# Patient Record
Sex: FEMALE | Race: WHITE | NOT HISPANIC OR LATINO | Employment: PART TIME | URBAN - METROPOLITAN AREA
[De-identification: names, ages, dates, MRNs, and addresses within clinical notes are randomized per-mention and may not be internally consistent; named-entity substitution may affect disease eponyms.]

---

## 2017-01-23 ENCOUNTER — ALLSCRIPTS OFFICE VISIT (OUTPATIENT)
Dept: OTHER | Facility: OTHER | Age: 52
End: 2017-01-23

## 2017-02-06 ENCOUNTER — GENERIC CONVERSION - ENCOUNTER (OUTPATIENT)
Dept: OTHER | Facility: OTHER | Age: 52
End: 2017-02-06

## 2017-03-27 ENCOUNTER — TRANSCRIBE ORDERS (OUTPATIENT)
Dept: ADMINISTRATIVE | Facility: HOSPITAL | Age: 52
End: 2017-03-27

## 2017-03-27 DIAGNOSIS — Z12.31 SCREENING MAMMOGRAM FOR HIGH-RISK PATIENT: Primary | ICD-10-CM

## 2017-03-31 ENCOUNTER — HOSPITAL ENCOUNTER (OUTPATIENT)
Dept: MAMMOGRAPHY | Facility: HOSPITAL | Age: 52
Discharge: HOME | End: 2017-03-31
Payer: COMMERCIAL

## 2017-03-31 DIAGNOSIS — Z12.31 SCREENING MAMMOGRAM FOR HIGH-RISK PATIENT: ICD-10-CM

## 2017-03-31 PROCEDURE — G0202 SCR MAMMO BI INCL CAD: HCPCS

## 2017-09-15 ENCOUNTER — ALLSCRIPTS OFFICE VISIT (OUTPATIENT)
Dept: OTHER | Facility: OTHER | Age: 52
End: 2017-09-15

## 2017-09-15 ENCOUNTER — APPOINTMENT (OUTPATIENT)
Dept: RADIOLOGY | Facility: MEDICAL CENTER | Age: 52
End: 2017-09-15
Payer: COMMERCIAL

## 2017-09-15 DIAGNOSIS — M25.50 PAIN IN JOINT: ICD-10-CM

## 2017-09-15 DIAGNOSIS — R52 PAIN: ICD-10-CM

## 2017-09-15 DIAGNOSIS — M17.11 PRIMARY OSTEOARTHRITIS OF RIGHT KNEE: ICD-10-CM

## 2017-09-15 PROCEDURE — 73562 X-RAY EXAM OF KNEE 3: CPT

## 2017-10-12 ENCOUNTER — TRANSCRIBE ORDERS (OUTPATIENT)
Dept: RADIOLOGY | Facility: MEDICAL CENTER | Age: 52
End: 2017-10-12

## 2017-10-12 ENCOUNTER — APPOINTMENT (OUTPATIENT)
Dept: RADIOLOGY | Facility: MEDICAL CENTER | Age: 52
End: 2017-10-12
Payer: COMMERCIAL

## 2017-10-12 DIAGNOSIS — R52 PAIN: ICD-10-CM

## 2017-10-12 PROCEDURE — 73630 X-RAY EXAM OF FOOT: CPT

## 2017-10-13 ENCOUNTER — GENERIC CONVERSION - ENCOUNTER (OUTPATIENT)
Dept: OTHER | Facility: OTHER | Age: 52
End: 2017-10-13

## 2017-11-27 ENCOUNTER — ALLSCRIPTS OFFICE VISIT (OUTPATIENT)
Dept: OTHER | Facility: OTHER | Age: 52
End: 2017-11-27

## 2017-11-28 NOTE — PROGRESS NOTES
Assessment    1  Chronic constipation (564 00) (K59 09)    Plan  Chronic constipation    · Linzess 72 MCG Oral Capsule; take 1 capsule daily    Chief Complaint  Mrs Hellen Dunn aunts here with recurring constipation she seems to wax and wane but when she is acutely symptomatic her pain is quite severe this is 1 of the times when she is acutely symptomatic she has not gone well for several days abdomen should his there is no guarding our rigid there is no rigidity but there is some guarding in the lower abdomen over the rectosigmoid colon I reviewed her most recent colonoscopy in 2016 she had a small tubular adenoma removed from the lumbar from the lower colon but otherwise did not have any significant pathology or strictures      History of Present Illness  HPI: see chief complaint regarding chronic constipation      Review of Systems   Constitutional: No fever, no chills, feels well, no tiredness, no recent weight gain or loss  ENT: no ear ache, no loss of hearing, no nosebleeds or nasal discharge, no sore throat or hoarseness  Cardiovascular: no complaints of slow or fast heart rate, no chest pain, no palpitations, no leg claudication or lower extremity edema  Respiratory: no complaints of shortness of breath, no wheezing, no dyspnea on exertion, no orthopnea or PND  Breasts: no complaints of breast pain, breast lump or nipple discharge  Gastrointestinal: abdominal pain-- and-- constipation  Genitourinary: no complaints of dysuria, no incontinence, no pelvic pain, no dysmenorrhea, no vaginal discharge or abnormal vaginal bleeding  Musculoskeletal: no complaints of arthralgia, no myalgia, no joint swelling or stiffness, no limb pain or swelling  Integumentary: no complaints of skin rash or lesion, no itching or dry skin, no skin wounds  Neurological: no complaints of headache, no confusion, no numbness or tingling, no dizziness or fainting  ROS reviewed  Active Problems    1   Ache in joint (318 81) (M25 50)   2  Acute pain (338 19) (R52)   3  Anemia (285 9) (D64 9)   4  Depression screening (V79 0) (Z13 89)   5  Diverticulosis (562 10) (K57 90)   6  Encounter for routine gynecological examination (V72 31) (Z01 419)   7  Encounter for screening colonoscopy (V76 51) (Z12 11)   8  Encounter for screening mammogram for malignant neoplasm of breast (V76 12) (Z12 31)   9  Erysipelas (035) (A46)   10  Esophageal reflux (530 81) (K21 9)   11  Fatigue (780 79) (R53 83)   12  History of stomach ulcers (V12 79) (Z87 19)   13  Hyperlipidemia (272 4) (E78 5)   14  Hypertension (401 9) (I10)   15  Leg pain, left (729 5) (M79 605)   16  Non-rheumatic mitral regurgitation (424 0) (I34 0)   17  Primary osteoarthritis of left knee (715 16) (M17 12)   18  Systolic murmur (353 8) (H46 6)   19  Thyroid disorder (246 9) (E07 9)   20  Vaginal dryness, menopausal (627 2) (N95 1)   21  Vitamin D deficiency (268 9) (E55 9)   22  Vulvitis (616 10) (N76 2)    Past Medical History  1  History of Acute pansinusitis (461 8) (J01 40)   2  History of Acute tracheobronchitis (466 0) (J20 9)   3  Common cold (460) (J00)   4  History of Dermatitis (692 9) (L30 9)   5  History of Endometrial polyp (621 0) (N84 0)   6  History of Hand Injury (959 4)   7  History of High risk medication use (V58 69) (Z79 899)   8  History Of 5  Previous Pregnancies (V61 5)   9  History of dysfunctional uterine bleeding (V13 29) (Z87 42)   10  History of Renal colic (941 9) (A60)   11  History of Sinusitis (473 9) (J32 9)   12  Sore throat (462) (J02 9)   13  History of Viral gastroenteritis (008 8) (A08 4)   14  History of Viral syndrome (079 99) (B34 9)  Active Problems And Past Medical History Reviewed: The active problems and past medical history were reviewed and updated today  Family History  Mother    1  Family history of Type 2 diabetes mellitus (250 00) (E11 9)  Father    2   Family history of congestive heart failure (V17 49) (Z82 49)  Family History 3  Denied: Family history of Breast Cancer   4  Denied: Family history of Colon Cancer   5  Denied: Family history of Osteoporosis   6  Denied: Family history of Ovarian Cancer   7  Denied: Family history of Uterine Cancer  Family History Reviewed: The family history was reviewed and updated today  Social History   · Being A Social Drinker   · Denied: Drug use (305 90) (F19 90)   · Former smoker (V65 82) (E56 038)   · quit 2003  The social history was reviewed and updated today  The social history was reviewed and is unchanged  Surgical History    1  History of Cholecystectomy   2  History of Gynecologic Services Thermal Endometrial Ablation   3  History of Hysteroscopy   4  History of Near-Total Thyroidectomy   5  History of Tonsillectomy With Adenoidectomy   6  History of Tubal Ligation  Surgical History Reviewed: The surgical history was reviewed and updated today  Current Meds   1  Aspirin 81 MG CAPS; Therapy: (Recorded:58Ulf1450) to Recorded   2  Atorvastatin Calcium 10 MG Oral Tablet; take 1 tablet by mouth once daily; Therapy: [id number] to (Astrid Jennings)  Requested for: 80Vpe2487; Last Rx:86Lom3288 Ordered   3  Lisinopril 10 MG Oral Tablet; TAKE 1 TABLET DAILY AS DIRECTED; Therapy: 21Jun2016 to (Evaluate:17Jan2018)  Requested for: 61Tye8398; Last Rx:45Xju2509 Ordered   4  Prevacid CPDR; TAKE 1 CAPSULE Daily Recorded   5  RA Vitamin D-3 2000 UNIT Oral Capsule; One daily; Therapy: 94Buv4058 to (Last Rx:28Cdq6457) Ordered    The medication list was reviewed and updated today  Allergies  1  No Known Drug Allergies    Vitals   Recorded: 83MTD1081 67:14ZP   Systolic 690   Diastolic 84       Physical Exam   Constitutional  General appearance: Abnormal  -- obese  Ears, Nose, Mouth, and Throat  External inspection of ears and nose: Normal    Otoscopic examination: Tympanic membranes translucent with normal light reflex  Canals patent without erythema     Pulmonary  Respiratory effort: No increased work of breathing or signs of respiratory distress  Auscultation of lungs: Clear to auscultation  Cardiovascular  Palpation of heart: Normal PMI, no thrills  Auscultation of heart: Normal rate and rhythm, normal S1 and S2, without murmurs  Abdomen  Abdomen: Abnormal  -- tenderness inferior to the umbilicus  Liver and spleen: No hepatomegaly or splenomegaly           Signatures   Electronically signed by : Aida Gary DO; Nov 27 2017  4:37PM EST                       (Author)

## 2017-11-29 ENCOUNTER — TRANSCRIBE ORDERS (OUTPATIENT)
Dept: ADMINISTRATIVE | Facility: HOSPITAL | Age: 52
End: 2017-11-29

## 2017-11-29 ENCOUNTER — APPOINTMENT (EMERGENCY)
Dept: CT IMAGING | Facility: HOSPITAL | Age: 52
End: 2017-11-29
Payer: COMMERCIAL

## 2017-11-29 ENCOUNTER — APPOINTMENT (OUTPATIENT)
Dept: LAB | Facility: HOSPITAL | Age: 52
End: 2017-11-29
Attending: FAMILY MEDICINE
Payer: COMMERCIAL

## 2017-11-29 ENCOUNTER — HOSPITAL ENCOUNTER (EMERGENCY)
Facility: HOSPITAL | Age: 52
Discharge: HOME | End: 2017-11-29
Attending: EMERGENCY MEDICINE
Payer: COMMERCIAL

## 2017-11-29 ENCOUNTER — HOSPITAL ENCOUNTER (OUTPATIENT)
Dept: RADIOLOGY | Facility: HOSPITAL | Age: 52
Discharge: HOME | End: 2017-11-29
Attending: FAMILY MEDICINE
Payer: COMMERCIAL

## 2017-11-29 VITALS
TEMPERATURE: 98.5 F | HEART RATE: 82 BPM | RESPIRATION RATE: 18 BRPM | SYSTOLIC BLOOD PRESSURE: 159 MMHG | OXYGEN SATURATION: 100 % | DIASTOLIC BLOOD PRESSURE: 75 MMHG | WEIGHT: 210 LBS | BODY MASS INDEX: 39.68 KG/M2

## 2017-11-29 DIAGNOSIS — R52 PAIN: ICD-10-CM

## 2017-11-29 DIAGNOSIS — K57.92 DIVERTICULITIS: Primary | ICD-10-CM

## 2017-11-29 LAB
ALBUMIN SERPL BCP-MCNC: 3.2 G/DL (ref 3.5–5)
ALP SERPL-CCNC: 110 U/L (ref 46–116)
ALT SERPL W P-5'-P-CCNC: 20 U/L (ref 12–78)
ANION GAP SERPL CALCULATED.3IONS-SCNC: 8 MMOL/L (ref 4–13)
AST SERPL W P-5'-P-CCNC: 12 U/L (ref 5–45)
BACTERIA UR QL AUTO: ABNORMAL /HPF
BASOPHILS # BLD AUTO: 0.05 THOUSANDS/ΜL (ref 0–0.1)
BASOPHILS NFR BLD AUTO: 0 % (ref 0–1)
BILIRUB SERPL-MCNC: 1.4 MG/DL (ref 0.2–1)
BILIRUB UR QL STRIP: ABNORMAL
BUN SERPL-MCNC: 11 MG/DL (ref 5–25)
CALCIUM SERPL-MCNC: 9.7 MG/DL (ref 8.3–10.1)
CHLORIDE SERPL-SCNC: 98 MMOL/L (ref 100–108)
CLARITY UR: CLEAR
CO2 SERPL-SCNC: 31 MMOL/L (ref 21–32)
COLOR UR: YELLOW
CREAT SERPL-MCNC: 0.74 MG/DL (ref 0.6–1.3)
EOSINOPHIL # BLD AUTO: 0.02 THOUSAND/ΜL (ref 0–0.61)
EOSINOPHIL NFR BLD AUTO: 0 % (ref 0–6)
ERYTHROCYTE [DISTWIDTH] IN BLOOD BY AUTOMATED COUNT: 11.7 % (ref 11.6–15.1)
GFR SERPL CREATININE-BSD FRML MDRD: 93 ML/MIN/1.73SQ M
GLUCOSE SERPL-MCNC: 82 MG/DL (ref 65–140)
GLUCOSE UR STRIP-MCNC: NEGATIVE MG/DL
HCT VFR BLD AUTO: 40.1 % (ref 34.8–46.1)
HGB BLD-MCNC: 14 G/DL (ref 11.5–15.4)
HGB UR QL STRIP.AUTO: ABNORMAL
KETONES UR STRIP-MCNC: ABNORMAL MG/DL
LEUKOCYTE ESTERASE UR QL STRIP: NEGATIVE
LYMPHOCYTES # BLD AUTO: 1.98 THOUSANDS/ΜL (ref 0.6–4.47)
LYMPHOCYTES NFR BLD AUTO: 16 % (ref 14–44)
MCH RBC QN AUTO: 30.4 PG (ref 26.8–34.3)
MCHC RBC AUTO-ENTMCNC: 34.9 G/DL (ref 31.4–37.4)
MCV RBC AUTO: 87 FL (ref 82–98)
MONOCYTES # BLD AUTO: 0.87 THOUSAND/ΜL (ref 0.17–1.22)
MONOCYTES NFR BLD AUTO: 7 % (ref 4–12)
NEUTROPHILS # BLD AUTO: 9.12 THOUSANDS/ΜL (ref 1.85–7.62)
NEUTS SEG NFR BLD AUTO: 77 % (ref 43–75)
NITRITE UR QL STRIP: NEGATIVE
NON-SQ EPI CELLS URNS QL MICRO: ABNORMAL /HPF
PH UR STRIP.AUTO: 6 [PH] (ref 4.5–8)
PLATELET # BLD AUTO: 307 THOUSANDS/UL (ref 149–390)
PMV BLD AUTO: 10.1 FL (ref 8.9–12.7)
POTASSIUM SERPL-SCNC: 3.3 MMOL/L (ref 3.5–5.3)
PROT SERPL-MCNC: 7.8 G/DL (ref 6.4–8.2)
PROT UR STRIP-MCNC: NEGATIVE MG/DL
RBC # BLD AUTO: 4.6 MILLION/UL (ref 3.81–5.12)
RBC #/AREA URNS AUTO: ABNORMAL /HPF
SODIUM SERPL-SCNC: 137 MMOL/L (ref 136–145)
SP GR UR STRIP.AUTO: 1.02 (ref 1–1.03)
UROBILINOGEN UR QL STRIP.AUTO: 1 E.U./DL
WBC # BLD AUTO: 12.04 THOUSAND/UL (ref 4.31–10.16)
WBC #/AREA URNS AUTO: ABNORMAL /HPF

## 2017-11-29 PROCEDURE — 99284 EMERGENCY DEPT VISIT MOD MDM: CPT

## 2017-11-29 PROCEDURE — 80053 COMPREHEN METABOLIC PANEL: CPT | Performed by: EMERGENCY MEDICINE

## 2017-11-29 PROCEDURE — 85025 COMPLETE CBC W/AUTO DIFF WBC: CPT

## 2017-11-29 PROCEDURE — 36415 COLL VENOUS BLD VENIPUNCTURE: CPT

## 2017-11-29 PROCEDURE — 81001 URINALYSIS AUTO W/SCOPE: CPT | Performed by: EMERGENCY MEDICINE

## 2017-11-29 PROCEDURE — 96360 HYDRATION IV INFUSION INIT: CPT

## 2017-11-29 PROCEDURE — 74022 RADEX COMPL AQT ABD SERIES: CPT

## 2017-11-29 PROCEDURE — 74177 CT ABD & PELVIS W/CONTRAST: CPT

## 2017-11-29 RX ORDER — MORPHINE SULFATE 15 MG/1
7.5 TABLET ORAL EVERY 6 HOURS PRN
Qty: 8 TABLET | Refills: 0 | Status: SHIPPED | OUTPATIENT
Start: 2017-11-29 | End: 2017-12-07 | Stop reason: HOSPADM

## 2017-11-29 RX ORDER — DICYCLOMINE HCL 20 MG
20 TABLET ORAL ONCE
Status: COMPLETED | OUTPATIENT
Start: 2017-11-29 | End: 2017-11-29

## 2017-11-29 RX ORDER — CIPROFLOXACIN 500 MG/1
500 TABLET, FILM COATED ORAL 2 TIMES DAILY
Qty: 14 TABLET | Refills: 0 | Status: ON HOLD | OUTPATIENT
Start: 2017-11-29 | End: 2017-12-04 | Stop reason: SINTOL

## 2017-11-29 RX ORDER — DICYCLOMINE HCL 20 MG
20 TABLET ORAL 3 TIMES DAILY PRN
Qty: 30 TABLET | Refills: 0 | Status: ON HOLD | OUTPATIENT
Start: 2017-11-29 | End: 2017-12-04 | Stop reason: SINTOL

## 2017-11-29 RX ORDER — CIPROFLOXACIN 500 MG/1
500 TABLET, FILM COATED ORAL ONCE
Status: COMPLETED | OUTPATIENT
Start: 2017-11-29 | End: 2017-11-29

## 2017-11-29 RX ORDER — METRONIDAZOLE 500 MG/1
500 TABLET ORAL ONCE
Status: COMPLETED | OUTPATIENT
Start: 2017-11-29 | End: 2017-11-29

## 2017-11-29 RX ORDER — METRONIDAZOLE 500 MG/1
500 TABLET ORAL EVERY 8 HOURS SCHEDULED
Qty: 21 TABLET | Refills: 0 | Status: ON HOLD | OUTPATIENT
Start: 2017-11-29 | End: 2017-12-04 | Stop reason: SINTOL

## 2017-11-29 RX ADMIN — IOHEXOL 100 ML: 350 INJECTION, SOLUTION INTRAVENOUS at 17:39

## 2017-11-29 RX ADMIN — METRONIDAZOLE 500 MG: 500 TABLET ORAL at 18:34

## 2017-11-29 RX ADMIN — CIPROFLOXACIN 500 MG: 500 TABLET, FILM COATED ORAL at 18:34

## 2017-11-29 RX ADMIN — DICYCLOMINE HYDROCHLORIDE 20 MG: 20 TABLET ORAL at 16:48

## 2017-11-29 RX ADMIN — SODIUM CHLORIDE 1000 ML: 0.9 INJECTION, SOLUTION INTRAVENOUS at 17:08

## 2017-11-29 NOTE — ED PROVIDER NOTES
History  Chief Complaint   Patient presents with    Constipation     Patient has been constipated for the past 2 weeks says she is able to go to the bathroom but only goes a little bit  Last BM was today  Patient was seen by Dr Laila Gutierrez today and was sent for an xray and bloodwork  Patient states Dr Laila Gutierrez called her and told her everything was normal but wanted her to come in for a Cat Scan  HPI     47 yoF presents with two weeks of difficulty stooling  Having small BMs, most times firm but sometimes soft, always brown, and still yet feeling constipated  She admits to eating almost no fiber and hasn't tried metamucil or other bulking agents  No obstipation or tenesmus  No fevers, chills, sweats  Having some discomfort, especially in the LLQ  Had colonoscopy a year ago with diverticulosis  No radiating pain  Achey, cramping  Tried dulcolax without relief  Had abd x-ray and CBC today, unremarkable, sent here by PCP for CT imaging  No cp, sob, weakness, light-headedness, urinary changes  MDM:  Imp: constipation and LLQ discomfort, worrisome for colitis, diverticulitis, ovarian cyst, uti, etc   Warrants labs, UA, CT abd, symptom control  Prior to Admission Medications   Prescriptions Last Dose Informant Patient Reported? Taking?    Cholecalciferol (VITAMIN D) 2000 UNITS CAPS   Yes No   Sig: Take by mouth   aspirin 81 MG tablet   Yes No   Sig: Take 81 mg by mouth daily   atorvastatin (LIPITOR) 10 mg tablet   Yes No   Sig: Take 10 mg by mouth daily   lansoprazole (PREVACID) 30 mg capsule   Yes No   Sig: Take 30 mg by mouth daily   lisinopril (ZESTRIL) 10 mg tablet   Yes No   Sig: Take 10 mg by mouth daily      Facility-Administered Medications: None       Past Medical History:   Diagnosis Date    Disease of thyroid gland     Hypertension     Vitamin D deficiency disease        Past Surgical History:   Procedure Laterality Date    CHOLECYSTECTOMY      HYSTERECTOMY      NM ESOPHAGOGASTRODUODENOSCOPY TRANSORAL DIAGNOSTIC N/A 10/21/2016    Procedure: EGD AND COLONOSCOPY;  Surgeon: Chiquita Hagan MD;  Location: [facility];  Service: Gastroenterology    THYROIDECTOMY, PARTIAL      TONSILLECTOMY AND ADENOIDECTOMY      TUBAL LIGATION         History reviewed  No pertinent family history  I have reviewed and agree with the history as documented  Social History   Substance Use Topics    Smoking status: Former Smoker    Smokeless tobacco: Not on file    Alcohol use Yes      Comment: social        Review of Systems   Constitutional: Negative for chills, fatigue and fever  HENT: Negative for congestion, ear pain, rhinorrhea, sinus pressure, sore throat, trouble swallowing and voice change  Eyes: Negative for pain and visual disturbance  Respiratory: Negative for cough, choking, shortness of breath and stridor  Cardiovascular: Negative for chest pain, palpitations and leg swelling  Gastrointestinal: Positive for abdominal pain and constipation  Negative for blood in stool, diarrhea, nausea and vomiting  Endocrine: Negative  Genitourinary: Negative for dysuria, flank pain, hematuria, pelvic pain and urgency  Musculoskeletal: Negative  Skin: Negative  Allergic/Immunologic: Negative  Neurological: Negative for dizziness, speech difficulty, weakness, light-headedness and numbness  Hematological: Negative  Psychiatric/Behavioral: Negative  Physical Exam  ED Triage Vitals [11/29/17 1614]   Temperature Pulse Respirations Blood Pressure SpO2   98 5 °F (36 9 °C) 95 18 141/83 96 %      Temp Source Heart Rate Source Patient Position - Orthostatic VS BP Location FiO2 (%)   Temporal Monitor Sitting Left arm --      Pain Score       7           Orthostatic Vital Signs  Vitals:    11/29/17 1614   BP: 141/83   Pulse: 95   Patient Position - Orthostatic VS: Sitting       Physical Exam   Constitutional: She is oriented to person, place, and time   She appears well-developed and well-nourished  She appears distressed (uncomfortable)  HENT:   Head: Normocephalic and atraumatic  Nose: Nose normal    Mouth/Throat: Oropharynx is clear and moist    Eyes: Conjunctivae and EOM are normal  Pupils are equal, round, and reactive to light  Neck: Normal range of motion  Neck supple  Cardiovascular: Normal rate, regular rhythm, normal heart sounds and intact distal pulses  Exam reveals no gallop and no friction rub  No murmur heard  Pulmonary/Chest: Effort normal and breath sounds normal  No stridor  No respiratory distress  She has no wheezes  She has no rales  She exhibits no tenderness  Abdominal: Soft  Bowel sounds are normal  She exhibits no distension  There is tenderness (LLQ)  There is no rebound and no guarding  Musculoskeletal: Normal range of motion  She exhibits no deformity  Neurological: She is alert and oriented to person, place, and time  She exhibits normal muscle tone  Skin: Skin is warm and dry  No rash noted  No erythema  Psychiatric: She has a normal mood and affect  Vitals reviewed        ED Medications  Medications   ciprofloxacin (CIPRO) tablet 500 mg (not administered)   metroNIDAZOLE (FLAGYL) tablet 500 mg (not administered)   dicyclomine (BENTYL) tablet 20 mg (20 mg Oral Given 11/29/17 1648)   sodium chloride 0 9 % bolus 1,000 mL (1,000 mL Intravenous New Bag 11/29/17 1708)   iohexol (OMNIPAQUE) 350 MG/ML injection (SINGLE-DOSE) 100 mL (100 mL Intravenous Given 11/29/17 1739)       Diagnostic Studies  Results Reviewed     Procedure Component Value Units Date/Time    Comprehensive metabolic panel [[id number]]  (Abnormal) Collected:  11/29/17 1648    Lab Status:  Final result Specimen:  Blood from Arm, Left Updated:  11/29/17 1712     Sodium 137 mmol/L      Potassium 3 3 (L) mmol/L      Chloride 98 (L) mmol/L      CO2 31 mmol/L      Anion Gap 8 mmol/L      BUN 11 mg/dL      Creatinine 0 74 mg/dL      Glucose 82 mg/dL      Calcium 9 7 mg/dL      AST 12 U/L      ALT 20 U/L      Alkaline Phosphatase 110 U/L      Total Protein 7 8 g/dL      Albumin 3 2 (L) g/dL      Total Bilirubin 1 40 (H) mg/dL      eGFR 93 ml/min/1 73sq m     Narrative:         National Kidney Disease Education Program recommendations are as follows:  GFR calculation is accurate only with a steady state creatinine  Chronic Kidney disease less than 60 ml/min/1 73 sq  meters  Kidney failure less than 15 ml/min/1 73 sq  meters  Urine Microscopic [[id number]]  (Abnormal) Collected:  11/29/17 1648    Lab Status:  Final result Specimen:  Urine from Urine, Clean Catch Updated:  11/29/17 1703     RBC, UA 1-2 (A) /hpf      WBC, UA 1-2 (A) /hpf      Epithelial Cells Moderate (A) /hpf      Bacteria, UA Occasional /hpf     UA w Reflex to Microscopic w Reflex to Culture [[id number]]  (Abnormal) Collected:  11/29/17 1648    Lab Status:  Final result Specimen:  Urine from Urine, Clean Catch Updated:  11/29/17 1654     Color, UA Yellow     Clarity, UA Clear     Specific Sublimity, UA 1 020     pH, UA 6 0     Leukocytes, UA Negative     Nitrite, UA Negative     Protein, UA Negative mg/dl      Glucose, UA Negative mg/dl      Ketones, UA 40 (2+) (A) mg/dl      Urobilinogen, UA 1 0 E U /dl      Bilirubin, UA Interference- unable to analyze (A)     Blood, UA Trace-lysed (A)                 CT abdomen pelvis w contrast   Final Result by Nereida Barton MD (11/29 1754)      Findings most compatible with recurrent acute diverticulitis of the sigmoid colon  No abscess or perforation or obstruction seen  Recommend appropriate clinical follow-up to ensure resolution and exclude the possibility of any underlying neoplastic lesion  Multi fibroid uterus  Cholecystectomy           Workstation performed: [id number]                    Procedures  Procedures       Phone Contacts  ED Phone Contact    ED Course  ED Course                                MDM  CritCare Time    Disposition  Final diagnoses:   Diverticulitis     Time reflects when diagnosis was documented in both MDM as applicable and the Disposition within this note     Time User Action Codes Description Comment    11/29/2017  6:11 PM Escobar Stubbs Add [K57 92] Diverticulitis       ED Disposition     ED Disposition Condition Comment    Discharge  [place]  discharge to home/self care  Condition at discharge: Good        Follow-up Information     Follow up With Specialties Details Why Contact Info    Aime Franco DO Family Medicine Schedule an appointment as soon as possible for a visit in 3 days for re-evaluation [address]  [address]  [phone]          Patient's Medications   Discharge Prescriptions    CIPROFLOXACIN (CIPRO) 500 MG TABLET    Take 1 tablet by mouth 2 (two) times a day for 7 days       Start Date: 11/29/2017End Date: 12/6/2017       Order Dose: 500 mg       Quantity: 14 tablet    Refills: 0    DICYCLOMINE (BENTYL) 20 MG TABLET    Take 1 tablet by mouth 3 (three) times a day as needed (cramping or abdominal pain) for up to 10 days       Start Date: 11/29/2017End Date: 12/9/2017       Order Dose: 20 mg       Quantity: 30 tablet    Refills: 0    METRONIDAZOLE (FLAGYL) 500 MG TABLET    Take 1 tablet by mouth every 8 (eight) hours for 7 days       Start Date: 11/29/2017End Date: 12/6/2017       Order Dose: 500 mg       Quantity: 21 tablet    Refills: 0    MORPHINE (MSIR) 15 MG TABLET    Take 0 5 tablets by mouth every 6 (six) hours as needed for severe pain for up to 10 days Max Daily Amount: 30 mg       Start Date: 11/29/2017End Date: 12/9/2017       Order Dose: 7 5 mg       Quantity: 8 tablet    Refills: 0     No discharge procedures on file      ED Provider  Electronically Signed by           Escobar Rosado DO  11/29/17 3448

## 2017-11-29 NOTE — DISCHARGE INSTRUCTIONS
Diverticulitis   WHAT YOU NEED TO KNOW:   Diverticulitis is a condition that causes small pockets along your intestine called diverticula to become inflamed or infected  This is caused by hard bowel movements, food, or bacteria that get stuck in the pockets  DISCHARGE INSTRUCTIONS:   Return to the emergency department if:   · You have bowel movement or foul-smelling discharge leaking from your vagina or in your urine  · You have severe diarrhea  · You urinate less than usual or not at all  · You are not able to have a bowel movement  · You cannot stop vomiting  · You have severe abdominal pain, a fever, and your abdomen is larger than usual      · You have new or increased blood in your bowel movements  Contact your healthcare provider if:   · You have pain when you urinate  · Your symptoms get worse or do not go away  · You have questions or concerns about your condition or care  Medicines:   · Antibiotics  may be given to help treat a bacterial infection  · Prescription pain medicine  may be given  Ask your healthcare provider how to take this medicine safely  Some prescription pain medicines contain acetaminophen  Do not take other medicines that contain acetaminophen without talking to your healthcare provider  Too much acetaminophen may cause liver damage  Prescription pain medicine may cause constipation  Ask your healthcare provider how to prevent or treat constipation  · Take your medicine as directed  Contact your healthcare provider if you think your medicine is not helping or if you have side effects  Tell him or her if you are allergic to any medicine  Keep a list of the medicines, vitamins, and herbs you take  Include the amounts, and when and why you take them  Bring the list or the pill bottles to follow-up visits  Carry your medicine list with you in case of an emergency  Clear liquid diet:  A clear liquid diet includes any liquids that you can see through  Examples include water, ginger-delta, cranberry or apple juice, frozen fruit ice, or broth  Stay on a clear liquid diet until your symptoms are gone, or as directed  Follow up with your healthcare provider as directed: You may need to return for a colonoscopy  When your symptoms are gone, you may need a low-fat, high-fiber diet to prevent diverticulitis from developing again  Your healthcare provider or dietitian can help you create meal plans  Write down your questions so you remember to ask them during your visits  © 2017 [facility] Information is for End User's use only and may not be sold, redistributed or otherwise used for commercial purposes  All illustrations and images included in CareNotes® are the copyrighted property of [employer]  or Yonis Stover  The above information is an  only  It is not intended as medical advice for individual conditions or treatments  Talk to your doctor, nurse or pharmacist before following any medical regimen to see if it is safe and effective for you

## 2017-12-04 ENCOUNTER — APPOINTMENT (EMERGENCY)
Dept: CT IMAGING | Facility: HOSPITAL | Age: 52
DRG: 392 | End: 2017-12-04
Payer: COMMERCIAL

## 2017-12-04 ENCOUNTER — HOSPITAL ENCOUNTER (INPATIENT)
Facility: HOSPITAL | Age: 52
LOS: 3 days | Discharge: HOME | DRG: 392 | End: 2017-12-07
Attending: EMERGENCY MEDICINE | Admitting: INTERNAL MEDICINE
Payer: COMMERCIAL

## 2017-12-04 DIAGNOSIS — K57.92 DIVERTICULITIS: Primary | ICD-10-CM

## 2017-12-04 DIAGNOSIS — K63.0 PERICOLONIC ABSCESS: ICD-10-CM

## 2017-12-04 PROBLEM — E78.5 DYSLIPIDEMIA: Status: ACTIVE | Noted: 2017-12-04

## 2017-12-04 PROBLEM — I10 ESSENTIAL HYPERTENSION: Status: ACTIVE | Noted: 2017-12-04

## 2017-12-04 PROBLEM — E87.6 HYPOKALEMIA: Status: ACTIVE | Noted: 2017-12-04

## 2017-12-04 PROBLEM — K21.9 GASTROESOPHAGEAL REFLUX DISEASE WITHOUT ESOPHAGITIS: Chronic | Status: ACTIVE | Noted: 2017-12-04

## 2017-12-04 LAB
ALBUMIN SERPL BCP-MCNC: 3.1 G/DL (ref 3.5–5)
ALP SERPL-CCNC: 102 U/L (ref 46–116)
ALT SERPL W P-5'-P-CCNC: 21 U/L (ref 12–78)
ANION GAP SERPL CALCULATED.3IONS-SCNC: 11 MMOL/L (ref 4–13)
AST SERPL W P-5'-P-CCNC: 12 U/L (ref 5–45)
BASOPHILS # BLD AUTO: 0.04 THOUSANDS/ΜL (ref 0–0.1)
BASOPHILS NFR BLD AUTO: 0 % (ref 0–1)
BILIRUB SERPL-MCNC: 0.9 MG/DL (ref 0.2–1)
BUN SERPL-MCNC: 8 MG/DL (ref 5–25)
CALCIUM SERPL-MCNC: 8.9 MG/DL (ref 8.3–10.1)
CHLORIDE SERPL-SCNC: 99 MMOL/L (ref 100–108)
CO2 SERPL-SCNC: 26 MMOL/L (ref 21–32)
CREAT SERPL-MCNC: 0.7 MG/DL (ref 0.6–1.3)
EOSINOPHIL # BLD AUTO: 0.02 THOUSAND/ΜL (ref 0–0.61)
EOSINOPHIL NFR BLD AUTO: 0 % (ref 0–6)
ERYTHROCYTE [DISTWIDTH] IN BLOOD BY AUTOMATED COUNT: 12 % (ref 11.6–15.1)
GFR SERPL CREATININE-BSD FRML MDRD: 99 ML/MIN/1.73SQ M
GLUCOSE SERPL-MCNC: 125 MG/DL (ref 65–140)
HCT VFR BLD AUTO: 42.2 % (ref 34.8–46.1)
HGB BLD-MCNC: 14.8 G/DL (ref 11.5–15.4)
LACTATE SERPL-SCNC: 1.2 MMOL/L (ref 0.5–2)
LIPASE SERPL-CCNC: 403 U/L (ref 73–393)
LYMPHOCYTES # BLD AUTO: 1.3 THOUSANDS/ΜL (ref 0.6–4.47)
LYMPHOCYTES NFR BLD AUTO: 8 % (ref 14–44)
MCH RBC QN AUTO: 30.5 PG (ref 26.8–34.3)
MCHC RBC AUTO-ENTMCNC: 35.1 G/DL (ref 31.4–37.4)
MCV RBC AUTO: 87 FL (ref 82–98)
MONOCYTES # BLD AUTO: 0.97 THOUSAND/ΜL (ref 0.17–1.22)
MONOCYTES NFR BLD AUTO: 6 % (ref 4–12)
NEUTROPHILS # BLD AUTO: 14.21 THOUSANDS/ΜL (ref 1.85–7.62)
NEUTS SEG NFR BLD AUTO: 86 % (ref 43–75)
PLATELET # BLD AUTO: 372 THOUSANDS/UL (ref 149–390)
PMV BLD AUTO: 10.2 FL (ref 8.9–12.7)
POTASSIUM SERPL-SCNC: 3.2 MMOL/L (ref 3.5–5.3)
PROT SERPL-MCNC: 7.8 G/DL (ref 6.4–8.2)
RBC # BLD AUTO: 4.86 MILLION/UL (ref 3.81–5.12)
SODIUM SERPL-SCNC: 136 MMOL/L (ref 136–145)
WBC # BLD AUTO: 16.54 THOUSAND/UL (ref 4.31–10.16)

## 2017-12-04 PROCEDURE — 83605 ASSAY OF LACTIC ACID: CPT | Performed by: EMERGENCY MEDICINE

## 2017-12-04 PROCEDURE — 36415 COLL VENOUS BLD VENIPUNCTURE: CPT | Performed by: EMERGENCY MEDICINE

## 2017-12-04 PROCEDURE — 74177 CT ABD & PELVIS W/CONTRAST: CPT

## 2017-12-04 PROCEDURE — 96375 TX/PRO/DX INJ NEW DRUG ADDON: CPT

## 2017-12-04 PROCEDURE — 96361 HYDRATE IV INFUSION ADD-ON: CPT

## 2017-12-04 PROCEDURE — 99285 EMERGENCY DEPT VISIT HI MDM: CPT

## 2017-12-04 PROCEDURE — 96365 THER/PROPH/DIAG IV INF INIT: CPT

## 2017-12-04 PROCEDURE — 85025 COMPLETE CBC W/AUTO DIFF WBC: CPT | Performed by: EMERGENCY MEDICINE

## 2017-12-04 PROCEDURE — 96367 TX/PROPH/DG ADDL SEQ IV INF: CPT

## 2017-12-04 PROCEDURE — 83690 ASSAY OF LIPASE: CPT | Performed by: EMERGENCY MEDICINE

## 2017-12-04 PROCEDURE — 80053 COMPREHEN METABOLIC PANEL: CPT | Performed by: EMERGENCY MEDICINE

## 2017-12-04 RX ORDER — ONDANSETRON 2 MG/ML
4 INJECTION INTRAMUSCULAR; INTRAVENOUS EVERY 6 HOURS PRN
Status: DISCONTINUED | OUTPATIENT
Start: 2017-12-04 | End: 2017-12-07 | Stop reason: HOSPADM

## 2017-12-04 RX ORDER — SODIUM CHLORIDE 9 MG/ML
125 INJECTION, SOLUTION INTRAVENOUS CONTINUOUS
Status: DISCONTINUED | OUTPATIENT
Start: 2017-12-04 | End: 2017-12-04

## 2017-12-04 RX ORDER — ONDANSETRON 2 MG/ML
4 INJECTION INTRAMUSCULAR; INTRAVENOUS ONCE
Status: COMPLETED | OUTPATIENT
Start: 2017-12-04 | End: 2017-12-04

## 2017-12-04 RX ORDER — CIPROFLOXACIN 2 MG/ML
400 INJECTION, SOLUTION INTRAVENOUS ONCE
Status: COMPLETED | OUTPATIENT
Start: 2017-12-04 | End: 2017-12-04

## 2017-12-04 RX ORDER — PANTOPRAZOLE SODIUM 40 MG/1
40 INJECTION, POWDER, FOR SOLUTION INTRAVENOUS
Status: DISCONTINUED | OUTPATIENT
Start: 2017-12-05 | End: 2017-12-06

## 2017-12-04 RX ORDER — ONDANSETRON 2 MG/ML
4 INJECTION INTRAMUSCULAR; INTRAVENOUS EVERY 8 HOURS PRN
Status: DISCONTINUED | OUTPATIENT
Start: 2017-12-04 | End: 2017-12-04

## 2017-12-04 RX ORDER — SODIUM CHLORIDE AND POTASSIUM CHLORIDE .9; .15 G/100ML; G/100ML
50 SOLUTION INTRAVENOUS CONTINUOUS
Status: DISCONTINUED | OUTPATIENT
Start: 2017-12-04 | End: 2017-12-06

## 2017-12-04 RX ORDER — ACETAMINOPHEN 325 MG/1
650 TABLET ORAL EVERY 6 HOURS PRN
Status: DISCONTINUED | OUTPATIENT
Start: 2017-12-04 | End: 2017-12-07 | Stop reason: HOSPADM

## 2017-12-04 RX ADMIN — SODIUM CHLORIDE AND POTASSIUM CHLORIDE 125 ML/HR: .9; .15 SOLUTION INTRAVENOUS at 15:59

## 2017-12-04 RX ADMIN — ONDANSETRON 4 MG: 2 INJECTION INTRAMUSCULAR; INTRAVENOUS at 15:59

## 2017-12-04 RX ADMIN — HYDROMORPHONE HYDROCHLORIDE 0.2 MG: 1 INJECTION, SOLUTION INTRAMUSCULAR; INTRAVENOUS; SUBCUTANEOUS at 17:42

## 2017-12-04 RX ADMIN — CIPROFLOXACIN 400 MG: 2 INJECTION, SOLUTION INTRAVENOUS at 10:55

## 2017-12-04 RX ADMIN — IOHEXOL 100 ML: 350 INJECTION, SOLUTION INTRAVENOUS at 11:18

## 2017-12-04 RX ADMIN — HYDROMORPHONE HYDROCHLORIDE 1 MG: 1 INJECTION, SOLUTION INTRAMUSCULAR; INTRAVENOUS; SUBCUTANEOUS at 10:18

## 2017-12-04 RX ADMIN — SODIUM CHLORIDE 1000 ML: 0.9 INJECTION, SOLUTION INTRAVENOUS at 09:31

## 2017-12-04 RX ADMIN — ONDANSETRON 4 MG: 2 INJECTION INTRAMUSCULAR; INTRAVENOUS at 09:30

## 2017-12-04 RX ADMIN — METRONIDAZOLE 500 MG: 500 INJECTION, SOLUTION INTRAVENOUS at 10:18

## 2017-12-04 RX ADMIN — PIPERACILLIN SODIUM AND TAZOBACTAM SODIUM 3.38 G: 3; .375 INJECTION, POWDER, LYOPHILIZED, FOR SOLUTION INTRAVENOUS at 18:50

## 2017-12-04 NOTE — ED PROVIDER NOTES
History  Chief Complaint   Patient presents with    Abdominal Pain     Abdominal pain nausea,vomiting and diarrhea     HPI     50-year-old female I saw personally about 5 days ago and found to have diverticulitis with left lower quadrant pain and constipation presents to the emergency department with ongoing abdominal pain  She has been progressed to having some diarrhea that is slightly orange colored  Also having vomiting the past couple days roughly fiber 6 episodes of nonbilious nonbloody material   No fevers chills or sweats  No vaginal symptoms  No urinary changes  Has no appetite  Had a sister who had diverticulitis so bad she needed to have colostomy placed  The pain is achy cramping nonradiating  Persistent throughout the day  Medical decision making:    Impression:  Ongoing diverticulitis most likely in this patient worrisome for intramural abscesses etc   She warrants repeat labs, hydration, symptom control, CT abdomen  Depending on workup, her disposition is pending  Prior to Admission Medications   Prescriptions Last Dose Informant Patient Reported? Taking?    Cholecalciferol (VITAMIN D) 2000 UNITS CAPS   Yes Yes   Sig: Take by mouth   aspirin 81 MG tablet   Yes Yes   Sig: Take 81 mg by mouth daily   atorvastatin (LIPITOR) 10 mg tablet   Yes Yes   Sig: Take 10 mg by mouth daily   ciprofloxacin (CIPRO) 500 mg tablet   No Yes   Sig: Take 1 tablet by mouth 2 (two) times a day for 7 days   dicyclomine (BENTYL) 20 mg tablet   No Yes   Sig: Take 1 tablet by mouth 3 (three) times a day as needed (cramping or abdominal pain) for up to 10 days   lansoprazole (PREVACID) 30 mg capsule   Yes Yes   Sig: Take 30 mg by mouth daily   lisinopril (ZESTRIL) 10 mg tablet   Yes Yes   Sig: Take 10 mg by mouth daily   metroNIDAZOLE (FLAGYL) 500 mg tablet   No Yes   Sig: Take 1 tablet by mouth every 8 (eight) hours for 7 days   morphine (MSIR) 15 mg tablet   No Yes   Sig: Take 0 5 tablets by mouth every 6 (six) hours as needed for severe pain for up to 10 days Max Daily Amount: 30 mg      Facility-Administered Medications: None       Past Medical History:   Diagnosis Date    Disease of thyroid gland     Hypertension     Vitamin D deficiency disease        Past Surgical History:   Procedure Laterality Date    CHOLECYSTECTOMY      HYSTERECTOMY      [place] ESOPHAGOGASTRODUODENOSCOPY TRANSORAL DIAGNOSTIC N/A 10/21/2016    Procedure: EGD AND COLONOSCOPY;  Surgeon: Guille Martinez MD;  Location: [facility];  Service: Gastroenterology    THYROIDECTOMY, PARTIAL      TONSILLECTOMY AND ADENOIDECTOMY      TUBAL LIGATION         History reviewed  No pertinent family history  I have reviewed and agree with the history as documented  Social History   Substance Use Topics    Smoking status: Former Smoker    Smokeless tobacco: Never Used    Alcohol use Yes      Comment: social        Review of Systems   Constitutional: Negative for chills, fatigue and fever  HENT: Negative for congestion, ear pain, rhinorrhea, sinus pressure, sore throat, trouble swallowing and voice change  Eyes: Negative for pain and visual disturbance  Respiratory: Negative for cough, choking, shortness of breath and stridor  Cardiovascular: Negative for chest pain, palpitations and leg swelling  Gastrointestinal: Positive for abdominal pain, diarrhea, nausea and vomiting  Negative for blood in stool and constipation  Endocrine: Negative  Genitourinary: Negative for dysuria, flank pain, hematuria, pelvic pain and urgency  Musculoskeletal: Negative  Skin: Negative  Allergic/Immunologic: Negative  Neurological: Negative for dizziness, speech difficulty, weakness, light-headedness and numbness  Hematological: Negative  Psychiatric/Behavioral: Negative          Physical Exam  ED Triage Vitals   Temperature Pulse Respirations Blood Pressure SpO2   12/04/17 0920 12/04/17 0920 12/04/17 0920 12/04/17 0930 12/04/17 0920   99 °F (37 2 °C) 98 18 139/94 98 %      Temp Source Heart Rate Source Patient Position - Orthostatic VS BP Location FiO2 (%)   12/04/17 0920 12/04/17 0920 12/04/17 0920 12/04/17 0920 --   Temporal Monitor Lying Left arm       Pain Score       12/04/17 0920       5           Orthostatic Vital Signs  Vitals:    12/04/17 1045 12/04/17 1100 12/04/17 1130 12/04/17 1200   BP:  108/56  98/55   Pulse: 87 89 88 84   Patient Position - Orthostatic VS:           Physical Exam   Constitutional: She is oriented to person, place, and time  She appears well-developed and well-nourished  She appears distressed (uncomfortable)  HENT:   Head: Normocephalic and atraumatic  Nose: Nose normal    Mouth/Throat: Oropharynx is clear and moist    Eyes: Conjunctivae and EOM are normal  Pupils are equal, round, and reactive to light  Neck: Normal range of motion  Neck supple  Cardiovascular: Normal rate, regular rhythm, normal heart sounds and intact distal pulses  Exam reveals no gallop and no friction rub  No murmur heard  Pulmonary/Chest: Effort normal and breath sounds normal  No stridor  No respiratory distress  She has no wheezes  She has no rales  She exhibits no tenderness  Abdominal: Soft  Bowel sounds are normal  She exhibits no distension  There is tenderness (LLQ pain)  There is no rebound and no guarding  Musculoskeletal: Normal range of motion  She exhibits no deformity  Neurological: She is alert and oriented to person, place, and time  She exhibits normal muscle tone  Skin: Skin is warm and dry  No rash noted  No erythema  Psychiatric: She has a normal mood and affect  Vitals reviewed        ED Medications  Medications   piperacillin-tazobactam (ZOSYN) 3 375 g in dextrose 5 % 50 mL IVPB (not administered)   sodium chloride 0 9 % with KCl 20 mEq/L infusion (premix) (not administered)   ondansetron (ZOFRAN) injection 4 mg (not administered)   HYDROmorphone (DILAUDID) 1 mg/mL injection 1 mg (not administered)   sodium chloride 0 9 % bolus 1,000 mL (0 mL Intravenous Stopped 12/4/17 1055)   ondansetron (ZOFRAN) injection 4 mg (4 mg Intravenous Given 12/4/17 0930)   ciprofloxacin (CIPRO) IVPB (premix) 400 mg (400 mg Intravenous New Bag 12/4/17 1055)   metroNIDAZOLE (FLAGYL) IVPB (premix) 500 mg (0 mg Intravenous Stopped 12/4/17 1055)   HYDROmorphone (DILAUDID) 1 mg/mL injection 1 mg (1 mg Intravenous Given 12/4/17 1018)   iohexol (OMNIPAQUE) 350 MG/ML injection (MULTI-DOSE) 100 mL (100 mL Intravenous Given 12/4/17 1118)       Diagnostic Studies  Results Reviewed     Procedure Component Value Units Date/Time    Lipase [[id number]]  (Abnormal) Collected:  12/04/17 0930    Lab Status:  Final result Specimen:  Blood from Line, Venous Updated:  12/04/17 1002     Lipase 403 (H) u/L     Comprehensive metabolic panel [[id number]]  (Abnormal) Collected:  12/04/17 0930    Lab Status:  Final result Specimen:  Blood from Line, Venous Updated:  12/04/17 1002     Sodium 136 mmol/L      Potassium 3 2 (L) mmol/L      Chloride 99 (L) mmol/L      CO2 26 mmol/L      Anion Gap 11 mmol/L      BUN 8 mg/dL      Creatinine 0 70 mg/dL      Glucose 125 mg/dL      Calcium 8 9 mg/dL      AST 12 U/L      ALT 21 U/L      Alkaline Phosphatase 102 U/L      Total Protein 7 8 g/dL      Albumin 3 1 (L) g/dL      Total Bilirubin 0 90 mg/dL      eGFR 99 ml/min/1 73sq m     Narrative:         National Kidney Disease Education Program recommendations are as follows:  GFR calculation is accurate only with a steady state creatinine  Chronic Kidney disease less than 60 ml/min/1 73 sq  meters  Kidney failure less than 15 ml/min/1 73 sq  meters  Lactic acid, plasma [[id number]]  (Normal) Collected:  12/04/17 0930    Lab Status:  Final result Specimen:  Blood from Line, Venous Updated:  12/04/17 0956     LACTIC ACID 1 2 mmol/L     Narrative:         Result may be elevated if tourniquet was used during collection      CBC and differential [[id number]]  (Abnormal) Collected:  12/04/17 0930    Lab Status:  Final result Specimen:  Blood from Line, Venous Updated:  12/04/17 0940     WBC 16 54 (H) Thousand/uL      RBC 4 86 Million/uL      Hemoglobin 14 8 g/dL      Hematocrit 42 2 %      MCV 87 fL      MCH 30 5 pg      MCHC 35 1 g/dL      RDW 12 0 %      MPV 10 2 fL      Platelets 859 Thousands/uL      Neutrophils Relative 86 (H) %      Lymphocytes Relative 8 (L) %      Monocytes Relative 6 %      Eosinophils Relative 0 %      Basophils Relative 0 %      Neutrophils Absolute 14 21 (H) Thousands/µL      Lymphocytes Absolute 1 30 Thousands/µL      Monocytes Absolute 0 97 Thousand/µL      Eosinophils Absolute 0 02 Thousand/µL      Basophils Absolute 0 04 Thousands/µL                  CT abdomen pelvis w contrast   Final Result by Shonna James MD (12/04 1140)      Diffuse, persistent, thickening of the proximal sigmoid colon with a focal area of more severe inflammatory sabout a proximal sigmoid diverticulum likely on the basis of persistent sacute diverticulitis  Interval development of 16mm and 13 mm collections adjacent to the area of maximum colonic inflammation in keeping with small pericolonic abscesses  These both show thin surrounding rinds of subtle enhancement  Workstation performed: [id number]                    Procedures  Procedures       Phone Contacts  ED Phone Contact    ED Course  ED Course as of Dec 04 1501   Mon Dec 04, 2017   1150 Spoke to surgery on-call  The abscesses are so small so as not to need procedural intervention  He recomends IV zosyn and medical admission                                  MDM  CritCare Time    Disposition  Final diagnoses:   Diverticulitis   Pericolonic abscess     Time reflects when diagnosis was documented in both MDM as applicable and the Disposition within this note     Time User Action Codes Description Comment    12/4/2017 11:58 AM Enoc, Case Add [[id number]] Diverticulitis     12/4/2017 11:58 AM Enoc, Case Add [[id number]] Pericolonic abscess       ED Disposition     ED Disposition Condition Comment    Admit  Case was discussed with ANGELINA and the patient's admission status was agreed to be Admission Status: inpatient status to the service of Dr Caterina Roldan  Follow-up Information    None       Patient's Medications   Discharge Prescriptions    No medications on file     No discharge procedures on file      ED Provider  Electronically Signed by           Escobar Russell, DO  12/04/17 2442

## 2017-12-04 NOTE — MEDICAL STUDENT
H&P Exam - General Surgery   Monet Rich 47 y o  female MRN: [id number]  Unit/Bed#: RM02 Encounter: [id number]    Assessment/Plan     Assessment:  1  Persistent acute diverticulitis with 16mm and 13mm abscesses  2  Hypokalemia   Hypertension, controlled    Plan:  1  Dx: History, physical, CT abd/pelvis   Tx: remain NPO    IV fluids and rehydration    Start Zosyn    Remain in hospital for observation    1  Dx: History and physical   Tx: Potassium bump    Recheck BMP in AM    History of Present Illness "abdominal pain and diarrhea" x 8 days  HPI:  Monet Rich is a 47 y o  female who presents with abdominal pain and diarrhea  Eight days ago she was having abdominal pain and went to her PCP, Dr Cecelia Quinn  He treated her for constipation  Two days later when she did not feel any better he sent her to the ER where a CT of the abdomen was done  She was diagnosed with diverticulitis and put on two antibiotics - Flagyl and Cipro  She states that since the pain has gotten worse and she now states that she has diarrhea from the antibiotics  She describes it as mucus and denies seeing blood in her bowel movements  She reports that when she was first seen in the ER her pain was like a belt around her lower stomach  She states that the pain has intensified but has moved to just her left lower stomach  She states that she has had a 1-2 bouts of diarrhea a day  She also reports vomiting bile the last 2 days  She is able to drink water but does not have the appetite to eat  She has never experienced pain like this before  Nothing helps to make it feel better  She states that she does not think that she has had a fever but she has experienced chills in the past week  Review of Systems   Constitutional: Negative for fever and unexpected weight change  HENT: Negative for congestion and hearing loss  Has experienced headaches for a couple weeks  Eyes: Negative      Respiratory: Negative for chest tightness and shortness of breath  Cardiovascular: Negative for chest pain and leg swelling  Gastrointestinal: Positive for abdominal pain  Negative for blood in stool  Endocrine: Negative  Genitourinary: Negative  Skin: Negative  Allergic/Immunologic: Negative  Neurological: Positive for dizziness  Psychiatric/Behavioral: Negative  Historical Information   Past Medical History:   Diagnosis Date    Disease of thyroid gland     Hypertension     Vitamin D deficiency disease      Past Surgical History:   Procedure Laterality Date    CHOLECYSTECTOMY      HYSTERECTOMY      [place] ESOPHAGOGASTRODUODENOSCOPY TRANSORAL DIAGNOSTIC N/A 10/21/2016    Procedure: EGD AND COLONOSCOPY;  Surgeon: Sally Beach MD;  Location: [facility];  Service: Gastroenterology    THYROIDECTOMY, PARTIAL      TONSILLECTOMY AND ADENOIDECTOMY      TUBAL LIGATION       Social History   History   Alcohol Use    Yes     Comment: social     History   Drug Use No     History   Smoking Status    Former Smoker   Smokeless Tobacco    Never Used     Family History: History reviewed  No pertinent family history      Meds/Allergies     No Known Allergies    Objective   First Vitals:   Blood Pressure: 139/94 (12/04/17 0930)  Pulse: 98 (12/04/17 0920)  Temperature: 99 °F (37 2 °C) (12/04/17 0920)  Temp Source: Temporal (12/04/17 0920)  Respirations: 18 (12/04/17 0920)  Height: 5' 1" (154 9 cm) (12/04/17 0920)  Weight - Scale: 97 1 kg (214 lb 1 1 oz) (12/04/17 0920)  SpO2: 98 % (12/04/17 0920)    Current Vitals:   Blood Pressure: 98/55 (12/04/17 1200)  Pulse: 84 (12/04/17 1200)  Temperature: 99 °F (37 2 °C) (12/04/17 0920)  Temp Source: Temporal (12/04/17 0920)  Respirations: 18 (12/04/17 1200)  Height: 5' 1" (154 9 cm) (12/04/17 0920)  Weight - Scale: 97 1 kg (214 lb 1 1 oz) (12/04/17 0920)  SpO2: 96 % (12/04/17 1200)      Intake/Output Summary (Last 24 hours) at 12/04/17 1421  Last data filed at 12/04/17 1055   Gross per 24 hour   Intake 1123 33 ml   Output                0 ml   Net          1123 33 ml       Invasive Devices     Peripheral Intravenous Line            Peripheral IV 12/04/17 Right Antecubital less than 1 day                Physical Exam   Constitutional: She appears well-developed and well-nourished  HENT:   Head: Normocephalic and atraumatic  Right Ear: External ear normal    Left Ear: External ear normal    Mouth/Throat: Oropharynx is clear and moist    Eyes: Pupils are equal, round, and reactive to light  Neck: Normal range of motion  Neck supple  Cardiovascular: Normal rate, regular rhythm and normal heart sounds  Pulmonary/Chest: Effort normal and breath sounds normal    Abdominal: Normal aorta  Bowel sounds are absent  There is tenderness in the left lower quadrant  There is no rebound and no CVA tenderness  Skin: Skin is warm and dry  She is not diaphoretic  Psychiatric: She has a normal mood and affect   Her behavior is normal        Lab Results:   CBC with differential   Ref Range & Units 12/4/17 0930 Flag   WBC 4 31 - 10 16 Thousand/uL 16 54   H    RBC 3 81 - 5 12 Million/uL 4 86     Hemoglobin 11 5 - 15 4 g/dL 14 8     Hematocrit 34 8 - 46 1 % 42 2     MCV 82 - 98 fL 87     MCH 26 8 - 34 3 pg 30 5     MCHC 31 4 - 37 4 g/dL 35 1     RDW 11 6 - 15 1 % 12 0     MPV 8 9 - 12 7 fL 10 2     Platelets 997 - 914 Thousands/uL 372     Neutrophils Relative 43 - 75 % 86   H    Lymphocytes Relative 14 - 44 % 8   L    Monocytes Relative 4 - 12 % 6     Eosinophils Relative 0 - 6 % 0     Basophils Relative 0 - 1 % 0     Neutrophils Absolute 1 85 - 7 62 Thousands/µL 14 21   H    Lymphocytes Absolute 0 60 - 4 47 Thousands/µL 1 30     Monocytes Absolute 0 17 - 1 22 Thousand/µL 0 97     Eosinophils Absolute 0 00 - 0 61 Thousand/µL 0 02     Basophils Absolute 0 00 - 0 10 Thousands/µL 0 04       Comprehensive Metabolic Panel   Ref Range & Units 12/4/17 0930 Flag   Sodium 136 - 145 mmol/L 136     Potassium 3 5 - 5 3 mmol/L 3 2 L    Chloride 100 - 108 mmol/L 99   L    CO2 21 - 32 mmol/L 26     Anion Gap 4 - 13 mmol/L 11     BUN 5 - 25 mg/dL 8     Creatinine 0 60 - 1 30 mg/dL 0 70     Comments: Standardized to IDMS reference method   Glucose 65 - 140 mg/dL 125     Comments:    If the patient is fasting, the ADA then defines impaired fasting glucose as > 100 mg/dL and diabetes as > or equal to 123 mg/dL  Specimen collection should occur prior to Sulfasalazine administration due to the potential for falsely depressed results  Specimen collection should occur prior to Sulfapyridine administration due to the potential for falsely elevated results  Calcium 8 3 - 10 1 mg/dL 8 9     AST 5 - 45 U/L 12     Comments:    Specimen collection should occur prior to Sulfasalazine administration due to the potential for falsely depressed results  ALT 12 - 78 U/L 21     Comments:    Specimen collection should occur prior to Sulfasalazine administration due to the potential for falsely depressed results  Alkaline Phosphatase 46 - 116 U/L 102     Total Protein 6 4 - 8 2 g/dL 7 8     Albumin 3 5 - 5 0 g/dL 3 1   L    Total Bilirubin 0 20 - 1 00 mg/dL 0 90     eGFR ml/min/1 73sq m 99       Lipase   Ref Range & Units 12/4/17 0930 Flag   Lipase 73 - 393 u/L 403   H      Lactic Acid   Ref Range & Units 12/4/17 0930   LACTIC ACID 0 5 - 2 0 mmol/L 1 2        Imaging:  CT abd/pelvis w/IV contrast - 11/29/2017  CT ABDOMEN AND PELVIS WITH IV CONTRAST     INDICATION:  Constipation  Symptoms for 2 weeks     COMPARISON: 3/17/2014     TECHNIQUE:  CT examination of the abdomen and pelvis was performed  Reformatted images were created in axial, sagittal, and coronal planes        Radiation dose length product (DLP) for this visit:  995 3 mGy-cm     This examination, like all CT scans performed in the [facility], was performed utilizing techniques to minimize radiation dose exposure, including the use of iterative   reconstruction and automated exposure control      IV Contrast:  100 mL of iohexol (OMNIPAQUE)       Enteric Contrast:  Enteric contrast was not administered      FINDINGS:     ABDOMEN     LOWER CHEST:  Lung bases and included portion of heart appear normal limits  Asymmetry in the outer left breast was present on prior mammogram      LIVER/BILIARY TREE:  Unremarkable      GALLBLADDER:  Gallbladder is surgically absent      SPLEEN:  Unremarkable      PANCREAS:  Unremarkable      ADRENAL GLANDS:  Unremarkable      KIDNEYS/URETERS:  Unremarkable  No hydronephrosis      STOMACH AND BOWEL:  There is inflammation surrounding the sigmoid colon which appears similar to the previous study and most likely represents recurrent diverticulitis  There is no obstruction  There is no perforation or abscess  Some of the   inflammatory changes from the sigmoid region extend to the adjacent small bowel is in the pelvic area but there is no convincing evidence of fistulization      APPENDIX:  A normal appendix was visualized      ABDOMINOPELVIC CAVITY:  No ascites or free intraperitoneal air  No lymphadenopathy      VESSELS:  Unremarkable for patient's age      PELVIS     REPRODUCTIVE ORGANS:  The uterus is heterogeneous and lobulated suggesting the presence of several fibroids  No suspicious adnexal masses are appreciated      URINARY BLADDER:  Unremarkable      ABDOMINAL WALL/INGUINAL REGIONS:  Unremarkable      OSSEOUS STRUCTURES:  No acute fracture or destructive osseous lesion      IMPRESSION:     Findings most compatible with recurrent acute diverticulitis of the sigmoid colon  No abscess or perforation or obstruction seen      Recommend appropriate clinical follow-up to ensure resolution and exclude the possibility of any underlying neoplastic lesion      Multi fibroid uterus      Cholecystectomy      CT abd/pelvis w/ IV contrast - 12/04/2017  CT ABDOMEN AND PELVIS WITH IV CONTRAST     INDICATION:  "50-year-old female I saw personally about 5 days ago and found to have diverticulitis with left lower quadrant pain and constipation presents to the emergency department with ongoing abdominal pain  She has been progressed to having some   diarrhea that is slightly orange colored  Also having vomiting the past couple days roughly fiber 6 episodes of nonbilious nonbloody material  "     COMPARISON: CT abdomen pelvis 11/29/2017      TECHNIQUE:  CT examination of the abdomen and pelvis was performed  Reformatted images were created in axial, sagittal, and coronal planes        Radiation dose length product (DLP) for this visit:  975 19 mGy-cm   This examination, like all CT scans performed in the [facility], was performed utilizing techniques to minimize radiation dose exposure, including the use of iterative   reconstruction and automated exposure control      IV Contrast:  100 mL of iohexol (OMNIPAQUE)  350  Enteric Contrast:  Enteric contrast was not administered      FINDINGS:     ABDOMEN     LOWER CHEST:  No significant abnormalities identified in the lower chest      LIVER/BILIARY TREE:  Unremarkable      GALLBLADDER:  Cholecystectomy      SPLEEN:  Unremarkable      PANCREAS:  Unremarkable      ADRENAL GLANDS:  Unremarkable      KIDNEYS/URETERS:  Unremarkable  No hydronephrosis      STOMACH AND BOWEL:  Persistent inflammatory changes throughout the proximal sigmoid colon most prominent about a proximal sigmoid diverticulum in keeping with persistent acute sigmoid diverticulitis      APPENDIX:  No findings to suggest appendicitis      ABDOMINOPELVIC CAVITY:  No ascites or free intraperitoneal air  No lymphadenopathy  Tiny collection adjacent to the inflamed proximal sigmoid colon measuring 16 mm in greatest diameter  A more distal anterior similar collection measures 13 mm    These   both have subtle surrounding wall suspicious for small periapical abscesses      VESSELS:  Unremarkable for patient's age      PELVIS     REPRODUCTIVE ORGANS:  Fibroid uterus      URINARY BLADDER:  Unremarkable      ABDOMINAL WALL/INGUINAL REGIONS:  Unremarkable      OSSEOUS STRUCTURES:  No acute fracture or destructive osseous lesion      IMPRESSION:     Diffuse, persistent, thickening of the proximal sigmoid colon with a focal area of more severe inflammatory sabout a proximal sigmoid diverticulum likely on the basis of persistent sacute diverticulitis      Interval development of 16mm and 13 mm collections adjacent to the area of maximum colonic inflammation in keeping with small pericolonic abscesses  These both show thin surrounding rinds of subtle enhancement  Code Status: No Order  Advance Directive and Living Will:      Power of :    POLST:      Counseling / Coordination of Care  Total floor / unit time spent today 45 minutes

## 2017-12-04 NOTE — H&P
History and Physical - [facility] Internal Medicine    Patient Information: Mami Gracia 47 y o  female MRN: [id number]  Unit/Bed#: 407-01 Encounter: [id number]  Admitting Physician: Ryan Kendrick DO  PCP: Juan M Alas DO  Date of Admission:  12/04/17    Assessment/Plan:    Hospital Problem List:     Principal Problem:    Diverticulitis  Active Problems:    Pericolonic abscess    Gastroesophageal reflux disease without esophagitis    Dyslipidemia    Essential hypertension    Hypokalemia      Plan for the Primary Problem(s):  · Acute diverticulitis with pericolonic abscess  · NPO  · IV fluid  · IV Zosyn  · Acute care surgical consult appreciated  · IV Dilaudid 0 2 mg every 3 hours as needed for severe pain    Plan for Additional Problems:   · History of hypertension, hold patient's blood pressure medications, follow up a m  Labs  · History of GERD, will give Protonix IV 40 mg daily  · Hypokalemia, will replace with IV potassium, follow up repeat labs daily  · History of dyslipidemia, hold statin, hold aspirin  VTE Prophylaxis: Enoxaparin (Lovenox)  / sequential compression device   Code Status:  Full code  POLST: There is no POLST form on file for this patient (pre-hospital)    Anticipated Length of Stay:  Patient will be admitted on an Inpatient basis with an anticipated length of stay of  greater than 2 midnights  Justification for Hospital Stay:  Patient had progression of diverticulitis with formation of pericolonic abscesses despite oral antibiotic therapy, will need continuous inpatient monitoring and IV fluid with intermittent dosing of IV antibiotics until symptoms are improved      Chief Complaint:   Abdominal pain    History of Present Illness:    Mami Garcia is a 47 y o  female who presents with increasing abdominal pain left lower quadrant despite oral antibiotics, furthermore patient developed nausea vomiting, anorexia      Patient is not able to tolerate p o  antibiotic regimen due to the nausea and vomiting  Pain left lower quadrant currently rated as 5/10  Cramping with periods of stabbing like pain  Review of Systems:    Review of Systems   Constitutional: Positive for appetite change and fatigue  HENT: Negative  Eyes: Negative  Respiratory: Negative  Cardiovascular: Negative  Gastrointestinal: Positive for abdominal pain, nausea and vomiting  Genitourinary: Negative  Musculoskeletal: Negative  Skin: Negative  Neurological: Negative  Hematological: Negative  All other systems reviewed and are negative  Past Medical and Surgical History:     Past Medical History:   Diagnosis Date    Disease of thyroid gland     Hypertension     Vitamin D deficiency disease        Past Surgical History:   Procedure Laterality Date    CHOLECYSTECTOMY      HYSTERECTOMY      ME ESOPHAGOGASTRODUODENOSCOPY TRANSORAL DIAGNOSTIC N/A 10/21/2016    Procedure: EGD AND COLONOSCOPY;  Surgeon: Faustino Monahan MD;  Location: [facility];  Service: Gastroenterology    THYROIDECTOMY, PARTIAL      TONSILLECTOMY AND ADENOIDECTOMY      TUBAL LIGATION         Meds/Allergies:    Prior to Admission medications    Medication Sig Start Date End Date Taking?  Authorizing Provider   aspirin 81 MG tablet Take 81 mg by mouth daily   Yes Historical Provider, MD   atorvastatin (LIPITOR) 10 mg tablet Take 10 mg by mouth daily   Yes Historical Provider, MD   Cholecalciferol (VITAMIN D) 2000 UNITS CAPS Take 1 capsule by mouth daily     Yes Historical Provider, MD   lansoprazole (PREVACID) 30 mg capsule Take 30 mg by mouth daily   Yes Historical Provider, MD   lisinopril (ZESTRIL) 10 mg tablet Take 10 mg by mouth daily   Yes Historical Provider, MD   morphine (MSIR) 15 mg tablet Take 0 5 tablets by mouth every 6 (six) hours as needed for severe pain for up to 10 days Max Daily Amount: 30 mg 11/29/17 12/9/17 Yes Case K Enoc,    ciprofloxacin (CIPRO) 500 mg tablet Take 1 tablet by mouth 2 (two) times a day for 7 days 11/29/17 12/4/17 Yes Case K DO Enoc   dicyclomine (BENTYL) 20 mg tablet Take 1 tablet by mouth 3 (three) times a day as needed (cramping or abdominal pain) for up to 10 days 11/29/17 12/4/17 Yes Case K DO Enoc   metroNIDAZOLE (FLAGYL) 500 mg tablet Take 1 tablet by mouth every 8 (eight) hours for 7 days 11/29/17 12/4/17 Yes Case K DO Enoc     I have reviewed home medications with patient personally  Allergies: No Known Allergies    Social History:     Marital Status: /Civil Union   Substance Use History:   History   Alcohol Use    Yes     Comment: social     History   Smoking Status    Former Smoker   Smokeless Tobacco    Never Used     History   Drug Use No       Family History:    History reviewed  No pertinent family history  Physical Exam:     Vitals:   Blood Pressure: 127/69 (12/04/17 1543)  Pulse: 85 (12/04/17 1543)  Temperature: 99 1 °F (37 3 °C) (12/04/17 1543)  Temp Source: Temporal (12/04/17 1543)  Respirations: 18 (12/04/17 1543)  Height: 5' 1" (154 9 cm) (12/04/17 1543)  Weight - Scale: 96 2 kg (212 lb 1 6 oz) (12/04/17 1543)  SpO2: 97 % (12/04/17 1543)    Physical Exam   Constitutional: She is oriented to person, place, and time  She appears well-developed and well-nourished  No distress  HENT:   Head: Normocephalic and atraumatic  Mouth/Throat: Oropharynx is clear and moist  No oropharyngeal exudate  Cardiovascular: Normal rate and regular rhythm  No murmur heard  Pulmonary/Chest: Effort normal and breath sounds normal  No respiratory distress  She has no wheezes  Abdominal: Soft  Bowel sounds are normal  There is tenderness  There is no rebound and no guarding  Musculoskeletal: Normal range of motion  Neurological: She is alert and oriented to person, place, and time  She has normal reflexes  No cranial nerve deficit  Skin: Skin is warm and dry  No rash noted  She is not diaphoretic  No erythema     Psychiatric: She has a normal mood and affect  Her behavior is normal  Judgment and thought content normal    Nursing note and vitals reviewed  Additional Data:     Lab Results: I have personally reviewed pertinent reports  Results from last 7 days  Lab Units 12/04/17  0930   WBC Thousand/uL 16 54*   HEMOGLOBIN g/dL 14 8   HEMATOCRIT % 42 2   PLATELETS Thousands/uL 372   NEUTROS PCT % 86*   LYMPHS PCT % 8*   MONOS PCT % 6   EOS PCT % 0       Results from last 7 days  Lab Units 12/04/17  0930   SODIUM mmol/L 136   POTASSIUM mmol/L 3 2*   CHLORIDE mmol/L 99*   CO2 mmol/L 26   BUN mg/dL 8   CREATININE mg/dL 0 70   CALCIUM mg/dL 8 9   TOTAL PROTEIN g/dL 7 8   BILIRUBIN TOTAL mg/dL 0 90   ALK PHOS U/L 102   ALT U/L 21   AST U/L 12   GLUCOSE RANDOM mg/dL 125           Imaging: I have personally reviewed pertinent reports  Xr Abdomen Obstruction Series    Result Date: 11/29/2017  Narrative: OBSTRUCTION SERIES INDICATION:  Diffuse abdominal pain  Absent bowel movement for 2 weeks  COMPARISON: None VIEWS:  (Supine, erect abdomen and upright chest) IMAGES:  5 FINDINGS: There is a nonobstructive bowel gas pattern  No free air beneath the hemidiaphragms  No pathologic calcifications or soft tissue masses evident  Osseous structures are unremarkable  Examination of the chest reveals a normal cardiomediastinal silhouette  Lungs are clear  Impression: Nonobstructive bowel gas pattern  Workstation performed: [id number]     Ct Abdomen Pelvis W Contrast    Result Date: 12/4/2017  Narrative: CT ABDOMEN AND PELVIS WITH IV CONTRAST INDICATION:  "28-year-old female I saw personally about 5 days ago and found to have diverticulitis with left lower quadrant pain and constipation presents to the emergency department with ongoing abdominal pain  She has been progressed to having some diarrhea that is slightly orange colored    Also having vomiting the past couple days roughly fiber 6 episodes of nonbilious nonbloody material  " COMPARISON: CT abdomen pelvis 11/29/2017  TECHNIQUE:  CT examination of the abdomen and pelvis was performed  Reformatted images were created in axial, sagittal, and coronal planes  Radiation dose length product (DLP) for this visit:  975 19 mGy-cm   This examination, like all CT scans performed in the [facility], was performed utilizing techniques to minimize radiation dose exposure, including the use of iterative  reconstruction and automated exposure control  IV Contrast:  100 mL of iohexol (OMNIPAQUE)  350 Enteric Contrast:  Enteric contrast was not administered  FINDINGS: ABDOMEN LOWER CHEST:  No significant abnormalities identified in the lower chest  LIVER/BILIARY TREE:  Unremarkable  GALLBLADDER:  Cholecystectomy  SPLEEN:  Unremarkable  PANCREAS:  Unremarkable  ADRENAL GLANDS:  Unremarkable  KIDNEYS/URETERS:  Unremarkable  No hydronephrosis  STOMACH AND BOWEL:  Persistent inflammatory changes throughout the proximal sigmoid colon most prominent about a proximal sigmoid diverticulum in keeping with persistent acute sigmoid diverticulitis  APPENDIX:  No findings to suggest appendicitis  ABDOMINOPELVIC CAVITY:  No ascites or free intraperitoneal air  No lymphadenopathy  Tiny collection adjacent to the inflamed proximal sigmoid colon measuring 16 mm in greatest diameter  A more distal anterior similar collection measures 13 mm  These both have subtle surrounding wall suspicious for small periapical abscesses  VESSELS:  Unremarkable for patient's age  PELVIS REPRODUCTIVE ORGANS:  Fibroid uterus  URINARY BLADDER:  Unremarkable  ABDOMINAL WALL/INGUINAL REGIONS:  Unremarkable  OSSEOUS STRUCTURES:  No acute fracture or destructive osseous lesion  Impression: Diffuse, persistent, thickening of the proximal sigmoid colon with a focal area of more severe inflammatory sabout a proximal sigmoid diverticulum likely on the basis of persistent sacute diverticulitis   Interval development of 16mm and 13 mm collections adjacent to the area of maximum colonic inflammation in keeping with small pericolonic abscesses  These both show thin surrounding rinds of subtle enhancement  Workstation performed: [id number]     Ct Abdomen Pelvis W Contrast    Result Date: 11/29/2017  Narrative: CT ABDOMEN AND PELVIS WITH IV CONTRAST INDICATION:  Constipation  Symptoms for 2 weeks COMPARISON: 3/17/2014 TECHNIQUE:  CT examination of the abdomen and pelvis was performed  Reformatted images were created in axial, sagittal, and coronal planes  Radiation dose length product (DLP) for this visit:  995 3 mGy-cm   This examination, like all CT scans performed in the [facility], was performed utilizing techniques to minimize radiation dose exposure, including the use of iterative reconstruction and automated exposure control  IV Contrast:  100 mL of iohexol (OMNIPAQUE)     Enteric Contrast:  Enteric contrast was not administered  FINDINGS: ABDOMEN LOWER CHEST:  Lung bases and included portion of heart appear normal limits  Asymmetry in the outer left breast was present on prior mammogram  LIVER/BILIARY TREE:  Unremarkable  GALLBLADDER:  Gallbladder is surgically absent  SPLEEN:  Unremarkable  PANCREAS:  Unremarkable  ADRENAL GLANDS:  Unremarkable  KIDNEYS/URETERS:  Unremarkable  No hydronephrosis  STOMACH AND BOWEL:  There is inflammation surrounding the sigmoid colon which appears similar to the previous study and most likely represents recurrent diverticulitis  There is no obstruction  There is no perforation or abscess  Some of the inflammatory changes from the sigmoid region extend to the adjacent small bowel is in the pelvic area but there is no convincing evidence of fistulization  APPENDIX:  A normal appendix was visualized  ABDOMINOPELVIC CAVITY:  No ascites or free intraperitoneal air  No lymphadenopathy  VESSELS:  Unremarkable for patient's age   PELVIS REPRODUCTIVE ORGANS:  The uterus is heterogeneous and lobulated suggesting the presence of several fibroids  No suspicious adnexal masses are appreciated  URINARY BLADDER:  Unremarkable  ABDOMINAL WALL/INGUINAL REGIONS:  Unremarkable  OSSEOUS STRUCTURES:  No acute fracture or destructive osseous lesion  Impression: Findings most compatible with recurrent acute diverticulitis of the sigmoid colon  No abscess or perforation or obstruction seen  Recommend appropriate clinical follow-up to ensure resolution and exclude the possibility of any underlying neoplastic lesion  Multi fibroid uterus  Cholecystectomy  Workstation performed: [id number]       Allscripts / [facility] Records Reviewed: Yes     ** Please Note: This note has been constructed using a voice recognition system   **

## 2017-12-04 NOTE — PLAN OF CARE
DISCHARGE PLANNING     Discharge to home or other facility with appropriate resources Progressing        INFECTION - ADULT     Absence or prevention of progression during hospitalization Progressing        Knowledge Deficit     Patient/family/caregiver demonstrates understanding of disease process, treatment plan, medications, and discharge instructions Progressing        PAIN - ADULT     Verbalizes/displays adequate comfort level or baseline comfort level Progressing        Potential for Falls     Patient will remain free of falls Progressing        SAFETY ADULT     Patient will remain free of falls Progressing     Maintain or return to baseline ADL function Progressing     Maintain or return mobility status to optimal level Progressing

## 2017-12-04 NOTE — CONSULTS
Consultation - General Surgery   Ankit Murray 47 y o  female MRN: [id number]  Unit/Bed#: RM02 Encounter: [id number]    Assessment/Plan     Assessment:  Acute sigmoid diverticulitis, failed outpatient management  Leukocytosis of 16 54  The patient is afebrile at present  CT scan of the abdomen pelvis shows small collection measuring 1 3 cm likely representing a small reid colonic abscess which is not considered drainable at this time  Hypokalemia    Plan:  Nothing by mouth  IV fluids for hydration, an ACL with 20 mEq of KCl per each L 125 mL/hour  Monitor vital signs Q shift  Bowel rest  The patient has received 1st dose of IV Cipro and Flagyl  Dr Vida Garza requested the patient be started on IV Zosyn 3 375 g Q 6 hours at this time  Will start with the next dose at [facility], personally discussed via telephone with the pharmacist   APOLONIA m  labs including CBC and BMP  Antiemetics and analgesics as ordered  NG tube only if the patient has worsening nausea and vomiting  Dr Vida Garza will examine the patient later this afternoon    History of Present Illness     HPI:  Ankit Murray is a 47 y o  female who presents with continued left lower quadrant abdominal pain and constipation  The patient was seen emergency department here at this hospital 5 days ago  She initially started with symptoms almost 2 weeks ago including abdominal pain mostly localized to the left lower quadrant of the abdomen  The patient presents with ongoing abdominal pain which is quite severe and seems to be cramping in nature  She describes her pain as an 8 or 9 on a 10 point pain scale  The patient last had one semi-formed stool yesterday, no blood in stool  Stated that her stool was somewhat yellow in color  She has been vomiting over the past couple days with about 6 episodes of non bilious and nonbloody emesis  She denies any fever at present does think that she has been having fever and chills at home    The patient was seen here 5 days ago and CT scan in the ED which suggested sigmoid diverticulitis  The patient's family physician is Dr Toan Jin  The patient underwent a colonoscopy oz a screening basis last year in 2016 by Dr Stephen Gonzales which she was told revealed diverticulosis  The patient also believes that she had a similar type of symptoms though not as acute about 3 years ago which resolved  She has had previous laparoscopic cholecystectomy many years ago here at this hospital by Dr Destiny Mcgarry  The patient also had a tubal ligation in the past   Patient has now met this time under the hospitalist service with general surgery consultation requested at this time  CT scan was repeated this morning and showed acute sigmoid diverticulitis with a small collection measuring 1 3 cm of a pericolonic abscess formation seen  There was no evidence of any free intraperitoneal air  Dr Odalis Zhu will examine the patient later this afternoon  The patient has been started on IV Cipro and IV Flagyl in the ED  Her temperature present is 99  White count was elevated to 16 54  Inpatient consult to Acute Care Surgery  Consult performed by: Donte Rivera ordered by: Precious El          Review of Systems     Constitutional: Negative for chills, fatigue and fever  HENT: Negative for congestion, ear pain, rhinorrhea, sinus pressure, sore throat, trouble swallowing and voice change  Eyes: Negative for pain and visual disturbance  Respiratory: Negative for cough, choking, shortness of breath and stridor  Cardiovascular: Negative for chest pain, palpitations and leg swelling  Gastrointestinal: Positive for abdominal pain, diarrhea, nausea and vomiting  Negative for blood in stool and constipation  Endocrine: Negative  Genitourinary: Negative for dysuria, flank pain, hematuria, pelvic pain and urgency  Musculoskeletal: Negative  Skin: Negative  Allergic/Immunologic: Negative      Neurological: Negative for dizziness, speech difficulty, weakness, light-headedness and numbness  Hematological: Negative  Psychiatric/Behavioral: Negative  She is  5, para 3, AB 2  The patient is postmenopausal   She had previous tubal ligation performed  Historical Information   Past Medical History:   Diagnosis Date    Disease of thyroid gland     Hypertension     Vitamin D deficiency disease      Past Surgical History:   Procedure Laterality Date    CHOLECYSTECTOMY      HYSTERECTOMY      OR ESOPHAGOGASTRODUODENOSCOPY TRANSORAL DIAGNOSTIC N/A 10/21/2016    Procedure: EGD AND COLONOSCOPY;  Surgeon: Kaushal Lawson MD;  Location: [facility];  Service: Gastroenterology    THYROIDECTOMY, PARTIAL      TONSILLECTOMY AND ADENOIDECTOMY      TUBAL LIGATION       Social History   History   Alcohol Use    Yes     Comment: social     History   Drug Use No     History   Smoking Status    Former Smoker   Smokeless Tobacco    Never Used     Family History: The patient states that her sister had acute diverticulitis and apparently had to have what sounds like a Pj's procedure performed with colostomy      Meds/Allergies   current meds:   Current Facility-Administered Medications   Medication Dose Route Frequency    HYDROmorphone (DILAUDID) 1 mg/mL injection 1 mg  1 mg Intravenous Q3H PRN    ondansetron (ZOFRAN) injection 4 mg  4 mg Intravenous Q8H PRN    piperacillin-tazobactam (ZOSYN) 3 375 g in dextrose 5 % 50 mL IVPB  3 375 g Intravenous Q6H    sodium chloride 0 9 % with KCl 20 mEq/L infusion (premix)  125 mL/hr Intravenous Continuous     No Known Allergies    Objective   First Vitals:   Blood Pressure: 139/94 (1730)  Pulse: 98 (17)  Temperature: 99 °F (37 2 °C) (17)  Temp Source: Temporal (17)  Respirations: 18 (17)  Height: 5' 1" (154 9 cm) (17)  Weight - Scale: 97 1 kg (214 lb 1 1 oz) (17)  SpO2: 98 % (17)    Current Vitals: Blood Pressure: 98/55 (12/04/17 1200)  Pulse: 84 (12/04/17 1200)  Temperature: 99 °F (37 2 °C) (12/04/17 0920)  Temp Source: Temporal (12/04/17 0920)  Respirations: 18 (12/04/17 1200)  Height: 5' 1" (154 9 cm) (12/04/17 0920)  Weight - Scale: 97 1 kg (214 lb 1 1 oz) (12/04/17 0920)  SpO2: 96 % (12/04/17 1200)      Intake/Output Summary (Last 24 hours) at 12/04/17 1316  Last data filed at 12/04/17 1055   Gross per 24 hour   Intake          1123 33 ml   Output                0 ml   Net          1123 33 ml       Invasive Devices     Peripheral Intravenous Line            Peripheral IV 12/04/17 Right Antecubital less than 1 day                Physical Exam     The patient is awake and alert  She appears moderately uncomfortable  The patient's  is present in the ED with her at this time  The patient's speech is clear  She does not appear toxic  She is lying flat on the hospital later  Skin warm and dry to touch  No rashes or pallor  Head normocephalic  PERRLA, EOMI  Pharynx not inflamed  Nares patent without discharge  No hearing acuity is normal  Neck supple no adenopathy or goiter  Chest symmetric  Heart regular rate and rhythm  No murmur or gallop  Lungs essentially clear  Back no CVA tenderness to percussion  Abdomen positive bowel sounds are heard  The patient has had previous laparoscopic surgery with healed scars noted  The abdomen is not distended  The abdomen is tender to touch in the left mid and lower quadrants with some voluntary guarding  No definite masses are palpable  No ascites  No hernias are noted  She moves all 4 extremities well  No focal motor or sensory neurological weakness is present  No tremor noted  Cranial nerves 2-12 appear grossly intact  Mental status is appropriate      Lab Results:   CBC:   Lab Results   Component Value Date    WBC 16 54 (H) 12/04/2017    HGB 14 8 12/04/2017    HCT 42 2 12/04/2017    MCV 87 12/04/2017     12/04/2017    MCH 30 5 12/04/2017    MCHC 35 1 12/04/2017    RDW 12 0 12/04/2017    MPV 10 2 12/04/2017   , CMP:   Lab Results   Component Value Date     12/04/2017    K 3 2 (L) 12/04/2017    CL 99 (L) 12/04/2017    CO2 26 12/04/2017    ANIONGAP 11 12/04/2017    BUN 8 12/04/2017    CREATININE 0 70 12/04/2017    GLUCOSE 125 12/04/2017    CALCIUM 8 9 12/04/2017    AST 12 12/04/2017    ALT 21 12/04/2017    ALKPHOS 102 12/04/2017    PROT 7 8 12/04/2017    ALBUMIN 3 1 (L) 12/04/2017    BILITOT 0 90 12/04/2017    EGFR 99 12/04/2017   , Coagulation: No results found for: PT, INR, APTT, Urinalysis: No results found for: COLORU, CLARITYU, SPECGRAV, PHUR, LEUKOCYTESUR, NITRITE, PROTEINUA, GLUCOSEU, KETONESU, BILIRUBINUR, BLOODU, Amylase: No results found for: AMYLASE, Lipase:   Lab Results   Component Value Date    LIPASE 403 (H) 12/04/2017     Imaging: I have personally reviewed pertinent films in PACS     CT ABDOMEN AND PELVIS WITH IV CONTRAST     INDICATION:  "44-year-old female I saw personally about 5 days ago and found to have diverticulitis with left lower quadrant pain and constipation presents to the emergency department with ongoing abdominal pain  She has been progressed to having some   diarrhea that is slightly orange colored  Also having vomiting the past couple days roughly fiber 6 episodes of nonbilious nonbloody material  "     COMPARISON: CT abdomen pelvis 11/29/2017      TECHNIQUE:  CT examination of the abdomen and pelvis was performed  Reformatted images were created in axial, sagittal, and coronal planes        Radiation dose length product (DLP) for this visit:  975 19 mGy-cm     This examination, like all CT scans performed in the [facility], was performed utilizing techniques to minimize radiation dose exposure, including the use of iterative   reconstruction and automated exposure control      IV Contrast:  100 mL of iohexol (OMNIPAQUE)  350  Enteric Contrast:  Enteric contrast was not administered      FINDINGS:     ABDOMEN     LOWER CHEST:  No significant abnormalities identified in the lower chest      LIVER/BILIARY TREE:  Unremarkable      GALLBLADDER:  Cholecystectomy      SPLEEN:  Unremarkable      PANCREAS:  Unremarkable      ADRENAL GLANDS:  Unremarkable      KIDNEYS/URETERS:  Unremarkable  No hydronephrosis      STOMACH AND BOWEL:  Persistent inflammatory changes throughout the proximal sigmoid colon most prominent about a proximal sigmoid diverticulum in keeping with persistent acute sigmoid diverticulitis      APPENDIX:  No findings to suggest appendicitis      ABDOMINOPELVIC CAVITY:  No ascites or free intraperitoneal air  No lymphadenopathy  Tiny collection adjacent to the inflamed proximal sigmoid colon measuring 16 mm in greatest diameter  A more distal anterior similar collection measures 13 mm  These   both have subtle surrounding wall suspicious for small periapical abscesses      VESSELS:  Unremarkable for patient's age      PELVIS     REPRODUCTIVE ORGANS:  Fibroid uterus      URINARY BLADDER:  Unremarkable      ABDOMINAL WALL/INGUINAL REGIONS:  Unremarkable      OSSEOUS STRUCTURES:  No acute fracture or destructive osseous lesion      IMPRESSION:     Diffuse, persistent, thickening of the proximal sigmoid colon with a focal area of more severe inflammatory sabout a proximal sigmoid diverticulum likely on the basis of persistent sacute diverticulitis      Interval development of 16mm and 13 mm collections adjacent to the area of maximum colonic inflammation in keeping with small pericolonic abscesses  These both show thin surrounding rinds of subtle enhancement           EKG, Pathology, and Other Studies: I have personally reviewed pertinent reports  Counseling / Coordination of Care  Total floor / unit time spent today 30 minutes  Greater than 50% of total time was spent with the patient and / or family counseling and / or coordination of care    A description of the counseling / coordination of care:  Review of all diagnostic imaging and laboratory studies as well as personal discussion with Dr Minal Mondragon from general surgery who also will personally examined the patient later this afternoon      Deisi Li PA-C

## 2017-12-04 NOTE — PROGRESS NOTES
Arrived via stretcher from ER to Room 407 at [facility] to surroundings and call system  Call bell placed in reach

## 2017-12-05 LAB
ALBUMIN SERPL BCP-MCNC: 2.4 G/DL (ref 3.5–5)
ALP SERPL-CCNC: 74 U/L (ref 46–116)
ALT SERPL W P-5'-P-CCNC: 16 U/L (ref 12–78)
ANION GAP SERPL CALCULATED.3IONS-SCNC: 7 MMOL/L (ref 4–13)
AST SERPL W P-5'-P-CCNC: 11 U/L (ref 5–45)
BASOPHILS # BLD AUTO: 0.02 THOUSANDS/ΜL (ref 0–0.1)
BASOPHILS NFR BLD AUTO: 0 % (ref 0–1)
BILIRUB SERPL-MCNC: 0.7 MG/DL (ref 0.2–1)
BUN SERPL-MCNC: 6 MG/DL (ref 5–25)
CALCIUM SERPL-MCNC: 8.2 MG/DL (ref 8.3–10.1)
CHLORIDE SERPL-SCNC: 105 MMOL/L (ref 100–108)
CO2 SERPL-SCNC: 29 MMOL/L (ref 21–32)
CREAT SERPL-MCNC: 0.66 MG/DL (ref 0.6–1.3)
EOSINOPHIL # BLD AUTO: 0.09 THOUSAND/ΜL (ref 0–0.61)
EOSINOPHIL NFR BLD AUTO: 1 % (ref 0–6)
ERYTHROCYTE [DISTWIDTH] IN BLOOD BY AUTOMATED COUNT: 11.8 % (ref 11.6–15.1)
GFR SERPL CREATININE-BSD FRML MDRD: 101 ML/MIN/1.73SQ M
GLUCOSE SERPL-MCNC: 87 MG/DL (ref 65–140)
HCT VFR BLD AUTO: 35.8 % (ref 34.8–46.1)
HGB BLD-MCNC: 11.9 G/DL (ref 11.5–15.4)
LYMPHOCYTES # BLD AUTO: 1.25 THOUSANDS/ΜL (ref 0.6–4.47)
LYMPHOCYTES NFR BLD AUTO: 14 % (ref 14–44)
MAGNESIUM SERPL-MCNC: 1.9 MG/DL (ref 1.6–2.6)
MCH RBC QN AUTO: 29.9 PG (ref 26.8–34.3)
MCHC RBC AUTO-ENTMCNC: 33.2 G/DL (ref 31.4–37.4)
MCV RBC AUTO: 90 FL (ref 82–98)
MONOCYTES # BLD AUTO: 0.73 THOUSAND/ΜL (ref 0.17–1.22)
MONOCYTES NFR BLD AUTO: 8 % (ref 4–12)
NEUTROPHILS # BLD AUTO: 6.9 THOUSANDS/ΜL (ref 1.85–7.62)
NEUTS SEG NFR BLD AUTO: 77 % (ref 43–75)
PHOSPHATE SERPL-MCNC: 3.3 MG/DL (ref 2.7–4.5)
PLATELET # BLD AUTO: 260 THOUSANDS/UL (ref 149–390)
PMV BLD AUTO: 10.6 FL (ref 8.9–12.7)
POTASSIUM SERPL-SCNC: 3.8 MMOL/L (ref 3.5–5.3)
PROT SERPL-MCNC: 6.2 G/DL (ref 6.4–8.2)
RBC # BLD AUTO: 3.98 MILLION/UL (ref 3.81–5.12)
SODIUM SERPL-SCNC: 141 MMOL/L (ref 136–145)
WBC # BLD AUTO: 8.99 THOUSAND/UL (ref 4.31–10.16)

## 2017-12-05 PROCEDURE — 84100 ASSAY OF PHOSPHORUS: CPT | Performed by: INTERNAL MEDICINE

## 2017-12-05 PROCEDURE — 85025 COMPLETE CBC W/AUTO DIFF WBC: CPT

## 2017-12-05 PROCEDURE — C9113 INJ PANTOPRAZOLE SODIUM, VIA: HCPCS | Performed by: INTERNAL MEDICINE

## 2017-12-05 PROCEDURE — 83735 ASSAY OF MAGNESIUM: CPT | Performed by: INTERNAL MEDICINE

## 2017-12-05 PROCEDURE — 80053 COMPREHEN METABOLIC PANEL: CPT | Performed by: INTERNAL MEDICINE

## 2017-12-05 RX ADMIN — PIPERACILLIN SODIUM AND TAZOBACTAM SODIUM 3.38 G: 3; .375 INJECTION, POWDER, LYOPHILIZED, FOR SOLUTION INTRAVENOUS at 18:51

## 2017-12-05 RX ADMIN — PIPERACILLIN SODIUM AND TAZOBACTAM SODIUM 3.38 G: 3; .375 INJECTION, POWDER, LYOPHILIZED, FOR SOLUTION INTRAVENOUS at 00:44

## 2017-12-05 RX ADMIN — PANTOPRAZOLE SODIUM 40 MG: 40 INJECTION, POWDER, FOR SOLUTION INTRAVENOUS at 08:51

## 2017-12-05 RX ADMIN — SODIUM CHLORIDE AND POTASSIUM CHLORIDE 50 ML/HR: .9; .15 SOLUTION INTRAVENOUS at 11:56

## 2017-12-05 RX ADMIN — PIPERACILLIN SODIUM AND TAZOBACTAM SODIUM 3.38 G: 3; .375 INJECTION, POWDER, LYOPHILIZED, FOR SOLUTION INTRAVENOUS at 13:45

## 2017-12-05 RX ADMIN — HYDROMORPHONE HYDROCHLORIDE 0.2 MG: 1 INJECTION, SOLUTION INTRAMUSCULAR; INTRAVENOUS; SUBCUTANEOUS at 05:17

## 2017-12-05 RX ADMIN — SODIUM CHLORIDE AND POTASSIUM CHLORIDE 125 ML/HR: .9; .15 SOLUTION INTRAVENOUS at 02:56

## 2017-12-05 RX ADMIN — PIPERACILLIN SODIUM AND TAZOBACTAM SODIUM 3.38 G: 3; .375 INJECTION, POWDER, LYOPHILIZED, FOR SOLUTION INTRAVENOUS at 06:29

## 2017-12-05 NOTE — MALNUTRITION/BMI
This medical record reflects one or more clinical indicators suggestive of  morbid obesity  Please indicate the one diagnosis below which you feel best reflects the clinical picture  Morbid obesity    BMI Findings:  40-44 9    Body mass index is 40 07 kg/m²  See Nutrition note dated 12/05/2017 for additional details  Completed nutrition assessment is viewable in the nutrition documentation

## 2017-12-05 NOTE — PROGRESS NOTES
[facility] Progress Note  Patient: Justin Abo 47 y o  female   MRN: [id number]  PCP: Kaleigh Bruner DO  Unit/Bed#: 716-61 Encounter: [id number]  Date Of Visit: 17    Assessment:    Principal Problem:    Diverticulitis  Active Problems:    Pericolonic abscess    Gastroesophageal reflux disease without esophagitis    Dyslipidemia    Essential hypertension    Hypokalemia      Plan:    · Acute diverticulitis with associated pericolonic abscess, continue with IV Zosyn, advance diet to clear liquids, follow up with surgical recommendations  · Leukocytosis resolved  · Decrease IV fluids  VTE Pharmacologic Prophylaxis:   Pharmacologic: Patient has refused VTE prophylaxis  Mechanical VTE Prophylaxis in Place: Yes    Discussions with Specialists or Other Care Team Provider/Patient/Family:  Plan of care discussed patient at bedside  Time Spent for Care: 20 minutes  More than 50% of total time spent on counseling and coordination of care as described above  Current Length of Stay: 1 day(s)    Current Patient Status: Inpatient     Code Status: Level 1 - Full Code      Subjective:   Patient overall feeling better today, less pain    Objective:     Vitals:   Temp (24hrs), Av 6 °F (37 °C), Min:98 °F (36 7 °C), Max:99 1 °F (37 3 °C)    HR:  [84-89] 84  Resp:  [18] 18  BP: ()/(55-69) 110/63  SpO2:  [93 %-97 %] 96 %  Body mass index is 40 08 kg/m²  Input and Output Summary (last 24 hours): Intake/Output Summary (Last 24 hours) at 17 1049  Last data filed at 17 0251   Gross per 24 hour   Intake          2481 66 ml   Output                0 ml   Net          2481 66 ml       Physical Exam:     Physical Exam   Constitutional: She is oriented to person, place, and time  She appears well-developed and well-nourished  No distress  HENT:   Head: Normocephalic and atraumatic  Mouth/Throat: Oropharynx is clear and moist  No oropharyngeal exudate     Pulmonary/Chest: Effort normal and breath sounds normal  No respiratory distress  She has no wheezes  Neurological: She is alert and oriented to person, place, and time  No cranial nerve deficit  Coordination normal    Skin: She is not diaphoretic  Nursing note and vitals reviewed  Additional Data:     Labs:      Results from last 7 days  Lab Units 12/05/17  0505   WBC Thousand/uL 8 99   HEMOGLOBIN g/dL 11 9   HEMATOCRIT % 35 8   PLATELETS Thousands/uL 260   NEUTROS PCT % 77*   LYMPHS PCT % 14   MONOS PCT % 8   EOS PCT % 1       Results from last 7 days  Lab Units 12/05/17  0505   SODIUM mmol/L 141   POTASSIUM mmol/L 3 8   CHLORIDE mmol/L 105   CO2 mmol/L 29   BUN mg/dL 6   CREATININE mg/dL 0 66   CALCIUM mg/dL 8 2*   TOTAL PROTEIN g/dL 6 2*   BILIRUBIN TOTAL mg/dL 0 70   ALK PHOS U/L 74   ALT U/L 16   AST U/L 11   GLUCOSE RANDOM mg/dL 87           * I Have Reviewed All Lab Data Listed Above  * Additional Pertinent Lab Tests Reviewed:  [facility] Admission Reviewed    Recent Cultures (last 7 days):           Last 24 Hours Medication List:     enoxaparin 40 mg Subcutaneous Daily   pantoprazole 40 mg Intravenous Q24H [address]   piperacillin-tazobactam 3 375 g Intravenous Q6H        Today, Patient Was Seen By: Justo Palma DO

## 2017-12-05 NOTE — SOCIAL WORK
Cm met with the patient to evaluate the patients prior function and living situation and any barriers to d/c and form a safe d/c plan  Cm also evaluated the patient for any services in the home or needs for services  Cm will follow the patient and address any needs and address all concerns  The patient is at home with her family and she is totally independent at home with all adls   She uses no dme at home and she has no lw poa  pcp Dr Caitlin coburn in [place]

## 2017-12-05 NOTE — PROGRESS NOTES
Progress Note - General Surgery   Ankit Murray 47 y o  female MRN: [id number]  Unit/Bed#: 407-01 Encounter: [id number]    Assessment:  Acute sigmoid diverticulitis with 2 small non drainable pericolonic fluid collections seen on CT, improved  WBC today 8 99 and patient is afebrile  Hypokalemia, resolved  GERD  Hypertension, controlled  Hyperlipidemia    Plan:  Personally discussed with Dr Cristine Barber this morning who also examined the patient later today  Advance diet to clear liquids today  Continue IV Zosyn 3 375 g q 6 hours  Out of bed as tolerated  No indication for surgical intervention at this time  Repeat CBC and CMP in a m  Subjective/Objective   Chief Complaint:  Patient has less left lower quadrant abdominal pain this morning  Subjective: The patient is feeling better today  She still has left lower quadrant abdominal pain, she rates as a 3 on a 10 point pain scale  She denies any nausea  She is passing flatus  She has not had a bowel movement in over 2 days  No report of any fever or chills  The patient does admit that she was not taking her oral Cipro and Flagyl since Sunday because she stopped on her home with complaints of an upset stomach  Objective:     Blood pressure 110/63, pulse 84, temperature 98 °F (36 7 °C), temperature source Temporal, resp  rate 18, height 5' 1" (1 549 m), weight 96 2 kg (212 lb 1 6 oz), SpO2 96 %  ,Body mass index is 40 08 kg/m²  Intake/Output Summary (Last 24 hours) at 12/05/17 0842  Last data filed at 12/05/17 0251   Gross per 24 hour   Intake          2481 66 ml   Output                0 ml   Net          2481 66 ml       Invasive Devices     Peripheral Intravenous Line            Peripheral IV 12/04/17 Right Antecubital less than 1 day                Physical Exam:  Patient is awake and alert  She appears in no acute distress  Skin warm and dry to touch without rashes or pallor  Head normocephalic  PERRLA, EOMI  Pharynx not inflamed  Neck no JVD or carotid bruit  Chest symmetric  Heart regular rate rhythm no murmur rub or gallop  Lungs clear  Back no CVA tenderness  Abdomen reveals normoactive bowel sounds in 4 quadrants  The abdomen is soft  There is some mild tenderness to palpation in the left lower quadrant  No definite palpable masses  Still some mild voluntary guarding in the left lower quadrant present  Definitely less tender today compared to yesterday      Lab, Imaging and other studies:  CBC:   Lab Results   Component Value Date    WBC 8 99 12/05/2017    HGB 11 9 12/05/2017    HCT 35 8 12/05/2017    MCV 90 12/05/2017     12/05/2017    MCH 29 9 12/05/2017    MCHC 33 2 12/05/2017    RDW 11 8 12/05/2017    MPV 10 6 12/05/2017   , CMP:   Lab Results   Component Value Date     12/05/2017    K 3 8 12/05/2017     12/05/2017    CO2 29 12/05/2017    ANIONGAP 7 12/05/2017    BUN 6 12/05/2017    CREATININE 0 66 12/05/2017    GLUCOSE 87 12/05/2017    CALCIUM 8 2 (L) 12/05/2017    AST 11 12/05/2017    ALT 16 12/05/2017    ALKPHOS 74 12/05/2017    PROT 6 2 (L) 12/05/2017    ALBUMIN 2 4 (L) 12/05/2017    BILITOT 0 70 12/05/2017    EGFR 101 12/05/2017     VTE Pharmacologic Prophylaxis: Enoxaparin (Lovenox)  VTE Mechanical Prophylaxis: sequential compression device     Lonnie Santos PA-C

## 2017-12-05 NOTE — CASE MANAGEMENT
Initial Clinical Review    Admission: Date/Time/Statement: 12/4/17 @ 1201     Orders Placed This Encounter   Procedures    Inpatient Admission (expected length of stay for this patient is greater than two midnights)     Standing Status:   Standing     Number of Occurrences:   1     Order Specific Question:   Admitting Physician     Answer:   Ketty Wilson     Order Specific Question:   Level of Care     Answer:   Med Surg [16]     Order Specific Question:   Estimated length of stay     Answer:   More than 2 Midnights     Order Specific Question:   Certification     Answer:   I certify that inpatient services are medically necessary for this patient for a duration of greater than two midnights  See H&P and MD Progress Notes for additional information about the patient's course of treatment  ED: Date/Time/Mode of Arrival:   ED Arrival Information     Expected Arrival Acuity Means of Arrival Escorted By Service Admission Type    - 12/4/2017 09:11 Urgent Walk-In Spouse General Medicine Urgent    Arrival Complaint    abdominal pain      Chief Complaint:   Chief Complaint   Patient presents with    Abdominal Pain     Abdominal pain nausea,vomiting and diarrhea   History of Illness:   76-year-old female I saw personally about 5 days ago and found to have diverticulitis with left lower quadrant pain and constipation presents to the emergency department with ongoing abdominal pain  She has been progressed to having some diarrhea that is slightly orange colored  Also having vomiting the past couple days roughly fiber 6 episodes of nonbilious nonbloody material   No fevers chills or sweats  No vaginal symptoms  No urinary changes  Has no appetite  Had a sister who had diverticulitis so bad she needed to have colostomy placed  The pain is achy cramping nonradiating  Persistent throughout the day    ED Vital Signs:   ED Triage Vitals   Temperature Pulse Respirations Blood Pressure SpO2   12/04/17 0920 12/04/17 0920 12/04/17 0920 12/04/17 0930 12/04/17 0920   99 °F (37 2 °C) 98 18 139/94 98 %      Temp Source Heart Rate Source Patient Position - Orthostatic VS BP Location FiO2 (%)   12/04/17 0920 12/04/17 0920 12/04/17 0920 12/04/17 0920 --   Temporal Monitor Lying Left arm       Pain Score       12/04/17 0920       5        Wt Readings from Last 1 Encounters:   12/04/17 96 2 kg (212 lb 1 6 oz)   Vital Signs (abnormal):   T 99 1  Abnormal Labs/Diagnostic Test Results:   WBC 16 54 K 3 2 CL 99 ALB 3 1 LIPASE 403   CT A/P=Diffuse, persistent, thickening of the proximal sigmoid colon with a focal area of more severe inflammatory sabout a proximal sigmoid diverticulum likely on the basis of persistent diverticulitis  Interval development of 16mm and 13 mm collections adjacent to the area of maximum colonic inflammation in keeping with small pericolonic abscesses  These both show thin surrounding rinds of subtle enhancement    ED Treatment:   Medication Administration from 12/04/2017 0821 to 12/04/2017 1516       Date/Time Order Dose Route Action Action by Comments     12/04/2017 1055 sodium chloride 0 9 % bolus 1,000 mL 0 mL Intravenous Stopped Ilda Do RN      12/04/2017 0931 sodium chloride 0 9 % bolus 1,000 mL 1,000 mL Intravenous New Bag Ilda Hoyos RN      12/04/2017 0930 ondansetron (ZOFRAN) injection 4 mg 4 mg Intravenous Given Ilda Do RN      12/04/2017 1055 ciprofloxacin (CIPRO) IVPB (premix) 400 mg 400 mg Intravenous New Bag Ilda Hoyos RN      12/04/2017 1055 metroNIDAZOLE (FLAGYL) IVPB (premix) 500 mg 0 mg Intravenous Stopped Ilda Do RN      12/04/2017 1018 metroNIDAZOLE (FLAGYL) IVPB (premix) 500 mg 500 mg Intravenous New Bag Ilda Do RN      12/04/2017 1018 HYDROmorphone (DILAUDID) 1 mg/mL injection 1 mg 1 mg Intravenous Given Ilda Do RN      12/04/2017 1118 iohexol (OMNIPAQUE) 350 MG/ML injection (MULTI-DOSE) 100 mL 100 mL Intravenous Given Dalia Mantle       Past Medical/Surgical History: Active Ambulatory Problems     Diagnosis Date Noted    No Active Ambulatory Problems     Resolved Ambulatory Problems     Diagnosis Date Noted    No Resolved Ambulatory Problems     Past Medical History:   Diagnosis Date    Disease of thyroid gland     Gastroesophageal reflux disease without esophagitis 12/4/2017    Hypertension     Vitamin D deficiency disease    Admitting Diagnosis: Diverticulitis [K57 92]  Abdominal pain [R10 9]  Pericolonic abscess [K63 0]  Age/Sex: 47 y o  female  Assessment/Plan:   Principal Problem:    Diverticulitis  Active Problems:    Pericolonic abscess    Gastroesophageal reflux disease without esophagitis    Dyslipidemia    Essential hypertension    Hypokalemia  Plan for the Primary Problem(s):  · Acute diverticulitis with pericolonic abscess  ? NPO  ? IV fluid  ? IV Zosyn  ? Acute care surgical consult appreciated  ? IV Dilaudid 0 2 mg every 3 hours as needed for severe pain  Plan for Additional Problems:   · History of hypertension, hold patient's blood pressure medications, follow up a m  Labs  · History of GERD, will give Protonix IV 40 mg daily  · Hypokalemia, will replace with IV potassium, follow up repeat labs daily  · History of dyslipidemia, hold statin, hold aspirin    Anticipated Length of Stay:  Patient will be admitted on an Inpatient basis with an anticipated length of stay of  greater than 2 midnights     Justification for Hospital Stay:  Patient had progression of diverticulitis with formation of pericolonic abscesses despite oral antibiotic therapy, will need continuous inpatient monitoring and IV fluid with intermittent dosing of IV antibiotics until symptoms are improved  Admission Orders:  MED SURG  NPO  CONSULT SURGERY  BLE VENLAZARUS  Scheduled Meds:   enoxaparin 40 mg Subcutaneous Daily   pantoprazole 40 mg Intravenous Q24H [address]   piperacillin-tazobactam 3 375 g Intravenous Q6H     Continuous Infusions:   sodium chloride 0 9 % with KCl 20 mEq/L 125 mL/hr Last Rate: 125 mL/hr (12/05/17 0256)     PRN Meds:   acetaminophen    HYDROmorphone    ondansetron    [employer] [facility] in the [employer] by Yonis Stover for 2017  Network Utilization Review Department  Phone: [phone]; Fax [phone]  ATTENTION: The Network Utilization Review Department is now centralized for our 7 Facilities  Make a note that we have a new phone and fax numbers for our Department  Please call with any questions or concerns to [phone] and carefully follow the prompts so that you are directed to the right person  All voicemails are confidential  Fax any determinations, approvals, denials, and requests for initial or continue stay review clinical to [phone]  Due to HIGH CALL volume, it would be easier if you could please send faxed requests to expedite your requests and in part, help us provide discharge notifications faster

## 2017-12-06 LAB
ALBUMIN SERPL BCP-MCNC: 2.3 G/DL (ref 3.5–5)
ALP SERPL-CCNC: 67 U/L (ref 46–116)
ALT SERPL W P-5'-P-CCNC: 15 U/L (ref 12–78)
ANION GAP SERPL CALCULATED.3IONS-SCNC: 7 MMOL/L (ref 4–13)
AST SERPL W P-5'-P-CCNC: 13 U/L (ref 5–45)
BASOPHILS # BLD AUTO: 0.02 THOUSANDS/ΜL (ref 0–0.1)
BASOPHILS NFR BLD AUTO: 0 % (ref 0–1)
BILIRUB SERPL-MCNC: 0.5 MG/DL (ref 0.2–1)
BUN SERPL-MCNC: 4 MG/DL (ref 5–25)
CALCIUM SERPL-MCNC: 8.8 MG/DL (ref 8.3–10.1)
CHLORIDE SERPL-SCNC: 104 MMOL/L (ref 100–108)
CO2 SERPL-SCNC: 30 MMOL/L (ref 21–32)
CREAT SERPL-MCNC: 0.7 MG/DL (ref 0.6–1.3)
EOSINOPHIL # BLD AUTO: 0.15 THOUSAND/ΜL (ref 0–0.61)
EOSINOPHIL NFR BLD AUTO: 2 % (ref 0–6)
ERYTHROCYTE [DISTWIDTH] IN BLOOD BY AUTOMATED COUNT: 11.8 % (ref 11.6–15.1)
GFR SERPL CREATININE-BSD FRML MDRD: 99 ML/MIN/1.73SQ M
GLUCOSE SERPL-MCNC: 75 MG/DL (ref 65–140)
HCT VFR BLD AUTO: 35.7 % (ref 34.8–46.1)
HGB BLD-MCNC: 12.1 G/DL (ref 11.5–15.4)
LYMPHOCYTES # BLD AUTO: 1.43 THOUSANDS/ΜL (ref 0.6–4.47)
LYMPHOCYTES NFR BLD AUTO: 17 % (ref 14–44)
MCH RBC QN AUTO: 30.4 PG (ref 26.8–34.3)
MCHC RBC AUTO-ENTMCNC: 33.9 G/DL (ref 31.4–37.4)
MCV RBC AUTO: 90 FL (ref 82–98)
MONOCYTES # BLD AUTO: 0.61 THOUSAND/ΜL (ref 0.17–1.22)
MONOCYTES NFR BLD AUTO: 7 % (ref 4–12)
NEUTROPHILS # BLD AUTO: 6.09 THOUSANDS/ΜL (ref 1.85–7.62)
NEUTS SEG NFR BLD AUTO: 74 % (ref 43–75)
PLATELET # BLD AUTO: 282 THOUSANDS/UL (ref 149–390)
PMV BLD AUTO: 11 FL (ref 8.9–12.7)
POTASSIUM SERPL-SCNC: 4 MMOL/L (ref 3.5–5.3)
PROT SERPL-MCNC: 6.1 G/DL (ref 6.4–8.2)
RBC # BLD AUTO: 3.98 MILLION/UL (ref 3.81–5.12)
SODIUM SERPL-SCNC: 141 MMOL/L (ref 136–145)
WBC # BLD AUTO: 8.3 THOUSAND/UL (ref 4.31–10.16)

## 2017-12-06 PROCEDURE — 85025 COMPLETE CBC W/AUTO DIFF WBC: CPT

## 2017-12-06 PROCEDURE — C9113 INJ PANTOPRAZOLE SODIUM, VIA: HCPCS | Performed by: INTERNAL MEDICINE

## 2017-12-06 PROCEDURE — 80053 COMPREHEN METABOLIC PANEL: CPT

## 2017-12-06 RX ORDER — PANTOPRAZOLE SODIUM 40 MG/1
40 TABLET, DELAYED RELEASE ORAL
Status: DISCONTINUED | OUTPATIENT
Start: 2017-12-07 | End: 2017-12-07 | Stop reason: HOSPADM

## 2017-12-06 RX ORDER — AMOXICILLIN AND CLAVULANATE POTASSIUM 875; 125 MG/1; MG/1
1 TABLET, FILM COATED ORAL EVERY 12 HOURS SCHEDULED
Status: DISCONTINUED | OUTPATIENT
Start: 2017-12-06 | End: 2017-12-07 | Stop reason: HOSPADM

## 2017-12-06 RX ADMIN — PANTOPRAZOLE SODIUM 40 MG: 40 INJECTION, POWDER, FOR SOLUTION INTRAVENOUS at 08:13

## 2017-12-06 RX ADMIN — PIPERACILLIN SODIUM AND TAZOBACTAM SODIUM 3.38 G: 3; .375 INJECTION, POWDER, LYOPHILIZED, FOR SOLUTION INTRAVENOUS at 00:59

## 2017-12-06 RX ADMIN — AMOXICILLIN AND CLAVULANATE POTASSIUM 1 TABLET: 875; 125 TABLET, FILM COATED ORAL at 20:51

## 2017-12-06 RX ADMIN — AMOXICILLIN AND CLAVULANATE POTASSIUM 1 TABLET: 875; 125 TABLET, FILM COATED ORAL at 10:18

## 2017-12-06 RX ADMIN — PIPERACILLIN SODIUM AND TAZOBACTAM SODIUM 3.38 G: 3; .375 INJECTION, POWDER, LYOPHILIZED, FOR SOLUTION INTRAVENOUS at 06:45

## 2017-12-06 NOTE — PROGRESS NOTES
Progress Note - General Surgery   Jose Carlos Solitario 47 y o  female MRN: [id number]  Unit/Bed#: 407-01 Encounter: [id number]    Assessment:  50-year-old female admitted with acute complicated diverticulitis with small non drainable collections  Doing well  Minimal to no tenderness  Tolerating clears  Leukocytosis resolved  Plan:  - advance diet as tolerated  - IV antibiotics may transition to p o   - if tolerates diet would stop IV fluids  - likely discharge tomorrow if tolerates diet and would complete antibiotic course is outpatient    Subjective/Objective   Chief Complaint:  feeling of fullness in the lower abdomen    Subjective:  Doing okay  Minimal to no tenderness  No nausea vomiting  Tolerating clears  Objective:     Blood pressure 120/74, pulse 75, temperature 98 7 °F (37 1 °C), temperature source Temporal, resp  rate 18, height 5' 1" (1 549 m), weight 96 2 kg (212 lb 1 3 oz), SpO2 96 %  ,Body mass index is 40 07 kg/m²  Intake/Output Summary (Last 24 hours) at 12/06/17 1058  Last data filed at 12/06/17 1016   Gross per 24 hour   Intake          2719 17 ml   Output                0 ml   Net          2719 17 ml       Invasive Devices     Peripheral Intravenous Line            Peripheral IV 12/04/17 Right Antecubital 2 days                Physical Exam:   General:  No acute distress, sitting up in chair resting comfortably  HEENT:  Normocephalic, atraumatic, trachea midline  CV:  S1-S2 audible, regular rate and rhythm, no murmurs rubs or gallops  Pulmonary:  Clear auscultation bilaterally  GI:  Abdomen is obese, soft, nontender to palpation left lower quadrant rebound or guarding  MSK:  Lower extremities without clubbing or cyanosis  Neurologic:  Alert oriented x3, cranial nerves 2-12 grossly intact  Psych:  Mood and affect appropriate            Lab, Imaging and other studies:  I have personally reviewed pertinent lab results    , CBC:   Lab Results   Component Value Date    WBC 8 30 12/06/2017 HGB 12 1 12/06/2017    HCT 35 7 12/06/2017    MCV 90 12/06/2017     12/06/2017    MCH 30 4 12/06/2017    MCHC 33 9 12/06/2017    RDW 11 8 12/06/2017    MPV 11 0 12/06/2017   , CMP:   Lab Results   Component Value Date     12/06/2017    K 4 0 12/06/2017     12/06/2017    CO2 30 12/06/2017    ANIONGAP 7 12/06/2017    BUN 4 (L) 12/06/2017    CREATININE 0 70 12/06/2017    GLUCOSE 75 12/06/2017    CALCIUM 8 8 12/06/2017    AST 13 12/06/2017    ALT 15 12/06/2017    ALKPHOS 67 12/06/2017    PROT 6 1 (L) 12/06/2017    ALBUMIN 2 3 (L) 12/06/2017    BILITOT 0 50 12/06/2017    EGFR 99 12/06/2017   , Coagulation: No results found for: PT, INR, APTT, Urinalysis: No results found for: COLORU, CLARITYU, SPECGRAV, PHUR, LEUKOCYTESUR, NITRITE, PROTEINUA, GLUCOSEU, KETONESU, BILIRUBINUR, BLOODU, Amylase: No results found for: AMYLASE, Lipase: No results found for: LIPASE  VTE Pharmacologic Prophylaxis: Enoxaparin (Lovenox)  VTE Mechanical Prophylaxis: sequential compression device

## 2017-12-06 NOTE — CASE MANAGEMENT
Continued Stay Review  Date: 12/6/17  Vital Signs: /74   Pulse 75   Temp 98 7 °F (37 1 °C) (Temporal)   Resp 18   Ht 5' 1" (1 549 m)   Wt 96 2 kg (212 lb 1 3 oz)   SpO2 96%   BMI 40 07 kg/m²   Medications:   Scheduled Meds:   pantoprazole 40 mg Intravenous Q24H [address]   piperacillin-tazobactam 3 375 g Intravenous Q6H   Continuous Infusions:   sodium chloride 0 9 % with KCl 20 mEq/L 50 mL/hr Last Rate: 50 mL/hr (12/05/17 1156)   PRN Meds:   acetaminophen    HYDROmorphone;USED X1/24HRS    Ondansetron  Abnormal Labs/Diagnostic Results:   TPROT 6 1 ALB 2 3   Age/Sex: 47 y o  female   Assessment/Plan:   Principal Problem:    Diverticulitis  Active Problems:    Pericolonic abscess    Gastroesophageal reflux disease without esophagitis    Dyslipidemia    Essential hypertension    Hypokalemia  Plan  · Diverticulitis with pericolonic abscess, changed to p o  Augmentin, advance diet, if patient tolerates diet with no worsening pain, no fever and no spike in white count will discharge home tomorrow on 10 additional days of p o  Augmentin  Discharge Plan: [address] [place] [address], [address] in the [facility] by Yonis Stover for 2017  Network Utilization Review Department  Phone: [phone]; Fax [phone]  ATTENTION: The Network Utilization Review Department is now centralized for our 7 Facilities  Make a note that we have a new phone and fax numbers for our Department  Please call with any questions or concerns to [phone] and carefully follow the prompts so that you are directed to the right person  All voicemails are confidential  Fax any determinations, approvals, denials, and requests for initial or continue stay review clinical to [phone]  Due to HIGH CALL volume, it would be easier if you could please send faxed requests to expedite your requests and in part, help us provide discharge notifications faster

## 2017-12-06 NOTE — PROGRESS NOTES
[address] Internal Medicine Progress Note  Patient: Charu Ramsey 47 y o  female   MRN: [id number]  PCP: Luisa Plaza DO  Unit/Bed#: 689-73 Encounter: [id number]  Date Of Visit: 17    Assessment:    Principal Problem:    Diverticulitis  Active Problems:    Pericolonic abscess    Gastroesophageal reflux disease without esophagitis    Dyslipidemia    Essential hypertension    Hypokalemia      Plan:    · Diverticulitis with pericolonic abscess, changed to p o  Augmentin, advance diet, if patient tolerates diet with no worsening pain, no fever and no spike in white count will discharge home tomorrow on 10 additional days of p o  Augmentin  VTE Pharmacologic Prophylaxis:   Pharmacologic: Patient has refused VTE prophylaxis  Mechanical VTE Prophylaxis in Place: No    Discussions with Specialists or Other Care Team Provider/Patient/Family:  Plan of care discussed with patient bedside  Current Length of Stay: 2 day(s)    Current Patient Status: Inpatient     Code Status: Level 1 - Full Code      Subjective:   Minimal abdominal pain    Objective:     Vitals:   Temp (24hrs), Av 5 °F (36 9 °C), Min:98 1 °F (36 7 °C), Max:98 7 °F (37 1 °C)    HR:  [73-75] 75  Resp:  [18] 18  BP: (108-120)/(68-74) 120/74  SpO2:  [96 %-97 %] 96 %  Body mass index is 40 07 kg/m²  Input and Output Summary (last 24 hours): Intake/Output Summary (Last 24 hours) at 17 7120  Last data filed at 17 2106   Gross per 24 hour   Intake             1600 ml   Output                0 ml   Net             1600 ml       Physical Exam:     Physical Exam   Constitutional: She is oriented to person, place, and time  She appears well-developed and well-nourished  No distress  Pulmonary/Chest: Effort normal and breath sounds normal  No respiratory distress  She has no wheezes  Abdominal: Soft  Bowel sounds are normal  There is tenderness  There is no rebound and no guarding     Neurological: She is alert and oriented to person, place, and time  No cranial nerve deficit  Coordination normal    Skin: She is not diaphoretic  Nursing note and vitals reviewed  Additional Data:     Labs:      Results from last 7 days  Lab Units 12/06/17  0355   WBC Thousand/uL 8 30   HEMOGLOBIN g/dL 12 1   HEMATOCRIT % 35 7   PLATELETS Thousands/uL 282   NEUTROS PCT % 74   LYMPHS PCT % 17   MONOS PCT % 7   EOS PCT % 2       Results from last 7 days  Lab Units 12/06/17  0355   SODIUM mmol/L 141   POTASSIUM mmol/L 4 0   CHLORIDE mmol/L 104   CO2 mmol/L 30   BUN mg/dL 4*   CREATININE mg/dL 0 70   CALCIUM mg/dL 8 8   TOTAL PROTEIN g/dL 6 1*   BILIRUBIN TOTAL mg/dL 0 50   ALK PHOS U/L 67   ALT U/L 15   AST U/L 13   GLUCOSE RANDOM mg/dL 75           * I Have Reviewed All Lab Data Listed Above  * Additional Pertinent Lab Tests Reviewed:  [facility]      Recent Cultures (last 7 days):           Last 24 Hours Medication List:     amoxicillin-clavulanate 1 tablet Oral Q12H [facility]   [START ON 12/7/2017] pantoprazole 40 mg Oral Early Morning        Today, Patient Was Seen By: Justo Palma DO

## 2017-12-07 VITALS
SYSTOLIC BLOOD PRESSURE: 130 MMHG | WEIGHT: 212.08 LBS | BODY MASS INDEX: 40.04 KG/M2 | RESPIRATION RATE: 16 BRPM | TEMPERATURE: 97.9 F | DIASTOLIC BLOOD PRESSURE: 72 MMHG | HEIGHT: 61 IN | HEART RATE: 70 BPM | OXYGEN SATURATION: 97 %

## 2017-12-07 PROBLEM — E87.6 HYPOKALEMIA: Status: RESOLVED | Noted: 2017-12-04 | Resolved: 2017-12-07

## 2017-12-07 LAB
BASOPHILS # BLD AUTO: 0.02 THOUSANDS/ΜL (ref 0–0.1)
BASOPHILS NFR BLD AUTO: 0 % (ref 0–1)
EOSINOPHIL # BLD AUTO: 0.13 THOUSAND/ΜL (ref 0–0.61)
EOSINOPHIL NFR BLD AUTO: 2 % (ref 0–6)
ERYTHROCYTE [DISTWIDTH] IN BLOOD BY AUTOMATED COUNT: 11.7 % (ref 11.6–15.1)
HCT VFR BLD AUTO: 36.4 % (ref 34.8–46.1)
HGB BLD-MCNC: 12.4 G/DL (ref 11.5–15.4)
LYMPHOCYTES # BLD AUTO: 1.55 THOUSANDS/ΜL (ref 0.6–4.47)
LYMPHOCYTES NFR BLD AUTO: 19 % (ref 14–44)
MCH RBC QN AUTO: 30.3 PG (ref 26.8–34.3)
MCHC RBC AUTO-ENTMCNC: 34.1 G/DL (ref 31.4–37.4)
MCV RBC AUTO: 89 FL (ref 82–98)
MONOCYTES # BLD AUTO: 0.72 THOUSAND/ΜL (ref 0.17–1.22)
MONOCYTES NFR BLD AUTO: 9 % (ref 4–12)
NEUTROPHILS # BLD AUTO: 5.91 THOUSANDS/ΜL (ref 1.85–7.62)
NEUTS SEG NFR BLD AUTO: 70 % (ref 43–75)
PLATELET # BLD AUTO: 311 THOUSANDS/UL (ref 149–390)
PMV BLD AUTO: 10.3 FL (ref 8.9–12.7)
RBC # BLD AUTO: 4.09 MILLION/UL (ref 3.81–5.12)
WBC # BLD AUTO: 8.33 THOUSAND/UL (ref 4.31–10.16)

## 2017-12-07 PROCEDURE — 85025 COMPLETE CBC W/AUTO DIFF WBC: CPT | Performed by: INTERNAL MEDICINE

## 2017-12-07 RX ORDER — AMOXICILLIN AND CLAVULANATE POTASSIUM 875; 125 MG/1; MG/1
1 TABLET, FILM COATED ORAL EVERY 12 HOURS SCHEDULED
Qty: 20 TABLET | Refills: 0 | Status: SHIPPED | OUTPATIENT
Start: 2017-12-07 | End: 2017-12-17

## 2017-12-07 RX ADMIN — PANTOPRAZOLE SODIUM 40 MG: 40 TABLET, DELAYED RELEASE ORAL at 08:29

## 2017-12-07 RX ADMIN — AMOXICILLIN AND CLAVULANATE POTASSIUM 1 TABLET: 875; 125 TABLET, FILM COATED ORAL at 08:29

## 2017-12-07 NOTE — DISCHARGE INSTR - APPOINTMENTS
Pt has a follow up appt with Dr Adolfo Delaney on Tuesday December 19, 2017  at 10:15 am in the [place] office  Pt has a follow up appt with Dr Britni Roldan on Monday December 11, 2017 at 1:15pm   If unable to make these appts please call and reschedule them

## 2017-12-07 NOTE — SOCIAL WORK
Pt is being discharged today with no Case Management needs  Pt family will give the pt a ride home  Follow up appointments made for the pt and reviewed with pt   Case Management reviewed discharge planning process including the following: identifying help that is needed at home, pt's preference for discharge needs and Meds at [facility]  Reviewed with Pt that any member of the healthcare team can answer questions regarding : medications, jmportance of recognizing  Signs and symptoms of any  medical problems  Case Management also encouraged pt to follow up with all recommended appointments after discharge

## 2017-12-07 NOTE — NURSING NOTE
Pt discharged to home, d/c instructions given and reviewed with pt, pt verbalized understanding, pt left via w/c

## 2017-12-07 NOTE — DISCHARGE INSTRUCTIONS
Low Fiber Diet   WHAT YOU NEED TO KNOW:   A low-fiber diet limits foods that are high in fiber  Fiber is the part of fruits, vegetables, and grains that is not broken down by your body  You may need to follow this diet after surgery on your intestines  You may also need to follow this diet for certain conditions such as Crohn disease or ulcerative colitis  Ask your healthcare provider or dietitian how much fiber you can have each day  DISCHARGE INSTRUCTIONS:   Foods to include:  Read food labels to check the amount of fiber that are found in foods  Some foods that are low in fiber include the following:  · Grains:  Choose grains that have less than 2 grams of fiber in each serving   Examples include the following:     ¨ Cream of wheat and finely ground grits    ¨ Dry cereal made from rice     ¨ White bread, white pasta, and white rice    ¨ Crackers, bagels, and rolls made from white or refined flour    · Other foods:      ¨ Canned and well-cooked fruit without skins or seeds, and juice without pulp    ¨ Ripe bananas and melons    ¨ Canned and well-cooked vegetables without skins or seeds, and vegetable juice    ¨ Cow's milk, lactose-free milk, soy milk, and rice milk    ¨ Yogurt without nuts, fruit, or granola    ¨ Eggs, poultry (such as chicken and turkey), fish, and tender, ground, well-cooked beef     ¨ Tofu and smooth peanut butter    ¨ Broth and strained soups made of low-fiber foods  Foods to avoid:   · Breads, cereals, crackers, and pasta made with whole wheat or whole grains (such as whole oats)    · [place] [employer] [place], wild rice, quinoa, kasha, and barley    · All fresh fruit with skin, except banana and melons     · Dried fruits and fruit juice with pulp    · Canned pineapple    · Raw vegetables    · Nuts, seeds, and popcorn    · Beans, nuts, peas, and lentils    · Tough meats    · Coconut and avocado  What else you should know about a low-fiber diet:  A low-fiber diet can decrease the amount of bowel movements you have  Drink liquids as directed to avoid constipation  Ask how much liquid to drink each day and which liquids are best for you  © 2017 [address] [employer] Information is for End User's use only and may not be sold, redistributed or otherwise used for commercial purposes  All illustrations and images included in CareNotes® are the copyrighted property of [employer] M [employer]  or [address]  The above information is an  only  It is not intended as medical advice for individual conditions or treatments  Talk to your doctor, nurse or pharmacist before following any medical regimen to see if it is safe and effective for you

## 2017-12-07 NOTE — PROGRESS NOTES
Progress Note - General Surgery   Bettie Bridges 47 y o  female MRN: [id number]  Unit/Bed#: 407-01 Encounter: [id number]    Assessment:  Acute diverticulitis with small non drainable pericolonic abscess, improved with IV antibiotics    GERD  Hypertension  Hyperlipidemia  Hypokalemia, resolved    Plan:  Patient is stable for hospital discharge at the direction of the hospitalist team   Follow up with Dr Blanka Hodge in 2 or 3 weeks, patient was provided a business card with contact information to call his office to arrange an appointment date and time  The patient should be seen by her primary care physician if needed or previously scheduled  Patient was counseled regarding nutrition by this provider for her diet upon discharge  Patient will continue p o  Augmentin for another 10 days after discharge  Subjective/Objective   Chief Complaint:  Patient is feeling better  Her abdominal pain is improved  No nausea or vomiting  Subjective:  Her abdominal pain is minimal at this time  She is moving her bowels  She states that her stool is somewhat loose  She has had no fever  No nausea or vomiting  Patient is seen early this morning by Dr Blanka Hodge  Patient took a shower today  She is ambulating without problem  She has been tolerating regular diet without problem  Scheduled Meds:  amoxicillin-clavulanate 1 tablet Oral Q12H [facility]   pantoprazole 40 mg Oral Early Morning     Continuous Infusions:   PRN Meds:   acetaminophen    HYDROmorphone    ondansetron      Objective:     Blood pressure 130/72, pulse 70, temperature 97 9 °F (36 6 °C), temperature source Temporal, resp  rate 16, height 5' 1" (1 549 m), weight 96 2 kg (212 lb 1 3 oz), SpO2 97 %  ,Body mass index is 40 07 kg/m²        Intake/Output Summary (Last 24 hours) at 12/07/17 1013  Last data filed at 12/06/17 1748   Gross per 24 hour   Intake          1839 17 ml   Output                0 ml   Net          1839 17 ml       Invasive Devices          No matching active lines, drains, or airways          Physical Exam:  Patient is awake and alert  She is in good spirits  Skin warm and dry to touch without rashes or pallor  HEENT entirely unremarkable  Chest heart regular rate and rhythm  Lungs clear  No rales or rhonchi  Back no CVA tenderness  Abdomen positive bowel sounds are heard  Abdomen is nondistended  The abdomen is mostly soft and entirely nontender  No guarding or rebound  No masses palpable  Was all 4 extremities well  No calf tenderness    Lab, Imaging and other studies:  I have personally reviewed pertinent lab results    , CBC:   Lab Results   Component Value Date    WBC 8 33 12/07/2017    HGB 12 4 12/07/2017    HCT 36 4 12/07/2017    MCV 89 12/07/2017     12/07/2017    MCH 30 3 12/07/2017    MCHC 34 1 12/07/2017    RDW 11 7 12/07/2017    MPV 10 3 12/07/2017   , CMP: No results found for: NA, K, CL, CO2, ANIONGAP, BUN, CREATININE, GLUCOSE, CALCIUM, AST, ALT, ALKPHOS, PROT, ALBUMIN, BILITOT, EGFR  VTE Pharmacologic Prophylaxis: Reason for no pharmacologic prophylaxis Patient has been ambulatory  VTE Mechanical Prophylaxis: sequential compression device     Serjio Spencer PA-C

## 2017-12-07 NOTE — DISCHARGE SUMMARY
Discharge Summary - [facility]    Patient Information: Kj Reynoso 47 y o  female MRN: [id number]  Unit/Bed#: 407-01 Encounter: [id number]    Discharging Physician / Practitioner: Shayne Augustine DO  PCP: Ermelinda Duffy DO  Admission Date: 12/4/2017  Discharge Date: 12/07/17    Reason for Admission:      47 y o  female who presents with increasing abdominal pain left lower quadrant despite oral antibiotics, furthermore patient developed nausea vomiting, anorexia  Patient is not able to tolerate p o  antibiotic regimen due to the nausea and vomiting  Pain left lower quadrant currently rated as 5/10  Cramping with periods of stabbing like pain  Discharge Diagnoses:     Principal Problem:    Diverticulitis  Active Problems:    Pericolonic abscess    Gastroesophageal reflux disease without esophagitis    Dyslipidemia    Essential hypertension  Resolved Problems:    Hypokalemia      Consultations During Hospital Stay:  · General surgical consult    Procedures Performed:     · None    Significant Findings / Test Results:     · Acute complicated diverticulitis with small pericolonic abscess, non drainable collections  Incidental Findings:   · None     Test Results Pending at Discharge (will require follow up): · None     Complications:  None    Hospital Course:     Kj Reynoso is a 47 y o  female patient who originally presented to the hospital on 12/4/2017 due to HPI as noted above  Patient was initially treated with NPO status, IV fluid and IV Zosyn  The patient had gradual improvement throughout the hospital course, she is going to be discharged to complete 10 additional days of PO augmentin      Condition at Discharge: good     Discharge Day Visit / Exam:     Subjective:  Minimal abdominal pain  Vitals: Blood Pressure: 130/72 (12/07/17 0720)  Pulse: 70 (12/07/17 0720)  Temperature: 97 9 °F (36 6 °C) (12/07/17 0720)  Temp Source: Temporal (12/07/17 0720)  Respirations: 16 (12/07/17 0720)  Height: 5' 1" (154 9 cm) (12/04/17 1543)  Weight - Scale: 96 2 kg (212 lb 1 3 oz) (12/05/17 1201)  SpO2: 97 % (12/07/17 0720)  Exam:   Physical Exam   Constitutional: She is oriented to person, place, and time  She appears well-developed and well-nourished  No distress  Pulmonary/Chest: Effort normal and breath sounds normal  No respiratory distress  She has no wheezes  Abdominal: Soft  Bowel sounds are normal  She exhibits no distension  There is no tenderness  Neurological: She is alert and oriented to person, place, and time  No cranial nerve deficit  Coordination normal    Skin: She is not diaphoretic  Nursing note and vitals reviewed  Discharge instructions/Information to patient and family:   See after visit summary for information provided to patient and family  Provisions for Follow-Up Care:  See after visit summary for information related to follow-up care and any pertinent home health orders  Disposition:     Home    For Discharges to [facility]:   · Not Applicable to this Patient - Not Applicable to this Patient    Planned Readmission:  No     Discharge Statement:  I spent 20 minutes discharging the patient  This time was spent on the day of discharge  I had direct contact with the patient on the day of discharge  Greater than 50% of the total time was spent examining patient, answering all patient questions, arranging and discussing plan of care with patient as well as directly providing post-discharge instructions  Additional time then spent on discharge activities  Discharge Medications:  See after visit summary for reconciled discharge medications provided to patient and family        ** Please Note: This note has been constructed using a voice recognition system **

## 2017-12-11 ENCOUNTER — ALLSCRIPTS OFFICE VISIT (OUTPATIENT)
Dept: OTHER | Facility: OTHER | Age: 52
End: 2017-12-11

## 2017-12-11 ENCOUNTER — HOSPITAL ENCOUNTER (EMERGENCY)
Facility: HOSPITAL | Age: 52
Discharge: HOME | End: 2017-12-11
Attending: EMERGENCY MEDICINE | Admitting: EMERGENCY MEDICINE
Payer: COMMERCIAL

## 2017-12-11 VITALS
OXYGEN SATURATION: 93 % | TEMPERATURE: 99.3 F | RESPIRATION RATE: 18 BRPM | HEART RATE: 90 BPM | DIASTOLIC BLOOD PRESSURE: 76 MMHG | SYSTOLIC BLOOD PRESSURE: 123 MMHG | WEIGHT: 211.8 LBS | BODY MASS INDEX: 39.99 KG/M2 | HEIGHT: 61 IN

## 2017-12-11 DIAGNOSIS — R11.10 VOMITING: Primary | ICD-10-CM

## 2017-12-11 LAB
ALBUMIN SERPL BCP-MCNC: 3 G/DL (ref 3.5–5)
ALP SERPL-CCNC: 87 U/L (ref 46–116)
ALT SERPL W P-5'-P-CCNC: 18 U/L (ref 12–78)
ANION GAP SERPL CALCULATED.3IONS-SCNC: 11 MMOL/L (ref 4–13)
AST SERPL W P-5'-P-CCNC: 11 U/L (ref 5–45)
ATRIAL RATE: 93 BPM
BASOPHILS # BLD AUTO: 0.04 THOUSANDS/ΜL (ref 0–0.1)
BASOPHILS NFR BLD AUTO: 0 % (ref 0–1)
BILIRUB SERPL-MCNC: 1 MG/DL (ref 0.2–1)
BUN SERPL-MCNC: 12 MG/DL (ref 5–25)
CALCIUM SERPL-MCNC: 9.4 MG/DL (ref 8.3–10.1)
CHLORIDE SERPL-SCNC: 100 MMOL/L (ref 100–108)
CO2 SERPL-SCNC: 27 MMOL/L (ref 21–32)
CREAT SERPL-MCNC: 0.86 MG/DL (ref 0.6–1.3)
EOSINOPHIL # BLD AUTO: 0.06 THOUSAND/ΜL (ref 0–0.61)
EOSINOPHIL NFR BLD AUTO: 0 % (ref 0–6)
ERYTHROCYTE [DISTWIDTH] IN BLOOD BY AUTOMATED COUNT: 12 % (ref 11.6–15.1)
GFR SERPL CREATININE-BSD FRML MDRD: 77 ML/MIN/1.73SQ M
GLUCOSE SERPL-MCNC: 161 MG/DL (ref 65–140)
HCT VFR BLD AUTO: 42.5 % (ref 34.8–46.1)
HGB BLD-MCNC: 14.5 G/DL (ref 11.5–15.4)
LIPASE SERPL-CCNC: 257 U/L (ref 73–393)
LYMPHOCYTES # BLD AUTO: 1.3 THOUSANDS/ΜL (ref 0.6–4.47)
LYMPHOCYTES NFR BLD AUTO: 10 % (ref 14–44)
MCH RBC QN AUTO: 29.8 PG (ref 26.8–34.3)
MCHC RBC AUTO-ENTMCNC: 34.1 G/DL (ref 31.4–37.4)
MCV RBC AUTO: 87 FL (ref 82–98)
MONOCYTES # BLD AUTO: 0.96 THOUSAND/ΜL (ref 0.17–1.22)
MONOCYTES NFR BLD AUTO: 7 % (ref 4–12)
NEUTROPHILS # BLD AUTO: 11.36 THOUSANDS/ΜL (ref 1.85–7.62)
NEUTS SEG NFR BLD AUTO: 83 % (ref 43–75)
P AXIS: 29 DEGREES
PLATELET # BLD AUTO: 479 THOUSANDS/UL (ref 149–390)
PMV BLD AUTO: 10.3 FL (ref 8.9–12.7)
POTASSIUM SERPL-SCNC: 3.6 MMOL/L (ref 3.5–5.3)
PR INTERVAL: 148 MS
PROT SERPL-MCNC: 8.1 G/DL (ref 6.4–8.2)
QRS AXIS: 44 DEGREES
QRSD INTERVAL: 88 MS
QT INTERVAL: 330 MS
QTC INTERVAL: 410 MS
RBC # BLD AUTO: 4.86 MILLION/UL (ref 3.81–5.12)
SODIUM SERPL-SCNC: 138 MMOL/L (ref 136–145)
T WAVE AXIS: 3 DEGREES
TROPONIN I SERPL-MCNC: <0.02 NG/ML
VENTRICULAR RATE: 93 BPM
WBC # BLD AUTO: 13.72 THOUSAND/UL (ref 4.31–10.16)

## 2017-12-11 PROCEDURE — 96361 HYDRATE IV INFUSION ADD-ON: CPT

## 2017-12-11 PROCEDURE — 85025 COMPLETE CBC W/AUTO DIFF WBC: CPT | Performed by: EMERGENCY MEDICINE

## 2017-12-11 PROCEDURE — 83690 ASSAY OF LIPASE: CPT | Performed by: EMERGENCY MEDICINE

## 2017-12-11 PROCEDURE — 36415 COLL VENOUS BLD VENIPUNCTURE: CPT | Performed by: EMERGENCY MEDICINE

## 2017-12-11 PROCEDURE — 99284 EMERGENCY DEPT VISIT MOD MDM: CPT

## 2017-12-11 PROCEDURE — 96374 THER/PROPH/DIAG INJ IV PUSH: CPT

## 2017-12-11 PROCEDURE — 84484 ASSAY OF TROPONIN QUANT: CPT | Performed by: EMERGENCY MEDICINE

## 2017-12-11 PROCEDURE — 80053 COMPREHEN METABOLIC PANEL: CPT | Performed by: EMERGENCY MEDICINE

## 2017-12-11 PROCEDURE — 93005 ELECTROCARDIOGRAM TRACING: CPT | Performed by: EMERGENCY MEDICINE

## 2017-12-11 RX ORDER — ONDANSETRON 4 MG/1
4 TABLET, ORALLY DISINTEGRATING ORAL EVERY 6 HOURS PRN
Qty: 6 TABLET | Refills: 0 | Status: ON HOLD | OUTPATIENT
Start: 2017-12-11 | End: 2018-02-15

## 2017-12-11 RX ORDER — ONDANSETRON 4 MG/1
4 TABLET, FILM COATED ORAL EVERY 6 HOURS
Qty: 12 TABLET | Refills: 0 | Status: SHIPPED | OUTPATIENT
Start: 2017-12-11 | End: 2017-12-14 | Stop reason: SDUPTHER

## 2017-12-11 RX ORDER — ONDANSETRON 2 MG/ML
4 INJECTION INTRAMUSCULAR; INTRAVENOUS ONCE
Status: COMPLETED | OUTPATIENT
Start: 2017-12-11 | End: 2017-12-11

## 2017-12-11 RX ORDER — FAMOTIDINE 20 MG/1
20 TABLET, FILM COATED ORAL 2 TIMES DAILY
Qty: 30 TABLET | Refills: 0 | Status: SHIPPED | OUTPATIENT
Start: 2017-12-11 | End: 2018-02-15 | Stop reason: HOSPADM

## 2017-12-11 RX ORDER — MAGNESIUM HYDROXIDE/ALUMINUM HYDROXICE/SIMETHICONE 120; 1200; 1200 MG/30ML; MG/30ML; MG/30ML
30 SUSPENSION ORAL ONCE
Status: COMPLETED | OUTPATIENT
Start: 2017-12-11 | End: 2017-12-11

## 2017-12-11 RX ADMIN — ONDANSETRON 4 MG: 2 INJECTION INTRAMUSCULAR; INTRAVENOUS at 05:20

## 2017-12-11 RX ADMIN — SODIUM CHLORIDE 1000 ML: 0.9 INJECTION, SOLUTION INTRAVENOUS at 05:21

## 2017-12-11 RX ADMIN — ALUMINUM HYDROXIDE, MAGNESIUM HYDROXIDE, AND SIMETHICONE 30 ML: 200; 200; 20 SUSPENSION ORAL at 05:55

## 2017-12-11 NOTE — DISCHARGE INSTRUCTIONS

## 2017-12-11 NOTE — ED PROVIDER NOTES
History  Chief Complaint   Patient presents with    Vomiting     pt hospitalized w/diverticulitis and discharged Thursday began vomiting Saturday AM     HPI     47 yoF i've seen twice for diverticulitis in the past couple weeks including with many micro-abscesses and subsequently admitted a week ago presents with vomiting  She had a 3 days stay in the hospital and was [facility] and feeling great on augmentin until Saturday when she started vomiting nbnb  Unable to keep much down, except kelly yesterday  Bowel movements have normalized  Yesterday had some generalized abd discomfort, but today her pain seems improved  No fevers, chills, sweats  Normal urination, though perhaps less than usual   She denies cp, sob, cough, sweats, weakness  Feels some malaise  Had egd last year showing small hiatal hernia and some gastritis without esophagitis  Takes prevacid daily  No HA or neurologic changes or dizziness  Mdm:  Imp: vomiting in the setting of ongoing treatment for diverticulitis but without return of the pain and difficulty with BMs she felt previously  She warrants labs, ekg, fluids, zofran and re-eval   If no improvement, may need another CT abd  Prior to Admission Medications   Prescriptions Last Dose Informant Patient Reported? Taking?    Cholecalciferol (VITAMIN D) 2000 UNITS CAPS   Yes No   Sig: Take 1 capsule by mouth daily     amoxicillin-clavulanate (AUGMENTIN) 875-125 mg per tablet   No No   Sig: Take 1 tablet by mouth every 12 (twelve) hours for 10 days   aspirin 81 MG tablet   Yes No   Sig: Take 81 mg by mouth daily   atorvastatin (LIPITOR) 10 mg tablet   Yes No   Sig: Take 10 mg by mouth daily   lansoprazole (PREVACID) 30 mg capsule   Yes No   Sig: Take 30 mg by mouth daily   lisinopril (ZESTRIL) 10 mg tablet   Yes No   Sig: Take 10 mg by mouth daily      Facility-Administered Medications: None       Past Medical History:   Diagnosis Date    Disease of thyroid gland     Gastroesophageal reflux disease without esophagitis 12/4/2017    Hypertension     Vitamin D deficiency disease        Past Surgical History:   Procedure Laterality Date    CHOLECYSTECTOMY      HYSTERECTOMY      MS ESOPHAGOGASTRODUODENOSCOPY TRANSORAL DIAGNOSTIC N/A 10/21/2016    Procedure: EGD AND COLONOSCOPY;  Surgeon: Estiven Eliazlde MD;  Location: [facility];  Service: Gastroenterology    THYROIDECTOMY, PARTIAL      TONSILLECTOMY AND ADENOIDECTOMY      TUBAL LIGATION         History reviewed  No pertinent family history  I have reviewed and agree with the history as documented  Social History   Substance Use Topics    Smoking status: Former Smoker    Smokeless tobacco: Never Used    Alcohol use Yes      Comment: social        Review of Systems   Constitutional: Negative for chills, fatigue and fever  HENT: Negative for congestion, ear pain, rhinorrhea, sinus pressure, sore throat, trouble swallowing and voice change  Eyes: Negative for pain and visual disturbance  Respiratory: Negative for cough, choking, shortness of breath and stridor  Cardiovascular: Negative for chest pain, palpitations and leg swelling  Gastrointestinal: Positive for vomiting  Negative for blood in stool, constipation, diarrhea and nausea  Endocrine: Negative  Genitourinary: Negative for dysuria, flank pain, hematuria, pelvic pain and urgency  Musculoskeletal: Negative  Skin: Negative  Allergic/Immunologic: Negative  Neurological: Negative for dizziness, speech difficulty, weakness, light-headedness and numbness  Hematological: Negative  Psychiatric/Behavioral: Negative          Physical Exam  ED Triage Vitals   Temperature Pulse Respirations Blood Pressure SpO2   12/11/17 0509 12/11/17 0509 12/11/17 0509 12/11/17 0509 12/11/17 0509   99 3 °F (37 4 °C) (!) 109 16 135/90 94 %      Temp Source Heart Rate Source Patient Position - Orthostatic VS BP Location FiO2 (%)   12/11/17 0509 12/11/17 0509 12/11/17 6915 12/11/17 0509 --   Temporal Monitor Lying Left arm       Pain Score       12/11/17 0518       3           Orthostatic Vital Signs  Vitals:    12/11/17 0509 12/11/17 0533 12/11/17 0600 12/11/17 0630   BP: 135/90  132/77 123/76   Pulse: (!) 109 93 91 90   Patient Position - Orthostatic VS: Lying  Sitting Sitting       Physical Exam   Constitutional: She is oriented to person, place, and time  She appears well-developed and well-nourished  She appears distressed (uncomfortable)  HENT:   Head: Normocephalic and atraumatic  Nose: Nose normal    Mouth/Throat: Oropharynx is clear and moist    Eyes: Conjunctivae and EOM are normal  Pupils are equal, round, and reactive to light  Neck: Normal range of motion  Neck supple  Cardiovascular: Normal rate, regular rhythm, normal heart sounds and intact distal pulses  Exam reveals no gallop and no friction rub  No murmur heard  Pulmonary/Chest: Effort normal and breath sounds normal  No stridor  No respiratory distress  She has no wheezes  She has no rales  She exhibits no tenderness  Abdominal: Soft  She exhibits no distension  There is tenderness (mild generalized abd discomfort to palpation without guarding or rigidity)  There is no rebound and no guarding  Hyperactive bowel sounds without hypertympany   Musculoskeletal: Normal range of motion  She exhibits no deformity  Neurological: She is alert and oriented to person, place, and time  No sensory deficit  She exhibits normal muscle tone  Coordination normal    Skin: Skin is warm and dry  No rash noted  No erythema  Psychiatric: She has a normal mood and affect  Vitals reviewed        ED Medications  Medications   ondansetron (ZOFRAN) injection 4 mg (4 mg Intravenous Given 12/11/17 0520)   sodium chloride 0 9 % bolus 1,000 mL (0 mL Intravenous Stopped 12/11/17 0556)   aluminum-magnesium hydroxide-simethicone (MYLANTA) 200-200-20 mg/5 mL oral suspension 30 mL (30 mL Oral Given 12/11/17 0555) Diagnostic Studies  Results Reviewed     Procedure Component Value Units Date/Time    Troponin I [[id number]]  (Normal) Collected:  12/11/17 0516    Lab Status:  Final result Specimen:  Blood from Arm, Right Updated:  12/11/17 0545     Troponin I <0 02 ng/mL     Narrative:         [employer] Chemistry analyzer 99% cutoff is > 0 04 ng/mL in network labs    o cTnI 99% cutoff is useful only when applied to patients in the clinical setting of myocardial ischemia  o cTnI 99% cutoff should be interpreted in the context of clinical history, ECG findings and possibly cardiac imaging to establish correct diagnosis  o cTnI 99% cutoff may be suggestive but clearly not indicative of a coronary event without the clinical setting of myocardial ischemia  Comprehensive metabolic panel [[id number]]  (Abnormal) Collected:  12/11/17 0516    Lab Status:  Final result Specimen:  Blood from Arm, Right Updated:  12/11/17 0543     Sodium 138 mmol/L      Potassium 3 6 mmol/L      Chloride 100 mmol/L      CO2 27 mmol/L      Anion Gap 11 mmol/L      BUN 12 mg/dL      Creatinine 0 86 mg/dL      Glucose 161 (H) mg/dL      Calcium 9 4 mg/dL      AST 11 U/L      ALT 18 U/L      Alkaline Phosphatase 87 U/L      Total Protein 8 1 g/dL      Albumin 3 0 (L) g/dL      Total Bilirubin 1 00 mg/dL      eGFR 77 ml/min/1 73sq m     Narrative:         National Kidney Disease Education Program recommendations are as follows:  GFR calculation is accurate only with a steady state creatinine  Chronic Kidney disease less than 60 ml/min/1 73 sq  meters  Kidney failure less than 15 ml/min/1 73 sq  meters      Lipase [[id number]]  (Normal) Collected:  12/11/17 0516    Lab Status:  Final result Specimen:  Blood from Arm, Right Updated:  12/11/17 0543     Lipase 257 u/L     CBC and differential [[id number]]  (Abnormal) Collected:  12/11/17 0516    Lab Status:  Final result Specimen:  Blood from Arm, Right Updated:  12/11/17 0527     WBC 13 72 (H) Thousand/uL      RBC 4 86 Million/uL      Hemoglobin 14 5 g/dL      Hematocrit 42 5 %      MCV 87 fL      MCH 29 8 pg      MCHC 34 1 g/dL      RDW 12 0 %      MPV 10 3 fL      Platelets 647 (H) Thousands/uL      Neutrophils Relative 83 (H) %      Lymphocytes Relative 10 (L) %      Monocytes Relative 7 %      Eosinophils Relative 0 %      Basophils Relative 0 %      Neutrophils Absolute 11 36 (H) Thousands/µL      Lymphocytes Absolute 1 30 Thousands/µL      Monocytes Absolute 0 96 Thousand/µL      Eosinophils Absolute 0 06 Thousand/µL      Basophils Absolute 0 04 Thousands/µL                  No orders to display              Procedures  ECG 12 Lead Documentation  Date/Time: 12/11/2017 5:29 AM  Performed by: NICOLE Jha  Authorized by: NICOLE Jha     Interpretation:     Interpretation: normal    Rate:     ECG rate:  93    ECG rate assessment: normal    Rhythm:     Rhythm: sinus rhythm    Ectopy:     Ectopy: none    QRS:     QRS axis:  Normal    QRS intervals:  Normal  Conduction:     Conduction: normal    ST segments:     ST segments:  Normal  T waves:     T waves: inverted      Inverted:  III           Phone Contacts  ED Phone Contact    ED Course  ED Course as of Dec 12 0546   Mon Dec 11, 2017   0622 Symptoms very improved at this time  She is drinking water and tolerating it  Labs notable for stress reaction, otherwise reassuring  Safe for DC home with op f/u, zofran, pepcid, and strict return precautions for any changes such as hematemesis, bloody stools, abd pain, fever etc   She expresses understanding of all the above                                  MDM  CritCare Time    Disposition  Final diagnoses:   Vomiting     Time reflects when diagnosis was documented in both MDM as applicable and the Disposition within this note     Time User Action Codes Description Comment    12/11/2017  6:27 AM Enoc, Case Add [R11 10] Vomiting       ED Disposition     ED Disposition Condition Comment    Discharge  Charu Ramsey discharge to home/self care  Condition at discharge: Good        Follow-up Information     Follow up With Specialties Details Why Contact Info    Neelima Cordero DO General Surgery Go to as scheduled [address] [address]  [phone]          Discharge Medication List as of 12/11/2017  6:28 AM      START taking these medications    Details   famotidine (PEPCID) 20 mg tablet Take 1 tablet by mouth 2 (two) times a day, Starting Mon 12/11/2017, Normal      ondansetron (ZOFRAN) 4 mg tablet Take 1 tablet by mouth every 6 (six) hours for 3 days, Starting Mon 12/11/2017, Until u 12/14/2017, Normal      ondansetron (ZOFRAN-ODT) 4 mg disintegrating tablet Take 1 tablet by mouth every 6 (six) hours as needed for nausea or vomiting, Starting Mon 12/11/2017, Normal         CONTINUE these medications which have NOT CHANGED    Details   amoxicillin-clavulanate (AUGMENTIN) 875-125 mg per tablet Take 1 tablet by mouth every 12 (twelve) hours for 10 days, Starting Thu 12/7/2017, Until Sun 12/17/2017, Normal      aspirin 81 MG tablet Take 81 mg by mouth daily, Until Discontinued, Historical Med      atorvastatin (LIPITOR) 10 mg tablet Take 10 mg by mouth daily, Until Discontinued, Historical Med      Cholecalciferol (VITAMIN D) 2000 UNITS CAPS Take 1 capsule by mouth daily  , Historical Med      lansoprazole (PREVACID) 30 mg capsule Take 30 mg by mouth daily, Until Discontinued, Historical Med      lisinopril (ZESTRIL) 10 mg tablet Take 10 mg by mouth daily, Until Discontinued, Historical Med           No discharge procedures on file      ED Provider  Electronically Signed by           Escobar Travis DO  12/12/17 2359

## 2017-12-11 NOTE — ED NOTES
Feeling better since zofran - Has not vomited since administration     Deann Cordon RN  12/11/17 0017

## 2017-12-14 ENCOUNTER — APPOINTMENT (EMERGENCY)
Dept: CT IMAGING | Facility: HOSPITAL | Age: 52
End: 2017-12-14
Payer: COMMERCIAL

## 2017-12-14 ENCOUNTER — HOSPITAL ENCOUNTER (EMERGENCY)
Facility: HOSPITAL | Age: 52
Discharge: HOME | End: 2017-12-14
Attending: EMERGENCY MEDICINE
Payer: COMMERCIAL

## 2017-12-14 VITALS
SYSTOLIC BLOOD PRESSURE: 109 MMHG | DIASTOLIC BLOOD PRESSURE: 69 MMHG | TEMPERATURE: 97.3 F | OXYGEN SATURATION: 95 % | RESPIRATION RATE: 17 BRPM | WEIGHT: 209.22 LBS | HEART RATE: 78 BPM | BODY MASS INDEX: 39.53 KG/M2

## 2017-12-14 DIAGNOSIS — R10.32 ABDOMINAL DISCOMFORT, BILATERAL LOWER QUADRANT: Primary | ICD-10-CM

## 2017-12-14 DIAGNOSIS — R53.83 MALAISE AND FATIGUE: ICD-10-CM

## 2017-12-14 DIAGNOSIS — K52.9 COLITIS PRESUMED INFECTIOUS: ICD-10-CM

## 2017-12-14 DIAGNOSIS — R10.31 ABDOMINAL DISCOMFORT, BILATERAL LOWER QUADRANT: Primary | ICD-10-CM

## 2017-12-14 DIAGNOSIS — R53.81 MALAISE AND FATIGUE: ICD-10-CM

## 2017-12-14 LAB
ALBUMIN SERPL BCP-MCNC: 3.1 G/DL (ref 3.5–5)
ALP SERPL-CCNC: 89 U/L (ref 46–116)
ALT SERPL W P-5'-P-CCNC: 22 U/L (ref 12–78)
ANION GAP SERPL CALCULATED.3IONS-SCNC: 10 MMOL/L (ref 4–13)
AST SERPL W P-5'-P-CCNC: 14 U/L (ref 5–45)
BACTERIA UR QL AUTO: ABNORMAL /HPF
BASOPHILS # BLD AUTO: 0.03 THOUSANDS/ΜL (ref 0–0.1)
BASOPHILS NFR BLD AUTO: 0 % (ref 0–1)
BILIRUB SERPL-MCNC: 1 MG/DL (ref 0.2–1)
BILIRUB UR QL STRIP: ABNORMAL
BUN SERPL-MCNC: 16 MG/DL (ref 5–25)
CALCIUM SERPL-MCNC: 9.9 MG/DL (ref 8.3–10.1)
CAOX CRY URNS QL MICRO: ABNORMAL /HPF
CHLORIDE SERPL-SCNC: 98 MMOL/L (ref 100–108)
CLARITY UR: ABNORMAL
CO2 SERPL-SCNC: 30 MMOL/L (ref 21–32)
COARSE GRAN CASTS URNS QL MICRO: ABNORMAL /LPF
COLOR UR: YELLOW
CREAT SERPL-MCNC: 1.46 MG/DL (ref 0.6–1.3)
EOSINOPHIL # BLD AUTO: 0.01 THOUSAND/ΜL (ref 0–0.61)
EOSINOPHIL NFR BLD AUTO: 0 % (ref 0–6)
ERYTHROCYTE [DISTWIDTH] IN BLOOD BY AUTOMATED COUNT: 12 % (ref 11.6–15.1)
FINE GRAN CASTS URNS QL MICRO: ABNORMAL /LPF
GFR SERPL CREATININE-BSD FRML MDRD: 41 ML/MIN/1.73SQ M
GLUCOSE SERPL-MCNC: 118 MG/DL (ref 65–140)
GLUCOSE UR STRIP-MCNC: NEGATIVE MG/DL
HCT VFR BLD AUTO: 43.1 % (ref 34.8–46.1)
HGB BLD-MCNC: 14.4 G/DL (ref 11.5–15.4)
HGB UR QL STRIP.AUTO: NEGATIVE
HOLD SPECIMEN: NORMAL
KETONES UR STRIP-MCNC: ABNORMAL MG/DL
LEUKOCYTE ESTERASE UR QL STRIP: NEGATIVE
LIPASE SERPL-CCNC: 259 U/L (ref 73–393)
LYMPHOCYTES # BLD AUTO: 1.33 THOUSANDS/ΜL (ref 0.6–4.47)
LYMPHOCYTES NFR BLD AUTO: 11 % (ref 14–44)
MCH RBC QN AUTO: 29 PG (ref 26.8–34.3)
MCHC RBC AUTO-ENTMCNC: 33.4 G/DL (ref 31.4–37.4)
MCV RBC AUTO: 87 FL (ref 82–98)
MONOCYTES # BLD AUTO: 0.66 THOUSAND/ΜL (ref 0.17–1.22)
MONOCYTES NFR BLD AUTO: 5 % (ref 4–12)
NEUTROPHILS # BLD AUTO: 10.48 THOUSANDS/ΜL (ref 1.85–7.62)
NEUTS SEG NFR BLD AUTO: 84 % (ref 43–75)
NITRITE UR QL STRIP: NEGATIVE
NON-SQ EPI CELLS URNS QL MICRO: ABNORMAL /HPF
PH UR STRIP.AUTO: 5.5 [PH] (ref 4.5–8)
PLATELET # BLD AUTO: 516 THOUSANDS/UL (ref 149–390)
PMV BLD AUTO: 10.3 FL (ref 8.9–12.7)
POTASSIUM SERPL-SCNC: 3.7 MMOL/L (ref 3.5–5.3)
PROT SERPL-MCNC: 8.3 G/DL (ref 6.4–8.2)
PROT UR STRIP-MCNC: ABNORMAL MG/DL
RBC # BLD AUTO: 4.96 MILLION/UL (ref 3.81–5.12)
RBC #/AREA URNS AUTO: ABNORMAL /HPF
SODIUM SERPL-SCNC: 138 MMOL/L (ref 136–145)
SP GR UR STRIP.AUTO: >=1.03 (ref 1–1.03)
UROBILINOGEN UR QL STRIP.AUTO: 0.2 E.U./DL
WBC # BLD AUTO: 12.51 THOUSAND/UL (ref 4.31–10.16)
WBC #/AREA URNS AUTO: ABNORMAL /HPF

## 2017-12-14 PROCEDURE — 80053 COMPREHEN METABOLIC PANEL: CPT | Performed by: EMERGENCY MEDICINE

## 2017-12-14 PROCEDURE — 36415 COLL VENOUS BLD VENIPUNCTURE: CPT | Performed by: EMERGENCY MEDICINE

## 2017-12-14 PROCEDURE — 96374 THER/PROPH/DIAG INJ IV PUSH: CPT

## 2017-12-14 PROCEDURE — 99284 EMERGENCY DEPT VISIT MOD MDM: CPT

## 2017-12-14 PROCEDURE — 85025 COMPLETE CBC W/AUTO DIFF WBC: CPT | Performed by: EMERGENCY MEDICINE

## 2017-12-14 PROCEDURE — 74177 CT ABD & PELVIS W/CONTRAST: CPT

## 2017-12-14 PROCEDURE — 96361 HYDRATE IV INFUSION ADD-ON: CPT

## 2017-12-14 PROCEDURE — 93005 ELECTROCARDIOGRAM TRACING: CPT

## 2017-12-14 PROCEDURE — 81001 URINALYSIS AUTO W/SCOPE: CPT | Performed by: EMERGENCY MEDICINE

## 2017-12-14 PROCEDURE — 83690 ASSAY OF LIPASE: CPT | Performed by: EMERGENCY MEDICINE

## 2017-12-14 RX ORDER — DOCUSATE SODIUM 100 MG/1
100 CAPSULE, LIQUID FILLED ORAL EVERY 12 HOURS
Qty: 60 CAPSULE | Refills: 0 | Status: ON HOLD | OUTPATIENT
Start: 2017-12-14 | End: 2017-12-26 | Stop reason: ALTCHOICE

## 2017-12-14 RX ORDER — NAPROXEN 500 MG/1
500 TABLET ORAL 2 TIMES DAILY WITH MEALS
Qty: 20 TABLET | Refills: 0 | Status: SHIPPED | OUTPATIENT
Start: 2017-12-14 | End: 2017-12-26

## 2017-12-14 RX ORDER — ONDANSETRON 2 MG/ML
4 INJECTION INTRAMUSCULAR; INTRAVENOUS ONCE
Status: COMPLETED | OUTPATIENT
Start: 2017-12-14 | End: 2017-12-14

## 2017-12-14 RX ADMIN — ONDANSETRON 4 MG: 2 INJECTION INTRAMUSCULAR; INTRAVENOUS at 10:05

## 2017-12-14 RX ADMIN — IOHEXOL 50 ML: 240 INJECTION, SOLUTION INTRATHECAL; INTRAVASCULAR; INTRAVENOUS; ORAL at 10:30

## 2017-12-14 RX ADMIN — SODIUM CHLORIDE 1000 ML: 0.9 INJECTION, SOLUTION INTRAVENOUS at 10:05

## 2017-12-14 RX ADMIN — IODIXANOL 100 ML: 320 INJECTION, SOLUTION INTRAVASCULAR at 12:05

## 2017-12-14 NOTE — ED NOTES
Pt ambulatory to restroom at this time with nurse, pt states "im feeling better then when I arrived"  Pt will ring call bell if she becomes dizzy or when finished for assistance back to room        Katie Garcia RN  12/14/17 5913

## 2017-12-14 NOTE — ED PROVIDER NOTES
History  Chief Complaint   Patient presents with    Fatigue     patient was inpatient in hospital for diverticulitis- discharged one week ago  Increase fatigue since discharge 7days ago  Vomited [id number]  denies diarrhea since dischargefrom hospital     Patient returns to the emergency department for continuing complaint of lower abdominal discomfort described as pressure and bloating associated with persistent nausea despite Zofran and recurrent vomiting  She last vomited this morning  She was admitted approximately 2 weeks ago with a new diagnosis of diverticulitis for which she received antibiotics and IV hydration  She states she was feeling better in the hospital and at the time of discharge  She was given Augmentin to continue taking at home but this caused diarrhea was stopped  She return to the emergency department for recurrence of nausea and vomiting and was given Zofran for treatment  Then she followed up with her family doctor and was told to discontinue her course of treatment  She states she is feeling weak and fatigued again like when she was 1st admitted and Zofran is no longer effective  She has not had any further diarrhea but also states she has not had a bowel movement in more than 5 days despite the urge to do so  She has had greatly decreased appetite and states that anything she eats feels heavy  She has had no change in urination  Of note her repeat CT abdomen/pelvis on 4 December demonstrated 2 fluid collections of 13 and 11 mm which were thought to be pericolonic abscesses  No hematochezia, melena or hematemesis  No change in symptoms w/voiding  No recent travel or similar sick contacts  Denies f/c, CP, SOB or dysuria  12 system ROS o/w negative                Abdominal Pain   Pain location:  LLQ, RLQ and epigastric  Pain quality: bloating, fullness, heavy and pressure    Pain radiates to:  Does not radiate  Pain severity:  Mild  Onset quality:  Unable to specify  Duration:  2 weeks  Timing:  Constant  Progression:  Waxing and waning  Chronicity:  Recurrent  Context: awakening from sleep, eating and recent illness    Context: not alcohol use, not diet changes, not laxative use, not previous surgeries, not recent travel, not retching, not sick contacts, not suspicious food intake and not trauma    Relieved by:  Nothing  Worsened by:  Eating, palpation and vomiting  Ineffective treatments: Zofran  Associated symptoms: anorexia, constipation (For the past 5 days with urge), fatigue, nausea and vomiting (Once, today)    Associated symptoms: no chest pain, no chills, no cough, no diarrhea, no dysuria, no fever, no hematemesis, no hematochezia, no hematuria, no melena, no shortness of breath, no sore throat and no vaginal discharge    Risk factors: obesity and recent hospitalization    Risk factors: no alcohol abuse, no aspirin use, not elderly, has not had multiple surgeries, no NSAID use and not pregnant        Prior to Admission Medications   Prescriptions Last Dose Informant Patient Reported? Taking?    Cholecalciferol (VITAMIN D) 2000 UNITS CAPS   Yes Yes   Sig: Take 1 capsule by mouth daily     amoxicillin-clavulanate (AUGMENTIN) 875-125 mg per tablet   No No   Sig: Take 1 tablet by mouth every 12 (twelve) hours for 10 days   Patient taking differently: Take 1 tablet by mouth every 12 (twelve) hours stoppedtaking 12/13/17    aspirin 81 MG tablet   Yes Yes   Sig: Take 81 mg by mouth daily   atorvastatin (LIPITOR) 10 mg tablet   Yes Yes   Sig: Take 10 mg by mouth daily   famotidine (PEPCID) 20 mg tablet   No Yes   Sig: Take 1 tablet by mouth 2 (two) times a day   lansoprazole (PREVACID) 30 mg capsule   Yes Yes   Sig: Take 30 mg by mouth daily   lisinopril (ZESTRIL) 10 mg tablet   Yes Yes   Sig: Take 10 mg by mouth daily   ondansetron (ZOFRAN-ODT) 4 mg disintegrating tablet   No Yes   Sig: Take 1 tablet by mouth every 6 (six) hours as needed for nausea or vomiting      Facility-Administered Medications: None       Past Medical History:   Diagnosis Date    Disease of thyroid gland     Gastroesophageal reflux disease without esophagitis 12/4/2017    Hypertension     Vitamin D deficiency disease        Past Surgical History:   Procedure Laterality Date    CHOLECYSTECTOMY      HYSTERECTOMY      IN ESOPHAGOGASTRODUODENOSCOPY TRANSORAL DIAGNOSTIC N/A 10/21/2016    Procedure: EGD AND COLONOSCOPY;  Surgeon: Floyd Arrington MD;  Location: [facility];  Service: Gastroenterology    THYROIDECTOMY, PARTIAL      TONSILLECTOMY AND ADENOIDECTOMY      TUBAL LIGATION         History reviewed  No pertinent family history  I have reviewed and agree with the history as documented  Social History   Substance Use Topics    Smoking status: Former Smoker    Smokeless tobacco: Never Used    Alcohol use Yes      Comment: social        Review of Systems   Constitutional: Positive for fatigue  Negative for chills, diaphoresis and fever  HENT: Negative for congestion, rhinorrhea and sore throat  Respiratory: Negative for cough, shortness of breath and wheezing  Cardiovascular: Negative for chest pain, palpitations and leg swelling  Gastrointestinal: Positive for abdominal pain (Epigastric and bilateral lower quadrants), anorexia, constipation (For the past 5 days with urge), nausea and vomiting (Once, today)  Negative for blood in stool, diarrhea, hematemesis, hematochezia and melena  Endocrine: Negative for polydipsia, polyphagia and polyuria  Genitourinary: Negative for dysuria, flank pain, frequency, hematuria, urgency, vaginal discharge and vaginal pain  Musculoskeletal: Negative for arthralgias, back pain and myalgias  Skin: Negative for pallor and rash  Neurological: Positive for weakness (Generalized, nonspecific)  Negative for dizziness, syncope, light-headedness, numbness and headaches  Hematological: Negative for adenopathy  Psychiatric/Behavioral: Positive for sleep disturbance  Negative for confusion  All other systems reviewed and are negative  Physical Exam  ED Triage Vitals   Temperature Pulse Respirations Blood Pressure SpO2   12/14/17 0927 12/14/17 0927 12/14/17 0927 12/14/17 0927 12/14/17 0927   (!) 97 3 °F (36 3 °C) 102 18 116/68 95 %      Temp Source Heart Rate Source Patient Position - Orthostatic VS BP Location FiO2 (%)   12/14/17 0927 12/14/17 0927 12/14/17 0927 12/14/17 0927 --   Temporal Monitor Lying Left arm       Pain Score       12/14/17 0920       No Pain           Orthostatic Vital Signs  Vitals:    12/14/17 1015 12/14/17 1030 12/14/17 1100 12/14/17 1130   BP: 91/57 99/63 115/66 117/66   Pulse: 92 85 86 80   Patient Position - Orthostatic VS:   Lying Lying       Physical Exam   Constitutional: She is oriented to person, place, and time  She appears well-developed and well-nourished  She appears distressed (Mildly, tired and ill-appearing)  HENT:   Head: Normocephalic and atraumatic  Mildly dry oral mucous membranes without edema or exudate  Eyes: Conjunctivae and EOM are normal  Pupils are equal, round, and reactive to light  Neck: Normal range of motion  Neck supple  Cardiovascular: Normal rate, regular rhythm and normal heart sounds  No murmur heard  Borderline tachycardic   Pulmonary/Chest: Effort normal and breath sounds normal  No respiratory distress  She has no wheezes  She has no rales  Abdominal: Soft  She exhibits no distension and no mass  There is tenderness (Mild, epigastric and bilateral lower quadrants)  There is no rebound and no guarding  Diminished bowel sounds but present in all 4 quadrants   Musculoskeletal: Normal range of motion  She exhibits no edema or tenderness  Neurological: She is alert and oriented to person, place, and time  She has normal reflexes  No cranial nerve deficit or sensory deficit  She exhibits normal muscle tone  Skin: Skin is warm and dry  Capillary refill takes less than 2 seconds  No rash noted  She is not diaphoretic  No pallor  Psychiatric: She has a normal mood and affect  Her behavior is normal  Thought content normal    Vitals reviewed  ED Medications  Medications   ondansetron (ZOFRAN) injection 4 mg (4 mg Intravenous Given 12/14/17 1005)   iohexol (OMNIPAQUE) 240 MG/ML solution 50 mL (50 mL Oral Given 12/14/17 1030)   sodium chloride 0 9 % bolus 1,000 mL (0 mL Intravenous Stopped 12/14/17 1114)   iodixanol (VISIPAQUE) 320 MG/ML injection 100 mL (100 mL Intravenous Given 12/14/17 1205)       Diagnostic Studies  Results Reviewed     Procedure Component Value Units Date/Time    [place] draw [[id number]] Collected:  12/14/17 1001    Lab Status: In process Specimen:  Blood Updated:  12/14/17 1201    Narrative: The following orders were created for panel order [place] draw  Procedure                               Abnormality         Status                     ---------                               -----------         ------                     Maria Victoria Feroscar Top on DMMN[[id number]]                            Final result               Gold top on VHKT[[id number]]                                  In process                 Green / Black tube on RDBN[[id number]]                        Final result               Lavender Top on EHQQ[[id number]]                              Final result                 Please view results for these tests on the individual orders      CMP [[id number]]  (Abnormal) Collected:  12/14/17 0959    Lab Status:  Final result Specimen:  Blood from Arm, Right Updated:  12/14/17 1020     Sodium 138 mmol/L      Potassium 3 7 mmol/L      Chloride 98 (L) mmol/L      CO2 30 mmol/L      Anion Gap 10 mmol/L      BUN 16 mg/dL      Creatinine 1 46 (H) mg/dL      Glucose 118 mg/dL      Calcium 9 9 mg/dL      AST 14 U/L      ALT 22 U/L      Alkaline Phosphatase 89 U/L      Total Protein 8 3 (H) g/dL      Albumin 3 1 (L) g/dL      Total Bilirubin 1 00 mg/dL      eGFR 41 ml/min/1 73sq m     Narrative: National Kidney Disease Education Program recommendations are as follows:  GFR calculation is accurate only with a steady state creatinine  Chronic Kidney disease less than 60 ml/min/1 73 sq  meters  Kidney failure less than 15 ml/min/1 73 sq  meters      Lipase [[id number]]  (Normal) Collected:  12/14/17 0959    Lab Status:  Final result Specimen:  Blood from Arm, Right Updated:  12/14/17 1020     Lipase 259 u/L     Urine Microscopic [[id number]]  (Abnormal) Collected:  12/14/17 0959    Lab Status:  Final result Specimen:  Urine from Urine, Clean Catch Updated:  12/14/17 1020     RBC, UA None Seen /hpf      WBC, UA 2-4 (A) /hpf      Epithelial Cells Moderate (A) /hpf      Bacteria, UA Innumerable (A) /hpf      Fine granular casts 10-20 /lpf      COARSE GRANULAR CASTS 2-3 /lpf      Ca Oxalate Sadie, UA Moderate (A) /hpf     CBC and differential [[id number]]  (Abnormal) Collected:  12/14/17 0959    Lab Status:  Final result Specimen:  Blood from Arm, Right Updated:  12/14/17 1008     WBC 12 51 (H) Thousand/uL      RBC 4 96 Million/uL      Hemoglobin 14 4 g/dL      Hematocrit 43 1 %      MCV 87 fL      MCH 29 0 pg      MCHC 33 4 g/dL      RDW 12 0 %      MPV 10 3 fL      Platelets 313 (H) Thousands/uL      Neutrophils Relative 84 (H) %      Lymphocytes Relative 11 (L) %      Monocytes Relative 5 %      Eosinophils Relative 0 %      Basophils Relative 0 %      Neutrophils Absolute 10 48 (H) Thousands/µL      Lymphocytes Absolute 1 33 Thousands/µL      Monocytes Absolute 0 66 Thousand/µL      Eosinophils Absolute 0 01 Thousand/µL      Basophils Absolute 0 03 Thousands/µL     UA w Reflex to Microscopic w Reflex to Culture [[id number]]  (Abnormal) Collected:  12/14/17 0959    Lab Status:  Final result Specimen:  Urine from Urine, Clean Catch Updated:  12/14/17 1008     Color, UA Yellow     Clarity, UA Cloudy     Specific Gravity, UA >=1 030     pH, UA 5 5     Leukocytes, UA Negative     Nitrite, UA Negative     Protein, UA 100 (2+) (A) mg/dl      Glucose, UA Negative mg/dl      Ketones, UA Trace (A) mg/dl      Urobilinogen, UA 0 2 E U /dl      Bilirubin, UA Interference- unable to analyze (A)     Blood, UA Negative                 CT abdomen pelvis with contrast   Final Result by Paulie Silverman MD (12/14 1244)   Decreasing inflammatory changes around the affected area of sigmoid diverticulitis  No abscess or free air  Workstation performed: [id number]                    Procedures  Procedures       Phone Contacts  ED Phone Contact    ED Course  ED Course as of Dec 14 1259   Thu Dec 14, 2017   1022 Lab results reviewed  Symptoms improved with treatment  Awaiting CT evaluation  1248 Results reviewed with patient  Feels better  Recommend continued supportive treatment at home and follow up with PCP  Initial Sepsis Screening     Row Name 12/14/17 1057                Is the patient's history suggestive of a new or worsening infection? No  -AC        Suspected source of infection acute abdominal infection  -AC        Are two or more of the following signs & symptoms of infection both present and new to the patient?         Indicate SIRS criteria Leukocytosis (WBC > 61454 IJL)  -AC        If the answer is yes to both questions, suspicion of sepsis is present          If severe sepsis is present AND tissue hypoperfusion perists in the hour after fluid resuscitation or lactate > 4, the patient meets criteria for SEPTIC SHOCK          Are any of the following organ dysfunction criteria present within 6 hours of suspected infection and SIRS criteria that are NOT considered to be chronic conditions?  No  -AC        Organ dysfunction          Date of presentation of severe sepsis          Time of presentation of severe sepsis          Tissue hypoperfusion persists in the hour after crystalloid fluid administration, evidenced, by either:          Was hypotension present within one hour of the conclusion of crystalloid fluid administration? No  -AC        Date of presentation of septic shock          Time of presentation of septic shock            User Key  (r) = Recorded By, (t) = Taken By, (c) = Cosigned By    [address] Name Provider Type    [address], DO Physician                  MDM  Number of Diagnoses or Management Options  Diagnosis management comments: DDx: Abdominal pain/bloating/nausea/vomiting/anorexia -  recurrent diverticulitis, worsening pericolonic abscesses, constipation, doubt bowel obstruction or ischemia, cardiac etiology or UTI  A/P: Will check CT a/p with PO and IV contrast, abdominal labs, urine, treat symptoms, reevaluate for further work up and disposition  Amount and/or Complexity of Data Reviewed  Clinical lab tests: ordered and reviewed  Tests in the radiology section of CPT®: ordered and reviewed  Review and summarize past medical records: yes      CritCare Time    Disposition  Final diagnoses:   Abdominal discomfort, bilateral lower quadrant   Malaise and fatigue   Colitis presumed infectious     Time reflects when diagnosis was documented in both MDM as applicable and the Disposition within this note     Time User Action Codes Description Comment    12/14/2017 12:53 PM [address] [place] [address],Jose Alfredo  [address] [R10 [id number],  R10 32] Abdominal discomfort, bilateral lower quadrant     12/14/2017 12:54 PM [address] [place] [address],Jose Alfredo  [address], [address] [R53 81,  R53 83] Malaise and fatigue     12/14/2017 12:54 PM Sharona Calvillo Add [A09] Colitis presumed infectious       ED Disposition     ED Disposition Condition Comment    [facility]      Condition at discharge: Stable        Follow-up Information     Follow up With Specialties Details Why Emmie Riojas MD Gastroenterology Schedule an appointment as soon as possible for a visit If symptoms worsen [address] [address]  [phone]            Patient's Medications   Discharge Prescriptions    DOCUSATE SODIUM (COLACE) 100 MG CAPSULE    Take 1 capsule by mouth every 12 (twelve) hours       Start Date: 12/14/2017End Date: --       Order Dose: 100 mg       Quantity: 60 capsule    Refills: 0    NAPROXEN (NAPROSYN) 500 MG TABLET    Take 1 tablet by mouth 2 (two) times a day with meals for 10 days       Start Date: 12/14/2017End Date: 12/24/2017       Order Dose: 500 mg       Quantity: 20 tablet    Refills: 0     No discharge procedures on file      ED Provider  Electronically Signed by           Luke Ovalle DO  12/14/17 3195

## 2017-12-14 NOTE — ED NOTES
Pt resting comfortably at this time, warm blanket provided and lights have been dimmed  Pt expresses no further needs at this time and call bell within reach        Champ Shahab, RN  12/14/17 2995

## 2017-12-14 NOTE — DISCHARGE INSTRUCTIONS
Colitis Diet   WHAT YOU NEED TO KNOW:   A colitis diet includes foods that allow your intestines to rest while you have colitis   Colitis is a condition that causes inflammation in your intestine  DISCHARGE INSTRUCTIONS:   Foods you can eat while you have colitis :   · A clear liquid diet may be recommended for 2 to 3 days  A clear liquid diet is made up of clear liquids and foods that are liquid at room temperature  Your healthcare provider will tell you when you can start eating solid foods  Examples of clear liquids include the following:     ¨ Water and clear juices (such as apple, cranberry, or grape), strained citrus juices or fruit punch    ¨ Coffee or tea (without cream or milk)    ¨ Clear sports drinks or soft drinks, such as ginger ale, lemon-lime soda, or club soda (no cola or root beer)    ¨ Clear broth, bouillon, or consommé    ¨ Plain popsicles (no popsicles with pureed fruit or fiber)    ¨ Flavored gelatin without fruit    · A low-fiber diet may be recommended until your symptoms improve  Your healthcare provider will tell you when you can slowly add high-fiber foods back into your diet  ¨ Cream of wheat and finely ground grits    ¨ White bread, white pasta, and white rice    ¨ Canned and well-cooked fruit without skins or seeds, and juice without pulp    ¨ Canned and well-cooked vegetables without skins or seeds, and vegetable juice    ¨ Cow's milk, lactose-free milk, soy milk, and rice milk    ¨ Yogurt, cottage cheese, and sherbet    ¨ Eggs, poultry (such as chicken and turkey), fish, and tender, ground, well-cooked beef     ¨ Tofu and smooth nut butters, such as peanut butter    ¨ Broth and strained soups made of low-fiber foods  Foods you should avoid while you have colitis :  Avoid foods that are high in fiber while you have symptoms of colitis    Examples of high-fiber foods include the following:  · Whole grains and breads, and cereals made with whole grains    · Dried fruit, fresh fruit with skin, and fruit pulp    · Raw vegetables    · Cooked greens, such as spinach    · Tough meat and meat with gristle    · Legumes, such as epps beans and lentils  Contact your healthcare provider if:   · Your symptoms do not get better, or they get worse  · You have questions about the foods you should eat  · You have questions or concerns about your condition or care  © 2017 [address] [employer] [employer] Information is for End User's use only and may not be sold, redistributed or otherwise used for commercial purposes  All illustrations and images included in CareNotes® are the copyrighted property of [employer] M , [employer]  or [address]  The above information is an  only  It is not intended as medical advice for individual conditions or treatments  Talk to your doctor, nurse or pharmacist before following any medical regimen to see if it is safe and effective for you  Colitis   WHAT YOU NEED TO KNOW:   Colitis is swelling and irritation of your colon  Colitis may be caused by ulcers or a problem with your immune system  Bacteria, a virus, or a parasite may also cause colitis  The cause may not be known  You may have diarrhea, abdominal pain, fever, or blood or mucus in your bowel movement  DISCHARGE INSTRUCTIONS:   Return to the emergency department if:   · You have sudden trouble breathing  · Your bowel movements are black or have blood in them  · You have blood in your vomit  · You have severe abdominal pain or your abdomen is swollen and feels hard  · You have any of the following signs of dehydration:     ¨ Dizziness or weakness    ¨ Dry mouth, cracked lips, or severe thirst    ¨ Fast heartbeat or breathing    ¨ Urinating very little or not at all  Contact your healthcare provider if:   · Your symptoms get worse or do not go away  · You have a fever, chills, cough, or feel weak and achy  · You suddenly lose weight without trying       · You have questions or concerns about your condition or care  Medicines:   · Medicines  may be given to decrease inflammation in your colon and treat diarrhea  · Take your medicine as directed  Contact your healthcare provider if you think your medicine is not helping or if you have side effects  Tell him of her if you are allergic to any medicine  Keep a list of the medicines, vitamins, and herbs you take  Include the amounts, and when and why you take them  Bring the list or the pill bottles to follow-up visits  Carry your medicine list with you in case of an emergency  Manage your symptoms:   · Drink liquids as directed  to help prevent dehydration  Good liquids to drink include water, juice, and broth  Ask how much liquid to drink each day  You may need to drink an oral rehydration solution (ORS)  An ORS contains a balance of water, salt, and sugar to replace body fluids lost during diarrhea  · Eat a variety of healthy foods  Healthy foods include fruits, vegetables, whole-grain breads, beans, low-fat dairy products, lean meats, and fish  You may need to eat several small meals throughout the day instead of large meals  Avoid spicy foods, caffeine, chocolate, and foods high in fat  · Talk to your healthcare provider before you take NSAIDs  NSAIDs can cause worsen your symptoms if ulcers are causing your colitis  · Start to exercise when you feel better  Regular exercise helps your bowels work normally  Ask about the best exercise plan for you  Follow up with your healthcare provider as directed: You may need to return for a colonoscopy or other tests  Write down how often you have a bowel movements and what they look like  Bring this to your follow-up visits  Write down your questions so you remember to ask them during your visits  © 2017 [employer] Information is for End User's use only and may not be sold, redistributed or otherwise used for commercial purposes   All illustrations and images included in CareNotes® are the copyrighted property of A D A M , Inc  or Yonis Stover  The above information is an  only  It is not intended as medical advice for individual conditions or treatments  Talk to your doctor, nurse or pharmacist before following any medical regimen to see if it is safe and effective for you

## 2017-12-14 NOTE — ED PROCEDURE NOTE
PROCEDURE  ECG 12 Lead Documentation  Date/Time: 12/14/2017 9:35 AM  Performed by: Ryan Guerra  Authorized by: Ryan Guerra     Indications / Diagnosis:  Weakness  ECG reviewed by me, the ED Provider: yes    Patient location:  ED  Interpretation:     Interpretation: normal    Quality:     Tracing quality:  Limited by artifact  Rate:     ECG rate:  100    ECG rate assessment: normal    Rhythm:     Rhythm: sinus rhythm    ST segments:     ST segments:  Non-specific  T waves:     T waves: non-specific

## 2017-12-14 NOTE — ED NOTES
Pt ambulatory back to room at this time, pt states that she still feels fatigued but denies dizziness with ambulation n/v or SOB  Pt placed back on monitor at this time resting watching TV and expresses no further needs at this time with call august Ferguson RN  12/14/17 8014

## 2017-12-15 LAB
ATRIAL RATE: 100 BPM
P AXIS: 31 DEGREES
PR INTERVAL: 148 MS
QRS AXIS: 22 DEGREES
QRSD INTERVAL: 88 MS
QT INTERVAL: 324 MS
QTC INTERVAL: 417 MS
T WAVE AXIS: 5 DEGREES
VENTRICULAR RATE: 100 BPM

## 2017-12-19 ENCOUNTER — ALLSCRIPTS OFFICE VISIT (OUTPATIENT)
Dept: OTHER | Facility: OTHER | Age: 52
End: 2017-12-19

## 2017-12-20 NOTE — PROGRESS NOTES
Assessment  1  Diverticulitis of colon (562 11) (K57 32)    Plan  Diverticulitis of colon    · Amoxicillin-Pot Clavulanate 875-125 MG Oral Tablet; TAKE 1 TABLET EVERY 12HOURS WITH MEALS UNTIL GONE   Rx By: Sasha Minor; Dispense: 7 Days ; #:14 Tablet; Refill: 0;For: Diverticulitis of colon; KAN = N; Verified Transmission to [place] [address]; Last Updated By: System, SureScripts; 12/19/2017 10:50:18 AM   · Ondansetron HCl - 4 MG Oral Tablet (Zofran); Take 1 Tab every 8 hours as neededfor nausea MDD:3 TDD:3   Rx By: Sasha Minor; Dispense: 0 Days ; #:30 Tablet; Refill: 2;For: Diverticulitis of colon; KAN = N; Verified Transmission to [place] [address]; Last Updated By: System, SureScripts; 12/19/2017 10:50:20 AM   · Follow-up visit in 2 weeks Evaluation and Treatment  Follow-up  Status: Hold For -Scheduling  Requested for: [id number]   Ordered; For: Diverticulitis of colon; Ordered By: Sasha Minor Performed:  Due: [id number]    Discussion/Summary  Discussion Summary:   29-year-old female with a history of diverticulitis and recent admission for 1st episode of diverticulitis in [facility] her she was found have acute complicated diverticulitis with small non drainable collections  She was treated with IV antibiotics non operatively and did well  She was discharged on a course of p  o  antibiotics which she did not finish  She continued to have intermittent abdominal pain and nausea vomiting for which she was seen in the ER twice of which the lab work showed a small leukocytosis of 12 and a CT scan which showed decreased inflammation in the sigmoid colon  She is tolerating a diet  She is having issues with constipation has only had water to bowel movements both which were diarrhea  On exam her abdomen is soft and she does have some tenderness in the lower abdomen as well as the upper abdomen no palpable masses or rebound   For right now we will address her constipation by starting MiraLax 1 cap per day in 8 oz of water in addition to her Colace  Regarding her diverticulitis I would like to complete a 7 day course of Augmentin and hopes of stopping this current inflammatory process  She will follow up with me in 2 weeks  If her symptoms become worse that she should come back to [facility] for admission and prolonged IV course of antibiotics  In 2 weeks if she is doing well we will continue as is and hopefully can postpone any type of surgical intervention  However as discussed in 2 weeks if she still having symptoms we will repeat lab work and a CT scan and she will likely have to be admitted for IV antibiotics  For now we will continue as discussed with a course of antibiotics and follow up in 2 weeks to see how she is coming along  The patient understands and is agreeable to the plan  Goals and Barriers: The patient has the current Goals: Complete course of antibiotics  Follow up 2 weeks  Patient's Capacity to Self-Care: Patient is able to Self-Care  Counseling Documentation With Imm: The patient was counseled regarding diagnostic results,-- instructions for management,-- prognosis,-- risks and benefits of treatment options  Chief Complaint  Chief Complaint Free Text Note Form: foolow up from diverticulitis and colitis      History of Present Illness  HPI: 76-year-old female with recent admission to [facility] for acute complicated diverticulitis with small non drainable abscesses, she was treated non operatively with IV antibiotics did well  This was her 1st episode  She had a recent history of colonoscopy in the past which did show diverticulosis  Since her discharge she was seen in the ER twice  The 1st visit was for nausea vomiting that time she was deemed stable with a small leukocytosis but otherwise no abdominal pain was sent home  The 2nd admission was due to nausea vomiting and again with some abdominal pain   At that time her white blood cell count was 12 and CT scan was done with IV and p  o  contrast which showed no fluid collections and a decrease in the overall inflammation of the diverticulitis  She comes in today stating that overall she is doing okay  She is tolerating small amounts of food  She is tolerating liquids  She is having issues with what appears to be constipation and only has not had a few bowel movements which were mostly diarrhea  She is having abdominal pain mostly in the lower abdomen bilaterally  She has occasionally nauseous  Of note she did not finish her antibiotics when she left the hospital  Denies any shortness of breath or chest pain at this time  Review of Systems  A 10 point review of systems was conducted, all negative except what is noted above HPI  ROS Reviewed:   ROS reviewed  Active Problems  1  Acute pain (338 19) (R52)   2  Anemia (285 9) (D64 9)   3  Chronic constipation (564 00) (K59 09)   4  Diverticulitis of colon (562 11) (K57 32)   5  Diverticulosis (562 10) (K57 90)   6  Erysipelas (035) (A46)   7  Esophageal reflux (530 81) (K21 9)   8  Fatigue (780 79) (R53 83)   9  History of stomach ulcers (V12 79) (Z87 19)   10  Hyperlipidemia (272 4) (E78 5)   11  Hypertension (401 9) (I10)   12  Leg pain, left (729 5) (M79 605)   13  Non-rheumatic mitral regurgitation (424 0) (I34 0)   14  Primary osteoarthritis of left knee (715 16) (M17 12)   15  Systolic murmur (943 8) (I56 8)   16  Thyroid disorder (246 9) (E07 9)   17  Vaginal dryness, menopausal (627 2) (N95 1)   18  Vitamin D deficiency (268 9) (E55 9)   19  Vulvitis (616 10) (N76 2)    Past Medical History  1  History of Acute pansinusitis (461 8) (J01 40)   2  History of Acute tracheobronchitis (466 0) (J20 9)   3  Common cold (460) (J00)   4  History of Depression screening (V79 0) (Z13 89)   5  History of Dermatitis (692 9) (L30 9)   6  History of Encounter for routine gynecological examination (V72 31) (Z01 419)   7   History of Encounter for screening colonoscopy (V76 51) (Z12 11)   8  History of Endometrial polyp (621 0) (N84 0)   9  History of Hand Injury (959 4)   10  History of High risk medication use (V58 69) (Z79 899)   11  History Of 5  Previous Pregnancies (V61 5)   12  History of dysfunctional uterine bleeding (V13 29) (Z87 42)   13  History of joint pain (V13 59) (Z87 39)   14  History of screening mammography (V15 89) (Z92 89)   15  History of Renal colic (877 0) (J52)   16  History of Sinusitis (473 9) (J32 9)   17  Sore throat (462) (J02 9)   18  History of Viral gastroenteritis (008 8) (A08 4)   19  History of Viral syndrome (079 99) (B34 9)  Active Problems And Past Medical History Reviewed: The active problems and past medical history were reviewed and updated today  Surgical History  1  History of Cholecystectomy   2  History of Gynecologic Services Thermal Endometrial Ablation   3  History of Hysteroscopy   4  History of Near-Total Thyroidectomy   5  History of Tonsillectomy With Adenoidectomy   6  History of Tubal Ligation  Surgical History Reviewed: The surgical history was reviewed and updated today  Family History  Mother    1  Family history of Type 2 diabetes mellitus (250 00) (E11 9)  Father    2  Family history of congestive heart failure (V17 49) (Z82 49)  Family History    3  Denied: Family history of Breast Cancer   4  Denied: Family history of Colon Cancer   5  Denied: Family history of Osteoporosis   6  Denied: Family history of Ovarian Cancer   7  Denied: Family history of Uterine Cancer  Family History Reviewed: The family history was reviewed and updated today  Social History   · Being A Social Drinker   · Denied: Drug use (305 90) (F19 90)   · Former smoker (A42 72) (E06 483)  Social History Reviewed: The social history was reviewed and updated today  Current Meds   1  Amoxicillin 500 MG Oral Tablet; Therapy: (Recorded:70Khx9732) to Recorded   2  Aspirin 81 MG CAPS; Therapy: (Recorded:80Rpg0119) to Recorded   3  Atorvastatin Calcium 10 MG Oral Tablet; take 1 tablet by mouth once daily; Therapy: 73AFK9690 to (Vivienne Trent)  Requested for: 86Efh8338; Last Rx:47Uvp9452 Ordered   4  Lisinopril 10 MG Oral Tablet; TAKE 1 TABLET DAILY AS DIRECTED; Therapy: 21Jun2016 to (Evaluate:17Jan2018)  Requested for: 12Lpo4456; Last Rx:83Czn2339 Ordered   5  Prevacid CPDR; TAKE 1 CAPSULE Daily Recorded   6  RA Vitamin D-3 2000 UNIT Oral Capsule; One daily; Therapy: 21Jul2016 to (Last Rx:02Twj6512) Ordered   7  Zofran 8 MG Oral Tablet; Therapy: (Recorded:81Boz3355) to Recorded  Medication List Reviewed: The medication list was reviewed and updated today  Allergies  1  No Known Drug Allergies    Vitals  Vital Signs    Recorded: 64MZQ9493 10:00AM   Temperature 98 9 F   Heart Rate 107   Systolic 90   Diastolic 55   Height 5 ft 1 in   Weight 212 lb    BMI Calculated 40 06   BSA Calculated 1 94     Physical Exam   Constitutional  General appearance: No acute distress, well appearing and well nourished  Eyes  Conjunctiva and lids: No swelling, erythema or discharge  Ears, Nose, Mouth, and Throat  External inspection of ears and nose: Normal    Neck  Supple, symmetric, trachea midline, no masses  Pulmonary  Respiratory effort: No increased work of breathing or signs of respiratory distress  Auscultation of lungs: Clear to auscultation, equal breath sounds bilaterally, no wheezes, no rales, no rhonci  Cardiovascular  Auscultation of heart: Normal rate and rhythm, normal S1 and S2, without murmurs  Examination of extremities for edema and/or varicosities: Normal    Abdomen Obese, soft, mild tender palpation in the epigastric/right upper quadrant in addition to bilateral right lower and left lower quadrants  No rebound or guarding  No palpable masses  Results/Data  Diagnostic Studies Reviewed: I personally reviewed the films/images/results in the office today  My interpretation follows    CT Scan Review CT scans x2 were both reviewed with the patient as were results discussed with the patient  Health Management  History of Encounter for screening colonoscopy   COLONOSCOPY (GI, SURG); every 3 years; Last 68RCW7007; Next Due: 91HSY7270;  Active    Future Appointments    Date/Time Provider Specialty Site   01/02/2018 10:45 AM Lydia Wilkinson DO [address]     Signatures   Electronically signed by : Shade Macdonald DO; Dec 19 2017 11:11AM EST                       (Author)

## 2017-12-26 ENCOUNTER — APPOINTMENT (EMERGENCY)
Dept: CT IMAGING | Facility: HOSPITAL | Age: 52
DRG: 329 | End: 2017-12-26
Payer: COMMERCIAL

## 2017-12-26 ENCOUNTER — HOSPITAL ENCOUNTER (INPATIENT)
Facility: HOSPITAL | Age: 52
LOS: 8 days | DRG: 329 | End: 2018-01-03
Attending: EMERGENCY MEDICINE | Admitting: INTERNAL MEDICINE
Payer: COMMERCIAL

## 2017-12-26 DIAGNOSIS — R10.32 ABDOMINAL PAIN, LLQ: ICD-10-CM

## 2017-12-26 DIAGNOSIS — E88.09 HYPOALBUMINEMIA: ICD-10-CM

## 2017-12-26 DIAGNOSIS — K57.21 DIVERTICULITIS OF LARGE INTESTINE WITH PERFORATION AND BLEEDING WITHOUT ABSCESS: Primary | ICD-10-CM

## 2017-12-26 PROBLEM — K63.0 PERICOLONIC ABSCESS: Status: RESOLVED | Noted: 2017-12-04 | Resolved: 2017-12-26

## 2017-12-26 LAB
ALBUMIN SERPL BCP-MCNC: 2.7 G/DL (ref 3.5–5)
ALP SERPL-CCNC: 81 U/L (ref 46–116)
ALT SERPL W P-5'-P-CCNC: 20 U/L (ref 12–78)
ANION GAP SERPL CALCULATED.3IONS-SCNC: 11 MMOL/L (ref 4–13)
AST SERPL W P-5'-P-CCNC: 28 U/L (ref 5–45)
BACTERIA UR QL AUTO: ABNORMAL /HPF
BASOPHILS # BLD AUTO: 0.04 THOUSANDS/ΜL (ref 0–0.1)
BASOPHILS NFR BLD AUTO: 0 % (ref 0–1)
BILIRUB SERPL-MCNC: 0.9 MG/DL (ref 0.2–1)
BILIRUB UR QL STRIP: NEGATIVE
BUN SERPL-MCNC: 10 MG/DL (ref 5–25)
CALCIUM SERPL-MCNC: 9.1 MG/DL (ref 8.3–10.1)
CHLORIDE SERPL-SCNC: 98 MMOL/L (ref 100–108)
CLARITY UR: CLEAR
CO2 SERPL-SCNC: 28 MMOL/L (ref 21–32)
COLOR UR: YELLOW
CREAT SERPL-MCNC: 0.71 MG/DL (ref 0.6–1.3)
EOSINOPHIL # BLD AUTO: 0.07 THOUSAND/ΜL (ref 0–0.61)
EOSINOPHIL NFR BLD AUTO: 1 % (ref 0–6)
ERYTHROCYTE [DISTWIDTH] IN BLOOD BY AUTOMATED COUNT: 12.2 % (ref 11.6–15.1)
GFR SERPL CREATININE-BSD FRML MDRD: 98 ML/MIN/1.73SQ M
GLUCOSE SERPL-MCNC: 99 MG/DL (ref 65–140)
GLUCOSE UR STRIP-MCNC: NEGATIVE MG/DL
HCT VFR BLD AUTO: 37.6 % (ref 34.8–46.1)
HGB BLD-MCNC: 12.6 G/DL (ref 11.5–15.4)
HGB UR QL STRIP.AUTO: ABNORMAL
INR PPP: 1.06 (ref 0.86–1.16)
KETONES UR STRIP-MCNC: ABNORMAL MG/DL
LEUKOCYTE ESTERASE UR QL STRIP: NEGATIVE
LIPASE SERPL-CCNC: 129 U/L (ref 73–393)
LYMPHOCYTES # BLD AUTO: 1.48 THOUSANDS/ΜL (ref 0.6–4.47)
LYMPHOCYTES NFR BLD AUTO: 12 % (ref 14–44)
MCH RBC QN AUTO: 29.2 PG (ref 26.8–34.3)
MCHC RBC AUTO-ENTMCNC: 33.5 G/DL (ref 31.4–37.4)
MCV RBC AUTO: 87 FL (ref 82–98)
MONOCYTES # BLD AUTO: 0.82 THOUSAND/ΜL (ref 0.17–1.22)
MONOCYTES NFR BLD AUTO: 7 % (ref 4–12)
NEUTROPHILS # BLD AUTO: 9.91 THOUSANDS/ΜL (ref 1.85–7.62)
NEUTS SEG NFR BLD AUTO: 80 % (ref 43–75)
NITRITE UR QL STRIP: NEGATIVE
NON-SQ EPI CELLS URNS QL MICRO: ABNORMAL /HPF
PH UR STRIP.AUTO: 6 [PH] (ref 4.5–8)
PLATELET # BLD AUTO: 365 THOUSANDS/UL (ref 149–390)
PMV BLD AUTO: 10.5 FL (ref 8.9–12.7)
POTASSIUM SERPL-SCNC: 3.3 MMOL/L (ref 3.5–5.3)
PROT SERPL-MCNC: 7.9 G/DL (ref 6.4–8.2)
PROT UR STRIP-MCNC: NEGATIVE MG/DL
PROTHROMBIN TIME: 13.7 SECONDS (ref 12.1–14.4)
RBC # BLD AUTO: 4.32 MILLION/UL (ref 3.81–5.12)
RBC #/AREA URNS AUTO: ABNORMAL /HPF
SODIUM SERPL-SCNC: 137 MMOL/L (ref 136–145)
SP GR UR STRIP.AUTO: 1.01 (ref 1–1.03)
UROBILINOGEN UR QL STRIP.AUTO: 1 E.U./DL
WBC # BLD AUTO: 12.32 THOUSAND/UL (ref 4.31–10.16)
WBC #/AREA URNS AUTO: ABNORMAL /HPF

## 2017-12-26 PROCEDURE — 36415 COLL VENOUS BLD VENIPUNCTURE: CPT | Performed by: EMERGENCY MEDICINE

## 2017-12-26 PROCEDURE — 74177 CT ABD & PELVIS W/CONTRAST: CPT

## 2017-12-26 PROCEDURE — 81001 URINALYSIS AUTO W/SCOPE: CPT | Performed by: INTERNAL MEDICINE

## 2017-12-26 PROCEDURE — 99285 EMERGENCY DEPT VISIT HI MDM: CPT

## 2017-12-26 PROCEDURE — 87086 URINE CULTURE/COLONY COUNT: CPT | Performed by: INTERNAL MEDICINE

## 2017-12-26 PROCEDURE — 83690 ASSAY OF LIPASE: CPT | Performed by: EMERGENCY MEDICINE

## 2017-12-26 PROCEDURE — 85025 COMPLETE CBC W/AUTO DIFF WBC: CPT | Performed by: EMERGENCY MEDICINE

## 2017-12-26 PROCEDURE — 80053 COMPREHEN METABOLIC PANEL: CPT | Performed by: EMERGENCY MEDICINE

## 2017-12-26 PROCEDURE — 85610 PROTHROMBIN TIME: CPT | Performed by: EMERGENCY MEDICINE

## 2017-12-26 PROCEDURE — 96360 HYDRATION IV INFUSION INIT: CPT

## 2017-12-26 RX ORDER — ACETAMINOPHEN 325 MG/1
650 TABLET ORAL EVERY 6 HOURS PRN
Status: DISCONTINUED | OUTPATIENT
Start: 2017-12-26 | End: 2018-01-03 | Stop reason: HOSPADM

## 2017-12-26 RX ORDER — LEVOFLOXACIN 5 MG/ML
750 INJECTION, SOLUTION INTRAVENOUS ONCE
Status: COMPLETED | OUTPATIENT
Start: 2017-12-26 | End: 2017-12-30

## 2017-12-26 RX ORDER — DICYCLOMINE HCL 20 MG
20 TABLET ORAL ONCE
Status: COMPLETED | OUTPATIENT
Start: 2017-12-26 | End: 2017-12-26

## 2017-12-26 RX ORDER — OXYCODONE HYDROCHLORIDE 5 MG/1
5 TABLET ORAL EVERY 4 HOURS PRN
Status: DISCONTINUED | OUTPATIENT
Start: 2017-12-26 | End: 2018-01-03 | Stop reason: HOSPADM

## 2017-12-26 RX ORDER — LISINOPRIL 10 MG/1
10 TABLET ORAL DAILY
Status: DISCONTINUED | OUTPATIENT
Start: 2017-12-27 | End: 2018-01-03 | Stop reason: HOSPADM

## 2017-12-26 RX ORDER — ONDANSETRON 2 MG/ML
4 INJECTION INTRAMUSCULAR; INTRAVENOUS EVERY 6 HOURS PRN
Status: DISCONTINUED | OUTPATIENT
Start: 2017-12-26 | End: 2017-12-28

## 2017-12-26 RX ORDER — AMOXICILLIN 875 MG/1
875 TABLET, COATED ORAL 2 TIMES DAILY
COMMUNITY
End: 2018-01-18 | Stop reason: HOSPADM

## 2017-12-26 RX ORDER — ASPIRIN 81 MG/1
81 TABLET, CHEWABLE ORAL DAILY
Status: DISCONTINUED | OUTPATIENT
Start: 2017-12-27 | End: 2018-01-03 | Stop reason: HOSPADM

## 2017-12-26 RX ORDER — SODIUM CHLORIDE 9 MG/ML
125 INJECTION, SOLUTION INTRAVENOUS CONTINUOUS
Status: DISCONTINUED | OUTPATIENT
Start: 2017-12-26 | End: 2017-12-26

## 2017-12-26 RX ORDER — SODIUM CHLORIDE AND POTASSIUM CHLORIDE .9; .15 G/100ML; G/100ML
125 SOLUTION INTRAVENOUS CONTINUOUS
Status: DISCONTINUED | OUTPATIENT
Start: 2017-12-26 | End: 2017-12-27

## 2017-12-26 RX ORDER — POTASSIUM CHLORIDE 14.9 MG/ML
20 INJECTION INTRAVENOUS ONCE
Status: COMPLETED | OUTPATIENT
Start: 2017-12-26 | End: 2017-12-30

## 2017-12-26 RX ORDER — POLYETHYLENE GLYCOL 3350 17 G/17G
17 POWDER, FOR SOLUTION ORAL DAILY
Status: ON HOLD | COMMUNITY
End: 2018-01-18

## 2017-12-26 RX ADMIN — SODIUM CHLORIDE 1000 ML: 0.9 INJECTION, SOLUTION INTRAVENOUS at 12:48

## 2017-12-26 RX ADMIN — ONDANSETRON 4 MG: 2 INJECTION INTRAMUSCULAR; INTRAVENOUS at 21:48

## 2017-12-26 RX ADMIN — POTASSIUM CHLORIDE 20 MEQ: 200 INJECTION, SOLUTION INTRAVENOUS at 18:40

## 2017-12-26 RX ADMIN — DICYCLOMINE HYDROCHLORIDE 20 MG: 20 TABLET ORAL at 12:47

## 2017-12-26 RX ADMIN — IOHEXOL 100 ML: 350 INJECTION, SOLUTION INTRAVENOUS at 13:44

## 2017-12-26 RX ADMIN — METRONIDAZOLE 500 MG: 500 INJECTION, SOLUTION INTRAVENOUS at 16:01

## 2017-12-26 RX ADMIN — SODIUM CHLORIDE AND POTASSIUM CHLORIDE 125 ML/HR: .9; .15 SOLUTION INTRAVENOUS at 21:36

## 2017-12-26 RX ADMIN — LEVOFLOXACIN 750 MG: 5 INJECTION, SOLUTION INTRAVENOUS at 16:39

## 2017-12-26 RX ADMIN — FAMOTIDINE 20 MG: 10 INJECTION, SOLUTION INTRAVENOUS at 18:28

## 2017-12-26 NOTE — ED PROVIDER NOTES
History  Chief Complaint   Patient presents with    Abdominal Pain     Pt has had ongoing abdominal pain with n/v  Hx of diverticulitis  This 79-year-old female patient presents emergency department for re-evaluation of lower abdominal pain most consistent with previous bouts of diverticulitis which she has been seen here in the ER at least twice  Patient states she has been treating herself is was directed by her physician has not been getting any better  Today, she states she has vomited multiple times, has had bloody stool, so she came back in for evaluation after not being able to see the surgeon for which he was supposed to be seen in follow-up  History provided by:  Patient   used: No    Abdominal Pain   Pain location:  Generalized  Pain quality: aching    Pain radiates to:  Does not radiate  Pain severity:  No pain  Onset quality:  Sudden  Timing:  Constant  Progression:  Worsening  Chronicity:  New  Context: not alcohol use, not awakening from sleep, not diet changes, not medication withdrawal, not previous surgeries, not retching and not sick contacts    Relieved by:  Nothing  Worsened by:  Nothing  Ineffective treatments:  None tried  Associated symptoms: no belching, no constipation, no fever, no hematemesis and no hematochezia        Prior to Admission Medications   Prescriptions Last Dose Informant Patient Reported? Taking?    Cholecalciferol (VITAMIN D) 2000 UNITS CAPS 12/26/2017 at Unknown time  Yes Yes   Sig: Take 1 capsule by mouth daily     amoxicillin (AMOXIL) 875 mg tablet 12/25/2017 at Unknown time  Yes Yes   Sig: Take 875 mg by mouth 2 (two) times a day From Tuesday 12-19-17 to today 12-26-17   aspirin 81 MG tablet 12/26/2017 at Unknown time  Yes Yes   Sig: Take 81 mg by mouth daily   atorvastatin (LIPITOR) 10 mg tablet 12/26/2017 at Unknown time  Yes Yes   Sig: Take 10 mg by mouth daily   famotidine (PEPCID) 20 mg tablet 12/26/2017 at Unknown time  No Yes Sig: Take 1 tablet by mouth 2 (two) times a day   lansoprazole (PREVACID) 30 mg capsule 12/26/2017 at Unknown time  Yes Yes   Sig: Take 30 mg by mouth daily   lisinopril (ZESTRIL) 10 mg tablet 12/26/2017 at Unknown time  Yes Yes   Sig: Take 10 mg by mouth daily   ondansetron (ZOFRAN-ODT) 4 mg disintegrating tablet Past Week at Unknown time  No Yes   Sig: Take 1 tablet by mouth every 6 (six) hours as needed for nausea or vomiting   polyethylene glycol (MIRALAX) 17 g packet 12/25/2017 at Unknown time  Yes Yes   Sig: Take 17 g by mouth daily      Facility-Administered Medications: None       Past Medical History:   Diagnosis Date    Disease of thyroid gland     Gastroesophageal reflux disease without esophagitis 12/4/2017    Hyperlipidemia     Hypertension     Vitamin D deficiency disease        Past Surgical History:   Procedure Laterality Date    CHOLECYSTECTOMY      CA ESOPHAGOGASTRODUODENOSCOPY TRANSORAL DIAGNOSTIC N/A 10/21/2016    Procedure: EGD AND COLONOSCOPY;  Surgeon: Deisi Russo MD;  Location: [facility];  Service: Gastroenterology    THYROIDECTOMY, PARTIAL      TONSILLECTOMY AND ADENOIDECTOMY      TUBAL LIGATION         History reviewed  No pertinent family history  I have reviewed and agree with the history as documented  Social History   Substance Use Topics    Smoking status: Former Smoker    Smokeless tobacco: Never Used    Alcohol use Yes      Comment: social        Review of Systems   Constitutional: Negative for fever  Gastrointestinal: Positive for abdominal pain  Negative for constipation, hematemesis and hematochezia  All other systems reviewed and are negative        Physical Exam  ED Triage Vitals [12/26/17 1219]   Temperature Pulse Respirations Blood Pressure SpO2   98 8 °F (37 1 °C) (!) 113 18 (!) 133/104 97 %      Temp Source Heart Rate Source Patient Position - Orthostatic VS BP Location FiO2 (%)   Temporal Monitor Sitting Right arm --      Pain Score       4 Orthostatic Vital Signs  Vitals:    12/26/17 1530 12/26/17 1545 12/26/17 1630 12/26/17 1656   BP: 125/72  115/61 115/84   Pulse: 88 85 79 90   Patient Position - Orthostatic VS:    Lying       Physical Exam   Constitutional: She is oriented to person, place, and time  She appears well-developed and well-nourished  HENT:   Head: Normocephalic and atraumatic  Right Ear: External ear normal    Left Ear: External ear normal    Eyes: Conjunctivae and EOM are normal    Neck: No JVD present  No tracheal deviation present  No thyromegaly present  Cardiovascular: Normal rate  Pulmonary/Chest: Effort normal and breath sounds normal  No stridor  Abdominal: Soft  She exhibits no distension and no mass  There is no tenderness  There is no guarding  No hernia  Musculoskeletal: Normal range of motion  She exhibits no edema, tenderness or deformity  Lymphadenopathy:     She has no cervical adenopathy  Neurological: She is alert and oriented to person, place, and time  Skin: Skin is warm  No rash noted  No erythema  No pallor  Psychiatric: She has a normal mood and affect  Her behavior is normal    Nursing note and vitals reviewed        ED Medications  Medications   piperacillin-tazobactam (ZOSYN) 3 375 g in dextrose 5 % 50 mL IVPB (not administered)   HYDROmorphone (DILAUDID) 1 mg/mL injection 0 5 mg (not administered)   oxyCODONE (ROXICODONE) IR tablet 5 mg (not administered)   potassium chloride 20 mEq IVPB (premix) (not administered)   sodium chloride 0 9 % with KCl 20 mEq/L infusion (premix) (not administered)   acetaminophen (TYLENOL) tablet 650 mg (not administered)   ondansetron (ZOFRAN) injection 4 mg (not administered)   enoxaparin (LOVENOX) subcutaneous injection 40 mg (not administered)   aspirin chewable tablet 81 mg (not administered)   lisinopril (ZESTRIL) tablet 10 mg (not administered)   famotidine (PEPCID) injection 20 mg (not administered)   sodium chloride 0 9 % bolus 1,000 mL (0 mL Intravenous Stopped 12/26/17 1356)   dicyclomine (BENTYL) tablet 20 mg (20 mg Oral Given 12/26/17 1247)   iohexol (OMNIPAQUE) 350 MG/ML injection (MULTI-DOSE) 100 mL (100 mL Intravenous Given 12/26/17 1344)   levofloxacin (LEVAQUIN) IVPB (premix) 750 mg (750 mg Intravenous New Bag 12/26/17 1639)   metroNIDAZOLE (FLAGYL) IVPB (premix) 500 mg (0 mg Intravenous Stopped 12/26/17 1636)       Diagnostic Studies  Results Reviewed     Procedure Component Value Units Date/Time    Comprehensive metabolic panel [[id number]]  (Abnormal) Collected:  12/26/17 1236    Lab Status:  Final result Specimen:  Blood from Arm, Left Updated:  12/26/17 1258     Sodium 137 mmol/L      Potassium 3 3 (L) mmol/L      Chloride 98 (L) mmol/L      CO2 28 mmol/L      Anion Gap 11 mmol/L      BUN 10 mg/dL      Creatinine 0 71 mg/dL      Glucose 99 mg/dL      Calcium 9 1 mg/dL      AST 28 U/L      ALT 20 U/L      Alkaline Phosphatase 81 U/L      Total Protein 7 9 g/dL      Albumin 2 7 (L) g/dL      Total Bilirubin 0 90 mg/dL      eGFR 98 ml/min/1 73sq m     Narrative:         National Kidney Disease Education Program recommendations are as follows:  GFR calculation is accurate only with a steady state creatinine  Chronic Kidney disease less than 60 ml/min/1 73 sq  meters  Kidney failure less than 15 ml/min/1 73 sq  meters      Lipase [[id number]]  (Normal) Collected:  12/26/17 1236    Lab Status:  Final result Specimen:  Blood from Arm, Left Updated:  12/26/17 1258     Lipase 129 u/L     Protime-INR [[id number]]  (Normal) Collected:  12/26/17 1236    Lab Status:  Final result Specimen:  Blood from Arm, Left Updated:  12/26/17 1251     Protime 13 7 seconds      INR 1 06    CBC and differential [[id number]]  (Abnormal) Collected:  12/26/17 1236    Lab Status:  Final result Specimen:  Blood from Arm, Left Updated:  12/26/17 1244     WBC 12 32 (H) Thousand/uL      RBC 4 32 Million/uL      Hemoglobin 12 6 g/dL      Hematocrit 37 6 %      MCV 87 fL      MCH 29 2 pg      MCHC 33 5 g/dL      RDW 12 2 %      MPV 10 5 fL      Platelets 206 Thousands/uL      Neutrophils Relative 80 (H) %      Lymphocytes Relative 12 (L) %      Monocytes Relative 7 %      Eosinophils Relative 1 %      Basophils Relative 0 %      Neutrophils Absolute 9 91 (H) Thousands/µL      Lymphocytes Absolute 1 48 Thousands/µL      Monocytes Absolute 0 82 Thousand/µL      Eosinophils Absolute 0 07 Thousand/µL      Basophils Absolute 0 04 Thousands/µL                  CT abdomen pelvis with contrast   Final Result by NISA Dye MD (12/26 1419)      Subacute upon chronic sigmoid diverticulitis with findings suspicious for new microperforation   (Intrinsic sigmoid mural lesion not excludable)  Cholecystectomy  I personally discussed this study with Arabella Schuler on 12/26/2017 2:19 PM          Workstation performed: [id number]                    Procedures  Procedures       Phone Contacts  ED Phone Contact    ED Course  ED Course as of Dec 26 1813   Tue Dec 26, 2017   1519 I spoke to the surgeon, Dr Rose Prabhakar who will see the patient in consult                                  MDM  Number of Diagnoses or Management Options  Diverticulitis of large intestine with perforation and bleeding without abscess: new and requires workup     Amount and/or Complexity of Data Reviewed  Clinical lab tests: ordered and reviewed  Tests in the radiology section of CPT®: reviewed and ordered  Decide to obtain previous medical records or to obtain history from someone other than the patient: yes  Review and summarize past medical records: yes  Discuss the patient with other providers: yes  Independent visualization of images, tracings, or specimens: yes    Patient Progress  Patient progress: stable    CritCare Time    Disposition  Final diagnoses:   Diverticulitis of large intestine with perforation and bleeding without abscess     Time reflects when diagnosis was documented in both MDM as applicable and the Disposition within this note     Time User Action Codes Description Comment    12/26/2017  3:22 PM Glendy Cheung Add [K57 21] Diverticulitis of large intestine with perforation and bleeding without abscess     12/26/2017  6:04 PM Akash Barnes Add [E88 09] Hypoalbuminemia       ED Disposition     ED Disposition Condition Comment    Admit  Case was discussed with Dr Mishel Gomez and the patient's admission status was agreed to be Admission Status: inpatient status to the service of Dr Mishel Gomez   Follow-up Information    None       Current Discharge Medication List      CONTINUE these medications which have NOT CHANGED    Details   amoxicillin (AMOXIL) 875 mg tablet Take 875 mg by mouth 2 (two) times a day From Tuesday 12-19-17 to today 12-26-17      aspirin 81 MG tablet Take 81 mg by mouth daily      atorvastatin (LIPITOR) 10 mg tablet Take 10 mg by mouth daily      Cholecalciferol (VITAMIN D) 2000 UNITS CAPS Take 1 capsule by mouth daily        famotidine (PEPCID) 20 mg tablet Take 1 tablet by mouth 2 (two) times a day  Qty: 30 tablet, Refills: 0      lansoprazole (PREVACID) 30 mg capsule Take 30 mg by mouth daily      lisinopril (ZESTRIL) 10 mg tablet Take 10 mg by mouth daily      ondansetron (ZOFRAN-ODT) 4 mg disintegrating tablet Take 1 tablet by mouth every 6 (six) hours as needed for nausea or vomiting  Qty: 6 tablet, Refills: 0      polyethylene glycol (MIRALAX) 17 g packet Take 17 g by mouth daily           No discharge procedures on file      ED Provider  Electronically Signed by           Chanda Lucia DO  12/26/17 3195

## 2017-12-26 NOTE — CONSULTS
Consultation - General Surgery   Matthew Canchola 47 y o  female MRN: [id number]  Unit/Bed#: RL27 Encounter: [id number]    Assessment/Plan     Assessment:  Diverticulitis with microperforation    Plan:  Admit  IV antibiotics  Clear liquid diet    History of Present Illness   HPI:  Matthew Canchola is a 47 y o  female who presents with LLQ pain that is severe, sharp,constant and associated with nausea and vomiting  Pt with previous episodes of diverticulitis  Consult to surgery general  Consult performed by: Solange Wong  Consult ordered by: Brittni Friday          Review of Systems   Constitutional: Positive for activity change and appetite change  Negative for chills, fatigue and fever  HENT: Negative  Eyes: Negative  Respiratory: Negative  Cardiovascular: Negative  Gastrointestinal: Positive for abdominal pain, nausea and vomiting  Negative for abdominal distention and blood in stool  Genitourinary: Positive for dysuria  Negative for flank pain, frequency and urgency  Musculoskeletal: Negative  Skin: Negative  Neurological: Negative  Hematological: Negative  Psychiatric/Behavioral: Negative  Historical Information   Past Medical History:   Diagnosis Date    Disease of thyroid gland     Gastroesophageal reflux disease without esophagitis 12/4/2017    Hypertension     Vitamin D deficiency disease      Past Surgical History:   Procedure Laterality Date    CHOLECYSTECTOMY      HYSTERECTOMY      MD ESOPHAGOGASTRODUODENOSCOPY TRANSORAL DIAGNOSTIC N/A 10/21/2016    Procedure: EGD AND COLONOSCOPY;  Surgeon: Marelyn Hammans, MD;  Location: [facility];  Service: Gastroenterology    THYROIDECTOMY, PARTIAL      TONSILLECTOMY AND ADENOIDECTOMY      TUBAL LIGATION       Social History   History   Alcohol Use    Yes     Comment: social     History   Drug Use No     History   Smoking Status    Former Smoker   Smokeless Tobacco    Never Used     Family History: History reviewed  No pertinent family history  Meds/Allergies   all current active meds have been reviewed  No Known Allergies    Objective   First Vitals:   Blood Pressure: (!) 133/104 (12/26/17 1219)  Pulse: (!) 113 (12/26/17 1219)  Temperature: 98 8 °F (37 1 °C) (12/26/17 1219)  Temp Source: Temporal (12/26/17 1219)  Respirations: 18 (12/26/17 1219)  Height: 5' 1" (154 9 cm) (12/26/17 1219)  Weight - Scale: 93 kg (205 lb) (12/26/17 1219)  SpO2: 97 % (12/26/17 1219)    Current Vitals:   Blood Pressure: 125/72 (12/26/17 1530)  Pulse: 85 (12/26/17 1545)  Temperature: 98 8 °F (37 1 °C) (12/26/17 1219)  Temp Source: Temporal (12/26/17 1219)  Respirations: 18 (12/26/17 1352)  Height: 5' 1" (154 9 cm) (12/26/17 1219)  Weight - Scale: 93 kg (205 lb) (12/26/17 1219)  SpO2: 99 % (12/26/17 1545)      Intake/Output Summary (Last 24 hours) at 12/26/17 1618  Last data filed at 12/26/17 1356   Gross per 24 hour   Intake             1000 ml   Output                0 ml   Net             1000 ml       Invasive Devices     Peripheral Intravenous Line            Peripheral IV 12/26/17 Left Antecubital less than 1 day                Physical Exam   Constitutional: She is oriented to person, place, and time  She appears well-developed and well-nourished  HENT:   Head: Normocephalic and atraumatic  Mouth/Throat: Oropharynx is clear and moist  No oropharyngeal exudate  Eyes: EOM are normal  Pupils are equal, round, and reactive to light  Left eye exhibits no discharge  No scleral icterus  Neck: Neck supple  No JVD present  No tracheal deviation present  Cardiovascular: Normal rate and regular rhythm  No murmur heard  Pulmonary/Chest: Effort normal and breath sounds normal  No respiratory distress  She exhibits no tenderness  Abdominal: There is tenderness  There is guarding  Localized peritonitis in LLQ, rest of quadrants soft   Musculoskeletal: Normal range of motion  She exhibits no edema or deformity     Neurological: She is alert and oriented to person, place, and time  No cranial nerve deficit  Skin: Skin is warm and dry  No erythema  Psychiatric: She has a normal mood and affect  Her behavior is normal  Judgment and thought content normal        Lab Results:   CBC:   Lab Results   Component Value Date    WBC 12 32 (H) 12/26/2017    HGB 12 6 12/26/2017    HCT 37 6 12/26/2017    MCV 87 12/26/2017     12/26/2017    MCH 29 2 12/26/2017    MCHC 33 5 12/26/2017    RDW 12 2 12/26/2017    MPV 10 5 12/26/2017     Imaging: I have personally reviewed pertinent films in PACS  EKG, Pathology, and Other Studies: I have personally reviewed pertinent reports  Counseling / Coordination of Care  Total floor / unit time spent today 45 minutes  Greater than 50% of total time was spent with the patient and / or family counseling and / or coordination of care  A description of the counseling / coordination of care: Explained conservative treatment plan and reason for admission  Also began coordination of future surgical management plan

## 2017-12-26 NOTE — H&P
H&P Exam - Juan Waters 47 y o  female MRN: [id number]    Unit/Bed#: 406-01 Encounter: [id number]    Assessment:  Patient Active Problem List   Diagnosis    Diverticulitis    Gastroesophageal reflux disease without esophagitis    Hyperlipidemia    Essential hypertension    Hypokalemia    Hypoalbuminemia       Plan:  1)  Sigmoid diverticulitis with microperforation:  Admit the patient to inpatient status, med/surg level of care  The patient will require at least a 2-midnight inpatient hospitalization for work-up and treatment of the symptomatology  General surgery was consulted and already evaluated the patient  IV fluids  IV Zosyn  Clear liquid diet  Pain control  Incentive spirometry 10 times per hour while awake  2)  Hypokalemia:  Replete with IV potassium chloride  3)  Hypoalbuminemia:  Check a prealbumin level  Consult Nutrition  4)  Essential hypertension:  Continue PO Lisinopril  Follow the patient's blood pressure trend  5)  Hyperlipidemia:  Hold the patient's statin while she is on a clear liquid diet  6)  DVT Prophylaxis:  Lovenox 40 mg SQ every 24 hours  SCD's bilaterally  History of Present Illness   The patient is a 47year-old female who presented to the ED with the complaints of severe left lower quadrant abdominal pain  She was recently admitted from 12/4/17 through 12/7/17 for diverticulitis  At the time of discharge, her abdominal pain had resolved  Over the last 1-2 weeks, the patient has been experiencing intermittent left lower quadrant abdominal pain  She also complains of a decreased appetite and nausea  She admits to decreased PO fluid and food intake over the last few days secondary to nausea  Over the last few days, her abdominal pain has worsened  Nothing seemed to relieve her pain  Review of Systems:  Per HPI, all other systems have been reviewed and were negative      Historical Information   Past Medical History:   Diagnosis Date    Disease of thyroid gland     Gastroesophageal reflux disease without esophagitis 12/4/2017    Hypertension     Vitamin D deficiency disease      Past Surgical History:   Procedure Laterality Date    CHOLECYSTECTOMY      HYSTERECTOMY      AL ESOPHAGOGASTRODUODENOSCOPY TRANSORAL DIAGNOSTIC N/A 10/21/2016    Procedure: EGD AND COLONOSCOPY;  Surgeon: Daphney Torres MD;  Location: [facility];  Service: Gastroenterology    THYROIDECTOMY, PARTIAL      TONSILLECTOMY AND ADENOIDECTOMY      TUBAL LIGATION       Social History   History   Alcohol Use    Yes     Comment: social     History   Drug Use No     History   Smoking Status    Former Smoker   Smokeless Tobacco    Never Used     Family History: non-contributory    Meds/Allergies   all medications and allergies reviewed  No Known Allergies    Objective   First Vitals:   Blood Pressure: (!) 133/104 (12/26/17 1219)  Pulse: (!) 113 (12/26/17 1219)  Temperature: 98 8 °F (37 1 °C) (12/26/17 1219)  Temp Source: Temporal (12/26/17 1219)  Respirations: 18 (12/26/17 1219)  Height: 5' 1" (154 9 cm) (12/26/17 1219)  Weight - Scale: 93 kg (205 lb) (12/26/17 1219)  SpO2: 97 % (12/26/17 1219)    Current Vitals:   Blood Pressure: 115/84 (12/26/17 1656)  Pulse: 90 (12/26/17 1656)  Temperature: 99 2 °F (37 3 °C) (12/26/17 1656)  Temp Source: Temporal (12/26/17 1656)  Respirations: 18 (12/26/17 1656)  Height: 5' 1" (154 9 cm) (12/26/17 1656)  Weight - Scale: 90 1 kg (198 lb 10 2 oz) (12/26/17 1721)  SpO2: 98 % (12/26/17 1656)      Intake/Output Summary (Last 24 hours) at 12/26/17 1753  Last data filed at 12/26/17 1636   Gross per 24 hour   Intake             1100 ml   Output                0 ml   Net             1100 ml       Invasive Devices     Peripheral Intravenous Line            Peripheral IV 12/26/17 Left Antecubital less than 1 day                Physical Exam   General:  NAD, awake, alert, oriented x 3  HEENT:  NC/AT, mucous membranes dry  Neck:  Supple, No JVP elevation  CV: + S1, + S2, Tachycardic  Pulm:  Lung fields are CTA bilaterally  Abd:  Soft, + left lower quadrant tenderness with palpation, + guarding of the left lower quadrant with palpation, Non-distended  Ext:  No clubbing/cyanosis/edema      Lab Results:  Reviewed  Lab Results   Component Value Date    WBC 12 32 (H) 12/26/2017    HGB 12 6 12/26/2017    HCT 37 6 12/26/2017    MCV 87 12/26/2017     12/26/2017     Lab Results   Component Value Date    GLUCOSE 99 12/26/2017    CALCIUM 9 1 12/26/2017     12/26/2017    K 3 3 (L) 12/26/2017    CO2 28 12/26/2017    CL 98 (L) 12/26/2017    BUN 10 12/26/2017    CREATININE 0 71 12/26/2017     Lab Results   Component Value Date    ALT 20 12/26/2017    AST 28 12/26/2017    ALKPHOS 81 12/26/2017    BILITOT 0 90 12/26/2017       Imaging:  CT scan of the abdomen/pelvis (12/26/17):  CT ABDOMEN AND PELVIS WITH IV CONTRAST     INDICATION:       History taken directly from the electronic ordering system  Patient complains of left lower quadrant pain with nausea and vomiting  Patient states history of diverticulitis      COMPARISON: December 14, 2017      TECHNIQUE:  CT examination of the abdomen and pelvis was performed  Reformatted images were created in axial, sagittal, and coronal planes        Radiation dose length product (DLP) for this visit:  1009 6 mGy-cm   This examination, like all CT scans performed in the [facility], was performed utilizing techniques to minimize radiation dose exposure, including the use of iterative   reconstruction and automated exposure control      IV Contrast:  100 mL of iohexol (OMNIPAQUE)           Enteric Contrast:  Enteric contrast was not administered      FINDINGS:     ABDOMEN     LOWER CHEST:  No significant abnormalities identified in the lower chest      LIVER/BILIARY TREE:  Unremarkable      GALLBLADDER:  Surgically absent    Surgical clips are noted in the gallbladder fossa      SPLEEN: Unremarkable      PANCREAS:  Unremarkable      ADRENAL GLANDS:  Unremarkable      KIDNEYS/URETERS:  Unremarkable  No hydronephrosis      STOMACH AND BOWEL:  Stomach and small bowel unremarkable  Left colonic diverticula noted  Diffuse mural thickening of the proximal mid sigmoid colon with streaking of the pericolonic fat in this region is similar to previous studies  Punctate   collection of air is noted in the adjacent soft tissues suspicious for microperforation (series 2 image 68 and series 601, image 58)     APPENDIX:  No findings to suggest appendicitis      ABDOMINOPELVIC CAVITY:  No ascites or free intraperitoneal air  No lymphadenopathy      VESSELS:  Unremarkable for patient's age      PELVIS     REPRODUCTIVE ORGANS:  Unremarkable for patient's age      URINARY BLADDER:  Unremarkable      ABDOMINAL WALL/INGUINAL REGIONS:  Unremarkable      OSSEOUS STRUCTURES:  No acute fracture or destructive osseous lesion      IMPRESSION:     Subacute upon chronic sigmoid diverticulitis with findings suspicious for new microperforation  (Intrinsic sigmoid mural lesion not excludable)     Cholecystectomy  Code Status: Level 1 - Full Code  Advance Directive and Living Will:      Power of :    POLST:      Counseling / Coordination of Care: Total floor / unit time spent today 40 minutes

## 2017-12-27 ENCOUNTER — GENERIC CONVERSION - ENCOUNTER (OUTPATIENT)
Dept: OTHER | Facility: OTHER | Age: 52
End: 2017-12-27

## 2017-12-27 PROBLEM — E43 SEVERE PROTEIN-CALORIE MALNUTRITION (HCC): Status: ACTIVE | Noted: 2017-12-27

## 2017-12-27 PROBLEM — R79.89 LOW SERUM PREALBUMIN: Status: ACTIVE | Noted: 2017-12-27

## 2017-12-27 LAB
ALBUMIN SERPL BCP-MCNC: 2.1 G/DL (ref 3.5–5)
ALP SERPL-CCNC: 66 U/L (ref 46–116)
ALT SERPL W P-5'-P-CCNC: 16 U/L (ref 12–78)
ANION GAP SERPL CALCULATED.3IONS-SCNC: 7 MMOL/L (ref 4–13)
AST SERPL W P-5'-P-CCNC: 11 U/L (ref 5–45)
BASOPHILS # BLD AUTO: 0.04 THOUSANDS/ΜL (ref 0–0.1)
BASOPHILS NFR BLD AUTO: 0 % (ref 0–1)
BILIRUB SERPL-MCNC: 0.7 MG/DL (ref 0.2–1)
BUN SERPL-MCNC: 4 MG/DL (ref 5–25)
CALCIUM SERPL-MCNC: 8.3 MG/DL (ref 8.3–10.1)
CHLORIDE SERPL-SCNC: 104 MMOL/L (ref 100–108)
CK SERPL-CCNC: 14 U/L (ref 26–192)
CO2 SERPL-SCNC: 27 MMOL/L (ref 21–32)
CREAT SERPL-MCNC: 0.66 MG/DL (ref 0.6–1.3)
EOSINOPHIL # BLD AUTO: 0.06 THOUSAND/ΜL (ref 0–0.61)
EOSINOPHIL NFR BLD AUTO: 1 % (ref 0–6)
ERYTHROCYTE [DISTWIDTH] IN BLOOD BY AUTOMATED COUNT: 12.1 % (ref 11.6–15.1)
EST. AVERAGE GLUCOSE BLD GHB EST-MCNC: 100 MG/DL
GFR SERPL CREATININE-BSD FRML MDRD: 101 ML/MIN/1.73SQ M
GLUCOSE SERPL-MCNC: 95 MG/DL (ref 65–140)
HBA1C MFR BLD: 5.1 % (ref 4.2–6.3)
HCT VFR BLD AUTO: 33.9 % (ref 34.8–46.1)
HGB BLD-MCNC: 11.2 G/DL (ref 11.5–15.4)
LACTATE SERPL-SCNC: 0.6 MMOL/L (ref 0.5–2)
LYMPHOCYTES # BLD AUTO: 1.22 THOUSANDS/ΜL (ref 0.6–4.47)
LYMPHOCYTES NFR BLD AUTO: 12 % (ref 14–44)
MAGNESIUM SERPL-MCNC: 1.7 MG/DL (ref 1.6–2.6)
MCH RBC QN AUTO: 29.2 PG (ref 26.8–34.3)
MCHC RBC AUTO-ENTMCNC: 33 G/DL (ref 31.4–37.4)
MCV RBC AUTO: 89 FL (ref 82–98)
MONOCYTES # BLD AUTO: 0.65 THOUSAND/ΜL (ref 0.17–1.22)
MONOCYTES NFR BLD AUTO: 6 % (ref 4–12)
NEUTROPHILS # BLD AUTO: 8.35 THOUSANDS/ΜL (ref 1.85–7.62)
NEUTS SEG NFR BLD AUTO: 81 % (ref 43–75)
PHOSPHATE SERPL-MCNC: 3.5 MG/DL (ref 2.7–4.5)
PLATELET # BLD AUTO: 297 THOUSANDS/UL (ref 149–390)
PMV BLD AUTO: 10.3 FL (ref 8.9–12.7)
POTASSIUM SERPL-SCNC: 4 MMOL/L (ref 3.5–5.3)
PREALB SERPL-MCNC: 6.9 MG/DL (ref 18–40)
PROT SERPL-MCNC: 6.5 G/DL (ref 6.4–8.2)
RBC # BLD AUTO: 3.83 MILLION/UL (ref 3.81–5.12)
SODIUM SERPL-SCNC: 138 MMOL/L (ref 136–145)
TROPONIN I SERPL-MCNC: <0.02 NG/ML
TSH SERPL DL<=0.05 MIU/L-ACNC: 3.14 UIU/ML (ref 0.36–3.74)
WBC # BLD AUTO: 10.32 THOUSAND/UL (ref 4.31–10.16)

## 2017-12-27 PROCEDURE — G8980 MOBILITY D/C STATUS: HCPCS

## 2017-12-27 PROCEDURE — 84484 ASSAY OF TROPONIN QUANT: CPT | Performed by: INTERNAL MEDICINE

## 2017-12-27 PROCEDURE — G8989 SELF CARE D/C STATUS: HCPCS

## 2017-12-27 PROCEDURE — 85025 COMPLETE CBC W/AUTO DIFF WBC: CPT | Performed by: INTERNAL MEDICINE

## 2017-12-27 PROCEDURE — 83605 ASSAY OF LACTIC ACID: CPT | Performed by: INTERNAL MEDICINE

## 2017-12-27 PROCEDURE — 83735 ASSAY OF MAGNESIUM: CPT | Performed by: INTERNAL MEDICINE

## 2017-12-27 PROCEDURE — 84100 ASSAY OF PHOSPHORUS: CPT | Performed by: INTERNAL MEDICINE

## 2017-12-27 PROCEDURE — 83036 HEMOGLOBIN GLYCOSYLATED A1C: CPT | Performed by: INTERNAL MEDICINE

## 2017-12-27 PROCEDURE — G8979 MOBILITY GOAL STATUS: HCPCS

## 2017-12-27 PROCEDURE — 82550 ASSAY OF CK (CPK): CPT | Performed by: INTERNAL MEDICINE

## 2017-12-27 PROCEDURE — G8988 SELF CARE GOAL STATUS: HCPCS

## 2017-12-27 PROCEDURE — G8987 SELF CARE CURRENT STATUS: HCPCS

## 2017-12-27 PROCEDURE — 84134 ASSAY OF PREALBUMIN: CPT | Performed by: INTERNAL MEDICINE

## 2017-12-27 PROCEDURE — G8978 MOBILITY CURRENT STATUS: HCPCS

## 2017-12-27 PROCEDURE — 84443 ASSAY THYROID STIM HORMONE: CPT | Performed by: INTERNAL MEDICINE

## 2017-12-27 PROCEDURE — 97530 THERAPEUTIC ACTIVITIES: CPT

## 2017-12-27 PROCEDURE — 97167 OT EVAL HIGH COMPLEX 60 MIN: CPT

## 2017-12-27 PROCEDURE — 97163 PT EVAL HIGH COMPLEX 45 MIN: CPT

## 2017-12-27 PROCEDURE — 80053 COMPREHEN METABOLIC PANEL: CPT | Performed by: INTERNAL MEDICINE

## 2017-12-27 RX ORDER — SODIUM CHLORIDE 9 MG/ML
125 INJECTION, SOLUTION INTRAVENOUS CONTINUOUS
Status: DISCONTINUED | OUTPATIENT
Start: 2017-12-27 | End: 2017-12-31

## 2017-12-27 RX ADMIN — SODIUM CHLORIDE AND POTASSIUM CHLORIDE 125 ML/HR: .9; .15 SOLUTION INTRAVENOUS at 06:49

## 2017-12-27 RX ADMIN — OXYCODONE HYDROCHLORIDE 5 MG: 5 TABLET ORAL at 22:16

## 2017-12-27 RX ADMIN — LISINOPRIL 10 MG: 10 TABLET ORAL at 08:17

## 2017-12-27 RX ADMIN — PIPERACILLIN SODIUM,TAZOBACTAM SODIUM 3.38 G: 3; .375 INJECTION, POWDER, FOR SOLUTION INTRAVENOUS at 12:31

## 2017-12-27 RX ADMIN — SODIUM CHLORIDE AND POTASSIUM CHLORIDE 125 ML/HR: .9; .15 SOLUTION INTRAVENOUS at 15:33

## 2017-12-27 RX ADMIN — PIPERACILLIN SODIUM,TAZOBACTAM SODIUM 3.38 G: 3; .375 INJECTION, POWDER, FOR SOLUTION INTRAVENOUS at 17:32

## 2017-12-27 RX ADMIN — ASPIRIN 81 MG CHEWABLE TABLET 81 MG: 81 TABLET CHEWABLE at 08:18

## 2017-12-27 RX ADMIN — PIPERACILLIN SODIUM,TAZOBACTAM SODIUM 3.38 G: 3; .375 INJECTION, POWDER, FOR SOLUTION INTRAVENOUS at 00:07

## 2017-12-27 RX ADMIN — FAMOTIDINE 20 MG: 10 INJECTION, SOLUTION INTRAVENOUS at 08:17

## 2017-12-27 RX ADMIN — ONDANSETRON 4 MG: 2 INJECTION INTRAMUSCULAR; INTRAVENOUS at 12:48

## 2017-12-27 RX ADMIN — PIPERACILLIN SODIUM,TAZOBACTAM SODIUM 3.38 G: 3; .375 INJECTION, POWDER, FOR SOLUTION INTRAVENOUS at 05:56

## 2017-12-27 RX ADMIN — OXYCODONE HYDROCHLORIDE 5 MG: 5 TABLET ORAL at 15:27

## 2017-12-27 RX ADMIN — PIPERACILLIN SODIUM,TAZOBACTAM SODIUM 3.38 G: 3; .375 INJECTION, POWDER, FOR SOLUTION INTRAVENOUS at 23:46

## 2017-12-27 RX ADMIN — OXYCODONE HYDROCHLORIDE 5 MG: 5 TABLET ORAL at 12:47

## 2017-12-27 RX ADMIN — SODIUM CHLORIDE 100 ML/HR: 0.9 INJECTION, SOLUTION INTRAVENOUS at 17:25

## 2017-12-27 NOTE — MALNUTRITION/BMI
This medical record reflects one or more clinical indicators suggestive of malnutrition and/or morbid obesity  Please indicate the one diagnosis below which you feel best reflects the clinical picture  Malnutrition Findings:   acute illness  other severe protein calorie malnutrition    BMI Findings:  30-39 9    Body mass index is 37 53 kg/m²  See Nutrition note dated 12/27/2017 for additional details  Completed nutrition assessment is viewable in the nutrition documentation

## 2017-12-27 NOTE — PLAN OF CARE
Problem: PHYSICAL THERAPY ADULT  Goal: Performs mobility at highest level of function for planned discharge setting  See evaluation for individualized goals  Outcome: Completed Date Met: 12/27/17  Prognosis: Excellent  Problem List: Pain  Assessment: Patient is a 47 y o  female admitted with severe abdominal pain  Upon examination, patient is able to ambulate without assistance, demonstrates good balance, and is able to perform all functional transfers without difficulty  Physical therapy intervention is not appropriate at this time due to high level of function and patient is safe to d/c home without additional services once medically stable  Barriers to Discharge: None     Recommendation: D/C PT     PT - OK to Discharge: Yes (When medically stable)    See flowsheet documentation for full assessment

## 2017-12-27 NOTE — SOCIAL WORK
Met with pt to discuss role as  in helping pt to develop discharge plan and to help pt carry out their plan  Pt has 5 steps on the outside with a railing  Pt has 13 steps on the inside  Pt has her bedroom on the 2nd floor  Pt has bathroom on the 1rst and 2nd floor  Pt has no DME at home  Pt is independent with ADL's  Pt does the cooking   Pt works at [employer]  Both the pt and her  both drive  Pt has never had home care or any services in the home  Pt's PCP is Dr Bettie Lewis  Pt uses the [facility] in [place]  Discharge checklist discussed with patient with a good understanding  Pt with with no Case management needs will continue to follow pt

## 2017-12-27 NOTE — OCCUPATIONAL THERAPY NOTE
Occupational Therapy Evaluation     Patient Name: Benedicto Smith  CKSAV'R Date: 12/27/2017  Problem List  Patient Active Problem List   Diagnosis    Diverticulitis    Gastroesophageal reflux disease without esophagitis    Hyperlipidemia    Essential hypertension    Hypokalemia    Hypoalbuminemia     Past Medical History  Past Medical History:   Diagnosis Date    Disease of thyroid gland     Gastroesophageal reflux disease without esophagitis 12/4/2017    Hyperlipidemia     Hypertension     Vitamin D deficiency disease      Past Surgical History  Past Surgical History:   Procedure Laterality Date    CHOLECYSTECTOMY      [place] ESOPHAGOGASTRODUODENOSCOPY TRANSORAL DIAGNOSTIC N/A 10/21/2016    Procedure: EGD AND COLONOSCOPY;  Surgeon: Makenna Santacruz MD;  Location: [facility];  Service: Gastroenterology    THYROIDECTOMY, PARTIAL      TONSILLECTOMY AND ADENOIDECTOMY      TUBAL LIGATION               12/27/17 1011   Note Type   Note type Eval only   Restrictions/Precautions   Weight Bearing Precautions Per Order No   Other Precautions Telemetry   Pain Assessment   Pain Assessment 0-10   Pain Score 3   Pain Location Abdomen   Pain Descriptors Cramping;Stabbing   Home Living   Type of Home House   Home Layout Multi-level;Bed/bath upstairs;Stairs to enter with rails  (15 DEANN; 2nd set of DEANN has HR; FF to 2nd)   Bathroom Shower/Tub Tub/shower unit   Bathroom Toilet Standard   Bathroom Accessibility Accessible   Additional Comments pt does not have DME   Prior Function   Level of [place] Independent with ADLs and functional mobility   Lives With Spouse; Son   ADL Assistance Independent   IADLs Independent   Comments pt is (I) c driving   Psychosocial   Psychosocial (WDL) WDL   Bed Mobility   Supine to Sit 7  Independent   Sit to Supine 7  Independent   Transfers   Sit to Stand 7  Independent   Stand to Sit 7  Independent   Functional Mobility   Functional Mobility 7  Independent   Additional Comments pt performed FM in the hallway with no device and no difficulties during FM   Balance   Static Sitting Good   Dynamic Sitting Good   Static Standing Good   Dynamic Standing Good   Ambulatory Good   Activity Tolerance   Activity Tolerance Patient tolerated treatment well   RUE Assessment   RUE Assessment WFL   LUE Assessment   LUE Assessment WFL   Hand Function   Gross Motor Coordination Functional   Fine Motor Coordination Functional   Sensation   Light Touch No apparent deficits   Sharp/Dull No apparent deficits   Cognition   Overall Cognitive Status WFL   Arousal/Participation Alert   Attention Within functional limits   Orientation Level Oriented X4   Memory Within functional limits   Following Commands Follows all commands and directions without difficulty   Assessment   Assessment pt performing at PLOF (I) level with no functional deficits requiring skilled OT intervention at this time    Recommendation   OT Discharge Recommendation Home independent   OT - OK to Discharge Yes   Barthel Index   Feeding 10   Bathing 5   Grooming Score 5   Dressing Score 10   Bladder Score 10   Bowels Score 10   Toilet Use Score 10   Transfers (Bed/Chair) Score 15   Mobility (Level Surface) Score 15   Stairs Score 0   Barthel Index Score 90       Pt is performing at PLOF; OT services will be discontinued at this time  Pt left supine in bed at end of session; all needs within reach; all lines intact

## 2017-12-27 NOTE — CASE MANAGEMENT
Initial Clinical Review    Admission: Date/Time/Statement: 12/26/17 @ 1522     Orders Placed This Encounter   Procedures    Inpatient Admission (expected length of stay for this patient is greater than two midnights)     Standing Status:   Standing     Number of Occurrences:   1     Order Specific Question:   Admitting Physician     Answer:   Mike Ulloa [[id number]]     Order Specific Question:   Level of Care     Answer:   Med Surg [16]     Order Specific Question:   Estimated length of stay     Answer:   More than 2 Midnights     Order Specific Question:   Certification     Answer:   I certify that inpatient services are medically necessary for this patient for a duration of greater than two midnights  See H&P and MD Progress Notes for additional information about the patient's course of treatment  ED: Date/Time/Mode of Arrival:   ED Arrival Information     Expected Arrival Acuity Means of Arrival Escorted By Service Admission Type    - 12/26/2017 12:11 Urgent Walk-In Spouse General Medicine Urgent    Arrival Complaint    Abdominal Pain      Chief Complaint:   Chief Complaint   Patient presents with    Abdominal Pain     Pt has had ongoing abdominal pain with n/v  Hx of diverticulitis  History of Illness: This 62-year-old female patient presents emergency department for re-evaluation of lower abdominal pain most consistent with previous bouts of diverticulitis which she has been seen here in the ER at least twice  Patient states she has been treating herself is was directed by her physician has not been getting any better  Today, she states she has vomited multiple times, has had bloody stool, so she came back in for evaluation after not being able to see the surgeon for which he was supposed to be seen in follow-up    ED Vital Signs:   ED Triage Vitals [12/26/17 1219]   Temperature Pulse Respirations Blood Pressure SpO2   98 8 °F (37 1 °C) (!) 113 18 (!) 133/104 97 %      Temp Source Heart Rate Source Patient Position - Orthostatic VS BP Location FiO2 (%)   Temporal Monitor Sitting Right arm --      Pain Score       4        Wt Readings from Last 1 Encounters:   12/26/17 90 1 kg (198 lb 10 2 oz)   Vital Signs (abnormal):   T 99 5  Abnormal Labs/Diagnostic Test Results:   WBC 12 32 K 3 3 CL 98 ALB 2 7  U/A+KETONES, BLOOD  CT A/P=Subacute upon chronic sigmoid diverticulitis with findings suspicious for new microperforation (Intrinsic sigmoid mural lesion not excludable)  Cholecystectomy  ED Treatment:   Medication Administration from 12/26/2017 1211 to 12/26/2017 1654       Date/Time Order Dose Route Action Action by Comments     12/26/2017 1356 sodium chloride 0 9 % bolus 1,000 mL 0 mL Intravenous Stopped Jamee Acosta RN      12/26/2017 1248 sodium chloride 0 9 % bolus 1,000 mL 1,000 mL Intravenous [facility] Donald Back RN      12/26/2017 1247 dicyclomine (BENTYL) tablet 20 mg 20 mg Oral Given April Duque RN      12/26/2017 1344 iohexol (OMNIPAQUE) 350 MG/ML injection (MULTI-DOSE) 100 mL 100 mL Intravenous Given Tristin Angel      12/26/2017 1639 levofloxacin (LEVAQUIN) IVPB (premix) 750 mg 750 mg Intravenous New Bag Anju Johnson RN other antibiotics running     12/26/2017 1636 metroNIDAZOLE (FLAGYL) IVPB (premix) 500 mg 0 mg Intravenous Stopped Jamee Acosta RN      12/26/2017 1601 metroNIDAZOLE (FLAGYL) IVPB (premix) 500 mg 500 mg Intravenous New Bag Justice Randall RN       Past Medical/Surgical History:    Active Ambulatory Problems     Diagnosis Date Noted    Diverticulitis 12/04/2017    Gastroesophageal reflux disease without esophagitis 12/04/2017    Hyperlipidemia 12/04/2017    Essential hypertension 12/04/2017    Hypokalemia 12/04/2017     Resolved Ambulatory Problems     Diagnosis Date Noted    Pericolonic abscess 12/04/2017     Past Medical History:   Diagnosis Date    Disease of thyroid gland     Gastroesophageal reflux disease without esophagitis 12/4/2017  Hyperlipidemia     Hypertension     Vitamin D deficiency disease    Admitting Diagnosis: Abdominal pain [R10 9]  Diverticulitis of large intestine with perforation and bleeding without abscess [K57 21]  Age/Sex: 47 y o  female  Assessment/Plan:   Patient Active Problem List   Diagnosis    Diverticulitis    Gastroesophageal reflux disease without esophagitis    Hyperlipidemia    Essential hypertension    Hypokalemia    Hypoalbuminemia   Plan:  1)  Sigmoid diverticulitis with microperforation:  Admit the patient to inpatient status, med/surg level of care  The patient will require at least a 2-midnight inpatient hospitalization for work-up and treatment of the symptomatology  General surgery was consulted and already evaluated the patient  IV fluids  IV Zosyn  Clear liquid diet  Pain control  Incentive spirometry 10 times per hour while awake  2)  Hypokalemia:  Replete with IV potassium chloride  3)  Hypoalbuminemia:  Check a prealbumin level  Consult Nutrition  4)  Essential hypertension:  Continue PO Lisinopril  Follow the patient blood pressure trend  5)  Hyperlipidemia:  Hold the patient's statin while she is on a clear liquid diet  6)  DVT Prophylaxis:  Lovenox 40 mg SQ every 24 hours  SCD's bilaterally  Admission Orders:  MED SURG  PT/OT EVAL & TX  BLE VENODYNES  CONSULT NUTRITION  INCENTIVE SPIROMETRY  CONSULT SURGERY  Scheduled Meds:   aspirin 81 mg Oral Daily   enoxaparin 40 mg Subcutaneous Q24H   famotidine 20 mg Intravenous Q24H [address]   lisinopril 10 mg Oral Daily   piperacillin-tazobactam 3 375 g Intravenous Q6H   Continuous Infusions:   sodium chloride 0 9 % with KCl 20 mEq/L 125 mL/hr Last Rate: 125 mL/hr (12/27/17 0649)   PRN Meds:   acetaminophen    HYDROmorphone    ondansetron    oxyCODONE  Thank you,  [address] [facility] in the [facility] by Yonis Stover for 2017  Network Utilization Review Department  Phone: [phone];  Fax [phone]  ATTENTION: The Network Utilization Review Department is now centralized for our 7 Facilities  Make a note that we have a new phone and fax numbers for our Department  Please call with any questions or concerns to [phone] and carefully follow the prompts so that you are directed to the right person  All voicemails are confidential  Fax any determinations, approvals, denials, and requests for initial or continue stay review clinical to [phone]  Due to HIGH CALL volume, it would be easier if you could please send faxed requests to expedite your requests and in part, help us provide discharge notifications faster

## 2017-12-27 NOTE — PROGRESS NOTES
Progress Note - General Surgery   Dwain Collins 47 y o  female MRN: [id number]  Unit/Bed#: 406-01 Encounter: [id number]    Assessment:  Acute diverticulitis  Morbid obesity with > BMI      Plan:  Diet modification  - clears only at this time  Due to failure of home regimen of PO antibiotics and worsening CT scan, will prolong IV antibiotic course to 5 days in an attempt to arrest the inflammatory process and still plan for elective resection  Analgesia  DVT prophylaxis    Subjective/Objective   Chief Complaint: Abdominal pain    Subjective: Improved abdominal pain form yesterday, now says 3/10  Less cramp Some nausea with breakfast     Objective:     Blood pressure 111/67, pulse 72, temperature 97 6 °F (36 4 °C), temperature source Temporal, resp  rate 18, height 5' 1" (1 549 m), weight 90 1 kg (198 lb 10 2 oz), SpO2 96 %  ,Body mass index is 37 53 kg/m²  Intake/Output Summary (Last 24 hours) at 12/27/17 1217  Last data filed at 12/27/17 0801   Gross per 24 hour   Intake             2400 ml   Output              850 ml   Net             1550 ml       Invasive Devices     Peripheral Intravenous Line            Peripheral IV 12/26/17 Left Antecubital less than 1 day                Physical Exam: General appearance: alert, appears stated age, cooperative and no distress  Lungs: clear to auscultation bilaterally  Heart: regular rate and rhythm, S1, S2 normal, no murmur, click, rub or gallop  Abdomen: Soft , tender in LLQ but improved over last 24 hours  No rebound at this time  Bowel sounds present  Extremities: extremities normal, atraumatic, no cyanosis or edema  Neurologic: Alert and oriented X 3, normal strength and tone  Normal symmetric reflexes   Normal coordination and gait    Lab, Imaging and other studies:  CBC:   Lab Results   Component Value Date    WBC 10 32 (H) 12/27/2017    HGB 11 2 (L) 12/27/2017    HCT 33 9 (L) 12/27/2017    MCV 89 12/27/2017     12/27/2017    MCH 29 2 12/27/2017    [facility] 33 0 12/27/2017    RDW 12 1 12/27/2017    MPV 10 3 12/27/2017     VTE Pharmacologic Prophylaxis: Heparin  VTE Mechanical Prophylaxis: sequential compression device

## 2017-12-27 NOTE — PROGRESS NOTES
Progress Note - Nesha Ballard 47 y o  female MRN: [id number]    Unit/Bed#: 406-01 Encounter: [id number]      Assessment:  Patient Active Problem List   Diagnosis    Diverticulitis    Gastroesophageal reflux disease without esophagitis    Hyperlipidemia    Essential hypertension    Hypokalemia    Hypoalbuminemia    Low serum prealbumin         Plan:  1)  Sigmoid diverticulitis with microperforation:  I appreciate General Surgery's input  Continue IV fluids  Continue IV Zosyn  Maintain a clear liquid diet  Pain control  Incentive spirometry 10 times per hour while awake      2)  Hypokalemia:  Resolved  3)  Hypoalbuminemia/Low serum prealbumin:  Consult Nutrition     4)  Essential hypertension:  Continue PO Lisinopril  Follow the patient's blood pressure trend      5)  Hyperlipidemia:  Hold the patient's statin while she is on a clear liquid diet  Subjective:   Patient seen and examined  The patient's abdominal pain is slightly improved this AM   She complains of persistent, intermittent nausea  She is tolerating the clear liquid diet  Objective:     Vitals: Blood pressure 103/64, pulse 76, temperature 99 3 °F (37 4 °C), temperature source Temporal, resp  rate 18, height 5' 1" (1 549 m), weight 90 1 kg (198 lb 10 2 oz), SpO2 97 %  ,Body mass index is 37 53 kg/m²    Weight (last 2 days)     Date/Time   Weight    12/26/17 1721  90 1 (198 63)    12/26/17 1219  93 (205)              Intake/Output Summary (Last 24 hours) at 12/27/17 1718  Last data filed at 12/27/17 1532   Gross per 24 hour   Intake          2739 59 ml   Output             1100 ml   Net          1639 59 ml       Physical Exam: General:  NAD, awake, alert, oriented x 3  HEENT:  NC/AT, mucous membranes dry  Neck:  Supple, No JVP elevation  CV:  + S1, + S2, RRR  Pulm:  Lung fields are CTA bilaterally  Abd:  Soft, Left lower quadrant abdominal pain with palpation, Mild distension  Ext:  No clubbing/cyanosis/edema    Scheduled Meds:  aspirin 81 mg Oral Daily   enoxaparin 40 mg Subcutaneous Q24H   famotidine 20 mg Intravenous Q24H [facility]   lisinopril 10 mg Oral Daily   piperacillin-tazobactam 3 375 g Intravenous Q6H     Continuous Infusions:  sodium chloride 100 mL/hr     PRN Meds:   acetaminophen    HYDROmorphone    ondansetron    oxyCODONE      Invasive Devices     Peripheral Intravenous Line            Peripheral IV 12/26/17 Left Antecubital 1 day                  Results from last 7 days  Lab Units 12/27/17  0613 12/26/17  1236   WBC Thousand/uL 10 32* 12 32*   HEMOGLOBIN g/dL 11 2* 12 6   HEMATOCRIT % 33 9* 37 6   PLATELETS Thousands/uL 297 365   NEUTROS PCT % 81* 80*   MONOS PCT % 6 7         Results from last 7 days  Lab Units 12/27/17  0613 12/26/17  1236   SODIUM mmol/L 138 137   POTASSIUM mmol/L 4 0 3 3*   CHLORIDE mmol/L 104 98*   CO2 mmol/L 27 28   BUN mg/dL 4* 10   CREATININE mg/dL 0 66 0 71   CALCIUM mg/dL 8 3 9 1   TOTAL PROTEIN g/dL 6 5 7 9   BILIRUBIN TOTAL mg/dL 0 70 0 90   ALK PHOS U/L 66 81   ALT U/L 16 20   AST U/L 11 28   GLUCOSE RANDOM mg/dL 95 99       Lab Results   Component Value Date    CKTOTAL 14 (L) 12/27/2017    TROPONINI <0 02 12/27/2017       Lab Results   Component Value Date    INR 1 06 12/26/2017    PROTIME 13 7 12/26/2017                 Lab, Imaging and other studies: I have personally reviewed pertinent reports  VTE Pharmacologic Prophylaxis: Enoxaparin (Lovenox)-The patient is refusing the SQ Lovenox  VTE Mechanical Prophylaxis: sequential compression device-The patient is refusing the SCD's

## 2017-12-27 NOTE — PHYSICAL THERAPY NOTE
PT Evaluation     12/27/17 1036   Note Type   Note type Eval only   Pain Assessment   Pain Assessment 0-10   Pain Score 3   Pain Type Acute pain   Pain Location Abdomen   Pain Descriptors Stabbing   Pain Frequency Intermittent   Home Living   Type of Home House   Home Layout Multi-level   Bathroom Shower/Tub Tub/shower unit   Bathroom Toilet Standard   Prior Function   Level of [place] Independent with ADLs and functional mobility   Lives With Spouse; Son   ADL Assistance Independent   IADLs Independent   Cognition   Overall Cognitive Status WFL   Arousal/Participation Alert   Orientation Level Oriented X4   Following Commands Follows all commands and directions without difficulty   RLE Assessment   RLE Assessment WNL   LLE Assessment   LLE Assessment WNL   Coordination   Movements are Fluid and Coordinated 1   Sensation WFL   Light Touch   RLE Light Touch Grossly intact   LLE Light Touch Grossly intact   Bed Mobility   Rolling R 7  Independent   Rolling L 7  Independent   Supine to Sit 7  Independent   Sit to Supine 7  Independent   Transfers   Sit to Stand 7  Independent   Stand to Sit 7  Independent   Ambulation/Elevation   Gait pattern WNL   Gait Assistance 7  Independent   Assistive Device None   Distance 150 feet   Balance   Static Sitting Normal   Dynamic Sitting Normal   Static Standing Good   Dynamic Standing Good   Ambulatory Good   Endurance Deficit   Endurance Deficit No   Activity Tolerance   Activity Tolerance Patient tolerated treatment well   Assessment   Prognosis Excellent   Problem List Pain   Assessment Patient is a 47 y o  female admitted with severe abdominal pain  Upon examination, patient is able to ambulate without assistance, demonstrates good balance, and is able to perform all functional transfers without difficulty   Physical therapy intervention is not appropriate at this time due to high level of function and patient is safe to d/c home without additional services once medically stable  Barriers to Discharge None   Recommendation   Recommendation D/C PT   PT - OK to Discharge Yes  (When medically stable)   Patient supine in bed when approached for therapy  returned to same position at end of session with all needs within reach  Patient given armchair and encouraged to sit out of bed throughout the day

## 2017-12-28 PROBLEM — R31.29 MICROSCOPIC HEMATURIA: Status: ACTIVE | Noted: 2017-12-28

## 2017-12-28 PROBLEM — E83.42 HYPOMAGNESEMIA: Status: ACTIVE | Noted: 2017-12-28

## 2017-12-28 LAB
ALBUMIN SERPL BCP-MCNC: 2 G/DL (ref 3.5–5)
ALP SERPL-CCNC: 77 U/L (ref 46–116)
ALT SERPL W P-5'-P-CCNC: 14 U/L (ref 12–78)
ANION GAP SERPL CALCULATED.3IONS-SCNC: 7 MMOL/L (ref 4–13)
AST SERPL W P-5'-P-CCNC: 14 U/L (ref 5–45)
BACTERIA UR CULT: NORMAL
BASOPHILS # BLD AUTO: 0.02 THOUSANDS/ΜL (ref 0–0.1)
BASOPHILS NFR BLD AUTO: 0 % (ref 0–1)
BILIRUB SERPL-MCNC: 0.8 MG/DL (ref 0.2–1)
BUN SERPL-MCNC: 2 MG/DL (ref 5–25)
CALCIUM SERPL-MCNC: 8.5 MG/DL (ref 8.3–10.1)
CHLORIDE SERPL-SCNC: 101 MMOL/L (ref 100–108)
CO2 SERPL-SCNC: 30 MMOL/L (ref 21–32)
CREAT SERPL-MCNC: 0.77 MG/DL (ref 0.6–1.3)
EOSINOPHIL # BLD AUTO: 0.1 THOUSAND/ΜL (ref 0–0.61)
EOSINOPHIL NFR BLD AUTO: 1 % (ref 0–6)
ERYTHROCYTE [DISTWIDTH] IN BLOOD BY AUTOMATED COUNT: 12.5 % (ref 11.6–15.1)
GFR SERPL CREATININE-BSD FRML MDRD: 88 ML/MIN/1.73SQ M
GLUCOSE SERPL-MCNC: 92 MG/DL (ref 65–140)
HCT VFR BLD AUTO: 36 % (ref 34.8–46.1)
HGB BLD-MCNC: 11.7 G/DL (ref 11.5–15.4)
LYMPHOCYTES # BLD AUTO: 1.3 THOUSANDS/ΜL (ref 0.6–4.47)
LYMPHOCYTES NFR BLD AUTO: 14 % (ref 14–44)
MAGNESIUM SERPL-MCNC: 1.5 MG/DL (ref 1.6–2.6)
MCH RBC QN AUTO: 29.2 PG (ref 26.8–34.3)
MCHC RBC AUTO-ENTMCNC: 32.5 G/DL (ref 31.4–37.4)
MCV RBC AUTO: 90 FL (ref 82–98)
MONOCYTES # BLD AUTO: 0.57 THOUSAND/ΜL (ref 0.17–1.22)
MONOCYTES NFR BLD AUTO: 6 % (ref 4–12)
NEUTROPHILS # BLD AUTO: 7.64 THOUSANDS/ΜL (ref 1.85–7.62)
NEUTS SEG NFR BLD AUTO: 79 % (ref 43–75)
PLATELET # BLD AUTO: 297 THOUSANDS/UL (ref 149–390)
PMV BLD AUTO: 10.6 FL (ref 8.9–12.7)
POTASSIUM SERPL-SCNC: 3.7 MMOL/L (ref 3.5–5.3)
PROT SERPL-MCNC: 6.4 G/DL (ref 6.4–8.2)
RBC # BLD AUTO: 4.01 MILLION/UL (ref 3.81–5.12)
SODIUM SERPL-SCNC: 138 MMOL/L (ref 136–145)
WBC # BLD AUTO: 9.63 THOUSAND/UL (ref 4.31–10.16)

## 2017-12-28 PROCEDURE — 85025 COMPLETE CBC W/AUTO DIFF WBC: CPT | Performed by: INTERNAL MEDICINE

## 2017-12-28 PROCEDURE — 83735 ASSAY OF MAGNESIUM: CPT | Performed by: INTERNAL MEDICINE

## 2017-12-28 PROCEDURE — 80053 COMPREHEN METABOLIC PANEL: CPT | Performed by: INTERNAL MEDICINE

## 2017-12-28 RX ORDER — MAGNESIUM SULFATE HEPTAHYDRATE 40 MG/ML
2 INJECTION, SOLUTION INTRAVENOUS ONCE
Status: COMPLETED | OUTPATIENT
Start: 2017-12-28 | End: 2017-12-28

## 2017-12-28 RX ORDER — PROMETHAZINE HYDROCHLORIDE 25 MG/ML
25 INJECTION, SOLUTION INTRAMUSCULAR; INTRAVENOUS EVERY 4 HOURS PRN
Status: DISCONTINUED | OUTPATIENT
Start: 2017-12-28 | End: 2017-12-31

## 2017-12-28 RX ADMIN — PROMETHAZINE HYDROCHLORIDE 25 MG: 25 INJECTION INTRAMUSCULAR; INTRAVENOUS at 16:29

## 2017-12-28 RX ADMIN — OXYCODONE HYDROCHLORIDE 5 MG: 5 TABLET ORAL at 13:20

## 2017-12-28 RX ADMIN — PIPERACILLIN SODIUM,TAZOBACTAM SODIUM 3.38 G: 3; .375 INJECTION, POWDER, FOR SOLUTION INTRAVENOUS at 23:49

## 2017-12-28 RX ADMIN — PIPERACILLIN SODIUM,TAZOBACTAM SODIUM 3.38 G: 3; .375 INJECTION, POWDER, FOR SOLUTION INTRAVENOUS at 12:01

## 2017-12-28 RX ADMIN — PIPERACILLIN SODIUM,TAZOBACTAM SODIUM 3.38 G: 3; .375 INJECTION, POWDER, FOR SOLUTION INTRAVENOUS at 05:27

## 2017-12-28 RX ADMIN — ASPIRIN 81 MG CHEWABLE TABLET 81 MG: 81 TABLET CHEWABLE at 08:31

## 2017-12-28 RX ADMIN — FAMOTIDINE 20 MG: 10 INJECTION, SOLUTION INTRAVENOUS at 08:31

## 2017-12-28 RX ADMIN — SODIUM CHLORIDE 100 ML/HR: 0.9 INJECTION, SOLUTION INTRAVENOUS at 04:30

## 2017-12-28 RX ADMIN — ACETAMINOPHEN 650 MG: 325 TABLET, FILM COATED ORAL at 16:29

## 2017-12-28 RX ADMIN — LISINOPRIL 10 MG: 10 TABLET ORAL at 08:31

## 2017-12-28 RX ADMIN — ONDANSETRON 4 MG: 2 INJECTION INTRAMUSCULAR; INTRAVENOUS at 12:01

## 2017-12-28 RX ADMIN — OXYCODONE HYDROCHLORIDE 5 MG: 5 TABLET ORAL at 08:31

## 2017-12-28 RX ADMIN — MAGNESIUM SULFATE HEPTAHYDRATE 2 G: 40 INJECTION, SOLUTION INTRAVENOUS at 13:22

## 2017-12-28 RX ADMIN — PIPERACILLIN SODIUM,TAZOBACTAM SODIUM 3.38 G: 3; .375 INJECTION, POWDER, FOR SOLUTION INTRAVENOUS at 17:36

## 2017-12-28 RX ADMIN — SODIUM CHLORIDE 100 ML/HR: 0.9 INJECTION, SOLUTION INTRAVENOUS at 20:07

## 2017-12-28 NOTE — CASE MANAGEMENT
Continued Stay Review    Date: 12/28/17    Vital Signs: /64   Pulse 94   Temp 99 6 °F (37 6 °C) (Temporal)   Resp 18   Ht 5' 1" (1 549 m)   Wt 90 1 kg (198 lb 10 2 oz)   LMP  (LMP Unknown)   SpO2 96%   BMI 37 53 kg/m²     Medications:   Scheduled Meds:   aspirin 81 mg Oral Daily   enoxaparin 40 mg Subcutaneous Q24H   famotidine 20 mg Intravenous Q24H [address]   lisinopril 10 mg Oral Daily   piperacillin-tazobactam 3 375 g Intravenous Q6H     IV MG RUN X1  Continuous Infusions:   sodium chloride 100 mL/hr Last Rate: 100 mL/hr (12/28/17 0430)     PRN Meds:   acetaminophen    HYDROmorphone    Ondansetron; used x2/24hrs    OxyCODONE; used x5/24hrs    Abnormal Labs/Diagnostic Results:   Mg 1 5     Age/Sex: 47 y o  female     Assessment/Plan:   1)  Sigmoid diverticulitis with microperforation:  I appreciate General Surgery's input  Continue IV fluids  Continue IV Zosyn  Maintain a clear liquid diet   Pain control   Incentive spirometry 10 times per hour while awake  2)  Hypokalemia:  Resolved  3)  Hypoalbuminemia/Low serum prealbumin/Severe protein-calorie malnutrition:  I appreciate the Dietitian's input  4)  Essential hypertension:  Continue PO Lisinopril   Follow the patient blood pressure trend  5)  Hyperlipidemia:  Hold the patient's statin while she is on a clear liquid diet  6)  Hypomagnesemia:  Replete with IV magnesium sulfate  7)  Microscopic hematuria:  She will need outpatient follow-up with her PCP  Subjective:   Patient seen and examined  The patient is having worsening left lower quadrant abdominal pain  She is also complaining of worsening nausea  Discharge Plan: HOME  Thank you,  [facility] in the [facility] by Yonis Stover for 2017  Network Utilization Review Department  Phone: [phone]; Fax [phone]  ATTENTION: The Network Utilization Review Department is now centralized for our 7 Facilities   Make a note that we have a new phone and fax numbers for our Department  Please call with any questions or concerns to [phone] and carefully follow the prompts so that you are directed to the right person  All voicemails are confidential  Fax any determinations, approvals, denials, and requests for initial or continue stay review clinical to [phone]  Due to HIGH CALL volume, it would be easier if you could please send faxed requests to expedite your requests and in part, help us provide discharge notifications faster

## 2017-12-28 NOTE — PROGRESS NOTES
Patient c/o nausea, states that zofran is offering her very little relief  Dr Consuelo Rice aware  Phenergan administered as ordered

## 2017-12-28 NOTE — PROGRESS NOTES
Progress Note - General Surgery   [facility] 47 y o  female MRN: [id number]  Unit/Bed#: 406-01 Encounter: [id number]    Assessment:  Acute sigmoid diverticulitis, improving    Suggestion of small microperforation on CT scan of sigmoid colon, no evidence of drainable abscess formation or fluid collection seen  Patient continues with a normal white blood cell count  The patient failed home regimen of oral antibiotics and evidence of worsening CT scan findings will require the patient to receive antibiotic course as an inpatient for 5 days, today is day 2  The patient was hospitalized here earlier within the month  She has been followed up by Dr Arabella Hyman in the office as an outpatient on December 19th  The plan was for future sigmoid colon resection as an elective procedure, however once this current episode has improved with conservative therapy  The patient is not deemed to have an acute surgical abdomen at this time and will continue to monitor CT findings for a small microperforation  Hypokalemia from 2 days ago is now resolved  Hypoalbuminemia, today 2 0, likely secondary to decreased oral intake  Morbid obesity, class 2  BMI calculated at 37 1    Plan:  Continue present IV antibiotic regimen with Zosyn  Maintain clear liquid diet  Bowel rest, out of bed to chair as tolerated  Antiemetics and analgesics as needed  Continue to monitor a m  labs including CBC and BMP  Consider abdominal x-ray in flat and upright position only if worsening abdominal pain or rise in her WBC count  Elective colon resection at some point in the future after this current bout has resolved  DVT prophylaxis with subcu enoxaparin    Subjective/Objective   Chief Complaint:  Patient feels about the same  She still has some nausea  Left lower quadrant abdominal pain still remains  Subjective:   The patient feels slightly bloated and about the same with regard to her left lower quadrant abdominal pain which she rates as a 2 or 3 on a 10 point pain scale  She moved her bowels yesterday  Patient had just been hospitalized earlier within the month because of acute diverticulitis  Patient again was readmitted to the hospital with probable continuation of her sigmoid diverticulitis and failed oral outpatient antibiotic therapy  Patient was seen in surgical consultation yesterday by Dr Felipa Herrera had recommended bowel rest and conservative treatment with continuation of the necessity for a minimum of 5 days of IV antibiotic therapy  Plan was for consideration of elective colon resection at some point in the future and the patient had last been seen in outpatient follow-up with Dr Anup Byrne on December 19th in the office  Scheduled Meds:  aspirin 81 mg Oral Daily   enoxaparin 40 mg Subcutaneous Q24H   famotidine 20 mg Intravenous Q24H [facility]   lisinopril 10 mg Oral Daily   magnesium sulfate 2 g Intravenous Once   piperacillin-tazobactam 3 375 g Intravenous Q6H     Continuous Infusions:  sodium chloride 100 mL/hr Last Rate: 100 mL/hr (12/28/17 0430)     PRN Meds:   acetaminophen    HYDROmorphone    ondansetron    oxyCODONE      Objective:      Blood pressure 115/64, pulse 94, temperature 99 6 °F (37 6 °C), temperature source Temporal, resp  rate 18, height 5' 1" (1 549 m), weight 90 1 kg (198 lb 10 2 oz), SpO2 96 %  Patient is awake and alert  She is sitting at bedside chair  Patient is tolerating clear liquids  She is passing flatus  She admits bowel movement which was small and semi formed yesterday  Skin warm and dry to touch without rashes or pallor  HEENT clear and unremarkable  Oral mucosa is pink and moist   Heart regular rate and rhythm  Lungs essentially clear  Back no CVA tenderness to percussion  Abdomen appears obese  Positive bowel sounds are heard in 4 quadrants  The abdomen is slightly distended    There is some mild tenderness to deep palpation in the left lower quadrant without any guarding or rebound  No definite masses  No hernias are palpated  She moves all 4 extremities well  No calf tenderness or peripheral edema  Independent ambulation was not observed  Blood pressure 115/64, pulse 94, temperature 99 6 °F (37 6 °C), temperature source Temporal, resp  rate 18, height 5' 1" (1 549 m), weight 90 1 kg (198 lb 10 2 oz), SpO2 96 %  ,Body mass index is 37 53 kg/m²        Intake/Output Summary (Last 24 hours) at 12/28/17 1018  Last data filed at 12/28/17 0934   Gross per 24 hour   Intake          3506 26 ml   Output             1225 ml   Net          2281 26 ml       Invasive Devices     Peripheral Intravenous Line            Peripheral IV 12/26/17 Left Antecubital 1 day                Lab, Imaging and other studies:  CBC:   Lab Results   Component Value Date    WBC 9 63 12/28/2017    HGB 11 7 12/28/2017    HCT 36 0 12/28/2017    MCV 90 12/28/2017     12/28/2017    MCH 29 2 12/28/2017    MCHC 32 5 12/28/2017    RDW 12 5 12/28/2017    MPV 10 6 12/28/2017   , CMP:   Lab Results   Component Value Date     12/28/2017    K 3 7 12/28/2017     12/28/2017    CO2 30 12/28/2017    ANIONGAP 7 12/28/2017    BUN 2 (L) 12/28/2017    CREATININE 0 77 12/28/2017    GLUCOSE 92 12/28/2017    CALCIUM 8 5 12/28/2017    AST 14 12/28/2017    ALT 14 12/28/2017    ALKPHOS 77 12/28/2017    PROT 6 4 12/28/2017    ALBUMIN 2 0 (L) 12/28/2017    BILITOT 0 80 12/28/2017    EGFR 88 12/28/2017     VTE Pharmacologic Prophylaxis: Enoxaparin (Lovenox)  VTE Mechanical Prophylaxis: sequential compression device     Jojo Smith PA-C

## 2017-12-28 NOTE — PROGRESS NOTES
Progress Note - Franc Robert 47 y o  female MRN: [id number]    Unit/Bed#: 406-01 Encounter: [id number]      Assessment:  Patient Active Problem List   Diagnosis    Diverticulitis    Gastroesophageal reflux disease without esophagitis    Hyperlipidemia    Essential hypertension    Hypokalemia    Hypoalbuminemia    Low serum prealbumin    Severe protein-calorie malnutrition (HCC)    Microscopic hematuria    Hypomagnesemia         Plan:  1)  Sigmoid diverticulitis with microperforation:  I appreciate General Surgery's input  Continue IV fluids  Continue IV Zosyn  Maintain a clear liquid diet  Pain control  Incentive spirometry 10 times per hour while awake      2)  Hypokalemia:  Resolved  3)  Hypoalbuminemia/Low serum prealbumin/Severe protein-calorie malnutrition:  I appreciate the Dietitian's input        4)  Essential hypertension:  Continue PO Lisinopril  Follow the patient's blood pressure trend      5)  Hyperlipidemia:  Hold the patient's statin while she is on a clear liquid diet  6)  Hypomagnesemia:  Replete with IV magnesium sulfate  7)  Microscopic hematuria:  She will need outpatient follow-up with her PCP  Subjective:   Patient seen and examined  The patient is having worsening left lower quadrant abdominal pain  She is also complaining of worsening nausea  Objective:     Vitals: Blood pressure 115/64, pulse 94, temperature 99 6 °F (37 6 °C), temperature source Temporal, resp  rate 18, height 5' 1" (1 549 m), weight 90 1 kg (198 lb 10 2 oz), SpO2 96 %  ,Body mass index is 37 53 kg/m²    Weight (last 2 days)     Date/Time   Weight    12/26/17 1721  90 1 (198 63)    12/26/17 1219  93 (205)              Intake/Output Summary (Last 24 hours) at 12/28/17 1022  Last data filed at 12/28/17 0934   Gross per 24 hour   Intake          3506 26 ml   Output             1225 ml   Net          2281 26 ml       Physical Exam: General:  NAD, awake, alert, oriented x 3  HEENT:  NC/AT, mucous membranes dry  Neck:  Supple, No JVP elevation  CV:  + S1, + S2, RRR  Pulm:  Lung fields are CTA bilaterally  Abd:  Soft, Worsening left lower quadrant abdominal pain with palpation, Mild distension  Ext:  No clubbing/cyanosis/edema    Scheduled Meds:    aspirin 81 mg Oral Daily   enoxaparin 40 mg Subcutaneous Q24H   famotidine 20 mg Intravenous Q24H [address]   lisinopril 10 mg Oral Daily   magnesium sulfate 2 g Intravenous Once   piperacillin-tazobactam 3 375 g Intravenous Q6H     Continuous Infusions:    sodium chloride 100 mL/hr Last Rate: 100 mL/hr (12/28/17 0430)     PRN Meds:   acetaminophen    HYDROmorphone    ondansetron    oxyCODONE      Invasive Devices     Peripheral Intravenous Line            Peripheral IV 12/26/17 Left Antecubital 1 day                  Results from last 7 days  Lab Units 12/28/17  0450 12/27/17  0613 12/26/17  1236   WBC Thousand/uL 9 63 10 32* 12 32*   HEMOGLOBIN g/dL 11 7 11 2* 12 6   HEMATOCRIT % 36 0 33 9* 37 6   PLATELETS Thousands/uL 297 297 365   NEUTROS PCT % 79* 81* 80*   MONOS PCT % 6 6 7         Results from last 7 days  Lab Units 12/28/17  0450 12/27/17  0613 12/26/17  1236   SODIUM mmol/L 138 138 137   POTASSIUM mmol/L 3 7 4 0 3 3*   CHLORIDE mmol/L 101 104 98*   CO2 mmol/L 30 27 28   BUN mg/dL 2* 4* 10   CREATININE mg/dL 0 77 0 66 0 71   CALCIUM mg/dL 8 5 8 3 9 1   TOTAL PROTEIN g/dL 6 4 6 5 7 9   BILIRUBIN TOTAL mg/dL 0 80 0 70 0 90   ALK PHOS U/L 77 66 81   ALT U/L 14 16 20   AST U/L 14 11 28   GLUCOSE RANDOM mg/dL 92 95 99       Lab Results   Component Value Date    CKTOTAL 14 (L) 12/27/2017    TROPONINI <0 02 12/27/2017       Lab Results   Component Value Date    INR 1 06 12/26/2017    PROTIME 13 7 12/26/2017                 Lab, Imaging and other studies: I have personally reviewed pertinent reports  VTE Pharmacologic Prophylaxis: Enoxaparin (Lovenox)-The patient is refusing the SQ Lovenox    VTE Mechanical Prophylaxis: sequential compression device-The patient is refusing the SCD's

## 2017-12-29 ENCOUNTER — APPOINTMENT (INPATIENT)
Dept: CT IMAGING | Facility: HOSPITAL | Age: 52
DRG: 329 | End: 2017-12-29
Payer: COMMERCIAL

## 2017-12-29 LAB
ALBUMIN SERPL BCP-MCNC: 2.1 G/DL (ref 3.5–5)
ALP SERPL-CCNC: 75 U/L (ref 46–116)
ALT SERPL W P-5'-P-CCNC: 15 U/L (ref 12–78)
ANION GAP SERPL CALCULATED.3IONS-SCNC: 7 MMOL/L (ref 4–13)
AST SERPL W P-5'-P-CCNC: 11 U/L (ref 5–45)
BASOPHILS # BLD AUTO: 0.03 THOUSANDS/ΜL (ref 0–0.1)
BASOPHILS NFR BLD AUTO: 0 % (ref 0–1)
BILIRUB SERPL-MCNC: 0.8 MG/DL (ref 0.2–1)
BUN SERPL-MCNC: 1 MG/DL (ref 5–25)
CALCIUM SERPL-MCNC: 8.5 MG/DL (ref 8.3–10.1)
CHLORIDE SERPL-SCNC: 102 MMOL/L (ref 100–108)
CO2 SERPL-SCNC: 29 MMOL/L (ref 21–32)
CREAT SERPL-MCNC: 0.79 MG/DL (ref 0.6–1.3)
EOSINOPHIL # BLD AUTO: 0.12 THOUSAND/ΜL (ref 0–0.61)
EOSINOPHIL NFR BLD AUTO: 1 % (ref 0–6)
ERYTHROCYTE [DISTWIDTH] IN BLOOD BY AUTOMATED COUNT: 12.6 % (ref 11.6–15.1)
GFR SERPL CREATININE-BSD FRML MDRD: 86 ML/MIN/1.73SQ M
GLUCOSE SERPL-MCNC: 124 MG/DL (ref 65–140)
HCT VFR BLD AUTO: 33.8 % (ref 34.8–46.1)
HGB BLD-MCNC: 10.9 G/DL (ref 11.5–15.4)
LYMPHOCYTES # BLD AUTO: 1.31 THOUSANDS/ΜL (ref 0.6–4.47)
LYMPHOCYTES NFR BLD AUTO: 11 % (ref 14–44)
MAGNESIUM SERPL-MCNC: 2 MG/DL (ref 1.6–2.6)
MCH RBC QN AUTO: 29 PG (ref 26.8–34.3)
MCHC RBC AUTO-ENTMCNC: 32.2 G/DL (ref 31.4–37.4)
MCV RBC AUTO: 90 FL (ref 82–98)
MONOCYTES # BLD AUTO: 0.61 THOUSAND/ΜL (ref 0.17–1.22)
MONOCYTES NFR BLD AUTO: 5 % (ref 4–12)
NEUTROPHILS # BLD AUTO: 9.63 THOUSANDS/ΜL (ref 1.85–7.62)
NEUTS SEG NFR BLD AUTO: 83 % (ref 43–75)
PHOSPHATE SERPL-MCNC: 3 MG/DL (ref 2.7–4.5)
PLATELET # BLD AUTO: 342 THOUSANDS/UL (ref 149–390)
PMV BLD AUTO: 10.3 FL (ref 8.9–12.7)
POTASSIUM SERPL-SCNC: 3.4 MMOL/L (ref 3.5–5.3)
PROT SERPL-MCNC: 6.9 G/DL (ref 6.4–8.2)
RBC # BLD AUTO: 3.76 MILLION/UL (ref 3.81–5.12)
SODIUM SERPL-SCNC: 138 MMOL/L (ref 136–145)
WBC # BLD AUTO: 11.7 THOUSAND/UL (ref 4.31–10.16)

## 2017-12-29 PROCEDURE — 80053 COMPREHEN METABOLIC PANEL: CPT | Performed by: INTERNAL MEDICINE

## 2017-12-29 PROCEDURE — 84100 ASSAY OF PHOSPHORUS: CPT | Performed by: INTERNAL MEDICINE

## 2017-12-29 PROCEDURE — 83735 ASSAY OF MAGNESIUM: CPT | Performed by: INTERNAL MEDICINE

## 2017-12-29 PROCEDURE — 74177 CT ABD & PELVIS W/CONTRAST: CPT

## 2017-12-29 PROCEDURE — 85025 COMPLETE CBC W/AUTO DIFF WBC: CPT | Performed by: INTERNAL MEDICINE

## 2017-12-29 RX ORDER — POTASSIUM CHLORIDE 20 MEQ/1
40 TABLET, EXTENDED RELEASE ORAL ONCE
Status: COMPLETED | OUTPATIENT
Start: 2017-12-29 | End: 2017-12-29

## 2017-12-29 RX ORDER — MAGNESIUM HYDROXIDE/ALUMINUM HYDROXICE/SIMETHICONE 120; 1200; 1200 MG/30ML; MG/30ML; MG/30ML
30 SUSPENSION ORAL EVERY 4 HOURS PRN
Status: DISCONTINUED | OUTPATIENT
Start: 2017-12-29 | End: 2018-01-03 | Stop reason: HOSPADM

## 2017-12-29 RX ADMIN — METRONIDAZOLE 500 MG: 500 INJECTION, SOLUTION INTRAVENOUS at 20:00

## 2017-12-29 RX ADMIN — OXYCODONE HYDROCHLORIDE 5 MG: 5 TABLET ORAL at 14:32

## 2017-12-29 RX ADMIN — SODIUM CHLORIDE 100 ML/HR: 0.9 INJECTION, SOLUTION INTRAVENOUS at 20:04

## 2017-12-29 RX ADMIN — POTASSIUM CHLORIDE 40 MEQ: 1500 TABLET, EXTENDED RELEASE ORAL at 14:31

## 2017-12-29 RX ADMIN — PIPERACILLIN SODIUM,TAZOBACTAM SODIUM 3.38 G: 3; .375 INJECTION, POWDER, FOR SOLUTION INTRAVENOUS at 18:20

## 2017-12-29 RX ADMIN — PIPERACILLIN SODIUM,TAZOBACTAM SODIUM 3.38 G: 3; .375 INJECTION, POWDER, FOR SOLUTION INTRAVENOUS at 12:22

## 2017-12-29 RX ADMIN — FAMOTIDINE 20 MG: 10 INJECTION, SOLUTION INTRAVENOUS at 08:47

## 2017-12-29 RX ADMIN — LISINOPRIL 10 MG: 10 TABLET ORAL at 08:48

## 2017-12-29 RX ADMIN — SODIUM CHLORIDE 100 ML/HR: 0.9 INJECTION, SOLUTION INTRAVENOUS at 07:40

## 2017-12-29 RX ADMIN — PIPERACILLIN SODIUM,TAZOBACTAM SODIUM 3.38 G: 3; .375 INJECTION, POWDER, FOR SOLUTION INTRAVENOUS at 05:18

## 2017-12-29 RX ADMIN — ASPIRIN 81 MG CHEWABLE TABLET 81 MG: 81 TABLET CHEWABLE at 08:48

## 2017-12-29 RX ADMIN — IOHEXOL 50 ML: 240 INJECTION, SOLUTION INTRATHECAL; INTRAVASCULAR; INTRAVENOUS; ORAL at 13:22

## 2017-12-29 RX ADMIN — ALUMINUM HYDROXIDE, MAGNESIUM HYDROXIDE, AND SIMETHICONE 30 ML: 200; 200; 20 SUSPENSION ORAL at 20:49

## 2017-12-29 RX ADMIN — IOHEXOL 100 ML: 350 INJECTION, SOLUTION INTRAVENOUS at 13:22

## 2017-12-29 NOTE — PROGRESS NOTES
Progress Note - Shay Boyd 47 y o  female MRN: [id number]    Unit/Bed#: 406-01 Encounter: [id number]      Assessment:  Patient Active Problem List   Diagnosis    Diverticulitis    Gastroesophageal reflux disease without esophagitis    Hyperlipidemia    Essential hypertension    Hypokalemia    Hypoalbuminemia    Low serum prealbumin    Severe protein-calorie malnutrition (HCC)    Microscopic hematuria    Hypomagnesemia         Plan:  1)  Sigmoid diverticulitis with microperforation:  I appreciate General Surgery's input  Continue IV fluids  Continue IV Zosyn  Maintain a clear liquid diet  Pain control  Incentive spirometry 10 times per hour while awake  Recheck a CT scan of the abdomen/pelvis with PO and IV contrast today, 12/29/17, to evaluate for the possibility of an abscess formation     2)  Hypokalemia:  Replete with 40 meQ of PO potassium chloride x 1 dose  Follow the patient's potassium level  3)  Hypoalbuminemia/Low serum prealbumin/Severe protein-calorie malnutrition:  I appreciate the Dietitian's input        4)  Essential hypertension:  Continue PO Lisinopril  Follow the patient's blood pressure trend      5)  Hyperlipidemia:  Hold the patient's statin while she is on a clear liquid diet  6)  Hypomagnesemia:  Improved with IV magnesium sulfate  Follow the magnesium level  7)  Microscopic hematuria:  She will need outpatient follow-up with her PCP  Subjective:   Patient seen and examined  The patient continues to experience left lower quadrant abdominal pain along with nausea  Objective:     Vitals: Blood pressure 111/65, pulse 79, temperature 98 5 °F (36 9 °C), resp  rate 18, height 5' 1" (1 549 m), weight 91 1 kg (200 lb 13 4 oz), SpO2 93 %  ,Body mass index is 37 95 kg/m²    Weight (last 2 days)     Date/Time   Weight    12/29/17 0623  91 1 (200 84)              Intake/Output Summary (Last 24 hours) at 12/29/17 1354  Last data filed at 12/29/17 0794   Gross per 24 hour   Intake          2763 33 ml   Output             1675 ml   Net          1088 33 ml       Physical Exam: General:  NAD, awake, alert, oriented x 3, follows commands  HEENT:  NC/AT, mucous membranes dry  Neck:  Supple, No JVP elevation  CV:  + S1, + S2, RRR  Pulm:  Lung fields are CTA bilaterally  Abd:  Soft, Persistent left lower quadrant abdominal pain with palpation, Mild distension  Ext:  No clubbing/cyanosis/edema    Scheduled Meds:    aspirin 81 mg Oral Daily   enoxaparin 40 mg Subcutaneous Q24H   famotidine 20 mg Intravenous Q24H [facility]   lisinopril 10 mg Oral Daily   piperacillin-tazobactam 3 375 g Intravenous Q6H   potassium chloride 40 mEq Oral Once     Continuous Infusions:    sodium chloride 100 mL/hr Last Rate: 100 mL/hr (12/29/17 0740)     PRN Meds:   acetaminophen    HYDROmorphone    oxyCODONE    promethazine      Invasive Devices     Peripheral Intravenous Line            Peripheral IV 12/29/17 Left Antecubital less than 1 day                  Results from last 7 days  Lab Units 12/29/17  0623 12/28/17  0450 12/27/17  0613   WBC Thousand/uL 11 70* 9 63 10 32*   HEMOGLOBIN g/dL 10 9* 11 7 11 2*   HEMATOCRIT % 33 8* 36 0 33 9*   PLATELETS Thousands/uL 342 297 297   NEUTROS PCT % 83* 79* 81*   MONOS PCT % 5 6 6         Results from last 7 days  Lab Units 12/29/17  0623 12/28/17  0450 12/27/17  0613   SODIUM mmol/L 138 138 138   POTASSIUM mmol/L 3 4* 3 7 4 0   CHLORIDE mmol/L 102 101 104   CO2 mmol/L 29 30 27   BUN mg/dL 1* 2* 4*   CREATININE mg/dL 0 79 0 77 0 66   CALCIUM mg/dL 8 5 8 5 8 3   TOTAL PROTEIN g/dL 6 9 6 4 6 5   BILIRUBIN TOTAL mg/dL 0 80 0 80 0 70   ALK PHOS U/L 75 77 66   ALT U/L 15 14 16   AST U/L 11 14 11   GLUCOSE RANDOM mg/dL 124 92 95       Lab Results   Component Value Date    CKTOTAL 14 (L) 12/27/2017    TROPONINI <0 02 12/27/2017       Lab Results   Component Value Date    INR 1 06 12/26/2017    PROTIME 13 7 12/26/2017                 Lab, Imaging and other studies: I have personally reviewed pertinent reports  VTE Pharmacologic Prophylaxis: Enoxaparin (Lovenox)-The patient is refusing the SQ Lovenox  VTE Mechanical Prophylaxis: sequential compression device-The patient is refusing the SCD's

## 2017-12-29 NOTE — PROGRESS NOTES
Progress Note - General Surgery   Franc Robert 47 y o  female MRN: [id number]  Unit/Bed#: 406-01 Encounter: [id number]    Assessment:  Patient continues with pain in her left lower quadrant  Acute sigmoid diverticulitis with most recent CT of abdomen done 3 days ago which was suspicious for a new micro perforation  There did not appear to be any fluid collection or drainable abscess at that time, however she continues with pain, low-grade temperature of 100 9°, mild nausea, and an increase in her white count of 11 7 today  Careful consideration and case management was relayed to Dr Aracely Nguyen that the patient has had 4 previous CT scans prior to this since November 29th  The patient needs a CT scan of the abdomen and pelvis both with IV and p o  contrast repeated at this time  The patient would need transfer to [address] into the weekend should there be any worsening of her left lower quadrant pain or any abscess formation or fluid collection that would be amenable for interventional radiology or further surgical intervention if necessary  Plan:  Personally discussed with Dr Abdiel Mayo, hospitalist   Will obtain a CT scan of the abdomen pelvis both with IV and oral contrast   Clear liquids as tolerated  Continue current IV antibiotics with Zosyn  DVT prophylaxis, ongoing with subcu Lovenox and bilateral venous compression boots  Subjective/Objective   Chief Complaint:  Patient continues with nausea and left lower quadrant tenderness  Subjective:  Rosmery Minor feels about the same compared to yesterday  She relates her pain as a 4 or 5 on a 10 point pain scale  The patient describes the discomfort as sometimes crampy though always present  She is passing flatus  She had a bowel movement yesterday and this was very small in caliber  The patient admits that she has very little appetite  She also believes that she was told she was running a low-grade fever    Nausea has been present though she denies any vomiting  Patient had a CT scan of the abdomen with contrast done 3 days ago which showed a possible new micro perforation present  Scheduled Meds:  aspirin 81 mg Oral Daily   enoxaparin 40 mg Subcutaneous Q24H   famotidine 20 mg Intravenous Q24H [address]   lisinopril 10 mg Oral Daily   piperacillin-tazobactam 3 375 g Intravenous Q6H     Continuous Infusions:  sodium chloride 100 mL/hr Last Rate: 100 mL/hr (12/29/17 0740)     PRN Meds:   acetaminophen    HYDROmorphone    oxyCODONE    promethazine      Objective:     Blood pressure 111/65, pulse 79, temperature 99 7 °F (37 6 °C), temperature source Temporal, resp  rate 18, height 5' 1" (1 549 m), weight 91 1 kg (200 lb 13 4 oz), SpO2 93 %  ,Body mass index is 37 95 kg/m²  Intake/Output Summary (Last 24 hours) at 12/29/17 1125  Last data filed at 12/29/17 0739   Gross per 24 hour   Intake          2813 33 ml   Output             2475 ml   Net           338 33 ml       Invasive Devices     Peripheral Intravenous Line            Peripheral IV 12/26/17 Left Antecubital 2 days                Physical Exam:  The patient appears uncomfortable  Skin is warm and dry to touch  HEENT reveals oral mucosa to be pink and moist   Otherwise clear  Scleral white  Neck supple no adenopathy or goiter  Chest symmetric  Heart regular rate and rhythm  No murmur or gallop  Lungs essentially clear  Back no CVA tenderness  Back appears straight  Abdomen appears about the same  Positive bowel sounds are heard in 4 quadrants, somewhat more frequent  Slightly distended  No tympany to percussion  No definite masses  There is tenderness in the left lower quadrant to deep palpation which the patient winces because of pain  No guarding  No definite rigidity  No fluid wave suggesting any ascites  She moves all 4 extremities well  No calf tenderness or peripheral edema      Lab, Imaging and other studies:  CBC:   Lab Results   Component Value Date    WBC 11 70 (H) 12/29/2017    HGB 10 9 (L) 12/29/2017    HCT 33 8 (L) 12/29/2017    MCV 90 12/29/2017     12/29/2017    MCH 29 0 12/29/2017    MCHC 32 2 12/29/2017    RDW 12 6 12/29/2017    MPV 10 3 12/29/2017   , CMP:   Lab Results   Component Value Date     12/29/2017    K 3 4 (L) 12/29/2017     12/29/2017    CO2 29 12/29/2017    ANIONGAP 7 12/29/2017    BUN 1 (L) 12/29/2017    CREATININE 0 79 12/29/2017    GLUCOSE 124 12/29/2017    CALCIUM 8 5 12/29/2017    AST 11 12/29/2017    ALT 15 12/29/2017    ALKPHOS 75 12/29/2017    PROT 6 9 12/29/2017    ALBUMIN 2 1 (L) 12/29/2017    BILITOT 0 80 12/29/2017    EGFR 86 12/29/2017     VTE Pharmacologic Prophylaxis: Enoxaparin (Lovenox)  VTE Mechanical Prophylaxis: sequential compression device     Ulices Quan PA-C

## 2017-12-29 NOTE — TREATMENT PLAN
Results of the CT scan of abdomen and pelvis with IV and oral contrast performed this afternoon were personally reviewed with the in-house radiologist here at [facility] today  Final transcription of the radiologist report was not yet available  However it appears that the patient has about a 2 cm abscess formation present in the area of suspected microperforation of the colon  Also, in comparison, from previous CT scans upon review by Dr Pooja Jensen, he notes that the area of involvement seems to be consistent from previous CT scan dating all the way back to 2014 and also an intrinsic sigmoid mural lesion could not be excluded, such as an underlying sigmoid colon mass  Telephone conversation was conducted by this provider with Dr Florence Andrews who also reviewed the CT scan imaging  Dr Pooja Jensen, radiologist, called interventional radiology in [place] this afternoon and the recommendation was that an abscess less than 3 cm was not considered drainable  Dr Selena Rosa recommended that the patient be started on IV Flagyl in addition to the IV Zosyn which is already been initiated  Continue to monitor on a daily basis into the weekend with a m  labs repeated  Maintain the patient on clear liquid diet  The patient likely should have a colonoscopy performed  She had a colonoscopy performed by Dr Melvin Oseguera here at [facility] done as an outpatient on 10/21/2016 which showed a single pedunculated polyp found in the transverse colon which was removed by snare cautery polypectomy  Dr Melvin Oseguera recommended follow-up colonoscopy in 3 years and start patient on a high-fiber diet at that time  Any worsening of the patient from a clinical picture over the weekend, including increased abdominal pain, fever, or elevation in her white count, may warrant consideration for transfer to [place]    HOWEVER, at this time, the patient desires to stay here at this hospital and avoid being transferred if at all possible which she verbalized to this provider      YENNIFER KruseC

## 2017-12-30 LAB
ALBUMIN SERPL BCP-MCNC: 1.8 G/DL (ref 3.5–5)
ALP SERPL-CCNC: 68 U/L (ref 46–116)
ALT SERPL W P-5'-P-CCNC: 14 U/L (ref 12–78)
ANION GAP SERPL CALCULATED.3IONS-SCNC: 5 MMOL/L (ref 4–13)
AST SERPL W P-5'-P-CCNC: 11 U/L (ref 5–45)
BASOPHILS # BLD AUTO: 0.01 THOUSANDS/ΜL (ref 0–0.1)
BASOPHILS NFR BLD AUTO: 0 % (ref 0–1)
BILIRUB SERPL-MCNC: 0.6 MG/DL (ref 0.2–1)
BUN SERPL-MCNC: 1 MG/DL (ref 5–25)
CALCIUM SERPL-MCNC: 8.3 MG/DL (ref 8.3–10.1)
CEA SERPL-MCNC: 0.8 NG/ML (ref 0–3)
CHLORIDE SERPL-SCNC: 103 MMOL/L (ref 100–108)
CO2 SERPL-SCNC: 30 MMOL/L (ref 21–32)
CREAT SERPL-MCNC: 0.66 MG/DL (ref 0.6–1.3)
EOSINOPHIL # BLD AUTO: 0.12 THOUSAND/ΜL (ref 0–0.61)
EOSINOPHIL NFR BLD AUTO: 1 % (ref 0–6)
ERYTHROCYTE [DISTWIDTH] IN BLOOD BY AUTOMATED COUNT: 12.5 % (ref 11.6–15.1)
GFR SERPL CREATININE-BSD FRML MDRD: 101 ML/MIN/1.73SQ M
GLUCOSE SERPL-MCNC: 86 MG/DL (ref 65–140)
HCT VFR BLD AUTO: 32.5 % (ref 34.8–46.1)
HGB BLD-MCNC: 10.3 G/DL (ref 11.5–15.4)
LACTATE SERPL-SCNC: 0.5 MMOL/L (ref 0.5–2)
LACTATE SERPL-SCNC: 1.2 MMOL/L (ref 0.5–2)
LYMPHOCYTES # BLD AUTO: 1.35 THOUSANDS/ΜL (ref 0.6–4.47)
LYMPHOCYTES NFR BLD AUTO: 15 % (ref 14–44)
MAGNESIUM SERPL-MCNC: 1.9 MG/DL (ref 1.6–2.6)
MCH RBC QN AUTO: 28.7 PG (ref 26.8–34.3)
MCHC RBC AUTO-ENTMCNC: 31.7 G/DL (ref 31.4–37.4)
MCV RBC AUTO: 91 FL (ref 82–98)
MONOCYTES # BLD AUTO: 0.55 THOUSAND/ΜL (ref 0.17–1.22)
MONOCYTES NFR BLD AUTO: 6 % (ref 4–12)
NEUTROPHILS # BLD AUTO: 6.74 THOUSANDS/ΜL (ref 1.85–7.62)
NEUTS SEG NFR BLD AUTO: 78 % (ref 43–75)
PLATELET # BLD AUTO: 301 THOUSANDS/UL (ref 149–390)
PMV BLD AUTO: 10.1 FL (ref 8.9–12.7)
POTASSIUM SERPL-SCNC: 3.8 MMOL/L (ref 3.5–5.3)
PROT SERPL-MCNC: 6.4 G/DL (ref 6.4–8.2)
RBC # BLD AUTO: 3.59 MILLION/UL (ref 3.81–5.12)
SODIUM SERPL-SCNC: 138 MMOL/L (ref 136–145)
WBC # BLD AUTO: 8.77 THOUSAND/UL (ref 4.31–10.16)

## 2017-12-30 PROCEDURE — 83605 ASSAY OF LACTIC ACID: CPT | Performed by: INTERNAL MEDICINE

## 2017-12-30 PROCEDURE — 82378 CARCINOEMBRYONIC ANTIGEN: CPT | Performed by: INTERNAL MEDICINE

## 2017-12-30 PROCEDURE — 83605 ASSAY OF LACTIC ACID: CPT | Performed by: PHYSICIAN ASSISTANT

## 2017-12-30 PROCEDURE — 80053 COMPREHEN METABOLIC PANEL: CPT | Performed by: INTERNAL MEDICINE

## 2017-12-30 PROCEDURE — 85025 COMPLETE CBC W/AUTO DIFF WBC: CPT | Performed by: INTERNAL MEDICINE

## 2017-12-30 PROCEDURE — 83735 ASSAY OF MAGNESIUM: CPT | Performed by: INTERNAL MEDICINE

## 2017-12-30 RX ADMIN — ALUMINUM HYDROXIDE, MAGNESIUM HYDROXIDE, AND SIMETHICONE 30 ML: 200; 200; 20 SUSPENSION ORAL at 18:01

## 2017-12-30 RX ADMIN — SODIUM CHLORIDE 100 ML/HR: 0.9 INJECTION, SOLUTION INTRAVENOUS at 10:29

## 2017-12-30 RX ADMIN — ASPIRIN 81 MG CHEWABLE TABLET 81 MG: 81 TABLET CHEWABLE at 08:31

## 2017-12-30 RX ADMIN — PIPERACILLIN SODIUM,TAZOBACTAM SODIUM 3.38 G: 3; .375 INJECTION, POWDER, FOR SOLUTION INTRAVENOUS at 00:26

## 2017-12-30 RX ADMIN — FAMOTIDINE 20 MG: 10 INJECTION, SOLUTION INTRAVENOUS at 08:31

## 2017-12-30 RX ADMIN — METRONIDAZOLE 500 MG: 500 INJECTION, SOLUTION INTRAVENOUS at 02:15

## 2017-12-30 RX ADMIN — METRONIDAZOLE 500 MG: 500 INJECTION, SOLUTION INTRAVENOUS at 23:12

## 2017-12-30 RX ADMIN — PIPERACILLIN SODIUM,TAZOBACTAM SODIUM 3.38 G: 3; .375 INJECTION, POWDER, FOR SOLUTION INTRAVENOUS at 05:08

## 2017-12-30 RX ADMIN — SODIUM CHLORIDE 125 ML/HR: 0.9 INJECTION, SOLUTION INTRAVENOUS at 22:29

## 2017-12-30 RX ADMIN — PIPERACILLIN SODIUM,TAZOBACTAM SODIUM 3.38 G: 3; .375 INJECTION, POWDER, FOR SOLUTION INTRAVENOUS at 12:17

## 2017-12-30 RX ADMIN — METRONIDAZOLE 500 MG: 500 INJECTION, SOLUTION INTRAVENOUS at 16:31

## 2017-12-30 RX ADMIN — HYDROMORPHONE HYDROCHLORIDE 0.5 MG: 1 INJECTION, SOLUTION INTRAMUSCULAR; INTRAVENOUS; SUBCUTANEOUS at 00:31

## 2017-12-30 RX ADMIN — PROMETHAZINE HYDROCHLORIDE 25 MG: 25 INJECTION INTRAMUSCULAR; INTRAVENOUS at 12:21

## 2017-12-30 RX ADMIN — METRONIDAZOLE 500 MG: 500 INJECTION, SOLUTION INTRAVENOUS at 08:34

## 2017-12-30 RX ADMIN — PROMETHAZINE HYDROCHLORIDE 25 MG: 25 INJECTION INTRAMUSCULAR; INTRAVENOUS at 22:33

## 2017-12-30 RX ADMIN — PIPERACILLIN SODIUM,TAZOBACTAM SODIUM 3.38 G: 3; .375 INJECTION, POWDER, FOR SOLUTION INTRAVENOUS at 18:00

## 2017-12-30 RX ADMIN — LISINOPRIL 10 MG: 10 TABLET ORAL at 08:35

## 2017-12-30 NOTE — PROGRESS NOTES
Progress Note - Moises Nova 47 y o  female MRN: [id number]    Unit/Bed#: 406-01 Encounter: [id number]      Assessment:  Patient Active Problem List   Diagnosis    Diverticulitis    Gastroesophageal reflux disease without esophagitis    Hyperlipidemia    Essential hypertension    Hypokalemia    Hypoalbuminemia    Low serum prealbumin    Severe protein-calorie malnutrition (HCC)    Microscopic hematuria    Hypomagnesemia         Plan:  1)  Sigmoid diverticulitis with microperforation:  Chart reviewed  Patient is on IV Zosyn, flagyl added  Continue clear liquid diet, IV fluid hydration  Abcess formation on CT scan too small for IR guided drainage  Will monitor for fever, leukocytosis, signs of worsening infection, otherwise will continue conservative treatment  May require serial imaging to assess improvement  Will need outpatient colonoscopy when infection is resolved  2)  Hypokalemia: stable today, follow BMP  3)  Hypoalbuminemia/Low serum prealbumin/Severe protein-calorie malnutrition:  possibly due to prolonged GI symptoms  Will continue clear liquid diet for now  4)  Essential hypertension:  Continue  Lisinopril  Stable BP  5)  Hyperlipidemia:  Hold the patient's statin while she is on a clear liquid diet  6)  Hypomagnesemia:  resolved  Likely due to lack of PO intake  7)  Microscopic hematuria:  She will need outpatient follow-up with her PCP  Dispo: anticipate at least 48 hours in the hospital for continued IV antibiotic therapy and slow advancement of diet given abscess formation  Subjective:   Patient seen and examined  She is feeling better, but still with some LLQ abdominal pain  Reports low grade fever overnight  1 episode of vomiting with limited appetite, but able to tolerate small amounts of clears  No chest pain or SOB  Objective:   Vitals: Blood pressure 128/76, pulse 98, temperature 98 5 °F (36 9 °C), temperature source Temporal, resp   rate 18, height 5' 1" (1 549 m), weight 91 1 kg (200 lb 13 4 oz), SpO2 96 %  ,Body mass index is 37 95 kg/m²  Weight (last 2 days)     Date/Time   Weight    12/29/17 0623  91 1 (200 84)              Intake/Output Summary (Last 24 hours) at 12/30/17 1038  Last data filed at 12/30/17 1025   Gross per 24 hour   Intake             2000 ml   Output                0 ml   Net             2000 ml       Physical Exam:   General:  NAD, awake, alert, oriented x 3  HEENT:  NC/AT, MMM  CV:  + S1, +S2, RRR  Pulm:  Lung fields are CTA bilaterally, good effort  Abd:  BS active x 4  Soft  Marked tenderness in LLQ  Ext:  No edema of the BLE       Scheduled Meds:  aspirin 81 mg Oral Daily   enoxaparin 40 mg Subcutaneous Q24H   famotidine 20 mg Intravenous Q24H [address]   lisinopril 10 mg Oral Daily   metroNIDAZOLE 500 mg Intravenous Q8H   piperacillin-tazobactam 3 375 g Intravenous Q6H     Continuous Infusions:  sodium chloride 100 mL/hr Last Rate: 100 mL/hr (12/30/17 1029)     PRN Meds:   acetaminophen    aluminum-magnesium hydroxide-simethicone    HYDROmorphone    oxyCODONE    promethazine      Invasive Devices     Peripheral Intravenous Line            Peripheral IV 12/29/17 Left Antecubital less than 1 day                  Results from last 7 days  Lab Units 12/30/17  0506 12/29/17  0623 12/28/17  0450   WBC Thousand/uL 8 77 11 70* 9 63   HEMOGLOBIN g/dL 10 3* 10 9* 11 7   HEMATOCRIT % 32 5* 33 8* 36 0   PLATELETS Thousands/uL 301 342 297   NEUTROS PCT % 78* 83* 79*   MONOS PCT % 6 5 6         Results from last 7 days  Lab Units 12/30/17  0506 12/29/17  0623 12/28/17  0450   SODIUM mmol/L 138 138 138   POTASSIUM mmol/L 3 8 3 4* 3 7   CHLORIDE mmol/L 103 102 101   CO2 mmol/L 30 29 30   BUN mg/dL 1* 1* 2*   CREATININE mg/dL 0 66 0 79 0 77   CALCIUM mg/dL 8 3 8 5 8 5   TOTAL PROTEIN g/dL 6 4 6 9 6 4   BILIRUBIN TOTAL mg/dL 0 60 0 80 0 80   ALK PHOS U/L 68 75 77   ALT U/L 14 15 14   AST U/L 11 11 14   GLUCOSE RANDOM mg/dL 86 124 92       Lab Results   Component Value Date    CKTOTAL 14 (L) 12/27/2017    TROPONINI <0 02 12/27/2017       Lab Results   Component Value Date    INR 1 06 12/26/2017    PROTIME 13 7 12/26/2017             Lab, Imaging and other studies: I have personally reviewed pertinent reports      VTE Pharmacologic Prophylaxis: Enoxaparin (Lovenox)  VTE Mechanical Prophylaxis: sequential compression device

## 2017-12-31 LAB
ANION GAP SERPL CALCULATED.3IONS-SCNC: 11 MMOL/L (ref 4–13)
BASOPHILS # BLD AUTO: 0.01 THOUSANDS/ΜL (ref 0–0.1)
BASOPHILS NFR BLD AUTO: 0 % (ref 0–1)
BUN SERPL-MCNC: 1 MG/DL (ref 5–25)
CALCIUM SERPL-MCNC: 8 MG/DL (ref 8.3–10.1)
CHLORIDE SERPL-SCNC: 102 MMOL/L (ref 100–108)
CO2 SERPL-SCNC: 26 MMOL/L (ref 21–32)
CREAT SERPL-MCNC: 0.68 MG/DL (ref 0.6–1.3)
EOSINOPHIL # BLD AUTO: 0.05 THOUSAND/ΜL (ref 0–0.61)
EOSINOPHIL NFR BLD AUTO: 1 % (ref 0–6)
ERYTHROCYTE [DISTWIDTH] IN BLOOD BY AUTOMATED COUNT: 12.5 % (ref 11.6–15.1)
GFR SERPL CREATININE-BSD FRML MDRD: 100 ML/MIN/1.73SQ M
GLUCOSE SERPL-MCNC: 114 MG/DL (ref 65–140)
HCT VFR BLD AUTO: 33 % (ref 34.8–46.1)
HGB BLD-MCNC: 10.8 G/DL (ref 11.5–15.4)
LYMPHOCYTES # BLD AUTO: 0.72 THOUSANDS/ΜL (ref 0.6–4.47)
LYMPHOCYTES NFR BLD AUTO: 7 % (ref 14–44)
MCH RBC QN AUTO: 29 PG (ref 26.8–34.3)
MCHC RBC AUTO-ENTMCNC: 32.7 G/DL (ref 31.4–37.4)
MCV RBC AUTO: 89 FL (ref 82–98)
MONOCYTES # BLD AUTO: 0.5 THOUSAND/ΜL (ref 0.17–1.22)
MONOCYTES NFR BLD AUTO: 5 % (ref 4–12)
NEUTROPHILS # BLD AUTO: 8.66 THOUSANDS/ΜL (ref 1.85–7.62)
NEUTS SEG NFR BLD AUTO: 87 % (ref 43–75)
PLATELET # BLD AUTO: 301 THOUSANDS/UL (ref 149–390)
PMV BLD AUTO: 9.9 FL (ref 8.9–12.7)
POTASSIUM SERPL-SCNC: 3.1 MMOL/L (ref 3.5–5.3)
RBC # BLD AUTO: 3.73 MILLION/UL (ref 3.81–5.12)
SODIUM SERPL-SCNC: 139 MMOL/L (ref 136–145)
WBC # BLD AUTO: 9.94 THOUSAND/UL (ref 4.31–10.16)

## 2017-12-31 PROCEDURE — 85025 COMPLETE CBC W/AUTO DIFF WBC: CPT | Performed by: PHYSICIAN ASSISTANT

## 2017-12-31 PROCEDURE — C9113 INJ PANTOPRAZOLE SODIUM, VIA: HCPCS | Performed by: PHYSICIAN ASSISTANT

## 2017-12-31 PROCEDURE — 80048 BASIC METABOLIC PNL TOTAL CA: CPT | Performed by: PHYSICIAN ASSISTANT

## 2017-12-31 RX ORDER — ONDANSETRON 2 MG/ML
4 INJECTION INTRAMUSCULAR; INTRAVENOUS EVERY 6 HOURS PRN
Status: DISCONTINUED | OUTPATIENT
Start: 2017-12-31 | End: 2017-12-31

## 2017-12-31 RX ORDER — POTASSIUM CHLORIDE 20 MEQ/1
20 TABLET, EXTENDED RELEASE ORAL ONCE
Status: COMPLETED | OUTPATIENT
Start: 2017-12-31 | End: 2017-12-31

## 2017-12-31 RX ORDER — PANTOPRAZOLE SODIUM 40 MG/1
40 INJECTION, POWDER, FOR SOLUTION INTRAVENOUS EVERY 12 HOURS SCHEDULED
Status: DISCONTINUED | OUTPATIENT
Start: 2017-12-31 | End: 2018-01-03 | Stop reason: HOSPADM

## 2017-12-31 RX ORDER — PROMETHAZINE HYDROCHLORIDE 25 MG/ML
25 INJECTION, SOLUTION INTRAMUSCULAR; INTRAVENOUS EVERY 6 HOURS PRN
Status: DISCONTINUED | OUTPATIENT
Start: 2017-12-31 | End: 2018-01-03 | Stop reason: HOSPADM

## 2017-12-31 RX ORDER — SODIUM CHLORIDE AND POTASSIUM CHLORIDE .9; .15 G/100ML; G/100ML
125 SOLUTION INTRAVENOUS CONTINUOUS
Status: DISCONTINUED | OUTPATIENT
Start: 2017-12-31 | End: 2018-01-03 | Stop reason: HOSPADM

## 2017-12-31 RX ADMIN — PIPERACILLIN SODIUM,TAZOBACTAM SODIUM 3.38 G: 3; .375 INJECTION, POWDER, FOR SOLUTION INTRAVENOUS at 00:13

## 2017-12-31 RX ADMIN — HYDROMORPHONE HYDROCHLORIDE 0.5 MG: 1 INJECTION, SOLUTION INTRAMUSCULAR; INTRAVENOUS; SUBCUTANEOUS at 19:38

## 2017-12-31 RX ADMIN — SODIUM CHLORIDE AND POTASSIUM CHLORIDE 125 ML/HR: .9; .15 SOLUTION INTRAVENOUS at 08:45

## 2017-12-31 RX ADMIN — PIPERACILLIN SODIUM,TAZOBACTAM SODIUM 3.38 G: 3; .375 INJECTION, POWDER, FOR SOLUTION INTRAVENOUS at 05:21

## 2017-12-31 RX ADMIN — PIPERACILLIN SODIUM,TAZOBACTAM SODIUM 3.38 G: 3; .375 INJECTION, POWDER, FOR SOLUTION INTRAVENOUS at 17:06

## 2017-12-31 RX ADMIN — POTASSIUM CHLORIDE 20 MEQ: 1500 TABLET, EXTENDED RELEASE ORAL at 08:45

## 2017-12-31 RX ADMIN — SODIUM CHLORIDE AND POTASSIUM CHLORIDE 125 ML/HR: .9; .15 SOLUTION INTRAVENOUS at 18:38

## 2017-12-31 RX ADMIN — PIPERACILLIN SODIUM,TAZOBACTAM SODIUM 3.38 G: 3; .375 INJECTION, POWDER, FOR SOLUTION INTRAVENOUS at 12:10

## 2017-12-31 RX ADMIN — PANTOPRAZOLE SODIUM 40 MG: 40 INJECTION, POWDER, FOR SOLUTION INTRAVENOUS at 10:16

## 2017-12-31 RX ADMIN — METRONIDAZOLE 500 MG: 500 INJECTION, SOLUTION INTRAVENOUS at 07:24

## 2017-12-31 RX ADMIN — LISINOPRIL 10 MG: 10 TABLET ORAL at 08:39

## 2017-12-31 RX ADMIN — PANTOPRAZOLE SODIUM 40 MG: 40 INJECTION, POWDER, FOR SOLUTION INTRAVENOUS at 20:54

## 2017-12-31 RX ADMIN — METRONIDAZOLE 500 MG: 500 INJECTION, SOLUTION INTRAVENOUS at 15:35

## 2017-12-31 RX ADMIN — ASPIRIN 81 MG CHEWABLE TABLET 81 MG: 81 TABLET CHEWABLE at 08:39

## 2017-12-31 NOTE — PROGRESS NOTES
Progress Note - Jose J Biswas 47 y o  female MRN: [id number]    Unit/Bed#: 406-01 Encounter: [id number]      Assessment:  Patient Active Problem List   Diagnosis    Diverticulitis    Gastroesophageal reflux disease without esophagitis    Hyperlipidemia    Essential hypertension    Hypokalemia    Hypoalbuminemia    Low serum prealbumin    Severe protein-calorie malnutrition (HCC)    Microscopic hematuria    Hypomagnesemia         Plan:  1)  Sigmoid diverticulitis with microperforation:  Continue IV Zosyn, IV flagyl per surgery recommendations  Keep NPO with sips of clears  IV protonix for nausea/vomiting  Abcess formation on CT scan too small for IR guided drainage  Will continue to monitor for fever, leukocytosis, signs of worsening infection, otherwise will continue conservative treatment  May require serial imaging to assess improvement  Will need outpatient colonoscopy when infection is resolved  If infection worsens may require surgery  2)  Hypokalemia: replete with IV and PO potassium  3)  Hypoalbuminemia/Low serum prealbumin/Severe protein-calorie malnutrition:  likely due to prolonged GI symptoms  May benefit from ensure supplementation once tolerating a regular diet     4)  Essential hypertension:  will continue lisinopril  Stable BP  5)  Hyperlipidemia:  plan to restart statin once tolerating regular diet  6)  Hypomagnesemia:  resolved  Likely due to lack of PO intake, will check magnesium tomorrow  7)  Microscopic hematuria:  plan for outpatient follow-up with her PCP  Subjective:   Patient seen and examined  She has less abdominal pain today  Still reports intermittent nausea and had a few episodes of vomiting yesterday  Feels pepcid is not working  She felt chills but no fever overnight  No chest pain or shortness of breath  Objective:   Vitals: Blood pressure 114/75, pulse 92, temperature 99 1 °F (37 3 °C), temperature source Temporal, resp   rate 18, height 5' 1" (1 549 m), weight 91 1 kg (200 lb 13 4 oz), SpO2 93 %  ,Body mass index is 37 95 kg/m²  Weight (last 2 days)     Date/Time   Weight    12/29/17 0623  91 1 (200 84)              Intake/Output Summary (Last 24 hours) at 12/31/17 1042  Last data filed at 12/30/17 1702   Gross per 24 hour   Intake           765 42 ml   Output                0 ml   Net           765 42 ml       Physical Exam:   General:  NAD, awake, alert, oriented x 3  HEENT:  NC/AT, MMM  CV:  + S1, +S2, RRR  Pulm:  Lung fields are CTA bilaterally, good effort  Abd:  Soft  Nontender today  No rebound tenderness or peritoneal signs  Ext:  No edema of the BLE       Scheduled Meds:  aspirin 81 mg Oral Daily   enoxaparin 40 mg Subcutaneous Q24H   lisinopril 10 mg Oral Daily   metroNIDAZOLE 500 mg Intravenous Q8H   pantoprazole 40 mg Intravenous Q12H [address]   piperacillin-tazobactam 3 375 g Intravenous Q6H     Continuous Infusions:  sodium chloride 0 9 % with KCl 20 mEq/L 125 mL/hr Last Rate: 125 mL/hr (12/31/17 0845)     PRN Meds:   acetaminophen    aluminum-magnesium hydroxide-simethicone    HYDROmorphone    oxyCODONE    promethazine      Invasive Devices     Peripheral Intravenous Line            Peripheral IV 12/29/17 Left Antecubital 1 day                  Results from last 7 days  Lab Units 12/31/17  0626 12/30/17  0506 12/29/17  0623   WBC Thousand/uL 9 94 8 77 11 70*   HEMOGLOBIN g/dL 10 8* 10 3* 10 9*   HEMATOCRIT % 33 0* 32 5* 33 8*   PLATELETS Thousands/uL 301 301 342   NEUTROS PCT % 87* 78* 83*   MONOS PCT % 5 6 5         Results from last 7 days  Lab Units 12/31/17  0626 12/30/17  0506 12/29/17  0623 12/28/17  0450   SODIUM mmol/L 139 138 138 138   POTASSIUM mmol/L 3 1* 3 8 3 4* 3 7   CHLORIDE mmol/L 102 103 102 101   CO2 mmol/L 26 30 29 30   BUN mg/dL 1* 1* 1* 2*   CREATININE mg/dL 0 68 0 66 0 79 0 77   CALCIUM mg/dL 8 0* 8 3 8 5 8 5   TOTAL PROTEIN g/dL  --  6 4 6 9 6 4   BILIRUBIN TOTAL mg/dL  --  0 60 0 80 0 80   ALK PHOS U/L  --  68 75 77   ALT U/L --  14 15 14   AST U/L  --  11 11 14   GLUCOSE RANDOM mg/dL 114 86 124 92       Lab Results   Component Value Date    CKTOTAL 14 (L) 12/27/2017    TROPONINI <0 02 12/27/2017       Lab Results   Component Value Date    INR 1 06 12/26/2017    PROTIME 13 7 12/26/2017               Lab, Imaging and other studies: I have personally reviewed pertinent reports      VTE Pharmacologic Prophylaxis: Enoxaparin (Lovenox)  VTE Mechanical Prophylaxis: sequential compression device

## 2018-01-01 PROBLEM — D64.9 ANEMIA: Status: ACTIVE | Noted: 2018-01-01

## 2018-01-01 LAB
ANION GAP SERPL CALCULATED.3IONS-SCNC: 9 MMOL/L (ref 4–13)
BASOPHILS # BLD AUTO: 0.01 THOUSANDS/ΜL (ref 0–0.1)
BASOPHILS NFR BLD AUTO: 0 % (ref 0–1)
BUN SERPL-MCNC: 1 MG/DL (ref 5–25)
CALCIUM SERPL-MCNC: 8 MG/DL (ref 8.3–10.1)
CHLORIDE SERPL-SCNC: 105 MMOL/L (ref 100–108)
CO2 SERPL-SCNC: 25 MMOL/L (ref 21–32)
CREAT SERPL-MCNC: 0.51 MG/DL (ref 0.6–1.3)
EOSINOPHIL # BLD AUTO: 0.14 THOUSAND/ΜL (ref 0–0.61)
EOSINOPHIL NFR BLD AUTO: 2 % (ref 0–6)
ERYTHROCYTE [DISTWIDTH] IN BLOOD BY AUTOMATED COUNT: 12.5 % (ref 11.6–15.1)
FERRITIN SERPL-MCNC: 1073 NG/ML (ref 8–388)
FOLATE SERPL-MCNC: 2 NG/ML (ref 3.1–17.5)
GFR SERPL CREATININE-BSD FRML MDRD: 110 ML/MIN/1.73SQ M
GLUCOSE SERPL-MCNC: 70 MG/DL (ref 65–140)
HCT VFR BLD AUTO: 30.5 % (ref 34.8–46.1)
HGB BLD-MCNC: 9.9 G/DL (ref 11.5–15.4)
IRON SATN MFR SERPL: 36 %
IRON SERPL-MCNC: 34 UG/DL (ref 50–170)
LYMPHOCYTES # BLD AUTO: 0.93 THOUSANDS/ΜL (ref 0.6–4.47)
LYMPHOCYTES NFR BLD AUTO: 11 % (ref 14–44)
MCH RBC QN AUTO: 29.1 PG (ref 26.8–34.3)
MCHC RBC AUTO-ENTMCNC: 32.5 G/DL (ref 31.4–37.4)
MCV RBC AUTO: 90 FL (ref 82–98)
MONOCYTES # BLD AUTO: 0.54 THOUSAND/ΜL (ref 0.17–1.22)
MONOCYTES NFR BLD AUTO: 6 % (ref 4–12)
NEUTROPHILS # BLD AUTO: 6.89 THOUSANDS/ΜL (ref 1.85–7.62)
NEUTS SEG NFR BLD AUTO: 81 % (ref 43–75)
PLATELET # BLD AUTO: 295 THOUSANDS/UL (ref 149–390)
PMV BLD AUTO: 10 FL (ref 8.9–12.7)
POTASSIUM SERPL-SCNC: 3.7 MMOL/L (ref 3.5–5.3)
RBC # BLD AUTO: 3.4 MILLION/UL (ref 3.81–5.12)
SODIUM SERPL-SCNC: 139 MMOL/L (ref 136–145)
TIBC SERPL-MCNC: 95 UG/DL (ref 250–450)
VIT B12 SERPL-MCNC: 1245 PG/ML (ref 100–900)
WBC # BLD AUTO: 8.51 THOUSAND/UL (ref 4.31–10.16)

## 2018-01-01 PROCEDURE — 85025 COMPLETE CBC W/AUTO DIFF WBC: CPT | Performed by: PHYSICIAN ASSISTANT

## 2018-01-01 PROCEDURE — 82746 ASSAY OF FOLIC ACID SERUM: CPT | Performed by: PHYSICIAN ASSISTANT

## 2018-01-01 PROCEDURE — 83550 IRON BINDING TEST: CPT | Performed by: PHYSICIAN ASSISTANT

## 2018-01-01 PROCEDURE — 82607 VITAMIN B-12: CPT | Performed by: PHYSICIAN ASSISTANT

## 2018-01-01 PROCEDURE — 83540 ASSAY OF IRON: CPT | Performed by: PHYSICIAN ASSISTANT

## 2018-01-01 PROCEDURE — 82728 ASSAY OF FERRITIN: CPT | Performed by: PHYSICIAN ASSISTANT

## 2018-01-01 PROCEDURE — 80048 BASIC METABOLIC PNL TOTAL CA: CPT | Performed by: PHYSICIAN ASSISTANT

## 2018-01-01 PROCEDURE — C9113 INJ PANTOPRAZOLE SODIUM, VIA: HCPCS | Performed by: PHYSICIAN ASSISTANT

## 2018-01-01 RX ADMIN — PIPERACILLIN SODIUM,TAZOBACTAM SODIUM 3.38 G: 3; .375 INJECTION, POWDER, FOR SOLUTION INTRAVENOUS at 11:00

## 2018-01-01 RX ADMIN — PANTOPRAZOLE SODIUM 40 MG: 40 INJECTION, POWDER, FOR SOLUTION INTRAVENOUS at 08:19

## 2018-01-01 RX ADMIN — PIPERACILLIN SODIUM,TAZOBACTAM SODIUM 3.38 G: 3; .375 INJECTION, POWDER, FOR SOLUTION INTRAVENOUS at 21:57

## 2018-01-01 RX ADMIN — METRONIDAZOLE 500 MG: 500 INJECTION, SOLUTION INTRAVENOUS at 03:15

## 2018-01-01 RX ADMIN — SODIUM CHLORIDE AND POTASSIUM CHLORIDE 125 ML/HR: .9; .15 SOLUTION INTRAVENOUS at 03:22

## 2018-01-01 RX ADMIN — METRONIDAZOLE 500 MG: 500 INJECTION, SOLUTION INTRAVENOUS at 19:07

## 2018-01-01 RX ADMIN — METRONIDAZOLE 500 MG: 500 INJECTION, SOLUTION INTRAVENOUS at 08:19

## 2018-01-01 RX ADMIN — PIPERACILLIN SODIUM,TAZOBACTAM SODIUM 3.38 G: 3; .375 INJECTION, POWDER, FOR SOLUTION INTRAVENOUS at 03:15

## 2018-01-01 RX ADMIN — LISINOPRIL 10 MG: 10 TABLET ORAL at 08:19

## 2018-01-01 RX ADMIN — PIPERACILLIN SODIUM,TAZOBACTAM SODIUM 3.38 G: 3; .375 INJECTION, POWDER, FOR SOLUTION INTRAVENOUS at 17:04

## 2018-01-01 RX ADMIN — SODIUM CHLORIDE AND POTASSIUM CHLORIDE 125 ML/HR: .9; .15 SOLUTION INTRAVENOUS at 21:57

## 2018-01-01 RX ADMIN — PANTOPRAZOLE SODIUM 40 MG: 40 INJECTION, POWDER, FOR SOLUTION INTRAVENOUS at 21:57

## 2018-01-01 RX ADMIN — SODIUM CHLORIDE AND POTASSIUM CHLORIDE 125 ML/HR: .9; .15 SOLUTION INTRAVENOUS at 13:30

## 2018-01-01 RX ADMIN — ASPIRIN 81 MG CHEWABLE TABLET 81 MG: 81 TABLET CHEWABLE at 08:18

## 2018-01-01 NOTE — PROGRESS NOTES
Progress Note - Dania Braun 47 y o  female MRN: [id number]    Unit/Bed#: 406-01 Encounter: [id number]      Assessment:  Patient Active Problem List   Diagnosis    Diverticulitis    Gastroesophageal reflux disease without esophagitis    Hyperlipidemia    Essential hypertension    Hypokalemia    Hypoalbuminemia    Low serum prealbumin    Severe protein-calorie malnutrition (HCC)    Microscopic hematuria    Hypomagnesemia    Anemia         Plan:  1)  Sigmoid diverticulitis with microperforation:  Continue IV Zosyn, IV flagyl per surgery recommendations  Will try clear liquid diet again  Continue IV protonix  Abcess formation on CT scan too small for IR guided drainage  Will continue to monitor for fever, leukocytosis, signs of worsening infection  Continue conservative treatment  May require serial imaging to assess improvement  Will need outpatient colonoscopy when infection is resolved  If infection worsens may require surgery  2) Anemia with drop in Hemoglobin overnight - likely dilutional, but will check Iron, B12, folate levels and hemetest stools  2)  Hypokalemia: resolved today, follow BMP  3)  Hypoalbuminemia/Low serum prealbumin/Severe protein-calorie malnutrition:  this is likely due to prolonged GI symptoms  May benefit from ensure supplementation once tolerating a regular diet     4)  Essential hypertension: continue lisinopril  BP remains stable  5)  Hyperlipidemia:  will plan to restart statin once tolerating regular diet  6)  Hypomagnesemia:  resolved  Likely due to lack of PO intake, follow magnesium  7)  Microscopic hematuria:  plan for outpatient follow-up with her PCP  Subjective:   Patient seen and examined  She feels well today  No further nausea/vomiting  Feels protonix IV is helping  She denies fevers overnight  No abdominal pain  Bowel movements are normal, no diarrhea or dark stools       Objective:   Vitals: Blood pressure 110/68, pulse 84, temperature 98 °F (36 7 °C), temperature source Temporal, resp  rate 18, height 5' 1" (1 549 m), weight 91 1 kg (200 lb 13 4 oz), SpO2 96 %  ,Body mass index is 37 95 kg/m²  Weight (last 2 days)     None          Intake/Output Summary (Last 24 hours) at 01/01/18 1058  Last data filed at 01/01/18 0322   Gross per 24 hour   Intake             1975 ml   Output                0 ml   Net             1975 ml       Physical Exam:   General:  NAD, awake, alert, oriented x 3  HEENT:  NC/AT, MM slightly dry with dry lips  Neck:  Supple, No JVD  CV:  + S1, +S2, RRR  Pulm:  Lung fields are CTA bilaterally, good effort  Abd:  Soft, no tenderness throughout, including the LLQ  BS active x 4  Ext:  No edema of the BLE  Skin: generalized pallor      Scheduled Meds:  aspirin 81 mg Oral Daily   enoxaparin 40 mg Subcutaneous Q24H   lisinopril 10 mg Oral Daily   metroNIDAZOLE 500 mg Intravenous Q8H   pantoprazole 40 mg Intravenous Q12H [address]   piperacillin-tazobactam 3 375 g Intravenous Q6H     Continuous Infusions:  sodium chloride 0 9 % with KCl 20 mEq/L 125 mL/hr Last Rate: 125 mL/hr (01/01/18 0322)     PRN Meds:   acetaminophen    aluminum-magnesium hydroxide-simethicone    HYDROmorphone    oxyCODONE    promethazine      Invasive Devices     Peripheral Intravenous Line            Peripheral IV 12/29/17 Left Antecubital 2 days                  Results from last 7 days  Lab Units 01/01/18  0523 12/31/17  0626 12/30/17  0506   WBC Thousand/uL 8 51 9 94 8 77   HEMOGLOBIN g/dL 9 9* 10 8* 10 3*   HEMATOCRIT % 30 5* 33 0* 32 5*   PLATELETS Thousands/uL 295 301 301   NEUTROS PCT % 81* 87* 78*   MONOS PCT % 6 5 6         Results from last 7 days  Lab Units 01/01/18  0523 12/31/17  0626 12/30/17  0506 12/29/17  0623 12/28/17  0450   SODIUM mmol/L 139 139 138 138 138   POTASSIUM mmol/L 3 7 3 1* 3 8 3 4* 3 7   CHLORIDE mmol/L 105 102 103 102 101   CO2 mmol/L 25 26 30 29 30   BUN mg/dL 1* 1* 1* 1* 2*   CREATININE mg/dL 0 51* 0 68 0 66 0 79 0 77   CALCIUM mg/dL 8  0* 8 0* 8 3 8 5 8 5   TOTAL PROTEIN g/dL  --   --  6 4 6 9 6 4   BILIRUBIN TOTAL mg/dL  --   --  0 60 0 80 0 80   ALK PHOS U/L  --   --  68 75 77   ALT U/L  --   --  14 15 14   AST U/L  --   --  11 11 14   GLUCOSE RANDOM mg/dL 70 114 86 124 92       Lab Results   Component Value Date    CKTOTAL 14 (L) 12/27/2017    TROPONINI <0 02 12/27/2017       Lab Results   Component Value Date    INR 1 06 12/26/2017    PROTIME 13 7 12/26/2017               Lab, Imaging and other studies: I have personally reviewed pertinent reports      VTE Pharmacologic Prophylaxis: Enoxaparin (Lovenox)  VTE Mechanical Prophylaxis: sequential compression device

## 2018-01-02 ENCOUNTER — ANESTHESIA EVENT (INPATIENT)
Dept: PERIOP | Facility: HOSPITAL | Age: 53
DRG: 329 | End: 2018-01-02
Payer: COMMERCIAL

## 2018-01-02 PROBLEM — K57.21: Status: ACTIVE | Noted: 2017-12-26

## 2018-01-02 LAB
ANION GAP SERPL CALCULATED.3IONS-SCNC: 8 MMOL/L (ref 4–13)
BASOPHILS # BLD AUTO: 0.02 THOUSANDS/ΜL (ref 0–0.1)
BASOPHILS NFR BLD AUTO: 0 % (ref 0–1)
BUN SERPL-MCNC: <1 MG/DL (ref 5–25)
CALCIUM SERPL-MCNC: 8.1 MG/DL (ref 8.3–10.1)
CHLORIDE SERPL-SCNC: 105 MMOL/L (ref 100–108)
CO2 SERPL-SCNC: 26 MMOL/L (ref 21–32)
CREAT SERPL-MCNC: 0.51 MG/DL (ref 0.6–1.3)
EOSINOPHIL # BLD AUTO: 0.19 THOUSAND/ΜL (ref 0–0.61)
EOSINOPHIL NFR BLD AUTO: 3 % (ref 0–6)
ERYTHROCYTE [DISTWIDTH] IN BLOOD BY AUTOMATED COUNT: 12.6 % (ref 11.6–15.1)
GFR SERPL CREATININE-BSD FRML MDRD: 110 ML/MIN/1.73SQ M
GLUCOSE SERPL-MCNC: 82 MG/DL (ref 65–140)
HCT VFR BLD AUTO: 30.6 % (ref 34.8–46.1)
HGB BLD-MCNC: 10 G/DL (ref 11.5–15.4)
LYMPHOCYTES # BLD AUTO: 1.07 THOUSANDS/ΜL (ref 0.6–4.47)
LYMPHOCYTES NFR BLD AUTO: 14 % (ref 14–44)
MAGNESIUM SERPL-MCNC: 1.6 MG/DL (ref 1.6–2.6)
MCH RBC QN AUTO: 29.1 PG (ref 26.8–34.3)
MCHC RBC AUTO-ENTMCNC: 32.7 G/DL (ref 31.4–37.4)
MCV RBC AUTO: 89 FL (ref 82–98)
MONOCYTES # BLD AUTO: 0.45 THOUSAND/ΜL (ref 0.17–1.22)
MONOCYTES NFR BLD AUTO: 6 % (ref 4–12)
NEUTROPHILS # BLD AUTO: 5.93 THOUSANDS/ΜL (ref 1.85–7.62)
NEUTS SEG NFR BLD AUTO: 77 % (ref 43–75)
PLATELET # BLD AUTO: 298 THOUSANDS/UL (ref 149–390)
PMV BLD AUTO: 9.7 FL (ref 8.9–12.7)
POTASSIUM SERPL-SCNC: 3.6 MMOL/L (ref 3.5–5.3)
RBC # BLD AUTO: 3.44 MILLION/UL (ref 3.81–5.12)
SODIUM SERPL-SCNC: 139 MMOL/L (ref 136–145)
WBC # BLD AUTO: 7.66 THOUSAND/UL (ref 4.31–10.16)

## 2018-01-02 PROCEDURE — 85025 COMPLETE CBC W/AUTO DIFF WBC: CPT | Performed by: PHYSICIAN ASSISTANT

## 2018-01-02 PROCEDURE — C9113 INJ PANTOPRAZOLE SODIUM, VIA: HCPCS | Performed by: PHYSICIAN ASSISTANT

## 2018-01-02 PROCEDURE — 83735 ASSAY OF MAGNESIUM: CPT | Performed by: PHYSICIAN ASSISTANT

## 2018-01-02 PROCEDURE — 80048 BASIC METABOLIC PNL TOTAL CA: CPT | Performed by: PHYSICIAN ASSISTANT

## 2018-01-02 RX ORDER — SODIUM PHOSPHATE, DIBASIC AND SODIUM PHOSPHATE, MONOBASIC 7; 19 G/133ML; G/133ML
1 ENEMA RECTAL ONCE
Status: COMPLETED | OUTPATIENT
Start: 2018-01-03 | End: 2018-01-03

## 2018-01-02 RX ORDER — SODIUM PHOSPHATE, DIBASIC AND SODIUM PHOSPHATE, MONOBASIC 7; 19 G/133ML; G/133ML
1 ENEMA RECTAL ONCE
Status: COMPLETED | OUTPATIENT
Start: 2018-01-02 | End: 2018-01-02

## 2018-01-02 RX ORDER — IRON POLYSACCHARIDE COMPLEX 150 MG
150 CAPSULE ORAL DAILY
Status: DISCONTINUED | OUTPATIENT
Start: 2018-01-02 | End: 2018-01-02

## 2018-01-02 RX ORDER — FOLIC ACID 1 MG/1
1 TABLET ORAL DAILY
Status: DISCONTINUED | OUTPATIENT
Start: 2018-01-02 | End: 2018-01-03 | Stop reason: HOSPADM

## 2018-01-02 RX ADMIN — PIPERACILLIN SODIUM,TAZOBACTAM SODIUM 3.38 G: 3; .375 INJECTION, POWDER, FOR SOLUTION INTRAVENOUS at 20:11

## 2018-01-02 RX ADMIN — METRONIDAZOLE 500 MG: 500 INJECTION, SOLUTION INTRAVENOUS at 00:16

## 2018-01-02 RX ADMIN — SODIUM CHLORIDE AND POTASSIUM CHLORIDE 125 ML/HR: .9; .15 SOLUTION INTRAVENOUS at 09:13

## 2018-01-02 RX ADMIN — PANTOPRAZOLE SODIUM 40 MG: 40 INJECTION, POWDER, FOR SOLUTION INTRAVENOUS at 09:10

## 2018-01-02 RX ADMIN — METRONIDAZOLE 500 MG: 500 INJECTION, SOLUTION INTRAVENOUS at 16:32

## 2018-01-02 RX ADMIN — METRONIDAZOLE 500 MG: 500 INJECTION, SOLUTION INTRAVENOUS at 09:10

## 2018-01-02 RX ADMIN — LISINOPRIL 10 MG: 10 TABLET ORAL at 09:11

## 2018-01-02 RX ADMIN — METRONIDAZOLE 500 MG: 500 INJECTION, SOLUTION INTRAVENOUS at 23:28

## 2018-01-02 RX ADMIN — FOLIC ACID 1 MG: 1 TABLET ORAL at 11:55

## 2018-01-02 RX ADMIN — SODIUM CHLORIDE AND POTASSIUM CHLORIDE 125 ML/HR: .9; .15 SOLUTION INTRAVENOUS at 20:10

## 2018-01-02 RX ADMIN — PANTOPRAZOLE SODIUM 40 MG: 40 INJECTION, POWDER, FOR SOLUTION INTRAVENOUS at 21:21

## 2018-01-02 RX ADMIN — PIPERACILLIN SODIUM,TAZOBACTAM SODIUM 3.38 G: 3; .375 INJECTION, POWDER, FOR SOLUTION INTRAVENOUS at 10:51

## 2018-01-02 RX ADMIN — PIPERACILLIN SODIUM,TAZOBACTAM SODIUM 3.38 G: 3; .375 INJECTION, POWDER, FOR SOLUTION INTRAVENOUS at 03:50

## 2018-01-02 RX ADMIN — SODIUM PHOSPHATE, DIBASIC AND SODIUM PHOSPHATE, MONOBASIC 1 ENEMA: 7; 19 ENEMA RECTAL at 13:08

## 2018-01-02 RX ADMIN — ASPIRIN 81 MG CHEWABLE TABLET 81 MG: 81 TABLET CHEWABLE at 09:10

## 2018-01-02 RX ADMIN — Medication 150 MG: at 10:51

## 2018-01-02 RX ADMIN — SODIUM PHOSPHATE 1 ENEMA: 7; 19 ENEMA RECTAL at 21:21

## 2018-01-02 NOTE — PROGRESS NOTES
[facility] Internal Medicine Progress Note  Patient: Silvia Renteria 47 y o  female   MRN: [id number]  PCP: Vee Parker DO  Unit/Bed#: 300-50 Encounter: [id number]  Date Of Visit: 01/02/18    Assessment:    Principal Problem:    Diverticulitis  Active Problems:    Gastroesophageal reflux disease without esophagitis    Hyperlipidemia    Essential hypertension    Hypokalemia    Hypoalbuminemia    Low serum prealbumin    Severe protein-calorie malnutrition (HCC)    Microscopic hematuria    Hypomagnesemia    Anemia      Plan:    · Sigmoid diverticulitis with microperforation/Anterior pelvic abscess: Dr Red Leventhal (general surgery) will be taking patient to the OR tomorrow for bowel resection, likely Pj's procedure  Continue clear liquids today, NPO after midnight  Bowel prep per general surgery  Continue IV Zosyn and IV flagyl  Lovenox discontinued for planned surgery  · Anemia: noted to be 9 9 yesterday, stable at 10 0 today  Likely due to chronic disease with low iron, low TIBC and high ferritin  Folate is 2 0, will start folic acid  Continue to monitor  · Hypokalemia: resolved  Continue to monitor  · Hypoalbuminemia/Low serum prealbumin/Severe protein-calorie malnutrition: likely due to prolonged GI symptoms  May benefit from ensure supplements once tolerating a regular diet  · Essential hypertension: blood pressure stable  Continue Lisinopril  · Hyperlipidemia: will plan to restart statin once tolerating regular diet  · Hypomagnesemia: low normal at 1 6  Give IV magnesium 2 g today  Recheck Magnesium in am    · Microscopic hematuria: plan for outpatient follow-up with her PCP  VTE Pharmacologic Prophylaxis:   Pharmacologic: Pharmacologic VTE Prophylaxis contraindicated due to planned surgery  Mechanical VTE Prophylaxis in Place: Yes      Discussions with Specialists or Other Care Team Provider: nursing, CM, and attending      Time Spent for Care: 30 minutes    More than 50% of total time spent on counseling and coordination of care as described above  Current Length of Stay: 7 day(s)    Current Patient Status: Inpatient   Certification Statement: The patient will continue to require additional inpatient hospital stay due to continued need for IV antibiotics and planned surgery      Code Status: Level 1 - Full Code      Subjective: The patient was seen and examined  The patient continues to have mild LLQ pain  She is tolerating a clear liquid diet  No acute events overnight  Objective:     Vitals:   Temp (24hrs), Av 9 °F (37 2 °C), Min:98 4 °F (36 9 °C), Max:99 2 °F (37 3 °C)    HR:  [82-99] 99  Resp:  [18] 18  BP: (126-150)/(61-93) 131/93  SpO2:  [94 %-99 %] 99 %  Body mass index is 37 95 kg/m²  Input and Output Summary (last 24 hours): Intake/Output Summary (Last 24 hours) at 18 1227  Last data filed at 18 0910   Gross per 24 hour   Intake             3020 ml   Output                0 ml   Net             3020 ml       Physical Exam:     Physical Exam   Constitutional: She is oriented to person, place, and time  Vital signs are normal  She is active and cooperative  Cardiovascular: Normal rate, regular rhythm and normal heart sounds  Pulmonary/Chest: Effort normal and breath sounds normal  She has no wheezes  She has no rhonchi  She has no rales  Abdominal: Soft  Normal appearance and bowel sounds are normal  There is tenderness in the left lower quadrant  There is no rigidity, no rebound and no guarding  Neurological: She is alert and oriented to person, place, and time  No cranial nerve deficit  Skin: Skin is warm, dry and intact  Nursing note and vitals reviewed          Additional Data:     Labs:      Results from last 7 days  Lab Units 18  0609   WBC Thousand/uL 7 66   HEMOGLOBIN g/dL 10 0*   HEMATOCRIT % 30 6*   PLATELETS Thousands/uL 298   NEUTROS PCT % 77*   LYMPHS PCT % 14   MONOS PCT % 6   EOS PCT % 3       Results from last 7 days  Lab Units 01/02/18  0609  12/30/17  0506   SODIUM mmol/L 139  < > 138   POTASSIUM mmol/L 3 6  < > 3 8   CHLORIDE mmol/L 105  < > 103   CO2 mmol/L 26  < > 30   BUN mg/dL <1*  < > 1*   CREATININE mg/dL 0 51*  < > 0 66   CALCIUM mg/dL 8 1*  < > 8 3   TOTAL PROTEIN g/dL  --   --  6 4   BILIRUBIN TOTAL mg/dL  --   --  0 60   ALK PHOS U/L  --   --  68   ALT U/L  --   --  14   AST U/L  --   --  11   GLUCOSE RANDOM mg/dL 82  < > 86   < > = values in this interval not displayed  Results from last 7 days  Lab Units 12/26/17  1236   INR  1 06       * I Have Reviewed All Lab Data Listed Above  * Additional Pertinent Lab Tests Reviewed: All Labs Within Last 24 Hours Reviewed    Imaging:    Imaging Reports Reviewed Today Include: CT abdomen pelvis  Imaging Personally Reviewed by Myself Includes:  none    Recent Cultures (last 7 days):       Results from last 7 days  Lab Units 12/26/17  2200   URINE CULTURE  No Growth <1000 cfu/mL       Last 24 Hours Medication List:     aspirin 81 mg Oral Daily   enoxaparin 40 mg Subcutaneous E19P   folic acid 1 mg Oral Daily   iron polysaccharides 150 mg Oral Daily   lisinopril 10 mg Oral Daily   metroNIDAZOLE 500 mg Intravenous Q8H   pantoprazole 40 mg Intravenous Q12H [address]   piperacillin-tazobactam 3 375 g Intravenous Q6H   sodium phosphate-biphosphate 1 enema Rectal Once   sodium phosphate-biphosphate 1 enema Rectal Once   [START ON 1/3/2018] sodium phosphate-biphosphate 1 enema Rectal Once        Today, Patient Was Seen By: Justin Young PA-C    ** Please Note: This note has been constructed using a voice recognition system   **

## 2018-01-02 NOTE — CASE MANAGEMENT
Continued Stay Review    Date: 1/1/18  Vitals: Blood pressure 110/68, pulse 84, temperature 98 °F (36 7 °C), temperature source Temporal, resp  rate 18, height 5' 1" (1 549 m), weight 91 1 kg (200 lb 13 4 oz), SpO2 96 %  ,Body mass index is 37 95 kg/m²  Medications:   Scheduled Meds:   aspirin 81 mg Oral Daily   enoxaparin 40 mg Subcutaneous Q24H   lisinopril 10 mg Oral Daily   metroNIDAZOLE 500 mg Intravenous Q8H   pantoprazole 40 mg Intravenous Q12H [facility]   piperacillin-tazobactam 3 375 g Intravenous Q6H     Continuous Infusions:   sodium chloride 0 9 % with KCl 20 mEq/L 125 mL/hr Last Rate: 125 mL/hr (01/02/18 0913)     PRN Meds:   acetaminophen    aluminum-magnesium hydroxide-simethicone    HYDROmorphone    oxyCODONE    promethazine    Abnormal Labs/Diagnostic Results:   FOLATE 2 0 TIBC 95 IRON 34 HGB 9 9 CA 8 0 VIT B12 1245    Age/Sex: 47 y o  female     Assessment/Plan:   1)  Sigmoid diverticulitis with microperforation:  Continue IV Zosyn, IV flagyl per surgery recommendations  Will try clear liquid diet again  Continue IV protonix  Abcess formation on CT scan too small for IR guided drainage  Will continue to monitor for fever, leukocytosis, signs of worsening infection  Continue conservative treatment  May require serial imaging to assess improvement  Will need outpatient colonoscopy when infection is resolved  If infection worsens may require surgery  2) Anemia with drop in Hemoglobin overnight - likely dilutional, but will check Iron, B12, folate levels and hemetest stools  2)  Hypokalemia: resolved today, follow BMP  3)  Hypoalbuminemia/Low serum prealbumin/Severe protein-calorie malnutrition:  this is likely due to prolonged GI symptoms  May benefit from ensure supplementation once tolerating a regular diet     4)  Essential hypertension: continue lisinopril  BP remains stable  5)  Hyperlipidemia:  will plan to restart statin once tolerating regular diet  6)  Hypomagnesemia:  resolved   Likely due to lack of PO intake, follow magnesium  7)  Microscopic hematuria:  plan for outpatient follow-up with her PCP      Discharge Plan: HOME

## 2018-01-02 NOTE — PROGRESS NOTES
Progress Note - General Surgery   Jenni Garcia 47 y o  female MRN: [id number]  Unit/Bed#: 406-01 Encounter: [id number]    Assessment:  Acute sigmoid diverticulitis with micro perforation and pelvic abscess  She has failed conservative therapy with bed rest and antibiotics  Patient continues with pain in the left lower quadrant of the abdomen  Dr Kitty Long will personally examine the patient later this afternoon  Likely will proceed with sigmoid colon resection and likely Pj's procedure tomorrow  Plan:  Nothing by mouth after midnight  Maintain clear liquids until then  Patient will be given fleets enema x3  The 1st at 12 noon already administered and then 7:00 p m  and 7:00 a m  Operative permit to be obtained by Dr Tommy Alexandre with explanation of risks and benefits  Hypokalemia has resolved  DVT prophylaxis, ongoing with bilateral venous compression boots  Subcu Lovenox has been held in preparation for surgery tomorrow  Type and screen, anticipated 2 units if needed  A m  labs including CBC, CMP, and coag profile    Subjective/Objective   Chief Complaint:  Patient continues with pain in the left lower quadrant of her abdomen  Subjective:  She still is quite uncomfortable  Patient is passing gas  She has been maintained on a clear liquid diet  Patient has been treated over the weekend with IV Zosyn and IV Flagyl  There has been no report of any fever  She had a small bowel movement yesterday  No blood in the stool  CT scan done last Friday suggested a small abscess formation which was not considered drainable by Interventional Radiology  Patient denies any nausea or vomiting  Patient notes that she had a sister also with diverticular disease and she required colon resection as well      Scheduled Meds:  aspirin 81 mg Oral Daily   folic acid 1 mg Oral Daily   lisinopril 10 mg Oral Daily   metroNIDAZOLE 500 mg Intravenous Q8H   pantoprazole 40 mg Intravenous Q12H [facility] piperacillin-tazobactam 3 375 g Intravenous Q6H   sodium phosphate-biphosphate 1 enema Rectal Once   [START ON 1/3/2018] sodium phosphate-biphosphate 1 enema Rectal Once     Continuous Infusions:  sodium chloride 0 9 % with KCl 20 mEq/L 125 mL/hr Last Rate: 125 mL/hr (01/02/18 0913)     PRN Meds:   acetaminophen    aluminum-magnesium hydroxide-simethicone    HYDROmorphone    oxyCODONE    promethazine      Objective:     Blood pressure 131/93, pulse 99, temperature 99 2 °F (37 3 °C), temperature source Temporal, resp  rate 18, height 5' 1" (1 549 m), weight 91 1 kg (200 lb 13 4 oz), SpO2 99 %  ,Body mass index is 37 95 kg/m²  Intake/Output Summary (Last 24 hours) at 01/02/18 1406  Last data filed at 01/02/18 1200   Gross per 24 hour   Intake             1920 ml   Output                0 ml   Net             1920 ml       Invasive Devices     Peripheral Intravenous Line            Peripheral IV 01/02/18 Left Hand less than 1 day                Physical Exam:  Patient appears anxious though no acute distress  Skin warm and dry to touch  HEENT shows moist oral mucosa otherwise clear  Neck supple  Heart regular rate and rhythm  Lungs clear  Back no CVA tenderness  Abdomen appears obese  Positive bowel sounds are heard  The abdomen has normoactive bowel sounds heard in 4 quadrants  There is some voluntary guarding of the left lower quadrant  No definite masses  No rebound tenderness  She was all 4 extremities well  No calf tenderness or peripheral edema      Lab, Imaging and other studies:  CBC:   Lab Results   Component Value Date    WBC 7 66 01/02/2018    HGB 10 0 (L) 01/02/2018    HCT 30 6 (L) 01/02/2018    MCV 89 01/02/2018     01/02/2018    MCH 29 1 01/02/2018    MCHC 32 7 01/02/2018    RDW 12 6 01/02/2018    MPV 9 7 01/02/2018   , CMP:   Lab Results   Component Value Date     01/02/2018    K 3 6 01/02/2018     01/02/2018    CO2 26 01/02/2018    ANIONGAP 8 01/02/2018    BUN <1 (L) 01/02/2018    CREATININE 0 51 (L) 01/02/2018    GLUCOSE 82 01/02/2018    CALCIUM 8 1 (L) 01/02/2018    EGFR 110 01/02/2018     VTE Pharmacologic Prophylaxis: Reason for no pharmacologic prophylaxis Lovenox has been held starting today in preparation for surgery tomorrow  She does have bilateral venous compression boots applied    VTE Mechanical Prophylaxis: sequential compression device      Calli Montes PA-C

## 2018-01-02 NOTE — WOUND OSTOMY CARE
Stoma siting completed  Marked bilaterally for colostomy vs  Ileostomy, approximately 4 5 cm from midline, superior to infraumbilical fold  Patient agreeable to sites  Teaching completed on ABCDs (Activity, Bathing, Clothing, Diet) of stoma care  Differences between ileostomy and colostomy discussed, along with appropriate appliance selection, post op follow up, self efficacy and self care of ostomy  Patient tearful at times   at bedside  Patient stated that " will have to take care of it "  I assured patient that nursing staff, including myself will help her with ostomy care lessons  Plan:  1  Will follow-up with patient as needed for ostomy care lessons  Zack Begum RN   CWOCN  1/2/2018 17:13

## 2018-01-02 NOTE — CASE MANAGEMENT
Continued Stay Review    Date: 12/30/17  Vitals: Blood pressure 128/76, pulse 98, temperature 98 5 °F (36 9 °C), temperature source Temporal, resp  rate 18, height 5' 1" (1 549 m), weight 91 1 kg (200 lb 13 4 oz), SpO2 96 %  ,Body mass index is 37 95 kg/m²  Medications:   Scheduled Meds:   aspirin 81 mg Oral Daily   enoxaparin 40 mg Subcutaneous Q24H   lisinopril 10 mg Oral Daily   metroNIDAZOLE 500 mg Intravenous Q8H   pantoprazole 40 mg Intravenous Q12H [address]   piperacillin-tazobactam 3 375 g Intravenous Q6H     Continuous Infusions:   sodium chloride 0 9 %  125 mL/hr      PRN Meds:   acetaminophen    aluminum-magnesium hydroxide-simethicone    HYDROmorphone; USED X1    oxyCODONE    Promethazine; USED X2    Abnormal Labs/Diagnostic Results:   HGB 10 3 ALB 1 8   CT A/P=1  Anterior pelvic abscess measuring 3 6 cm x 2 7 cm x 1 9 cm (<10 cc)associated with sigmoid perforation  The collection is not large enough to hold a pigtail drainage catheter  2  Unchanged length of the thickened segment of sigmoid colon for one month  Consider colonoscopy after resolution of acute episode to exclude underlying mass  3  Persistent small foci of pneumoperitoneum within the central-lower abdomen  Age/Sex: 47 y o  female     Assessment/Plan:   1)  Sigmoid diverticulitis with microperforation:  Chart reviewed  Patient is on IV Zosyn, flagyl added  Continue clear liquid diet, IV fluid hydration  Abcess formation on CT scan too small for IR guided drainage  Will monitor for fever, leukocytosis, signs of worsening infection, otherwise will continue conservative treatment  May require serial imaging to assess improvement  Will need outpatient colonoscopy when infection is resolved  2)  Hypokalemia: stable today, follow BMP  3)  Hypoalbuminemia/Low serum prealbumin/Severe protein-calorie malnutrition:  possibly due to prolonged GI symptoms   Will continue clear liquid diet for now      4)  Essential hypertension:  Continue Lisinopril  Stable BP  5)  Hyperlipidemia:  Hold the patient's statin while she is on a clear liquid diet  6)  Hypomagnesemia:  resolved  Likely due to lack of PO intake  7)  Microscopic hematuria:  She will need outpatient follow-up with her PCP      Discharge Plan: [facility]

## 2018-01-02 NOTE — PROGRESS NOTES
Wound care nurse at pt bedside explaining colostomy care, bags, etc   Pt resting comfortably  Tearful at times  Pt reassured by nursing staff

## 2018-01-02 NOTE — ANESTHESIA PREPROCEDURE EVALUATION
Review of Systems/Medical History  Patient summary reviewed  Chart reviewed      Cardiovascular  EKG reviewed, Exercise tolerance: good,  Hyperlipidemia, Hypertension controlled,   Comment: nml TTE June 2016    NSR,  Pulmonary  Negative pulmonary ROS ,        GI/Hepatic    GERD well controlled,   Comment: Diverticulitis with abscess          Endo/Other  Negative endo/other ROS      GYN  Negative gynecology ROS          Hematology  Anemia ,     Musculoskeletal  Obesity  morbid obesity,        Neurology  Negative neurology ROS      Psychology   Negative psychology ROS          Labs noted  Alb 1 8  H/H 10/30  Mag 1 6  Physical Exam    Airway    Mallampati score: I  TM Distance: >3 FB  Neck ROM: full     Dental   No notable dental hx     Cardiovascular  Cardiovascular exam normal    Pulmonary  Pulmonary exam normal     Other Findings        Anesthesia Plan  ASA Score- 3     Anesthesia Type- general with ASA Monitors  Additional Monitors:   Airway Plan: ETT  Plan Factors-    Induction- intravenous  Postoperative Plan- Plan for postoperative opioid use  Informed Consent- Anesthetic plan and risks discussed with patient  I personally reviewed this patient with the CRNA  Discussed and agreed on the Anesthesia Plan with the CRNA  Damon Granger

## 2018-01-03 ENCOUNTER — HOSPITAL ENCOUNTER (INPATIENT)
Facility: HOSPITAL | Age: 53
LOS: 15 days | Discharge: NURSING, REHAB OR LONG-TERM CARE | DRG: 330 | End: 2018-01-18
Attending: SURGERY | Admitting: SURGERY
Payer: COMMERCIAL

## 2018-01-03 ENCOUNTER — APPOINTMENT (INPATIENT)
Dept: RADIOLOGY | Facility: HOSPITAL | Age: 53
DRG: 330 | End: 2018-01-03
Payer: COMMERCIAL

## 2018-01-03 ENCOUNTER — APPOINTMENT (INPATIENT)
Dept: RADIOLOGY | Facility: HOSPITAL | Age: 53
DRG: 329 | End: 2018-01-03
Payer: COMMERCIAL

## 2018-01-03 ENCOUNTER — ANESTHESIA (INPATIENT)
Dept: PERIOP | Facility: HOSPITAL | Age: 53
DRG: 329 | End: 2018-01-03
Payer: COMMERCIAL

## 2018-01-03 VITALS
DIASTOLIC BLOOD PRESSURE: 62 MMHG | TEMPERATURE: 99.5 F | OXYGEN SATURATION: 100 % | BODY MASS INDEX: 37.92 KG/M2 | HEIGHT: 61 IN | HEART RATE: 98 BPM | RESPIRATION RATE: 14 BRPM | SYSTOLIC BLOOD PRESSURE: 101 MMHG | WEIGHT: 200.84 LBS

## 2018-01-03 DIAGNOSIS — E43 SEVERE PROTEIN-CALORIE MALNUTRITION (HCC): ICD-10-CM

## 2018-01-03 DIAGNOSIS — I48.91 ATRIAL FIBRILLATION, UNSPECIFIED TYPE (HCC): ICD-10-CM

## 2018-01-03 DIAGNOSIS — K57.21 DIVERTICULITIS OF LARGE INTESTINE WITH PERFORATION AND BLEEDING WITHOUT ABSCESS: Primary | ICD-10-CM

## 2018-01-03 DIAGNOSIS — F32.2 SEVERE DEPRESSION (HCC): ICD-10-CM

## 2018-01-03 DIAGNOSIS — I48.0 PAROXYSMAL ATRIAL FIBRILLATION (HCC): ICD-10-CM

## 2018-01-03 PROBLEM — R31.9 HEMATURIA: Status: ACTIVE | Noted: 2018-01-03

## 2018-01-03 PROBLEM — Z93.2 ILEOSTOMY IN PLACE (HCC): Status: ACTIVE | Noted: 2018-01-03

## 2018-01-03 LAB
ABO GROUP BLD: NORMAL
ALBUMIN SERPL BCP-MCNC: 2.4 G/DL (ref 3.5–5)
ALP SERPL-CCNC: 48 U/L (ref 46–116)
ALT SERPL W P-5'-P-CCNC: 7 U/L (ref 12–78)
ANION GAP SERPL CALCULATED.3IONS-SCNC: 10 MMOL/L (ref 4–13)
ANION GAP SERPL CALCULATED.3IONS-SCNC: 12 MMOL/L (ref 4–13)
APTT PPP: 38 SECONDS (ref 23–35)
ARTERIAL PATENCY WRIST A: YES
AST SERPL W P-5'-P-CCNC: 12 U/L (ref 5–45)
BASE EXCESS BLDA CALC-SCNC: -3.2 MMOL/L
BASE EXCESS BLDA CALC-SCNC: 0 MMOL/L (ref -2–3)
BASOPHILS # BLD AUTO: 0.02 THOUSANDS/ΜL (ref 0–0.1)
BASOPHILS # BLD MANUAL: 0 THOUSAND/UL (ref 0–0.1)
BASOPHILS NFR BLD AUTO: 0 % (ref 0–1)
BASOPHILS NFR MAR MANUAL: 0 % (ref 0–1)
BILIRUB SERPL-MCNC: 0.71 MG/DL (ref 0.2–1)
BLD GP AB SCN SERPL QL: NEGATIVE
BUN SERPL-MCNC: 1 MG/DL (ref 5–25)
BUN SERPL-MCNC: <1 MG/DL (ref 5–25)
BURR CELLS BLD QL SMEAR: PRESENT
CA-I BLD-SCNC: 1.1 MMOL/L (ref 1.12–1.32)
CA-I BLD-SCNC: 1.11 MMOL/L (ref 1.12–1.32)
CALCIUM SERPL-MCNC: 8.4 MG/DL (ref 8.3–10.1)
CALCIUM SERPL-MCNC: 8.5 MG/DL (ref 8.3–10.1)
CHLORIDE SERPL-SCNC: 103 MMOL/L (ref 100–108)
CHLORIDE SERPL-SCNC: 103 MMOL/L (ref 100–108)
CO2 SERPL-SCNC: 24 MMOL/L (ref 21–32)
CO2 SERPL-SCNC: 26 MMOL/L (ref 21–32)
CREAT SERPL-MCNC: 0.4 MG/DL (ref 0.6–1.3)
CREAT SERPL-MCNC: 0.5 MG/DL (ref 0.6–1.3)
EOSINOPHIL # BLD AUTO: 0.12 THOUSAND/ΜL (ref 0–0.61)
EOSINOPHIL # BLD MANUAL: 0 THOUSAND/UL (ref 0–0.4)
EOSINOPHIL NFR BLD AUTO: 2 % (ref 0–6)
EOSINOPHIL NFR BLD MANUAL: 0 % (ref 0–6)
ERYTHROCYTE [DISTWIDTH] IN BLOOD BY AUTOMATED COUNT: 12.8 % (ref 11.6–15.1)
ERYTHROCYTE [DISTWIDTH] IN BLOOD BY AUTOMATED COUNT: 13 % (ref 11.6–15.1)
GFR SERPL CREATININE-BSD FRML MDRD: 111 ML/MIN/1.73SQ M
GFR SERPL CREATININE-BSD FRML MDRD: 119 ML/MIN/1.73SQ M
GLUCOSE SERPL-MCNC: 129 MG/DL (ref 65–140)
GLUCOSE SERPL-MCNC: 134 MG/DL (ref 65–140)
GLUCOSE SERPL-MCNC: 69 MG/DL (ref 65–140)
HCO3 BLDA-SCNC: 20.9 MMOL/L (ref 22–28)
HCO3 BLDA-SCNC: 25.7 MMOL/L (ref 22–28)
HCT VFR BLD AUTO: 31.6 % (ref 34.8–46.1)
HCT VFR BLD AUTO: 32.1 % (ref 34.8–46.1)
HCT VFR BLD CALC: 28 % (ref 34.8–46.1)
HGB BLD-MCNC: 10.2 G/DL (ref 11.5–15.4)
HGB BLD-MCNC: 10.2 G/DL (ref 11.5–15.4)
HGB BLDA-MCNC: 9.5 G/DL (ref 11.5–15.4)
HOROWITZ INDEX BLDA+IHG-RTO: 40 MM[HG]
INR PPP: 1.51 (ref 0.86–1.16)
INR PPP: 1.62 (ref 0.86–1.16)
LACTATE SERPL-SCNC: 1.1 MMOL/L (ref 0.5–2)
LYMPHOCYTES # BLD AUTO: 1.14 THOUSANDS/ΜL (ref 0.6–4.47)
LYMPHOCYTES # BLD AUTO: 1.26 THOUSAND/UL (ref 0.6–4.47)
LYMPHOCYTES # BLD AUTO: 9 % (ref 14–44)
LYMPHOCYTES NFR BLD AUTO: 14 % (ref 14–44)
MAGNESIUM SERPL-MCNC: 1.6 MG/DL (ref 1.6–2.6)
MAGNESIUM SERPL-MCNC: 1.6 MG/DL (ref 1.6–2.6)
MCH RBC QN AUTO: 28.5 PG (ref 26.8–34.3)
MCH RBC QN AUTO: 28.5 PG (ref 26.8–34.3)
MCHC RBC AUTO-ENTMCNC: 31.8 G/DL (ref 31.4–37.4)
MCHC RBC AUTO-ENTMCNC: 32.3 G/DL (ref 31.4–37.4)
MCV RBC AUTO: 88 FL (ref 82–98)
MCV RBC AUTO: 90 FL (ref 82–98)
MONOCYTES # BLD AUTO: 0.14 THOUSAND/UL (ref 0–1.22)
MONOCYTES # BLD AUTO: 0.43 THOUSAND/ΜL (ref 0.17–1.22)
MONOCYTES NFR BLD AUTO: 5 % (ref 4–12)
MONOCYTES NFR BLD: 1 % (ref 4–12)
NEUTROPHILS # BLD AUTO: 6.34 THOUSANDS/ΜL (ref 1.85–7.62)
NEUTROPHILS # BLD MANUAL: 12.62 THOUSAND/UL (ref 1.85–7.62)
NEUTS BAND NFR BLD MANUAL: 4 % (ref 0–8)
NEUTS SEG NFR BLD AUTO: 79 % (ref 43–75)
NEUTS SEG NFR BLD AUTO: 86 % (ref 43–75)
NRBC BLD AUTO-RTO: 0 /100 WBCS
O2 CT BLDA-SCNC: 15.3 ML/DL (ref 16–23)
OXYHGB MFR BLDA: 98.6 % (ref 94–97)
PCO2 BLD: 27 MMOL/L (ref 21–32)
PCO2 BLD: 43.7 MM HG (ref 36–44)
PCO2 BLDA: 34.1 MM HG (ref 36–44)
PEEP RESPIRATORY: 5 CM[H2O]
PH BLD: 7.38 [PH] (ref 7.35–7.45)
PH BLDA: 7.41 [PH] (ref 7.35–7.45)
PHOSPHATE SERPL-MCNC: 4.1 MG/DL (ref 2.7–4.5)
PLATELET # BLD AUTO: 328 THOUSANDS/UL (ref 149–390)
PLATELET # BLD AUTO: 359 THOUSANDS/UL (ref 149–390)
PLATELET BLD QL SMEAR: ADEQUATE
PMV BLD AUTO: 10.1 FL (ref 8.9–12.7)
PMV BLD AUTO: 9.6 FL (ref 8.9–12.7)
PO2 BLD: 375 MM HG (ref 75–129)
PO2 BLDA: 147.7 MM HG (ref 75–129)
POIKILOCYTOSIS BLD QL SMEAR: PRESENT
POTASSIUM BLD-SCNC: 3.7 MMOL/L (ref 3.5–5.3)
POTASSIUM SERPL-SCNC: 3.4 MMOL/L (ref 3.5–5.3)
POTASSIUM SERPL-SCNC: 3.8 MMOL/L (ref 3.5–5.3)
PROT SERPL-MCNC: 6.1 G/DL (ref 6.4–8.2)
PROTHROMBIN TIME: 18.3 SECONDS (ref 12.1–14.4)
PROTHROMBIN TIME: 19.2 SECONDS (ref 12.1–14.4)
RBC # BLD AUTO: 3.58 MILLION/UL (ref 3.81–5.12)
RBC # BLD AUTO: 3.58 MILLION/UL (ref 3.81–5.12)
RBC MORPH BLD: PRESENT
RH BLD: POSITIVE
SAO2 % BLD FROM PO2: 100 % (ref 95–98)
SODIUM BLD-SCNC: 139 MMOL/L (ref 136–145)
SODIUM SERPL-SCNC: 139 MMOL/L (ref 136–145)
SODIUM SERPL-SCNC: 139 MMOL/L (ref 136–145)
SPECIMEN EXPIRATION DATE: NORMAL
SPECIMEN SOURCE: ABNORMAL
SPECIMEN SOURCE: ABNORMAL
VENT AC: 14
VENT- AC: AC
VT SETTING VENT: 450 ML
WBC # BLD AUTO: 14.02 THOUSAND/UL (ref 4.31–10.16)
WBC # BLD AUTO: 8.05 THOUSAND/UL (ref 4.31–10.16)

## 2018-01-03 PROCEDURE — 84132 ASSAY OF SERUM POTASSIUM: CPT

## 2018-01-03 PROCEDURE — 88307 TISSUE EXAM BY PATHOLOGIST: CPT | Performed by: SURGERY

## 2018-01-03 PROCEDURE — 82805 BLOOD GASES W/O2 SATURATION: CPT | Performed by: PHYSICIAN ASSISTANT

## 2018-01-03 PROCEDURE — 86901 BLOOD TYPING SEROLOGIC RH(D): CPT

## 2018-01-03 PROCEDURE — 0DNG0ZZ RELEASE LEFT LARGE INTESTINE, OPEN APPROACH: ICD-10-PCS | Performed by: INTERNAL MEDICINE

## 2018-01-03 PROCEDURE — 87205 SMEAR GRAM STAIN: CPT | Performed by: SURGERY

## 2018-01-03 PROCEDURE — 85730 THROMBOPLASTIN TIME PARTIAL: CPT | Performed by: PHYSICIAN ASSISTANT

## 2018-01-03 PROCEDURE — 83605 ASSAY OF LACTIC ACID: CPT | Performed by: PHYSICIAN ASSISTANT

## 2018-01-03 PROCEDURE — 85610 PROTHROMBIN TIME: CPT | Performed by: PHYSICIAN ASSISTANT

## 2018-01-03 PROCEDURE — 85025 COMPLETE CBC W/AUTO DIFF WBC: CPT | Performed by: PHYSICIAN ASSISTANT

## 2018-01-03 PROCEDURE — 87186 SC STD MICRODIL/AGAR DIL: CPT | Performed by: SURGERY

## 2018-01-03 PROCEDURE — 82803 BLOOD GASES ANY COMBINATION: CPT

## 2018-01-03 PROCEDURE — 87086 URINE CULTURE/COLONY COUNT: CPT | Performed by: SURGERY

## 2018-01-03 PROCEDURE — 85007 BL SMEAR W/DIFF WBC COUNT: CPT | Performed by: PHYSICIAN ASSISTANT

## 2018-01-03 PROCEDURE — 82947 ASSAY GLUCOSE BLOOD QUANT: CPT

## 2018-01-03 PROCEDURE — 80053 COMPREHEN METABOLIC PANEL: CPT | Performed by: PHYSICIAN ASSISTANT

## 2018-01-03 PROCEDURE — 0D1B0Z4 BYPASS ILEUM TO CUTANEOUS, OPEN APPROACH: ICD-10-PCS | Performed by: INTERNAL MEDICINE

## 2018-01-03 PROCEDURE — 85610 PROTHROMBIN TIME: CPT

## 2018-01-03 PROCEDURE — C9113 INJ PANTOPRAZOLE SODIUM, VIA: HCPCS | Performed by: PHYSICIAN ASSISTANT

## 2018-01-03 PROCEDURE — 94002 VENT MGMT INPAT INIT DAY: CPT

## 2018-01-03 PROCEDURE — 84100 ASSAY OF PHOSPHORUS: CPT | Performed by: PHYSICIAN ASSISTANT

## 2018-01-03 PROCEDURE — 82330 ASSAY OF CALCIUM: CPT | Performed by: PHYSICIAN ASSISTANT

## 2018-01-03 PROCEDURE — 82330 ASSAY OF CALCIUM: CPT

## 2018-01-03 PROCEDURE — 86850 RBC ANTIBODY SCREEN: CPT

## 2018-01-03 PROCEDURE — 85014 HEMATOCRIT: CPT

## 2018-01-03 PROCEDURE — 86900 BLOOD TYPING SEROLOGIC ABO: CPT

## 2018-01-03 PROCEDURE — 87075 CULTR BACTERIA EXCEPT BLOOD: CPT | Performed by: SURGERY

## 2018-01-03 PROCEDURE — 85027 COMPLETE CBC AUTOMATED: CPT | Performed by: PHYSICIAN ASSISTANT

## 2018-01-03 PROCEDURE — 87077 CULTURE AEROBIC IDENTIFY: CPT | Performed by: SURGERY

## 2018-01-03 PROCEDURE — 0T7D7ZZ DILATION OF URETHRA, VIA NATURAL OR ARTIFICIAL OPENING: ICD-10-PCS | Performed by: INTERNAL MEDICINE

## 2018-01-03 PROCEDURE — 36600 WITHDRAWAL OF ARTERIAL BLOOD: CPT

## 2018-01-03 PROCEDURE — 94760 N-INVAS EAR/PLS OXIMETRY 1: CPT

## 2018-01-03 PROCEDURE — 0W9G0ZZ DRAINAGE OF PERITONEAL CAVITY, OPEN APPROACH: ICD-10-PCS | Performed by: INTERNAL MEDICINE

## 2018-01-03 PROCEDURE — 84295 ASSAY OF SERUM SODIUM: CPT

## 2018-01-03 PROCEDURE — 83735 ASSAY OF MAGNESIUM: CPT | Performed by: PHYSICIAN ASSISTANT

## 2018-01-03 PROCEDURE — 71045 X-RAY EXAM CHEST 1 VIEW: CPT

## 2018-01-03 PROCEDURE — 87070 CULTURE OTHR SPECIMN AEROBIC: CPT | Performed by: SURGERY

## 2018-01-03 PROCEDURE — 80048 BASIC METABOLIC PNL TOTAL CA: CPT | Performed by: PHYSICIAN ASSISTANT

## 2018-01-03 RX ORDER — ROCURONIUM BROMIDE 10 MG/ML
INJECTION, SOLUTION INTRAVENOUS AS NEEDED
Status: DISCONTINUED | OUTPATIENT
Start: 2018-01-03 | End: 2018-01-03 | Stop reason: SURG

## 2018-01-03 RX ORDER — HYDROMORPHONE HYDROCHLORIDE 2 MG/ML
INJECTION, SOLUTION INTRAMUSCULAR; INTRAVENOUS; SUBCUTANEOUS AS NEEDED
Status: DISCONTINUED | OUTPATIENT
Start: 2018-01-03 | End: 2018-01-03 | Stop reason: SURG

## 2018-01-03 RX ORDER — HEPARIN SODIUM 5000 [USP'U]/ML
5000 INJECTION, SOLUTION INTRAVENOUS; SUBCUTANEOUS EVERY 8 HOURS SCHEDULED
Status: DISCONTINUED | OUTPATIENT
Start: 2018-01-03 | End: 2018-01-19 | Stop reason: HOSPADM

## 2018-01-03 RX ORDER — METOCLOPRAMIDE HYDROCHLORIDE 5 MG/ML
INJECTION INTRAMUSCULAR; INTRAVENOUS AS NEEDED
Status: DISCONTINUED | OUTPATIENT
Start: 2018-01-03 | End: 2018-01-03 | Stop reason: SURG

## 2018-01-03 RX ORDER — ATORVASTATIN CALCIUM 10 MG/1
10 TABLET, FILM COATED ORAL DAILY
Status: DISCONTINUED | OUTPATIENT
Start: 2018-01-04 | End: 2018-01-06

## 2018-01-03 RX ORDER — MAGNESIUM SULFATE HEPTAHYDRATE 40 MG/ML
4 INJECTION, SOLUTION INTRAVENOUS ONCE
Status: COMPLETED | OUTPATIENT
Start: 2018-01-03 | End: 2018-01-03

## 2018-01-03 RX ORDER — POTASSIUM CHLORIDE 14.9 MG/ML
20 INJECTION INTRAVENOUS ONCE
Status: COMPLETED | OUTPATIENT
Start: 2018-01-03 | End: 2018-01-03

## 2018-01-03 RX ORDER — ONDANSETRON 2 MG/ML
4 INJECTION INTRAMUSCULAR; INTRAVENOUS EVERY 6 HOURS PRN
Status: DISCONTINUED | OUTPATIENT
Start: 2018-01-03 | End: 2018-01-17

## 2018-01-03 RX ORDER — DOCUSATE SODIUM 100 MG/1
100 CAPSULE, LIQUID FILLED ORAL DAILY
Status: DISCONTINUED | OUTPATIENT
Start: 2018-01-04 | End: 2018-01-03 | Stop reason: HOSPADM

## 2018-01-03 RX ORDER — SODIUM CHLORIDE, SODIUM LACTATE, POTASSIUM CHLORIDE, CALCIUM CHLORIDE 600; 310; 30; 20 MG/100ML; MG/100ML; MG/100ML; MG/100ML
75 INJECTION, SOLUTION INTRAVENOUS CONTINUOUS
Status: DISCONTINUED | OUTPATIENT
Start: 2018-01-03 | End: 2018-01-06

## 2018-01-03 RX ORDER — CHLORHEXIDINE GLUCONATE 0.12 MG/ML
15 RINSE ORAL EVERY 12 HOURS SCHEDULED
Status: DISCONTINUED | OUTPATIENT
Start: 2018-01-03 | End: 2018-01-05

## 2018-01-03 RX ORDER — ACETAMINOPHEN 160 MG/5ML
650 SUSPENSION, ORAL (FINAL DOSE FORM) ORAL EVERY 6 HOURS PRN
Status: DISCONTINUED | OUTPATIENT
Start: 2018-01-03 | End: 2018-01-19 | Stop reason: HOSPADM

## 2018-01-03 RX ORDER — ALBUMIN, HUMAN INJ 5% 5 %
SOLUTION INTRAVENOUS CONTINUOUS PRN
Status: DISCONTINUED | OUTPATIENT
Start: 2018-01-03 | End: 2018-01-03 | Stop reason: SURG

## 2018-01-03 RX ORDER — PROPOFOL 10 MG/ML
INJECTION, EMULSION INTRAVENOUS AS NEEDED
Status: DISCONTINUED | OUTPATIENT
Start: 2018-01-03 | End: 2018-01-03 | Stop reason: SURG

## 2018-01-03 RX ORDER — PROPOFOL 10 MG/ML
5-50 INJECTION, EMULSION INTRAVENOUS
Status: DISCONTINUED | OUTPATIENT
Start: 2018-01-03 | End: 2018-01-05

## 2018-01-03 RX ORDER — PROPOFOL 10 MG/ML
INJECTION, EMULSION INTRAVENOUS CONTINUOUS PRN
Status: DISCONTINUED | OUTPATIENT
Start: 2018-01-03 | End: 2018-01-03 | Stop reason: SURG

## 2018-01-03 RX ORDER — FERROUS SULFATE 325(65) MG
325 TABLET ORAL
Status: DISCONTINUED | OUTPATIENT
Start: 2018-01-04 | End: 2018-01-03 | Stop reason: HOSPADM

## 2018-01-03 RX ORDER — HEPARIN SODIUM 5000 [USP'U]/ML
INJECTION, SOLUTION INTRAVENOUS; SUBCUTANEOUS AS NEEDED
Status: DISCONTINUED | OUTPATIENT
Start: 2018-01-03 | End: 2018-01-03 | Stop reason: SURG

## 2018-01-03 RX ORDER — MIDAZOLAM HYDROCHLORIDE 1 MG/ML
INJECTION INTRAMUSCULAR; INTRAVENOUS AS NEEDED
Status: DISCONTINUED | OUTPATIENT
Start: 2018-01-03 | End: 2018-01-03 | Stop reason: SURG

## 2018-01-03 RX ORDER — FENTANYL CITRATE 50 UG/ML
50 INJECTION, SOLUTION INTRAMUSCULAR; INTRAVENOUS EVERY 2 HOUR PRN
Status: DISCONTINUED | OUTPATIENT
Start: 2018-01-03 | End: 2018-01-05

## 2018-01-03 RX ORDER — FENTANYL CITRATE 50 UG/ML
INJECTION, SOLUTION INTRAMUSCULAR; INTRAVENOUS AS NEEDED
Status: DISCONTINUED | OUTPATIENT
Start: 2018-01-03 | End: 2018-01-03 | Stop reason: SURG

## 2018-01-03 RX ORDER — SUCCINYLCHOLINE CHLORIDE 20 MG/ML
INJECTION INTRAMUSCULAR; INTRAVENOUS AS NEEDED
Status: DISCONTINUED | OUTPATIENT
Start: 2018-01-03 | End: 2018-01-03 | Stop reason: SURG

## 2018-01-03 RX ORDER — LIDOCAINE HYDROCHLORIDE 10 MG/ML
INJECTION, SOLUTION INFILTRATION; PERINEURAL AS NEEDED
Status: DISCONTINUED | OUTPATIENT
Start: 2018-01-03 | End: 2018-01-03 | Stop reason: SURG

## 2018-01-03 RX ORDER — PANTOPRAZOLE SODIUM 40 MG/1
40 INJECTION, POWDER, FOR SOLUTION INTRAVENOUS
Status: DISCONTINUED | OUTPATIENT
Start: 2018-01-04 | End: 2018-01-10

## 2018-01-03 RX ORDER — SODIUM CHLORIDE, SODIUM LACTATE, POTASSIUM CHLORIDE, CALCIUM CHLORIDE 600; 310; 30; 20 MG/100ML; MG/100ML; MG/100ML; MG/100ML
INJECTION, SOLUTION INTRAVENOUS CONTINUOUS PRN
Status: DISCONTINUED | OUTPATIENT
Start: 2018-01-03 | End: 2018-01-03 | Stop reason: SURG

## 2018-01-03 RX ORDER — SODIUM CHLORIDE 9 MG/ML
INJECTION, SOLUTION INTRAVENOUS CONTINUOUS PRN
Status: DISCONTINUED | OUTPATIENT
Start: 2018-01-03 | End: 2018-01-03

## 2018-01-03 RX ADMIN — CHLORHEXIDINE GLUCONATE 15 ML: 1.2 RINSE ORAL at 20:02

## 2018-01-03 RX ADMIN — PROPOFOL 30 MCG/KG/MIN: 10 INJECTION, EMULSION INTRAVENOUS at 20:03

## 2018-01-03 RX ADMIN — HYDROMORPHONE HYDROCHLORIDE 0.5 MG: 2 INJECTION, SOLUTION INTRAMUSCULAR; INTRAVENOUS; SUBCUTANEOUS at 15:17

## 2018-01-03 RX ADMIN — DEXTROSE MONOHYDRATE 3.38 G: 50 INJECTION, SOLUTION INTRAVENOUS at 21:42

## 2018-01-03 RX ADMIN — ROCURONIUM BROMIDE 2 MG: 10 INJECTION INTRAVENOUS at 13:40

## 2018-01-03 RX ADMIN — ROCURONIUM BROMIDE 20 MG: 10 INJECTION INTRAVENOUS at 14:56

## 2018-01-03 RX ADMIN — PROPOFOL 150 MG: 10 INJECTION, EMULSION INTRAVENOUS at 13:40

## 2018-01-03 RX ADMIN — FENTANYL CITRATE 50 MCG: 50 INJECTION, SOLUTION INTRAMUSCULAR; INTRAVENOUS at 13:40

## 2018-01-03 RX ADMIN — ALBUMIN HUMAN: 0.05 INJECTION, SOLUTION INTRAVENOUS at 13:51

## 2018-01-03 RX ADMIN — METOCLOPRAMIDE HYDROCHLORIDE 10 MG: 5 INJECTION INTRAMUSCULAR; INTRAVENOUS at 14:14

## 2018-01-03 RX ADMIN — PROPOFOL 50 MCG/KG/MIN: 10 INJECTION, EMULSION INTRAVENOUS at 17:29

## 2018-01-03 RX ADMIN — SODIUM CHLORIDE, SODIUM LACTATE, POTASSIUM CHLORIDE, AND CALCIUM CHLORIDE: .6; .31; .03; .02 INJECTION, SOLUTION INTRAVENOUS at 13:13

## 2018-01-03 RX ADMIN — SODIUM PHOSPHATE, DIBASIC AND SODIUM PHOSPHATE, MONOBASIC 1 ENEMA: 7; 19 ENEMA RECTAL at 06:34

## 2018-01-03 RX ADMIN — ROCURONIUM BROMIDE 10 MG: 10 INJECTION INTRAVENOUS at 17:01

## 2018-01-03 RX ADMIN — ROCURONIUM BROMIDE 20 MG: 10 INJECTION INTRAVENOUS at 14:12

## 2018-01-03 RX ADMIN — FENTANYL CITRATE 50 MCG: 50 INJECTION, SOLUTION INTRAMUSCULAR; INTRAVENOUS at 13:38

## 2018-01-03 RX ADMIN — MAGNESIUM SULFATE HEPTAHYDRATE 4 G: 40 INJECTION, SOLUTION INTRAVENOUS at 21:43

## 2018-01-03 RX ADMIN — HEPARIN SODIUM 5000 UNITS: 5000 INJECTION, SOLUTION INTRAVENOUS; SUBCUTANEOUS at 13:53

## 2018-01-03 RX ADMIN — MIDAZOLAM HYDROCHLORIDE 2 MG: 1 INJECTION, SOLUTION INTRAMUSCULAR; INTRAVENOUS at 13:18

## 2018-01-03 RX ADMIN — METRONIDAZOLE 500 MG: 500 INJECTION, SOLUTION INTRAVENOUS at 09:12

## 2018-01-03 RX ADMIN — DEXAMETHASONE SODIUM PHOSPHATE 10 MG: 10 INJECTION INTRAMUSCULAR; INTRAVENOUS at 14:02

## 2018-01-03 RX ADMIN — PIPERACILLIN SODIUM,TAZOBACTAM SODIUM 3.38 G: 3; .375 INJECTION, POWDER, FOR SOLUTION INTRAVENOUS at 10:27

## 2018-01-03 RX ADMIN — SODIUM CHLORIDE, SODIUM LACTATE, POTASSIUM CHLORIDE, AND CALCIUM CHLORIDE 125 ML/HR: .6; .31; .03; .02 INJECTION, SOLUTION INTRAVENOUS at 20:03

## 2018-01-03 RX ADMIN — ROCURONIUM BROMIDE 10 MG: 10 INJECTION INTRAVENOUS at 15:37

## 2018-01-03 RX ADMIN — ROCURONIUM BROMIDE 20 MG: 10 INJECTION INTRAVENOUS at 16:00

## 2018-01-03 RX ADMIN — SUCCINYLCHOLINE CHLORIDE 100 MG: 20 INJECTION, SOLUTION INTRAMUSCULAR; INTRAVENOUS at 13:40

## 2018-01-03 RX ADMIN — Medication 50 MCG/HR: at 20:21

## 2018-01-03 RX ADMIN — METRONIDAZOLE 500 MG: 500 INJECTION, SOLUTION INTRAVENOUS at 13:43

## 2018-01-03 RX ADMIN — MIDAZOLAM HYDROCHLORIDE 2 MG: 1 INJECTION, SOLUTION INTRAMUSCULAR; INTRAVENOUS at 13:27

## 2018-01-03 RX ADMIN — HEPARIN SODIUM 5000 UNITS: 5000 INJECTION, SOLUTION INTRAVENOUS; SUBCUTANEOUS at 21:43

## 2018-01-03 RX ADMIN — PANTOPRAZOLE SODIUM 40 MG: 40 INJECTION, POWDER, FOR SOLUTION INTRAVENOUS at 09:12

## 2018-01-03 RX ADMIN — HYDROMORPHONE HYDROCHLORIDE 1 MG: 2 INJECTION, SOLUTION INTRAMUSCULAR; INTRAVENOUS; SUBCUTANEOUS at 17:23

## 2018-01-03 RX ADMIN — POTASSIUM CHLORIDE 20 MEQ: 200 INJECTION, SOLUTION INTRAVENOUS at 21:43

## 2018-01-03 RX ADMIN — ALBUMIN HUMAN: 0.05 INJECTION, SOLUTION INTRAVENOUS at 16:15

## 2018-01-03 RX ADMIN — SODIUM CHLORIDE, SODIUM LACTATE, POTASSIUM CHLORIDE, AND CALCIUM CHLORIDE: .6; .31; .03; .02 INJECTION, SOLUTION INTRAVENOUS at 14:23

## 2018-01-03 RX ADMIN — HYDROMORPHONE HYDROCHLORIDE 1 MG: 2 INJECTION, SOLUTION INTRAMUSCULAR; INTRAVENOUS; SUBCUTANEOUS at 18:20

## 2018-01-03 RX ADMIN — PIPERACILLIN SODIUM,TAZOBACTAM SODIUM 3.38 G: 3; .375 INJECTION, POWDER, FOR SOLUTION INTRAVENOUS at 13:55

## 2018-01-03 RX ADMIN — Medication 1 G: at 22:38

## 2018-01-03 RX ADMIN — HYDROMORPHONE HYDROCHLORIDE 0.5 MG: 2 INJECTION, SOLUTION INTRAMUSCULAR; INTRAVENOUS; SUBCUTANEOUS at 16:14

## 2018-01-03 RX ADMIN — LIDOCAINE HYDROCHLORIDE 100 MG: 10 INJECTION, SOLUTION INFILTRATION; PERINEURAL at 13:40

## 2018-01-03 RX ADMIN — METRONIDAZOLE 500 MG: 500 INJECTION, SOLUTION INTRAVENOUS at 21:43

## 2018-01-03 RX ADMIN — PIPERACILLIN SODIUM,TAZOBACTAM SODIUM 3.38 G: 3; .375 INJECTION, POWDER, FOR SOLUTION INTRAVENOUS at 02:07

## 2018-01-03 NOTE — PROGRESS NOTES
Progress Note - Robinson Kurtz 47 y o  female MRN: [id number]    Unit/Bed#: 406-01 Encounter: [id number]      Assessment:  Patient Active Problem List   Diagnosis    Diverticulitis    Gastroesophageal reflux disease without esophagitis    Hyperlipidemia    Essential hypertension    Hypokalemia    Hypoalbuminemia    Low serum prealbumin    Severe protein-calorie malnutrition (HCC)    Microscopic hematuria    Hypomagnesemia    Anemia    Diverticulitis of large intestine with perforation and bleeding without abscess         Plan:  1) Sigmoid diverticulitis with microperforation/Anterior pelvic abscess: Plan for Pj's procedure today  Will also have bilateral ureteral stents placed to assist with ureter identification during surgery  Continue IV Zosyn and IV flagyl  Lovenox discontinued for planned surgery  2) Anemia: stable in the 10 range today    Likely due to chronic disease with low iron, low TIBC and high ferritin  Notes previous history of iron deficiency and iron infusions  Will add PO iron supplementation  Folate is low at 2 0, will start folic acid  Continue to monitor  May require blood transfusion perioperatively and patient is aware and agreeable  3) Hypokalemia: mild  Will continue IV normal saline with 20mEq of KCL  4) Hypoalbuminemia/Low serum prealbumin/Severe protein-calorie malnutrition: appreciate dietician consult  Will likely benefit from ensure supplements once tolerating a regular diet  5) Essential hypertension: slightly accelerated today, will monitor  6) Hyperlipidemia: will plan to restart statin once tolerating regular diet  7) Hypomagnesemia: remains low normal at 1 6  Plan to start PO magnesium supplementation following surgery  8) Microscopic hematuria: plan for outpatient follow-up with her PCP  Subjective:   Patient seen and examined  Patient is feeling better from a nausea standpoint  Still generally weak  No fever or chills overnight  Objective:   Vitals: Blood pressure 154/90, pulse 83, temperature 99 1 °F (37 3 °C), temperature source Temporal, resp  rate 18, height 5' 1" (1 549 m), weight 91 1 kg (200 lb 13 4 oz), SpO2 97 %  ,Body mass index is 37 95 kg/m²  Weight (last 2 days)     None          Intake/Output Summary (Last 24 hours) at 01/03/18 1043  Last data filed at 01/03/18 0900   Gross per 24 hour   Intake             1200 ml   Output              250 ml   Net              950 ml       Physical Exam:   General:  NAD, awake, alert, oriented x 3  HEENT:  NC/AT, MM slightly dry  Neck:  Supple, No JVD  CV:  + S1, +S2, RRR  Pulm:  Lung fields are CTA bilaterally, good effort  Abd:  Soft, Non-tender, Non-distended  BS active x 4     Ext:  No edema of the BLE    Scheduled Meds:  aspirin 81 mg Oral Daily   folic acid 1 mg Oral Daily   lisinopril 10 mg Oral Daily   metroNIDAZOLE 500 mg Intravenous Q8H   pantoprazole 40 mg Intravenous Q12H [facility]   piperacillin-tazobactam 3 375 g Intravenous Q6H     Continuous Infusions:  sodium chloride 0 9 % with KCl 20 mEq/L 125 mL/hr Last Rate: Stopped (01/03/18 0900)     PRN Meds:   acetaminophen    aluminum-magnesium hydroxide-simethicone    HYDROmorphone    oxyCODONE    promethazine      Invasive Devices     Peripheral Intravenous Line            Peripheral IV 01/03/18 Left Antecubital less than 1 day                  Results from last 7 days  Lab Units 01/03/18  0538 01/02/18  0609 01/01/18  0523   WBC Thousand/uL 8 05 7 66 8 51   HEMOGLOBIN g/dL 10 2* 10 0* 9 9*   HEMATOCRIT % 32 1* 30 6* 30 5*   PLATELETS Thousands/uL 328 298 295   NEUTROS PCT % 79* 77* 81*   MONOS PCT % 5 6 6         Results from last 7 days  Lab Units 01/03/18  0538 01/02/18  0609 01/01/18  0523  12/30/17  0506 12/29/17  0623 12/28/17  0450   SODIUM mmol/L 139 139 139  < > 138 138 138   POTASSIUM mmol/L 3 4* 3 6 3 7  < > 3 8 3 4* 3 7   CHLORIDE mmol/L 103 105 105  < > 103 102 101   CO2 mmol/L 26 26 25  < > 30 29 30   BUN mg/dL <1* <1* 1*  < > 1* 1* 2*   CREATININE mg/dL 0 50* 0 51* 0 51*  < > 0 66 0 79 0 77   CALCIUM mg/dL 8 5 8 1* 8 0*  < > 8 3 8 5 8 5   TOTAL PROTEIN g/dL  --   --   --   --  6 4 6 9 6 4   BILIRUBIN TOTAL mg/dL  --   --   --   --  0 60 0 80 0 80   ALK PHOS U/L  --   --   --   --  68 75 77   ALT U/L  --   --   --   --  14 15 14   AST U/L  --   --   --   --  11 11 14   GLUCOSE RANDOM mg/dL 69 82 70  < > 86 124 92   < > = values in this interval not displayed  Lab Results   Component Value Date    CKTOTAL 14 (L) 12/27/2017    TROPONINI <0 02 12/27/2017       Lab Results   Component Value Date    INR 1 62 (H) 01/03/2018    INR 1 06 12/26/2017    PROTIME 19 2 (H) 01/03/2018    PROTIME 13 7 12/26/2017               Lab, Imaging and other studies: I have personally reviewed pertinent reports      VTE Pharmacologic Prophylaxis: RX contraindicated due to: plan for OR today  VTE Mechanical Prophylaxis: sequential compression device

## 2018-01-03 NOTE — CONSULTS
UROLOGY CONSULTATION NOTE     Patient Identifiers: Vicky Cramer (MRN [id number])  Service Requesting Consultation:   General surgeryService Requesting Consultation:  Service Providing Consultation:  Urology, Mara Kumar MD    Date of Service: 1/3/2018    Reason for Consultation:   Diverticulitis, request for preoperative ureteral catheter placement       ASSESSMENT:     47 y o  old female with diverticulitis and large diverticular abscess    PLAN:     At the request of the general surgery team, I have discussed with the patient and preoperative cystoscopy and placement of bilateral ureteral catheters  This will help the general surgeon identify the ureteral structures during the time of surgery  I discussed with the patient that ureteral catheters do not change the rate of ureteral injury but rather helped to identify important structures and more easily identify intraoperative injuries  If there are no concerns at the time of surgery, the ureteral catheters can be removed by the general surgery team   If there are any concerns, urology team will be available for assistance  Informed consent has been obtained from the patient  History of Present Illness:     Vicky Cramer is a 47 y o  old with a history of diverticulitis who has been admitted for the last week attempting conservative management  She has developed a diverticular abscess in the left hemipelvis  The general surgery team has seen and evaluated and is planning sigmoid resection later today  At their request, we are assessing the patient for preoperative ureteral catheters      Past Medical, Past Surgical History:     Past Medical History:   Diagnosis Date    Disease of thyroid gland     Gastroesophageal reflux disease without esophagitis 12/4/2017    Hyperlipidemia     Hypertension     Vitamin D deficiency disease    :    Past Surgical History:   Procedure Laterality Date    CHOLECYSTECTOMY      IN ESOPHAGOGASTRODUODENOSCOPY TRANSORAL DIAGNOSTIC N/A 10/21/2016    Procedure: EGD AND COLONOSCOPY;  Surgeon: Laurie Lopez MD;  Location: [facility];  Service: Gastroenterology    THYROIDECTOMY, PARTIAL      TONSILLECTOMY AND ADENOIDECTOMY      TUBAL LIGATION     :    Medications, Allergies:     Current Facility-Administered Medications:     acetaminophen (TYLENOL) tablet 650 mg, 650 mg, Oral, Q6H PRN, Edy Roberts, DO, 650 mg at 12/28/17 1629    aluminum-magnesium hydroxide-simethicone (MYLANTA) 200-200-20 mg/5 mL oral suspension 30 mL, 30 mL, Oral, Q4H PRN, Indy Cortes MD, 30 mL at 12/30/17 1801    aspirin chewable tablet 81 mg, 81 mg, Oral, Daily, Edy Roberts, DO, 81 mg at 87/14/25 8251    folic acid (FOLVITE) tablet 1 mg, 1 mg, Oral, Daily, Nitin Webster PA-C, 1 mg at 01/02/18 1155    HYDROmorphone (DILAUDID) 1 mg/mL injection 0 5 mg, 0 5 mg, Intravenous, Q3H PRN, Edy Roberts, DO, 0 5 mg at 12/31/17 1938    lisinopril (ZESTRIL) tablet 10 mg, 10 mg, Oral, Daily, Edy Roberts, DO, 10 mg at 01/02/18 0911    metroNIDAZOLE (FLAGYL) IVPB (premix) 500 mg, 500 mg, Intravenous, Q8H, Calli Montes PA-C, Last Rate: 200 mL/hr at 01/02/18 2328, 500 mg at 01/02/18 2328    oxyCODONE (ROXICODONE) IR tablet 5 mg, 5 mg, Oral, Q4H PRN, Edy Roberts, DO, 5 mg at 12/29/17 1432    pantoprazole (PROTONIX) injection 40 mg, 40 mg, Intravenous, Q12H [facility], Mai Messina PA-C, 40 mg at 01/02/18 2121    piperacillin-tazobactam (ZOSYN) 3 375 g in dextrose 5 % 50 mL IVPB, 3 375 g, Intravenous, Q6H, Edy Roberts, DO, Last Rate: 100 mL/hr at 01/03/18 0207, 3 375 g at 01/03/18 0207    promethazine (PHENERGAN) injection 25 mg, 25 mg, Intramuscular, Q6H PRN, Mai Messina PA-C    sodium chloride 0 9 % with KCl 20 mEq/L infusion (premix), 125 mL/hr, Intravenous, Continuous, Mai Messina PA-C, Last Rate: 125 mL/hr at 01/02/18 2010, 125 mL/hr at 01/02/18 2010    Allergies:  No Known Allergies:    Social and Family History:   Social History:   Social History   Substance Use Topics    Smoking status: Former Smoker    Smokeless tobacco: Never Used    Alcohol use Yes      Comment: social        History   Smoking Status    Former Smoker   Smokeless Tobacco    Never Used       Family History:  History reviewed  No pertinent family history :     Review of Systems:     General: Fever, chills, or night sweats: positive  Cardiac: Negative for chest pain  Pulmonary: Negative for shortness of breath  Gastrointestinal: Abdominal pain positive  Nausea, vomiting, or diarrhea positive,  Genitourinary: See HPI above  Patient does not have hematuria  All other systems queried were negative  Physical Exam:   General: Patient is pleasant and in NAD  Awake and alert  /90   Pulse 83   Temp 99 1 °F (37 3 °C) (Temporal)   Resp 18   Ht 5' 1" (1 549 m)   Wt 91 1 kg (200 lb 13 4 oz)   LMP  (LMP Unknown)   SpO2 97%   BMI 37 95 kg/m²   Cardiac: Peripheral edema: negative  Pulmonary: Non-labored breathing  Abdomen: Soft, non-tender, non-distended  No surgical scars  No masses, tenderness, hernias noted  Genitourinary: Negative CVA tenderness, negative suprapubic tenderness  Labs:     Lab Results   Component Value Date    HGB 10 2 (L) 01/03/2018    HCT 32 1 (L) 01/03/2018    WBC 8 05 01/03/2018     01/03/2018   ]    Lab Results   Component Value Date     01/03/2018    K 3 4 (L) 01/03/2018     01/03/2018    CO2 26 01/03/2018    BUN <1 (L) 01/03/2018    CREATININE 0 50 (L) 01/03/2018    CALCIUM 8 5 01/03/2018    GLUCOSE 69 01/03/2018     Culture     No Growth <1000 cfu/mL          Specimen Collected: 12/26/17 22:00            Imaging:   I personally reviewed the images and report of the following studies, and reviewed them with the patient:    CT ABDOMEN AND PELVIS WITH IV CONTRAST     INDICATION: Persistent abdominal pain    Microperforated diverticulitis      COMPARISON: 12/26/2017     TECHNIQUE:  CT examination of the abdomen and pelvis was performed  Reformatted images were created in axial, sagittal, and coronal planes        Radiation dose length product (DLP) for this visit:  946 86 mGy-cm   This examination, like all CT scans performed in the [facility], was performed utilizing techniques to minimize radiation dose exposure, including the use of iterative   reconstruction and automated exposure control      IV Contrast:      100 mL of iohexol (OMNIPAQUE 350)  Enteric Contrast: 50 mL of iohexol (OMNIPAQUE 240)       FINDINGS:     ABDOMEN     LOWER CHEST:  No significant abnormalities identified in the lower chest      LIVER/BILIARY TREE:  Unremarkable      GALLBLADDER:  Surgically absent      SPLEEN:  Unremarkable      PANCREAS:  Unremarkable      ADRENAL GLANDS:  Unremarkable      KIDNEYS/URETERS:  Unremarkable  No masses, stones, or hydronephrosis      STOMACH AND BOWEL:  Stomach and small bowel are unremarkable  Contrast progresses to the sigmoid colon  Left colonic diverticula noted  Diffuse mural thickening of the proximal mid sigmoid colon with streaking of the pericolonic fat in this region is   similar to previous studies  Length of the involved segment is unchanged since 11/29/2017  Similar segment was involved in 2014        Increasing collection in the anterior pelvis tracking inferiorly from the sigmoid microperforation previous redemonstrated  Collection measures 2 7 cm transverse x 1 9 cm AP x 3 6 cm CC and contains fluid and gas  Smaller bubbles of pneumoperitoneum   not associated with fluid collections are also seen in the lower central abdomen      APPENDIX:  A normal appendix was visualized      ABDOMINOPELVIC CAVITY:  Pelvic abscess, as discussed above  Strandy inflammatory changes around the colon with mild fluid tracking into the pelvis    Small reactive lymph nodes are seen at the left pelvic sidewall      VESSELS: Unremarkable for patient's age      PELVIS     REPRODUCTIVE ORGANS:  Fibroid uterus      URINARY BLADDER:  Normal for level of nondistention      ABDOMINAL WALL/INGUINAL REGIONS:  Unremarkable      OSSEOUS STRUCTURES:  No acute fracture or destructive osseous lesion      IMPRESSION:     1  Anterior pelvic abscess measuring 3 6 cm x 2 7 cm x 1 9 cm (<10 cc)associated with sigmoid perforation  The collection is not large enough to hold a pigtail drainage catheter      2  Unchanged length of the thickened segment of sigmoid colon for one month  Consider colonoscopy after resolution of acute episode to exclude underlying mass      3  Persistent small foci of pneumoperitoneum within the central-lower abdomen      I personally discussed this study with Nelia Ball in person on 12/29/2017 15:26 PM       Thank you for allowing me to participate in this patients care  Please do not hesitate to call with any additional questions    Yasir Cordova MD

## 2018-01-03 NOTE — ANESTHESIA POSTPROCEDURE EVALUATION
Post-Op Assessment Note      CV Status:  Stable    Mental Status:  Somnolent (on propofol gtt at 50mcg/kg/min)    Hydration Status:  Euvolemic    PONV Controlled:  Controlled    Airway Patency:  Patent (ETT 22 cm at incisor)  Airway: intubated    Post Op Vitals Reviewed: Yes          Staff: CRNA, Anesthesiologist       Comments: care transferrerd to [facility] ; full report given          /62 (01/03/18 1845)    Temp      Pulse 98 (01/03/18 1845)   Resp 14 (ventilator) (01/03/18 1845)    SpO2 100 % (01/03/18 1845)

## 2018-01-03 NOTE — PROGRESS NOTES
Patient transported to OR via stretcher  Patient's sister aware  Patient's jewelry removed and given to sister  Patient voided and removed undergarments

## 2018-01-03 NOTE — CASE MANAGEMENT
Continued Stay Review    Date: 1/3/18    Vital Signs: /90   Pulse 83   Temp 99 1 °F (37 3 °C) (Temporal)   Resp 18   Ht 5' 1" (1 549 m)   Wt 91 1 kg (200 lb 13 4 oz)   LMP  (LMP Unknown)   SpO2 97%   BMI 37 95 kg/m²     Medications:   Scheduled Meds:   aspirin 81 mg Oral Daily   folic acid 1 mg Oral Daily   lisinopril 10 mg Oral Daily   metroNIDAZOLE 500 mg Intravenous Q8H   pantoprazole 40 mg Intravenous Q12H [facility]   piperacillin-tazobactam 3 375 g Intravenous Q6H     Continuous Infusions:   sodium chloride 0 9 % with KCl 20 mEq/L 125 mL/hr Last Rate: 125 mL/hr (01/02/18 2010)     PRN Meds:   acetaminophen    aluminum-magnesium hydroxide-simethicone    HYDROmorphone    oxyCODONE    promethazine    Abnormal Labs/Diagnostic Results:   HGB 10 2 K 3 4 BUN <1 CR 0 50 PT 19 2 INR 1 62    Age/Sex: 47 y o  female     Assessment/Plan:   1) Sigmoid diverticulitis with microperforation/Anterior pelvic abscess: Plan for Pj's procedure today  Will also have bilateral ureteral stents placed to assist with ureter identification during surgery  Continue IV Zosyn and IV flagyl  Lovenox discontinued for planned surgery  2) Anemia: stable in the 10 range today    Likely due to chronic disease with low iron, low TIBC and high ferritin  Notes previous history of iron deficiency and iron infusions  Will add PO iron supplementation  Folate is low at 2 0, will start folic acid  Continue to monitor  May require blood transfusion perioperatively and patient is aware and agreeable  3) Hypokalemia: mild  Will continue IV normal saline with 20mEq of KCL  4) Hypoalbuminemia/Low serum prealbumin/Severe protein-calorie malnutrition: appreciate dietician consult  Will likely benefit from ensure supplements once tolerating a regular diet  5) Essential hypertension: slightly accelerated today, will monitor  6) Hyperlipidemia: will plan to restart statin once tolerating regular diet    7) Hypomagnesemia: remains low normal at 1 6  Plan to start PO magnesium supplementation following surgery  8) Microscopic hematuria: plan for outpatient follow-up with her PCP  Discharge Plan: HOME  Thank you,  [address] [facility] in the [facility] by Yonis Stover for 2017  Network Utilization Review Department  Phone: [phone]; Fax [phone]  ATTENTION: The Network Utilization Review Department is now centralized for our 7 Facilities  Make a note that we have a new phone and fax numbers for our Department  Please call with any questions or concerns to [phone] and carefully follow the prompts so that you are directed to the right person  All voicemails are confidential  Fax any determinations, approvals, denials, and requests for initial or continue stay review clinical to [phone]  Due to HIGH CALL volume, it would be easier if you could please send faxed requests to expedite your requests and in part, help us provide discharge notifications faster

## 2018-01-04 ENCOUNTER — APPOINTMENT (INPATIENT)
Dept: RADIOLOGY | Facility: HOSPITAL | Age: 53
DRG: 330 | End: 2018-01-04
Payer: COMMERCIAL

## 2018-01-04 ENCOUNTER — ANESTHESIA EVENT (INPATIENT)
Dept: PERIOP | Facility: HOSPITAL | Age: 53
DRG: 330 | End: 2018-01-04
Payer: COMMERCIAL

## 2018-01-04 ENCOUNTER — ANESTHESIA (INPATIENT)
Dept: PERIOP | Facility: HOSPITAL | Age: 53
DRG: 330 | End: 2018-01-04
Payer: COMMERCIAL

## 2018-01-04 LAB
ABO GROUP BLD: NORMAL
ANION GAP SERPL CALCULATED.3IONS-SCNC: 12 MMOL/L (ref 4–13)
BASOPHILS # BLD AUTO: 0.01 THOUSANDS/ΜL (ref 0–0.1)
BASOPHILS NFR BLD AUTO: 0 % (ref 0–1)
BLD GP AB SCN SERPL QL: NEGATIVE
BUN SERPL-MCNC: 1 MG/DL (ref 5–25)
CA-I BLD-SCNC: 1.09 MMOL/L (ref 1.12–1.32)
CALCIUM SERPL-MCNC: 8.2 MG/DL (ref 8.3–10.1)
CHLORIDE SERPL-SCNC: 103 MMOL/L (ref 100–108)
CO2 SERPL-SCNC: 21 MMOL/L (ref 21–32)
CREAT SERPL-MCNC: 0.28 MG/DL (ref 0.6–1.3)
EOSINOPHIL # BLD AUTO: 0 THOUSAND/ΜL (ref 0–0.61)
EOSINOPHIL NFR BLD AUTO: 0 % (ref 0–6)
ERYTHROCYTE [DISTWIDTH] IN BLOOD BY AUTOMATED COUNT: 13.3 % (ref 11.6–15.1)
GFR SERPL CREATININE-BSD FRML MDRD: 134 ML/MIN/1.73SQ M
GLUCOSE SERPL-MCNC: 118 MG/DL (ref 65–140)
GLUCOSE SERPL-MCNC: 124 MG/DL (ref 65–140)
GLUCOSE SERPL-MCNC: 136 MG/DL (ref 65–140)
GLUCOSE SERPL-MCNC: 144 MG/DL (ref 65–140)
GLUCOSE SERPL-MCNC: 153 MG/DL (ref 65–140)
GLUCOSE SERPL-MCNC: 195 MG/DL (ref 65–140)
HCT VFR BLD AUTO: 33.2 % (ref 34.8–46.1)
HGB BLD-MCNC: 10.8 G/DL (ref 11.5–15.4)
INR PPP: 1.39 (ref 0.86–1.16)
LYMPHOCYTES # BLD AUTO: 1.08 THOUSANDS/ΜL (ref 0.6–4.47)
LYMPHOCYTES NFR BLD AUTO: 9 % (ref 14–44)
MAGNESIUM SERPL-MCNC: 2.2 MG/DL (ref 1.6–2.6)
MCH RBC QN AUTO: 28.5 PG (ref 26.8–34.3)
MCHC RBC AUTO-ENTMCNC: 32.5 G/DL (ref 31.4–37.4)
MCV RBC AUTO: 88 FL (ref 82–98)
MONOCYTES # BLD AUTO: 0.72 THOUSAND/ΜL (ref 0.17–1.22)
MONOCYTES NFR BLD AUTO: 6 % (ref 4–12)
NEUTROPHILS # BLD AUTO: 10.77 THOUSANDS/ΜL (ref 1.85–7.62)
NEUTS SEG NFR BLD AUTO: 85 % (ref 43–75)
NRBC BLD AUTO-RTO: 0 /100 WBCS
PHOSPHATE SERPL-MCNC: 3.3 MG/DL (ref 2.7–4.5)
PLATELET # BLD AUTO: 418 THOUSANDS/UL (ref 149–390)
PMV BLD AUTO: 10.1 FL (ref 8.9–12.7)
POTASSIUM SERPL-SCNC: 4 MMOL/L (ref 3.5–5.3)
PROTHROMBIN TIME: 17.1 SECONDS (ref 12.1–14.4)
RBC # BLD AUTO: 3.79 MILLION/UL (ref 3.81–5.12)
RH BLD: POSITIVE
SODIUM SERPL-SCNC: 136 MMOL/L (ref 136–145)
SPECIMEN EXPIRATION DATE: NORMAL
WBC # BLD AUTO: 12.65 THOUSAND/UL (ref 4.31–10.16)

## 2018-01-04 PROCEDURE — 83605 ASSAY OF LACTIC ACID: CPT | Performed by: SURGERY

## 2018-01-04 PROCEDURE — 82948 REAGENT STRIP/BLOOD GLUCOSE: CPT

## 2018-01-04 PROCEDURE — 94760 N-INVAS EAR/PLS OXIMETRY 1: CPT | Performed by: SOCIAL WORKER

## 2018-01-04 PROCEDURE — 86901 BLOOD TYPING SEROLOGIC RH(D): CPT | Performed by: PHYSICIAN ASSISTANT

## 2018-01-04 PROCEDURE — 0DTN0ZZ RESECTION OF SIGMOID COLON, OPEN APPROACH: ICD-10-PCS | Performed by: SURGERY

## 2018-01-04 PROCEDURE — 80048 BASIC METABOLIC PNL TOTAL CA: CPT | Performed by: SURGERY

## 2018-01-04 PROCEDURE — 85025 COMPLETE CBC W/AUTO DIFF WBC: CPT | Performed by: PHYSICIAN ASSISTANT

## 2018-01-04 PROCEDURE — 82805 BLOOD GASES W/O2 SATURATION: CPT | Performed by: SURGERY

## 2018-01-04 PROCEDURE — 86920 COMPATIBILITY TEST SPIN: CPT

## 2018-01-04 PROCEDURE — 84100 ASSAY OF PHOSPHORUS: CPT | Performed by: PHYSICIAN ASSISTANT

## 2018-01-04 PROCEDURE — 85610 PROTHROMBIN TIME: CPT | Performed by: NURSE ANESTHETIST, CERTIFIED REGISTERED

## 2018-01-04 PROCEDURE — 88307 TISSUE EXAM BY PATHOLOGIST: CPT | Performed by: SURGERY

## 2018-01-04 PROCEDURE — 83735 ASSAY OF MAGNESIUM: CPT | Performed by: PHYSICIAN ASSISTANT

## 2018-01-04 PROCEDURE — 0D1L0Z4 BYPASS TRANSVERSE COLON TO CUTANEOUS, OPEN APPROACH: ICD-10-PCS | Performed by: SURGERY

## 2018-01-04 PROCEDURE — 86850 RBC ANTIBODY SCREEN: CPT | Performed by: PHYSICIAN ASSISTANT

## 2018-01-04 PROCEDURE — 94760 N-INVAS EAR/PLS OXIMETRY 1: CPT

## 2018-01-04 PROCEDURE — 83735 ASSAY OF MAGNESIUM: CPT | Performed by: SURGERY

## 2018-01-04 PROCEDURE — 86900 BLOOD TYPING SEROLOGIC ABO: CPT | Performed by: PHYSICIAN ASSISTANT

## 2018-01-04 PROCEDURE — 0WJG0ZZ INSPECTION OF PERITONEAL CAVITY, OPEN APPROACH: ICD-10-PCS | Performed by: SURGERY

## 2018-01-04 PROCEDURE — 84100 ASSAY OF PHOSPHORUS: CPT | Performed by: SURGERY

## 2018-01-04 PROCEDURE — 82330 ASSAY OF CALCIUM: CPT | Performed by: PHYSICIAN ASSISTANT

## 2018-01-04 PROCEDURE — C9113 INJ PANTOPRAZOLE SODIUM, VIA: HCPCS | Performed by: PHYSICIAN ASSISTANT

## 2018-01-04 PROCEDURE — 74018 RADEX ABDOMEN 1 VIEW: CPT

## 2018-01-04 PROCEDURE — 85025 COMPLETE CBC W/AUTO DIFF WBC: CPT | Performed by: SURGERY

## 2018-01-04 PROCEDURE — 0DBB0ZZ EXCISION OF ILEUM, OPEN APPROACH: ICD-10-PCS | Performed by: SURGERY

## 2018-01-04 PROCEDURE — 94003 VENT MGMT INPAT SUBQ DAY: CPT | Performed by: SOCIAL WORKER

## 2018-01-04 PROCEDURE — 80048 BASIC METABOLIC PNL TOTAL CA: CPT | Performed by: PHYSICIAN ASSISTANT

## 2018-01-04 RX ORDER — ONDANSETRON 2 MG/ML
INJECTION INTRAMUSCULAR; INTRAVENOUS AS NEEDED
Status: DISCONTINUED | OUTPATIENT
Start: 2018-01-04 | End: 2018-01-04 | Stop reason: SURG

## 2018-01-04 RX ORDER — SODIUM CHLORIDE 9 MG/ML
INJECTION, SOLUTION INTRAVENOUS CONTINUOUS PRN
Status: DISCONTINUED | OUTPATIENT
Start: 2018-01-04 | End: 2018-01-04 | Stop reason: SURG

## 2018-01-04 RX ORDER — CEFAZOLIN SODIUM 1 G/3ML
INJECTION, POWDER, FOR SOLUTION INTRAMUSCULAR; INTRAVENOUS AS NEEDED
Status: DISCONTINUED | OUTPATIENT
Start: 2018-01-04 | End: 2018-01-04 | Stop reason: SURG

## 2018-01-04 RX ORDER — ROCURONIUM BROMIDE 10 MG/ML
INJECTION, SOLUTION INTRAVENOUS AS NEEDED
Status: DISCONTINUED | OUTPATIENT
Start: 2018-01-04 | End: 2018-01-04 | Stop reason: SURG

## 2018-01-04 RX ORDER — ALBUMIN, HUMAN INJ 5% 5 %
SOLUTION INTRAVENOUS CONTINUOUS PRN
Status: DISCONTINUED | OUTPATIENT
Start: 2018-01-04 | End: 2018-01-04 | Stop reason: SURG

## 2018-01-04 RX ORDER — FENTANYL CITRATE 50 UG/ML
INJECTION, SOLUTION INTRAMUSCULAR; INTRAVENOUS AS NEEDED
Status: DISCONTINUED | OUTPATIENT
Start: 2018-01-04 | End: 2018-01-04 | Stop reason: SURG

## 2018-01-04 RX ORDER — MAGNESIUM HYDROXIDE 1200 MG/15ML
LIQUID ORAL AS NEEDED
Status: DISCONTINUED | OUTPATIENT
Start: 2018-01-04 | End: 2018-01-04 | Stop reason: HOSPADM

## 2018-01-04 RX ADMIN — Medication 500 MG: at 19:08

## 2018-01-04 RX ADMIN — HYDROMORPHONE HYDROCHLORIDE 0.5 MG: 1 INJECTION, SOLUTION INTRAMUSCULAR; INTRAVENOUS; SUBCUTANEOUS at 19:39

## 2018-01-04 RX ADMIN — PANTOPRAZOLE SODIUM 40 MG: 40 INJECTION, POWDER, FOR SOLUTION INTRAVENOUS at 09:33

## 2018-01-04 RX ADMIN — ROCURONIUM BROMIDE 10 MG: 10 INJECTION INTRAVENOUS at 19:21

## 2018-01-04 RX ADMIN — CEFAZOLIN 2000 MG: 1 INJECTION, POWDER, FOR SOLUTION INTRAVENOUS at 19:08

## 2018-01-04 RX ADMIN — FENTANYL CITRATE 50 MCG: 50 INJECTION, SOLUTION INTRAMUSCULAR; INTRAVENOUS at 19:00

## 2018-01-04 RX ADMIN — FENTANYL CITRATE 50 MCG: 50 INJECTION INTRAMUSCULAR; INTRAVENOUS at 15:15

## 2018-01-04 RX ADMIN — DEXTROSE MONOHYDRATE 3.38 G: 50 INJECTION, SOLUTION INTRAVENOUS at 02:48

## 2018-01-04 RX ADMIN — CHLORHEXIDINE GLUCONATE 15 ML: 1.2 RINSE ORAL at 09:36

## 2018-01-04 RX ADMIN — METRONIDAZOLE 500 MG: 500 INJECTION, SOLUTION INTRAVENOUS at 05:58

## 2018-01-04 RX ADMIN — HYDROMORPHONE HYDROCHLORIDE 1 MG: 1 INJECTION, SOLUTION INTRAMUSCULAR; INTRAVENOUS; SUBCUTANEOUS at 04:25

## 2018-01-04 RX ADMIN — FENTANYL CITRATE 50 MCG: 50 INJECTION, SOLUTION INTRAMUSCULAR; INTRAVENOUS at 19:10

## 2018-01-04 RX ADMIN — SODIUM CHLORIDE: 0.9 INJECTION, SOLUTION INTRAVENOUS at 20:50

## 2018-01-04 RX ADMIN — FENTANYL CITRATE 50 MCG: 50 INJECTION INTRAMUSCULAR; INTRAVENOUS at 02:00

## 2018-01-04 RX ADMIN — Medication 50 MCG/HR: at 16:15

## 2018-01-04 RX ADMIN — ONDANSETRON 4 MG: 2 INJECTION INTRAMUSCULAR; INTRAVENOUS at 19:13

## 2018-01-04 RX ADMIN — PROPOFOL 45 MCG/KG/MIN: 10 INJECTION, EMULSION INTRAVENOUS at 23:00

## 2018-01-04 RX ADMIN — SODIUM CHLORIDE: 0.9 INJECTION, SOLUTION INTRAVENOUS at 19:00

## 2018-01-04 RX ADMIN — Medication 3 G: at 15:06

## 2018-01-04 RX ADMIN — ALBUMIN HUMAN: 0.05 INJECTION, SOLUTION INTRAVENOUS at 19:13

## 2018-01-04 RX ADMIN — DEXTROSE MONOHYDRATE 3.38 G: 50 INJECTION, SOLUTION INTRAVENOUS at 15:08

## 2018-01-04 RX ADMIN — FENTANYL CITRATE 50 MCG: 50 INJECTION INTRAMUSCULAR; INTRAVENOUS at 04:00

## 2018-01-04 RX ADMIN — FENTANYL CITRATE 50 MCG: 50 INJECTION INTRAMUSCULAR; INTRAVENOUS at 00:09

## 2018-01-04 RX ADMIN — ROCURONIUM BROMIDE 30 MG: 10 INJECTION INTRAVENOUS at 19:05

## 2018-01-04 RX ADMIN — FENTANYL CITRATE 50 MCG: 50 INJECTION INTRAMUSCULAR; INTRAVENOUS at 09:36

## 2018-01-04 RX ADMIN — SODIUM CHLORIDE, SODIUM LACTATE, POTASSIUM CHLORIDE, AND CALCIUM CHLORIDE 125 ML/HR: .6; .31; .03; .02 INJECTION, SOLUTION INTRAVENOUS at 02:53

## 2018-01-04 RX ADMIN — HEPARIN SODIUM 5000 UNITS: 5000 INJECTION, SOLUTION INTRAVENOUS; SUBCUTANEOUS at 05:58

## 2018-01-04 RX ADMIN — SODIUM CHLORIDE, SODIUM LACTATE, POTASSIUM CHLORIDE, AND CALCIUM CHLORIDE: .6; .31; .03; .02 INJECTION, SOLUTION INTRAVENOUS at 20:50

## 2018-01-04 RX ADMIN — SODIUM CHLORIDE, SODIUM LACTATE, POTASSIUM CHLORIDE, AND CALCIUM CHLORIDE 125 ML/HR: .6; .31; .03; .02 INJECTION, SOLUTION INTRAVENOUS at 11:04

## 2018-01-04 RX ADMIN — HYDROMORPHONE HYDROCHLORIDE 0.5 MG: 1 INJECTION, SOLUTION INTRAMUSCULAR; INTRAVENOUS; SUBCUTANEOUS at 20:48

## 2018-01-04 RX ADMIN — DEXTROSE MONOHYDRATE 3.38 G: 50 INJECTION, SOLUTION INTRAVENOUS at 09:30

## 2018-01-04 NOTE — PLAN OF CARE

## 2018-01-04 NOTE — PROGRESS NOTES
Acceptance Note - Critical Care   Marilu Limb 47 y o  female MRN: [id number]  Unit/Bed#: [facility] 523-00 Encounter: [id number]    Attending Physician: Felipa Herrera MD      ______________________________________________________________________  Assessment and Plan:   Principal Problem:    Diverticulitis of large intestine with perforation and bleeding without abscess  Active Problems:    Gastroesophageal reflux disease without esophagitis    Hyperlipidemia    Essential hypertension    Hematuria    Ileostomy in place [facility])  Resolved Problems:    * No resolved hospital problems   *    Neuro:   - Sedation with propofol drip, titrate to goal RASS 0 to -1  - Pain control with fentanyl drip at 50 mcg/hr, 50 mcg IV q2h pain for breakthrough pain     CV:   - Telemetry monitoring  - Goal MAP >65  - Hyperlipidemia: continue statin   - HTN: hold home lisinopril     Pulm:   - Remained intubated post-op as returning to OR in AM, CXR with ETT in good position, vent settings adequate given ABG results, assess for SBT post-op tomorrow     GI:   - Diverticulitis with pelvics abscess s/p small bowel enterotomy and ileostomy with open abdomen: wound vac in place, management per general surgery, to return to OR tomorrow ex-lap and re-exploration  - GERD: protonix 40 mg IV willis y    :   - Soto catheter in place with bilateral ureteral stents placed by urology, maintain for strict I/Os    F/E/N:   - LR at 125 ml/hr  - NPO, OG tube in place  - Hypocalcemia: replete with calcium gluconate 1g IV  - Hypomagnesemia: replete with 4g IV magnesium sulfate  - Hypokalemia: replete with 20 meq KCl IV    ID:   - Sigmoid diverticulitis with microperforation and pelvic abscess: continue zosyn and flagyl, day #9 of zosyn, day #6 flagyl, blood cultures not sent at prior campus, will send if febrile     Heme:   - DVT PPx with SQH  - Anemia: stable, repeat CBC in AM     Endo:   - Hx thyroidectomy: not on any home medications for thyroid      Msk/Skin: - q2h repositioning  - Wound vac per general surgery    Disposition:   - Admit to ICU, to OR tomorrow     Code Status: Level 1 - Full Code    Counseling / Coordination of Care  Total time spent today 40 minutes  Greater than 50% of total time was spent with the patient and / or family counseling and / or coordination of care  A description of the counseling / coordination of care: discussed with general surgery, nursing, respiratory  ______________________________________________________________________    Chief Complaint: patient intubated and sedated    HPI: 48 y/o female admitted at [address] [place] [address] since 12/26/2017 for diverticulitis  She had been to the ER several times with the same complaint and was admitted from 12/4-12/7 for diverticulitis as well  She was being treated as an outpatient but went to the ED on 12/26 for vomiting and bloody bowel movement  She was diagnosed with sigmoid diverticulitis with microperforation  She was being treated conservatively with IV antibiotics for several days but developed worsening nausea and LLQ pain  Repeat CT scan revealed abscess formation  She went to the OR today, 1/3/2018, with general surgery for ex-lap, sigmoid resection and likely end colostomy  No Op report is available at this time but per nursing, there were a significant amount of adhesions and a small bowel enterotomy occurred  An ileostomy was performed and the abdomen was left open and a wound vac was placed  During the procedure, urology also place bilateral ureteral stents and montoya catheter  Intra-op: 2700 ml LR, 500 ml albumin,  ml,  ml, no blood products received     ______________________________________________________________________    Physical Exam:   Physical Exam   Constitutional: She appears well-developed and well-nourished  No distress  HENT:   Head: Normocephalic and atraumatic  Eyes: Pupils are equal, round, and reactive to light  Neck: Neck supple   No tracheal deviation present  Cardiovascular: Normal rate and regular rhythm  Pulmonary/Chest: Effort normal and breath sounds normal    Abdominal: Soft  Midline wound vac in place with serosanguinous drainage  Ileostomy pink with no output    Genitourinary:   Genitourinary Comments: + montoya with hematuria   Neurological: She is alert  GCS eye subscore is 3  GCS verbal subscore is 1  GCS motor subscore is 6  Skin: Skin is warm and dry      ______________________________________________________________________  Vitals:    18 19318 2100   BP: 101/61  122/74 114/77   Pulse: 90  92 86   Resp: 21  19 13   Temp:   98 2 °F (36 8 °C) 98 6 °F (37 °C)   SpO2: 100%  100% 100%   Weight:  93 4 kg (205 lb 14 6 oz)     Height:  5' 1" (1 549 m)         Temperature:   Temp (24hrs), Av 8 °F (37 1 °C), Min:98 2 °F (36 8 °C), Max:99 5 °F (37 5 °C)    Current Temperature: 98 6 °F (37 °C)  Weights:   IBW: 47 8 kg    Body mass index is 38 91 kg/m²    Weight (last 2 days)     Date/Time   Weight    18  93 4 (205 91)               Non-Invasive/Invasive Ventilation Settings:  Respiratory    Lab Data (Last 4 hours)      1952            pH, Arterial       7 406           pCO2, Arterial       (!)34 1           pO2, Arterial       (!)147 7           HCO3, Arterial       (!)20 9           Base Excess, Arterial       -3 2                O2/Vent Data       1937   Most Recent         Vent Mode AC/VC  AC/VC      Resp Rate (BPM) (BPM) 14  14      Vt (mL) (mL) 450  450      FIO2 (%) (%) 40  40      PEEP (cmH2O) (cmH2O) 5  5      MV 6 3  6 3                Lab Results   Component Value Date    PHART 7 406 2018    [id number] 34 1 (L) 2018    PO2ART 147 7 (H) 2018    IQN7SGH 20 9 (L) 2018    BEART -3 2 2018    SOURCE Radial, Left 2018     Intake and Outputs:  I/O     None        Nutrition:        Diet Orders            Start     Ordered 01/03/18 1936  Diet NPO  Diet effective now     Question:  Diet Type  Answer:  NPO    01/03/18 1941        Labs:     Results from last 7 days  Lab Units 01/03/18  1950 01/03/18  1629 01/03/18  0538 01/02/18  0609 01/01/18  0523   WBC Thousand/uL 14 02*  --  8 05 7 66 8 51   HEMOGLOBIN g/dL 10 2*  --  10 2* 10 0* 9 9*   I STAT HEMOGLOBIN g/dl  --  9 5*  --   --   --    HEMATOCRIT % 31 6*  --  32 1* 30 6* 30 5*   PLATELETS Thousands/uL 359  --  328 298 295   NEUTROS PCT %  --   --  79* 77* 81*   MONOS PCT %  --   --  5 6 6   MONO PCT MAN % 1*  --   --   --   --        Results from last 7 days  Lab Units 01/03/18  1950 01/03/18  1629 01/03/18  0538 01/02/18  0609  12/30/17  0506 12/29/17  0623   SODIUM mmol/L 139  --  139 139  < > 138 138   POTASSIUM mmol/L 3 8  --  3 4* 3 6  < > 3 8 3 4*   CHLORIDE mmol/L 103  --  103 105  < > 103 102   CO2 mmol/L 24  --  26 26  < > 30 29   BUN mg/dL 1*  --  <1* <1*  < > 1* 1*   CREATININE mg/dL 0 40*  --  0 50* 0 51*  < > 0 66 0 79   CALCIUM mg/dL 8 4  --  8 5 8 1*  < > 8 3 8 5   TOTAL PROTEIN g/dL 6 1*  --   --   --   --  6 4 6 9   BILIRUBIN TOTAL mg/dL 0 71  --   --   --   --  0 60 0 80   ALK PHOS U/L 48  --   --   --   --  68 75   ALT U/L 7*  --   --   --   --  14 15   AST U/L 12  --   --   --   --  11 11   GLUCOSE RANDOM mg/dL 134  --  69 82  < > 86 124   GLUCOSE, ISTAT mg/dl  --  129  --   --   --   --   --    < > = values in this interval not displayed      Results from last 7 days  Lab Units 01/03/18  1950 01/03/18  0538 01/02/18  0609   MAGNESIUM mg/dL 1 6 1 6 1 6     Lab Results   Component Value Date    PHOS 4 1 01/03/2018    PHOS 3 0 12/29/2017    PHOS 3 5 12/27/2017        Results from last 7 days  Lab Units 01/03/18  1950 01/03/18  0742   INR  1 51* 1 62*   PTT seconds 38*  --        0  Lab Value Date/Time   TROPONINI <0 02 12/27/2017 0613   TROPONINI <0 02 12/11/2017 0516       Results from last 7 days  Lab Units 01/03/18  1950 12/30/17  1717 12/30/17  0506   LACTIC ACID mmol/L 1 1 1 2 0 5     ABG:  Lab Results   Component Value Date    PHART 7 406 2018    [id number] 34 1 (L) 2018    PO2ART 147 7 (H) 2018    [id number] 20 9 (L) 2018    BEART -3 2 2018    SOURCE Radial, Left 2018     Imagin/3 CXR: Endotracheal and enteric tubes in expected position  No pneumothorax  17 CT A/P: Anterior pelvic abscess measuring 3 6 cm x 2 7 cm x 1 9 cm (<10 cc)associated with sigmoid perforation  The collection is not large enough to hold a pigtail drainage catheter  Unchanged length of the thickened segment of sigmoid colon for one month  Consider colonoscopy after resolution of acute episode to exclude underlying mass  Persistent small foci of pneumoperitoneum within the central-lower abdomen  I have personally reviewed pertinent reports  and I have personally reviewed pertinent films in PACS    Micro:  Lab Results   Component Value Date    URINECX No Growth <1000 cfu/mL 2017     Allergies: No Known Allergies  Medications:   Scheduled Meds:    [START ON 2018] atorvastatin 10 mg Oral Daily   calcium gluconate 1 g Intravenous Once   chlorhexidine 15 mL Swish & Spit Q12H [facility]   heparin (porcine) 5,000 Units Subcutaneous Q8H [facility]   magnesium sulfate 4 g Intravenous Once   metroNIDAZOLE 500 mg Intravenous Q8H   [START ON 2018] pantoprazole 40 mg Intravenous Q24H [facility]   piperacillin-tazobactam 3 375 g Intravenous Q6H   potassium chloride 20 mEq Intravenous Once     Continuous Infusions:    fentaNYL 50 mcg/hr Last Rate: 50 mcg/hr (18)   lactated ringers 125 mL/hr Last Rate: 125 mL/hr (18)   propofol 5-50 mcg/kg/min Last Rate: 30 mcg/kg/min (18)     PRN Meds:    acetaminophen 650 mg Q6H PRN   fentanyl citrate (PF) 50 mcg Q2H PRN   ondansetron 4 mg Q6H PRN     VTE Pharmacologic Prophylaxis: Heparin  VTE Mechanical Prophylaxis: sequential compression device  Invasive lines and devices:   Invasive Devices     Peripheral Intravenous Line            Peripheral IV 01/03/18 Left Antecubital less than 1 day    Peripheral IV 01/03/18 Left Hand less than 1 day    Peripheral IV 01/03/18 Right Antecubital less than 1 day          Drain            Ileostomy Standard (ted Walters) RUQ less than 1 day    NG/OG/Enteral Tube Orogastric 18 Fr Center mouth less than 1 day    Negative Pressure Wound Therapy (V A C ) Abdomen Mid less than 1 day    Ureteral Drain/Stent Left ureter 5 Fr  less than 1 day    Ureteral Drain/Stent Right ureter 5 Fr  less than 1 day    Urethral Catheter Latex 16 Fr  less than 1 day          Airway            ETT  Cuffed;Oral;Inflated 7 mm less than 1 day                     Portions of the record may have been created with voice recognition software  Occasional wrong word or "sound a like" substitutions may have occurred due to the inherent limitations of voice recognition software  Read the chart carefully and recognize, using context, where substitutions have occurred      Dakotah Godwin PA-C

## 2018-01-04 NOTE — SOCIAL WORK
Call to pts hsb since pt is vented  Pt resides with her son and hsb in a home with 18 [address] and 12 steps to second floor  Pts hsb is her primary contact Linnea Anderson [phone]  Pts hsb states that prior to admission pt was indep and did not use any assistive devices  Pt has never had VNA in the past   Pts PCP is Dr Eran Melendez and pt uses [employer] in [place]  Pts hsb states pt has no hx of mental illness or substance abuse  Pts hsb states he is coming tomm and I explained that I left a rehab list in the room in the event that pt needs rehab  CM reviewed d/c planning process including the following: identifying help at home, patient preference for d/c planning needs, Discharge Lounge, [facility] Meds to Bed program, availability of treatment team to discuss questions or concerns patient and/or family may have regarding understanding medications and recognizing signs and symptoms once discharged  CM also encouraged patient to follow up with all recommended appointments after discharge  Patient advised of importance for patient and family to participate in managing patients medical well being

## 2018-01-04 NOTE — PROGRESS NOTES
Progress Note - Critical Care   Silvia Renteria 47 y o  female MRN: [id number]  Unit/Bed#: [facility] 515-01 Encounter: [id number]    Assessment: 46 yo F POD # 1 s/p aborted Pj's with ex-lap, SBR, and ileostomy creation and open abdomen  Plan:          Neuro:   GCS 11T  Fentanyl @ 5  Pain: Fentanyl 50 mcg Q2 PRN IV (x3 overnight) and tylenol 650 PO Q6 PRN (x0 overnight)  Daily CAM-ICU  CV:   /78 and Pulse 108  No issues or pressor needs currently  Continue fluids and will re-assess after OR  Continue Lipitor PO                 Lung:   Ventilator: AC 14/450/5/40%  100% oxygen saturation  Continue on vent and will re-assess after OR  Pulmonary toilet  GI:   VAC in place @ 125, 400 output with 50 in cannister serous output  Ileostomy in place  Protonix for prophylaxis  Zofran PRN ordered (x0 used)                 FEN:   Fluids: Continue LR @ 125  Electrolytes: Normal  Recheck in AM and replete as necessary  Nutrition: NPO for OR  :   UOP: 0 98 mL/kg/hr (1100 total)  BUN and creatinine normal   Continue fluids and will re-assess after OR  Continue Soto                 ID:   Afebrile  WBC 12 65 from 14 02  Probably from post-operative inflammation  Will monitor for signs of infection  Continue Flagyl 500 mg Q8 IV (Day 2) and Zosyn 3 375 g IV Q6 (Day 2)  Follow up cultures  Heme:   Hemoglobin stable at 10 8 from 10 2  Monitor for signs of bleeding  Continue fluids and replete as necessary  Heparin 5000 SC Q8 for prophylaxis  Endo:   Glucose normal  One elevated level at 144  No issues  Continue glucose checks  Msk/Skin:   No signs of skin breakdown  Continuous turning and pressure off-loading  Disposition: Will remain intubated  Is an add on case for 2nd look ex-lap, possible bowel resection, possible ostomy creation      Chief Complaint: Intubated    HPI/24hr events: Came into the ICU intubated, but no acute events overnight  Moderate amount of pain but well controlled on fentanyl  Physical Exam:  General: Intubated, awake and alert  Heart: RRR  Lungs: CTA BL  Abdomen: Soft, tender, non-distended  Abdomen VAC in place with no drainage seen  Holding suction @ 125  Ileostomy in place with minimal brown output  Vitals:    18 0400 18 0500 18 0600 18 0700   BP: 127/81 114/99 112/78    Pulse: 90 98 (!) 108    Resp: 14 14 14    Temp: 99 7 °F (37 6 °C) 100 °F (37 8 °C) 100 °F (37 8 °C) 100 °F (37 8 °C)   TempSrc:    Probe   SpO2: 99% 99% 100%    Weight:       Height:           Temperature:   Temp (24hrs), Av 2 °F (37 3 °C), Min:98 2 °F (36 8 °C), Max:100 °F (37 8 °C)    Current: Temperature: 100 °F (37 8 °C)    Weights:   IBW: 47 8 kg    Body mass index is 38 91 kg/m²    Weight (last 2 days)     Date/Time   Weight    18 1942  93 4 (205 91)               Non-Invasive/Invasive Ventilation Settings:  Respiratory    Lab Data (Last 4 hours)    None         O2/Vent Data (Last 4 hours)       0357           Vent Mode AC/VC       Resp Rate (BPM) (BPM) 14       Vt (mL) (mL) 450       FIO2 (%) (%) 40       PEEP (cmH2O) (cmH2O) 5       MV 7 02                 Lab Results   Component Value Date    PHART 7 406 2018    [id number] 34 1 (L) 2018    PO2ART 147 7 (H) 2018    QTB3EUM 20 9 (L) 2018    BEART -3 2 2018    SOURCE Radial, Left 2018     SpO2: SpO2: 100 %    Intake and Outputs:  I/O        07 -  0700  07 -  0700  07 -  0700    I V  (mL/kg)  1504 8 (16 1)     IV Piggyback  550     Total Intake(mL/kg)  2054 8 (22)     Urine (mL/kg/hr)  1100     Drains  400     Stool  0     Total Output   1500      Net   +554 8                 UOP: 0 98/hour     Nutrition:        Diet Orders            Start     Ordered    18  Diet NPO  Diet effective now     Question:  Diet Type  Answer:  NPO 01/03/18 1941          Labs:     Results from last 7 days  Lab Units 01/04/18 0433 01/03/18  1950 01/03/18  1629 01/03/18  0538 01/02/18  0609   WBC Thousand/uL 12 65* 14 02*  --  8 05 7 66   HEMOGLOBIN g/dL 10 8* 10 2*  --  10 2* 10 0*   I STAT HEMOGLOBIN g/dl  --   --  9 5*  --   --    HEMATOCRIT % 33 2* 31 6*  --  32 1* 30 6*   PLATELETS Thousands/uL 418* 359  --  328 298   NEUTROS PCT % 85*  --   --  79* 77*   MONOS PCT % 6  --   --  5 6   MONO PCT MAN %  --  1*  --   --   --       Results from last 7 days  Lab Units 01/04/18 0433 01/03/18  1950 01/03/18  1629 01/03/18  0538  12/30/17  0506 12/29/17  0623   SODIUM mmol/L 136 139  --  139  < > 138 138   POTASSIUM mmol/L 4 0 3 8  --  3 4*  < > 3 8 3 4*   CHLORIDE mmol/L 103 103  --  103  < > 103 102   CO2 mmol/L 21 24  --  26  < > 30 29   BUN mg/dL 1* 1*  --  <1*  < > 1* 1*   CREATININE mg/dL 0 28* 0 40*  --  0 50*  < > 0 66 0 79   CALCIUM mg/dL 8 2* 8 4  --  8 5  < > 8 3 8 5   TOTAL PROTEIN g/dL  --  6 1*  --   --   --  6 4 6 9   BILIRUBIN TOTAL mg/dL  --  0 71  --   --   --  0 60 0 80   ALK PHOS U/L  --  48  --   --   --  68 75   ALT U/L  --  7*  --   --   --  14 15   AST U/L  --  12  --   --   --  11 11   GLUCOSE RANDOM mg/dL 144* 134  --  69  < > 86 124   GLUCOSE, ISTAT mg/dl  --   --  129  --   --   --   --    < > = values in this interval not displayed      Results from last 7 days  Lab Units 01/04/18 0433 01/03/18  1950 01/03/18  0538   MAGNESIUM mg/dL 2 2 1 6 1 6       Results from last 7 days  Lab Units 01/04/18  0433 01/03/18  1950 12/29/17  0623   PHOSPHORUS mg/dL 3 3 4 1 3 0        Results from last 7 days  Lab Units 01/03/18  1950 01/03/18  0742   INR  1 51* 1 62*   PTT seconds 38*  --        Results from last 7 days  Lab Units 01/03/18  1950   LACTIC ACID mmol/L 1 1       0  Lab Value Date/Time   TROPONINI <0 02 12/27/2017 0613   TROPONINI <0 02 12/11/2017 0516       Imaging: None  EKG: None    Micro:  Lab Results   Component Value Date    [facility] No Growth <1000 cfu/mL 12/26/2017       Allergies: No Known Allergies    Medications:   Scheduled Meds:  atorvastatin 10 mg Oral Daily   chlorhexidine 15 mL Swish & Spit Q12H [address]   heparin (porcine) 5,000 Units Subcutaneous Q8H [address]   metroNIDAZOLE 500 mg Intravenous Q8H   pantoprazole 40 mg Intravenous Q24H [address]   piperacillin-tazobactam 3 375 g Intravenous Q6H     Continuous Infusions:  fentaNYL 50 mcg/hr Last Rate: 50 mcg/hr (01/03/18 2021)   lactated ringers 125 mL/hr Last Rate: 125 mL/hr (01/04/18 0253)   propofol 5-50 mcg/kg/min Last Rate: Stopped (01/04/18 0130)     PRN Meds:    acetaminophen 650 mg Q6H PRN   fentanyl citrate (PF) 50 mcg Q2H PRN   ondansetron 4 mg Q6H PRN       VTE Pharmacologic Prophylaxis: Heparin  VTE Mechanical Prophylaxis: sequential compression device    Invasive lines and devices: Invasive Devices     Peripheral Intravenous Line            Peripheral IV 01/03/18 Left Antecubital less than 1 day    Peripheral IV 01/03/18 Left Hand less than 1 day    Peripheral IV 01/03/18 Right Antecubital less than 1 day          Drain            Ileostomy Standard (Selena, ted) RUQ less than 1 day    NG/OG/Enteral Tube Orogastric 18 Fr Center mouth less than 1 day    Negative Pressure Wound Therapy (V A C ) Abdomen Mid less than 1 day    Ureteral Drain/Stent Left ureter 5 Fr  less than 1 day    Ureteral Drain/Stent Right ureter 5 Fr  less than 1 day    Urethral Catheter Latex 16 Fr  less than 1 day          Airway            ETT  Cuffed;Oral;Inflated 7 mm less than 1 day                 Code Status: Level 1 - Full Code     Portions of the record may have been created with voice recognition software  Occasional wrong word or "sound a like" substitutions may have occurred due to the inherent limitations of voice recognition software  Read the chart carefully and recognize, using context, where substitutions have occurred       Galo Lopez MD

## 2018-01-04 NOTE — NUTRITION
01/04/18 1532   Recommendations/Interventions   Interventions EN initiate   Nutrition Recommendations Tube Feeding Recommendation provided  (Rec: initiate Vital 1 2 AF 20ml/hr advance by 10ml/hr q 4 hrs to reach goal of 50ml/hr to provide 1440kcals, 90 gm Pro, 973 ml free fluid)

## 2018-01-04 NOTE — PROGRESS NOTES
Pt s/p attempted hartmanns with open abdomen  Plan for OR today for re-exploration, possible bowel resection, possible ostomy, possible open abdomen  Pt agrees with plan of care      Roya Tamayo MD

## 2018-01-04 NOTE — PROGRESS NOTES
Progress Note - General Surgery   Dwain Collins 47 y o  female MRN: [id number]  Unit/Bed#: OR POOL Encounter: [id number]    Assessment:  [de-identified] of female currently admitted with acute complicated diverticulitis, not responding to conservative management  Plan:  -IV antibiotics  -IV fluids  -NPO  -OR today for exploratory laparotomy, sigmoid resection, and likely end colostomy    Subjective/Objective   Chief Complaint:  Nausea vomiting, left lower quadrant pain    Subjective:  Doing okay still complains of some left lower quadrant pain occasionally  Unable to tolerate diet with continued nausea vomiting    Objective:     Blood pressure 101/62, pulse 98, temperature 99 5 °F (37 5 °C), temperature source Temporal, resp  rate 14, height 5' 1" (1 549 m), weight 91 1 kg (200 lb 13 4 oz), SpO2 100 %  ,Body mass index is 37 95 kg/m²        Intake/Output Summary (Last 24 hours) at 01/03/18 1926  Last data filed at 01/03/18 1829   Gross per 24 hour   Intake             4200 ml   Output             1000 ml   Net             3200 ml       Invasive Devices     Peripheral Intravenous Line            Peripheral IV 01/03/18 Left Antecubital less than 1 day    Peripheral IV 01/03/18 Left Hand less than 1 day          Drain            Ileostomy Standard (Selena, end) RUQ less than 1 day    NG/OG/Enteral Tube Orogastric 18 Fr Center mouth less than 1 day    Ureteral Drain/Stent Left ureter 5 Fr  less than 1 day    Ureteral Drain/Stent Right ureter 5 Fr  less than 1 day    Urethral Catheter Latex 16 Fr  less than 1 day          Airway            ETT  Cuffed;Oral;Inflated 7 mm less than 1 day                Physical Exam:   General:  No acute distress, resting comfortably  HEENT:  Normocephalic, atraumatic  CV:  S1-S2 audible, her regular rate and rhythm, no murmurs rubs or gallops  Pulmonary:  Clear to auscultation, bilaterally no wheezes rales  GI:  Abdomen is obese, soft, tender palpation left lower quadrant, no rebound  MSK:  Lower extremities without clubbing, cyanosis, edema  Psych:  Mood and affect appropriate  Neurologic:  Alert oriented x3, cranial 2-12 grossly intact              Lab, Imaging and other studies:  I have personally reviewed pertinent lab results    , CBC:   Lab Results   Component Value Date    WBC 8 05 01/03/2018    HGB 9 5 (L) 01/03/2018    HCT 32 1 (L) 01/03/2018    MCV 90 01/03/2018     01/03/2018    MCH 28 5 01/03/2018    MCHC 31 8 01/03/2018    RDW 12 8 01/03/2018    MPV 10 1 01/03/2018   , CMP:   Lab Results   Component Value Date     01/03/2018    K 3 4 (L) 01/03/2018     01/03/2018    CO2 26 01/03/2018    ANIONGAP 10 01/03/2018    BUN <1 (L) 01/03/2018    CREATININE 0 50 (L) 01/03/2018    GLUCOSE 129 01/03/2018    CALCIUM 8 5 01/03/2018    EGFR 111 01/03/2018   , Coagulation:   Lab Results   Component Value Date    INR 1 62 (H) 01/03/2018   , Urinalysis: No results found for: COLORU, CLARITYU, SPECGRAV, PHUR, LEUKOCYTESUR, NITRITE, PROTEINUA, GLUCOSEU, KETONESU, BILIRUBINUR, BLOODU, Amylase: No results found for: AMYLASE, Lipase: No results found for: LIPASE  VTE Pharmacologic Prophylaxis: Enoxaparin (Lovenox)  VTE Mechanical Prophylaxis: sequential compression device

## 2018-01-04 NOTE — MEDICAL STUDENT
MEDICAL STUDENT  Inpatient Progress Note for TRAINING ONLY  Not Part of Legal Medical Record       Progress Note - General Surgery  [employer]  Vicky Cramer 47 y o  female MRN: [id number]  Unit/Bed#: [facility] 515-01 Encounter: [id number]    Assessment and Plan:  Patient Active Problem List   Diagnosis    Diverticulitis    Gastroesophageal reflux disease without esophagitis    Hyperlipidemia    Essential hypertension    Hypokalemia    Hypoalbuminemia    Low serum prealbumin    Severe protein-calorie malnutrition (HCC)    Microscopic hematuria    Hypomagnesemia    Anemia    Diverticulitis of large intestine with perforation and bleeding without abscess    Hematuria    Ileostomy in place [facility])       Assessment:  51yo female who presented with perforated sigmoid diverticulitis s/p aborted Pj's procedure (proctosigmoidectomy)    Plan:  - Continue ICU care  - Continue intubation for respiratory support  - Return to OR today for re-exploration laparotomy  - DVT ppx with SQH  - IV Abx Zosyn 3 375g q6h IV and Flagyl 500mg q8 IV  - Diet NPO with IV fluids    - Consider PT consult to promote ambulation after today's produre  Subjective: Ms Madie Curtis was awake and alert when seen this morning  Due to intubation, used yes or no questions to obtain information with her and also offered pen and paper for additional questions  She declined this option  Per overnight nurse, no overnight events  Pain is 5 out of 10  Denies N/V  Diet NPO  Flatus none  Stool: 25cc's of dark liquid fecal material  No OOB, no ambulation  Objective:     Vitals   Vitals:    01/04/18 0230 01/04/18 0300 01/04/18 0357 01/04/18 0400   BP: 122/79 131/82  127/81   Pulse: 88 92  90   Resp: 13 14  14   Temp: 99 3 °F (37 4 °C) 99 3 °F (37 4 °C)  99 7 °F (37 6 °C)   SpO2: 99% 99% 99% 99%   Weight:       Height:         Temp  Min: 97 6 °F (36 4 °C)  Max: 100 9 °F (38 3 °C)  IBW: 47 8 kg   Body mass index is 38 91 kg/m²      I/O   I/O 01/02 0701 - 01/03 0700 01/03 0701 - 01/04 0700    I V  (mL/kg)  1214 8 (13)    IV Piggyback  450    Total Intake(mL/kg)  1664 8 (17 8)    Urine (mL/kg/hr)  1025    Drains  350    Stool  0    Total Output   1375    Net   +289 8                Intake/Output Summary (Last 24 hours) at 01/04/18 0544  Last data filed at 01/04/18 0400   Gross per 24 hour   Intake          1664 79 ml   Output             1375 ml   Net           289 79 ml       Invasive  Devices  Invasive Devices     Peripheral Intravenous Line            Peripheral IV 01/03/18 Left Antecubital less than 1 day    Peripheral IV 01/03/18 Left Hand less than 1 day    Peripheral IV 01/03/18 Right Antecubital less than 1 day          Drain            Ileostomy Standard (ted Walters) RUQ less than 1 day    NG/OG/Enteral Tube Orogastric 18 Fr Center mouth less than 1 day    Negative Pressure Wound Therapy (V A C ) Abdomen Mid less than 1 day    Ureteral Drain/Stent Left ureter 5 Fr  less than 1 day    Ureteral Drain/Stent Right ureter 5 Fr  less than 1 day    Urethral Catheter Latex 16 Fr  less than 1 day          Airway            ETT  Cuffed;Oral;Inflated 7 mm less than 1 day                Active medications  Scheduled Meds:  atorvastatin 10 mg Oral Daily   chlorhexidine 15 mL Swish & Spit Q12H [address]   heparin (porcine) 5,000 Units Subcutaneous Q8H [address]   metroNIDAZOLE 500 mg Intravenous Q8H   pantoprazole 40 mg Intravenous Q24H [address]   piperacillin-tazobactam 3 375 g Intravenous Q6H     Continuous Infusions:    fentaNYL 50 mcg/hr Last Rate: 50 mcg/hr (01/03/18 2021)   lactated ringers 125 mL/hr Last Rate: 125 mL/hr (01/04/18 0253)   propofol 5-50 mcg/kg/min Last Rate: Stopped (01/04/18 0130)     PRN Meds:     acetaminophen 650 mg Q6H PRN   fentanyl citrate (PF) 50 mcg Q2H PRN   ondansetron 4 mg Q6H PRN       Physical Exam: /81   Pulse 90   Temp 99 7 °F (37 6 °C)   Resp 14   Ht 5' 1" (1 549 m)   Wt 93 4 kg (205 lb 14 6 oz)   LMP  (LMP Unknown)   SpO2 99%   BMI 38 91 kg/m²     Physical Exam:  General Appearance: Alert, cooperative, no distress, appears stated age  Lungs: Clear to auscultation bilaterally, respirations unlablored   Heart: Regular rate and rhythm, S1 and S2 normal, no murmur, rub or gallop  Abdomen: Soft, non-tender, non distended, bowel sounds active in all four quadrants,   No masses or organomegaly    Laboratory and Diagnostics:    Results from last 7 days  Lab Units 01/04/18  0433 01/03/18  1950 01/03/18  1629 01/03/18  0538 01/02/18  0609   WBC Thousand/uL 12 65* 14 02*  --  8 05 7 66   HEMOGLOBIN g/dL 10 8* 10 2*  --  10 2* 10 0*   I STAT HEMOGLOBIN g/dl  --   --  9 5*  --   --    HEMATOCRIT % 33 2* 31 6*  --  32 1* 30 6*   PLATELETS Thousands/uL 418* 359  --  328 298   NEUTROS PCT % 85*  --   --  79* 77*   MONOS PCT % 6  --   --  5 6   MONO PCT MAN %  --  1*  --   --   --        Results from last 7 days  Lab Units 01/04/18  0433 01/03/18  1950 01/03/18  1629 01/03/18  0538  12/30/17  0506 12/29/17  0623   SODIUM mmol/L 136 139  --  139  < > 138 138   POTASSIUM mmol/L 4 0 3 8  --  3 4*  < > 3 8 3 4*   CHLORIDE mmol/L 103 103  --  103  < > 103 102   CO2 mmol/L 21 24  --  26  < > 30 29   BUN mg/dL 1* 1*  --  <1*  < > 1* 1*   CREATININE mg/dL 0 28* 0 40*  --  0 50*  < > 0 66 0 79   CALCIUM mg/dL 8 2* 8 4  --  8 5  < > 8 3 8 5   TOTAL PROTEIN g/dL  --  6 1*  --   --   --  6 4 6 9   BILIRUBIN TOTAL mg/dL  --  0 71  --   --   --  0 60 0 80   ALK PHOS U/L  --  48  --   --   --  68 75   ALT U/L  --  7*  --   --   --  14 15   AST U/L  --  12  --   --   --  11 11   GLUCOSE RANDOM mg/dL 144* 134  --  69  < > 86 124   GLUCOSE, ISTAT mg/dl  --   --  129  --   --   --   --    < > = values in this interval not displayed      Results from last 7 days  Lab Units 01/04/18  0433 01/03/18  1950 01/03/18  0538   MAGNESIUM mg/dL 2 2 1 6 1 6     Lab Results   Component Value Date    PHOS 3 3 01/04/2018    PHOS 4 1 01/03/2018    PHOS 3 0 12/29/2017        Results from last 7 days  Lab Units 01/03/18  1950 01/03/18  0742   INR  1 51* 1 62*   PTT seconds 38*  --      No results found for: TROPONINT  ABG:  Lab Results   Component Value Date    PHART 7 406 01/03/2018    QPF4UAY 34 1 (L) 01/03/2018    PO2ART 147 7 (H) 01/03/2018    CYL7VNT 20 9 (L) 01/03/2018    BEART -3 2 01/03/2018    SOURCE Radial, Left 01/03/2018       Blood Culture: No results found for: BLOODCX  Urine Culture:   Lab Results   Component Value Date    URINECX No Growth <1000 cfu/mL 12/26/2017       Imaging: I have personally reviewed pertinent reports        VTE Pharmacologic Prophylaxis: Heparin  VTE Mechanical Prophylaxis: sequential compression device    Sabina Dexter  Medical Student, Year 3  [employer], General Surgery

## 2018-01-04 NOTE — RESPIRATORY THERAPY NOTE
RT Ventilator Management Note  Maricruz Portillo 47 y o  female MRN: [id number]  Unit/Bed#: [facility] 515-01 Encounter: [id number]      Daily Screen     No data found  Physical Exam:   Assessment Type: Assess only  General Appearance: Drowsy  Respiratory Pattern: Assisted  Chest Assessment: Chest expansion symmetrical  Bilateral Breath Sounds: Diminished      Resp Comments: pt remained on AC/VC mode all throughout the night   No vent changes made

## 2018-01-04 NOTE — CASE MANAGEMENT
Initial Clinical Review    Admission: Date/Time/Statement: 1/3/18 @ Leonor Haider Written     Orders Placed This Encounter   Procedures    Inpatient Admission     Standing Status:   Standing     Number of Occurrences:   1     Order Specific Question:   Admitting Physician     Answer:   Ashley Bowden [[id number]]     Order Specific Question:   Level of Care     Answer:   Critical Care [15]     Order Specific Question:   Estimated length of stay     Answer:   More than 2 Midnights     Order Specific Question:   Certification     Answer:   I certify that inpatient services are medically necessary for this patient for a duration of greater than two midnights  See H&P and MD Progress Notes for additional information about the patient's course of treatment  History of Illness: 47 F w/ perforated sigmoid diverticulitis who failed conservative management  Planned for Pj's procedure however procedure was aborted due to significant inflammation and difficulty of dissection  She underwent ex lap, SBR, and ileostomy creation w/ open abdomen  Vital Signs:   Temperature Pulse Respirations Blood Pressure SpO2   01/03/18 2000 01/03/18 1930 01/03/18 1930 01/03/18 1930 01/03/18 1930   98 2 °F (36 8 °C) 90 21 101/61 100 %      Temp Source Heart Rate Source Patient Position - Orthostatic VS BP Location FiO2 (%)   01/04/18 0700 -- -- -- --   Probe          Pain Score       01/03/18 2000       No Pain        Wt Readings from Last 1 Encounters:   01/03/18 93 4 kg (205 lb 14 6 oz)       Vital Signs (abnormal): temp 100    Abnormal Labs/Diagnostic Test Results:   WBC 12 65 (H) 01/04/2018   HGB 10 8 (L) 01/04/2018   HCT 33 2 (L) 01/04/2018    (H) 01/04/2018     BUN 1 (L) 01/04/2018   CREATININE 0 28 (L) 01/04/2018   GLUCOSE 144 (H) 01/04/2018   CALCIUM 8 2 (L) 01/04/2018   ALT 7 (L) 01/03/2018   PROT 6 1 (L) 01/03/2018   ALBUMIN 2 4 (L) 01/03/2018     Past Medical/Surgical History:    Active Ambulatory Problems     Diagnosis Date Noted    Diverticulitis 12/04/2017    Gastroesophageal reflux disease without esophagitis 12/04/2017    Hyperlipidemia 12/04/2017    Essential hypertension 12/04/2017    Hypokalemia 12/04/2017    Hypoalbuminemia 12/26/2017    Low serum prealbumin 12/27/2017    Severe protein-calorie malnutrition ([facility] [address] ) 12/27/2017    Microscopic hematuria 12/28/2017    Hypomagnesemia 12/28/2017    Anemia 01/01/2018    Diverticulitis of large intestine with perforation and bleeding without abscess 12/26/2017     Resolved Ambulatory Problems     Diagnosis Date Noted    Pericolonic abscess 12/04/2017     Past Medical History:   Diagnosis Date    Disease of thyroid gland     Gastroesophageal reflux disease without esophagitis 12/4/2017    Hyperlipidemia     Hypertension     Vitamin D deficiency disease        Admitting Diagnosis: Diverticulitis [K57 92]  Colostomy in place [facility]) [Z93 3]    Age/Sex: 47 y o  female    Assessment/Plan:   Assessment:  47 F w/ perforated sigmoid diverticulitis who failed conservative management  Planned for Pj's procedure however procedure was aborted due to significant inflammation and difficulty of dissection  She underwent ex lap, SBR, and ileostomy creation w/ open abdomen     Plan:  1  Neuro             - GCS 11T, calm off propofol  Continue on fentanyl for pain control  2  Cardiac             - No issues             - Continue LR @ 125  3  Pulm             - Remained intubated due to return to OR  Plan to extubate post op if definitive therapy is performed               4  GI-  Perforated diverticulitis, s/p attempted hartmanns             - OR today as add on for ex lap, re-exploration, poss resection poss ostomy (patient signed own consent)             - Continue NPO/NGT  5  ID             - Continue Zosyn             - Trend WBC  6               - Hematuria 2/2 stent placement  Continue to monitor w/ montoya in place     7   Dispo             - Continue ICU care today    Admission Orders:  Telemetry   NPO  Daily weights, I/O  Intubation with mechanical ventilation, resp protocol  Consult nutr serv, cm  Sequential compression device    Scheduled Meds:   atorvastatin 10 mg Oral Daily   chlorhexidine 15 mL Swish & Spit Q12H [address]   heparin (porcine) 5,000 Units Subcutaneous Q8H [address]   metroNIDAZOLE 500 mg Intravenous Q8H   pantoprazole 40 mg Intravenous Q24H [address]   piperacillin-tazobactam 3 375 g Intravenous Q6H     Continuous Infusions:   fentaNYL 50 mcg/hr Last Rate: 50 mcg/hr (01/03/18 2021)   lactated ringers 125 mL/hr Last Rate: 125 mL/hr (01/04/18 0253)   propofol 5-50 mcg/kg/min Last Rate: Stopped (01/04/18 0130)     PRN Meds:   acetaminophen    fentanyl citrate (PF)    ondansetron    Thank you,  [facility] in the [facility] by Yonis Stover for 2017  Network Utilization Review Department  Phone: [phone]; Fax [phone]  ATTENTION: The Network Utilization Review Department is now centralized for our 7 Facilities  Make a note that we have a new phone and fax numbers for our Department  Please call with any questions or concerns to [phone] and carefully follow the prompts so that you are directed to the right person  All voicemails are confidential  Fax any determinations, approvals, denials, and requests for initial or continue stay review clinical to [phone]  Due to HIGH CALL volume, it would be easier if you could please send faxed requests to expedite your requests and in part, help us provide discharge notifications faster

## 2018-01-04 NOTE — MALNUTRITION/BMI
This medical record reflects one or more clinical indicators suggestive of malnutrition and/or morbid obesity  Please indicate the one diagnosis below which you feel best reflects the clinical picture  Malnutrition Findings:   acute illness  other severe protein calorie malnutrition    Pt meets criteria w/ poor PO intake for >5 days and temples depressed    See Nutrition note dated 1/4/18 for additional details  Completed nutrition assessment is viewable in the nutrition documentation

## 2018-01-04 NOTE — RESPIRATORY THERAPY NOTE
RT Ventilator Management Note  Wesly Quezada 47 y o  female MRN: [id number]  Unit/Bed#: [facility] 515-01 Encounter: [id number]      Daily Screen       1/4/2018 0844             Patient safety screen outcome[de-identified] Failed    Not Ready for Weaning due to[de-identified] Underline problem not resolved            Physical Exam:   Assessment Type: Assess only  General Appearance: Drowsy  Respiratory Pattern: Normal, Assisted  Chest Assessment: Chest expansion symmetrical  Bilateral Breath Sounds: Clear      Resp Comments: (P) pt arousable on vent  wean not started  poss OR today

## 2018-01-04 NOTE — NURSING NOTE
Pt's HR increases low 100's to 140's, pt grabs ABD and moderate amount of bile comes from oral airway and OG dumps 100 ml of green bile  Gave pt prn fentanyl  as ordered  Pt denies nausea  Pt writes, " I want to be knocked out"  After care, pt expresses no need for any other pain medication or sedation  HR returns to high 90's-low 100  Reinforced pt to call if assistance is needed  Pt verbalizes an understanding  Call bell within reach

## 2018-01-04 NOTE — PROGRESS NOTES
Progress Note - General Surgery   Marilu Limb 47 y o  female MRN: [id number]  Unit/Bed#: [facility] 373-30 Encounter: [id number]    Assessment:  47 F w/ perforated sigmoid diverticulitis who failed conservative management  Planned for Pj's procedure however procedure was aborted due to significant inflammation and difficulty of dissection  She underwent ex lap, SBR, and ileostomy creation w/ open abdomen    Plan:  1  Neuro   - GCS 11T, calm off propofol  Continue on fentanyl for pain control  2  Cardiac   - No issues   - Continue LR @ 125  3  Pulm   - Remained intubated due to return to OR  Plan to extubate post op if definitive therapy is performed     4  GI-  Perforated diverticulitis, s/p attempted hartmanns   - OR today as add on for ex lap, re-exploration, poss resection poss ostomy (patient signed own consent)   - Continue NPO/NGT  5  ID   - Continue Zosyn   - Trend WBC  6     - Hematuria 2/2 stent placement  Continue to monitor w/ montoya in place    7  Dispo   - Continue ICU care today    Subjective/Objective   Chief Complaint: None    Subjective: Patient is A&O, NAD on vent  Asking (on paper) where she is and what happened  Denies pain  Objective: No issues overnight    Blood pressure 127/81, pulse 90, temperature 99 7 °F (37 6 °C), resp  rate 14, height 5' 1" (1 549 m), weight 93 4 kg (205 lb 14 6 oz), SpO2 99 %  ,Body mass index is 38 91 kg/m²        Intake/Output Summary (Last 24 hours) at 01/04/18 0545  Last data filed at 01/04/18 0400   Gross per 24 hour   Intake          1664 79 ml   Output             1375 ml   Net           289 79 ml       Invasive Devices     Peripheral Intravenous Line            Peripheral IV 01/03/18 Left Antecubital less than 1 day    Peripheral IV 01/03/18 Left Hand less than 1 day    Peripheral IV 01/03/18 Right Antecubital less than 1 day          Drain            Ileostomy Standard (ted Walters) RUQ less than 1 day    NG/OG/Enteral Tube Orogastric 18 Fr Center mouth less than 1 day    Negative Pressure Wound Therapy (V A C ) Abdomen Mid less than 1 day    Ureteral Drain/Stent Left ureter 5 Fr  less than 1 day    Ureteral Drain/Stent Right ureter 5 Fr  less than 1 day    Urethral Catheter Latex 16 Fr  less than 1 day          Airway            ETT  Cuffed;Oral;Inflated 7 mm less than 1 day                Physical Exam:   Gen: A&O, NAD  Cardio: RRR  Lungs: CTAB  Abd: Soft, non distended  Vac to midline, 400 cc sanginous output in canister   ILeostomy to RLQ, no gas/air  Ext: Warm, dry  Vasc: Intact      Lab, Imaging and other studies:  CBC:   Lab Results   Component Value Date    WBC 12 65 (H) 01/04/2018    HGB 10 8 (L) 01/04/2018    HCT 33 2 (L) 01/04/2018    MCV 88 01/04/2018     (H) 01/04/2018    MCH 28 5 01/04/2018    MCHC 32 5 01/04/2018    RDW 13 3 01/04/2018    MPV 10 1 01/04/2018    NRBC 0 01/04/2018   , CMP:   Lab Results   Component Value Date     01/04/2018    K 4 0 01/04/2018     01/04/2018    CO2 21 01/04/2018    ANIONGAP 12 01/04/2018    BUN 1 (L) 01/04/2018    CREATININE 0 28 (L) 01/04/2018    GLUCOSE 144 (H) 01/04/2018    GLUCOSE 129 01/03/2018    CALCIUM 8 2 (L) 01/04/2018    AST 12 01/03/2018    ALT 7 (L) 01/03/2018    ALKPHOS 48 01/03/2018    PROT 6 1 (L) 01/03/2018    ALBUMIN 2 4 (L) 01/03/2018    BILITOT 0 71 01/03/2018    EGFR 134 01/04/2018     VTE Pharmacologic Prophylaxis: Heparin  VTE Mechanical Prophylaxis: sequential compression device

## 2018-01-04 NOTE — H&P
H&P Exam - General Surgery   Jenni Garcia 47 y o  female MRN: [id number]  Unit/Bed#: [facility] 811-23 Encounter: [id number]    Assessment/Plan     Assessment:  47 F w/ perforated sigmoid diverticulitis who failed conservative management  Planned for Pj's procedure however procedure was aborted due to significant inflammation and difficulty of dissection  She underwent ex lap, SBR, and ileostomy creation w/ open abdomen  Plan:  Admit to ICU  Obtain endpoints at this time, correct as necessary  To remain intubated  OR tomorrow for ex lap, re-exploration  Continue Zosyn, Flagyl  SQH for DVT ppx  Continue NPO w/ IVF    History of Present Illness     HPI:  Jenni Garcia is a 47 y o  female who [facility] on 12/26 with sigmoid diverticulitis with microperforation  She had also presented earlier that month as well with similar symptoms  Surgery was consulted and the patient was treated conservatively with IV antibiotics and bowel rest  However, her symptoms failed to improve, and repeat CT also showed non significant improvement  She was planned for exploratory laparotomy and burgess's procedure for today, 1/3  Preoperatively b/l ureteral stents were placed by urology  The operative course proved difficulty as there was significant inflammation present  The small bowel was inadvertently injured which required SBR and ileostomy  The burgess's was unable to be completed and the patient remained intubated and sedated post op with a vac over an open abdomen  She was [facility] for further care  Her last scope was in 2016 which reportedly showed diverticular disase      t  Review of Systems unable to be performed due to intubation    Historical Information   Past Medical History:   Diagnosis Date    Disease of thyroid gland     Gastroesophageal reflux disease without esophagitis 12/4/2017    Hyperlipidemia     Hypertension     Vitamin D deficiency disease      Past Surgical History: Procedure Laterality Date    CHOLECYSTECTOMY      OR ESOPHAGOGASTRODUODENOSCOPY TRANSORAL DIAGNOSTIC N/A 10/21/2016    Procedure: EGD AND COLONOSCOPY;  Surgeon: Eva Arauz MD;  Location: [facility];  Service: Gastroenterology    THYROIDECTOMY, PARTIAL      TONSILLECTOMY AND ADENOIDECTOMY      TUBAL LIGATION       Social History   History   Alcohol Use    Yes     Comment: social     History   Drug Use No     History   Smoking Status    Former Smoker   Smokeless Tobacco    Never Used     Family History: History reviewed  No pertinent family history  Meds/Allergies   all medications and allergies reviewed  No Known Allergies    Objective   First Vitals:        Current Vitals:        No intake or output data in the 24 hours ending 01/03/18 1943    Invasive Devices     Peripheral Intravenous Line            Peripheral IV 01/03/18 Left Antecubital less than 1 day    Peripheral IV 01/03/18 Left Hand less than 1 day          Drain            Ileostomy Standard (ted Walters) RUQ less than 1 day    NG/OG/Enteral Tube Orogastric 18 Fr Center mouth less than 1 day    Ureteral Drain/Stent Left ureter 5 Fr  less than 1 day    Ureteral Drain/Stent Right ureter 5 Fr  less than 1 day    Urethral Catheter Latex 16 Fr  less than 1 day          Airway            ETT  Cuffed;Oral;Inflated 7 mm less than 1 day                Physical Exam   Gen: Intubated, sedated  Neuro: GCS 11T when sedation held  Cardio: RRR  Lungs: CTAB  Abd: Soft, non distended, no apparent tenderness  Midline vac in place, serosanginous drainage  Ileostomy to RLQ     : Soto in place with bloody urine  Vasc: Intact    Lab Results:   CBC:   Lab Results   Component Value Date    WBC 8 05 01/03/2018    HGB 9 5 (L) 01/03/2018    HCT 32 1 (L) 01/03/2018    MCV 90 01/03/2018     01/03/2018    MCH 28 5 01/03/2018    MCHC 31 8 01/03/2018    RDW 12 8 01/03/2018    MPV 10 1 01/03/2018   , CMP:   Lab Results   Component Value Date     01/03/2018 K 3 4 (L) 01/03/2018     01/03/2018    CO2 26 01/03/2018    ANIONGAP 10 01/03/2018    BUN <1 (L) 01/03/2018    CREATININE 0 50 (L) 01/03/2018    GLUCOSE 129 01/03/2018    CALCIUM 8 5 01/03/2018    EGFR 111 01/03/2018   , Coagulation:   Lab Results   Component Value Date    INR 1 62 (H) 01/03/2018     Imaging: I have personally reviewed pertinent reports  EKG, Pathology, and Other Studies: I have personally reviewed pertinent reports  Code Status: Level 1 - Full Code  Advance Directive and Living Will:      Power of :    POLST:      Counseling / Coordination of Care  Total floor / unit time spent today 30 minutes  Greater than 50% of total time was spent with the patient and / or family counseling and / or coordination of care

## 2018-01-04 NOTE — PLAN OF CARE
Nutrition/Hydration-ADULT     Nutrient/Hydration intake appropriate for improving, restoring or maintaining nutritional needs Progressing        Prexisting or High Potential for Compromised Skin Integrity     Skin integrity is maintained or improved Progressing        SAFETY,RESTRAINT: NV/NON-SELF DESTRUCTIVE BEHAVIOR     Remains free of harm/injury (restraint for non violent/non self-detsructive behavior) Progressing     Returns to optimal restraint-free functioning Progressing

## 2018-01-04 NOTE — ANESTHESIA PREPROCEDURE EVALUATION
Review of Systems/Medical History  Patient summary reviewed  Chart reviewed      Cardiovascular  EKG reviewed, Hyperlipidemia, Hypertension controlled,   Comment: nml TTE June 2016    NSR,  Pulmonary  , Oxygen dependent intubated and ventilator,        GI/Hepatic    GERD well controlled,   Comment: Diverticulitis with abscess          Endo/Other  Negative endo/other ROS      GYN  Negative gynecology ROS          Hematology  Anemia ,     Musculoskeletal  Obesity ,        Neurology  Negative neurology ROS      Psychology   Negative psychology ROS          Physical Exam    Airway  Comment: Intubated and ventilated  Mallampati score: I  TM Distance: >3 FB  Neck ROM: full     Dental   No notable dental hx     Cardiovascular  Cardiovascular exam normal    Pulmonary  Pulmonary exam normal     Other Findings      Lab Results   Component Value Date    WBC 12 65 (H) 01/04/2018    HGB 10 8 (L) 01/04/2018    HCT 33 2 (L) 01/04/2018    MCV 88 01/04/2018     (H) 01/04/2018     Lab Results   Component Value Date    GLUCOSE 144 (H) 01/04/2018    CALCIUM 8 2 (L) 01/04/2018     01/04/2018    K 4 0 01/04/2018    CO2 21 01/04/2018     01/04/2018    BUN 1 (L) 01/04/2018    CREATININE 0 28 (L) 01/04/2018     Lab Results   Component Value Date    INR 1 39 (H) 01/04/2018    INR 1 51 (H) 01/03/2018    INR 1 62 (H) 01/03/2018    PROTIME 17 1 (H) 01/04/2018    PROTIME 18 3 (H) 01/03/2018    PROTIME 19 2 (H) 01/03/2018     Lab Results   Component Value Date    PTT 38 (H) 01/03/2018     Type and Screen:  O      Anesthesia Plan  ASA Score- 3     Anesthesia Type- general with ASA Monitors  Additional Monitors:   Airway Plan: ETT  Comment: Patient intubated and ventilated  Plan Factors-    Induction- intravenous  Postoperative Plan- Plan for postoperative opioid use  Informed Consent- Anesthetic plan and risks discussed with patient  I personally reviewed this patient with the CRNA   Discussed and agreed on the Anesthesia Plan with the CRNA  Nae Torres

## 2018-01-05 PROBLEM — I48.91 A-FIB (HCC): Status: ACTIVE | Noted: 2018-01-05

## 2018-01-05 LAB
ANION GAP SERPL CALCULATED.3IONS-SCNC: 4 MMOL/L (ref 4–13)
ANION GAP SERPL CALCULATED.3IONS-SCNC: 8 MMOL/L (ref 4–13)
ANION GAP SERPL CALCULATED.3IONS-SCNC: 8 MMOL/L (ref 4–13)
ATRIAL RATE: 116 BPM
BACTERIA SPEC ANAEROBE CULT: NORMAL
BACTERIA UR CULT: NORMAL
BASE EXCESS BLDA CALC-SCNC: -1.1 MMOL/L
BASOPHILS # BLD AUTO: 0.01 THOUSANDS/ΜL (ref 0–0.1)
BASOPHILS NFR BLD AUTO: 0 % (ref 0–1)
BODY TEMPERATURE: 99 DEGREES FEHRENHEIT
BUN SERPL-MCNC: 3 MG/DL (ref 5–25)
BUN SERPL-MCNC: 4 MG/DL (ref 5–25)
BUN SERPL-MCNC: 7 MG/DL (ref 5–25)
CALCIUM SERPL-MCNC: 7.7 MG/DL (ref 8.3–10.1)
CALCIUM SERPL-MCNC: 7.8 MG/DL (ref 8.3–10.1)
CALCIUM SERPL-MCNC: 7.9 MG/DL (ref 8.3–10.1)
CHLORIDE SERPL-SCNC: 105 MMOL/L (ref 100–108)
CHLORIDE SERPL-SCNC: 106 MMOL/L (ref 100–108)
CHLORIDE SERPL-SCNC: 106 MMOL/L (ref 100–108)
CO2 SERPL-SCNC: 25 MMOL/L (ref 21–32)
CO2 SERPL-SCNC: 26 MMOL/L (ref 21–32)
CO2 SERPL-SCNC: 30 MMOL/L (ref 21–32)
CREAT SERPL-MCNC: 0.42 MG/DL (ref 0.6–1.3)
CREAT SERPL-MCNC: 0.43 MG/DL (ref 0.6–1.3)
CREAT SERPL-MCNC: 0.47 MG/DL (ref 0.6–1.3)
EOSINOPHIL # BLD AUTO: 0 THOUSAND/ΜL (ref 0–0.61)
EOSINOPHIL NFR BLD AUTO: 0 % (ref 0–6)
ERYTHROCYTE [DISTWIDTH] IN BLOOD BY AUTOMATED COUNT: 13.4 % (ref 11.6–15.1)
ERYTHROCYTE [DISTWIDTH] IN BLOOD BY AUTOMATED COUNT: 13.6 % (ref 11.6–15.1)
GFR SERPL CREATININE-BSD FRML MDRD: 113 ML/MIN/1.73SQ M
GFR SERPL CREATININE-BSD FRML MDRD: 116 ML/MIN/1.73SQ M
GFR SERPL CREATININE-BSD FRML MDRD: 117 ML/MIN/1.73SQ M
GLUCOSE SERPL-MCNC: 112 MG/DL (ref 65–140)
GLUCOSE SERPL-MCNC: 128 MG/DL (ref 65–140)
GLUCOSE SERPL-MCNC: 157 MG/DL (ref 65–140)
GLUCOSE SERPL-MCNC: 162 MG/DL (ref 65–140)
HCO3 BLDA-SCNC: 22.7 MMOL/L (ref 22–28)
HCT VFR BLD AUTO: 28.6 % (ref 34.8–46.1)
HCT VFR BLD AUTO: 28.7 % (ref 34.8–46.1)
HGB BLD-MCNC: 9.3 G/DL (ref 11.5–15.4)
HGB BLD-MCNC: 9.5 G/DL (ref 11.5–15.4)
HOROWITZ INDEX BLDA+IHG-RTO: 40 MM[HG]
LACTATE SERPL-SCNC: 1.5 MMOL/L (ref 0.5–2)
LYMPHOCYTES # BLD AUTO: 1.11 THOUSANDS/ΜL (ref 0.6–4.47)
LYMPHOCYTES NFR BLD AUTO: 8 % (ref 14–44)
MAGNESIUM SERPL-MCNC: 1.5 MG/DL (ref 1.6–2.6)
MAGNESIUM SERPL-MCNC: 2.1 MG/DL (ref 1.6–2.6)
MCH RBC QN AUTO: 28.4 PG (ref 26.8–34.3)
MCH RBC QN AUTO: 29 PG (ref 26.8–34.3)
MCHC RBC AUTO-ENTMCNC: 32.5 G/DL (ref 31.4–37.4)
MCHC RBC AUTO-ENTMCNC: 33.1 G/DL (ref 31.4–37.4)
MCV RBC AUTO: 88 FL (ref 82–98)
MCV RBC AUTO: 88 FL (ref 82–98)
MONOCYTES # BLD AUTO: 0.85 THOUSAND/ΜL (ref 0.17–1.22)
MONOCYTES NFR BLD AUTO: 6 % (ref 4–12)
NEUTROPHILS # BLD AUTO: 12.46 THOUSANDS/ΜL (ref 1.85–7.62)
NEUTS SEG NFR BLD AUTO: 86 % (ref 43–75)
NRBC BLD AUTO-RTO: 0 /100 WBCS
O2 CT BLDA-SCNC: 14.1 ML/DL (ref 16–23)
OXYHGB MFR BLDA: 97.3 % (ref 94–97)
P AXIS: 54 DEGREES
PCO2 BLDA: 34.8 MM HG (ref 36–44)
PCO2 TEMP ADJ BLDA: 35.1 MM HG (ref 36–44)
PEEP RESPIRATORY: 5 CM[H2O]
PH BLD: 7.43 [PH] (ref 7.35–7.45)
PH BLDA: 7.43 [PH] (ref 7.35–7.45)
PHOSPHATE SERPL-MCNC: 2.4 MG/DL (ref 2.7–4.5)
PHOSPHATE SERPL-MCNC: 2.7 MG/DL (ref 2.7–4.5)
PLATELET # BLD AUTO: 378 THOUSANDS/UL (ref 149–390)
PLATELET # BLD AUTO: 436 THOUSANDS/UL (ref 149–390)
PMV BLD AUTO: 9.3 FL (ref 8.9–12.7)
PMV BLD AUTO: 9.7 FL (ref 8.9–12.7)
PO2 BLD: 137.2 MM HG (ref 75–129)
PO2 BLDA: 136 MM HG (ref 75–129)
POTASSIUM SERPL-SCNC: 3.7 MMOL/L (ref 3.5–5.3)
PR INTERVAL: 133 MS
QRS AXIS: 66 DEGREES
QRSD INTERVAL: 92 MS
QT INTERVAL: 292 MS
QTC INTERVAL: 406 MS
RBC # BLD AUTO: 3.27 MILLION/UL (ref 3.81–5.12)
RBC # BLD AUTO: 3.28 MILLION/UL (ref 3.81–5.12)
SODIUM SERPL-SCNC: 139 MMOL/L (ref 136–145)
SODIUM SERPL-SCNC: 139 MMOL/L (ref 136–145)
SODIUM SERPL-SCNC: 140 MMOL/L (ref 136–145)
SPECIMEN SOURCE: ABNORMAL
T WAVE AXIS: -56 DEGREES
VENT AC: 14
VENT- AC: AC
VENTRICULAR RATE: 116 BPM
VT SETTING VENT: 450 ML
WBC # BLD AUTO: 14.5 THOUSAND/UL (ref 4.31–10.16)
WBC # BLD AUTO: 17.08 THOUSAND/UL (ref 4.31–10.16)

## 2018-01-05 PROCEDURE — 80048 BASIC METABOLIC PNL TOTAL CA: CPT | Performed by: PHYSICIAN ASSISTANT

## 2018-01-05 PROCEDURE — 80048 BASIC METABOLIC PNL TOTAL CA: CPT | Performed by: EMERGENCY MEDICINE

## 2018-01-05 PROCEDURE — 93005 ELECTROCARDIOGRAM TRACING: CPT

## 2018-01-05 PROCEDURE — 94760 N-INVAS EAR/PLS OXIMETRY 1: CPT

## 2018-01-05 PROCEDURE — 83735 ASSAY OF MAGNESIUM: CPT | Performed by: EMERGENCY MEDICINE

## 2018-01-05 PROCEDURE — 94003 VENT MGMT INPAT SUBQ DAY: CPT

## 2018-01-05 PROCEDURE — 82948 REAGENT STRIP/BLOOD GLUCOSE: CPT

## 2018-01-05 PROCEDURE — 84100 ASSAY OF PHOSPHORUS: CPT | Performed by: EMERGENCY MEDICINE

## 2018-01-05 PROCEDURE — C9113 INJ PANTOPRAZOLE SODIUM, VIA: HCPCS | Performed by: PHYSICIAN ASSISTANT

## 2018-01-05 PROCEDURE — 85027 COMPLETE CBC AUTOMATED: CPT | Performed by: PHYSICIAN ASSISTANT

## 2018-01-05 RX ORDER — METOPROLOL TARTRATE 5 MG/5ML
2.5 INJECTION INTRAVENOUS ONCE
Status: COMPLETED | OUTPATIENT
Start: 2018-01-05 | End: 2018-01-05

## 2018-01-05 RX ORDER — MAGNESIUM SULFATE HEPTAHYDRATE 40 MG/ML
2 INJECTION, SOLUTION INTRAVENOUS ONCE
Status: COMPLETED | OUTPATIENT
Start: 2018-01-05 | End: 2018-01-05

## 2018-01-05 RX ADMIN — PROPOFOL 30 MCG/KG/MIN: 10 INJECTION, EMULSION INTRAVENOUS at 01:58

## 2018-01-05 RX ADMIN — METOPROLOL TARTRATE 2.5 MG: 1 INJECTION, SOLUTION INTRAVENOUS at 23:16

## 2018-01-05 RX ADMIN — MAGNESIUM SULFATE HEPTAHYDRATE 2 G: 40 INJECTION, SOLUTION INTRAVENOUS at 00:51

## 2018-01-05 RX ADMIN — HEPARIN SODIUM 5000 UNITS: 5000 INJECTION, SOLUTION INTRAVENOUS; SUBCUTANEOUS at 14:24

## 2018-01-05 RX ADMIN — PANTOPRAZOLE SODIUM 40 MG: 40 INJECTION, POWDER, FOR SOLUTION INTRAVENOUS at 08:53

## 2018-01-05 RX ADMIN — DEXTROSE MONOHYDRATE 3.38 G: 50 INJECTION, SOLUTION INTRAVENOUS at 20:39

## 2018-01-05 RX ADMIN — DEXTROSE MONOHYDRATE 3.38 G: 50 INJECTION, SOLUTION INTRAVENOUS at 08:53

## 2018-01-05 RX ADMIN — FENTANYL CITRATE 50 MCG: 50 INJECTION INTRAMUSCULAR; INTRAVENOUS at 10:47

## 2018-01-05 RX ADMIN — AMIODARONE HYDROCHLORIDE 150 MG: 50 INJECTION, SOLUTION INTRAVENOUS at 23:49

## 2018-01-05 RX ADMIN — HYDROMORPHONE HYDROCHLORIDE 0.5 MG: 1 INJECTION, SOLUTION INTRAMUSCULAR; INTRAVENOUS; SUBCUTANEOUS at 20:37

## 2018-01-05 RX ADMIN — METOPROLOL TARTRATE 2.5 MG: 1 INJECTION, SOLUTION INTRAVENOUS at 10:49

## 2018-01-05 RX ADMIN — CHLORHEXIDINE GLUCONATE 15 ML: 1.2 RINSE ORAL at 08:53

## 2018-01-05 RX ADMIN — HYDROMORPHONE HYDROCHLORIDE 0.5 MG: 1 INJECTION, SOLUTION INTRAMUSCULAR; INTRAVENOUS; SUBCUTANEOUS at 14:21

## 2018-01-05 RX ADMIN — HEPARIN SODIUM 5000 UNITS: 5000 INJECTION, SOLUTION INTRAVENOUS; SUBCUTANEOUS at 06:18

## 2018-01-05 RX ADMIN — SODIUM CHLORIDE, SODIUM LACTATE, POTASSIUM CHLORIDE, AND CALCIUM CHLORIDE 500 ML: .6; .31; .03; .02 INJECTION, SOLUTION INTRAVENOUS at 02:34

## 2018-01-05 RX ADMIN — PROPOFOL 20 MCG/KG/MIN: 10 INJECTION, EMULSION INTRAVENOUS at 08:30

## 2018-01-05 RX ADMIN — SODIUM CHLORIDE, SODIUM LACTATE, POTASSIUM CHLORIDE, AND CALCIUM CHLORIDE 125 ML/HR: .6; .31; .03; .02 INJECTION, SOLUTION INTRAVENOUS at 07:53

## 2018-01-05 RX ADMIN — POTASSIUM PHOSPHATE, MONOBASIC AND POTASSIUM PHOSPHATE, DIBASIC 12 MMOL: 224; 236 INJECTION, SOLUTION INTRAVENOUS at 01:53

## 2018-01-05 RX ADMIN — DEXTROSE MONOHYDRATE 3.38 G: 50 INJECTION, SOLUTION INTRAVENOUS at 01:57

## 2018-01-05 RX ADMIN — DEXTROSE MONOHYDRATE 3.38 G: 50 INJECTION, SOLUTION INTRAVENOUS at 14:24

## 2018-01-05 RX ADMIN — FENTANYL CITRATE 50 MCG: 50 INJECTION INTRAMUSCULAR; INTRAVENOUS at 12:10

## 2018-01-05 RX ADMIN — HYDROMORPHONE HYDROCHLORIDE 0.5 MG: 1 INJECTION, SOLUTION INTRAMUSCULAR; INTRAVENOUS; SUBCUTANEOUS at 17:34

## 2018-01-05 NOTE — OP NOTE
OPERATIVE REPORT  PATIENT NAME: Mark De La Paz    :  1965  MRN: [id number]  Pt Location: BE OR ROOM 07    SURGERY DATE: 2018    Surgeon(s) and Role:     * Shine Block MD - Assisting     * Sherita Mckinley DO - Primary     * Sara Gupta - Assisting    Preop Diagnosis:  Diverticulitis of large intestine with perforation and bleeding without abscess [K57 21]    Post-Op Diagnosis Codes:     * Diverticulitis of large intestine with perforation and bleeding without abscess [K57 21]    Procedure(s) (LRB):  LAPAROTOMY EXPLORATORY, 2nd look, small bowel resection, ilieostomy closure via handsown anastamosis transverse colostomy (N/A)    Specimen(s):  ID Type Source Tests Collected by Time Destination   1 : PORTION OF DESCENDING COLON Tissue Colon TISSUE EXAM Sherita Mckinley DO 2018    2 : PORTION OF TERMINAL ILEUM Tissue Ileum, ileostomy stoma TISSUE EXAM Sherita Mckinley DO 2018    3 :  Tissue Colon TISSUE EXAM Sherita Mckinley DO 2018        Estimated Blood Loss:   150 mL    Drains:  Negative Pressure Wound Therapy (V A C ) Abdomen Mid (Active)   Unit Type ulta vac 2018  8:00 AM   Cycle Continuous 2018  4:00 PM   Target Pressure (mmHg) 125 2018  4:00 PM   Canister Changed No 2018  4:00 PM   Dressing Status Clean;Dry; Intact 2018  4:00 PM   Output (mL) 100 mL 2018  6:00 PM   Number of days: 1       Urethral Catheter Latex 16 Fr  (Active)   Site Assessment Clean;Skin intact; Patent 2018  4:00 PM   Collection Container Standard drainage bag 2018  4:00 PM   Securement Method Securing device (Describe) 2018  4:00 PM   Output (mL) 100 mL 2018  6:00 PM   Number of days: 1       Ureteral Drain/Stent Left ureter 5 Fr  (Active)   Site Assessment AMNA 2018  4:00 PM   Number of days: 1       Ureteral Drain/Stent Right ureter 5 Fr   (Active)   Site Assessment AMNA 2018  4:00 PM   Number of days: 1       Anesthesia Type:   General    Operative Indications:  Diverticulitis of large intestine with perforation and bleeding without abscess [K57 21]  Open abdomen    Operative Findings:  rectum very thickened and inflamed all the way up through the transected sigmoid  Ileostomy necrotic appearing  Ultimately able to perform a small bowel small bowel anastomosis using hand-sewn technique, end transverse colostomy, and sigmoid resection  Complications:   None    Procedure and Technique: This is a 54-year-old female who went to the operating room yesterday at [facility] for a Pj's procedure  She had been having symptoms of diverticulitis for the past month and had failure of conservative management  Therefore she was taken to the operating room yesterday for attempted Pj's  However while in the OR  She was noted to have extremely diseased sigmoid all the way down to her distal rectum, and ultimately needed to abort the procedure  She was transferred to [facility] and went to the operating room today for re-exploration possible bowel resection and possible ostomy  All risks, benefits, alternatives were explained to the patient and the patient agreed with the plan of care  Patient was already intubated and taken from the ICU directly to the operating room  She was moved over to the operating room table and placed in supine position with her arms out  Soto catheter was already placed arterial line was placed by Anesthesia  Her temporary VAC dressing was removed and her abdomen was prepped with Betadine and draped in usual sterile fashion  A time-out called to review the procedure and details  Antibiotics were administered  The green towel was carefully removed using warm saline  The medially upon inspection the abdomen, she was noted to have a very thickened diseased sigmoid colon all the way down to the rectum    We continued to inspect the abdomen and the transected portion of the descending colon and transverse colon and cecum all looked very healthy and viable  Her however her ileostomy was necrotic appearing given all these findings we 1st turned our attention to the mobilization of the splenic flexure  This was performed in a lateral to medial fashion by taking down the lateral attachments of the colon with Bovie electrocautery  The colon was then mobilized medially up to the transverse colon  The omentum was mobilized off the transverse colon using Bovie electric cautery as well as using the EnSeal device  Once we felt we had adequate mobilization, we then turned our attention to the sigmoid colon  The sigmoid colon as noted previously was quite thickened and inflamed  However it was also appeared devitalized  Therefore we felt that it would be safest to remove the portion of devitalized sigmoid colon  This was performed by 1st incising the rind around the sigmoid and down to the rectum using Bovie electrocautery  Once the rind around these portion of the colon was removed were able to get down to soft colon  At this point we were able to get a contour stapler and a green load around the rectum  This portion the sigmoid was then transected and sent to pathology  The rectal stump was tagged with a 2 0 Prolene suture  Hemostasis was achieved  We then turned our attention to the ileostomy  The ileostomy was frankly necrotic appearing  Therefore we decided to mobilize this portion of the ileostomy completely and the necrotic appearing ileostomy was transected  This was performed using a CAMDEN 75 mm stapler  We then re-attempted to site the ileostomy and every time we did the ileostomy would become ischemic  It appeared that the mesentery would become crushed upon mobilization of the terminal ileum into the ostomy site  The ischemic portion of the small bowel was transected again using the CAMDEN stapler   At this point, given the fact that the ileostomy did not appear to be a viable option, we decided to perform a primary anastomosis  Both ends of the small bowel appeared healthy and viable  The small bowel small bowel anastomosis was performed using hand-sewn technique in 2 layers  The back wall was buttressed using 3 0 silk pop-off sutures  The anastomosis was performed using 4 0 PDS  The front wall was then Re buttressed using 3 0 silk pop-off sutures again  At this point we turned our attention back to the end colostomy  The transverse colon at this point was well mobilized and ready to be delivered through the ostomy site  A circular incision was then made 2 finger breaths below the left subcostal margin  This is taken down through the subcutaneous fat and the fascia was identified  It was incised using Bovie electrocautery  The fascial defect was then widened using Bovie electric cautery as well as manual traction  The and colostomy was then delivered through the colostomy site  At this point we reinspected the abdomen  We noted 2 defects in the omentum from our dissection of the omentum off the transverse colon  One of the defects was closed using a figure-of-eight 3 0 silk pop-off suture  The other defect was actually used to deliver the ostomy  The remaining omentum was then draped over the bowel  Once the end colostomy was delivered through the ostomy site, we turned our attention to closure of the ileostomy site  This was performed by 1st closing the posterior sheath with 2 0 Prolene suture in running fashion  The anterior sheath was closed with 0 Vicryl in a running fashion  The skin was closed in a pursestring fashion using 3 0 Monocryl and 1 Betadine wick was placed in the middle of the wound  The abdomen was copiously irrigated with warm saline  Hemostasis was excellent  At this point, 2 Sebastian-Mcmahon drains were placed in the abdomen, 1 through the right lower quadrant and the other through the left lower quadrant    The right lower quadrant drain was placed adjacent to the cecum and the left lower quadrant drain was placed around the rectal stump  At this point, the fascia was closed with running 1  PDS looped from proximally and distally and tied in the middle  The skin was closed loosely with staples and Betadine sae  A total of 5 Betadine sae were placed in the midline wound and 1 Betadine wick in the old right lower quadrant ileostomy site  Once the wound was closed  We turned our attention to the colostomy  This was matured using 3 0 chromic sutures in a Selena fashion  The patient was then brought back to the ICU intubated in critical but stable condition  All instrument and sponge counts were correct at the conclusion of the case  Dr Dominique Leak was present for the entire operation  RF scanning was negative   An x-ray was performed to ensure there were no retained objects and this was confirmed with the attending radiologist             Patient Disposition:  Critical Care Unit    SIGNATURE: Christina Sparrow MD  DATE: January 4, 2018  TIME: 10:56 PM

## 2018-01-05 NOTE — RESPIRATORY THERAPY NOTE
RT Ventilator Management Note  Kim Piña 47 y o  female MRN: [id number]  Unit/Bed#: [facility] 958-52 Encounter: [id number]      Daily Screen       1/4/2018 0844             Patient safety screen outcome[de-identified] Failed    Not Ready for Weaning due to[de-identified] Underline problem not resolved            Physical Exam:   Assessment Type: Assess only  Respiratory Pattern: Assisted  Chest Assessment: Chest expansion symmetrical      Resp Comments: pt remained on AC/VC mode all evening/night  No vent changes made   Tolerated well

## 2018-01-05 NOTE — CASE MANAGEMENT
Thank you,  [address] in the [facility] by Yonis Stover for 2017  Network Utilization Review Department  Phone: [phone]; Fax [phone]  ATTENTION: The Network Utilization Review Department is now centralized for our 7 Facilities  Make a note that we have a new phone and fax numbers for our Department  Please call with any questions or concerns to [phone] and carefully follow the prompts so that you are directed to the right person  All voicemails are confidential  Fax any determinations, approvals, denials, and requests for initial or continue stay review clinical to [phone]  Due to HIGH CALL volume, it would be easier if you could please send faxed requests to expedite your requests and in part, help us provide discharge notifications faster  Continued Stay Review    Date:  18 ICU LEVEL OF CARE    Vital Signs: /80   Pulse (!) 118   Temp 100 4 °F (38 °C)   Resp 14   Ht 5' 1" (1 549 m)   Wt 93 4 kg (205 lb 14 6 oz)   LMP  (LMP Unknown)   SpO2 100%   BMI 38 91 kg/m²     Temp (24hrs), Av 8 °F (37 7 °C), Min:98 8 °F (37 1 °C), Max:100 4 °F (38 °C)     Current Temperature: 100 °F (37 8 °C)     Vitals          Vitals:     18 0330 18 0400 18 0500 18 0600   BP:           Pulse: (!) 108 (!) 108 (!) 110 (!) 110   Resp: 14 14 13 14   Temp: 99 7 °F (37 6 °C) 99 7 °F (37 6 °C) 100 °F (37 8 °C) 100 °F (37 8 °C)   TempSrc:           SpO2: 100% 100% 100% 99%   Weight:           Height:                 Arterial Line BP: 120/80        Weights:   IBW: 47 8 kg    Body mass index is 38 91 kg/m²  Weight (last 2 days)      Date/Time   Weight     18 1942   93 4 (205 91)       Intake and Outputs:         I/O         07 -  0700  07 -  07 -  07     P  O    0       I V  (mL/kg) 1504 8 (16 1) 6238 8 (66 8)       NG/GT   0       IV Piggyback 851 6682       Total Intake(mL/kg) 2054 8 (22) 7788 8 (83 4)       Urine (mL/kg/hr) 1100 900 (0 4)       Emesis/NG output   250 (0 1)       Drains 400 765 (0 3)       Stool 0 75 (0)       Blood   150 (0 1)       Total Output 1500 2140       Net +554 8 +5648  8                   Unmeasured Emesis Occurrence   1 x         IN 7 7L  OUT2 1L (drains RLQ 50 in 24 LLQ 90 in 24)  NET +5 6L 24hrs  UOP: 0 4cc/kg/hour     NPO    NGT - LCS    Continuous IV Infusions:   lactated ringers 125 mL/hr Last Rate: 125 mL/hr (01/05/18 0753)     Medications:   Scheduled Meds:   atorvastatin 10 mg Oral Daily   heparin (porcine) 5,000 Units Subcutaneous Q8H [address]   pantoprazole 40 mg Intravenous Q24H [address]   piperacillin-tazobactam 3 375 g Intravenous Q6H     PRN Meds:     Acetaminophen 650 mg q6hrs prn given x 1/ 24 hrs    IV fentanyl citrate (PF) 50 mcg q2hrs prn given x 3/ 24 hrs    ondansetron    LABS/Diagnostic Results:   CBC Results last 7 days  Lab Units 01/05/18 0439 01/04/18 2350 01/04/18 0433 01/03/18  1950   01/03/18  0538   WBC Thousand/uL 17 08* 14 50* 12 65* 14 02*  --  8 05   HEMOGLOBIN g/dL 9 3* 9 5* 10 8* 10 2*  --  10 2*   I STAT HEMOGLOBIN    --   --   --   --   < >  --    HEMATOCRIT % 28 6* 28 7* 33 2* 31 6*  --  32 1*   PLATELETS Thousands/uL 436* 378 418* 359  --  328   NEUTROS PCT %  --  86* 85*  --   --  79*   MONOS PCT %  --  6 6  --   --  5   MONO PCT MAN %  --   --   --  1*  --   --         CMP  Results from last 7 days  Lab Units 01/05/18 0439 01/04/18 2350 01/04/18  0433 01/03/18  1950   12/30/17  0506   SODIUM mmol/L 139 139 136 139  < > 138   POTASSIUM mmol/L 3 7 3 7 4 0 3 8  < > 3 8   CHLORIDE mmol/L 105 106 103 103  < > 103   CO2 mmol/L 26 25 21 24  < > 30   BUN mg/dL 3* 4* 1* 1*  < > 1*   CREATININE mg/dL 0 43* 0 42* 0 28* 0 40*  < > 0 66   CALCIUM mg/dL 7 9* 7 7* 8 2* 8 4  < > 8 3   TOTAL PROTEIN g/dL  --   --   --  6 1*  --  6 4   BILIRUBIN TOTAL mg/dL  --   --   --  0 71  --  0 60   ALK PHOS U/L  --   --   --  48 --  68   ALT U/L  --   --   --  7*  --  14   AST U/L  --   --   --  12  --  11   GLUCOSE RANDOM mg/dL 157* 162* 144* 134  < > 86   GLUCOSE, ISTAT    --   --   --   --   < >  --       MICROBIOLOGY:        Lab Results   Component Value Date     URINECX No Growth <100 cfu/mL 2018     URINECX No Growth <1000 cfu/mL 2017      Wound Culure:         Lab Results   Component Value Date     WOUNDCULT No growth 2018         Lab Results   Component Value Date     URINECX No Growth <100 cfu/mL 2018     URINECX No Growth <1000 cfu/mL 2017     WOUNDCULT No growth 2018         Age/Sex: 47 y o  female      Surgery-General  OP Note Date of Service: 2018  7:10 PM     OPERATIVE REPORT  PATIENT NAME: Vicky Cramer    :  1965  MRN: [id number]  Pt Location:  OR ROOM 07     SURGERY DATE: 2018     Preop Diagnosis:  Diverticulitis of large intestine with perforation and bleeding without abscess [K57 21]     Post-Op Diagnosis Codes:     * Diverticulitis of large intestine with perforation and bleeding without abscess [K57 21]     Procedure(s) (LRB):  LAPAROTOMY EXPLORATORY, 2nd look, small bowel resection, ilieostomy closure via handsown anastamosis transverse colostomy (N/A)         Anesthesia Type: General     Operative Indications:  Diverticulitis of large intestine with perforation and bleeding without abscess [K57 21]  Open abdomen     Operative Findings:  rectum very thickened and inflamed all the way up through the transected sigmoid  Ileostomy necrotic appearing  Ultimately able to perform a small bowel small bowel anastomosis using hand-sewn technique, end transverse colostomy, and sigmoid resection        Critical Care  Progress Notes Date of Service: 2018  7:30 AM       Assessment/Plan:   Assessment:   Principal Problem:    Diverticulitis of large intestine with perforation and bleeding without abscess  Active Problems:    Gastroesophageal reflux disease without esophagitis    Hyperlipidemia    Essential hypertension    Hematuria    Ileostomy in place [facility])  Resolved Problems:    * No resolved hospital problems  *     Plan:   · Neuro:   ? Analgesia/Sedation- fentanyl 50mcg/hr, propofol 20/hr, wean for RASS 0 to -1  ? Delirium precautions  · CV:   ? Tachycardia- unclear etiology at this time did have 4 hour open procedure so possibly from volume loss, but is +5 6L, consider IVF bolus   ? Hx of HTN- holding home lisinopril   ? Hx of HLD- holding lipitor while NPO  · Lung:   ? Acute post operative respiratory failure- continues on MV, perform SBT for evaluation of possible extubation  · GI:   ? POD 1 second look ex lap small bowel resection, ileostomy closure via hand-sewn anastomosis and transverse colostomy on 1/4/18- continue NPO per surgery team, NGT to flush w/ 30cc NS Q shift, await return of bowel function, abdominal sae to say in until POD 3, 2 REILLY drains  ? Protonix for GI PPX  · :   ? Low UOP possible due to OR losses, consider IVF bolus, continue to monitor UOP  · ID:   ? Afebrile, worsening leukocytosis possibly related to surgery, continues on Zosyn, flagyl, cultures pending, Day 3 of ABX  · Heme:   ? Anemia- likely some drop 2/2 post operative losses, remains stable   · Endo:   ?  Monitor glucose while NPO  · Msk/Skin:   ? Turn q2 while in bed  · Disposition: Continue ICU care     ______________________________________________________________________     HPI/24hr events: Given 500cc bolus LR ON for tachycardia and low UOP        Discharge Plan:    DISCHARGE TO BE DETERMINED WHEN MEDICALLY STABLE / YET  IN ICU     CASE MANAGEMENT FOLLOWING CLOSELY FOR ALL DISCHARGE NEEDS

## 2018-01-05 NOTE — RESPIRATORY THERAPY NOTE
RT Ventilator Management Note  Raúl Hem 47 y o  female MRN: [id number]  Unit/Bed#: [facility] 605-03 Encounter: [id number]      Daily Screen       1/4/2018 0844 1/5/2018 0900          Patient safety screen outcome[de-identified] Failed Passed      Not Ready for Weaning due to[de-identified] Underline problem not resolved -              Physical Exam:   Assessment Type: Assess only  Respiratory Pattern: Assisted  Chest Assessment: Chest expansion symmetrical      Resp Comments: (P) pt awake, alert, following commands, pt placed on cpap/ps wean, pt appears comfortable, will continue to monitor x 30 minutes

## 2018-01-05 NOTE — CASE MANAGEMENT
Notification of Discharge  This is a Notification of Discharge from our facility [address]  Please be advised that this patient has been discharge from our facility  Below you will find the admission and discharge date and time including the patients disposition  PRESENTATION DATE: 12/26/2017 12:16 PM  IP ADMISSION DATE: 12/26/17 1522  DISCHARGE DATE: 1/3/2018  6:57 PM  DISPOSITION: [facility] in the [facility] by Yonis Stover for 2017  Network Utilization Review Department  Phone: [phone]; Fax [phone]  ATTENTION: The Network Utilization Review Department is now centralized for our 7 Facilities  Make a note that we have a new phone and fax numbers for our Department  Please call with any questions or concerns to [phone] and carefully follow the prompts so that you are directed to the right person  All voicemails are confidential  Fax any determinations, approvals, denials, and requests for initial or continue stay review clinical to [phone]  Due to HIGH CALL volume, it would be easier if you could please send faxed requests to expedite your requests and in part, help us provide discharge notifications faster

## 2018-01-05 NOTE — CASE MANAGEMENT
Pending Authorization #[id number]  Alex Díaz RN Registered Nurse Signed   Case Management Date of Service: 1/4/2018  9:52 AM         []Hide copied text  Initial Clinical Review     Admission: Date/Time/Statement: 1/3/18 @ Jean Pierremelanie Haider Written            Orders Placed This Encounter   Procedures    Inpatient Admission       Standing Status:   Standing       Number of Occurrences:   1       Order Specific Question:   Admitting Physician       Answer:   Evelio Salas [[id number]]       Order Specific Question:   Level of Care       Answer:   Critical Care [15]       Order Specific Question:   Estimated length of stay       Answer:   More than 2 Midnights       Order Specific Question:   Certification       Answer:   I certify that inpatient services are medically necessary for this patient for a duration of greater than two midnights  See H&P and MD Progress Notes for additional information about the patient's course of treatment       History of Illness: 47 F w/ perforated sigmoid diverticulitis who failed conservative management  Planned for Pj's procedure however procedure was aborted due to significant inflammation and difficulty of dissection   She underwent ex lap, SBR, and ileostomy creation w/ open abdomen       Vital Signs:            Temperature Pulse Respirations Blood Pressure SpO2   01/03/18 2000 01/03/18 1930 01/03/18 1930 01/03/18 1930 01/03/18 1930   98 2 °F (36 8 °C) 90 21 101/61 100 %       Temp Source Heart Rate Source Patient Position - Orthostatic VS BP Location FiO2 (%)   01/04/18 0700 -- -- -- --   Probe               Pain Score           01/03/18 2000           No Pain                Wt Readings from Last 1 Encounters:   01/03/18 93 4 kg (205 lb 14 6 oz)         Vital Signs (abnormal): temp 100     Abnormal Labs/Diagnostic Test Results:   WBC 12 65 (H) 01/04/2018   HGB 10 8 (L) 01/04/2018   HCT 33 2 (L) 01/04/2018    (H) 01/04/2018      BUN 1 (L) 01/04/2018   CREATININE 0 28 (L) 01/04/2018   GLUCOSE 144 (H) 01/04/2018   CALCIUM 8 2 (L) 01/04/2018   ALT 7 (L) 01/03/2018   PROT 6 1 (L) 01/03/2018   ALBUMIN 2 4 (L) 01/03/2018      Past Medical/Surgical History: Active Ambulatory Problems     Diagnosis Date Noted    Diverticulitis 12/04/2017    Gastroesophageal reflux disease without esophagitis 12/04/2017    Hyperlipidemia 12/04/2017    Essential hypertension 12/04/2017    Hypokalemia 12/04/2017    Hypoalbuminemia 12/26/2017    Low serum prealbumin 12/27/2017    Severe protein-calorie malnutrition ([facility] [address] ) 12/27/2017    Microscopic hematuria 12/28/2017    Hypomagnesemia 12/28/2017    Anemia 01/01/2018    Diverticulitis of large intestine with perforation and bleeding without abscess 12/26/2017           Resolved Ambulatory Problems     Diagnosis Date Noted    Pericolonic abscess 12/04/2017           Past Medical History:   Diagnosis Date    Disease of thyroid gland      Gastroesophageal reflux disease without esophagitis 12/4/2017    Hyperlipidemia      Hypertension      Vitamin D deficiency disease           Admitting Diagnosis: Diverticulitis [K57 92]  Colostomy in place [facility]) [Z93 3]     Age/Sex: 47 y o  female     Assessment/Plan:   Assessment:  47 F w/ perforated sigmoid diverticulitis who failed conservative management  Planned for Pj's procedure however procedure was aborted due to significant inflammation and difficulty of dissection  She underwent ex lap, SBR, and ileostomy creation w/ open abdomen     Plan:  1  Neuro             - GCS 11T, calm off propofol  Continue on fentanyl for pain control  2  Cardiac             - No issues             - Continue LR @ 125  3  Pulm             - Remained intubated due to return to OR   Plan to extubate post op if definitive therapy is performed     4  GI-  Perforated diverticulitis, s/p attempted hartmanns             - OR today as add on for ex lap, re-exploration, poss resection poss ostomy (patient signed own consent)             - Continue NPO/NGT  5  ID             - Continue Zosyn             - Trend WBC  6               - Hematuria 2/2 stent placement  Continue to monitor w/ montoya in place     7  Dispo             - Continue ICU care today     Admission Orders:  Telemetry   NPO  Daily weights, I/O  Intubation with mechanical ventilation, resp protocol  Consult nutr serv, cm  Sequential compression device     Scheduled Meds:   atorvastatin 10 mg Oral Daily   chlorhexidine 15 mL Swish & Spit Q12H [address]   heparin (porcine) 5,000 Units Subcutaneous Q8H [address]   metroNIDAZOLE 500 mg Intravenous Q8H   pantoprazole 40 mg Intravenous Q24H [address]   piperacillin-tazobactam 3 375 g Intravenous Q6H      Continuous Infusions:   fentaNYL 50 mcg/hr Last Rate: 50 mcg/hr (01/03/18 2021)   lactated ringers 125 mL/hr Last Rate: 125 mL/hr (01/04/18 0253)   propofol 5-50 mcg/kg/min Last Rate: Stopped (01/04/18 0130)      PRN Meds:   acetaminophen    fentanyl citrate (PF)    ondansetron     Thank you,  [address] [place] [address] in the [employer] by Yonis Stover for 2017  Network Utilization Review Department  Phone: [phone]; Fax [phone]  ATTENTION: The Network Utilization Review Department is now centralized for our 7 Facilities  Make a note that we have a new phone and fax numbers for our Department  Please call with any questions or concerns to [phone] and carefully follow the prompts so that you are directed to the right person  All voicemails are confidential  Fax any determinations, approvals, denials, and requests for initial or continue stay review clinical to [phone]  Due to HIGH CALL volume, it would be easier if you could please send faxed requests to expedite your requests and in part, help us provide discharge notifications faster             Radha Damico, RN Registered Nurse Signed   Case Management Date of Service: 1/5/2018 12:46 PM         []Hide copied text     Thank you,  [address] [facility] in the [employer] by Yonis Stover for 2017  Network Utilization Review Department  Phone: [phone]; Fax [phone]  ATTENTION: The Network Utilization Review Department is now centralized for our 7 Facilities  Make a note that we have a new phone and fax numbers for our Department  Please call with any questions or concerns to [phone] and carefully follow the prompts so that you are directed to the right person  All voicemails are confidential  Fax any determinations, approvals, denials, and requests for initial or continue stay review clinical to [phone]  Due to HIGH CALL volume, it would be easier if you could please send faxed requests to expedite your requests and in part, help us provide discharge notifications faster         Continued Stay Review     Date:  18 ICU LEVEL OF CARE     Vital Signs: /80   Pulse (!) 118   Temp 100 4 °F (38 °C)   Resp 14   Ht 5' 1" (1 549 m)   Wt 93 4 kg (205 lb 14 6 oz)   LMP  (LMP Unknown)   SpO2 100%   BMI 38 91 kg/m²      Temp (24hrs), Av 8 °F (37 7 °C), Min:98 8 °F (37 1 °C), Max:100 4 °F (38 °C)     Current Temperature: 100 °F (37 8 °C)     Vitals               Vitals:     18 0330 18 0400 18 0500 18 0600   BP:           Pulse: (!) 108 (!) 108 (!) 110 (!) 110   Resp: 14 14 13 14   Temp: 99 7 °F (37 6 °C) 99 7 °F (37 6 °C) 100 °F (37 8 °C) 100 °F (37 8 °C)   TempSrc:           SpO2: 100% 100% 100% 99%   Weight:           Height:                  Arterial Line BP: 120/80        Weights:   IBW: 47 8 kg    Body mass index is 38 91 kg/m²                    Weight (last 2 days)      Date/Time   Weight     18 1942   93 4 (205 91)         Intake and Outputs:              I/O         07 -  0700  07 -  0700  07 -  0700     P  O    0       I V  (mL/kg) 1504 8 (16 1) 6238 8 (66 8)       NG/GT   0       IV Piggyback 550 1550       Total Intake(mL/kg) 2054 8 (22) 7788 8 (83 4)       Urine (mL/kg/hr) 1100 900 (0 4)       Emesis/NG output   250 (0 1)       Drains 400 765 (0 3)       Stool 0 75 (0)       Blood   150 (0 1)       Total Output 1500 2140       Net +554 8 +5648  8                   Unmeasured Emesis Occurrence   1 x         IN 7 7L  OUT2 1L (drains RLQ 50 in 24 LLQ 90 in 24)  NET +5 6L 24hrs  UOP: 0 4cc/kg/hour      NPO     NGT - LCS     Continuous IV Infusions:   lactated ringers 125 mL/hr Last Rate: 125 mL/hr (01/05/18 8583)      Medications:   Scheduled Meds:   atorvastatin 10 mg Oral Daily   heparin (porcine) 5,000 Units Subcutaneous Q8H [address]   pantoprazole 40 mg Intravenous Q24H [address]   piperacillin-tazobactam 3 375 g Intravenous Q6H      PRN Meds:     Acetaminophen 650 mg q6hrs prn given x 1/ 24 hrs    IV fentanyl citrate (PF) 50 mcg q2hrs prn given x 3/ 24 hrs    ondansetron     LABS/Diagnostic Results:   CBC Results last 7 days  Lab Units 01/05/18 0439 01/04/18 2350 01/04/18 0433 01/03/18  1950   01/03/18  0538   WBC Thousand/uL 17 08* 14 50* 12 65* 14 02*  --  8 05   HEMOGLOBIN g/dL 9 3* 9 5* 10 8* 10 2*  --  10 2*   I STAT HEMOGLOBIN    --   --   --   --   < >  --    HEMATOCRIT % 28 6* 28 7* 33 2* 31 6*  --  32 1*   PLATELETS Thousands/uL 436* 378 418* 359  --  328   NEUTROS PCT %  --  86* 85*  --   --  79*   MONOS PCT %  --  6 6  --   --  5   MONO PCT MAN %  --   --   --  1*  --   --        CMP  Results from last 7 days  Lab Units 01/05/18 0439 01/04/18 2350 01/04/18  0433 01/03/18  1950   12/30/17  0506   SODIUM mmol/L 139 139 136 139  < > 138   POTASSIUM mmol/L 3 7 3 7 4 0 3 8  < > 3 8   CHLORIDE mmol/L 105 106 103 103  < > 103   CO2 mmol/L 26 25 21 24  < > 30   BUN mg/dL 3* 4* 1* 1*  < > 1*   CREATININE mg/dL 0 43* 0 42* 0 28* 0 40*  < > 0 66   CALCIUM mg/dL 7 9* 7 7* 8 2* 8 4  < > 8 3   TOTAL PROTEIN g/dL  --   --   --  6 1*  --  6 4   BILIRUBIN TOTAL mg/dL  --   --   --  0 71  --  0 60 ALK PHOS U/L  --   --   --  48  --  68   ALT U/L  --   --   --  7*  --  14   AST U/L  --   --   --  12  --  11   GLUCOSE RANDOM mg/dL 157* 162* 144* 134  < > 86   GLUCOSE, ISTAT    --   --   --   --   < >  --        MICROBIOLOGY:            Lab Results   Component Value Date     URINECX No Growth <100 cfu/mL 2018     URINECX No Growth <1000 cfu/mL 2017      Wound Culure:             Lab Results   Component Value Date     WOUNDCULT No growth 2018               Lab Results   Component Value Date     URINECX No Growth <100 cfu/mL 2018     URINECX No Growth <1000 cfu/mL 2017     WOUNDCULT No growth 2018            Age/Sex: 47 y o  female        Surgery-General   OP Note Date of Service: 2018  7:10 PM      OPERATIVE REPORT  PATIENT NAME: Lashaun Menjivar    :  1965  IGY: [id number]  Pt Location:  OR ROOM      SURGERY DATE: 2018     Preop Diagnosis:  Diverticulitis of large intestine with perforation and bleeding without abscess [K57 21]     Post-Op Diagnosis Codes:     * Diverticulitis of large intestine with perforation and bleeding without abscess [K57 21]     Procedure(s) (LRB):  LAPAROTOMY EXPLORATORY, 2nd look, small bowel resection, ilieostomy closure via handsown anastamosis transverse colostomy (N/A)         Anesthesia Type: General     Operative Indications:  Diverticulitis of large intestine with perforation and bleeding without abscess [K57 21]  Open abdomen     Operative Findings:  rectum very thickened and inflamed all the way up through the transected sigmoid   Ileostomy necrotic appearing   Ultimately able to perform a small bowel small bowel anastomosis using hand-sewn technique, end transverse colostomy, and sigmoid resection         Critical Care   Progress Notes Date of Service: 2018  7:30 AM         Assessment/Plan:   Assessment:   Principal Problem:    Diverticulitis of large intestine with perforation and bleeding without abscess  Active Problems:    Gastroesophageal reflux disease without esophagitis    Hyperlipidemia    Essential hypertension    Hematuria    Ileostomy in place [facility])  Resolved Problems:    * No resolved hospital problems  *     Plan:   · Neuro:   ? Analgesia/Sedation- fentanyl 50mcg/hr, propofol 20/hr, wean for RASS 0 to -1  ? Delirium precautions  · CV:   ? Tachycardia- unclear etiology at this time did have 4 hour open procedure so possibly from volume loss, but is +5 6L, consider IVF bolus   ? Hx of HTN- holding home lisinopril   ? Hx of HLD- holding lipitor while NPO  · Lung:   ? Acute post operative respiratory failure- continues on MV, perform SBT for evaluation of possible extubation  · GI:   ? POD 1 second look ex lap small bowel resection, ileostomy closure via hand-sewn anastomosis and transverse colostomy on 1/4/18- continue NPO per surgery team, NGT to flush w/ 30cc NS Q shift, await return of bowel function, abdominal sae to say in until POD 3, 2 REILLY drains  ? Protonix for GI PPX  · :   ? Low UOP possible due to OR losses, consider IVF bolus, continue to monitor UOP  · ID:   ? Afebrile, worsening leukocytosis possibly related to surgery, continues on Zosyn, flagyl, cultures pending, Day 3 of ABX  · Heme:   ? Anemia- likely some drop 2/2 post operative losses, remains stable   · Endo:   ? Monitor glucose while NPO  · Msk/Skin:   ? Turn q2 while in bed  · Disposition: Continue ICU care     ______________________________________________________________________     HPI/24hr events: Given 500cc bolus LR ON for tachycardia and low UOP           Discharge Plan:    DISCHARGE TO BE DETERMINED WHEN MEDICALLY STABLE / Hildegarde Meter  IN ICU      CASE MANAGEMENT FOLLOWING CLOSELY FOR ALL DISCHARGE NEEDS      Notification of Discharge  This is a Notification of Discharge from our facility [address]  Please be advised that this patient has been discharge from our facility   Below you will find the admission and discharge date and time including the patients disposition  PRESENTATION DATE: 1/3/2018  7:21 PM  IP ADMISSION DATE: 1/3/18 1921  DISCHARGE DATE: No discharge date for patient encounter  DISPOSITION: [facility] in the [facility] by Yonis Stover for 2017  Network Utilization Review Department  Phone: [phone]; Fax [phone]  ATTENTION: The Network Utilization Review Department is now centralized for our 7 Facilities  Make a note that we have a new phone and fax numbers for our Department  Please call with any questions or concerns to [phone] and carefully follow the prompts so that you are directed to the right person  All voicemails are confidential  Fax any determinations, approvals, denials, and requests for initial or continue stay review clinical to [phone]   Due to HIGH CALL volume, it would be easier if you could please send faxed requests to expedite your requests and in part, help us provide discharge notifications faster

## 2018-01-05 NOTE — PROGRESS NOTES
Progress Note - Critical Care   Julien Gar 47 y o  female MRN: [id number]  Unit/Bed#: [facility] 515-01 Encounter: [id number]    Assessment:   Principal Problem:    Diverticulitis of large intestine with perforation and bleeding without abscess  Active Problems:    Gastroesophageal reflux disease without esophagitis    Hyperlipidemia    Essential hypertension    Hematuria    Ileostomy in place [facility])  Resolved Problems:    * No resolved hospital problems  *    Plan:   · Neuro:   · Analgesia/Sedation- fentanyl 50mcg/hr, propofol 20/hr, wean for RASS 0 to -1  · Delirium precautions  · CV:   · Tachycardia- unclear etiology at this time did have 4 hour open procedure so possibly from volume loss, but is +5 6L, consider IVF bolus   · Hx of HTN- holding home lisinopril   · Hx of HLD- holding lipitor while NPO  · Lung:   · Acute post operative respiratory failure- continues on MV, perform SBT for evaluation of possible extubation  · GI:   · POD 1 second look ex lap small bowel resection, ileostomy closure via hand-sewn anastomosis and transverse colostomy on 1/4/18- continue NPO per surgery team, NGT to flush w/ 30cc NS Q shift, await return of bowel function, abdominal sae to say in until POD 3, 2 REILLY drains  · Protonix for GI PPX  · :   · Low UOP possible due to OR losses, consider IVF bolus, continue to monitor UOP  · ID:   · Afebrile, worsening leukocytosis possibly related to surgery, continues on Zosyn, flagyl, cultures pending, Day 3 of ABX     · Heme:   · Anemia- likely some drop 2/2 post operative losses, remains stable   · Endo:   · Monitor glucose while NPO  · Msk/Skin:   · Turn q2 while in bed  · Disposition: Continue ICU care    ______________________________________________________________________    HPI/24hr events: Given 500cc bolus LR ON for tachycardia and low UOP    ______________________________________________________________________  Physical Exam:  Durán Agitation Sedation Scale (RASS): Light sedation  Physical Exam   Constitutional: She appears well-developed and well-nourished  No distress  HENT:   Head: Normocephalic and atraumatic  Eyes: Pupils are equal, round, and reactive to light  Neck: No JVD present  No tracheal deviation present  Cardiovascular: Regular rhythm and normal heart sounds  Exam reveals no gallop and no friction rub  No murmur heard  Tachycardic    Pulmonary/Chest: Effort normal and breath sounds normal  No respiratory distress  She has no wheezes  She has no rales  She exhibits no tenderness  Abdominal: Soft  LUQ Ostomy pink with sweat in bag  Appropriately tender to palpation  Incision C/D/I   Neurological: She is alert  Follows 2 step commands  Moves all extremities to command   Skin: Skin is warm and dry      ______________________________________________________________________  Temperature:   Temp (24hrs), Av 8 °F (37 7 °C), Min:98 8 °F (37 1 °C), Max:100 4 °F (38 °C)    Current Temperature: 100 °F (37 8 °C)    Vitals:    18 0330 18 0400 18 0500 18 0600   BP:       Pulse: (!) 108 (!) 108 (!) 110 (!) 110   Resp: 14 14 13 14   Temp: 99 7 °F (37 6 °C) 99 7 °F (37 6 °C) 100 °F (37 8 °C) 100 °F (37 8 °C)   TempSrc:       SpO2: 100% 100% 100% 99%   Weight:       Height:         Arterial Line BP: 120/80       Weights:   IBW: 47 8 kg    Body mass index is 38 91 kg/m²  Weight (last 2 days)     Date/Time   Weight    18 1942  93 4 (205 91)            Height: 5' 1" (154 9 cm)  Ventilator Settings:   Vent Mode: AC/VC       14/450/40%/5           Intake and Outputs:  I/O        07 -  0700  07 -  0700 701 -  0700    P  O   0     I V  (mL/kg) 1504 8 (16 1) 6238 8 (66 8)     NG/GT  0     IV Piggyback 550 1550     Total Intake(mL/kg) 2054 8 (22) 7788 8 (83 4)     Urine (mL/kg/hr) 1100 900 (0 4)     Emesis/NG output  250 (0 1)     Drains 400 765 (0 3)     Stool 0 75 (0)     Blood  150 (0 1)     Total Output 1500 2140      Net +554 8 +5648 8             Unmeasured Emesis Occurrence  1 x       IN 7 7L  OUT2 1L (drains RLQ 50 in 24 LLQ 90 in 24)  NET +5 6L 24hrs  UOP: 0 4cc/kg/hour   Nutrition:        Diet Orders            Start     Ordered    01/03/18 1936  Diet NPO  Diet effective now     Question:  Diet Type  Answer:  NPO    01/03/18 1941          Labs:     Results from last 7 days  Lab Units 01/05/18  0439 01/04/18  2350 01/04/18  0433 01/03/18  1950  01/03/18  0538   WBC Thousand/uL 17 08* 14 50* 12 65* 14 02*  --  8 05   HEMOGLOBIN g/dL 9 3* 9 5* 10 8* 10 2*  --  10 2*   I STAT HEMOGLOBIN   --   --   --   --   < >  --    HEMATOCRIT % 28 6* 28 7* 33 2* 31 6*  --  32 1*   PLATELETS Thousands/uL 436* 378 418* 359  --  328   NEUTROS PCT %  --  86* 85*  --   --  79*   MONOS PCT %  --  6 6  --   --  5   MONO PCT MAN %  --   --   --  1*  --   --    < > = values in this interval not displayed  Results from last 7 days  Lab Units 01/05/18 0439 01/04/18 2350 01/04/18 0433 01/03/18  1950  12/30/17  0506   SODIUM mmol/L 139 139 136 139  < > 138   POTASSIUM mmol/L 3 7 3 7 4 0 3 8  < > 3 8   CHLORIDE mmol/L 105 106 103 103  < > 103   CO2 mmol/L 26 25 21 24  < > 30   BUN mg/dL 3* 4* 1* 1*  < > 1*   CREATININE mg/dL 0 43* 0 42* 0 28* 0 40*  < > 0 66   CALCIUM mg/dL 7 9* 7 7* 8 2* 8 4  < > 8 3   TOTAL PROTEIN g/dL  --   --   --  6 1*  --  6 4   BILIRUBIN TOTAL mg/dL  --   --   --  0 71  --  0 60   ALK PHOS U/L  --   --   --  48  --  68   ALT U/L  --   --   --  7*  --  14   AST U/L  --   --   --  12  --  11   GLUCOSE RANDOM mg/dL 157* 162* 144* 134  < > 86   GLUCOSE, ISTAT   --   --   --   --   < >  --    < > = values in this interval not displayed      Imaging: No new   EKG: No new  Micro:  Blood Culture: No results found for: BLOODCX  Urine Culture:   Lab Results   Component Value Date    URINECX No Growth <100 cfu/mL 01/03/2018    URINECX No Growth <1000 cfu/mL 12/26/2017     Sputum Culture: No components found for: SPUTUMCX  Wound Culure:   Lab Results   Component Value Date    WOUNDCULT No growth 01/03/2018       Lab Results   Component Value Date    URINECX No Growth <100 cfu/mL 01/03/2018    URINECX No Growth <1000 cfu/mL 12/26/2017    WOUNDCULT No growth 01/03/2018     Allergies: No Known Allergies  Medications:   Scheduled Meds:  atorvastatin 10 mg Oral Daily   chlorhexidine 15 mL Swish & Spit Q12H [facility]   heparin (porcine) 5,000 Units Subcutaneous Q8H [facility]   pantoprazole 40 mg Intravenous Q24H [facility]   piperacillin-tazobactam 3 375 g Intravenous Q6H     Continuous Infusions:  fentaNYL 50 mcg/hr Last Rate: 50 mcg/hr (01/04/18 1848)   lactated ringers 125 mL/hr Last Rate: 125 mL/hr (01/04/18 1848)   propofol 5-50 mcg/kg/min Last Rate: 20 mcg/kg/min (01/05/18 0637)     PRN Meds:    acetaminophen 650 mg Q6H PRN   fentanyl citrate (PF) 50 mcg Q2H PRN   ondansetron 4 mg Q6H PRN     VTE Pharmacologic Prophylaxis: Heparin  VTE Mechanical Prophylaxis: sequential compression device  Invasive lines and devices: Invasive Devices     Peripheral Intravenous Line            Peripheral IV 01/03/18 Left Antecubital 1 day    Peripheral IV 01/03/18 Left Hand 1 day    Peripheral IV 01/03/18 Right Antecubital 1 day          Arterial Line            Arterial Line 01/04/18 Left Radial less than 1 day          Drain            Ureteral Drain/Stent Left ureter 5 Fr  1 day    Ureteral Drain/Stent Right ureter 5 Fr  1 day    Urethral Catheter Latex 16 Fr  1 day    Closed/Suction Drain Left LLQ Bulb 10 Fr  less than 1 day    Closed/Suction Drain Right RLQ Bulb 10 Fr  less than 1 day    Colostomy LLQ less than 1 day    NG/OG/Enteral Tube Nasogastric 18 Fr Right nares less than 1 day                   Code Status: Level 1 - Full Code    Portions of the record may have been created with voice recognition software  Occasional wrong word or "sound a like" substitutions may have occurred due to the inherent limitations of voice recognition software    Read the chart carefully and recognize, using context, where substitutions have occurred      Zan Valverde PA-C

## 2018-01-05 NOTE — ANESTHESIA PROCEDURE NOTES
Arterial Line Insertion  Performed by: Manuel Franco  Authorized by: Leonard Rivera   Consent: Verbal consent obtained  Written consent obtained  Risks and benefits: risks, benefits and alternatives were discussed  Consent given by: patient  Patient understanding: patient states understanding of the procedure being performed  Patient consent: the patient's understanding of the procedure matches consent given  Required items: required blood products, implants, devices, and special equipment available  Patient identity confirmed: verbally with patient and arm band  Preparation: Patient was prepped and draped in the usual sterile fashion    Indications: hemodynamic monitoring  Orientation:  LeftLocation: radial artery  Needle gauge: 20  Seldinger technique: Seldinger technique used  Number of attempts: 1  Post-procedure: dressing applied  Patient tolerance: Patient tolerated the procedure well with no immediate complications

## 2018-01-05 NOTE — ANESTHESIA POSTPROCEDURE EVALUATION
Post-Op Assessment Note      CV Status:  Stable    Post-procedure mental status: intubated, sedated  Hydration Status:  Stable    PONV status: intubated, sedated  Airway patency: intubated  Intubated: intubated      Post Op Vitals Reviewed: Yes          Staff: Anesthesiologist       Comments: on propofol gtt, fentanyl gtt for sedation          BP      Temp      Pulse    Resp      SpO2

## 2018-01-05 NOTE — PROGRESS NOTES
Progress Note - General Surgery   Page Janine 47 y o  female MRN: [id number]  Unit/Bed#: [facility] 515-01 Encounter: [id number]    Assessment and Plan:  Patient Active Problem List   Diagnosis    Diverticulitis    Gastroesophageal reflux disease without esophagitis    Hyperlipidemia    Essential hypertension    Hypokalemia    Hypoalbuminemia    Low serum prealbumin    Severe protein-calorie malnutrition (HCC)    Microscopic hematuria    Hypomagnesemia    Anemia    Diverticulitis of large intestine with perforation and bleeding without abscess    Hematuria    Ileostomy in place [facility])       Assessment:  50-year-old female status post second-look exploratory laparotomy small bowel resection, ileostomy closure via hand-sewn anastomosis and transverse colostomy on 1/4/18    Plan:  1 L IV fluid bolus  Continue [email or web address]  Wean to extubate today  Follow-up/trend endpoints  Continue NPO/ NG tube - flush w/ 30cc normal saline q shift   Await return of bowel function  Abdominal sae will stay in until postop day 3  Continue Soto / ureteral stents & monitor I&Os  Continue A-line  Continue REILLY drains x2 to suction  SQH and venodynes for DVT ppx  Continue Zosyn IV abx   Rest of care as per [facility] team      Subjective:  Patient is postop labs were relatively within normal limits  Due to concerns of low urine output she did get a 500 cc bolus of albumin which she responded to however is probably still overall dry as she is still tachycardic  Currently on AC 14/450/40%/Peep5    Objective:       Vitals   Vitals:    01/05/18 0330 01/05/18 0400 01/05/18 0500 01/05/18 0600   BP:       Pulse: (!) 108 (!) 108 (!) 110 (!) 110   Resp: 14 14 13 14   Temp: 99 7 °F (37 6 °C) 99 7 °F (37 6 °C) 100 °F (37 8 °C) 100 °F (37 8 °C)   TempSrc:       SpO2: 100% 100% 100% 99%   Weight:       Height:         Temp  Min: 97 6 °F (36 4 °C)  Max: 100 9 °F (38 3 °C)  IBW: 47 8 kg   Body mass index is 38 91 kg/m²      I/O   I/O       01/03 0701 - 01/04 0700 01/04 0701 - 01/05 0700    P  O   0    I V  (mL/kg) 1504 8 (16 1) 6161 6 (66)    NG/GT  0    IV Piggyback 550 1550    Total Intake(mL/kg) 2054 8 (22) 7711 6 (82 6)    Urine (mL/kg/hr) 1100 800 (0 4)    Emesis/NG output  150 (0 1)    Drains 400 765 (0 3)    Stool 0 75 (0)    Blood  150 (0 1)    Total Output 1500 1940    Net +554 8 +5771 6          Unmeasured Emesis Occurrence  1 x          Intake/Output Summary (Last 24 hours) at 01/05/18 6489  Last data filed at 01/05/18 0600   Gross per 24 hour   Intake          7711 58 ml   Output             1940 ml   Net          5771 58 ml       Invasive  Devices  Invasive Devices     Peripheral Intravenous Line            Peripheral IV 01/03/18 Left Antecubital 1 day    Peripheral IV 01/03/18 Left Hand 1 day    Peripheral IV 01/03/18 Right Antecubital 1 day          Arterial Line            Arterial Line 01/04/18 Left Radial less than 1 day          Drain            Ureteral Drain/Stent Left ureter 5 Fr  1 day    Ureteral Drain/Stent Right ureter 5 Fr  1 day    Urethral Catheter Latex 16 Fr  1 day    Closed/Suction Drain Left LLQ Bulb 10 Fr  less than 1 day    Closed/Suction Drain Right RLQ Bulb 10 Fr  less than 1 day    Colostomy LLQ less than 1 day    NG/OG/Enteral Tube Nasogastric 18 Fr Right nares less than 1 day                Active medications  Scheduled Meds:  atorvastatin 10 mg Oral Daily   chlorhexidine 15 mL Swish & Spit Q12H [facility]   heparin (porcine) 5,000 Units Subcutaneous Q8H [facility]   pantoprazole 40 mg Intravenous Q24H [facility]   piperacillin-tazobactam 3 375 g Intravenous Q6H     Continuous Infusions:    fentaNYL 50 mcg/hr Last Rate: 50 mcg/hr (01/04/18 1848)   lactated ringers 125 mL/hr Last Rate: 125 mL/hr (01/04/18 1848)   propofol 5-50 mcg/kg/min Last Rate: 30 mcg/kg/min (01/05/18 0158)     PRN Meds:     acetaminophen 650 mg Q6H PRN   fentanyl citrate (PF) 50 mcg Q2H PRN   ondansetron 4 mg Q6H PRN       Physical Exam: /80   Pulse (!) 110   Temp 100 °F (37 8 °C)   Resp 14   Ht 5' 1" (1 549 m)   Wt 93 4 kg (205 lb 14 6 oz)   LMP  (LMP Unknown)   SpO2 99%   BMI 38 91 kg/m²     Physical Exam:  General Appearance: Alert, cooperative, no distress, appears stated age  Lungs: Clear to auscultation bilaterally, respirations unlablored   Heart: Regular rate and rhythm, S1 and S2 normal, no murmur, rub or gallop  Abdomen: Soft, appropriately tender, 5 midline with present in mid abdominal wound/staple line  Right upper quadrant closed ileostomy with 1 wick present  Left upper quadrant transverse colostomy with sweat within colostomy bag   No masses or organomegaly  REILLY drain # 1 (Right) serosanguineous  REILLY drain # 2 (Left)  serosanguineous  Laboratory and Diagnostics:    Results from last 7 days  Lab Units 01/05/18 0439 01/04/18 2350 01/04/18  0433 01/03/18  1950  01/03/18  0538   WBC Thousand/uL 17 08* 14 50* 12 65* 14 02*  --  8 05   HEMOGLOBIN g/dL 9 3* 9 5* 10 8* 10 2*  --  10 2*   I STAT HEMOGLOBIN   --   --   --   --   < >  --    HEMATOCRIT % 28 6* 28 7* 33 2* 31 6*  --  32 1*   PLATELETS Thousands/uL 436* 378 418* 359  --  328   NEUTROS PCT %  --  86* 85*  --   --  79*   MONOS PCT %  --  6 6  --   --  5   MONO PCT MAN %  --   --   --  1*  --   --    < > = values in this interval not displayed      Results from last 7 days  Lab Units 01/05/18  0439 01/04/18 2350 01/04/18  0433 01/03/18  1950  12/30/17  0506   SODIUM mmol/L 139 139 136 139  < > 138   POTASSIUM mmol/L 3 7 3 7 4 0 3 8  < > 3 8   CHLORIDE mmol/L 105 106 103 103  < > 103   CO2 mmol/L 26 25 21 24  < > 30   BUN mg/dL 3* 4* 1* 1*  < > 1*   CREATININE mg/dL 0 43* 0 42* 0 28* 0 40*  < > 0 66   CALCIUM mg/dL 7 9* 7 7* 8 2* 8 4  < > 8 3   TOTAL PROTEIN g/dL  --   --   --  6 1*  --  6 4   BILIRUBIN TOTAL mg/dL  --   --   --  0 71  --  0 60   ALK PHOS U/L  --   --   --  48  --  68   ALT U/L  --   --   --  7*  --  14   AST U/L  --   --   --  12  --  11   GLUCOSE RANDOM mg/dL 157* 162* 144* 134  < > 86 GLUCOSE, ISTAT   --   --   --   --   < >  --    < > = values in this interval not displayed  Results from last 7 days  Lab Units 01/04/18  2350 01/04/18  0433 01/03/18  1950   MAGNESIUM mg/dL 1 5* 2 2 1 6     Lab Results   Component Value Date    PHOS 2 4 (L) 01/04/2018    PHOS 3 3 01/04/2018    PHOS 4 1 01/03/2018        Results from last 7 days  Lab Units 01/04/18  1255 01/03/18  1950 01/03/18  0742   INR  1 39* 1 51* 1 62*   PTT seconds  --  38*  --      No results found for: TROPONINT  ABG:  Lab Results   Component Value Date    PHART 7 433 01/04/2018    [id number] 34 8 (L) 01/04/2018    PO2ART 136 0 (H) 01/04/2018    [id number] 22 7 01/04/2018    BEART -1 1 01/04/2018    SOURCE Line, Arterial 01/04/2018       Blood Culture: No results found for: BLOODCX  Urine Culture:   Lab Results   Component Value Date    URINECX No Growth <100 cfu/mL 01/03/2018    URINECX No Growth <1000 cfu/mL 12/26/2017     Sputum Culture: No components found for: SPUTUMCX    Imaging: I have personally reviewed pertinent reports     and I have personally reviewed pertinent films in PACS    VTE Pharmacologic Prophylaxis: Heparin  VTE Mechanical Prophylaxis: sequential compression device

## 2018-01-06 LAB
ANION GAP SERPL CALCULATED.3IONS-SCNC: 5 MMOL/L (ref 4–13)
ATRIAL RATE: 106 BPM
ATRIAL RATE: 188 BPM
BACTERIA WND AEROBE CULT: ABNORMAL
BASOPHILS # BLD AUTO: 0.03 THOUSANDS/ΜL (ref 0–0.1)
BASOPHILS NFR BLD AUTO: 0 % (ref 0–1)
BUN SERPL-MCNC: 7 MG/DL (ref 5–25)
CA-I BLD-SCNC: 1.09 MMOL/L (ref 1.12–1.32)
CALCIUM SERPL-MCNC: 8.1 MG/DL (ref 8.3–10.1)
CHLORIDE SERPL-SCNC: 108 MMOL/L (ref 100–108)
CO2 SERPL-SCNC: 30 MMOL/L (ref 21–32)
CREAT SERPL-MCNC: 0.43 MG/DL (ref 0.6–1.3)
EOSINOPHIL # BLD AUTO: 0.2 THOUSAND/ΜL (ref 0–0.61)
EOSINOPHIL NFR BLD AUTO: 1 % (ref 0–6)
ERYTHROCYTE [DISTWIDTH] IN BLOOD BY AUTOMATED COUNT: 14.2 % (ref 11.6–15.1)
ERYTHROCYTE [DISTWIDTH] IN BLOOD BY AUTOMATED COUNT: 14.3 % (ref 11.6–15.1)
GFR SERPL CREATININE-BSD FRML MDRD: 116 ML/MIN/1.73SQ M
GLUCOSE SERPL-MCNC: 111 MG/DL (ref 65–140)
GLUCOSE SERPL-MCNC: 111 MG/DL (ref 65–140)
GLUCOSE SERPL-MCNC: 125 MG/DL (ref 65–140)
GLUCOSE SERPL-MCNC: 130 MG/DL (ref 65–140)
GLUCOSE SERPL-MCNC: 134 MG/DL (ref 65–140)
GRAM STN SPEC: ABNORMAL
GRAM STN SPEC: ABNORMAL
HCT VFR BLD AUTO: 24.3 % (ref 34.8–46.1)
HCT VFR BLD AUTO: 24.8 % (ref 34.8–46.1)
HGB BLD-MCNC: 7.6 G/DL (ref 11.5–15.4)
HGB BLD-MCNC: 7.8 G/DL (ref 11.5–15.4)
LYMPHOCYTES # BLD AUTO: 1.7 THOUSANDS/ΜL (ref 0.6–4.47)
LYMPHOCYTES NFR BLD AUTO: 12 % (ref 14–44)
MAGNESIUM SERPL-MCNC: 2.4 MG/DL (ref 1.6–2.6)
MCH RBC QN AUTO: 28.6 PG (ref 26.8–34.3)
MCH RBC QN AUTO: 28.6 PG (ref 26.8–34.3)
MCHC RBC AUTO-ENTMCNC: 31.3 G/DL (ref 31.4–37.4)
MCHC RBC AUTO-ENTMCNC: 31.5 G/DL (ref 31.4–37.4)
MCV RBC AUTO: 91 FL (ref 82–98)
MCV RBC AUTO: 91 FL (ref 82–98)
MONOCYTES # BLD AUTO: 0.8 THOUSAND/ΜL (ref 0.17–1.22)
MONOCYTES NFR BLD AUTO: 5 % (ref 4–12)
NEUTROPHILS # BLD AUTO: 12 THOUSANDS/ΜL (ref 1.85–7.62)
NEUTS SEG NFR BLD AUTO: 82 % (ref 43–75)
NRBC BLD AUTO-RTO: 0 /100 WBCS
P AXIS: 45 DEGREES
PHOSPHATE SERPL-MCNC: 2.5 MG/DL (ref 2.7–4.5)
PLATELET # BLD AUTO: 341 THOUSANDS/UL (ref 149–390)
PLATELET # BLD AUTO: 360 THOUSANDS/UL (ref 149–390)
PMV BLD AUTO: 9.4 FL (ref 8.9–12.7)
PMV BLD AUTO: 9.7 FL (ref 8.9–12.7)
POTASSIUM SERPL-SCNC: 3.9 MMOL/L (ref 3.5–5.3)
PR INTERVAL: 142 MS
QRS AXIS: 65 DEGREES
QRS AXIS: 67 DEGREES
QRSD INTERVAL: 83 MS
QRSD INTERVAL: 96 MS
QT INTERVAL: 250 MS
QT INTERVAL: 308 MS
QTC INTERVAL: 409 MS
QTC INTERVAL: 443 MS
RBC # BLD AUTO: 2.66 MILLION/UL (ref 3.81–5.12)
RBC # BLD AUTO: 2.73 MILLION/UL (ref 3.81–5.12)
SODIUM SERPL-SCNC: 143 MMOL/L (ref 136–145)
T WAVE AXIS: -54 DEGREES
T WAVE AXIS: 246 DEGREES
VENTRICULAR RATE: 106 BPM
VENTRICULAR RATE: 188 BPM
WBC # BLD AUTO: 12.79 THOUSAND/UL (ref 4.31–10.16)
WBC # BLD AUTO: 14.8 THOUSAND/UL (ref 4.31–10.16)

## 2018-01-06 PROCEDURE — 85025 COMPLETE CBC W/AUTO DIFF WBC: CPT | Performed by: NURSE PRACTITIONER

## 2018-01-06 PROCEDURE — 80048 BASIC METABOLIC PNL TOTAL CA: CPT | Performed by: NURSE PRACTITIONER

## 2018-01-06 PROCEDURE — C9113 INJ PANTOPRAZOLE SODIUM, VIA: HCPCS | Performed by: PHYSICIAN ASSISTANT

## 2018-01-06 PROCEDURE — 84100 ASSAY OF PHOSPHORUS: CPT | Performed by: NURSE PRACTITIONER

## 2018-01-06 PROCEDURE — 82330 ASSAY OF CALCIUM: CPT | Performed by: NURSE PRACTITIONER

## 2018-01-06 PROCEDURE — 82948 REAGENT STRIP/BLOOD GLUCOSE: CPT

## 2018-01-06 PROCEDURE — 85027 COMPLETE CBC AUTOMATED: CPT | Performed by: NURSE PRACTITIONER

## 2018-01-06 PROCEDURE — 83735 ASSAY OF MAGNESIUM: CPT | Performed by: NURSE PRACTITIONER

## 2018-01-06 RX ORDER — SODIUM CHLORIDE, SODIUM GLUCONATE, SODIUM ACETATE, POTASSIUM CHLORIDE, MAGNESIUM CHLORIDE, SODIUM PHOSPHATE, DIBASIC, AND POTASSIUM PHOSPHATE .53; .5; .37; .037; .03; .012; .00082 G/100ML; G/100ML; G/100ML; G/100ML; G/100ML; G/100ML; G/100ML
500 INJECTION, SOLUTION INTRAVENOUS ONCE
Status: COMPLETED | OUTPATIENT
Start: 2018-01-06 | End: 2018-01-06

## 2018-01-06 RX ORDER — LORAZEPAM 2 MG/ML
0.5 INJECTION INTRAMUSCULAR EVERY 6 HOURS PRN
Status: DISCONTINUED | OUTPATIENT
Start: 2018-01-06 | End: 2018-01-19 | Stop reason: HOSPADM

## 2018-01-06 RX ORDER — DEXTROSE, SODIUM CHLORIDE, AND POTASSIUM CHLORIDE 5; .45; .15 G/100ML; G/100ML; G/100ML
75 INJECTION INTRAVENOUS CONTINUOUS
Status: DISCONTINUED | OUTPATIENT
Start: 2018-01-06 | End: 2018-01-11

## 2018-01-06 RX ADMIN — DEXTROSE MONOHYDRATE 3.38 G: 50 INJECTION, SOLUTION INTRAVENOUS at 14:55

## 2018-01-06 RX ADMIN — PANTOPRAZOLE SODIUM 40 MG: 40 INJECTION, POWDER, FOR SOLUTION INTRAVENOUS at 08:28

## 2018-01-06 RX ADMIN — AMIODARONE HYDROCHLORIDE 1 MG/MIN: 50 INJECTION, SOLUTION INTRAVENOUS at 00:04

## 2018-01-06 RX ADMIN — POTASSIUM PHOSPHATE, MONOBASIC AND POTASSIUM PHOSPHATE, DIBASIC 21 MMOL: 224; 236 INJECTION, SOLUTION INTRAVENOUS at 11:50

## 2018-01-06 RX ADMIN — HEPARIN SODIUM 5000 UNITS: 5000 INJECTION, SOLUTION INTRAVENOUS; SUBCUTANEOUS at 14:38

## 2018-01-06 RX ADMIN — SODIUM CHLORIDE, SODIUM GLUCONATE, SODIUM ACETATE, POTASSIUM CHLORIDE, MAGNESIUM CHLORIDE, SODIUM PHOSPHATE, DIBASIC, AND POTASSIUM PHOSPHATE 500 ML: .53; .5; .37; .037; .03; .012; .00082 INJECTION, SOLUTION INTRAVENOUS at 01:31

## 2018-01-06 RX ADMIN — HYDROMORPHONE HYDROCHLORIDE 0.5 MG: 1 INJECTION, SOLUTION INTRAMUSCULAR; INTRAVENOUS; SUBCUTANEOUS at 00:13

## 2018-01-06 RX ADMIN — HYDROMORPHONE HYDROCHLORIDE 0.5 MG: 1 INJECTION, SOLUTION INTRAMUSCULAR; INTRAVENOUS; SUBCUTANEOUS at 08:28

## 2018-01-06 RX ADMIN — HYDROMORPHONE HYDROCHLORIDE 1 MG: 1 INJECTION, SOLUTION INTRAMUSCULAR; INTRAVENOUS; SUBCUTANEOUS at 22:40

## 2018-01-06 RX ADMIN — HYDROMORPHONE HYDROCHLORIDE 0.5 MG: 1 INJECTION, SOLUTION INTRAMUSCULAR; INTRAVENOUS; SUBCUTANEOUS at 11:50

## 2018-01-06 RX ADMIN — DEXTROSE MONOHYDRATE 3.38 G: 50 INJECTION, SOLUTION INTRAVENOUS at 08:28

## 2018-01-06 RX ADMIN — DEXTROSE MONOHYDRATE 3.38 G: 50 INJECTION, SOLUTION INTRAVENOUS at 19:34

## 2018-01-06 RX ADMIN — AMIODARONE HYDROCHLORIDE 0.5 MG/MIN: 50 INJECTION, SOLUTION INTRAVENOUS at 06:08

## 2018-01-06 RX ADMIN — DEXTROSE, SODIUM CHLORIDE, AND POTASSIUM CHLORIDE 100 ML/HR: 5; .45; .15 INJECTION INTRAVENOUS at 14:54

## 2018-01-06 RX ADMIN — HYDROMORPHONE HYDROCHLORIDE 1 MG: 1 INJECTION, SOLUTION INTRAMUSCULAR; INTRAVENOUS; SUBCUTANEOUS at 16:38

## 2018-01-06 RX ADMIN — DEXTROSE MONOHYDRATE 3.38 G: 50 INJECTION, SOLUTION INTRAVENOUS at 04:43

## 2018-01-06 RX ADMIN — HEPARIN SODIUM 5000 UNITS: 5000 INJECTION, SOLUTION INTRAVENOUS; SUBCUTANEOUS at 22:21

## 2018-01-06 RX ADMIN — HEPARIN SODIUM 5000 UNITS: 5000 INJECTION, SOLUTION INTRAVENOUS; SUBCUTANEOUS at 05:22

## 2018-01-06 RX ADMIN — DEXTROSE 150 MG: 50 INJECTION, SOLUTION INTRAVENOUS at 01:26

## 2018-01-06 RX ADMIN — HYDROMORPHONE HYDROCHLORIDE 0.5 MG: 1 INJECTION, SOLUTION INTRAMUSCULAR; INTRAVENOUS; SUBCUTANEOUS at 05:22

## 2018-01-06 RX ADMIN — HYDROMORPHONE HYDROCHLORIDE 1 MG: 1 INJECTION, SOLUTION INTRAMUSCULAR; INTRAVENOUS; SUBCUTANEOUS at 19:25

## 2018-01-06 RX ADMIN — HYDROMORPHONE HYDROCHLORIDE 1 MG: 1 INJECTION, SOLUTION INTRAMUSCULAR; INTRAVENOUS; SUBCUTANEOUS at 14:38

## 2018-01-06 RX ADMIN — SODIUM CHLORIDE, SODIUM LACTATE, POTASSIUM CHLORIDE, AND CALCIUM CHLORIDE 75 ML/HR: .6; .31; .03; .02 INJECTION, SOLUTION INTRAVENOUS at 11:17

## 2018-01-06 RX ADMIN — CALCIUM GLUCONATE 2 G: 94 INJECTION, SOLUTION INTRAVENOUS at 11:54

## 2018-01-06 RX ADMIN — DEXTROSE MONOHYDRATE 500 MCG: 50 INJECTION, SOLUTION INTRAVENOUS at 02:45

## 2018-01-06 NOTE — RESPIRATORY THERAPY NOTE
RT Protocol Note  Luke Lab 47 y o  female MRN: [id number]  Unit/Bed#: [facility] 515-01 Encounter: [id number]    Assessment    Principal Problem:    Diverticulitis of large intestine with perforation and bleeding without abscess  Active Problems:    Gastroesophageal reflux disease without esophagitis    Hyperlipidemia    Essential hypertension    Hematuria    Ileostomy in place ([facility] )    A-fib ([facility] )      Home Pulmonary Medications:  none    Past Medical History:   Diagnosis Date    Disease of thyroid gland     Gastroesophageal reflux disease without esophagitis 12/4/2017    Hyperlipidemia     Hypertension     Vitamin D deficiency disease      Physical Exam:   Assessment Type: Assess only  General Appearance: Alert, Awake  Respiratory Pattern: Normal  Chest Assessment: Chest expansion symmetrical  Bilateral Breath Sounds: Clear  Cough: Non-productive, Dry    Vitals:  Blood pressure 118/52, pulse 82, temperature 98 8 °F (37 1 °C), temperature source Tympanic, resp  rate 18, height 5' 1" (1 549 m), weight 93 4 kg (205 lb 14 6 oz), SpO2 97 %        Results from last 7 days  Lab Units 01/04/18  2349 01/03/18  1952   PH ART  7 433 7 406   PCO2 ART mm Hg 34 8* 34 1*   PO2 ART mm Hg 136 0* 147 7*   HCO3 ART mmol/L 22 7 20 9*   BASE EXC ART mmol/L -1 1 -3 2   O2 CONTENT ART mL/dL 14 1* 15 3*   O2 HGB, ARTERIAL % 97 3* 98 6*   ABG SOURCE  Line, Arterial Radial, Left   CHRISTY TEST   --  Yes             Plan    Respiratory Plan: Discontinue Protocol        Resp Comments: no resp distress, lungs clear, no resp history, no home meds, will d/c protocol at this time

## 2018-01-06 NOTE — PROGRESS NOTES
Progress Note - General Surgery   Romelia Rosa 47 y o  female MRN: [id number]  Unit/Bed#: [facility] 515-01 Encounter: [id number]    Assessment and Plan:  Patient Active Problem List   Diagnosis    Diverticulitis    Gastroesophageal reflux disease without esophagitis    Hyperlipidemia    Essential hypertension    Hypokalemia    Hypoalbuminemia    Low serum prealbumin    Severe protein-calorie malnutrition (HCC)    Microscopic hematuria    Hypomagnesemia    Anemia    Diverticulitis of large intestine with perforation and bleeding without abscess    Hematuria    [facility] in [facility] ([facility] )    A-fib ([facility] )       Assessment:  50-year-old female status post second-look exploratory laparotomy small bowel resection, ileostomy closure via hand-sewn anastomosis and transverse colostomy on 1/4/18    Plan:    Change fluids to D5 1/2 NS +K @125  Follow-up/trend endpoints  Continue NPO/ NG tube - flush w/ 30cc normal saline q shift   Continue protonix for GI ppx   Await return of bowel function  Continue Soto & monitor I&Os  Try to get a new A-line for accurate BP monitoring - if unable to then d/c [place]   Continue REILLY drains x2 to suction  SQH and venodynes for DVT ppx  Continue Zosyn IV abx   Replete electrolytes as needed  Rest of care as per [facility] team      Subjective:  Patient was extubated yesterday  She is awake alert and responding to commands  She went into rapid AFib for several hours last night was given 2 Amiodarone boluses and is now on an amiodarone drip in normal sinus rhythm   Ostomy is still dusky with only sweat output    Objective:       Vitals   Vitals:    01/06/18 0300 01/06/18 0400 01/06/18 0500 01/06/18 0600   BP: 104/55 (!) 87/50 (!) 98/46 (!) 93/49   Pulse: (!) 154 84 84 84   Resp: 22 18 (!) 24 (!) 23   Temp: 99 3 °F (37 4 °C) 99 3 °F (37 4 °C) 99 3 °F (37 4 °C) 99 3 °F (37 4 °C)   TempSrc:       SpO2: 96% 97% 97% 96%   Weight:       Height:         Temp  Min: 97 6 °F (36 4 °C)  Max: 100 9 °F (38 3 °C)  IBW: 47 8 kg   Body mass index is 38 91 kg/m²  I/O   I/O       01/03 0701 - 01/04 0700 01/04 0701 - 01/05 0700    P  O   0    I V  (mL/kg) 1504 8 (16 1) 6161 6 (66)    NG/GT  0    IV Piggyback 550 1550    Total Intake(mL/kg) 2054 8 (22) 7711 6 (82 6)    Urine (mL/kg/hr) 1100 800 (0 4)    Emesis/NG output  150 (0 1)    Drains 400 765 (0 3)    Stool 0 75 (0)    Blood  150 (0 1)    Total Output 1500 1940    Net +554 8 +5771 6          Unmeasured Emesis Occurrence  1 x          Intake/Output Summary (Last 24 hours) at 01/06/18 0634  Last data filed at 01/06/18 0600   Gross per 24 hour   Intake             3493 ml   Output             2105 ml   Net             1388 ml       Invasive  Devices  Invasive Devices     Peripheral Intravenous Line            Peripheral IV 01/03/18 Left Antecubital 2 days    Peripheral IV 01/03/18 Left Hand 2 days    Peripheral IV 01/03/18 Right Antecubital 2 days          Arterial Line            Arterial Line 01/04/18 Left Radial 1 day          Drain            Urethral Catheter Latex 16 Fr  2 days    Closed/Suction Drain Left LLQ Bulb 10 Fr  1 day    Closed/Suction Drain Right RLQ Bulb 10 Fr  1 day    Colostomy LLQ 1 day    NG/OG/Enteral Tube Nasogastric 18 Fr Right nares 1 day                Active medications  Scheduled Meds:    atorvastatin 10 mg Oral Daily   heparin (porcine) 5,000 Units Subcutaneous Q8H [facility]   pantoprazole 40 mg Intravenous Q24H [facility]   piperacillin-tazobactam 3 375 g Intravenous Q6H     Continuous Infusions:    amiodarone 0 5 mg/min Last Rate: 0 5 mg/min (01/06/18 8467)   lactated ringers 125 mL/hr Last Rate: 125 mL/hr (01/05/18 0753)     PRN Meds:     acetaminophen 650 mg Q6H PRN   HYDROmorphone 0 5 mg Q3H PRN   ondansetron 4 mg Q6H PRN       Physical Exam: BP (!) 93/49   Pulse 84   Temp 99 3 °F (37 4 °C)   Resp (!) 23   Ht 5' 1" (1 549 m)   Wt 93 4 kg (205 lb 14 6 oz)   LMP  (LMP Unknown)   SpO2 96%   BMI 38 91 kg/m²     Physical Exam:  General Appearance: Alert, cooperative, no distress, appears stated age - NGT in place to sxn  Lungs: Clear to auscultation bilaterally, respirations unlablored   Heart: Regular rate and rhythm, S1 and S2 normal, no murmur, rub or gallop  Abdomen: Soft, appropriately tender, 5 midline with present in mid abdominal wound/staple line  Right upper quadrant closed ileostomy with 1 wick present  Left upper quadrant transverse colostomy with sweat within colostomy bag - dusky appearing stoma    No masses or organomegaly  REILLY drain # 1 (Right) serosanguineous - scant  REILLY drain # 2 (Left)  Serosanguineous - scant   Laboratory and Diagnostics:    Results from last 7 days  Lab Units 01/05/18  0439 01/04/18  2350 01/04/18  0433 01/03/18  1950  01/03/18  0538   WBC Thousand/uL 17 08* 14 50* 12 65* 14 02*  --  8 05   HEMOGLOBIN g/dL 9 3* 9 5* 10 8* 10 2*  --  10 2*   I STAT HEMOGLOBIN   --   --   --   --   < >  --    HEMATOCRIT % 28 6* 28 7* 33 2* 31 6*  --  32 1*   PLATELETS Thousands/uL 436* 378 418* 359  --  328   NEUTROS PCT %  --  86* 85*  --   --  79*   MONOS PCT %  --  6 6  --   --  5   MONO PCT MAN %  --   --   --  1*  --   --    < > = values in this interval not displayed  Results from last 7 days  Lab Units 01/05/18  2309 01/05/18  0439 01/04/18  2350  01/03/18  1950   SODIUM mmol/L 140 139 139  < > 139   POTASSIUM mmol/L 3 7 3 7 3 7  < > 3 8   CHLORIDE mmol/L 106 105 106  < > 103   CO2 mmol/L 30 26 25  < > 24   BUN mg/dL 7 3* 4*  < > 1*   CREATININE mg/dL 0 47* 0 43* 0 42*  < > 0 40*   CALCIUM mg/dL 7 8* 7 9* 7 7*  < > 8 4   TOTAL PROTEIN g/dL  --   --   --   --  6 1*   BILIRUBIN TOTAL mg/dL  --   --   --   --  0 71   ALK PHOS U/L  --   --   --   --  48   ALT U/L  --   --   --   --  7*   AST U/L  --   --   --   --  12   GLUCOSE RANDOM mg/dL 112 157* 162*  < > 134   < > = values in this interval not displayed      Results from last 7 days  Lab Units 01/05/18  2309 01/04/18  2350 01/04/18  0433   MAGNESIUM mg/dL 2 1 1 5* 2 2 Lab Results   Component Value Date    PHOS 2 7 01/05/2018    PHOS 2 4 (L) 01/04/2018    PHOS 3 3 01/04/2018        Results from last 7 days  Lab Units 01/04/18  1255 01/03/18  1950 01/03/18  0742   INR  1 39* 1 51* 1 62*   PTT seconds  --  38*  --      No results found for: TROPONINT  ABG:  Lab Results   Component Value Date    PHART 7 433 01/04/2018    [id number] 34 8 (L) 01/04/2018    PO2ART 136 0 (H) 01/04/2018    [id number] 22 7 01/04/2018    BEART -1 1 01/04/2018    SOURCE Line, Arterial 01/04/2018       Blood Culture: No results found for: BLOODCX  Urine Culture:   Lab Results   Component Value Date    URINECX No Growth <100 cfu/mL 01/03/2018    URINECX No Growth <1000 cfu/mL 12/26/2017     Sputum Culture: No components found for: SPUTUMCX    Imaging: I have personally reviewed pertinent reports     and I have personally reviewed pertinent films in PACS    VTE Pharmacologic Prophylaxis: Heparin  VTE Mechanical Prophylaxis: sequential compression device

## 2018-01-06 NOTE — PROGRESS NOTES
Progress Note - Critical Care   Dwain Collins 47 y o  female MRN: [id number]  Unit/Bed#: [facility] 047-84 Encounter: [id number]    Attending Physician: Miguel Corona MD      ______________________________________________________________________  Assessment and Plan:   Principal Problem:    Diverticulitis of large intestine with perforation and bleeding without abscess  Active Problems:    Gastroesophageal reflux disease without esophagitis    Hyperlipidemia    Essential hypertension    Hematuria    Ileostomy in place ([facility] )    A-fib ([facility] )  Resolved Problems:    * No resolved hospital problems  *        Neuro:   Analgesia   Dilaudid 0 5 mg q 3 hrs    Delirium Precautions    CV:   Afib    Asymptomatic   lopressor with hypotension and no impact on HR   amio bolus x 2 and gtt started last night   500 ml IVF given with no response in HR   HR now 84 bpm     Hypotension   Related to lopressor   MAPS now > 65    HD   Home lipitor    Pulm:   Pt extubated yesterday  On NC O2 with O2 sats 97%  Aggressive pulm toilet and IS  OOB and PT/OT today    GI:   Diverticulitis with perforation   POD 2 s/p ex lap with SBR, ileostomy reversal and closure   Gen surgery following, NPO until return of bowel function   NG tube to suction     2 REILLY drains - 135 R 265 L    Stress ulcer prophylaxis   Protonix    : BMP pending - creat has been stable  Ureteral stents were due to be removed yesterday by red surgery  Brittany for strict I&O - can consider DC if stents were removed   UOP 1,705 in 24 hours, net +1 3L in 24 hours    F/E/N:   NPO  LR at 125ml/hr  Follow-up BMP and Lytes, replete PRN    ID:   Perforated diverticulitis   Surgical source control achieved   Zosyn day 2/4   Luekocytosis worsening 17 from 14 - could be SIRS response   Monitor and trend   Afebrile TMax 100 4    Heme:  ABLA   HGB stable 9 3 from 9 5    Thrombocytosis     from 378 - likely SIRs from surgery     DVT Prophylaxis   SQ Heparin     Endo:   POT glucose 120-195  Trend and SSI - though remains NPO so limit insulin unless needed for goal < 180      Msk/Skin:   Abdominal wound closed  Frequent turns and repositioning  PT/OT    Disposition: Consider downgrade today if afib/hemodynamics stable     Code Status: Level 1 - Full Code    Counseling / Coordination of Care  Total time spent today 25 minutes  Greater than 50% of total time was spent with the patient and / or family counseling and / or coordination of care   A description of the counseling / coordination of care: Plan reviewed with RN  ______________________________________________________________________    Chief Complaint: "I am ok" Pt offers no complaints    24 Hour Events:   Extubated yesterday without complication  afib overnight with hypotension secondary to BB  Increasing leukocytosis, afebrile     ______________________________________________________________________    Physical Exam:   Constitutinoal: pt lying in bed, appears comfortable  HEENT: normocephalic, atraumatic, 3 mm ANGELINA, clear speech, NG tube  CV: regular rate and rhythm, no edema, + 1 DP pulses  Pulm: CTA, no wheezes, rhonchi or crackles, unlabored  Abd: mildly tender, colostomy dusky but viable, some bloody/brown thin liquid in collection bag, small amount, gigi bowel sounds, mildly distended, staples intact, incision well approximated with no drainage  Musc: moves all extremities, equal strength bilaterally  Neuro: GCS 15, no focal deficits, animated mood  Skin: no rash or breakdown      ______________________________________________________________________  Vitals:    18 0300 18 0400 18 0500 18 0600   BP: 104/55 (!) 87/50 (!) 98/46 (!) 93/49   Pulse: (!) 154 84 84 84   Resp: 22 18 (!) 24 (!) 23   Temp: 99 3 °F (37 4 °C) 99 3 °F (37 4 °C) 99 3 °F (37 4 °C) 99 3 °F (37 4 °C)   TempSrc:       SpO2: 96% 97% 97% 96%   Weight:       Height:           Temperature:   Temp (24hrs), Av 9 °F (37 7 °C), Min:99 3 °F (37 4 °C), Max:100 4 °F (38 °C)    Current Temperature: 99 3 °F (37 4 °C)  Weights:   IBW: 47 8 kg    Body mass index is 38 91 kg/m²  Weight (last 2 days)     None        Hemodynamic Monitoring:  N/A     Non-Invasive/Invasive Ventilation Settings:  Respiratory    Lab Data (Last 4 hours)    None         O2/Vent Data (Last 4 hours)    None              No results found for: PHART, ETS4MLT, PO2ART, WVW5KYH, Y2EKECLT, BEART, SOURCE  SpO2: SpO2: 96 %  Intake and Outputs:  I/O       01/04 0701 - 01/05 0700 01/05 0701 - 01/06 0700    P  O  0 0    I V  (mL/kg) 6238 8 (66 8) 3193 (34 2)    NG/GT 0 0    IV Piggyback 1550 300    Total Intake(mL/kg) 7788 8 (83 4) 3493 (37 4)    Urine (mL/kg/hr) 900 (0 4) 1705 (0 8)    Emesis/NG output 250 (0 1) 0 (0)    Drains 765 (0 3) 400 (0 2)    Stool 75 (0) 0 (0)    Blood 150 (0 1)     Total Output 2140 2105    Net +5648 8 +1388          Unmeasured Emesis Occurrence 1 x         UOP: /hour   Nutrition:        Diet Orders            Start     Ordered    01/03/18 1936  Diet NPO  Diet effective now     Question:  Diet Type  Answer:  NPO    01/03/18 1941        TF currently running at /hour with a goal of   Formula:  Labs:     Results from last 7 days  Lab Units 01/05/18  0439 01/04/18  2350 01/04/18  0433 01/03/18  1950  01/03/18  0538   WBC Thousand/uL 17 08* 14 50* 12 65* 14 02*  --  8 05   HEMOGLOBIN g/dL 9 3* 9 5* 10 8* 10 2*  --  10 2*   I STAT HEMOGLOBIN   --   --   --   --   < >  --    HEMATOCRIT % 28 6* 28 7* 33 2* 31 6*  --  32 1*   PLATELETS Thousands/uL 436* 378 418* 359  --  328   NEUTROS PCT %  --  86* 85*  --   --  79*   MONOS PCT %  --  6 6  --   --  5   MONO PCT MAN %  --   --   --  1*  --   --    < > = values in this interval not displayed      Results from last 7 days  Lab Units 01/05/18  2309 01/05/18  0439 01/04/18  2350  01/03/18  1950   SODIUM mmol/L 140 139 139  < > 139   POTASSIUM mmol/L 3 7 3 7 3 7  < > 3 8   CHLORIDE mmol/L 106 105 106  < > 103   CO2 mmol/L 30 26 25  < > 24 BUN mg/dL 7 3* 4*  < > 1*   CREATININE mg/dL 0 47* 0 43* 0 42*  < > 0 40*   CALCIUM mg/dL 7 8* 7 9* 7 7*  < > 8 4   TOTAL PROTEIN g/dL  --   --   --   --  6 1*   BILIRUBIN TOTAL mg/dL  --   --   --   --  0 71   ALK PHOS U/L  --   --   --   --  48   ALT U/L  --   --   --   --  7*   AST U/L  --   --   --   --  12   GLUCOSE RANDOM mg/dL 112 157* 162*  < > 134   < > = values in this interval not displayed  Results from last 7 days  Lab Units 01/05/18  2309 01/04/18  2350 01/04/18  0433   MAGNESIUM mg/dL 2 1 1 5* 2 2     Lab Results   Component Value Date    PHOS 2 7 01/05/2018    PHOS 2 4 (L) 01/04/2018    PHOS 3 3 01/04/2018        Results from last 7 days  Lab Units 01/04/18  1255 01/03/18  1950 01/03/18  0742   INR  1 39* 1 51* 1 62*   PTT seconds  --  38*  --        0  Lab Value Date/Time   TROPONINI <0 02 12/27/2017 0613   TROPONINI <0 02 12/11/2017 0516       Results from last 7 days  Lab Units 01/04/18  2350 01/03/18  1950 12/30/17  1717   LACTIC ACID mmol/L 1 5 1 1 1 2     ABG:  Lab Results   Component Value Date    PHART 7 433 01/04/2018    CDZ2GPG 34 8 (L) 01/04/2018    PO2ART 136 0 (H) 01/04/2018    INC3SXS 22 7 01/04/2018    BEART -1 1 01/04/2018    SOURCE Line, Arterial 01/04/2018     Imaging:  I have personally reviewed pertinent reports     and I have personally reviewed pertinent films in PACS  EKG:   Micro:  Lab Results   Component Value Date    URINECX No Growth <100 cfu/mL 01/03/2018    URINECX No Growth <1000 cfu/mL 12/26/2017    WOUNDCULT Few Colonies of Gram Negative Guzman Enteric Like (A) 01/03/2018     Allergies: No Known Allergies  Medications:   Scheduled Meds:  atorvastatin 10 mg Oral Daily   heparin (porcine) 5,000 Units Subcutaneous Q8H [facility]   pantoprazole 40 mg Intravenous Q24H [facility]   piperacillin-tazobactam 3 375 g Intravenous Q6H     Continuous Infusions:  amiodarone 0 5 mg/min Last Rate: 0 5 mg/min (01/06/18 8678)   lactated ringers 125 mL/hr Last Rate: 125 mL/hr (01/05/18 0753)     PRN Meds:    acetaminophen 650 mg Q6H PRN   HYDROmorphone 0 5 mg Q3H PRN   ondansetron 4 mg Q6H PRN     VTE Pharmacologic Prophylaxis: Heparin  VTE Mechanical Prophylaxis: sequential compression device  Invasive lines and devices: Invasive Devices     Peripheral Intravenous Line            Peripheral IV 01/03/18 Left Antecubital 2 days    Peripheral IV 01/03/18 Left Hand 2 days    Peripheral IV 01/03/18 Right Antecubital 2 days          Arterial Line            Arterial Line 01/04/18 Left Radial 1 day          Drain            Urethral Catheter Latex 16 Fr  2 days    Closed/Suction Drain Left LLQ Bulb 10 Fr  1 day    Closed/Suction Drain Right RLQ Bulb 10 Fr  1 day    Colostomy LLQ 1 day    NG/OG/Enteral Tube Nasogastric 18 Fr Right nares 1 day                     Portions of the record may have been created with voice recognition software  Occasional wrong word or "sound a like" substitutions may have occurred due to the inherent limitations of voice recognition software  Read the chart carefully and recognize, using context, where substitutions have occurred      JUANIS Coleman

## 2018-01-06 NOTE — PROGRESS NOTES
Report given to Stephendarlyn Lara RN and patient transferred to [facility] [phone]

## 2018-01-07 ENCOUNTER — GENERIC CONVERSION - ENCOUNTER (OUTPATIENT)
Dept: OTHER | Facility: OTHER | Age: 53
End: 2018-01-07

## 2018-01-07 LAB
ABO GROUP BLD BPU: NORMAL
ABO GROUP BLD BPU: NORMAL
ANION GAP SERPL CALCULATED.3IONS-SCNC: 5 MMOL/L (ref 4–13)
BASOPHILS # BLD AUTO: 0.02 THOUSANDS/ΜL (ref 0–0.1)
BASOPHILS NFR BLD AUTO: 0 % (ref 0–1)
BPU ID: NORMAL
BPU ID: NORMAL
BUN SERPL-MCNC: 6 MG/DL (ref 5–25)
CALCIUM SERPL-MCNC: 8.4 MG/DL (ref 8.3–10.1)
CHLORIDE SERPL-SCNC: 106 MMOL/L (ref 100–108)
CO2 SERPL-SCNC: 32 MMOL/L (ref 21–32)
CREAT SERPL-MCNC: 0.45 MG/DL (ref 0.6–1.3)
CROSSMATCH: NORMAL
CROSSMATCH: NORMAL
EOSINOPHIL # BLD AUTO: 0.3 THOUSAND/ΜL (ref 0–0.61)
EOSINOPHIL NFR BLD AUTO: 3 % (ref 0–6)
ERYTHROCYTE [DISTWIDTH] IN BLOOD BY AUTOMATED COUNT: 14.3 % (ref 11.6–15.1)
GFR SERPL CREATININE-BSD FRML MDRD: 115 ML/MIN/1.73SQ M
GLUCOSE SERPL-MCNC: 122 MG/DL (ref 65–140)
GLUCOSE SERPL-MCNC: 124 MG/DL (ref 65–140)
GLUCOSE SERPL-MCNC: 126 MG/DL (ref 65–140)
GLUCOSE SERPL-MCNC: 129 MG/DL (ref 65–140)
GLUCOSE SERPL-MCNC: 151 MG/DL (ref 65–140)
HCT VFR BLD AUTO: 25.2 % (ref 34.8–46.1)
HGB BLD-MCNC: 7.8 G/DL (ref 11.5–15.4)
LYMPHOCYTES # BLD AUTO: 1.37 THOUSANDS/ΜL (ref 0.6–4.47)
LYMPHOCYTES NFR BLD AUTO: 15 % (ref 14–44)
MAGNESIUM SERPL-MCNC: 2.1 MG/DL (ref 1.6–2.6)
MCH RBC QN AUTO: 28.4 PG (ref 26.8–34.3)
MCHC RBC AUTO-ENTMCNC: 31 G/DL (ref 31.4–37.4)
MCV RBC AUTO: 92 FL (ref 82–98)
MONOCYTES # BLD AUTO: 0.51 THOUSAND/ΜL (ref 0.17–1.22)
MONOCYTES NFR BLD AUTO: 6 % (ref 4–12)
NEUTROPHILS # BLD AUTO: 6.82 THOUSANDS/ΜL (ref 1.85–7.62)
NEUTS SEG NFR BLD AUTO: 76 % (ref 43–75)
NRBC BLD AUTO-RTO: 0 /100 WBCS
PHOSPHATE SERPL-MCNC: 3.1 MG/DL (ref 2.7–4.5)
PLATELET # BLD AUTO: 357 THOUSANDS/UL (ref 149–390)
PMV BLD AUTO: 9.1 FL (ref 8.9–12.7)
POTASSIUM SERPL-SCNC: 3.5 MMOL/L (ref 3.5–5.3)
RBC # BLD AUTO: 2.75 MILLION/UL (ref 3.81–5.12)
SODIUM SERPL-SCNC: 143 MMOL/L (ref 136–145)
UNIT DISPENSE STATUS: NORMAL
UNIT DISPENSE STATUS: NORMAL
UNIT PRODUCT CODE: NORMAL
UNIT PRODUCT CODE: NORMAL
UNIT RH: NORMAL
UNIT RH: NORMAL
WBC # BLD AUTO: 9.08 THOUSAND/UL (ref 4.31–10.16)

## 2018-01-07 PROCEDURE — 80048 BASIC METABOLIC PNL TOTAL CA: CPT | Performed by: SURGERY

## 2018-01-07 PROCEDURE — 82948 REAGENT STRIP/BLOOD GLUCOSE: CPT

## 2018-01-07 PROCEDURE — 84100 ASSAY OF PHOSPHORUS: CPT | Performed by: SURGERY

## 2018-01-07 PROCEDURE — 83735 ASSAY OF MAGNESIUM: CPT | Performed by: SURGERY

## 2018-01-07 PROCEDURE — C9113 INJ PANTOPRAZOLE SODIUM, VIA: HCPCS | Performed by: PHYSICIAN ASSISTANT

## 2018-01-07 PROCEDURE — 85025 COMPLETE CBC W/AUTO DIFF WBC: CPT | Performed by: SURGERY

## 2018-01-07 RX ADMIN — HYDROMORPHONE HYDROCHLORIDE 1 MG: 1 INJECTION, SOLUTION INTRAMUSCULAR; INTRAVENOUS; SUBCUTANEOUS at 08:06

## 2018-01-07 RX ADMIN — DEXTROSE MONOHYDRATE 3.38 G: 50 INJECTION, SOLUTION INTRAVENOUS at 13:46

## 2018-01-07 RX ADMIN — LORAZEPAM 0.5 MG: 2 INJECTION INTRAMUSCULAR; INTRAVENOUS at 00:30

## 2018-01-07 RX ADMIN — DEXTROSE MONOHYDRATE 3.38 G: 50 INJECTION, SOLUTION INTRAVENOUS at 08:06

## 2018-01-07 RX ADMIN — DEXTROSE MONOHYDRATE 3.38 G: 50 INJECTION, SOLUTION INTRAVENOUS at 02:08

## 2018-01-07 RX ADMIN — PANTOPRAZOLE SODIUM 40 MG: 40 INJECTION, POWDER, FOR SOLUTION INTRAVENOUS at 08:05

## 2018-01-07 RX ADMIN — LORAZEPAM 0.5 MG: 2 INJECTION INTRAMUSCULAR; INTRAVENOUS at 20:29

## 2018-01-07 RX ADMIN — HYDROMORPHONE HYDROCHLORIDE 1 MG: 1 INJECTION, SOLUTION INTRAMUSCULAR; INTRAVENOUS; SUBCUTANEOUS at 18:23

## 2018-01-07 RX ADMIN — Medication 20 MEQ: at 14:58

## 2018-01-07 RX ADMIN — HEPARIN SODIUM 5000 UNITS: 5000 INJECTION, SOLUTION INTRAVENOUS; SUBCUTANEOUS at 13:46

## 2018-01-07 RX ADMIN — DEXTROSE, SODIUM CHLORIDE, AND POTASSIUM CHLORIDE 100 ML/HR: 5; .45; .15 INJECTION INTRAVENOUS at 02:07

## 2018-01-07 RX ADMIN — HEPARIN SODIUM 5000 UNITS: 5000 INJECTION, SOLUTION INTRAVENOUS; SUBCUTANEOUS at 22:49

## 2018-01-07 RX ADMIN — HEPARIN SODIUM 5000 UNITS: 5000 INJECTION, SOLUTION INTRAVENOUS; SUBCUTANEOUS at 05:46

## 2018-01-07 RX ADMIN — Medication 20 MEQ: at 12:46

## 2018-01-07 RX ADMIN — HYDROMORPHONE HYDROCHLORIDE 1 MG: 1 INJECTION, SOLUTION INTRAMUSCULAR; INTRAVENOUS; SUBCUTANEOUS at 10:03

## 2018-01-07 RX ADMIN — DEXTROSE, SODIUM CHLORIDE, AND POTASSIUM CHLORIDE 100 ML/HR: 5; .45; .15 INJECTION INTRAVENOUS at 13:26

## 2018-01-07 RX ADMIN — HYDROMORPHONE HYDROCHLORIDE 1 MG: 1 INJECTION, SOLUTION INTRAMUSCULAR; INTRAVENOUS; SUBCUTANEOUS at 05:16

## 2018-01-07 RX ADMIN — DEXTROSE MONOHYDRATE 3.38 G: 50 INJECTION, SOLUTION INTRAVENOUS at 20:30

## 2018-01-07 RX ADMIN — HYDROMORPHONE HYDROCHLORIDE 1 MG: 1 INJECTION, SOLUTION INTRAMUSCULAR; INTRAVENOUS; SUBCUTANEOUS at 12:14

## 2018-01-07 RX ADMIN — AMIODARONE HYDROCHLORIDE 0.5 MG/MIN: 50 INJECTION, SOLUTION INTRAVENOUS at 00:35

## 2018-01-07 RX ADMIN — HYDROMORPHONE HYDROCHLORIDE 1 MG: 1 INJECTION, SOLUTION INTRAMUSCULAR; INTRAVENOUS; SUBCUTANEOUS at 22:49

## 2018-01-07 NOTE — PHYSICAL THERAPY NOTE
PT order received; chart reviewed; attempted to see pt in PM for initial evaluation; pt is observed in bed; reports she is visiting w/ family and declines mobilization at that time; nsg informed; will follow      Yrn Li PT

## 2018-01-07 NOTE — PROGRESS NOTES
Progress Note - General Surgery   Janet Camarena 47 y o  female MRN: [id number]  Unit/Bed#: [facility] 526-01 Encounter: [id number]    Assessment and Plan:  Patient Active Problem List   Diagnosis    Diverticulitis    Gastroesophageal reflux disease without esophagitis    Hyperlipidemia    Essential hypertension    Hypokalemia    Hypoalbuminemia    Low serum prealbumin    Severe protein-calorie malnutrition (HCC)    Microscopic hematuria    Hypomagnesemia    Anemia    Diverticulitis of large intestine with perforation and bleeding without abscess    Hematuria    Ile[facility] in place ([facility] )    A-fib ([facility] )       Assessment:  58-year-old female status post second-look exploratory laparotomy small bowel resection, ileostomy closure via hand-sewn anastomosis and transverse colostomy on 1/4/18    Plan:  D/C montoya   Change fluids to D5 1/2 NS +K @100  Follow-up/trend endpoints  Continue NPO/ NG tube - flush w/ 30cc normal saline q shift   Continue protonix for GI ppx   Await return of bowel function  Due to void at 8:30 a m  - patient may need to be straight cathed - continue to monitor I&Os  Continue REILLY drains x2 to suction  SQH and venodynes for DVT ppx  Continue Zosyn IV abx   Follow up on am labs and replete electrolytes as needed      Subjective:  Patient had urinary retention yesterday and had to be straight cathed for 550 cc  She states she is burping often however is not passing flatus or having any bowel movements  Ostomy is still dusky with only sweat output    REILLY drains are now serosanguineous    Objective:       Vitals   Vitals:    01/06/18 1541 01/06/18 1902 01/06/18 2301 01/07/18 0259   BP: 113/62 107/58 107/55 103/59   Pulse: 93 95 90 83   Resp: 18 18 18 15   Temp: 98 9 °F (37 2 °C) 98 6 °F (37 °C) 98 4 °F (36 9 °C) 98 5 °F (36 9 °C)   TempSrc: Oral Oral Oral Oral   SpO2: 100% 95% 90% 96%   Weight:       Height:         Temp  Min: 97 6 °F (36 4 °C)  Max: 100 9 °F (38 3 °C)  IBW: 47 8 kg   Body mass index is 38 91 kg/m²  I/O   I/O       01/03 0701 - 01/04 0700 01/04 0701 - 01/05 0700    P  O   0    I V  (mL/kg) 1504 8 (16 1) 6161 6 (66)    NG/GT  0    IV Piggyback 550 1550    Total Intake(mL/kg) 2054 8 (22) 7711 6 (82 6)    Urine (mL/kg/hr) 1100 800 (0 4)    Emesis/NG output  150 (0 1)    Drains 400 765 (0 3)    Stool 0 75 (0)    Blood  150 (0 1)    Total Output 1500 1940    Net +554 8 +5771 6          Unmeasured Emesis Occurrence  1 x          Intake/Output Summary (Last 24 hours) at 01/07/18 0648  Last data filed at 01/07/18 0559   Gross per 24 hour   Intake          3031 44 ml   Output             1550 ml   Net          1481 44 ml       Invasive  Devices  Invasive Devices     Peripheral Intravenous Line            Peripheral IV 01/03/18 Left Antecubital 3 days    Peripheral IV 01/03/18 Left Hand 3 days    Peripheral IV 01/03/18 Right Antecubital 3 days          Drain            Closed/Suction Drain Left LLQ Bulb 10 Fr  2 days    Closed/Suction Drain Right RLQ Bulb 10 Fr  2 days    Colostomy LLQ 2 days    NG/OG/Enteral Tube Nasogastric 18 Fr Right nares 2 days                Active medications  Scheduled Meds:    heparin (porcine) 5,000 Units Subcutaneous Q8H [address]   pantoprazole 40 mg Intravenous Q24H [address]   piperacillin-tazobactam 3 375 g Intravenous Q6H     Continuous Infusions:    amiodarone 0 5 mg/min Last Rate: 0 5 mg/min (01/07/18 0035)   dextrose 5 % and sodium chloride 0 45 % with KCl 20 mEq/L 100 mL/hr Last Rate: 100 mL/hr (01/07/18 0207)     PRN Meds:     acetaminophen 650 mg Q6H PRN   HYDROmorphone 1 mg Q2H PRN   LORazepam 0 5 mg Q6H PRN   ondansetron 4 mg Q6H PRN       Physical Exam: /59   Pulse 83   Temp 98 5 °F (36 9 °C) (Oral)   Resp 15   Ht 5' 1" (1 549 m)   Wt 93 4 kg (205 lb 14 6 oz)   LMP  (LMP Unknown)   SpO2 96%   BMI 38 91 kg/m²     Physical Exam:  General Appearance: Alert, cooperative, no distress, appears stated age - NGT in place to sxn  Lungs: Clear to auscultation bilaterally, respirations unlablored   Heart: Regular rate and rhythm, S1 and S2 normal, no murmur, rub or gallop  Abdomen: Soft, appropriately tender, 5 midline with present in mid abdominal wound/staple line  Right upper quadrant closed ileostomy with 1 wick present  Left upper quadrant transverse colostomy with sweat within colostomy bag - dusky appearing stoma    No masses or organomegaly  REILLY drain # 1 (Right) serosanguineous - scant  REILLY drain # 2 (Left)  Serosanguineous - scant   Laboratory and Diagnostics:    Results from last 7 days  Lab Units 01/06/18  1701 01/06/18  0452 01/05/18  0439 01/04/18  2350 01/04/18  0433   WBC Thousand/uL 12 79* 14 80* 17 08* 14 50* 12 65*   HEMOGLOBIN g/dL 7 8* 7 6* 9 3* 9 5* 10 8*   HEMATOCRIT % 24 8* 24 3* 28 6* 28 7* 33 2*   PLATELETS Thousands/uL 341 360 436* 378 418*   NEUTROS PCT %  --  82*  --  86* 85*   MONOS PCT %  --  5  --  6 6       Results from last 7 days  Lab Units 01/06/18  0452 01/05/18  2309 01/05/18  0439  01/03/18  1950   SODIUM mmol/L 143 140 139  < > 139   POTASSIUM mmol/L 3 9 3 7 3 7  < > 3 8   CHLORIDE mmol/L 108 106 105  < > 103   CO2 mmol/L 30 30 26  < > 24   BUN mg/dL 7 7 3*  < > 1*   CREATININE mg/dL 0 43* 0 47* 0 43*  < > 0 40*   CALCIUM mg/dL 8 1* 7 8* 7 9*  < > 8 4   TOTAL PROTEIN g/dL  --   --   --   --  6 1*   BILIRUBIN TOTAL mg/dL  --   --   --   --  0 71   ALK PHOS U/L  --   --   --   --  48   ALT U/L  --   --   --   --  7*   AST U/L  --   --   --   --  12   GLUCOSE RANDOM mg/dL 111 112 157*  < > 134   < > = values in this interval not displayed      Results from last 7 days  Lab Units 01/06/18  0452 01/05/18  2309 01/04/18  2350   MAGNESIUM mg/dL 2 4 2 1 1 5*     Lab Results   Component Value Date    PHOS 2 5 (L) 01/06/2018    PHOS 2 7 01/05/2018    PHOS 2 4 (L) 01/04/2018        Results from last 7 days  Lab Units 01/04/18  1255 01/03/18  1950 01/03/18  0742   INR  1 39* 1 51* 1 62*   PTT seconds  --  38*  --      No results found for: TROPONINT  ABG:  Lab Results   Component Value Date    PHART 7 433 01/04/2018    [id number] 34 8 (L) 01/04/2018    PO2ART 136 0 (H) 01/04/2018    WKI3XRA 22 7 01/04/2018    BEART -1 1 01/04/2018    SOURCE Line, Arterial 01/04/2018       Blood Culture: No results found for: BLOODCX  Urine Culture:   Lab Results   Component Value Date    URINECX No Growth <100 cfu/mL 01/03/2018    URINECX No Growth <1000 cfu/mL 12/26/2017     Sputum Culture: No components found for: SPUTUMCX    Imaging: I have personally reviewed pertinent reports     and I have personally reviewed pertinent films in PACS    VTE Pharmacologic Prophylaxis: Heparin  VTE Mechanical Prophylaxis: sequential compression device

## 2018-01-07 NOTE — PROGRESS NOTES
Spoke to resident Elana Kyle from [facility] about pt DTV @ 0830 and unable to, per resident to hold off and wait 2 more hours to see if pt voids on her on  Will continue to monitor

## 2018-01-08 ENCOUNTER — GENERIC CONVERSION - ENCOUNTER (OUTPATIENT)
Dept: OTHER | Facility: OTHER | Age: 53
End: 2018-01-08

## 2018-01-08 LAB
ANION GAP SERPL CALCULATED.3IONS-SCNC: 5 MMOL/L (ref 4–13)
BASOPHILS # BLD AUTO: 0.01 THOUSANDS/ΜL (ref 0–0.1)
BASOPHILS NFR BLD AUTO: 0 % (ref 0–1)
BUN SERPL-MCNC: 3 MG/DL (ref 5–25)
CALCIUM SERPL-MCNC: 8.3 MG/DL (ref 8.3–10.1)
CHLORIDE SERPL-SCNC: 106 MMOL/L (ref 100–108)
CO2 SERPL-SCNC: 32 MMOL/L (ref 21–32)
CREAT SERPL-MCNC: 0.41 MG/DL (ref 0.6–1.3)
EOSINOPHIL # BLD AUTO: 0.19 THOUSAND/ΜL (ref 0–0.61)
EOSINOPHIL NFR BLD AUTO: 3 % (ref 0–6)
ERYTHROCYTE [DISTWIDTH] IN BLOOD BY AUTOMATED COUNT: 14.5 % (ref 11.6–15.1)
GFR SERPL CREATININE-BSD FRML MDRD: 118 ML/MIN/1.73SQ M
GLUCOSE SERPL-MCNC: 101 MG/DL (ref 65–140)
GLUCOSE SERPL-MCNC: 105 MG/DL (ref 65–140)
GLUCOSE SERPL-MCNC: 108 MG/DL (ref 65–140)
GLUCOSE SERPL-MCNC: 120 MG/DL (ref 65–140)
HCT VFR BLD AUTO: 23 % (ref 34.8–46.1)
HGB BLD-MCNC: 7.2 G/DL (ref 11.5–15.4)
LYMPHOCYTES # BLD AUTO: 0.9 THOUSANDS/ΜL (ref 0.6–4.47)
LYMPHOCYTES NFR BLD AUTO: 15 % (ref 14–44)
MAGNESIUM SERPL-MCNC: 2 MG/DL (ref 1.6–2.6)
MCH RBC QN AUTO: 28.9 PG (ref 26.8–34.3)
MCHC RBC AUTO-ENTMCNC: 31.3 G/DL (ref 31.4–37.4)
MCV RBC AUTO: 92 FL (ref 82–98)
MONOCYTES # BLD AUTO: 0.34 THOUSAND/ΜL (ref 0.17–1.22)
MONOCYTES NFR BLD AUTO: 6 % (ref 4–12)
NEUTROPHILS # BLD AUTO: 4.4 THOUSANDS/ΜL (ref 1.85–7.62)
NEUTS SEG NFR BLD AUTO: 76 % (ref 43–75)
NRBC BLD AUTO-RTO: 0 /100 WBCS
PLATELET # BLD AUTO: 312 THOUSANDS/UL (ref 149–390)
PMV BLD AUTO: 10.2 FL (ref 8.9–12.7)
POTASSIUM SERPL-SCNC: 3.5 MMOL/L (ref 3.5–5.3)
RBC # BLD AUTO: 2.49 MILLION/UL (ref 3.81–5.12)
SODIUM SERPL-SCNC: 143 MMOL/L (ref 136–145)
WBC # BLD AUTO: 5.89 THOUSAND/UL (ref 4.31–10.16)

## 2018-01-08 PROCEDURE — C1751 CATH, INF, PER/CENT/MIDLINE: HCPCS

## 2018-01-08 PROCEDURE — 02HV33Z INSERTION OF INFUSION DEVICE INTO SUPERIOR VENA CAVA, PERCUTANEOUS APPROACH: ICD-10-PCS | Performed by: SURGERY

## 2018-01-08 PROCEDURE — 94762 N-INVAS EAR/PLS OXIMTRY CONT: CPT

## 2018-01-08 PROCEDURE — 80048 BASIC METABOLIC PNL TOTAL CA: CPT | Performed by: SURGERY

## 2018-01-08 PROCEDURE — C9113 INJ PANTOPRAZOLE SODIUM, VIA: HCPCS | Performed by: PHYSICIAN ASSISTANT

## 2018-01-08 PROCEDURE — 82948 REAGENT STRIP/BLOOD GLUCOSE: CPT

## 2018-01-08 PROCEDURE — 36569 INSJ PICC 5 YR+ W/O IMAGING: CPT

## 2018-01-08 PROCEDURE — 83735 ASSAY OF MAGNESIUM: CPT | Performed by: SURGERY

## 2018-01-08 PROCEDURE — G8987 SELF CARE CURRENT STATUS: HCPCS

## 2018-01-08 PROCEDURE — 97163 PT EVAL HIGH COMPLEX 45 MIN: CPT

## 2018-01-08 PROCEDURE — G8988 SELF CARE GOAL STATUS: HCPCS

## 2018-01-08 PROCEDURE — 97166 OT EVAL MOD COMPLEX 45 MIN: CPT

## 2018-01-08 PROCEDURE — 85025 COMPLETE CBC W/AUTO DIFF WBC: CPT | Performed by: SURGERY

## 2018-01-08 PROCEDURE — G8978 MOBILITY CURRENT STATUS: HCPCS

## 2018-01-08 RX ORDER — POTASSIUM CHLORIDE 20 MEQ/1
40 TABLET, EXTENDED RELEASE ORAL ONCE
Status: COMPLETED | OUTPATIENT
Start: 2018-01-08 | End: 2018-01-08

## 2018-01-08 RX ADMIN — LORAZEPAM 0.5 MG: 2 INJECTION INTRAMUSCULAR; INTRAVENOUS at 10:13

## 2018-01-08 RX ADMIN — PANTOPRAZOLE SODIUM 40 MG: 40 INJECTION, POWDER, FOR SOLUTION INTRAVENOUS at 10:00

## 2018-01-08 RX ADMIN — HEPARIN SODIUM 5000 UNITS: 5000 INJECTION, SOLUTION INTRAVENOUS; SUBCUTANEOUS at 21:44

## 2018-01-08 RX ADMIN — HEPARIN SODIUM 5000 UNITS: 5000 INJECTION, SOLUTION INTRAVENOUS; SUBCUTANEOUS at 13:25

## 2018-01-08 RX ADMIN — HYDROMORPHONE HYDROCHLORIDE 1 MG: 1 INJECTION, SOLUTION INTRAMUSCULAR; INTRAVENOUS; SUBCUTANEOUS at 12:09

## 2018-01-08 RX ADMIN — AMIODARONE HYDROCHLORIDE 0.5 MG/MIN: 50 INJECTION, SOLUTION INTRAVENOUS at 09:52

## 2018-01-08 RX ADMIN — POTASSIUM CHLORIDE 40 MEQ: 1500 TABLET, EXTENDED RELEASE ORAL at 10:00

## 2018-01-08 RX ADMIN — DEXTROSE MONOHYDRATE 3.38 G: 50 INJECTION, SOLUTION INTRAVENOUS at 02:37

## 2018-01-08 RX ADMIN — LORAZEPAM 0.5 MG: 2 INJECTION INTRAMUSCULAR; INTRAVENOUS at 22:40

## 2018-01-08 RX ADMIN — DEXTROSE, SODIUM CHLORIDE, AND POTASSIUM CHLORIDE 100 ML/HR: 5; .45; .15 INJECTION INTRAVENOUS at 22:34

## 2018-01-08 RX ADMIN — HYDROMORPHONE HYDROCHLORIDE 1 MG: 1 INJECTION, SOLUTION INTRAMUSCULAR; INTRAVENOUS; SUBCUTANEOUS at 06:36

## 2018-01-08 RX ADMIN — DEXTROSE, SODIUM CHLORIDE, AND POTASSIUM CHLORIDE 100 ML/HR: 5; .45; .15 INJECTION INTRAVENOUS at 01:01

## 2018-01-08 RX ADMIN — HEPARIN SODIUM 5000 UNITS: 5000 INJECTION, SOLUTION INTRAVENOUS; SUBCUTANEOUS at 06:36

## 2018-01-08 RX ADMIN — HYDROMORPHONE HYDROCHLORIDE 1 MG: 1 INJECTION, SOLUTION INTRAMUSCULAR; INTRAVENOUS; SUBCUTANEOUS at 20:09

## 2018-01-08 NOTE — NUTRITION
01/08/18 1528   Recommendations/Interventions   Summary overall has had inadequate diet for 10 days; NGT removed today; anticipate gradual diet increase   Interventions Diet: to Advance   Nutrition Recommendations Other (specify)  (advance to oral diet, starting with clear liquids and progressing to solid food, consider low fiber diet for short term while adjusting to colostomy; if unable to advance oral diet within next 24-48 hrs, consider PN and reconsult RD for recs)

## 2018-01-08 NOTE — OCCUPATIONAL THERAPY NOTE
[address]  Evaluation     Patient Name: Matthew Canchola  [facility]'Q Date: 1/8/2018  Problem List  Patient Active Problem List   Diagnosis    Diverticulitis    Gastroesophageal reflux disease without esophagitis    Hyperlipidemia    Essential hypertension    Hypokalemia    Hypoalbuminemia    Low serum prealbumin    Severe protein-calorie malnutrition (HCC)    Microscopic hematuria    Hypomagnesemia    Anemia    Diverticulitis of large intestine with perforation and bleeding without abscess    Hematuria    I[facility] in place ([facility] [address]    A-fib ([facility] [address] )     Past Medical History  Past Medical History:   Diagnosis Date    Disease of thyroid gland     Gastroesophageal reflux disease without esophagitis 12/4/2017    Hyperlipidemia     Hypertension     Vitamin D deficiency disease      Past Surgical History  Past Surgical History:   Procedure Laterality Date    CHOLECYSTECTOMY      LAPAROTOMY N/A 1/4/2018    Procedure: LAPAROTOMY EXPLORATORY, 2nd look, small bowel resection, ilieostomy closure via handsown anastamosis transverse colostomy;  Surgeon: Sonia Ashton DO;  Location:  MAIN OR;  Service: General    WY ESOPHAGOGASTRODUODENOSCOPY TRANSORAL DIAGNOSTIC N/A 10/21/2016    Procedure: EGD AND COLONOSCOPY;  Surgeon: Marelyn Hammans, MD;  Location: [facility];  Service: Gastroenterology    WY PART REMOVAL COLON W ANASTOMOSIS N/A 1/3/2018    Procedure: Exploratory laparotomy, RESECTION COLON SIGMOID, possible ostomy;  Surgeon: Artur Arriola DO;  Location: [facility];  Service: General    THYROIDECTOMY, PARTIAL      TONSILLECTOMY AND ADENOIDECTOMY      TUBAL LIGATION      URETERAL STENT PLACEMENT Bilateral 1/3/2018    Procedure: INSERTION STENT URETERAL;  Surgeon: Rafiq Hicks MD;  Location: [facility];  Service: Urology         01/08/18 1320   Note Type   Note type Eval/Treat   Restrictions/Precautions   Weight Bearing Precautions Per Order No   Other Precautions Pain; Fall Risk;Telemetry;Multiple lines   Pain Assessment   Pain Assessment 0-10   Pain Score 7   Pain Type Surgical pain   Pain Location Abdomen   Hospital Pain Intervention(s) Repositioned   Home Living   Type of [address] Multi-level;1/2 bath on main level;Bed/bath upstairs   Bathroom Shower/Tub Tub/shower unit   Bathroom Toilet Standard   Bathroom Accessibility Accessible   Additional Comments pt had no DME prior to admission   Prior Function   Level of Romance Independent with ADLs and functional mobility   Lives With Spouse   ADL Assistance Independent   IADLs Independent   Falls in the last 6 months 0   Vocational Full time employment   Comments [employer]    Lifestyle   Intrinsic Gratification spending time with my family   Psychosocial   Psychosocial (WDL) WDL   Patient Behaviors/Mood Appropriate for age   Subjective   Subjective It is just so painful, I know I should move but I just cant   ADL   Where Assessed Edge of bed   Grooming Assistance 4  Minimal Assistance   Grooming Deficit Steadying   UB Bathing Assistance 1  Total Assistance   UB Bathing Deficit Left arm; Abdomen;Right arm; Chest   LB Bathing Assistance 1  Total Assistance   LB Bathing Deficit Right upper leg;Left upper leg;Right lower leg including foot; Left lower leg including foot; Buttocks; Perineal area   UB Dressing Assistance 3  Moderate Assistance   LB Dressing Assistance 1  Total Assistance   LB Dressing Deficit Don/doff R sock; Don/doff L sock; Thread RLE into underwear; Thread LLE into underwear   Toileting Assistance  1  Total Assistance   Toileting Deficit Use of bedpan/urinal setup   Additional Comments Pt too fatigued to transfer to [facility] and reporting dizziness  Pt returned to bed and rolled side to side with assist to place bedpan  Pt limited with endurance and strength terriiel seated at EOB due to pain in abdomen pt unable to complete dynamic reaching to attempt LB dressing      Bed Mobility   Rolling R 4  Minimal assistance   Additional items Assist x 1   Rolling L 3  Moderate assistance   Additional items Assist x 1; Increased time required;Verbal cues   Supine to Sit 2  Maximal assistance   Additional items LE management;Verbal cues; Increased time required   Sit to Supine 2  Maximal assistance   Additional items Assist x 1; Increased time required;Verbal cues;LE management   Additional Comments Pt limited by pain requires assist with b/l LE's to lift back into bed  Pt requires AX2 to boost up in bed  Transfers   Additional Comments pt declining sit to stand due to fatigue and extreme pain in abdomen   Balance   Static Sitting Fair +   Dynamic Sitting Fair -   Activity Tolerance   Activity Tolerance Patient limited by pain; Patient limited by fatigue   Medical Staff Made Aware RN Mateo AWARE and present to help boost pt back up in bed   RUE Assessment   RUE Assessment WFL   LUE Assessment   LUE Assessment WFL   Edema   LUE Edema +2   Hand Function   Gross Motor Coordination Functional   Fine Motor Coordination Functional   Sensation   Light Touch No apparent deficits   Cognition   Overall Cognitive Status WFL   Arousal/Participation Alert; Cooperative   Attention Within functional limits   Orientation Level Oriented X4   Memory Within functional limits   Assessment   Limitation Decreased ADL status; Decreased UE strength;Decreased endurance;Decreased self-care trans;Decreased high-level ADLs   Prognosis Fair   Assessment Pt is 47 y o  F who presents s/p ex lap and ilestomy  Pt lives with jaci in multilevel home with b/b upstairs  PTA pt was working full time as [employer]  and was IND with ADL/IADLs and used no device for fxnl mobility  Pt now presents s/p ex lap and with decline in baseline function    Pt is currently demonstrating the following occupational deficits: decreased participation in ADLs, impaired mobility, decreased endurance/tolerance, generalized deconiditioning and UE weakness and is limited 2* decreased dynamic reach below waist, post surgical pain, UE edema, LE weakness   The following Occupational Performance Areas to address include: grooming, bathing/shower, toilet hygiene, dressing, medication management, functional mobility, community mobility and clothing management  Pt scored overall 35/100 on the Barthel Index Based on the aforementioned OT evaluation, functional performance deficits, and assessments, pt has been identified as a moderate complexity evaluation  Recommend pt has subacute rehab stay to maximize independence as  works full time and pt must be independent to return home  Pt to continue to benefit from acute immediate OT services to address the following goals 3-5x/wk to  w/in 14-21 days  See below for goals       Plan   Treatment Interventions ADL retraining;Functional transfer training;UE strengthening/ROM; Endurance training;Patient/family training;Equipment evaluation/education; Compensatory technique education; Energy conservation; Activityengagement   Goal Expiration Date 18   OT Frequency 3-5x/wk   Recommendation   OT Discharge Recommendation Short Term Rehab  (vs home pending progress)   Barthel Index   Feeding 10   Bathing 0   Grooming Score 0   Dressing Score 0   Bladder Score 10   Bowels Score 10   Toilet Use Score 0   Transfers (Bed/Chair) Score 5   Mobility (Level Surface) Score 0   Stairs Score 0   Barthel Index Score 35     OT GOALS TO  BY 2018    1  PT WILL COMPLETE UB/LB DRESSING WHILE SEATED/STANDING WITH MOD INDEPENDENCE     2  PT WILL COMPLETE TOILETING WITH MOD INDEPENDENCE, WITH THOROUGH HYGIENE AND PANTS UP/DOWN WHILE IN STANCE MAINTAINING GOOD BALANCE AND SAFETY    3  PT WILL COMPLETE BED MOBILITY AND TRANSFER TO EOB WITH MOD INDEPENDENCE USING COMPENSATORY STRATEGIES, TO INCREASE FUNCTIONAL MOBILITY FOR PARTICIPATION IN ADLS/IADLS    4  PT WILL COMPLETE FUNCTIONAL TRANSFERS ON/OFF ALL SURFACES, INCLUDING TOILET, WITH MOD INDEPENDENCE AND DME PRN TO INCREASE PARTICIPATION IN ADLS/IADLS     5  PT WILL COMPLETE LIGHT GROOMING TASK WHILE STANDING AT SINK FOR 3-5 MINUTES, WITH MOD INDEPENDENCE AND GOOD BALANCE, DME PRN     6  PT WILL INCREASE ACTIVITY TOLERANCE TO 15-20 MINUTES DURING OT TX SESSION TO INCREASE PARTICIPATION IN ADLS/IADLS   7  PT WILL IMPROVE UE STRENGTH TO 4/5 IN ORDER TO ENGAGE IN ADL TASKS AND FXNL MOBIILTY FOR IADL TASKS

## 2018-01-08 NOTE — PHYSICAL THERAPY NOTE
Physical Therapy Cancellation Note:  Pt unavailable for participation in PT evaluation as she is having a bedside procedure/test being done at this time  Will try back later as per scheduling allowance     Ivan kAbar, PT

## 2018-01-08 NOTE — INTERIM OP NOTE
Exploratory laparotomy, RESECTION COLON SIGMOID, possible ostomy  Postoperative Note  PATIENT NAME: Jalen Coburn  : 1965  MRN: [id number]  [facility] ROOM 01    Surgery Date: 1/3/2018    Preop Diagnosis:  Diverticulitis of large intestine with perforation and bleeding without abscess [K57 21]    Post-Op Diagnosis Codes:     * Diverticulitis of large intestine with perforation and bleeding without abscess [K57 21]    Procedure:  1  Insertion of bilateral ureteral stents-performed by Urology please see separate dictation  2    Exploratory laparotomy, partial dissection of sigmoid colon, drainage of intra-abdominal abscess, small-bowel resection, creation of end ileostomy, open abdomen with placement of VAC dressing    Surgeon(s) and Role:  Panel 1:     * Nicolasa Armendariz DO - Primary     * Calli Montes PA-C - Assisting    Panel 2:     * Emerald Ballard MD - Primary    Specimens:  ID Type Source Tests Collected by Time Destination   1 : terminal ileum Tissue Ileum, ileostomy stoma TISSUE EXAM Nicolasa Armendariz DO 1/3/2018  4:19 PM    A : urine from bladder for culture Urine Urine, Cystoscopic URINE CULTURE Nicolasa Armendariz DO 1/3/2018  2:10 PM    B : abdominal abscess Wound Wound ANAEROBIC CULTURE AND GRAM STAIN, WOUND CULTURE Nicolasa Armendariz DO 1/3/2018  3:06 PM        Estimated Blood Loss:   400 mL    Anesthesia Type:   General     Findings:   Severely diseased sigmoid colon secondary to diverticulitis with extension into the rectum, noted adhesed omentum and uterus, in addition to severely adhesed small intestine to the colon  Complications:   none      SIGNATURE: Nicolasa Armendariz DO   DATE: 2018   TIME: 8:56 PM

## 2018-01-08 NOTE — PLAN OF CARE
Nutrition/Hydration-ADULT     Nutrient/Hydration intake appropriate for improving, restoring or maintaining nutritional needs Not Progressing          DISCHARGE PLANNING - CARE MANAGEMENT     Discharge to post-acute care or home with appropriate resources Progressing        Prexisting or High Potential for Compromised Skin Integrity     Skin integrity is maintained or improved Progressing

## 2018-01-08 NOTE — PROGRESS NOTES
2/3 IV sites blew  Demetri Whitman RN was able to get IV site in right arm for me for the amiodarone gtt  Pt is for PICC line in the am   I changed abdomen dressing as gauze was saturated underneath and I wanted to look at incision  miled both REILLY drains as well

## 2018-01-08 NOTE — PROGRESS NOTES
Progress Note - General Surgery   Jose J Biswas 47 y o  female MRN: [id number]  Unit/Bed#: [facility] 1-0 Encounter: [id number]    Assessment and Plan:  Patient Active Problem List   Diagnosis    Diverticulitis    Gastroesophageal reflux disease without esophagitis    Hyperlipidemia    Essential hypertension    Hypokalemia    Hypoalbuminemia    Low serum prealbumin    Severe protein-calorie malnutrition (HCC)    Microscopic hematuria    Hypomagnesemia    Anemia    Diverticulitis of large intestine with perforation and bleeding without abscess    Hematuria    [facility] ([facility] [address]    A[facility]fib ([facility] )       Assessment:  50-year-old female status post second-look exploratory laparotomy small bowel resection, ileostomy closure via hand-sewn anastomosis and transverse colostomy on 1/4/18    Plan:  PICC today due to poor IV access   Decreased IVF fluids to D5 1/2 NS +K @75  Continue NPO  Discontinue NG tube  Continue protonix for GI ppx   Await return of bowel function  Continue REILLY drains x2 to suction - possibly pull REILLY #1   SQH and venodynes for DVT ppx  Continue Zosyn IV abx   Follow up on am labs and replete electrolytes as needed  PT/OT       Subjective:  Patient  Has no complaints this morning  She denies nausea or vomiting  Does not have any bowel function (  Air or stool ) present in colostomy bag  Ostomy is still dusky with only sweat output  REILLY drains are now serous     Objective:       Vitals   Vitals:    01/07/18 2107 01/07/18 2325 01/08/18 0215 01/08/18 0300   BP: 119/62 112/63  107/63   Pulse: 88 88  77   Resp: 18 18 18   Temp: 98 9 °F (37 2 °C) 97 9 °F (36 6 °C)  97 9 °F (36 6 °C)   TempSrc: Oral Oral  Oral   SpO2: 100% 98% 95% 100%   Weight: 90 7 kg (200 lb)      Height: 5' 2" (1 575 m)        Temp  Min: 97 6 °F (36 4 °C)  Max: 100 9 °F (38 3 °C)  IBW: 50 1 kg   Body mass index is 36 58 kg/m²  I/O   I/O       01/03 0701 - 01/04 0700 01/04 0701 - 01/05 0700    P  O   0    I V  (mL/kg) 1504 8 (16 1) 6161 6 (66)    NG/GT  0    IV Piggyback 550 1550    Total Intake(mL/kg) 2054 8 (22) 7711 6 (82 6)    Urine (mL/kg/hr) 1100 800 (0 4)    Emesis/NG output  150 (0 1)    Drains 400 765 (0 3)    Stool 0 75 (0)    Blood  150 (0 1)    Total Output 1500 1940    Net +554 8 +5771 6          Unmeasured Emesis Occurrence  1 x          Intake/Output Summary (Last 24 hours) at 01/08/18 0611  Last data filed at 01/08/18 0456   Gross per 24 hour   Intake          2463 88 ml   Output             2910 ml   Net          -446 12 ml       Invasive  Devices  Invasive Devices     Peripheral Intravenous Line            Peripheral IV 01/03/18 Left Antecubital 4 days    Peripheral IV 01/07/18 Right Wrist less than 1 day          Drain            Closed/Suction Drain Left LLQ Bulb 10 Fr  3 days    Closed/Suction Drain Right RLQ Bulb 10 Fr  3 days    Colostomy LLQ 3 days    NG/OG/Enteral Tube Nasogastric 18 Fr Right nares 3 days                Active medications  Scheduled Meds:    heparin (porcine) 5,000 Units Subcutaneous Q8H [address]   pantoprazole 40 mg Intravenous Q24H [address]   piperacillin-tazobactam 3 375 g Intravenous Q6H     Continuous Infusions:    amiodarone 0 5 mg/min Last Rate: 0 5 mg/min (01/07/18 0035)   dextrose 5 % and sodium chloride 0 45 % with KCl 20 mEq/L 100 mL/hr Last Rate: 100 mL/hr (01/08/18 0101)     PRN Meds:     acetaminophen 650 mg Q6H PRN   HYDROmorphone 1 mg Q2H PRN   LORazepam 0 5 mg Q6H PRN   ondansetron 4 mg Q6H PRN       Physical Exam: /63   Pulse 77   Temp 97 9 °F (36 6 °C) (Oral)   Resp 18   Ht 5' 2" (1 575 m)   Wt 90 7 kg (200 lb)   LMP  (LMP Unknown)   SpO2 100%   BMI 36 58 kg/m²     Physical Exam:  General Appearance: Alert, cooperative, no distress, appears stated age - NGT in place to sxn  Lungs: Clear to auscultation bilaterally, respirations unlablored   Heart: Regular rate and rhythm, S1 and S2 normal, no murmur, rub or gallop  Abdomen: Soft, appropriately tender, 5 midline with present in mid abdominal wound/staple line  Right upper quadrant closed ileostomy with 1 wick present  Left upper quadrant transverse colostomy with sweat within colostomy bag - dusky appearing stoma    No masses or organomegaly  REILLY drain # 1 (Right) serous - scant  REILLY drain # 2 (Left)  Serous - scant     Laboratory and Diagnostics:    Results from last 7 days  Lab Units 01/07/18  1045 01/06/18  1701 01/06/18  0452  01/04/18  2350   WBC Thousand/uL 9 08 12 79* 14 80*  < > 14 50*   HEMOGLOBIN g/dL 7 8* 7 8* 7 6*  < > 9 5*   HEMATOCRIT % 25 2* 24 8* 24 3*  < > 28 7*   PLATELETS Thousands/uL 357 341 360  < > 378   NEUTROS PCT % 76*  --  82*  --  86*   MONOS PCT % 6  --  5  --  6   < > = values in this interval not displayed  Results from last 7 days  Lab Units 01/07/18  1045 01/06/18  0452 01/05/18  2309  01/03/18  1950   SODIUM mmol/L 143 143 140  < > 139   POTASSIUM mmol/L 3 5 3 9 3 7  < > 3 8   CHLORIDE mmol/L 106 108 106  < > 103   CO2 mmol/L 32 30 30  < > 24   BUN mg/dL 6 7 7  < > 1*   CREATININE mg/dL 0 45* 0 43* 0 47*  < > 0 40*   CALCIUM mg/dL 8 4 8 1* 7 8*  < > 8 4   TOTAL PROTEIN g/dL  --   --   --   --  6 1*   BILIRUBIN TOTAL mg/dL  --   --   --   --  0 71   ALK PHOS U/L  --   --   --   --  48   ALT U/L  --   --   --   --  7*   AST U/L  --   --   --   --  12   GLUCOSE RANDOM mg/dL 126 111 112  < > 134   < > = values in this interval not displayed      Results from last 7 days  Lab Units 01/07/18  1045 01/06/18  0452 01/05/18  2309   MAGNESIUM mg/dL 2 1 2 4 2 1     Lab Results   Component Value Date    PHOS 3 1 01/07/2018    PHOS 2 5 (L) 01/06/2018    PHOS 2 7 01/05/2018        Results from last 7 days  Lab Units 01/04/18  1255 01/03/18  1950 01/03/18  0742   INR  1 39* 1 51* 1 62*   PTT seconds  --  38*  --      No results found for: TROPONINT  ABG:  Lab Results   Component Value Date    PHART 7 433 01/04/2018    GQL1HGX 34 8 (L) 01/04/2018    PO2ART 136 0 (H) 01/04/2018    AVT2HQA 22 7 01/04/2018    BEART -1 1 01/04/2018    SOURCE Line, Arterial 01/04/2018       Blood Culture: No results found for: BLOODCX  Urine Culture:   Lab Results   Component Value Date    URINECX No Growth <100 cfu/mL 01/03/2018    URINECX No Growth <1000 cfu/mL 12/26/2017     Sputum Culture: No components found for: SPUTUMCX    Imaging: I have personally reviewed pertinent reports     and I have personally reviewed pertinent films in PACS    VTE Pharmacologic Prophylaxis: Heparin  VTE Mechanical Prophylaxis: sequential compression device

## 2018-01-08 NOTE — OP NOTE
OPERATIVE REPORT  PATIENT NAME: Raúl Ayers    :  1965  MRN: [id number]  Pt Location: [facility] OR ROOM 01    SURGERY DATE: 1/3/2018    Surgeon(s) and Role:  Panel 1:     * Archie Hernandez DO - Primary     * Sanjuan Phalen, PA-C - Assisting    Panel 2:     * Claudene Bradford, MD - Primary    Preop Diagnosis:  Diverticulitis of large intestine with perforation and bleeding without abscess [K57 21]    Post-Op Diagnosis Codes:     * Diverticulitis of large intestine with perforation and bleeding without abscess [K57 21]    Procedure:  1  Insertion of bilateral ureteral stents-performed by Urology please see separate dictation  2  Exploratory laparotomy, partial dissection of sigmoid colon, drainage of intra-abdominal abscess, small-bowel resection, creation of end ileostomy, open abdomen with placement of VAC dressing    Specimen(s):  ID Type Source Tests Collected by Time Destination   1 : terminal ileum Tissue Ileum, ileostomy stoma TISSUE EXAM Archie Hernandez DO 1/3/2018  4:19 PM    A : urine from bladder for culture Urine Urine, Cystoscopic URINE CULTURE Archie Hernandez, DO 1/3/2018  2:10 PM    B : abdominal abscess Wound Wound ANAEROBIC CULTURE AND GRAM STAIN, WOUND CULTURE Archie Hernandez, DO 1/3/2018  3:06 PM        Estimated Blood Loss:   400 mL    Drains:  Closed/Suction Drain Right RLQ Bulb 10 Fr  (Active)   Site Description Unable to view 2018  8:00 AM   Dressing Status Dry; Intact 2018  8:00 AM   Drainage Appearance Serosanguineous 2018  8:00 AM   Status To bulb suction 2018  8:00 AM   Output (mL) 10 mL 2018  6:23 PM   Number of days: 3       Closed/Suction Drain Left LLQ Bulb 10 Fr  (Active)   Site Description Unable to view 2018  8:00 AM   Dressing Status Dry; Intact 2018  8:00 AM   Drainage Appearance Serosanguineous 2018  8:00 AM   Status To bulb suction 2018  8:00 AM   Output (mL) 10 mL 2018  6:23 PM   Number of days: 3       NG/OG/Enteral Tube Nasogastric 18 Fr Right nares (Active)   Placement Reverification Auscultation 1/7/2018  7:36 PM   Site Assessment Clean;Dry; Intact 1/7/2018  7:36 PM   Enteral feeding tube interventions Flushed 1/7/2018  8:00 AM   Status Suction-low intermittent 1/7/2018  7:36 PM   Drainage Appearance Green;Bile 1/7/2018  7:36 PM   Intake (mL) 0 mL 1/7/2018  7:36 PM   Output (mL) 100 mL 1/7/2018  7:36 PM   Number of days: 3       Colostomy LLQ (Active)   Stomal Appliance 1 piece 1/7/2018  7:36 PM   Stoma Assessment Red;Dusky 1/7/2018  7:36 PM   Stoma Shape Round 1/7/2018  7:36 PM   Peristomal Assessment Clean; Intact 1/7/2018  7:36 PM   Treatment Site care 1/6/2018  8:00 AM   Output (mL) 0 mL 1/7/2018  7:36 PM   Number of days: 3       [REMOVED] Negative Pressure Wound Therapy (V A C ) Abdomen Mid (Removed)   Removed 01/05/18 0012   Unit Type ulta vac 1/4/2018  8:00 AM   Cycle Continuous 1/4/2018  4:00 PM   Target Pressure (mmHg) 125 1/4/2018  4:00 PM   Canister Changed No 1/4/2018  4:00 PM   Dressing Status Clean;Dry; Intact 1/4/2018  4:00 PM   Output (mL) 100 mL 1/4/2018  6:00 PM   Number of days: 2       [REMOVED] NG/OG/Enteral Tube Orogastric 18 Fr Center mouth (Removed)   Removed 01/04/18 1943   Placement Reverification Auscultation 1/4/2018 10:00 AM   Site Assessment Clean;Dry; Intact 1/4/2018 10:00 AM   Status Suction-low continuous 1/4/2018  4:00 PM   Drainage Appearance Bile 1/4/2018  4:00 PM   Output (mL) 0 mL 1/4/2018  6:00 PM   Number of days: 1       [REMOVED] Ileostomy Standard [place], end) RUQ (Removed)   Removed 01/04/18 5149   Stomal Appliance 2 piece 1/4/2018  4:00 PM   Stoma Assessment Red 1/4/2018  4:00 PM   Peristomal Assessment Clean; Intact 1/4/2018  8:00 AM   Treatment Site care 1/4/2018  8:00 AM   Output (mL) 75 mL 1/4/2018  6:00 PM   Number of days: 1       [REMOVED] Urethral Catheter Latex 16 Fr   (Removed)   Removed 01/06/18 1449   Reasons to continue Urinary Catheter  Accurate I&O assessment in critically ill patients (48 hr  max) 1/6/2018 11:08 AM   Site Assessment Clean;Skin intact 1/6/2018  8:00 AM   Collection Container Standard drainage bag 1/6/2018  8:00 AM   Securement Method Securing device (Describe) 1/6/2018  8:00 AM   Output (mL) 420 mL 1/6/2018  2:00 PM   Number of days: 3       [REMOVED] Ureteral Drain/Stent Left ureter 5 Fr  (Removed)   Removed 01/05/18 1805   Site Assessment AMNA 1/5/2018  4:00 AM   Number of days: 2       [REMOVED] Ureteral Drain/Stent Right ureter 5 Fr  (Removed)   Removed 01/05/18 1700   Site Assessment AMNA 1/5/2018  4:00 AM   Number of days: 2       Anesthesia Type:   General    Operative Indications:  Diverticulitis of large intestine with abscess  58-year-old female with recent history of acute diverticulitis complicated by abscess, who was treated on multiple occasions with both IV and p  o  antibiotics without resolution of symptoms  Recently admitted to [facility] again with diverticulitis that was not resolving  Patient was continued on IV antibiotics but unable to tolerate diet and on repeat CT scan noted slightly worsening of inflammation and abscess formation  I decided take the patient to the operating room for colonic resection  The risks and benefits of the operation was discussed at great length with the patient in great detail, noting that the likelihood was to resect the sigmoid colon and bring up a colostomy bag  In addition other potential risks included injury to the small bowel and injury to the ureter for which bilateral ureteral stents were placed by Urology prior to the beginning of my portion the operation  The patient verbalized understand all these risks is willing to proceed  Consent was signed        Operative Findings:  Severely diseased sigmoid colon secondary to diverticulitis with extension into the rectum, noted adhesed omentum and uterus, in addition to severely adhesed small intestine to the colon    Complications:   Small-bowel enterotomy which was adhered to the procedure given the extensive adhesions between the small bowel in the diseased colon, requiring small bowel resection  In addition the procedure was aborted due to the complexity case and the extensive dissection that would be required, thus prolonging the operative time and requiring postoperative intubation and need for ICU care  pPatient was transferred to [facility]  Procedure and Technique:  Upon entering the operative area, the patient was already intubated and all required monitoring devices were connected  Bilateral ureteral stents were placed by Urology, please see separate dictation for that panel of the surgery  At that time was noted the patient was not on a bean back, and thus would be required to do so due to the steep Trendelenburg need for the colonic dissection  Therefore the patient was carefully moved back onto the stretcher while intubated the bean bag was placed and the patient was carefully moved back to the operative table  The ET tube was checked remained in place  All appropriate pressure points were padded  The beanbag was subsequently deflated and the patient was secured  Patient was then prepped and draped in usual sterile fashion  Time-out was performed again to verify correct patient, procedure, positioning, and site  The patient already received subcutaneous heparin for DVT prophylaxis in addition to bilateral lower sequential compression devices  Perioperative antibiotics were already administered  Using a 10 blade scalpel a midline abdominal incision was made from just above the umbilicus down to just above the pubic tubercle  The incision was carried down through the skin and deep dermis through subcutaneous fat using Bovie electrocautery  Hemostasis was achieved  The dissection was continued down to the fascia was observed  A small nick in the fascia was made and a finger was inserted and the fascia elevated    The fascia was then incised until the preperitoneal fat visualized  This fat was dissected until peritoneum was visualized, which was thengrasped with 2 clamps elevated and incised using Metzenbaum scissors  Abdominal cavity was then entered  Upon entering the abdominal cavity a solid mass like structure was then palpated  This was found to be the diseased sigmoid colon  At that time the [facility] was placed and multiple retractors were then carefully placed to retract the abdominal wall and provide exposure  The diseased sigmoid colon was then visualized and was noted to be stuck to both omentum small-bowel, and  uterus  The sigmoid colon was then carefully mobilized from a lateral to medial direction using a combination of blunt sharp technique  In doing so an abscess cavity was entered and a significant amount of pericolonic purulent material was found  This was cultured  Once the lateral portion of the sigmoid colon was mobilized attention was then turned to both the small bowel and omentum which was densely adhered  A he shins were then lysed allowing further mobilization of the omentum which was then retracted cephalad  The small bowel was then carefully mobilized off the diseased sigmoid colon port  Adhesions were lysed using combination of both blunt sharp technique  It was noted that time that a enterotomy was made into the small bowel, this was adhered to the procedure due to the dense adhesions between the terminal ileum and the sigmoid colon  Once the small bowel was completely freed was noted that there was a hole in the terminal ileum approximately 7 cm away from the cecum  At this time the enterotomy was then quickly over sewed to prevent spillage  The small bowel was then wrapped in a lap pad and packed away cephalad and retracted using 1 for Kade retractors  Attention was then turned to the dissection sigmoid colon      The white line of Toldt along the lateral aspect of the sigmoid and descending colon was then incised  This was carried up to approximately the splenic flexure  At that time healthy descending colon was visualized  A window was made between the small bowel mesentery using a hemostat  The descending colon was then transected using a CAMDEN 100 stapler with a blue load  At this time further dissection of the sigmoid colon, including takedown of the mesentery using a ligature energy device, was begun moving distally towards the rectum  It was noted that the sigmoid colon was severely diseased extending down to the rectum  At that time no healthy colon or rectum was found distally  It was determined that there would be a need for extensive dissection into the pelvis  It was felt that this dissection should not be completed a [employer]  Patient would likely require prolonged intubation and thus would require intubation postoperatively meeting ICU care  In addition to appropriately carry out this dissection in a timely manner and more importantly safely it was felt that additional surgical hands were needed in the OR  Thus the patient was to be transferred to [facility] for further management  The abdominal cavity was then washed out with multiple L of warm normal saline  Hemostasis was achieved  An end ileostomy was then created  The portion of terminal ileum that sustained the enterotomy was then resected  This was passed off the field  The proximal portion of the terminal ileum was then used for the ostomy  Of a site was chosen for the end ileostomy in the right lower quadrant  The skin was grasped with a Satya Dory and elevated and a circular incision was made approximately 2 finger breaths and diameter  This was then carried down to the subcutaneous fat and cord out until the fascia was reached  Cruciate incision was then made into the anterior sheath  The muscle was then split into the posterior sheath and peritoneum were visualized which of were both incised    Two fingers were then inserted a lying for plenty of room for the end ileostomy  The terminal ileum was then brought out through the ostomy site  Attention was then turned to the placement of the abdominal wound VAC  All the Bookwalter retractors were then removed  The entirety of the small bowel was then inspected all the way to thethe ligament Treitz and appeared to be healthy with no evidence of anyadditional injury  A moist green towel was then placed over the bowel and then 1 black sponge was then placed and the VAC was then secured  Attention was then turned to the end ileostomy  The end ileostomy was then matured in a Selena fashion using 3 0 Vicryl  An ostomy bag was then placed  Patient remained stable through the entirety of this particular procedure  She remained intubated and then was subsequently airlifted to [facility]  All lap, needle, and instrument counts were correct  I was present for the entire procedure and A qualified resident physician was not available, Silvano Melchor PA-C, was required for adequate exposure retraction of the case  Patient Disposition:  intubated and hemodynamically stable      SIGNATURE: Archie Hernandez DO  DATE: January 7, 2018  TIME: 9:15 PM

## 2018-01-08 NOTE — PLAN OF CARE
Problem: OCCUPATIONAL THERAPY ADULT  Goal: Performs self-care activities at highest level of function for planned discharge setting  See evaluation for individualized goals  Treatment Interventions: ADL retraining, Functional transfer training, UE strengthening/ROM, Endurance training, Patient/family training, Equipment evaluation/education, Compensatory technique education, Energy conservation, Activityengagement          See flowsheet documentation for full assessment, interventions and recommendations  Limitation: Decreased ADL status, Decreased UE strength, Decreased endurance, Decreased self-care trans, Decreased high-level ADLs  Prognosis: Fair  Assessment: Pt is 47 y o  F who presents s/p ex lap and ilestomy  Pt lives with benn in multilevel home with b/b upstairs  PTA pt was working full time as [employer]  and was IND with ADL/IADLs and used no device for fxnl mobility  Pt now presents s/p ex lap and with decline in baseline function        OT Discharge Recommendation: Short Term Rehab (vs home pending progress)

## 2018-01-08 NOTE — PLAN OF CARE
Problem: PHYSICAL THERAPY ADULT  Goal: Performs mobility at highest level of function for planned discharge setting  See evaluation for individualized goals  Treatment/Interventions: Functional transfer training, Therapeutic exercise, Patient/family training, Equipment eval/education, Bed mobility, Gait training, Spoke to nursing, Spoke to case management  Equipment Recommended:  (probable need for RW  )       See flowsheet documentation for full assessment, interventions and recommendations  Prognosis: Fair  Problem List: Pain  Assessment: Pt is 47 y o  female seen for PT evaluation s/p admit [facility] on 1/3/2018 w/ Diverticulitis of large intestine with perforation and bleeding without abscess  Pt underwent surgical resection, ileostomy, and transverse colectomy  PT consulted to assess pt's functional mobility and d/c needs  Order placed for PT eval and tx  Comorbidities affecting pt's physical performance at time of assessment include: hx of diverticulitis, GERD, a-fib, and anemia    PTA, pt was independent w/ all functional mobility w/ no need for an assistive device for ambulation, ambulates community distances and elevations, lives w/ her  and family in multi level home, works full time and was independent with all ADL's and IADL's PTA  Petr Macias Personal factors affecting pt at time of IE include: lives in [facility] house, ambulating w/ assistive device, stairs to enter home, inability to ambulate household distances, anxiety, inability to perform IADLs, inability to perform ADLs and will be alone during the daytime after d/c due to family work schedules     Please find objective findings from PT assessment regarding body systems outlined above with impairments and limitations including weakness, impaired balance, decreased endurance, gait deviations, pain, decreased activity tolerance, decreased functional mobility tolerance, decreased safety awareness and is currently functioning below baseline in re: to level of mobility    The following objective measures performed on IE also reveal limitations: Barthel Index: 55/100  Pt's clinical presentation is currently unstable/unpredictable seen in pt's presentation of having below baseline level of mobility at this time, having significant c/o abdomenal pain with all mobility activities, having limited home support ( home alone days)&  having 18 steps into her home    Pt to benefit from continued PT tx to address deficits as defined above and maximize level of functional independent mobility and consistency  From PT/mobility standpoint, recommendation at time of d/c would be determined in the future pending progress in order to facilitate return to PLOF  Today pt was experiencing signficicant dizziness and c/o post-surgical abdomenal pain as well as lethargy all limiting her ability to maximally participate  Pt was only able to complete a bed to commode chair and commode chair to bed transfers today  Will progress mobility skills as per pt tolerance  Barriers to Discharge: Inaccessible home environment, Decreased caregiver support     Recommendation: Other (Comment) (Still assessing  Pt very lethargic, painful, dizzy today  )     PT - OK to Discharge: No    See flowsheet documentation for full assessment

## 2018-01-08 NOTE — RESTORATIVE TECHNICIAN NOTE
Restorative Specialist Mobility Note       Activity: Other (Comment) (Educated/encouraged pt to ambulate w/assistance 3-4 x's/day, pt refused  Bed alarm on  Pt callbell, phone/tray within reach )              Repositioned: Other (Comment) (Rep /sat pt upright in bed  )           Range of Motion: All extremities  Anti-Embolism Device On:  Bilateral, Sequential compression devices, below knee       Melita GORDON, Restorative Technician, [employer] [facility]

## 2018-01-08 NOTE — PLAN OF CARE

## 2018-01-08 NOTE — PROCEDURES
Insert PICC line  Date/Time: 1/8/2018 10:54 AM  Performed by: Melisa Price  Authorized by: Aziza Morley     Patient location:  Bedside  Other Assisting Provider: Yes (comment) (yusuf Carter)    Consent:     Consent obtained:  Written (MD obtained)    Consent given by:  Patient (consent in chart)  Universal protocol:     Patient identity confirmed:  Verbally with patient and arm band  Pre-procedure details:     Hand hygiene: Hand hygiene performed prior to insertion      Sterile barrier technique: All elements of maximal sterile technique followed      Skin preparation:  ChloraPrep    Skin preparation agent: Skin preparation agent completely dried prior to procedure    Indications:     PICC line indications: no peripheral vascular access    Anesthesia (see MAR for exact dosages): Anesthesia method:  Local infiltration (3ml )    Local anesthetic:  Lidocaine 1% w/o epi  Procedure details:     Location:  Brachial    Vessel type: vein      Laterality:  Right    Patient position:  Flat    Procedural supplies:  Double lumen    Catheter size:  5 Fr    Landmarks identified: yes      Ultrasound guidance: yes      Sterile ultrasound techniques: Sterile gel and sterile probe covers were used      Number of attempts:  1    Successful placement: yes      Vessel of catheter tip end:  Sherlock 3CG confirmed    Total catheter length (cm):  37    Catheter out on skin (cm):  0    Max flow rate:  999/ml    Arm circumference:  34  Post-procedure details:     Post-procedure:  Dressing applied and securement device placed    Assessment:  Blood return through all ports and free fluid flow    Post-procedure complications: none      Patient tolerance of procedure:   Tolerated well, no immediate complications  Comments:      RN notified ok to use picc

## 2018-01-08 NOTE — PHYSICAL THERAPY NOTE
Physical Therapy Initial Evaluation   Munozmichael Tamayo, PT   01/08/18 1034   Note Type   Note type Eval/Treat   Pain Assessment   Pain Assessment 0-10   Pain Score 8   Pain Type Surgical pain;Acute pain   Pain Location Abdomen   Hospital Pain Intervention(s) Ambulation/increased activity;Repositioned; Emotional support  (Informed RN- Maria De Jesus Due of pt's c/o pain  )   Home Living   Type of [address] Shower/Tub Tub/shower unit   [address] [place] [address] Accessibility Accessible   Additional Comments No DME used or available PTA  Prior Function   Level of [place] Independent with ADLs and functional mobility   Lives With Spouse; Family   ADL Assistance Independent   IADLs Independent   Falls in the last 6 months 0   Vocational Full time employment   Comments [employer]    + drives  Restrictions/Precautions   Weight Bearing Precautions Per Order No   Braces or Orthoses Other (Comment)  (none)   Other Precautions Telemetry;Multiple lines; Fall Risk;Pain  (instructed log rolling )   General   Additional Pertinent History dx: sigmoid diverticulitis lwith perforation /bleeding  Underwent x-lap, small bowel resection,  ileostomy closure, anastomosis and transverse colectomy  PMH: dicverticulitis, GERD, HLD, hematuria, a-fib, anemia  Family/Caregiver Present No   Cognition   Overall Cognitive Status WFL  (Lethargic  RN reported pt had med for anxiety earlier today)   Arousal/Participation Lethargic   Attention Attends with cues to redirect   Orientation Level Oriented X4   Memory Within functional limits   Following Commands Follows one step commands with increased time or repetition   Comments Motivated to participate      RUE Assessment   RUE Assessment WFL   LUE Assessment   LUE Assessment WFL   RLE Assessment   RLE Assessment WFL  (4/5)   LLE Assessment   LLE Assessment WFL  (4/5 )   Coordination   Movements are Fluid and Coordinated 1   Sensation WFL   Bed Mobility   Rolling L 3  Moderate assistance   Additional items Assist x 1; Increased time required;Verbal cues   Sit to Supine 3  Moderate assistance   Additional items Assist x 1; Increased time required;LE management;Verbal cues   Transfers   Sit to Stand 4  Minimal assistance   Additional items Assist x 1; Increased time required;Verbal cues   Stand to Sit 4  Minimal assistance   Additional items Assist x 1; Increased time required;Verbal cues   Toilet transfer 4  Minimal assistance   Additional items Assist x 1; Increased time required;Verbal cues; Commode   Ambulation/Elevation   Gait pattern Excessively slow; Shuffling   Gait Assistance 4  Minimal assist   Additional items Assist x 1;Verbal cues   Assistive Device Rolling walker   Distance 4'  (2'x2  PT returned back to bed due to lethargy  )   Stair Management Assistance Not tested   Additional items (Too groggy, c/o dizziness  )   Balance   Static Sitting Fair +   Dynamic Sitting Fair -   Static Standing Fair -   Dynamic Standing Fair -   Ambulatory Poor +   Endurance Deficit   Endurance Deficit Yes   Activity Tolerance   Activity Tolerance Patient limited by pain; Patient limited by fatigue; Other (Comment); Treatment limited secondary to medical complications (Comment)  (dizziness)   Medical Staff Made Aware RN- Abrahan Hickey consented to allow pt to participate in PT evaluation  Abrahan Hickey informed of outcome of eval     Assessment   Prognosis Fair   Problem List Pain   Assessment Pt is 47 y o  female seen for PT evaluation s/p admit to [facility] on 1/3/2018 w/ Diverticulitis of large intestine with perforation and bleeding without abscess  Pt underwent surgical resection, ileostomy, and transverse colectomy  PT consulted to assess pt's functional mobility and d/c needs  Order placed for PT eval and tx  Comorbidities affecting pt's physical performance at time of assessment include: hx of diverticulitis, GERD, a-fib, and anemia      PTA, pt was independent w/ all functional mobility w/ no need for an assistive device for ambulation, ambulates community distances and elevations, lives w/ her  and family in multi level home, works full time and was independent with all ADL's and IADL's PTA  Tori Keene Personal factors affecting pt at time of IE include: lives in [facility], ambulating w/ assistive device, stairs to enter home, inability to ambulate household distances, anxiety, inability to perform IADLs, inability to perform ADLs and will be alone during the daytime after d/c due to family work schedules    Please find objective findings from PT assessment regarding body systems outlined above with impairments and limitations including weakness, impaired balance, decreased endurance, gait deviations, pain, decreased activity tolerance, decreased functional mobility tolerance, decreased safety awareness and is currently functioning below baseline in re: to level of mobility    The following objective measures performed on IE also reveal limitations: Barthel Index: 55/100  Pt's clinical presentation is currently unstable/unpredictable seen in pt's presentation of having below baseline level of mobility at this time, having significant c/o abdomenal pain with all mobility activities, having limited home support ( home alone days)&  having 18 steps into her home    Pt to benefit from continued PT tx to address deficits as defined above and maximize level of functional independent mobility and consistency  From PT/mobility standpoint, recommendation at time of d/c would be determined in the future pending progress in order to facilitate return to PLOF  Today pt was experiencing signficicant dizziness and c/o post-surgical abdomenal pain as well as lethargy all limiting her ability to maximally participate  Pt was only able to complete a bed to commode chair and commode chair to bed transfers today  Will progress mobility skills as per pt tolerance     Barriers to Discharge Inaccessible home environment;Decreased caregiver support   Goals   Patient Goals Pt did not express goals for PT rx's today  STG Expiration Date 01/18/18   Short Term Goal #1 1  Pt completes bed mobility activities independently   2  Pt transfers independently with RW, good safety and fair+ balance  3  Pt ambulates 150' independently with RW, good safety and fair + balance  4  Pt fully versed in use of log rolling technique for minimization of abdomenal pain during mobility activiites  Treatment Day 0   Plan   Treatment/Interventions Functional transfer training; Therapeutic exercise;Patient/family training;Equipment eval/education; Bed mobility;Gait training;Spoke to nursing;Spoke to case management   PT Frequency 5x/wk   Recommendation   Recommendation Other (Comment)  (Still assessing    Pt very lethargic, painful, dizzy today  )   Equipment Recommended (probable need for RW  )   PT - OK to Discharge No   Barthel Index   Feeding 10   Bathing 0   Grooming Score 5   Dressing Score 5   Bladder Score 10   Bowels Score 10   Toilet Use Score 5   Transfers (Bed/Chair) Score 10   Mobility (Level Surface) Score 0   Stairs Score 0   Barthel Index Score 55

## 2018-01-08 NOTE — CASE MANAGEMENT
Continued Stay Review    Thank you,  [facility] in the [facility] by Yonis Stover for 2017  Network Utilization Review Department  Phone: [phone]; Fax [phone]  ATTENTION: The Network Utilization Review Department is now centralized for our 7 Facilities  Make a note that we have a new phone and fax numbers for our Department  Please call with any questions or concerns to [phone] and carefully follow the prompts so that you are directed to the right person  All voicemails are confidential  Fax any determinations, approvals, denials, and requests for initial or continue stay review clinical to [phone]  Due to HIGH CALL volume, it would be easier if you could please send faxed requests to expedite your requests and in part, help us provide discharge notifications faster      Date: 1/7-1/8/2018 - Moved from [address]  Unit on 1/6 to Step down unit     Vital Signs: BP 97/57   Pulse 79   Temp 98 1 °F (36 7 °C) (Oral)   Resp 20   Ht 5' 2" (1 575 m)   Wt 90 7 kg (200 lb)   LMP  (LMP Unknown)   SpO2 96%   BMI 36 58 kg/m²   Vitals:     01/06/18 1541 01/06/18 1902 01/06/18 2301 01/07/18 0259   BP: 113/62 107/58 107/55 103/59   Pulse: 93 95 90 83   Resp: 18 18 18 15   Temp: 98 9 °F (37 2 °C) 98 6 °F (37 °C) 98 4 °F (36 9 °C) 98 5 °F (36 9 °C)   TempSrc: Oral Oral Oral Oral   SpO2: 100% 95% 90% 96%   Weight:           Vitals:     01/07/18 2107 01/07/18 2325 01/08/18 0215 01/08/18 0300   BP: 119/62 112/63   107/63   Pulse: 88 88   77   Resp: 18 18 18   Temp: 98 9 °F (37 2 °C) 97 9 °F (36 6 °C)   97 9 °F (36 6 °C)   TempSrc: Oral Oral   Oral   SpO2: 100% 98% 95% 100%   Weight: 90 7 kg (200 lb)         Height: 5' 2" (1 575 m)                   Medications:   Scheduled Meds:   heparin (porcine) 5,000 Units Subcutaneous Q8H [facility]   pantoprazole 40 mg Intravenous Q24H [facility]     Continuous Infusions:   amiodarone 0 5 mg/min Last Rate: 0 5 mg/min (01/08/18 2028)   dextrose 5 % and sodium chloride 0 45 % with KCl 20 mEq/L 100 mL/hr Last Rate: 100 mL/hr (01/08/18 0101)     PRN Meds:   acetaminophen    HYDROmorphone iv 1/7 x 6- 1/8 x1    LORazepam iv 1/7 x 2- 1/8 x1    ondansetron     Nursing orders - Telem - NG tube to LCWS - VS q 4 - I &O q shift - Daily weights - Turn pt q 2 - Elevate HOB - oral care - Diet NPO  - PT/OT eval     Abnormal Labs/Diagnostic Results:      Ref Range & Units 1/8/18 0622 1/7/18 1045 1/6/18 1701   WBC 4 31 - 10 16 Thousand/uL 5 89  9 08  12 79     RBC 3 81 - 5 12 Million/uL 2 49   2 75   2 73     Hemoglobin 11 5 - 15 4 g/dL 7 2   7 8   7 8     Hematocrit 34 8 - 46 1 % 23 0   25 2   24 8     MCV 82 - 98 fL 92  92  91    MCH 26 8 - 34 3 pg 28 9  28 4  28 6    MCHC 31 4 - 37 4 g/dL 31 3   31 0   31 5    RDW 11 6 - 15 1 % 14 5  14 3  14 3    MPV 8 9 - 12 7 fL 10 2  9 1  9 4    Platelets 559 - 601 Thousands/uL 312  357  341    nRBC /100 WBCs 0  0     Neutrophils Relative 43 - 75 % 76   76      Lymphocytes Relative 14 - 44 % 15  15     Monocytes Relative 4 - 12 % 6  6     Eosinophils Relative 0 - 6 % 3  3     Basophils Relative 0 - 1 % 0  0     Neutrophils Absolute 1 85 - 7 62 Thousands/µL 4 40  6 82     Lymphocytes Absolute 0 60 - 4 47 Thousands/µL 0 90  1 37     Monocytes Absolute 0 17 - 1 22 Thousand/µL 0 34  0 51     Eosinophils Absolute 0 00 - 0 61 Thousand/µL 0 19  0 30     Basophils Absolute 0 00 - 0 10 Thousands/µL 0 01  0 02              Ref Range & Units 1/8/18 0622 1/7/18 1045 1/6/18 0452   Sodium 136 - 145 mmol/L 143  143  143    Potassium 3 5 - 5 3 mmol/L 3 5  3 5  3 9    Chloride 100 - 108 mmol/L 106  106  108    CO2 21 - 32 mmol/L 32  32  30    Anion Gap 4 - 13 mmol/L 5  5  5    BUN 5 - 25 mg/dL 3   6  7    Creatinine 0 60 - 1 30 mg/dL 0 41   0 45CM   0 43CM     Glucose 65 - 140 mg/dL 101  126CM  111CM    Calcium 8 3 - 10 1 mg/dL 8 3  8 4  8 1     eGFR ml/min/1 73sq m 118  115  116                Age/Sex: 47 y o  female Assessment/Plan:   Progress note 1/7  Assessment:  59-year-old female status post second-look exploratory laparotomy small bowel resection, ileostomy closure via hand-sewn anastomosis and transverse colostomy on 1/4/18     Plan:  D/C montoya   Change fluids to D5 1/2 NS +K @100  Follow-up/trend endpoints  Continue NPO/ NG tube - flush w/ 30cc normal saline q shift   Continue protonix for GI ppx   Await return of bowel function  Due to void at 8:30 a m  - patient may need to be straight cathed - continue to monitor I&Os  Continue REILLY drains x2 to suction  SQH and venodynes for DVT ppx  Continue Zosyn IV abx   Follow up on am labs and replete electrolytes as needed        Subjective:  Patient had urinary retention yesterday and had to be straight cathed for 550 cc  She states she is burping often however is not passing flatus or having any bowel movements  Ostomy is still dusky with only sweat output    REILLY drains are now serosanguineous         Discharge Plan:  Was independent prior to admission - lives with her  & son - no DME  No Hx of C or rehab

## 2018-01-09 LAB
ANION GAP SERPL CALCULATED.3IONS-SCNC: 6 MMOL/L (ref 4–13)
BUN SERPL-MCNC: 2 MG/DL (ref 5–25)
CALCIUM SERPL-MCNC: 8.1 MG/DL (ref 8.3–10.1)
CHLORIDE SERPL-SCNC: 105 MMOL/L (ref 100–108)
CO2 SERPL-SCNC: 30 MMOL/L (ref 21–32)
CREAT SERPL-MCNC: 0.37 MG/DL (ref 0.6–1.3)
ERYTHROCYTE [DISTWIDTH] IN BLOOD BY AUTOMATED COUNT: 14.3 % (ref 11.6–15.1)
GFR SERPL CREATININE-BSD FRML MDRD: 122 ML/MIN/1.73SQ M
GLUCOSE SERPL-MCNC: 107 MG/DL (ref 65–140)
HCT VFR BLD AUTO: 22.6 % (ref 34.8–46.1)
HGB BLD-MCNC: 7.1 G/DL (ref 11.5–15.4)
MCH RBC QN AUTO: 28.5 PG (ref 26.8–34.3)
MCHC RBC AUTO-ENTMCNC: 31.4 G/DL (ref 31.4–37.4)
MCV RBC AUTO: 91 FL (ref 82–98)
PLATELET # BLD AUTO: 289 THOUSANDS/UL (ref 149–390)
PMV BLD AUTO: 9.1 FL (ref 8.9–12.7)
POTASSIUM SERPL-SCNC: 3.5 MMOL/L (ref 3.5–5.3)
RBC # BLD AUTO: 2.49 MILLION/UL (ref 3.81–5.12)
SODIUM SERPL-SCNC: 141 MMOL/L (ref 136–145)
WBC # BLD AUTO: 5.89 THOUSAND/UL (ref 4.31–10.16)

## 2018-01-09 PROCEDURE — 80048 BASIC METABOLIC PNL TOTAL CA: CPT | Performed by: PHYSICIAN ASSISTANT

## 2018-01-09 PROCEDURE — C9113 INJ PANTOPRAZOLE SODIUM, VIA: HCPCS | Performed by: PHYSICIAN ASSISTANT

## 2018-01-09 PROCEDURE — 97535 SELF CARE MNGMENT TRAINING: CPT

## 2018-01-09 PROCEDURE — 94762 N-INVAS EAR/PLS OXIMTRY CONT: CPT

## 2018-01-09 PROCEDURE — 85027 COMPLETE CBC AUTOMATED: CPT | Performed by: PHYSICIAN ASSISTANT

## 2018-01-09 RX ORDER — POTASSIUM CHLORIDE 29.8 MG/ML
40 INJECTION INTRAVENOUS ONCE
Status: COMPLETED | OUTPATIENT
Start: 2018-01-09 | End: 2018-01-11

## 2018-01-09 RX ADMIN — PANTOPRAZOLE SODIUM 40 MG: 40 INJECTION, POWDER, FOR SOLUTION INTRAVENOUS at 08:52

## 2018-01-09 RX ADMIN — HYDROMORPHONE HYDROCHLORIDE 1 MG: 1 INJECTION, SOLUTION INTRAMUSCULAR; INTRAVENOUS; SUBCUTANEOUS at 08:52

## 2018-01-09 RX ADMIN — DEXTROSE, SODIUM CHLORIDE, AND POTASSIUM CHLORIDE 100 ML/HR: 5; .45; .15 INJECTION INTRAVENOUS at 08:55

## 2018-01-09 RX ADMIN — AMIODARONE HYDROCHLORIDE 0.5 MG/MIN: 50 INJECTION, SOLUTION INTRAVENOUS at 20:56

## 2018-01-09 RX ADMIN — HYDROMORPHONE HYDROCHLORIDE 1 MG: 1 INJECTION, SOLUTION INTRAMUSCULAR; INTRAVENOUS; SUBCUTANEOUS at 04:53

## 2018-01-09 RX ADMIN — HEPARIN SODIUM 5000 UNITS: 5000 INJECTION, SOLUTION INTRAVENOUS; SUBCUTANEOUS at 21:04

## 2018-01-09 RX ADMIN — HEPARIN SODIUM 5000 UNITS: 5000 INJECTION, SOLUTION INTRAVENOUS; SUBCUTANEOUS at 15:07

## 2018-01-09 RX ADMIN — POTASSIUM CHLORIDE 40 MEQ: 400 INJECTION, SOLUTION INTRAVENOUS at 11:34

## 2018-01-09 RX ADMIN — ONDANSETRON 4 MG: 2 INJECTION INTRAMUSCULAR; INTRAVENOUS at 21:18

## 2018-01-09 RX ADMIN — HEPARIN SODIUM 5000 UNITS: 5000 INJECTION, SOLUTION INTRAVENOUS; SUBCUTANEOUS at 05:01

## 2018-01-09 RX ADMIN — DEXTROSE, SODIUM CHLORIDE, AND POTASSIUM CHLORIDE 100 ML/HR: 5; .45; .15 INJECTION INTRAVENOUS at 18:47

## 2018-01-09 RX ADMIN — HYDROMORPHONE HYDROCHLORIDE 1 MG: 1 INJECTION, SOLUTION INTRAMUSCULAR; INTRAVENOUS; SUBCUTANEOUS at 21:13

## 2018-01-09 RX ADMIN — ACETAMINOPHEN 650 MG: 160 SUSPENSION ORAL at 08:51

## 2018-01-09 NOTE — PROGRESS NOTES
Progress Note - General Surgery   Benedicto Sarah 47 y o  female MRN: [id number]  Unit/Bed#: [facility] 1-0 Encounter: [id number]    Assessment and Plan:  Patient Active Problem List   Diagnosis    Diverticulitis    Gastroesophageal reflux disease without esophagitis    Hyperlipidemia    Essential hypertension    Hypokalemia    Hypoalbuminemia    Low serum prealbumin    Severe protein-calorie malnutrition (HCC)    Microscopic hematuria    Hypomagnesemia    Anemia    Diverticulitis of large intestine with perforation and bleeding without abscess    Hematuria    [facility]le[facility] in place ([facility] [address] )    A-fib ([facility] [address] )       Assessment:  50-year-old female status post second-look exploratory laparotomy small bowel resection, ileostomy closure via hand-sewn anastomosis and transverse colostomy on 1/4/18    Plan:  [id number] [address] for sips for comfort   Abdominal binder daily  Decreased IVF fluids to D5 1/2 NS +K @75  Continue NPO  Continue protonix for GI ppx   Await return of bowel function  Continue REILLY drains x2 to suction  SQH and venodynes for DVT ppx  Continue Zosyn IV abx   Follow up on am labs and replete electrolytes as needed  PT/OT -  Encourage ambulation      Subjective:    No acute events overnight and patient has no complaints this morning  NG tube is out  She denies nausea or vomiting  Does not have any bowel function (  Air or stool ) present in colostomy bag  Ostomy is still dusky with only sweat output  REILLY drains are now serous     Objective:       Vitals   Vitals:    01/08/18 1900 01/08/18 1910 01/08/18 2320 01/09/18 0300   BP: 128/65  112/59 127/71   Pulse: 88  86 85   Resp: 20  20 20   Temp: 98 2 °F (36 8 °C)  98 1 °F (36 7 °C) 98 2 °F (36 8 °C)   TempSrc: Oral  Oral Oral   SpO2: 98% 95% 92% 94%   Weight:       Height:         Temp  Min: 97 6 °F (36 4 °C)  Max: 100 9 °F (38 3 °C)  IBW: 50 1 kg   Body mass index is 36 58 kg/m²  I/O   I/O       01/03 0701 - 01/04 0700 01/04 0701 - 01/05 0700    P  O   0    I  V  (mL/kg) 1504 8 (16 1) 6161 6 (66)    NG/GT  0    IV Piggyback 550 1550    Total Intake(mL/kg) 2054 8 (22) 7711 6 (82 6)    Urine (mL/kg/hr) 1100 800 (0 4)    Emesis/NG output  150 (0 1)    Drains 400 765 (0 3)    Stool 0 75 (0)    Blood  150 (0 1)    Total Output 1500 1940    Net +554 8 +5771 6          Unmeasured Emesis Occurrence  1 x          Intake/Output Summary (Last 24 hours) at 01/09/18 0554  Last data filed at 01/09/18 0457   Gross per 24 hour   Intake          2012 08 ml   Output             3773 ml   Net         -1760 92 ml       Invasive  Devices  Invasive Devices     Peripherally Inserted Central Catheter Line            PICC Line 01/08/18 Right Brachial less than 1 day          Peripheral Intravenous Line            Peripheral IV 01/07/18 Right Wrist 1 day          Drain            Closed/Suction Drain Left LLQ Bulb 10 Fr  4 days    Closed/Suction Drain Right RLQ Bulb 10 Fr  4 days    Colostomy LLQ 4 days                Active medications  Scheduled Meds:    heparin (porcine) 5,000 Units Subcutaneous Q8H [address]   pantoprazole 40 mg Intravenous Q24H [address]     Continuous Infusions:    amiodarone 0 5 mg/min Last Rate: 0 5 mg/min (01/08/18 0952)   dextrose 5 % and sodium chloride 0 45 % with KCl 20 mEq/L 100 mL/hr Last Rate: 100 mL/hr (01/08/18 2234)     PRN Meds:     acetaminophen 650 mg Q6H PRN   HYDROmorphone 1 mg Q2H PRN   LORazepam 0 5 mg Q6H PRN   ondansetron 4 mg Q6H PRN       Physical Exam: /71   Pulse 85   Temp 98 2 °F (36 8 °C) (Oral)   Resp 20   Ht 5' 2" (1 575 m)   Wt 90 7 kg (200 lb)   LMP  (LMP Unknown)   SpO2 94%   BMI 36 58 kg/m²     Physical Exam:  General Appearance: Alert, cooperative, no distress, appears stated age - NGT in place to sxn  Lungs: Clear to auscultation bilaterally, respirations unlablored   Heart: Regular rate and rhythm, S1 and S2 normal, no murmur, rub or gallop  Abdomen: Soft, appropriately tender, 5 midline with present in mid abdominal wound/staple line  Right upper quadrant closed ileostomy with 1 wick present  Left upper quadrant transverse colostomy with sweat within colostomy bag - dusky appearing stoma    No masses or organomegaly  REILLY drain # 1 (Right) serous - scant  REILLY drain # 2 (Left)  Serous - scant     Laboratory and Diagnostics:    Results from last 7 days  Lab Units 01/08/18  0622 01/07/18  1045 01/06/18  1701 01/06/18  0452   WBC Thousand/uL 5 89 9 08 12 79* 14 80*   HEMOGLOBIN g/dL 7 2* 7 8* 7 8* 7 6*   HEMATOCRIT % 23 0* 25 2* 24 8* 24 3*   PLATELETS Thousands/uL 312 357 341 360   NEUTROS PCT % 76* 76*  --  82*   MONOS PCT % 6 6  --  5       Results from last 7 days  Lab Units 01/08/18  0622 01/07/18  1045 01/06/18  0452  01/03/18  1950   SODIUM mmol/L 143 143 143  < > 139   POTASSIUM mmol/L 3 5 3 5 3 9  < > 3 8   CHLORIDE mmol/L 106 106 108  < > 103   CO2 mmol/L 32 32 30  < > 24   BUN mg/dL 3* 6 7  < > 1*   CREATININE mg/dL 0 41* 0 45* 0 43*  < > 0 40*   CALCIUM mg/dL 8 3 8 4 8 1*  < > 8 4   TOTAL PROTEIN g/dL  --   --   --   --  6 1*   BILIRUBIN TOTAL mg/dL  --   --   --   --  0 71   ALK PHOS U/L  --   --   --   --  48   ALT U/L  --   --   --   --  7*   AST U/L  --   --   --   --  12   GLUCOSE RANDOM mg/dL 101 126 111  < > 134   < > = values in this interval not displayed      Results from last 7 days  Lab Units 01/08/18  0622 01/07/18  1045 01/06/18  0452   MAGNESIUM mg/dL 2 0 2 1 2 4     Lab Results   Component Value Date    PHOS 3 1 01/07/2018    PHOS 2 5 (L) 01/06/2018    PHOS 2 7 01/05/2018        Results from last 7 days  Lab Units 01/04/18  1255 01/03/18  1950 01/03/18  0742   INR  1 39* 1 51* 1 62*   PTT seconds  --  38*  --      No results found for: TROPONINT  ABG:  Lab Results   Component Value Date    PHART 7 433 01/04/2018    BMN6MXT 34 8 (L) 01/04/2018    PO2ART 136 0 (H) 01/04/2018    WLI2OGX 22 7 01/04/2018    BEART -1 1 01/04/2018    SOURCE Line, Arterial 01/04/2018       Blood Culture: No results found for: BLOODCX  Urine Culture:   Lab Results   Component Value Date    URINECX No Growth <100 cfu/mL 01/03/2018    URINECX No Growth <1000 cfu/mL 12/26/2017     Sputum Culture: No components found for: SPUTUMCX    Imaging: I have personally reviewed pertinent reports     and I have personally reviewed pertinent films in PACS    VTE Pharmacologic Prophylaxis: Heparin  VTE Mechanical Prophylaxis: sequential compression device

## 2018-01-09 NOTE — SOCIAL WORK
Met with pt at bedside and her choices for rehab are [facility]  [place]  Referrals made

## 2018-01-09 NOTE — WOUND OSTOMY CARE
Progress Note- Ostomy  Nesha Ballard 47 y o  female  [id number]  [facility] [id number]        Assessment:  Patient seen this morning for ostomy care and teaching  On arrival patient sitting out of bed in chair with no complaints, once piece pouching system in place with no leakage, black effluent in pouch and flatus being passed  Patient's colostomy in place to her LUQ, stoma is non budded, pink, moist measuring approximately 11/2 to 11/4in round  Teaching started, per patient she already saw someone in [place] who marked her for stoma site and gave her "Life After Colostomy" book and completed some teaching  Patient is very receptive to teaching and agreeable to continue visit from ostomy care nurse  Supplies ordered and placed in room, patient made aware we will seen her tomorrow for pouch change with teaching  Plan: We will see patient tomorrow          Vitals:    01/09/18 0814   BP:    Pulse:    Resp:    Temp:    SpO2: 95%       Colostomy LLQ (Active)   Stomal Appliance 1 piece 1/7/2018 11:19 PM   Stoma Assessment Red 1/7/2018 11:19 PM   Stoma Shape Oval 1/7/2018 11:19 PM   Peristomal Assessment Clean; Intact 1/7/2018 11:19 PM   Treatment Site care 1/6/2018  8:00 AM   Output (mL) 0 mL 1/8/2018  6:50 AM   Number of days: 5       Patient to be seen tomorrow, please call wound care to ext [phone] with questions or concerns, supplies are placed in room      Joelle Sinha RN, BSN, [facility]

## 2018-01-09 NOTE — PROGRESS NOTES
01/09/18 1400   Plan of Care   Comments  attempted to see pt , but pt was asleep     Assessment Completed by: Unit visit

## 2018-01-09 NOTE — PROGRESS NOTES
Assessment    1  Hypertension (401 9) (I10)   2  Hyperlipidemia (272 4) (E78 5)   3  Fatigue (780 79) (R53 83)   4  Vitamin D deficiency (268 9) (E55 9)   5  Encounter for screening colonoscopy (V76 51) (Z12 11)    Plan  Encounter for screening colonoscopy    · 1 - Emily Lunsford MD, [facility] (Gastroenterology) Physician Referral  Consult  Status: Active   Requested for: [id number]  Care Summary provided  : Yes  Fatigue    · (1) TSH WITH FT4 REFLEX; Status:Active; Requested NYJ:[id number];   Fatigue, Hyperlipidemia    · (1) LIPID PANEL, FASTING; Status:Active; Requested for:21Jun2016;   Hyperlipidemia    · Lisinopril 20 MG Oral Tablet; TAKE 1 TABLET DAILY AS DIRECTED  Hypertension    · (1) CBC/PLT/DIFF; Status:Active; Requested RBX:[id number];    · (1) COMPREHENSIVE METABOLIC PANEL; Status:Active; Requested for:21Jun2016;   Vitamin D deficiency    · (1) VITAMIN D 25-HYDROXY; Status:Active; Requested for:21Jun2016;     Discussion/Summary  health maintenance visit healthy adult female Currently, she eats a healthy diet and eats an adequate diet  the risks and benefits of cervical cancer screening were discussed cervical cancer screening is current cervical cancer screening is managed by gyn Breast cancer screening: the risks and benefits of breast cancer screening were discussed, self breast exam technique was taught and monthly self breast exam was advised  Colorectal cancer screening: the risks and benefits of colorectal cancer screening were discussed, colorectal cancer screening is current and colonoscopy has been ordered  The risks and benefits of immunizations were discussed and immunizations are up to date   Advice and education were given regarding nutrition, aerobic exercise, weight bearing exercise, weight loss, calcium supplements, vitamin D supplements, reproductive health, contraception, cardiovascular risk reduction, alcohol use, sunscreen use, self skin examination, helmet use, seat belt use, fall risk reduction and advanced directive planning  Patient discussion: discussed with the patient  History of Present Illness  HM, Adult Female: The patient is being seen for a health maintenance evaluation  General Health: The patient's health since the last visit is described as good  She has regular dental visits  She denies vision problems  She denies hearing loss  Immunizations status: up to date  Lifestyle:  She consumes a diverse and healthy diet  She does not have any weight concerns  She exercises regularly  She does not use tobacco  She denies alcohol use  She denies drug use  Reproductive health:  she reports normal menses  Screening: cancer screening reviewed and current  metabolic screening reviewed and current  risk screening reviewed and current  Review of Systems    Constitutional: No fever, no chills, feels well, no tiredness, no recent weight gain or weight loss  Eyes: No complaints of eye pain, no red eyes, no eyesight problems, no discharge, no dry eyes, no itching of eyes  ENT: no complaints of earache, no loss of hearing, no nose bleeds, no nasal discharge, no sore throat, no hoarseness  Cardiovascular: No complaints of slow heart rate, no fast heart rate, no chest pain, no palpitations, no leg claudication, no lower extremity edema  Respiratory: No complaints of shortness of breath, no wheezing, no cough, no SOB on exertion, no orthopnea, no PND  Gastrointestinal: No complaints of abdominal pain, no constipation, no nausea or vomiting, no diarrhea, no bloody stools  Genitourinary: No complaints of dysuria, no incontinence, no pelvic pain, no dysmenorrhea, no vaginal discharge or bleeding  Musculoskeletal: No complaints of arthralgias, no myalgias, no joint swelling or stiffness, no limb pain or swelling  Integumentary: No complaints of skin rash or lesions, no itching, no skin wounds, no breast pain or lump     Neurological: No complaints of headache, no confusion, no convulsions, no numbness, no dizziness or fainting, no tingling, no limb weakness, no difficulty walking  Psychiatric: Not suicidal, no sleep disturbance, no anxiety or depression, no change in personality, no emotional problems  Endocrine: No complaints of proptosis, no hot flashes, no muscle weakness, no deepening of the voice, no feelings of weakness  Hematologic/Lymphatic: No complaints of swollen glands, no swollen glands in the neck, does not bleed easily, does not bruise easily  Active Problems    1  Diverticulosis (562 10) (K57 90)   2  Encounter for routine gynecological examination (V72 31) (Z01 419)   3  Encounter for screening mammogram for malignant neoplasm of breast (V76 12)   (Z12 31)   4  Erysipelas (035) (A46)   5  Esophageal reflux (530 81) (K21 9)   6  Hyperlipidemia (272 4) (E78 5)   7  Hypertension (401 9) (I10)   8  Leg pain, left (729 5) (M79 605)   9  Vaginal dryness, menopausal (627 2) (N95 1)   10  Viral syndrome (079 99) (B34 9)   11   Vulvitis (616 10) (N76 2)    Past Medical History    · History of Acute pansinusitis (461 8) (J01 40)   · History of Acute tracheobronchitis (466 0) (J20 9)   · Common cold (460) (J00)   · History of Dermatitis (692 9) (L30 9)   · History of Endometrial polyp (621 0) (N84 0)   · History of Hand Injury (959 4)   · History of High risk medication use (V58 69) (Z79 899)   · History Of 5  Previous Pregnancies (V61 5)   · History of dysfunctional uterine bleeding (V13 29) (Z87 42)   · History of Renal colic (105 4) (I07)   · Sore throat (462) (J02 9)   · History of Viral gastroenteritis (008 8) (A08 4)    Surgical History    · History of Cholecystectomy   · History of Gynecologic Services Thermal Endometrial Ablation   · History of Hysteroscopy   · History of Near-Total Thyroidectomy   · History of Tonsillectomy With Adenoidectomy   · History of Tubal Ligation    Family History  Mother    · Family history of Type 2 diabetes mellitus (250 00) (E11 9)  Father    · Family history of congestive heart failure (V17 49) (Z82 49)  Family History    · Denied: Family history of Breast Cancer   · Denied: Family history of Colon Cancer   · Denied: Family history of Osteoporosis   · Denied: Family history of Ovarian Cancer   · Denied: Family history of Uterine Cancer    Social History    · Being A Social Drinker   · Denied: Drug use (305 90) (F19 90)   · Former smoker (V15 82) (G86 877)   · quit 2003    Current Meds   1  Atorvastatin Calcium 10 MG Oral Tablet; TAKE 1 TABLET DAILY; Therapy: 38VUB9254 to (Evaluate:14Lpw0420)  Requested for: 80OIG5141; Last   Rx:[id number] Ordered   2  Doxycycline Hyclate 100 MG Oral Capsule; Therapy: 00NTQ2853 to (Evaluate:68Jts2536) Recorded   3  Prevacid CPDR; TAKE 1 CAPSULE Daily Recorded    Allergies    1  No Known Drug Allergies    Vitals   Recorded: 21Jun2016 01:22PM   Heart Rate 75   Systolic 536, LUE, Sitting   Diastolic 88, LUE, Sitting   Height 5 ft 1 in   Weight 224 lb    BMI Calculated 42 32   BSA Calculated 1 98     Physical Exam    Constitutional   General appearance: No acute distress, well appearing and well nourished  Eyes   Conjunctiva and lids: No swelling, erythema or discharge  Pupils and irises: Equal, round and reactive to light  Ears, Nose, Mouth, and Throat   External inspection of ears and nose: Normal     Otoscopic examination: Tympanic membranes translucent with normal light reflex  Canals patent without erythema  Oropharynx: Normal with no erythema, edema, exudate or lesions  Pulmonary   Respiratory effort: No increased work of breathing or signs of respiratory distress  Auscultation of lungs: Clear to auscultation  Cardiovascular   Auscultation of heart: Normal rate and rhythm, normal S1 and S2, without murmurs  Examination of extremities for edema and/or varicosities: Normal     Abdomen   Abdomen: Non-tender, no masses      Lymphatic   Palpation of lymph nodes in neck: No lymphadenopathy  Musculoskeletal   Gait and station: Normal     Digits and nails: Normal without clubbing or cyanosis  Inspection/palpation of joints, bones, and muscles: Normal     Skin   Skin and subcutaneous tissue: Normal without rashes or lesions  Neurologic   Cranial nerves: Cranial nerves 2-12 intact  Reflexes: 2+ and symmetric  Sensation: No sensory loss  Psychiatric   Orientation to person, place, and time: Normal     Mood and affect: Normal        Procedure    Procedure: Visual Acuity Test    Results: normal in both eyes  The patient tolerated the procedure well  There were no complications  Procedure: Hearing Acuity Test    Audiometry: Normal bilaterally  The patient Tolerated the procedure well  There were no complications         Signatures   Electronically signed by : Diana Runner, [facility]; Jun 21 2016  1:48PM EST                       (Author)    Electronically signed by : Yuriy Strickland DO; Jun 21 2016  6:47PM EST                       (Author)

## 2018-01-09 NOTE — OCCUPATIONAL THERAPY NOTE
OT TREATMENT     01/09/18 0852   Restrictions/Precautions   Weight Bearing Precautions Per Order No   Braces or Orthoses (abdominal binder)   Other Precautions Pain;Telemetry;Multiple lines   Pain Assessment   Pain Assessment 0-10   Pain Score 8   Pain Type Surgical pain   Pain Location Abdomen   Pain Orientation Bilateral   Pain Frequency Intermittent   Clinical Progression Gradually worsening   Hospital Pain Intervention(s) Repositioned  (RN notified for pain medication)   Response to Interventions pt    ADL   Where Assessed Chair   Grooming Assistance 4  Minimal Assistance   Grooming Deficit Brushing hair   Grooming Comments pt with limtedOH reach due to pain in L shoulder  Pt requires assist to comb back of hair  Pt able to wash face and brush teeth wtihout assistance   UB Bathing Assistance 2  Maximal Assistance   UB Bathing Deficit Chest;Left arm   UB Bathing Comments Due to limited OH reach pt requires assist to wash L/R armpit and assist tow domo ch est  Pt able to wash both arms from shoulder to wrist  Pt with pain requiring rest breaks and increased time  LB Bathing Assistance 2  Maximal Assistance   LB Bathing Deficit Right lower leg including foot; Left lower leg including foot; Buttocks; Perineal area   LB Bathing Comments Due to abdominal pain and incisions, pt unablet o reach below knee to wash LE's  Pt able to stand with RW but requires min A for standing balance and asssit to wash reid/rear hygiene care  UB Dressing Assistance 3  Moderate Assistance   UB Dressing Deficit Verbal cueing;Supervision/safety; Increased time to complete;Pull over head;Pull down in back   LB Dressing Assistance 1  Total Assistance   LB Dressing Deficit Don/doff R sock; Don/doff L sock   LB Dressing Comments Pt unable to wear underwear or pants due to incision and discomfort/pain   Toileting Assistance  1  Total Assistance   Toileting Deficit Bedside commode;Perineal hygiene   Toileting Comments Pt completed stand pivot to commode with mod A and asisst for line management  Pt requires assist with gown and assist for ried hygiene post urination   Functional Standing Tolerance   Time 2 minutes   Activity To engage in hygiene care for bathing/toileting  Pt required b/l UE support for balance   Bed Mobility   Rolling R 4  Minimal assistance   Rolling L 3  Moderate assistance   Supine to Sit 3  Moderate assistance   Additional Comments Pt improving with bed mobility today and utlizied pillow placed on abdomen for support with supine to sit transfer  Pt continues to get orthostatic with change in po sition but dizziness subsides in under a minute once seated on EOB   Transfers   Sit to Stand 4  Minimal assistance   Additional items Assist x 1; Increased time required   Stand to Sit 4  Minimal assistance   Additional items Assist x 1; Increased time required   Stand pivot 4  Minimal assistance   Additional items Assist x 1; Increased time required   Toilet transfer 3  Moderate assistance   Additional items Assist x 1;Verbal cues; Commode; Increased time required   Toilet Transfers   Toilet Transfer From [employer] Transfer Type To   Toilet Transfer to Standard bedside commode   Toilet Transfer Technique Stand pivot   Toilet Transfers Moderate assistance   Cognition   Overall Cognitive Status [facility]   Arousal/Participation Alert; Cooperative   Attention Within functional limits   Orientation Level Oriented X4   Memory Within functional limits   Activity Tolerance   Activity Tolerance Patient limited by pain   Assessment   Assessment Pt engaged in morning ADL routine for sponge bathing  Pt required double to time to complete ADL routine due to pain, and fatigue  Pt would beenfit from use of LHAE training for LB bathing and dressing due to limited reach and pain  Pt with improved tolerance today from yesterday's session and is OOB in chair  Pt was up for 45 minutes in chair by end of OT session  Pt goal is to spend 2 hours out of bed today   Pt pain escalating throughout session requiring pain medication  Pt is highly motivated and engagedi nt herapy session as appropriate  At this time pt is unsure if   would be able to take off of work upon her d/c  IF pt  unable to assist, pt may require inpatient rehab prior to return home  Continue with acute OT services   Plan   Treatment Interventions ADL retraining;Functional transfer training; Endurance training;Patient/family training;Equipment evaluation/education; Compensatory technique education; Energy conservation; Activityengagement   Goal Expiration Date 01/22/18   Treatment Day 1   OT Frequency 3-5x/wk

## 2018-01-09 NOTE — PLAN OF CARE
Problem: OCCUPATIONAL THERAPY ADULT  Goal: Performs self-care activities at highest level of function for planned discharge setting  See evaluation for individualized goals  Treatment Interventions: ADL retraining, Functional transfer training, UE strengthening/ROM, Endurance training, Patient/family training, Equipment evaluation/education, Compensatory technique education, Energy conservation, Activityengagement          See flowsheet documentation for full assessment, interventions and recommendations  Outcome: Progressing  Limitation: Decreased ADL status, Decreased UE strength, Decreased endurance, Decreased self-care trans, Decreased high-level ADLs  Prognosis: Fair  Assessment: Pt is 47 y o  F who presents s/p ex lap and ilestomy  Pt lives with jaci in multilevel home with b/b upstairs  PTA pt was working full time as [employer]  and was IND with ADL/IADLs and used no device for fxnl mobility  Pt now presents s/p ex lap and with decline in baseline function        OT Discharge Recommendation: Short Term Rehab (vs home pending progress)

## 2018-01-10 LAB
ANION GAP SERPL CALCULATED.3IONS-SCNC: 5 MMOL/L (ref 4–13)
BUN SERPL-MCNC: 1 MG/DL (ref 5–25)
CALCIUM SERPL-MCNC: 8.4 MG/DL (ref 8.3–10.1)
CHLORIDE SERPL-SCNC: 108 MMOL/L (ref 100–108)
CO2 SERPL-SCNC: 30 MMOL/L (ref 21–32)
CREAT SERPL-MCNC: 0.36 MG/DL (ref 0.6–1.3)
ERYTHROCYTE [DISTWIDTH] IN BLOOD BY AUTOMATED COUNT: 15 % (ref 11.6–15.1)
GFR SERPL CREATININE-BSD FRML MDRD: 124 ML/MIN/1.73SQ M
GLUCOSE SERPL-MCNC: 92 MG/DL (ref 65–140)
HCT VFR BLD AUTO: 23.9 % (ref 34.8–46.1)
HGB BLD-MCNC: 7.6 G/DL (ref 11.5–15.4)
MCH RBC QN AUTO: 28.9 PG (ref 26.8–34.3)
MCHC RBC AUTO-ENTMCNC: 31.8 G/DL (ref 31.4–37.4)
MCV RBC AUTO: 91 FL (ref 82–98)
PLATELET # BLD AUTO: 304 THOUSANDS/UL (ref 149–390)
PMV BLD AUTO: 9.1 FL (ref 8.9–12.7)
POTASSIUM SERPL-SCNC: 3.7 MMOL/L (ref 3.5–5.3)
RBC # BLD AUTO: 2.63 MILLION/UL (ref 3.81–5.12)
SODIUM SERPL-SCNC: 143 MMOL/L (ref 136–145)
WBC # BLD AUTO: 6.17 THOUSAND/UL (ref 4.31–10.16)

## 2018-01-10 PROCEDURE — 85027 COMPLETE CBC AUTOMATED: CPT | Performed by: SURGERY

## 2018-01-10 PROCEDURE — C9113 INJ PANTOPRAZOLE SODIUM, VIA: HCPCS | Performed by: PHYSICIAN ASSISTANT

## 2018-01-10 PROCEDURE — 94762 N-INVAS EAR/PLS OXIMTRY CONT: CPT

## 2018-01-10 PROCEDURE — 80048 BASIC METABOLIC PNL TOTAL CA: CPT | Performed by: SURGERY

## 2018-01-10 RX ORDER — PANTOPRAZOLE SODIUM 40 MG/1
40 TABLET, DELAYED RELEASE ORAL
Status: DISCONTINUED | OUTPATIENT
Start: 2018-01-11 | End: 2018-01-19 | Stop reason: HOSPADM

## 2018-01-10 RX ORDER — CALCIUM CARBONATE 200(500)MG
500 TABLET,CHEWABLE ORAL 3 TIMES DAILY PRN
Status: DISCONTINUED | OUTPATIENT
Start: 2018-01-10 | End: 2018-01-19 | Stop reason: HOSPADM

## 2018-01-10 RX ORDER — POTASSIUM CHLORIDE 20 MEQ/1
40 TABLET, EXTENDED RELEASE ORAL ONCE
Status: COMPLETED | OUTPATIENT
Start: 2018-01-10 | End: 2018-01-10

## 2018-01-10 RX ADMIN — DEXTROSE, SODIUM CHLORIDE, AND POTASSIUM CHLORIDE 100 ML/HR: 5; .45; .15 INJECTION INTRAVENOUS at 06:05

## 2018-01-10 RX ADMIN — LORAZEPAM 0.5 MG: 2 INJECTION INTRAMUSCULAR; INTRAVENOUS at 02:11

## 2018-01-10 RX ADMIN — ONDANSETRON 4 MG: 2 INJECTION INTRAMUSCULAR; INTRAVENOUS at 06:12

## 2018-01-10 RX ADMIN — HYDROMORPHONE HYDROCHLORIDE 1 MG: 1 INJECTION, SOLUTION INTRAMUSCULAR; INTRAVENOUS; SUBCUTANEOUS at 10:42

## 2018-01-10 RX ADMIN — HEPARIN SODIUM 5000 UNITS: 5000 INJECTION, SOLUTION INTRAVENOUS; SUBCUTANEOUS at 15:38

## 2018-01-10 RX ADMIN — DEXTROSE, SODIUM CHLORIDE, AND POTASSIUM CHLORIDE 75 ML/HR: 5; .45; .15 INJECTION INTRAVENOUS at 19:50

## 2018-01-10 RX ADMIN — HEPARIN SODIUM 5000 UNITS: 5000 INJECTION, SOLUTION INTRAVENOUS; SUBCUTANEOUS at 05:58

## 2018-01-10 RX ADMIN — HEPARIN SODIUM 5000 UNITS: 5000 INJECTION, SOLUTION INTRAVENOUS; SUBCUTANEOUS at 22:03

## 2018-01-10 RX ADMIN — POTASSIUM CHLORIDE 40 MEQ: 1500 TABLET, EXTENDED RELEASE ORAL at 15:38

## 2018-01-10 RX ADMIN — METOPROLOL TARTRATE 12.5 MG: 25 TABLET ORAL at 22:00

## 2018-01-10 RX ADMIN — PANTOPRAZOLE SODIUM 40 MG: 40 INJECTION, POWDER, FOR SOLUTION INTRAVENOUS at 11:00

## 2018-01-10 RX ADMIN — METOPROLOL TARTRATE 12.5 MG: 25 TABLET ORAL at 15:38

## 2018-01-10 RX ADMIN — HYDROMORPHONE HYDROCHLORIDE 1 MG: 1 INJECTION, SOLUTION INTRAMUSCULAR; INTRAVENOUS; SUBCUTANEOUS at 06:12

## 2018-01-10 RX ADMIN — HYDROMORPHONE HYDROCHLORIDE 1 MG: 1 INJECTION, SOLUTION INTRAMUSCULAR; INTRAVENOUS; SUBCUTANEOUS at 19:54

## 2018-01-10 RX ADMIN — Medication 500 MG: at 15:38

## 2018-01-10 NOTE — WOUND OSTOMY CARE
Progress Note- Ostomy  Alden Jackson 47 y o  female  [id number]  [facility] [address] [phone]        Assessment:  Patient seen this morning for ostomy care teaching,on arrival patient out of bed in chair with no complaints  One piece pouch in place with no leakage, brown effluent and flatus noted  Stoma with superficial necrotic tissue, stoma is functional with + stool and flatus output  Stoma is non budded, oval in shape measuring 11/8in x 13/4in with intact peristomal skin as, mucocutaneous junction is barely sutured to sloughing stoma  Teaching was hands on demonstration as well as verbal teaching, reviewed how to empty and clean pouch, how to measure stoma and rationale, changing pouch, using one piece vs two piece pouching system as well as healing process  Patient paying closed attention however states feeling uneasy about dealing with stool and odor  patient asking question about managing odor, educated on diet as well as using deodorant lubricating drop  Primary RN notified of necrosing stoma, primary RN noted findings and to  Notify MD about observation  Plan: We will continue following with ostomy care and teaching, we will encouraged patient involving herself with hands on care as well changing pouch  Vitals:    01/10/18 1100   BP: 112/58   Pulse: 87   Resp: 19   Temp: 98 3 °F (36 8 °C)   SpO2: 97%     Incision 01/03/18 Abdomen Other (Comment) (Active)   Incision Description Clean; Intact;Dry 1/10/2018  2:37 AM   Gale-wound Assessment Clean;Dry; Intact 1/10/2018  2:37 AM   Closure Staples 1/10/2018  2:37 AM   Drainage Amount Small 1/10/2018  2:37 AM   Drainage Description Serosanguineous 1/10/2018  2:37 AM   Treatments Cleansed;Site care 1/9/2018  7:37 PM   Dressing Wound V A C  1/4/2018  4:00 PM   Dressing Changed New dressing applied 1/9/2018  7:37 PM   Dressing Status Clean;Dry; Intact 1/10/2018  2:37 AM   Number of days: 7       Incision 01/04/18 Abdomen Other (Comment) (Active)   Incision Description Clean;Dry; Intact 1/10/2018  2:37 AM   Gale-wound Assessment Clean;Dry; Intact 1/10/2018  2:37 AM   Closure Staples 1/10/2018  2:37 AM   Drainage Amount Moderate 1/10/2018  2:37 AM   Drainage Description Serosanguineous 1/10/2018  2:37 AM   Dressing ABD;Gauze 1/10/2018  2:37 AM   Dressing Changed New dressing applied 1/8/2018  7:10 PM   Patient Tolerance Tolerated well 1/8/2018  7:10 PM   Dressing Status Clean;Dry; Intact 1/10/2018  2:37 AM   Number of days: 6     Colostomy LLQ (Active)   Stomal Appliance 1 piece 1/7/2018 11:19 PM   Stoma Assessment Red 1/7/2018 11:19 PM   Stoma Shape Oval 1/7/2018 11:19 PM   Peristomal Assessment Clean; Intact 1/7/2018 11:19 PM   Treatment Site care 1/6/2018  8:00 AM   Output (mL) 75 mL 1/10/2018  2:28 PM   Number of days: 6         Wound care to continue following, please call ext [phone] with questions or concerns, we will see patient Friday for second change with teaching and hopefully patient is comfortable to participate hands on      Rachel Calvin RN, BSN, [facility]

## 2018-01-10 NOTE — PROGRESS NOTES
Progress Note - General Surgery   Nathalia Renteria 47 y o  female MRN: [id number]  Unit/Bed#: [facility] 1-0 Encounter: [id number]    Assessment and Plan:  Patient Active Problem List   Diagnosis    Diverticulitis    Gastroesophageal reflux disease without esophagitis    Hyperlipidemia    Essential hypertension    Hypokalemia    Hypoalbuminemia    Low serum prealbumin    Severe protein-calorie malnutrition (HCC)    Microscopic hematuria    Hypomagnesemia    Anemia    Diverticulitis of large intestine with perforation and bleeding without abscess    Hematuria    I[address] in place ([facility] [address] )    A-fib ([facility] )       Assessment:  62-year-old female status post second-look exploratory laparotomy small bowel resection, ileostomy closure via hand-sewn anastomosis and transverse colostomy on 1/4/18    Plan:  Start clear liquid + toast & crackers diet   Consult cardiology regarding new onset of AFib   Abdominal binder daily  Decreased IVF fluids to D5 1/2 NS +K @75  Continue protonix for GI ppx   Will change to PO amio once on a diet   Continue REILLY drains x2 to suction  SQH and venodynes for DVT ppx  Follow up on am labs and replete electrolytes as needed  OT - home vs In pt rehab     Encourage ambulation        Subjective:    Pt is now having ostomy function  She was up and out of bed a little bit yesterday  Assessed by OT who is unsure if she can go home vs in pt rehab  No REILLY drain output recorded but serous and minimal amout of drainage in the REILLY bulb     Objective:       Vitals   Vitals:    01/09/18 1500 01/09/18 1924 01/10/18 0128 01/10/18 0331   BP: 124/59 126/62 116/63 109/67   Pulse: 87 82 77 80   Resp: 18 22 20 19   Temp: 98 7 °F (37 1 °C) 98 5 °F (36 9 °C) 98 7 °F (37 1 °C) 98 6 °F (37 °C)   TempSrc: Oral Oral Oral Oral   SpO2: 97% 98% 96% 97%   Weight:       Height:         Temp  Min: 97 6 °F (36 4 °C)  Max: 100 9 °F (38 3 °C)  IBW: 50 1 kg   Body mass index is 36 58 kg/m²      I/O   I/O       01/03 0701 - 01/04 0700 01/04 0701 - 01/05 0700    P  O   0    I V  (mL/kg) 1504 8 (16 1) 6161 6 (66)    NG/GT  0    IV Piggyback 550 1550    Total Intake(mL/kg) 2054 8 (22) 7711 6 (82 6)    Urine (mL/kg/hr) 1100 800 (0 4)    Emesis/NG output  150 (0 1)    Drains 400 765 (0 3)    Stool 0 75 (0)    Blood  150 (0 1)    Total Output 1500 1940    Net +554 8 +5771 6          Unmeasured Emesis Occurrence  1 x          Intake/Output Summary (Last 24 hours) at 01/10/18 8951  Last data filed at 01/10/18 0600   Gross per 24 hour   Intake          3773 22 ml   Output             3325 ml   Net           448 22 ml       Invasive  Devices  Invasive Devices     Peripherally Inserted Central Catheter Line            PICC Line 01/08/18 Right Brachial 1 day          Peripheral Intravenous Line            Peripheral IV 01/07/18 Right Wrist 2 days          Drain            Closed/Suction Drain Left LLQ Bulb 10 Fr  5 days    Closed/Suction Drain Right RLQ Bulb 10 Fr  5 days    Colostomy LLQ 5 days                Active medications  Scheduled Meds:    heparin (porcine) 5,000 Units Subcutaneous Q8H [address]   pantoprazole 40 mg Intravenous Q24H [address]     Continuous Infusions:    amiodarone 0 5 mg/min Last Rate: 0 5 mg/min (01/09/18 2056)   dextrose 5 % and sodium chloride 0 45 % with KCl 20 mEq/L 100 mL/hr Last Rate: 100 mL/hr (01/10/18 0605)     PRN Meds:     acetaminophen 650 mg Q6H PRN   HYDROmorphone 1 mg Q2H PRN   LORazepam 0 5 mg Q6H PRN   ondansetron 4 mg Q6H PRN       Physical Exam: /67   Pulse 80   Temp 98 6 °F (37 °C) (Oral)   Resp 19   Ht 5' 2" (1 575 m)   Wt 90 7 kg (200 lb)   LMP  (LMP Unknown)   SpO2 97%   BMI 36 58 kg/m²     Physical Exam:  General Appearance: Alert, cooperative, no distress, appears stated age - NGT in place to sxn  Lungs: Clear to auscultation bilaterally, respirations unlablored   Heart: Regular rate and rhythm, S1 and S2 normal, no murmur, rub or gallop  Abdomen: Soft, appropriately tender  Left upper quadrant transverse colostomy with black thin liquid output colostomy bag - dusky appearing stoma    No masses or organomegaly  REILLY drain # 1 (Right) serous - scant  REILLY drain # 2 (Left)  Serous - scant     Laboratory and Diagnostics:    Results from last 7 days  Lab Units 01/09/18  0627 01/08/18  0622 01/07/18  1045  01/06/18  0452   WBC Thousand/uL 5 89 5 89 9 08  < > 14 80*   HEMOGLOBIN g/dL 7 1* 7 2* 7 8*  < > 7 6*   HEMATOCRIT % 22 6* 23 0* 25 2*  < > 24 3*   PLATELETS Thousands/uL 289 312 357  < > 360   NEUTROS PCT %  --  76* 76*  --  82*   MONOS PCT %  --  6 6  --  5   < > = values in this interval not displayed  Results from last 7 days  Lab Units 01/09/18  0628 01/08/18  0622 01/07/18  1045  01/03/18  1950   SODIUM mmol/L 141 143 143  < > 139   POTASSIUM mmol/L 3 5 3 5 3 5  < > 3 8   CHLORIDE mmol/L 105 106 106  < > 103   CO2 mmol/L 30 32 32  < > 24   BUN mg/dL 2* 3* 6  < > 1*   CREATININE mg/dL 0 37* 0 41* 0 45*  < > 0 40*   CALCIUM mg/dL 8 1* 8 3 8 4  < > 8 4   TOTAL PROTEIN g/dL  --   --   --   --  6 1*   BILIRUBIN TOTAL mg/dL  --   --   --   --  0 71   ALK PHOS U/L  --   --   --   --  48   ALT U/L  --   --   --   --  7*   AST U/L  --   --   --   --  12   GLUCOSE RANDOM mg/dL 107 101 126  < > 134   < > = values in this interval not displayed      Results from last 7 days  Lab Units 01/08/18  0622 01/07/18  1045 01/06/18  0452   MAGNESIUM mg/dL 2 0 2 1 2 4     Lab Results   Component Value Date    PHOS 3 1 01/07/2018    PHOS 2 5 (L) 01/06/2018    PHOS 2 7 01/05/2018        Results from last 7 days  Lab Units 01/04/18  1255 01/03/18  1950 01/03/18  0742   INR  1 39* 1 51* 1 62*   PTT seconds  --  38*  --      No results found for: TROPONINT  ABG:  Lab Results   Component Value Date    PHART 7 433 01/04/2018    YGC7JHR 34 8 (L) 01/04/2018    PO2ART 136 0 (H) 01/04/2018    QPC4AHR 22 7 01/04/2018    BEART -1 1 01/04/2018    SOURCE Line, Arterial 01/04/2018       Blood Culture: No results found for: BLOODCX  Urine Culture:   Lab Results   Component Value Date    URINECX No Growth <100 cfu/mL 01/03/2018    URINECX No Growth <1000 cfu/mL 12/26/2017     Sputum Culture: No components found for: SPUTUMCX    Imaging: I have personally reviewed pertinent reports     and I have personally reviewed pertinent films in PACS    VTE Pharmacologic Prophylaxis: Heparin  VTE Mechanical Prophylaxis: sequential compression device

## 2018-01-10 NOTE — CONSULTS
Consultation - Cardiology   Nesha Ballard 47 y o  female MRN: [id number]  Unit/Bed#: [facility] 612-01 Encounter: [id number]      Assessment:  Principal Problem:    Diverticulitis of large intestine with perforation and bleeding without abscess  Active Problems:    Gastroesophageal reflux disease without esophagitis    Hyperlipidemia    Essential hypertension    Hematuria    Ileostomy in place ([facility] 75 )    A-fib ([facility] 75 )      Sigmoid diverticulitis, failed conservative management  She was taken for exploratory laparotomy, had difficulty intraoperatively due to significant inflammation, , with resulting injury to small bowel requiring small bowel resection and ileostomy  Then trasnferred over to [facility] for further management  She underwent  ileostomy closure via hand-sewn anastomosis and transverse colostomy on 1/4/18  New onset afib   in the setting of acute post surgical stress  Resolved in few hours after starting amio, no recurrent episodes  Now in sinus rhythm  Would discontinue amio drip and give metoprolol 12 5 mg po bid  Laith Lai is 1, not a good candidate for [facility] in view of her recent abdominal surgery, and the episode occurred in the setting of post op stress  However she does not have symptoms at the time of afib, so it is unclear if she had afib prior to this  Would continue telemetry monitoring, and possibly consider event monitor placement after discharge to look for afib recurrences  Recent TFTs were normal    OP sleep study    Hypertension  On lisinopril at home   Now on hold      History of Present Illness   Physician Requesting Consult: Nicholas Rodgers MD  Reason for Consult / Principal Problem:  Afib   HPI: Nesha Ballard is a 47y o  year old female with PMH of hypertension, diverticulitis, hyperlipidemia,  presented to [facility] with abdominal pain, found to have sigmoid diverticulitis with microperforation on CT scan, she was previosuly managed conservatively with IV antibiotics for the same, however her symptoms did not improve  She was taken for exploratory laparotomy, had difficulty intraoperatively due to significant inflammation, , with resulting injury to small bowel requiring small bowel resection and ileostomy  Then trasnferred over to [facility] for further management  She underwent  ileostomy closure via hand-sewn anastomosis and transverse colostomy on 1/4/18  She required brief intubation and was extubated on 1/5/18  She went into rapid afib on the night of 1/5/17, this was a new diagnosis, she was bolused with amio and maintained on drip until now  Episode lasted several hours and converted to sinus rhythm and has been in sinus rhythm since  Patient did not have palpitations with RVR, no SOB, palps  No h/o Afib, has 12 pack smoking history, no substance abuse              Inpatient consult to Cardiology     Performed by  Hugo Kohler     Authorized by Ashu Del Valle              Review of Systems:  Review of Systems    As per [facility]     Historical Information   Past Medical History:   Diagnosis Date    Disease of thyroid gland     Gastroesophageal reflux disease without esophagitis 12/4/2017    Hyperlipidemia     Hypertension     Vitamin D deficiency disease      Past Surgical History:   Procedure Laterality Date    CHOLECYSTECTOMY      LAPAROTOMY N/A 1/4/2018    Procedure: LAPAROTOMY EXPLORATORY, 2nd look, small bowel resection, ilieostomy closure via handsown anastamosis transverse colostomy;  Surgeon: Katlyn Bruch DO;  Location:  MAIN OR;  Service: General    MT ESOPHAGOGASTRODUODENOSCOPY TRANSORAL DIAGNOSTIC N/A 10/21/2016    Procedure: EGD AND COLONOSCOPY;  Surgeon: Ashley Tejada MD;  Location: [facility];  Service: Gastroenterology    MT PART REMOVAL COLON W ANASTOMOSIS N/A 1/3/2018    Procedure: Exploratory laparotomy, RESECTION COLON SIGMOID, possible ostomy;  Surgeon: Froilan Herrera DO;  Location: [facility];  Service: General    THYROIDECTOMY, PARTIAL      TONSILLECTOMY AND ADENOIDECTOMY      TUBAL LIGATION      URETERAL STENT PLACEMENT Bilateral 1/3/2018    Procedure: INSERTION STENT URETERAL;  Surgeon: Artem Joseph MD;  Location: [facility];  Service: Urology     History   Alcohol Use    Yes     Comment: social     History   Drug Use No     History   Smoking Status    Former Smoker   Smokeless Tobacco    Never Used     Family History: non-contributory    Meds/Allergies   PTA meds:   Prior to Admission Medications   Prescriptions Last Dose Informant Patient Reported? Taking? Cholecalciferol (VITAMIN D) 2000 UNITS CAPS   Yes No   Sig: Take 1 capsule by mouth daily     amoxicillin (AMOXIL) 875 mg tablet   Yes No   Sig: Take 875 mg by mouth 2 (two) times a day From Tuesday 12-19-17 to today 12-26-17   aspirin 81 MG tablet   Yes No   Sig: Take 81 mg by mouth daily   atorvastatin (LIPITOR) 10 mg tablet   Yes No   Sig: Take 10 mg by mouth daily   famotidine (PEPCID) 20 mg tablet   No No   Sig: Take 1 tablet by mouth 2 (two) times a day   lansoprazole (PREVACID) 30 mg capsule   Yes No   Sig: Take 30 mg by mouth daily   lisinopril (ZESTRIL) 10 mg tablet   Yes No   Sig: Take 10 mg by mouth daily   ondansetron (ZOFRAN-ODT) 4 mg disintegrating tablet   No No   Sig: Take 1 tablet by mouth every 6 (six) hours as needed for nausea or vomiting   polyethylene glycol (MIRALAX) 17 g packet   Yes No   Sig: Take 17 g by mouth daily      Facility-Administered Medications: None     No Known Allergies    Objective   Vitals: Blood pressure 112/58, pulse 87, temperature 98 3 °F (36 8 °C), temperature source Oral, resp  rate 19, height 5' 2" (1 575 m), weight 89 1 kg (196 lb 8 oz), SpO2 97 %  , Body mass index is 35 94 kg/m² , Orthostatic Blood Pressures    Flowsheet Row Most Recent Value   Blood Pressure  112/58 filed at 01/10/2018 1100   Patient Position - Orthostatic VS  Lying filed at 01/10/2018 1100            Intake/Output Summary (Last 24 hours) at 01/10/18 1324  Last data filed at 01/10/18 1100   Gross per 24 hour   Intake          3101 55 ml   Output             3890 ml   Net          -788 45 ml       Invasive Devices     Peripherally Inserted Central Catheter Line            PICC Line 01/08/18 Right Brachial 2 days          Peripheral Intravenous Line            Peripheral IV 01/07/18 Right Wrist 2 days          Drain            Closed/Suction Drain Left LLQ Bulb 10 Fr  5 days    Closed/Suction Drain Right RLQ Bulb 10 Fr  5 days    Colostomy LLQ 5 days                      Physical Exam    Gen: No acute distress  HEENT: anicteric, mucous membranes moist  Neck: supple, no jugular venous distention, or carotid bruit  Heart: regular, normal s1 and s2, no murmur/rub or gallop  Lungs :clear to auscultation bilaterally, no rales/rhonchi or wheeze  Abdomen: colostomy bag in place  Ext: warm and perfused, normal femoral pulses, no edema, clubbing  Skin: warm, no rashes  Neuro: AAO x 3, no focal findings      Lab Results:     Lab Results   Component Value Date    CKTOTAL 14 (L) 12/27/2017    TROPONINI <0 02 12/27/2017    TROPONINI <0 02 12/11/2017       Lab Results   Component Value Date    GLUCOSE 92 01/10/2018    CALCIUM 8 4 01/10/2018     01/10/2018    K 3 7 01/10/2018    CO2 30 01/10/2018     01/10/2018    BUN 1 (L) 01/10/2018    CREATININE 0 36 (L) 01/10/2018       Lab Results   Component Value Date    WBC 6 17 01/10/2018    HGB 7 6 (L) 01/10/2018    HCT 23 9 (L) 01/10/2018    MCV 91 01/10/2018     01/10/2018       Lab Results   Component Value Date    CHOL 167 06/25/2016    CHOL 226 12/14/2014     Lab Results   Component Value Date    HDL 58 06/25/2016    HDL 69 12/14/2014     Lab Results   Component Value Date    LDLCALC 88 06/25/2016    LDLCALC 145 (H) 12/14/2014     Lab Results   Component Value Date    TRIG 107 06/25/2016    TRIG 61 12/14/2014       Lab Results   Component Value Date    ALT 7 (L) 01/03/2018    AST 12 01/03/2018         Results from last 7 days  Lab Units 01/04/18  1255   INR  1 39*         Imaging: I have personally reviewed pertinent reports

## 2018-01-10 NOTE — PROGRESS NOTES
I was asked to evaluate the patient over concern for necrosis of her stoma by the wound care and nursing staff  The concerning appearance of the stoma was noted by the wound care staff during stoma care teaching and replacement of her stoma appliance  On my evaluation, the collection portion of her 2-piece appliance was removed  The stoma does appear to have some necrotic mucosa superficially, but the deeper tissue and mucosa is viable  Will continue to follow this closely  Some superficial mucosal sloughing should be expected      Melly Maldonado PA-C  1/10/2018 2:41 PM

## 2018-01-10 NOTE — MEDICAL STUDENT
Consultation - Cardiology   Raúl Ayers 47 y o  female MRN: [id number]  Unit/Bed#: -32 Encounter: [id number]    Assessment/Plan     Assessment:  Patient seen and evaluated at bedside  Patient was resting comfortably watching TV in an armchair  Patient states she is doing well recovering from her GI surgery  Patient is aware of her recent episode of Atrial fibrillation but denies any symptoms during or since the episode  Patient is tolerating amiodarone drip well  Plan:    Atrial fibrillation (new onset):  - Patient has been in NSR with rate in 80s with Amiodarone 0 5 mg/min IV drip  - Patient has had an Echo in 6/27/16 which demonstrated 60% EF and no regional wall movement abnormalities  - Consider DANNY to r/o LA thrombi  - Patient has a CHADSVasc score of 2 for female gender and HTN history, and states she is being worked up for DM  Recommend initiating anticoagulation with NOAC, pending discussion with attending   - Recommend rate control with beta blocker or CCB and discontinuation of Amiodarone drip      History of Present Illness      Physician Requesting Consult: Surinder Betts MD  Reason for Consult / Principal Problem: Atrial-fibrillation    HPI: Raúl Ayers is a 47y o  year old female who on 1/4/18 underwent a exploratory laparotomy resulting in small bowel resection with ileostomy, transverse colostomy, and placement of b/l ureteral stents  Patient is since recovering well from surgery, as managed by surgery  Patient was extubated on 1/5/18 and found to be in Atrial Fibrillation later that evening/night resolved with Amiodarone bolus X 2  Patient has since been successfully rate controlled with Amiodarone 1mg/min IV drip for 6 hours followed by a continuous Amiodarone 0 5 mg/min IV drip since  She is aware that she had an episode of A-fib on 1/5/18 but denies any symptoms during her episode of A-fib and reports no symptoms since then    Patient denies any chest pain, palpitations, dizziness, lightheadedness, shortness of breath, and neurological deficits including blurry vision, weakness, or loss of sensation  Patient admits to some heartburn described as retrosternal pain but states this pain is similar to her prior heartburn episodes and unrelated to physical exertion or associated with shortness of breath  Patient reports a past pertinent cardiac of a murmur, unable to provide further details, for which she follows with Dr Bronwen Lefort  Patient states she has been taking a daily ASA 81 mg for this murmur but states that she has never been told she has A-fib  Patient has a smoking history of 12 pack years but states she quit over 10 years ago  Patient reports social EtOH consumption and denies any other substance use  Patient lives at home with her  near [place], [place]  Patient has 2 living children with no known medical disease and 1  child  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 [employer]  Consults    Review of Systems   Constitutional: Negative for appetite change, chills and fever  Respiratory: Negative for cough, chest tightness, shortness of breath and wheezing  Cardiovascular: Negative for chest pain, palpitations and leg swelling  Gastrointestinal: Negative for nausea  Positive for reflux associated pain  Ileostomy/colostomy bag in place  Skin: Negative for pallor and rash  Allergic/Immunologic: Negative for immunocompromised state  Neurological: Negative for dizziness, syncope, facial asymmetry, weakness, light-headedness and numbness  Psychiatric/Behavioral: Negative for behavioral problems and confusion         Historical Information   Past Medical History:   Diagnosis Date    Disease of thyroid gland     Gastroesophageal reflux disease without esophagitis 2017    Hyperlipidemia     Hypertension     Vitamin D deficiency disease      Past Surgical History:   Procedure Laterality Date    CHOLECYSTECTOMY      LAPAROTOMY N/A 1/4/2018    Procedure: LAPAROTOMY EXPLORATORY, 2nd look, small bowel resection, ilieostomy closure via handsown anastamosis transverse colostomy;  Surgeon: Davion Broussard DO;  Location:  MAIN OR;  Service: General    [place] ESOPHAGOGASTRODUODENOSCOPY TRANSORAL DIAGNOSTIC N/A 10/21/2016    Procedure: EGD AND COLONOSCOPY;  Surgeon: Deisi Russo MD;  Location: [facility];  Service: Gastroenterology    [place] PART REMOVAL COLON W ANASTOMOSIS N/A 1/3/2018    Procedure: Exploratory laparotomy, RESECTION COLON SIGMOID, possible ostomy;  Surgeon: Chivo Rivera DO;  [facility]: [facility];  Service: General    THYROIDECTOMY, PARTIAL      TONSILLECTOMY AND ADENOIDECTOMY      TUBAL LIGATION      URETERAL STENT PLACEMENT Bilateral 1/3/2018    Procedure: INSERTION STENT URETERAL;  Surgeon: Marci Linder MD;  Location: [facility];  Service: Urology     History   Alcohol Use    Yes     Comment: social     History   Drug Use No     History   Smoking Status    Former Smoker   Smokeless Tobacco    Never Used     Family History: non-contributory  Patient reports father passed away due to CHF complications at age 76, mother has history of DM  Meds/Allergies   all current active meds have been reviewed  No Known Allergies    Objective   Vitals: Blood pressure 125/85, pulse 80, temperature 97 9 °F (36 6 °C), temperature source Oral, resp  rate 18, height 5' 2" (1 575 m), weight 89 1 kg (196 lb 8 oz), SpO2 96 %    Orthostatic Blood Pressures    Flowsheet Row Most Recent Value   Blood Pressure  125/85 filed at 01/10/2018 0830   Patient Position - Orthostatic VS  Sitting filed at 01/10/2018 0830            Intake/Output Summary (Last 24 hours) at 01/10/18 0925  Last data filed at 01/10/18 0830   Gross per 24 hour   Intake          2741 55 ml   Output             4070 ml   Net         -1328 45 ml       Invasive Devices     Peripherally Inserted Central Catheter Line            PICC Line 01/08/18 Right Brachial 1 day          Peripheral Intravenous Line            Peripheral IV 01/07/18 Right Wrist 2 days          Drain            Closed/Suction Drain Left LLQ Bulb 10 Fr  5 days    Closed/Suction Drain Right RLQ Bulb 10 Fr  5 days    Colostomy LLQ 5 days                Physical Exam   Constitutional: She is oriented to person, place, and time  She appears well-developed and well-nourished  HENT:   Head: Normocephalic and atraumatic  Eyes: Pupils are equal, round, and reactive to light  Right eye exhibits no discharge  Left eye exhibits no discharge  No scleral icterus  Cardiovascular: Normal rate, regular rhythm, normal heart sounds and intact distal pulses  No murmurs appreciated   Pulmonary/Chest: No respiratory distress  She has no wheezes  Abdominal:   Surgical bag in place  No evidence of leakage or discharge  Musculoskeletal: She exhibits no edema or tenderness  Neurological: She is alert and oriented to person, place, and time  No cranial nerve deficit  Skin: Skin is warm and dry  No rash noted  No pallor  Psychiatric: She has a normal mood and affect  Her behavior is normal  Judgment and thought content normal    Vitals reviewed        Lab Results:   CBC with diff:   Results from last 7 days  Lab Units 01/10/18  0602   WBC Thousand/uL 6 17   RBC Million/uL 2 63*   HEMOGLOBIN g/dL 7 6*   HEMATOCRIT % 23 9*   MCV fL 91   MCH pg 28 9   MCHC g/dL 31 8   RDW % 15 0   MPV fL 9 1   PLATELETS Thousands/uL 304     CMP:   Results from last 7 days  Lab Units 01/10/18  0602  01/03/18  1950   SODIUM mmol/L 143  < > 139   POTASSIUM mmol/L 3 7  < > 3 8   CHLORIDE mmol/L 108  < > 103   CO2 mmol/L 30  < > 24   ANION GAP mmol/L 5  < > 12   BUN mg/dL 1*  < > 1*   CREATININE mg/dL 0 36*  < > 0 40*   GLUCOSE RANDOM mg/dL 92  < > 134   CALCIUM mg/dL 8 4  < > 8 4   AST U/L  --   --  12   ALT U/L  --   --  7*   ALK PHOS U/L  --   --  48   TOTAL PROTEIN g/dL  --   --  6 1*   ALBUMIN g/dL  --   -- 2 4*   BILIRUBIN TOTAL mg/dL  --   --  0 71   EGFR ml/min/1 73sq m 124  < > 119   < > = values in this interval not displayed  Coags:   Results from last 7 days  Lab Units 01/04/18  1255 01/03/18  1950   PTT seconds  --  38*   INR  1 39* 1 51*     Imaging: I have personally reviewed pertinent films in PACS  EKG: Prior EKGs reviewed  Patient was noted to have A-fib with RVR at a rate of 188 on EKG on 1/5/18 at 2302    Prior EKGs show NSR and ST   VTE Prophylaxis: Heparin    Code Status: Level 1 - Full Code

## 2018-01-11 PROBLEM — I48.0 PAROXYSMAL ATRIAL FIBRILLATION (HCC): Status: ACTIVE | Noted: 2018-01-05

## 2018-01-11 LAB
ANION GAP SERPL CALCULATED.3IONS-SCNC: 5 MMOL/L (ref 4–13)
BUN SERPL-MCNC: 2 MG/DL (ref 5–25)
CALCIUM SERPL-MCNC: 8.3 MG/DL (ref 8.3–10.1)
CHLORIDE SERPL-SCNC: 106 MMOL/L (ref 100–108)
CO2 SERPL-SCNC: 31 MMOL/L (ref 21–32)
CREAT SERPL-MCNC: 0.39 MG/DL (ref 0.6–1.3)
ERYTHROCYTE [DISTWIDTH] IN BLOOD BY AUTOMATED COUNT: 15.5 % (ref 11.6–15.1)
GFR SERPL CREATININE-BSD FRML MDRD: 120 ML/MIN/1.73SQ M
GLUCOSE SERPL-MCNC: 78 MG/DL (ref 65–140)
HCT VFR BLD AUTO: 18.3 % (ref 34.8–46.1)
HCT VFR BLD AUTO: 24.9 % (ref 34.8–46.1)
HGB BLD-MCNC: 5.8 G/DL (ref 11.5–15.4)
HGB BLD-MCNC: 8.1 G/DL (ref 11.5–15.4)
MCH RBC QN AUTO: 29 PG (ref 26.8–34.3)
MCHC RBC AUTO-ENTMCNC: 31.7 G/DL (ref 31.4–37.4)
MCV RBC AUTO: 92 FL (ref 82–98)
PLATELET # BLD AUTO: 201 THOUSANDS/UL (ref 149–390)
PMV BLD AUTO: 9 FL (ref 8.9–12.7)
POTASSIUM SERPL-SCNC: 3.7 MMOL/L (ref 3.5–5.3)
RBC # BLD AUTO: 2 MILLION/UL (ref 3.81–5.12)
SODIUM SERPL-SCNC: 142 MMOL/L (ref 136–145)
WBC # BLD AUTO: 4.43 THOUSAND/UL (ref 4.31–10.16)

## 2018-01-11 PROCEDURE — 85014 HEMATOCRIT: CPT | Performed by: SURGERY

## 2018-01-11 PROCEDURE — 97535 SELF CARE MNGMENT TRAINING: CPT

## 2018-01-11 PROCEDURE — 80048 BASIC METABOLIC PNL TOTAL CA: CPT | Performed by: SURGERY

## 2018-01-11 PROCEDURE — 94762 N-INVAS EAR/PLS OXIMTRY CONT: CPT

## 2018-01-11 PROCEDURE — 85018 HEMOGLOBIN: CPT | Performed by: SURGERY

## 2018-01-11 PROCEDURE — 85027 COMPLETE CBC AUTOMATED: CPT | Performed by: SURGERY

## 2018-01-11 RX ORDER — POTASSIUM CHLORIDE 20 MEQ/1
40 TABLET, EXTENDED RELEASE ORAL ONCE
Status: COMPLETED | OUTPATIENT
Start: 2018-01-11 | End: 2018-01-11

## 2018-01-11 RX ADMIN — PANTOPRAZOLE SODIUM 40 MG: 40 TABLET, DELAYED RELEASE ORAL at 05:24

## 2018-01-11 RX ADMIN — METOPROLOL TARTRATE 12.5 MG: 25 TABLET ORAL at 21:01

## 2018-01-11 RX ADMIN — LORAZEPAM 0.5 MG: 2 INJECTION INTRAMUSCULAR; INTRAVENOUS at 21:01

## 2018-01-11 RX ADMIN — METOPROLOL TARTRATE 12.5 MG: 25 TABLET ORAL at 10:20

## 2018-01-11 RX ADMIN — HEPARIN SODIUM 5000 UNITS: 5000 INJECTION, SOLUTION INTRAVENOUS; SUBCUTANEOUS at 21:01

## 2018-01-11 RX ADMIN — HYDROMORPHONE HYDROCHLORIDE 1 MG: 1 INJECTION, SOLUTION INTRAMUSCULAR; INTRAVENOUS; SUBCUTANEOUS at 10:21

## 2018-01-11 RX ADMIN — POTASSIUM CHLORIDE 40 MEQ: 1500 TABLET, EXTENDED RELEASE ORAL at 10:21

## 2018-01-11 RX ADMIN — HEPARIN SODIUM 5000 UNITS: 5000 INJECTION, SOLUTION INTRAVENOUS; SUBCUTANEOUS at 13:24

## 2018-01-11 RX ADMIN — HEPARIN SODIUM 5000 UNITS: 5000 INJECTION, SOLUTION INTRAVENOUS; SUBCUTANEOUS at 05:24

## 2018-01-11 RX ADMIN — HYDROMORPHONE HYDROCHLORIDE 1 MG: 1 INJECTION, SOLUTION INTRAMUSCULAR; INTRAVENOUS; SUBCUTANEOUS at 00:17

## 2018-01-11 NOTE — PROGRESS NOTES
Progress Note - Cardiology   Jenni Garcia 47 y o  female MRN: [id number]  Encounter: [id number]  01/11/18  12:08 PM        Assessment/Plan:    1  Paroxysmal atrial fibrillation  CHADS Vasc score 2, but may have been related to acute illness with diverticulitis and microperforation  However, was not aware of afib when she was in it  No further afib  Continue low dose Metoprolol as tolerated  Recommend outpt event monitor after discharge to ensure no further afib once recovers from acute illness  2  HTN  BP controlled with Metoprolol currently  Was on Lisinopril 10 prior to admission  Restart if BP becomes more elevated  3  HL  Was on Atorvastatin 10 prior to admission, resume when able  Patient [place] [facility] as she lives in that area, after discharge  Will sign off, call with questions  Subjective/Objective   Chief Complaint: No chief complaint on file  Subjective: 47year old woman with a history of HTN, HL, diverticulitis, admitted with abdominal pain at [facility], found to have sigmoid diverticulitis with microperforation requiring abdominal surgery  She had episode of atrial fibrillation on 1/5/17, treated with IV amiodarone, converted to SR within several hours, without further afib  Patient was not aware of afib when she was in it  Switched from IV amiodarone to PO Metoprolol 1/10  Denies any chest pain, shortness of breath, palpitations, lightheadedness, dizziness  Eating some, did have some nausea, no vomiting  No fevers         Patient Active Problem List   Diagnosis    Diverticulitis    Gastroesophageal reflux disease without esophagitis    Hyperlipidemia    Essential hypertension    Hypokalemia    Hypoalbuminemia    Low serum prealbumin    Severe protein-calorie malnutrition (HCC)    Microscopic hematuria    Hypomagnesemia    Anemia    Diverticulitis of large intestine with perforation and bleeding without abscess    Hematuria    Ileostomy in place ([facility] )    A-fib [facility])     Past Medical History:   Diagnosis Date    Disease of thyroid gland     Gastroesophageal reflux disease without esophagitis 12/4/2017    Hyperlipidemia     Hypertension     Vitamin D deficiency disease        No Known Allergies    Current Facility-Administered Medications   Medication Dose Route Frequency Provider Last Rate Last Dose    acetaminophen (TYLENOL) oral suspension 650 mg  650 mg Oral Q6H PRN Marilyn Ricardo PA-C   650 mg at 01/09/18 0851    calcium carbonate (TUMS) chewable tablet 500 mg  500 mg Oral TID PRN Keeley Gabriel PA-C   500 mg at 01/10/18 1538    heparin (porcine) subcutaneous injection 5,000 Units  5,000 Units Subcutaneous [facility] Marilyn Ricardo PA-C   5,000 Units at 01/11/18 0524    HYDROmorphone (DILAUDID) 1 mg/mL injection 1 mg  1 mg Intravenous Q2H PRN Shauna Lopez MD   1 mg at 01/11/18 1021    LORazepam (ATIVAN) 2 mg/mL injection 0 5 mg  0 5 mg Intravenous Q6H PRN Shauna Lopez MD   0 5 mg at 01/10/18 0211    metoprolol tartrate (LOPRESSOR) partial tablet 12 5 mg  12 5 mg Oral Q12H Leny Rich MD   12 5 mg at 01/11/18 1020    ondansetron (ZOFRAN) injection 4 mg  4 mg Intravenous Q6H PRN Marilyn Ricardo PA-C   4 mg at 01/10/18 0612    pantoprazole (PROTONIX) EC tablet 40 mg  40 mg Oral Early Morning Samm Avelar PA-C   40 mg at 01/11/18 0524       Vitals: /71   Pulse 77   Temp 98 °F (36 7 °C) (Oral)   Resp 20   Ht 5' 2" (1 575 m)   Wt 89 1 kg (196 lb 8 oz)   LMP  (LMP Unknown)   SpO2 98%   BMI 35 94 kg/m²     Intake/Output Summary (Last 24 hours) at 01/11/18 1208  Last data filed at 01/11/18 0501   Gross per 24 hour   Intake          1695 83 ml   Output             1145 ml   Net           550 83 ml     Wt Readings from Last 3 Encounters:   01/10/18 89 1 kg (196 lb 8 oz)   12/29/17 91 1 kg (200 lb 13 4 oz)   12/14/17 94 9 kg (209 lb 3 5 oz)       Body mass index is 35 94 kg/m²  ,     Vitals:    01/10/18 2300 01/11/18 0350 01/11/18 0758 01/11/18 1100   BP: 110/65 106/61 110/60 104/71   Pulse: 78 72 80 77   Patient Position - Orthostatic VS:  Lying         Physical Exam:     GEN: [place] and alert, in no acute distress  HEENT: Sclera anicteric, conjunctivae pink, mucous membranes moist   NECK: Supple, no carotid bruits, no significant JVD  HEART: Regular rhythm, normal S1 and S2, no murmurs, clicks, gallops or rubs  LUNGS: Clear to auscultation anteriorly bilaterally; no wheezes, rales, or rhonchi   ABDOMEN: Soft, brace around abdomen, normoactive bowel sounds  EXTREMITIES: Skin warm and well perfused, no clubbing, cyanosis, or edema  NEURO: No focal findings  SKIN: Normal without suspicious lesions on exposed skin        Lab Results:     BMP:  Results from last 7 days  Lab Units 01/11/18  0523 01/10/18  0602 01/09/18  3395 01/08/18  0622 01/07/18  1045 01/06/18  0452 01/05/18  2309   SODIUM mmol/L 142 143 141 143 143 143 140   POTASSIUM mmol/L 3 7 3 7 3 5 3 5 3 5 3 9 3 7   CHLORIDE mmol/L 106 108 105 106 106 108 106   CO2 mmol/L 31 30 30 32 32 30 30   BUN mg/dL 2* 1* 2* 3* 6 7 7   CREATININE mg/dL 0 39* 0 36* 0 37* 0 41* 0 45* 0 43* 0 47*   GLUCOSE RANDOM mg/dL 78 92 107 101 126 111 112   CALCIUM mg/dL 8 3 8 4 8 1* 8 3 8 4 8 1* 7 8*       CBC:   Results from last 7 days  Lab Units 01/11/18  0836 01/11/18  0523 01/10/18  0602 01/09/18  0627 01/08/18  0622 01/07/18  1045 01/06/18  1701 01/06/18  0452  01/04/18  2350   WBC Thousand/uL  --  4 43 6 17 5 89 5 89 9 08 12 79* 14 80*  < > 14 50*   HEMOGLOBIN g/dL 8 1* 5 8* 7 6* 7 1* 7 2* 7 8* 7 8* 7 6*  < > 9 5*   HEMATOCRIT % 24 9* 18 3* 23 9* 22 6* 23 0* 25 2* 24 8* 24 3*  < > 28 7*   MCV fL  --  92 91 91 92 92 91 91  < > 88   PLATELETS Thousands/uL  --  201 304 289 312 357 341 360  < > 378   MCH pg  --  29 0 28 9 28 5 28 9 28 4 28 6 28 6  < > 29 0   MCHC g/dL  --  31 7 31 8 31 4 31 3* 31 0* 31 5 31 3*  < > 33 1   RDW %  --  15 5* 15 0 14 3 14 5 14 3 14 3 14 2  < > 13 4   MPV fL  --  9 0 9 1 9 1 10 2 9 1 9 4 9 7  < > 9 3   NRBC AUTO /100 WBCs  --   --   --   --  0 0  --  0  --  0   < > = values in this interval not displayed  Results from last 7 days  Lab Units 18  0622 18  1045 18  0452   MAGNESIUM mg/dL 2 0 2 1 2 4     INR:     Results from last 7 days  Lab Units 18  1255   INR  1 39*       Lipid Profile:   Lab Results   Component Value Date    CHOL 167 2016    CHOL 226 2014     Lab Results   Component Value Date    HDL 58 2016    HDL 69 2014     Lab Results   Component Value Date    LDLCALC 88 2016    LDLCALC 145 (H) 2014     Lab Results   Component Value Date    TRIG 107 2016    TRIG 61 2014       Telemetry: off tele       Cardiac testing:   Results for orders placed during the hospital encounter of 16   Echo complete with contrast if indicated    Narrative [address] [facility] [address] [facility] [address], [place] [address]  [phone]    Transthoracic Echocardiogram  2D, M-mode, Doppler, and Color Doppler    Study date:  2016    Patient: eRg Sal  MR number: [id number]  Account number: [de-identified]  : 1965  Age: 45 years  Gender: Female  Status: Outpatient  Location: Echo lab  Height: 61 in  Weight: 219 6 lb  BP: 130/ 80 mmHg    Indications: Hypertension    Diagnoses: 424 90 - ENDOCARDITIS NOS    Sonographer:  Jacob Yang RDCS, [facility]  Primary Physician:  Janny Archuleta DO  Referring Physician:  Rosa Warren PA-C  Group:  [facility]  Interpreting Physician:  Urmila Baires MD    SUMMARY    LEFT VENTRICLE:  Size was normal   Systolic function was normal  Ejection fraction was estimated to be 60 %  There were no regional wall motion abnormalities  Wall thickness was normal     MITRAL VALVE:  There was mild regurgitation  TRICUSPID VALVE:  There was mild regurgitation  Estimated peak PA pressure was 25 mmHg      HISTORY: PRIOR HISTORY: Patient has no history of cardiovascular disease  PROCEDURE: The procedure was performed in the echo lab  This was a routine  study  The transthoracic approach was used  The study included complete 2D  imaging, M-mode, complete spectral Doppler, and color Doppler  The heart rate  was 80 bpm, at the start of the study  This was a technically difficult study  LEFT VENTRICLE: Size was normal  Systolic function was normal  Ejection  fraction was estimated to be 60 %  There were no regional wall motion  abnormalities  Wall thickness was normal     RIGHT VENTRICLE: The size was normal  Systolic function was normal  Wall  thickness was normal     LEFT ATRIUM: Size was normal     RIGHT ATRIUM: Size was normal     MITRAL VALVE: Valve structure was normal  There was normal leaflet separation  DOPPLER: The transmitral velocity was within the normal range  There was no  evidence for stenosis  There was mild regurgitation  TRICUSPID VALVE: DOPPLER: There was mild regurgitation  Estimated peak PA  pressure was 25 mmHg  PULMONIC VALVE: Leaflets exhibited normal thickness, no calcification, and  normal cuspal separation  DOPPLER: The transpulmonic velocity was within the  normal range  There was no regurgitation  PERICARDIUM: There was no pericardial effusion  The pericardium was normal in  appearance  AORTA: The root exhibited normal size      SYSTEM MEASUREMENT TABLES    2D  %FS: 29 %  AV Diam: 2 92 cm  EDV(Teich): 75 29 ml  EF(Teich): 56 17 %  ESV(Teich): 33 ml  IVSd: 1 02 cm  LA Area: 13 25 cm2  LA Diam: 3 96 cm  LVEDV MOD A4C: 31 08 ml  LVEF MOD A4C: 33 24 %  LVESV MOD A4C: 20 75 ml  LVIDd: 4 12 cm  LVIDs: 2 93 cm  LVLd A4C: 7 54 cm  LVLs A4C: 6 55 cm  LVPWd: 1 01 cm  RA Area: 14 09 cm2  RV DIAM: 3 08 cm  SV MOD A4C: 10 33 ml  SV(Teich): 42 29 ml    CW  AV Vmax: 1 57 m/s  AV maxP 84 mmHg  TR Vmax: 2 67 m/s  TR maxP 57 mmHg    MM  TAPSE: 3 cm    PW  E': 0 08 m/s  E/E': 8 55  LVOT Vmax: 0 9 m/s  LVOT maxPG: 3 21 mmHg  MV A Portillo: 0 74 m/s  MV Dec [place]: 3 12 m/s2  MV DecT: 230 02 ms  MV E Portillo: 0 72 m/s  MV E/A Ratio: 0 96    [facility] Accredited [facility]ch[facility] Laboratory    Prepared and electronically signed by    Natalia Hayes MD  Signed 27-Jun-2016 15:25:36

## 2018-01-11 NOTE — OCCUPATIONAL THERAPY NOTE
OT TREATMENT     01/11/18 1530   Restrictions/Precautions   Weight Bearing Precautions Per Order No   Other Precautions Fall Risk   Pain Assessment   Pain Assessment 0-10   Pain Score 4   Pain Type Surgical pain   Pain Location Abdomen   Pain Orientation Bilateral   Pain Frequency Intermittent   Hospital Pain Intervention(s) Repositioned   Response to Interventions tolerated, improved   ADL   Grooming Assistance 5  Supervision/Setup   Grooming Deficit Setup; Increased time to complete;Wash/dry hands; Wash/dry face; Teeth care   UB Bathing Assistance 3  Moderate Assistance   UB Bathing Deficit Right arm;Left arm; Chest   LB Bathing Assistance 2  Maximal Assistance   LB Bathing Deficit Right lower leg including foot; Left lower leg including foot; Perineal area; Buttocks   UB Dressing Assistance 4  Minimal Assistance   UB Dressing Comments pt harmony to thread b/l U E's into hospital gown and pull hospital gown over head  Pt requires increased time to complete task and assist to fix gownin back   LB Dressing Assistance 2  Maximal Assistance   LB Dressing Deficit Don/doff R sock; Don/doff L sock; Thread RLE into pants; Thread LLE into pants;Pull up over hips   LB Dressing Comments Pt given instruction with LHAE for LB dressing  Post demonstration pt able to doff socks with use of dressing stick and don socks with sock aid  Pt requires assist to don pants with reacher even after instruction  Pt unable to tolerate pants on due to pain in abdomen  Pt requires assist however to practice pulling pants up over hips   Toileting Assistance  2  Maximal Assistance   Toileting Deficit Bedside commode   Toileting Comments Pt able to complete reid care while in stance doing modified squat for hygiene care  Pt with noted irritation in inner thighs applied blue barrier cream     Functional Standing Tolerance   Time 3 minutes   Activity Pt with improved standing tolerance today session with intermittent unilateral hand release   Pt continues wtih brief dizziness upon sit to stands but with increased time, dizziness subsides   Transfers   Sit to Stand 4  Minimal assistance   Additional items Assist x 1; Increased time required   Stand to Sit 4  Minimal assistance   Additional items Assist x 1;Verbal cues   Stand pivot 4  Minimal assistance   Additional items Assist x 1   Toilet transfer 4  Minimal assistance   Additional items Assist x 1   Additional Comments Pt completed transfers wtih RW at min A level  Pt with  increased safety with manuevering RW  Functional Mobility   Additional Comments Pt too fatigued to complete fxnl mobility  Will attempt tomorrow   Toilet Transfers   Toilet Transfer From Rolling walker   Toilet Transfer Type To and from   Toilet Transfer to Standard bedside commode   Toilet Transfer Technique Stand pivot   Toilet Transfers Minimal assistance   Cognition   Overall Cognitive Status [facility]   Arousal/Participation Alert; Cooperative   Attention Within functional limits   Orientation Level Oriented X4   Memory Within functional limits   Following Commands Follows multistep commands without difficulty   Activity Tolerance   Activity Tolerance Patient limited by pain; Patient limited by fatigue   Medical Staff Made Aware Spoke to RN pt ok to be seen by therapy   Assessment   Assessment Pt with noted improvement today in ADL routine  Pt requires less rest breaks adn does not demonstrate SOB during activity  Pt however does continue to become dizzy with positional change, which subsides wtih time and deep breathing  Pt educated on not holding breath during tasks  Pt shown [facility] for LB dressing and pt demonstrates improved ADL participationw ith use of equiptment however pt woudl benefit from continued instruction to improve efficency with pants  Discussed wtih pt benefit of inpatient rehab prior to return home and pt in agreement  Continue with acute OT services wtih focus onf xnl mobility to bathroom, LB dressing with LHAE and UB strengthening  Plan   Treatment Interventions ADL retraining;Functional transfer training;UE strengthening/ROM; Endurance training;Patient/family training;Equipment evaluation/education; Compensatory technique education; Activityengagement; Energy conservation   Goal Expiration Date 01/22/18   Treatment Day 2   OT Frequency 3-5x/wk   Recommendation   OT Discharge Recommendation Short Term Rehab   Barthel Index   Feeding 10   Bathing 0   Grooming Score 5   Dressing Score 0   Bladder Score 10   Bowels Score 10   Toilet Use Score 5   Transfers (Bed/Chair) Score 10   Mobility (Level Surface) Score 0   Stairs Score 0   Barthel Index Score 50   Modified [place] Scale   Modified [place] Scale 4

## 2018-01-11 NOTE — RESULT NOTES
Verified Results  (1) TISSUE EXAM 21Oct2016 01:03PM Samuel Leyav     Test Name Result Flag Reference   LAB AP CASE REPORT (Report)     Surgical Pathology Report             Case: [id number]                   Authorizing Provider: Carissa Up MD      Collected:      10/21/2016 1303        Ordering Location:   [facility] Received:      10/21/2016 135                    Operating Room                                 Pathologist:      Amber Denson MD                                Specimens:  A) - Large Intestine, Left/Descending Colon, Descending colon polyp, retrieved with          cold biopsy forcep                                           B) - Large Intestine, Sigmoid Colon, Sigmoid polyp, retrieved with hot snare   LAB AP FINAL DIAGNOSIS (Report)     A  Large Intestine, Left/Descending Colon, Descending colon polyp,   retrieved with cold biopsy forcep:  -Tubular adenoma  -No evidence of high grade dysplasia or malignancy seen  B  Large Intestine, Sigmoid Colon, Sigmoid polyp, retrieved with hot   snare:  -Hyperplastic polyp   -No evidence of adenomatous change or malignancy  Electronically signed by Amber Denson MD on 10/25/2016 at 3:39 PM   LAB AP NOTE      Interpretation performed at [address] [place] [address]   LAB [address] [place] [address] (Report)     These tests were developed and their performance characteristics   determined by [facility] or [facility]  They may not be cleared or approved by the [place]  Food and   Drug Administration  The FDA has determined that such clearance or   approval is not necessary  These tests are used for clinical purposes  They should not be regarded as investigational or for research  This   laboratory has been approved by CLIA 88, designated as a high-complexity   laboratory and is qualified to perform these tests  LAB AP GROSS DESCRIPTION (Report)     A   The specimen is received in formalin, labeled with the patient's name   and hospital number, and is designated descending colon polyp  The   specimen consists of a single tan soft tissue fragment measuring 0 4 cm  Entirely submitted  One cassette  B  The specimen is received in formalin, labeled with the patient's name   and hospital number, and is designated sigmoid polyp  The specimen   consists of a tan-brown polyp measuring 0 9 cm in greatest dimension  The   margin of resection is inked blue  The polyp is bisected lengthwise   perpendicular to the inked margin  Entirely submitted  One cassette  Note: The estimated total formalin fixation time based upon information   provided by the submitting clinician and the standard processing schedule   is over 72 hours      MAC

## 2018-01-11 NOTE — PLAN OF CARE
Problem: OCCUPATIONAL THERAPY ADULT  Goal: Performs self-care activities at highest level of function for planned discharge setting  See evaluation for individualized goals  Treatment Interventions: ADL retraining, Functional transfer training, UE strengthening/ROM, Endurance training, Patient/family training, Equipment evaluation/education, Compensatory technique education, Energy conservation, Activityengagement          See flowsheet documentation for full assessment, interventions and recommendations  Outcome: Progressing  Limitation: Decreased ADL status, Decreased UE strength, Decreased endurance, Decreased self-care trans, Decreased high-level ADLs  Prognosis: Fair  Assessment: Pt engaged in morning ADL routine for sponge bathing  Pt required double to time to complete ADL routine due to pain, and fatigue  Pt would beenfit from use of LHAE training for LB bathing and dressing due to limited reach and pain  Pt with improved tolerance today from yesterday's session and is OOB in chair  Pt was up for 45 minutes in chair by end of OT session  Pt goal is to spend 2 hours out of bed today  Pt pain escalating throughout session requiring pain medication  Pt is highly motivated and engagedi nt herapy session as appropriate  At this time pt is unsure if   would be able to take off of work upon her d/c  IF pt  unable to assist, pt may require inpatient rehab prior to return home   Continue with acute OT services     OT Discharge Recommendation: Short Term Rehab (vs home pending progress)

## 2018-01-11 NOTE — PROGRESS NOTES
Progress Note - General Surgery   Lalita Barron 47 y o  female MRN: [id number]  Unit/Bed#: [facility] 1-0 Encounter: [id number]    Assessment and Plan:  Patient Active Problem List   Diagnosis    Diverticulitis    Gastroesophageal reflux disease without esophagitis    Hyperlipidemia    Essential hypertension    Hypokalemia    Hypoalbuminemia    Low serum prealbumin    Severe protein-calorie malnutrition (HCC)    Microscopic hematuria    Hypomagnesemia    Anemia    Diverticulitis of large intestine with perforation and bleeding without abscess    Hematuria    Ileostomy in place ([facility] )    A-fib ([facility] )       Assessment:  41-year-old female status post second-look exploratory laparotomy small bowel resection, ileostomy closure via hand-sewn anastomosis and transverse colostomy on 1/4/18    Plan:  Advanced diet to regular diet   Oral Metoprolol as per cardiology's rec's   Abdominal binder daily  D/C IVF fluids  Continue protonix for GI ppx   Discontinue REILLY drains  SQH and venodynes for DVT ppx  Follow up on am labs and replete electrolytes as needed  Will follow up w/ PT/OT on dispo plan   OT - home vs Short term rehab   (evaluation still in process)  Encourage ambulation        Subjective:    Pt tolerated clears toast and crackers yesterday  She is still having ostomy function  Only scant output from REILLY drains    Objective:       Vitals   Vitals:    01/10/18 1900 01/10/18 1952 01/10/18 2300 01/11/18 0350   BP: 119/60  110/65 106/61   Pulse: 81  78 72   Resp: 20  20 18   Temp: 98 4 °F (36 9 °C)  98 2 °F (36 8 °C) 98 9 °F (37 2 °C)   TempSrc:   Oral Oral   SpO2: 97% 96% 98% 98%   Weight:       Height:         Temp  Min: 97 6 °F (36 4 °C)  Max: 100 9 °F (38 3 °C)  IBW: 50 1 kg   Body mass index is 35 94 kg/m²  I/O   I/O       01/03 0701 - 01/04 0700 01/04 0701 - 01/05 0700    P  O   0    I V  (mL/kg) 1504 8 (16 1) 6161 6 (66)    NG/GT  0    IV Piggyback 550 1550    Total Intake(mL/kg) 2054 8 (22) 7711 6 (82 6)    Urine (mL/kg/hr) 1100 800 (0 4)    Emesis/NG output  150 (0 1)    Drains 400 765 (0 3)    Stool 0 75 (0)    Blood  150 (0 1)    Total Output 1500 1940    Net +554 8 +5771 6          Unmeasured Emesis Occurrence  1 x          Intake/Output Summary (Last 24 hours) at 01/11/18 0601  Last data filed at 01/11/18 0501   Gross per 24 hour   Intake          2055 83 ml   Output             2310 ml   Net          -254 17 ml       Invasive  Devices  Invasive Devices     Peripherally Inserted Central Catheter Line            PICC Line 01/08/18 Right Brachial 2 days          Peripheral Intravenous Line            Peripheral IV 01/07/18 Right Wrist 3 days          Drain            Closed/Suction Drain Left LLQ Bulb 10 Fr  6 days    Closed/Suction Drain Right RLQ Bulb 10 Fr  6 days    Colostomy LLQ 6 days                Active medications  Scheduled Meds:    heparin (porcine) 5,000 Units Subcutaneous Q8H [facility]   metoprolol tartrate 12 5 mg Oral Q12H SHAGUFTA   pantoprazole 40 mg Oral Early Morning     Continuous Infusions:    dextrose 5 % and sodium chloride 0 45 % with KCl 20 mEq/L 75 mL/hr Last Rate: 75 mL/hr (01/10/18 1950)     PRN Meds:     acetaminophen 650 mg Q6H PRN   calcium carbonate 500 mg TID PRN   HYDROmorphone 1 mg Q2H PRN   LORazepam 0 5 mg Q6H PRN   ondansetron 4 mg Q6H PRN       Physical Exam: /61   Pulse 72   Temp 98 9 °F (37 2 °C) (Oral)   Resp 18   Ht 5' 2" (1 575 m)   Wt 89 1 kg (196 lb 8 oz)   LMP  (LMP Unknown)   SpO2 98%   BMI 35 94 kg/m²     Physical Exam:  General Appearance: Alert, cooperative, no distress, appears stated age - NGT in place to sxn  Lungs: Clear to auscultation bilaterally, respirations unlablored   Heart: Regular rate and rhythm, S1 and S2 normal, no murmur, rub or gallop  Abdomen: Soft, appropriately tender  Left upper quadrant transverse colostomy with black thin liquid output colostomy bag - dusky appearing stoma    No masses or organomegaly  REILLY drain # 1 (Right) serous - scant  REILLY drain # 2 (Left)  Serous - scant     Laboratory and Diagnostics:    Results from last 7 days  Lab Units 01/10/18  0602 01/09/18  0627 01/08/18  0622 01/07/18  1045  01/06/18  0452   WBC Thousand/uL 6 17 5 89 5 89 9 08  < > 14 80*   HEMOGLOBIN g/dL 7 6* 7 1* 7 2* 7 8*  < > 7 6*   HEMATOCRIT % 23 9* 22 6* 23 0* 25 2*  < > 24 3*   PLATELETS Thousands/uL 304 289 312 357  < > 360   NEUTROS PCT %  --   --  76* 76*  --  82*   MONOS PCT %  --   --  6 6  --  5   < > = values in this interval not displayed  Results from last 7 days  Lab Units 01/10/18  0602 01/09/18  0628 01/08/18  0622   SODIUM mmol/L 143 141 143   POTASSIUM mmol/L 3 7 3 5 3 5   CHLORIDE mmol/L 108 105 106   CO2 mmol/L 30 30 32   BUN mg/dL 1* 2* 3*   CREATININE mg/dL 0 36* 0 37* 0 41*   CALCIUM mg/dL 8 4 8 1* 8 3   GLUCOSE RANDOM mg/dL 92 107 101       Results from last 7 days  Lab Units 01/08/18  0622 01/07/18  1045 01/06/18  0452   MAGNESIUM mg/dL 2 0 2 1 2 4     Lab Results   Component Value Date    PHOS 3 1 01/07/2018    PHOS 2 5 (L) 01/06/2018    PHOS 2 7 01/05/2018        Results from last 7 days  Lab Units 01/04/18  1255   INR  1 39*     No results found for: TROPONINT  ABG:  Lab Results   Component Value Date    PHART 7 433 01/04/2018    QNW5XCZ 34 8 (L) 01/04/2018    PO2ART 136 0 (H) 01/04/2018    ZFE2AQX 22 7 01/04/2018    BEART -1 1 01/04/2018    SOURCE Line, Arterial 01/04/2018       Blood Culture: No results found for: BLOODCX  Urine Culture:   Lab Results   Component Value Date    URINECX No Growth <100 cfu/mL 01/03/2018    URINECX No Growth <1000 cfu/mL 12/26/2017     Sputum Culture: No components found for: SPUTUMCX    Imaging: I have personally reviewed pertinent reports     and I have personally reviewed pertinent films in PACS    VTE Pharmacologic Prophylaxis: Heparin  VTE Mechanical Prophylaxis: sequential compression device

## 2018-01-12 LAB
BASOPHILS # BLD MANUAL: 0 THOUSAND/UL (ref 0–0.1)
BASOPHILS NFR MAR MANUAL: 0 % (ref 0–1)
EOSINOPHIL # BLD MANUAL: 0.18 THOUSAND/UL (ref 0–0.4)
EOSINOPHIL NFR BLD MANUAL: 3 % (ref 0–6)
ERYTHROCYTE [DISTWIDTH] IN BLOOD BY AUTOMATED COUNT: 15.9 % (ref 11.6–15.1)
HCT VFR BLD AUTO: 25 % (ref 34.8–46.1)
HGB BLD-MCNC: 8.1 G/DL (ref 11.5–15.4)
LYMPHOCYTES # BLD AUTO: 1.95 THOUSAND/UL (ref 0.6–4.47)
LYMPHOCYTES # BLD AUTO: 33 % (ref 14–44)
MCH RBC QN AUTO: 29.5 PG (ref 26.8–34.3)
MCHC RBC AUTO-ENTMCNC: 32.4 G/DL (ref 31.4–37.4)
MCV RBC AUTO: 91 FL (ref 82–98)
METAMYELOCYTES NFR BLD MANUAL: 1 % (ref 0–1)
MONOCYTES # BLD AUTO: 0.53 THOUSAND/UL (ref 0–1.22)
MONOCYTES NFR BLD: 9 % (ref 4–12)
NEUTROPHILS # BLD MANUAL: 3.19 THOUSAND/UL (ref 1.85–7.62)
NEUTS SEG NFR BLD AUTO: 54 % (ref 43–75)
NRBC BLD AUTO-RTO: 1 /100 WBCS
PLATELET # BLD AUTO: 264 THOUSANDS/UL (ref 149–390)
PLATELET BLD QL SMEAR: ADEQUATE
PMV BLD AUTO: 9.2 FL (ref 8.9–12.7)
RBC # BLD AUTO: 2.75 MILLION/UL (ref 3.81–5.12)
RBC MORPH BLD: NORMAL
WBC # BLD AUTO: 5.91 THOUSAND/UL (ref 4.31–10.16)

## 2018-01-12 PROCEDURE — 85007 BL SMEAR W/DIFF WBC COUNT: CPT | Performed by: SURGERY

## 2018-01-12 PROCEDURE — 85027 COMPLETE CBC AUTOMATED: CPT | Performed by: SURGERY

## 2018-01-12 PROCEDURE — 97116 GAIT TRAINING THERAPY: CPT

## 2018-01-12 PROCEDURE — 97530 THERAPEUTIC ACTIVITIES: CPT

## 2018-01-12 PROCEDURE — 97110 THERAPEUTIC EXERCISES: CPT

## 2018-01-12 RX ORDER — NYSTATIN 100000 U/G
CREAM TOPICAL 2 TIMES DAILY
Status: DISCONTINUED | OUTPATIENT
Start: 2018-01-12 | End: 2018-01-19 | Stop reason: HOSPADM

## 2018-01-12 RX ORDER — OXYCODONE HYDROCHLORIDE 10 MG/1
10 TABLET ORAL EVERY 4 HOURS PRN
Status: DISCONTINUED | OUTPATIENT
Start: 2018-01-12 | End: 2018-01-19 | Stop reason: HOSPADM

## 2018-01-12 RX ORDER — OXYCODONE HYDROCHLORIDE 5 MG/1
5 TABLET ORAL EVERY 4 HOURS PRN
Status: DISCONTINUED | OUTPATIENT
Start: 2018-01-12 | End: 2018-01-19 | Stop reason: HOSPADM

## 2018-01-12 RX ADMIN — METOPROLOL TARTRATE 12.5 MG: 25 TABLET ORAL at 20:29

## 2018-01-12 RX ADMIN — LORAZEPAM 0.5 MG: 2 INJECTION INTRAMUSCULAR; INTRAVENOUS at 05:47

## 2018-01-12 RX ADMIN — HYDROMORPHONE HYDROCHLORIDE 1 MG: 1 INJECTION, SOLUTION INTRAMUSCULAR; INTRAVENOUS; SUBCUTANEOUS at 11:21

## 2018-01-12 RX ADMIN — HEPARIN SODIUM 5000 UNITS: 5000 INJECTION, SOLUTION INTRAVENOUS; SUBCUTANEOUS at 13:19

## 2018-01-12 RX ADMIN — HEPARIN SODIUM 5000 UNITS: 5000 INJECTION, SOLUTION INTRAVENOUS; SUBCUTANEOUS at 21:28

## 2018-01-12 RX ADMIN — OXYCODONE HYDROCHLORIDE 5 MG: 5 TABLET ORAL at 21:28

## 2018-01-12 RX ADMIN — PANTOPRAZOLE SODIUM 40 MG: 40 TABLET, DELAYED RELEASE ORAL at 05:36

## 2018-01-12 RX ADMIN — LORAZEPAM 0.5 MG: 2 INJECTION INTRAMUSCULAR; INTRAVENOUS at 17:00

## 2018-01-12 RX ADMIN — HEPARIN SODIUM 5000 UNITS: 5000 INJECTION, SOLUTION INTRAVENOUS; SUBCUTANEOUS at 05:36

## 2018-01-12 RX ADMIN — METOPROLOL TARTRATE 12.5 MG: 25 TABLET ORAL at 09:28

## 2018-01-12 RX ADMIN — HYDROMORPHONE HYDROCHLORIDE 1 MG: 1 INJECTION, SOLUTION INTRAMUSCULAR; INTRAVENOUS; SUBCUTANEOUS at 17:00

## 2018-01-12 NOTE — PLAN OF CARE
Problem: OCCUPATIONAL THERAPY ADULT  Goal: Performs self-care activities at highest level of function for planned discharge setting  See evaluation for individualized goals  Treatment Interventions: ADL retraining, Functional transfer training, UE strengthening/ROM, Endurance training, Patient/family training, Equipment evaluation/education, Compensatory technique education, Energy conservation, Activityengagement          See flowsheet documentation for full assessment, interventions and recommendations  Outcome: Progressing  Limitation: Decreased ADL status, Decreased UE strength, Decreased endurance, Decreased self-care trans, Decreased high-level ADLs  Prognosis: Fair  Assessment: Patient seen for skilled OT with focus on ADL skill performance, use of adaptive devices, activity endurance/tolerance, bed mobility  Patient would benefit from short term rehab with focus on increasing functional strength and independence skills for carryover into her own enviroment       OT Discharge Recommendation: Short Term Rehab  OT - OK to Discharge: Yes (when medically cleared)   Laura Spann

## 2018-01-12 NOTE — WOUND OSTOMY CARE
Progress Note- Ostomy  Dania Braun 47 y o  female  [id number]  [facility] 612-01        Assessment:  Patient seen this morning for continue teaching and ostomy care, on arrival patient in company of sister and brother in-law, patient crying and stating feeling depressed  However patient receptive to teaching and agreeable with change of pouch with teaching, per patient sister remains in room as second learner  Stoma continues to slough away as fibrous, moist, black necrotic tissue, some viable mucosal tissue is visible centrally, 100% mucocutaneous junction separation is noted however stoma has (+) stool and flatus  Resident rounding, made aware of findings, resident assessed stoma and picture taken  Abdomen is softy distended, slightly tender around stoma, (+) bowel sound, no nausea or vomiting per patient  Peristomal skin remains intact, slight indent to distal aspect of stoma present where leakage is likely to happen  Reviewed emptying pouch, burping, cleaning skin and stoma as well as using different style pouch  Patient remains very Katelynnoscar Clark has a hard time watching pouch change and dealing with order, patient agreeable with receiving pouch sample however does not know what she prefers  We have provided patient with extra supplies to take when discharge, encouraged asking for [facility] nurse for questions  Pouch change after cleaning stoma and skin with warm water, barrier  under then half delmar added to distal part of barrier to fill indent skin area  If patient remains admitted, we will visit on Monday for continue teaching and ostomy care  Plan: We will continue following for teaching and care, we will order samples for home delivery once discharge  Vitals:    01/12/18 0658   BP: 113/66   Pulse: 77   Resp: 18   Temp: 98 1 °F (36 7 °C)   SpO2: 96%     Incision 01/03/18 Abdomen Other (Comment) (Active)   Incision Description Clean;Dry; Intact 1/11/2018  2:52 AM   Gale-wound Assessment Clean;Dry; Intact 1/11/2018  2:52 AM   Closure Staples 1/11/2018  2:52 AM   Drainage Amount Small 1/11/2018  2:52 AM   Drainage Description Serosanguineous 1/11/2018  2:52 AM   Treatments Cleansed;Site care 1/10/2018  8:11 PM   Dressing Wound V A C  1/4/2018  4:00 PM   Dressing Changed New dressing applied 1/10/2018  8:11 PM   Dressing Status Dry;Clean; Intact 1/11/2018  2:52 AM   Number of days: 9       Incision 01/04/18 Abdomen Other (Comment) (Active)   Incision Description Clean;Dry; Intact 1/12/2018  9:30 AM   Gale-wound Assessment Clean;Dry; Intact 1/12/2018  9:30 AM   Closure Staples 1/12/2018  9:30 AM   Drainage Amount Moderate 1/11/2018 10:00 PM   Drainage Description Serosanguineous 1/11/2018 10:00 PM   Treatments Irrigation with NSS 1/10/2018  9:16 AM   Dressing ABD;Gauze 1/12/2018  9:30 AM   Wound packed? No 1/11/2018 10:00 PM   Dressing Changed New dressing applied 1/10/2018  3:30 PM   Patient Tolerance Tolerated well 1/10/2018  3:30 PM   Dressing Status Clean;Dry; Intact 1/12/2018  9:30 AM   Number of days: 8     Closed/Suction Drain Right RLQ Bulb 10 Fr  (Active)   Site Description Healing 1/12/2018  9:30 AM   Dressing Status Clean;Dry; Intact 1/12/2018  9:30 AM   Drainage Appearance Serosanguineous 1/12/2018  9:30 AM   Status To bulb suction 1/12/2018  9:30 AM   Output (mL) 10 mL 1/11/2018  7:00 PM   Number of days: 8   Colostomy LLQ (Active)   Stomal Appliance 2 piece 1/12/2018  1:21 PM   Stoma Assessment Black 1/12/2018  1:21 PM   Stoma size (in) 1 75 in 1/12/2018  1:21 PM   Stoma Shape Oval 1/12/2018  1:21 PM   Peristomal Assessment Intact 1/12/2018  1:21 PM   Treatment Site care 1/6/2018  8:00 AM   Output (mL) 200 mL 1/12/2018  1:21 PM   Number of days: 8         Wound care to continue following, please call wound care to ext [phone] with questions or concerns        Janeth Lubin RN, BSN, [facility]

## 2018-01-12 NOTE — PLAN OF CARE
Problem: PHYSICAL THERAPY ADULT  Goal: Performs mobility at highest level of function for planned discharge setting  See evaluation for individualized goals  Treatment/Interventions: Functional transfer training, Therapeutic exercise, Patient/family training, Equipment eval/education, Bed mobility, Gait training, Spoke to nursing, Spoke to case management  Equipment Recommended:  (probable need for RW  )       See flowsheet documentation for full assessment, interventions and recommendations  Outcome: Progressing  Prognosis: Fair  Problem List: Decreased endurance, Impaired balance, Decreased mobility, Pain  Assessment: pt is   able ot  complete  all phases  of mobility  c  min A    she  requires  instruction   for  hand placemement  and  proper  technique   pt   did amb  20 ft using  rw     gait is   very  slow  c short  stride   therex perfomed arom BLE   supine in bed      pt  requires   frequent  rest periods  2* fatgiue   she  was  made comfrotable  in bed     pt  will need cont pT intervention  Barriers to Discharge: Decreased caregiver support, Inaccessible home environment     Recommendation:  (progreesing  may be able to return  home  if has  support )     PT - OK to Discharge: No    See flowsheet documentation for full assessment

## 2018-01-12 NOTE — OCCUPATIONAL THERAPY NOTE
Occupational Therapy Treatment Note:       01/12/18 1534   Pain Assessment   Pain Assessment 0-10   Pain Score 6   Pain Type Acute pain;Surgical pain   Pain Location Abdomen   ADL   Where Assessed Supine, bed   Grooming Assistance 5  Supervision/Setup   Grooming Deficit Setup; Increased time to complete;Wash/dry hands; Wash/dry face   LB Dressing Assistance 2  Maximal Assistance   LB Dressing Deficit Don/doff R shoe;Don/doff L shoe   LB Dressing Comments vc's for use of adaptive devices   Toileting Assistance  2  Maximal Assistance   Toileting Deficit Bedside commode   Toileting Comments Patient reports that regular toilet is more comfortable than bedside commode  Cognition   Overall Cognitive Status WFL   Arousal/Participation Alert   Attention Within functional limits   Orientation Level Oriented X4   Memory Within functional limits   Following Commands Follows all commands and directions without difficulty   Cognition Assessment Tools ("it's been a rough day")   Activity Tolerance   Activity Tolerance Patient limited by fatigue;Patient limited by pain   Medical Staff Made Aware nursing aware   Assessment   Assessment Patient seen for skilled OT with focus on ADL skill performance, use of adaptive devices, activity endurance/tolerance, bed mobility  Patient would benefit from short term rehab with focus on increasing functional strength and independence skills for carryover into her own enviroment  Plan   Treatment Interventions ADL retraining; Endurance training;Equipment evaluation/education; Compensatory technique education   Goal Expiration Date 01/22/18   Treatment Day 3   OT Frequency 3-5x/wk   Recommendation   OT Discharge Recommendation Short Term Rehab   OT - OK to Discharge Yes  (when medically cleared)   Barthel Index   Feeding 10   Bathing 0   Grooming Score 5   Dressing Score 5   Bladder Score 10   Bowels Score 10   Toilet Use Score 5   Transfers (Bed/Chair) Score 10   Mobility (Level Surface) Score 0 Stairs Score 0   Barthel Index Score 55   Modified Fletcher Scale   Modified Everett Scale 4   SHANNAN Crawley

## 2018-01-12 NOTE — RESULT NOTES
Verified Results  ECHO COMPLETE WITH CONTRAST IF INDICATED 2016 12:45PM Refugio Theodore Order Number: [id number]    Order Number: [id number]     Test Name Result Flag Reference   ECHO COMPLETE WITH CONTRAST IF INDICATED (Report)     [address] [facility] [address]   [phone]     Transthoracic Echocardiogram   2D, M-mode, Doppler, and Color Doppler     Study date: 2016     Patient: Ting Walker   MR number: [id number]   Account number: [de-identified]   : 1965   Age: 45 years   Gender: Female   Status: Outpatient   Location: Echo lab   Height: 61 in   Weight: 219 6 lb   BP: 130/ 80 mmHg     Indications: Hypertension     Diagnoses: 424 90 - ENDOCARDITIS NOS     Sonographer: Lisa Means RDCS, CCT   Primary Physician: Ken Chen DO   Referring Physician: ROB Hill-TEA   Group: [facility]   Interpreting Physician: Artur Barba MD     SUMMARY     LEFT VENTRICLE:   Size was normal    Systolic function was normal  Ejection fraction was estimated to be 60 %  There were no regional wall motion abnormalities  Wall thickness was normal      MITRAL VALVE:   There was mild regurgitation  TRICUSPID VALVE:   There was mild regurgitation  Estimated peak PA pressure was 25 mmHg  HISTORY: PRIOR HISTORY: Patient has no history of cardiovascular disease  PROCEDURE: The procedure was performed in the echo lab  This was a routine   study  The transthoracic approach was used  The study included complete 2D   imaging, M-mode, complete spectral Doppler, and color Doppler  The heart rate   was 80 bpm, at the start of the study  This was a technically difficult study  LEFT VENTRICLE: Size was normal  Systolic function was normal  Ejection   fraction was estimated to be 60 %  There were no regional wall motion   abnormalities   Wall thickness was normal      RIGHT VENTRICLE: The size was normal  Systolic function was normal  Wall   thickness was normal      LEFT ATRIUM: Size was normal      RIGHT ATRIUM: Size was normal      MITRAL VALVE: Valve structure was normal  There was normal leaflet separation  DOPPLER: The transmitral velocity was within the normal range  There was no   evidence for stenosis  There was mild regurgitation  TRICUSPID VALVE: DOPPLER: There was mild regurgitation  Estimated peak PA   pressure was 25 mmHg  PULMONIC VALVE: Leaflets exhibited normal thickness, no calcification, and   normal cuspal separation  DOPPLER: The transpulmonic velocity was within the   normal range  There was no regurgitation  PERICARDIUM: There was no pericardial effusion  The pericardium was normal in   appearance  AORTA: The root exhibited normal size       SYSTEM MEASUREMENT TABLES     2D   %FS: 29 %   AV Diam: 2 92 cm   EDV(Teich): 75 29 ml   EF(Teich): 56 17 %   ESV(Teich): 33 ml   IVSd: 1 02 cm   LA Area: 13 25 cm2   LA Diam: 3 96 cm   LVEDV MOD A4C: 31 08 ml   LVEF MOD A4C: 33 24 %   LVESV MOD A4C: 20 75 ml   LVIDd: 4 12 cm   LVIDs: 2 93 cm   LVLd A4C: 7 54 cm   LVLs A4C: 6 55 cm   LVPWd: 1 01 cm   RA Area: 14 09 cm2   RV DIAM: 3 08 cm   SV MOD A4C: 10 33 ml   SV(Teich): 42 29 ml     CW   AV Vmax: 1 57 m/s   AV maxP 84 mmHg   TR Vmax: 2 67 m/s   TR maxP 57 mmHg     MM   TAPSE: 3 cm     PW   E': 0 08 m/s   E/E': 8 55   LVOT Vmax: 0 9 m/s   LVOT maxPG: 3 21 mmHg   MV A [place]: 0 74 m/s   MV Dec [place]: 3 12 m/s2   MV DecT: 230 02 ms   MV E [place]illo: 0 72 m/s   MV E/A Ratio: 0 96     [employer] Accredited Echocardiography Laboratory     Prepared and electronically signed by     Dee Davis MD   Signed 2016 15:25:36

## 2018-01-12 NOTE — CASE MANAGEMENT
Continued Stay Review    Thank you,  [address] [facility] in the [facility] [address] for 2017  Network Utilization Review Department  Phone: [phone]; Fax [phone]  ATTENTION: The Network Utilization Review Department is now centralized for our 7 Facilities  Make a note that we have a new phone and fax numbers for our Department  Please call with any questions or concerns to [phone] and carefully follow the prompts so that you are directed to the right person  All voicemails are confidential  Fax any determinations, approvals, denials, and requests for initial or continue stay review clinical to [phone]  Due to HIGH CALL volume, it would be easier if you could please send faxed requests to expedite your requests and in part, help us provide discharge notifications faster      Date: 1/12/2018  Vital Signs: /66   Pulse 77   Temp 98 1 °F (36 7 °C) (Oral)   Resp 18   Ht 5' 2" (1 575 m)   Wt 89 1 kg (196 lb 8 oz)   LMP  (LMP Unknown)   SpO2 96%   BMI 35 94 kg/m²     Medications:   Scheduled Meds:   heparin (porcine) 5,000 Units Subcutaneous Q8H [address]   metoprolol tartrate 12 5 mg Oral Q12H SHAGUFTA   pantoprazole 40 mg Oral Early Morning     Continuous Infusions:  D5 - 0 45 NS  W/ 20 meq Kcl @ 75ml/hr 0- d/c'd on 1/11 @ 06:45  PRN Meds:   acetaminophen    calcium carbonate    HYDROmorphone 1 mg iv 1/810 x 3 - 1/11 x 2 - 1/12 x 1    LORazepam 0 5 mg iv 1/10 x 1- 1/11 x 1 - 1/12 x 1    Ondansetron iv 1/10 x 1  Nursing orders - VS q 3 - daily weights - I & O q shift - Sequential compression device to le's - Turn pt q 2 - encourage deep breathing - Elevate HOB - Oral care - Diet regular House - PT/OT       Abnormal Labs/Diagnostic Results:      Ref Range & Units 1/12/18 0658 1/11/18 0836 1/11/18 0523 1/10/18 0602 1/9/18 0627   WBC 4 31 - 10 16 Thousand/uL 5 91   4 43  6 17  5 89    RBC 3 81 - 5 12 Million/uL 2 75    2 00   2 63   2 49 Hemoglobin 11 5 - 15 4 g/dL 8 1   8 1   5 8CM   7 6   7 1     Hematocrit 34 8 - 46 1 % 25 0   24 9   18 3   23 9   22 6     MCV 82 - 98 fL 91   92  91  91    MCH 26 8 - 34 3 pg 29 5   29 0  28 9  28 5    MCHC 31 4 - 37 4 g/dL 32 4   31 7  31 8  31 4    RDW 11 6 - 15 1 % 15 9    15 5   15 0  14 3    MPV 8 9 - 12 7 fL 9 2   9 0  9 1  9 1    Platelets 472 - 044 Thousands/uL 264   201  304  289    nRBC /100 WBCs 1                   Ref Range & Units 1/11/18 0523 1/10/18 0602 1/9/18 0628 1/8/18 0622 1/7/18 1045   Sodium 136 - 145 mmol/L 142  143  141  143  143    Potassium 3 5 - 5 3 mmol/L 3 7  3 7  3 5  3 5  3 5    Chloride 100 - 108 mmol/L 106  108  105  106  106    CO2 21 - 32 mmol/L 31  30  30  32  32    Anion Gap 4 - 13 mmol/L 5  5  6  5  5    BUN 5 - 25 mg/dL 2   1   2   3   6    Creatinine 0 60 - 1 30 mg/dL 0 39   0 36CM   0 37CM   0 41CM   0 45CM     Glucose 65 - 140 mg/dL 78  92CM  107CM  101CM  126CM    Calcium 8 3 - 10 1 mg/dL 8 3  8 4  8 1   8 3  8 4    eGFR ml/min/1 73sq m 120  124  122  118  115                Age/Sex: 47 y o  female     Assessment/Plan:   Progress note 1/12   Assessment:  79-year-old female status post second-look exploratory laparotomy small bowel resection, ileostomy closure via hand-sewn anastomosis and transverse colostomy on 1/4/18     - REILLY 10-20 each / 24 hrs  + bowel function     Plan:  Continue regular diet  PT/OT/OOB        Discharge Plan: Referrals made to SNF rehab

## 2018-01-12 NOTE — PROGRESS NOTES
Progress Note - General Surgery   Elan Parsons 47 y o  female MRN: [id number]  Unit/Bed#: [facility] 612-01 Encounter: [id number]    Assessment:  70-year-old female status post second-look exploratory laparotomy small bowel resection, ileostomy closure via hand-sewn anastomosis and transverse colostomy on 1/4/18    - REILLY 10-20 each / 24 hrs  + bowel function    Plan:  Continue regular diet  PT/OT/OOB  CM for rehab choices- will f/u referrals that were sent    Subjective/Objective   Chief Complaint: None    Subjective: Feels sore this AM but tolerable  Was OOB yesterday, feels tired from that  Tolerating diet no N/V  Ostomy function ++    Objective:     Blood pressure 122/66, pulse 87, temperature 97 9 °F (36 6 °C), temperature source Oral, resp  rate 20, height 5' 2" (1 575 m), weight 89 1 kg (196 lb 8 oz), SpO2 98 %  ,Body mass index is 35 94 kg/m²  Intake/Output Summary (Last 24 hours) at 01/12/18 0616  Last data filed at 01/12/18 0107   Gross per 24 hour   Intake              300 ml   Output              695 ml   Net             -395 ml       Invasive Devices     Peripherally Inserted Central Catheter Line            PICC Line 01/08/18 Right Brachial 3 days          Drain            Closed/Suction Drain Left LLQ Bulb 10 Fr  7 days    Closed/Suction Drain Right RLQ Bulb 10 Fr  7 days    Colostomy LLQ 7 days              Physical Exam:   Gen: A&O, NAD  Cardio: RRR  Lungs: CTAB  Abd: Soft, non distended, mildly tender  Ostomy to LLQ w/ large amount of liquid output   REILLY x 2 , serous/serosanginous, minimal output      Lab, Imaging and other studies:  CBC:   Lab Results   Component Value Date    HGB 8 1 (L) 01/11/2018    HCT 24 9 (L) 01/11/2018   , CMP: No results found for: NA, K, CL, CO2, ANIONGAP, BUN, CREATININE, GLUCOSE, CALCIUM, AST, ALT, ALKPHOS, PROT, ALBUMIN, BILITOT, EGFR  VTE Pharmacologic Prophylaxis: Heparin  VTE Mechanical Prophylaxis: sequential compression device

## 2018-01-13 VITALS — HEART RATE: 71 BPM | SYSTOLIC BLOOD PRESSURE: 145 MMHG | DIASTOLIC BLOOD PRESSURE: 85 MMHG

## 2018-01-13 RX ORDER — SODIUM CHLORIDE, SODIUM GLUCONATE, SODIUM ACETATE, POTASSIUM CHLORIDE, MAGNESIUM CHLORIDE, SODIUM PHOSPHATE, DIBASIC, AND POTASSIUM PHOSPHATE .53; .5; .37; .037; .03; .012; .00082 G/100ML; G/100ML; G/100ML; G/100ML; G/100ML; G/100ML; G/100ML
500 INJECTION, SOLUTION INTRAVENOUS ONCE
Status: COMPLETED | OUTPATIENT
Start: 2018-01-13 | End: 2018-01-14

## 2018-01-13 RX ADMIN — HEPARIN SODIUM 5000 UNITS: 5000 INJECTION, SOLUTION INTRAVENOUS; SUBCUTANEOUS at 13:48

## 2018-01-13 RX ADMIN — LORAZEPAM 0.5 MG: 2 INJECTION INTRAMUSCULAR; INTRAVENOUS at 16:03

## 2018-01-13 RX ADMIN — METOPROLOL TARTRATE 12.5 MG: 25 TABLET ORAL at 20:35

## 2018-01-13 RX ADMIN — METOPROLOL TARTRATE 12.5 MG: 25 TABLET ORAL at 08:11

## 2018-01-13 RX ADMIN — LORAZEPAM 0.5 MG: 2 INJECTION INTRAMUSCULAR; INTRAVENOUS at 22:53

## 2018-01-13 RX ADMIN — HEPARIN SODIUM 5000 UNITS: 5000 INJECTION, SOLUTION INTRAVENOUS; SUBCUTANEOUS at 06:45

## 2018-01-13 RX ADMIN — HEPARIN SODIUM 5000 UNITS: 5000 INJECTION, SOLUTION INTRAVENOUS; SUBCUTANEOUS at 22:42

## 2018-01-13 RX ADMIN — NYSTATIN: 100000 CREAM TOPICAL at 17:52

## 2018-01-13 RX ADMIN — NYSTATIN: 100000 CREAM TOPICAL at 08:11

## 2018-01-13 RX ADMIN — SODIUM CHLORIDE, SODIUM GLUCONATE, SODIUM ACETATE, POTASSIUM CHLORIDE, MAGNESIUM CHLORIDE, SODIUM PHOSPHATE, DIBASIC, AND POTASSIUM PHOSPHATE 500 ML: .53; .5; .37; .037; .03; .012; .00082 INJECTION, SOLUTION INTRAVENOUS at 22:43

## 2018-01-13 RX ADMIN — LORAZEPAM 0.5 MG: 2 INJECTION INTRAMUSCULAR; INTRAVENOUS at 11:11

## 2018-01-13 RX ADMIN — OXYCODONE HYDROCHLORIDE 10 MG: 10 TABLET ORAL at 01:49

## 2018-01-13 RX ADMIN — OXYCODONE HYDROCHLORIDE 10 MG: 10 TABLET ORAL at 20:47

## 2018-01-13 RX ADMIN — PANTOPRAZOLE SODIUM 40 MG: 40 TABLET, DELAYED RELEASE ORAL at 06:45

## 2018-01-13 RX ADMIN — OXYCODONE HYDROCHLORIDE 5 MG: 5 TABLET ORAL at 06:45

## 2018-01-13 NOTE — PLAN OF CARE
DISCHARGE PLANNING - CARE MANAGEMENT     Discharge to post-acute care or home with appropriate resources Progressing        GASTROINTESTINAL - ADULT     Establish and maintain optimal ostomy function Progressing        Nutrition/Hydration-ADULT     Nutrient/Hydration intake appropriate for improving, restoring or maintaining nutritional needs Progressing        Potential for Falls     Patient will remain free of falls Progressing        Prexisting or High Potential for Compromised Skin Integrity     Skin integrity is maintained or improved Progressing

## 2018-01-13 NOTE — PROGRESS NOTES
Patient only voided 175 mL's hannah urine for dayshift  Spoke with Dr Taylor Dacosta, continue to monitor I/O and encourage PO intake  No further interventions at this time

## 2018-01-13 NOTE — PROGRESS NOTES
Progress Note - General Surgery   Rambo Angel 47 y o  female MRN: [id number]  Unit/Bed#: [facility] 612-01 Encounter: [id number]    Assessment:  23-year-old female status post second-look exploratory laparotomy small bowel resection, ileostomy closure via hand-sewn anastomosis and transverse colostomy on 1/4/18    Plan:  Continue regular diet  Monitor ostomy  Will continue REILLY drains until D/C  OOB, ambulation, PT/OT  Pending insurance auth for D/C to STR    Subjective/Objective   Chief Complaint: None    Subjective: No complaints, feels more tired as she is getting in and out of bed to ambulate to bathroom  No N/V on PO diet  + ostomy function    Objective:     Blood pressure 136/64, pulse 90, temperature 98 2 °F (36 8 °C), temperature source Oral, resp  rate 20, height 5' 2" (1 575 m), weight 89 1 kg (196 lb 8 oz), SpO2 99 %  ,Body mass index is 35 94 kg/m²  Intake/Output Summary (Last 24 hours) at 01/13/18 0759  Last data filed at 01/13/18 0645   Gross per 24 hour   Intake              400 ml   Output             2525 ml   Net            -2125 ml       Invasive Devices     Peripherally Inserted Central Catheter Line            PICC Line 01/08/18 Right Brachial 4 days          Drain            Closed/Suction Drain Left LLQ Bulb 10 Fr  8 days    Closed/Suction Drain Right RLQ Bulb 10 Fr  8 days    Colostomy LLQ 8 days                Physical Exam:   Gen: A&O, NAD  Cardio: RRR  Lungs: CTAB  Abd: Soft, non distended, non tender  Ostomy function w/ yellow seedy stool   REILLY x 2, serous      Lab, Imaging and other studies:CBC: No results found for: WBC, HGB, HCT, MCV, PLT, ADJUSTEDWBC, MCH, MCHC, RDW, MPV, NRBC, CMP: No results found for: NA, K, CL, CO2, ANIONGAP, BUN, CREATININE, GLUCOSE, CALCIUM, AST, ALT, ALKPHOS, PROT, ALBUMIN, BILITOT, EGFR  VTE Pharmacologic Prophylaxis: Heparin  VTE Mechanical Prophylaxis: sequential compression device

## 2018-01-14 VITALS
BODY MASS INDEX: 43.39 KG/M2 | SYSTOLIC BLOOD PRESSURE: 122 MMHG | DIASTOLIC BLOOD PRESSURE: 82 MMHG | HEIGHT: 61 IN | WEIGHT: 229.81 LBS

## 2018-01-14 VITALS — SYSTOLIC BLOOD PRESSURE: 152 MMHG | DIASTOLIC BLOOD PRESSURE: 84 MMHG

## 2018-01-14 PROCEDURE — 97535 SELF CARE MNGMENT TRAINING: CPT

## 2018-01-14 RX ADMIN — OXYCODONE HYDROCHLORIDE 10 MG: 10 TABLET ORAL at 05:43

## 2018-01-14 RX ADMIN — HEPARIN SODIUM 5000 UNITS: 5000 INJECTION, SOLUTION INTRAVENOUS; SUBCUTANEOUS at 21:58

## 2018-01-14 RX ADMIN — LORAZEPAM 0.5 MG: 2 INJECTION INTRAMUSCULAR; INTRAVENOUS at 11:26

## 2018-01-14 RX ADMIN — LORAZEPAM 0.5 MG: 2 INJECTION INTRAMUSCULAR; INTRAVENOUS at 22:00

## 2018-01-14 RX ADMIN — HYDROMORPHONE HYDROCHLORIDE 1 MG: 1 INJECTION, SOLUTION INTRAMUSCULAR; INTRAVENOUS; SUBCUTANEOUS at 16:31

## 2018-01-14 RX ADMIN — PANTOPRAZOLE SODIUM 40 MG: 40 TABLET, DELAYED RELEASE ORAL at 05:42

## 2018-01-14 RX ADMIN — METOPROLOL TARTRATE 12.5 MG: 25 TABLET ORAL at 10:09

## 2018-01-14 RX ADMIN — OXYCODONE HYDROCHLORIDE 10 MG: 10 TABLET ORAL at 14:40

## 2018-01-14 RX ADMIN — METOPROLOL TARTRATE 12.5 MG: 25 TABLET ORAL at 21:59

## 2018-01-14 RX ADMIN — HEPARIN SODIUM 5000 UNITS: 5000 INJECTION, SOLUTION INTRAVENOUS; SUBCUTANEOUS at 05:42

## 2018-01-14 RX ADMIN — HEPARIN SODIUM 5000 UNITS: 5000 INJECTION, SOLUTION INTRAVENOUS; SUBCUTANEOUS at 13:42

## 2018-01-14 RX ADMIN — NYSTATIN 1 APPLICATION: 100000 CREAM TOPICAL at 10:10

## 2018-01-14 NOTE — PLAN OF CARE
Problem: OCCUPATIONAL THERAPY ADULT  Goal: Performs self-care activities at highest level of function for planned discharge setting  See evaluation for individualized goals  Treatment Interventions: ADL retraining, Functional transfer training, UE strengthening/ROM, Endurance training, Patient/family training, Equipment evaluation/education, Compensatory technique education, Energy conservation, Activityengagement          See flowsheet documentation for full assessment, interventions and recommendations  Outcome: Progressing  Limitation: Decreased ADL status, Decreased UE strength, Decreased endurance, Decreased self-care trans, Decreased high-level ADLs  Prognosis: Fair  Assessment: Pt participated in occupational therapy with focus on activity tolerance, bed mob, functional transfers/mob,  standing balance and tolerance for pt engagement in functional self-care task/oral care, UB/LB self-care  Pt cleared by RN/Lydia for participation in OT session  Pt received HOB raised/sitting upright upon initiation of session  Pt tolerated session well despite pt nausea and bout of vomiting this session/pt nurse immediately informed and involved  Pt required mod A for LB bathing and dressing 2* pt limited by dizziness and abdominal pain with bending at waist  Pt will require in-pt rehab to continue to address pt noted deficits which currently impair pt ADL and functional mob        OT Discharge Recommendation: Short Term Rehab  OT - OK to Discharge: Yes (to in-pt rehab when pt medically stable )

## 2018-01-14 NOTE — PROGRESS NOTES
Progress Note - General Surgery   Raisa Mtz 47 y o  female MRN: [id number]  Unit/Bed#: [facility] 1-0 Encounter: [id number]    Assessment:  69-year-old female status post second-look exploratory laparotomy small bowel resection, ileostomy closure via hand-sewn anastomosis and transverse colostomy on 1/4/18    Plan:  Continue regular diet  REILLY  Drains until discharge  IS/OOB/ambualte  CM-pending insurance auth for STR    Subjective/Objective   Chief Complaint:     Subjective: ANATOLIY  No complaints  +ostomy function  Had episode of nausea last night, no vomiting  Objective:     Blood pressure 120/62, pulse 83, temperature 98 7 °F (37 1 °C), temperature source Oral, resp  rate 18, height 5' 2" (1 575 m), weight 89 1 kg (196 lb 8 oz), SpO2 97 %  ,Body mass index is 35 94 kg/m²        Intake/Output Summary (Last 24 hours) at 01/14/18 0502  Last data filed at 01/13/18 2243   Gross per 24 hour   Intake              500 ml   Output             1175 ml   Net             -675 ml       Invasive Devices     Peripherally Inserted Central Catheter Line            PICC Line 01/08/18 Right Brachial 5 days          Drain            Closed/Suction Drain Left LLQ Bulb 10 Fr  9 days    Closed/Suction Drain Right RLQ Bulb 10 Fr  9 days    Colostomy LLQ 9 days                Physical Exam:   NAD  AAOX3  Normal respiratory effort  Soft, NT, ND  Incision c/d/i with small dried blood near inferior staples  JPX2 serous      Lab, Imaging and other studies:CBC: No results found for: WBC, HGB, HCT, MCV, PLT, ADJUSTEDWBC, MCH, MCHC, RDW, MPV, NRBC, CMP: No results found for: NA, K, CL, CO2, ANIONGAP, BUN, CREATININE, GLUCOSE, CALCIUM, AST, ALT, ALKPHOS, PROT, ALBUMIN, BILITOT, EGFR  VTE Pharmacologic Prophylaxis: Heparin  VTE Mechanical Prophylaxis: sequential compression device

## 2018-01-14 NOTE — OCCUPATIONAL THERAPY NOTE
Occupational Therapy Treatment Note       01/14/18 1118   Restrictions/Precautions   Weight Bearing Precautions Per Order No   Braces or Orthoses (abdominal binder)   Other Precautions Pain   Lifestyle   Intrinsic Gratification spending time with my family   Pain Assessment   Pain Assessment 0-10   Pain Score 4   Pain Type Acute pain   Pain Location Abdomen   Pain Orientation Bilateral   ADL   Where Assessed Sitting at sink   Grooming Assistance 5  Supervision/Setup   UB Bathing Assistance 5  Supervision/Setup   LB Bathing Assistance 3  Moderate Assistance   LB Bathing Deficit Right lower leg including foot; Left lower leg including foot; Buttocks   UB Dressing Assistance 4  Minimal Assistance   UB Dressing Comments hospital gown    LB Dressing Assistance 3  Moderate Assistance   LB Dressing Deficit Don/doff R sock; Don/doff L sock   Toileting Assistance  4  Minimal Assistance   Toileting Deficit Perineal hygiene   Bed Mobility   Sit to Supine 4  Minimal assistance   Transfers   Sit to Stand 4  Minimal assistance   Stand to Sit 5  Supervision   Toilet transfer 5  Supervision   Functional Mobility   Functional Mobility 5  Supervision   Toilet Transfers   Toilet Transfer From Rolling walker   Toilet Transfer Type To and from   Toilet Transfer to Standard bedside commode   Toilet Transfer Technique Stand pivot   Toilet Transfers Minimal assistance   Cognition   Overall Cognitive Status [facility]   Arousal/Participation Alert   Attention Within functional limits   Orientation Level Oriented X4   Memory Within functional limits   Following Commands Follows all commands and directions without difficulty   Activity Tolerance   Activity Tolerance Patient tolerated treatment well   Assessment   Assessment Pt participated in occupational therapy with focus on activity tolerance, bed mob, functional transfers/mob,  standing balance and tolerance for pt engagement in functional self-care task/oral care, UB/LB self-care   Pt cleared by RN/Lydia for participation in OT session  Pt received HOB raised/sitting upright upon initiation of session  Pt tolerated session well despite pt nausea and bout of vomiting this session/pt nurse immediately informed and involved  Pt required mod A for LB bathing and dressing 2* pt limited by dizziness and abdominal pain with bending at waist  Pt will require in-pt rehab to continue to address pt noted deficits which currently impair pt ADL and functional mob  Plan   Treatment Interventions ADL retraining;Functional transfer training;Patient/family training; Activityengagement; Endurance training   Goal Expiration Date 01/22/18   Treatment Day 4   OT Frequency 3-5x/wk   Recommendation   OT Discharge Recommendation Short Term Rehab   OT - OK to Discharge Yes  (to in-pt rehab when pt medically stable )   Barthel Index   Feeding 10   Bathing 0   Grooming Score 5   Dressing Score 5   Bladder Score 10   Bowels Score 10   Toilet Use Score 5   Transfers (Bed/Chair) Score 10   Mobility (Level Surface) Score 0   Stairs Score 0   Barthel Index Score 55   Modified Williams Scale   Modified Williams Scale 4     Claude Villa  CAMPBELL/L

## 2018-01-14 NOTE — RESULT NOTES
Verified Results  (1) CBC/PLT/DIFF 25Jun2016 08:07AM Tee Mustafa Order Number: [id number]   Order Number: [id number]     Test Name Result Flag Reference   WBC COUNT 6 58 Thousand/uL  4 31-10 16   RBC COUNT 4 85 Million/uL  3 81-5 12   HEMOGLOBIN 15 2 g/dL  11 5-15 4   HEMATOCRIT 43 2 %  34 8-46  1   MCV 89 fL  82-98   MCH 31 3 pg  26 8-34 3   MCHC 35 2 g/dL  31 4-37 4   RDW 12 7 %  11 6-15 1   MPV 10 4 fL  8 9-12 7   PLATELET COUNT 728 Thousands/uL  149-390   NEUTROPHILS RELATIVE PERCENT 56 %  43-75   LYMPHOCYTES RELATIVE PERCENT 33 %  14-44   MONOCYTES RELATIVE PERCENT 7 %  4-12   EOSINOPHILS RELATIVE PERCENT 3 %  0-6   BASOPHILS RELATIVE PERCENT 1 %  0-1   NEUTROPHILS ABSOLUTE COUNT 3 69 Thousands/?L  1 85-7 62   LYMPHOCYTES ABSOLUTE COUNT 2 17 Thousands/?L  0 60-4 47   MONOCYTES ABSOLUTE COUNT 0 48 Thousand/?L  0 17-1 22   EOSINOPHILS ABSOLUTE COUNT 0 17 Thousand/?L  0 00-0 61   BASOPHILS ABSOLUTE COUNT 0 07 Thousands/?L  0 00-0 10     (1) COMPREHENSIVE METABOLIC PANEL [id number] 63:08GJ Aiden Locus    Order Number: [id number]  TW Order Number: [id number] Order Number: [id number] Order Number: [id number]     Test Name Result Flag Reference   GLUCOSE,RANDM 94 mg/dL     If the patient is fasting, the ADA then defines impaired fasting glucose as > 100 mg/dL and diabetes as > or equal to 123 mg/dL     SODIUM 142 mmol/L  136-145   POTASSIUM 4 1 mmol/L  3 5-5 3   CHLORIDE 106 mmol/L  100-108   CARBON DIOXIDE 28 mmol/L  21-32   ANION GAP (CALC) 8 mmol/L  4-13   BLOOD UREA NITROGEN 13 mg/dL  5-25   CREATININE 0 69 mg/dL  0 60-1 30   Standardized to IDMS reference method   CALCIUM 8 9 mg/dL  8 3-10 1   BILI, TOTAL 0 80 mg/dL  0 20-1 00   ALK PHOSPHATAS 143 U/L H    ALT (SGPT) 40 U/L  12-78   AST(SGOT) 18 U/L  5-45   ALBUMIN 3 7 g/dL  3 5-5 0   TOTAL PROTEIN 6 9 g/dL  6 4-8 2   eGFR Non-African American      >60 0 ml/min/1 73sq m National Kidney Disease Education Program recommendations are as follows:  GFR calculation is accurate only with a steady state creatinine  Chronic Kidney disease less than 60 ml/min/1 73 sq  meters  Kidney failure less than 15 ml/min/1 73 sq  meters  (1) TSH WITH FT4 REFLEX 25Jun2016 08:07AM Radha Matias Order Number: [id number]  TW Order Number: [id number] Order Number: [id number] Order Number: [id number]     Test Name Result Flag Reference   TSH 1 758 uIU/mL  0 358-3 740   Patients undergoing fluorescein dye angiography may retain small amounts of fluorescein in the body for 48-72 hours post procedure  Samples containing fluorescein can produce falsely depressed TSH values  If the patient had this procedure,a specimen should be resubmitted post fluorescein clearance  The recommended reference ranges for TSH during pregnancy are as follows:  First trimester 0 1 to 2 5 uIU/mL  Second trimester  0 2 to 3 0 uIU/mL  Third trimester 0 3 to 3 0 uIU/m     (1) LIPID PANEL, FASTING 25Jun2016 08:07AM Radha Badilloa Order Number: [id number]  TW Order Number: [id number] Order Number: [id number] Order Number: [id number]     Test Name Result Flag Reference   CHOLESTEROL 167 mg/dL     HDL,DIRECT 58 mg/dL  40-60   Specimen collection should occur prior to Metamizole administration due to the potential for falsely depressed results  LDL CHOLESTEROL CALCULATED 88 mg/dL  0-100   Triglyceride:         Normal              <150 mg/dl       Borderline High    150-199 mg/dl       High               200-499 mg/dl       Very High          >499 mg/dl  Cholesterol:         Desirable        <200 mg/dl      Borderline High  200-239 mg/dl      High             >239 mg/dl  HDL Cholesterol:        High    >59 mg/dL      Low     <41 mg/dL  LDL CALCULATED:    This screening LDL is a calculated result    It does not have the accuracy of the Direct Measured LDL in the monitoring of patients with hyperlipidemia and/or statin therapy  Direct Measure LDL (VEE586) must be ordered separately in these patients  TRIGLYCERIDES 107 mg/dL  <=150   Specimen collection should occur prior to N-Acetylcysteine or Metamizole administration due to the potential for falsely depressed results  (1) VITAMIN D 25-HYDROXY [id number] 08:07AM Edwar December Order Number: [id number]  TW Order Number: [id number]     Test Name Result Flag Reference   VIT D 25-HYDROX 17 5 ng/mL L 30 0-100 0   This assay is a certified procedure of the CDC Vitamin D Standardization Certification Program (VDSCP)     Deficiency <20ng/ml   Insufficiency 20-30ng/ml   Sufficient  ng/ml     *Patients undergoing fluorescein dye angiography may retain small amounts of fluorescein in the body for 48-72 hours post procedure  Samples containing fluorescein can produce falsely elevated Vitamin D values  If the patient had this procedure, a specimen should be resubmitted post fluorescein clearance

## 2018-01-15 PROCEDURE — 97530 THERAPEUTIC ACTIVITIES: CPT

## 2018-01-15 PROCEDURE — 97116 GAIT TRAINING THERAPY: CPT

## 2018-01-15 RX ADMIN — ONDANSETRON 4 MG: 2 INJECTION INTRAMUSCULAR; INTRAVENOUS at 19:03

## 2018-01-15 RX ADMIN — HEPARIN SODIUM 5000 UNITS: 5000 INJECTION, SOLUTION INTRAVENOUS; SUBCUTANEOUS at 22:38

## 2018-01-15 RX ADMIN — HEPARIN SODIUM 5000 UNITS: 5000 INJECTION, SOLUTION INTRAVENOUS; SUBCUTANEOUS at 06:58

## 2018-01-15 RX ADMIN — OXYCODONE HYDROCHLORIDE 10 MG: 10 TABLET ORAL at 19:03

## 2018-01-15 RX ADMIN — METOPROLOL TARTRATE 12.5 MG: 25 TABLET ORAL at 10:09

## 2018-01-15 RX ADMIN — LORAZEPAM 0.5 MG: 2 INJECTION INTRAMUSCULAR; INTRAVENOUS at 22:44

## 2018-01-15 RX ADMIN — OXYCODONE HYDROCHLORIDE 10 MG: 10 TABLET ORAL at 04:03

## 2018-01-15 RX ADMIN — HEPARIN SODIUM 5000 UNITS: 5000 INJECTION, SOLUTION INTRAVENOUS; SUBCUTANEOUS at 15:48

## 2018-01-15 RX ADMIN — PANTOPRAZOLE SODIUM 40 MG: 40 TABLET, DELAYED RELEASE ORAL at 06:58

## 2018-01-15 RX ADMIN — METOPROLOL TARTRATE 12.5 MG: 25 TABLET ORAL at 22:38

## 2018-01-15 RX ADMIN — ONDANSETRON 4 MG: 2 INJECTION INTRAMUSCULAR; INTRAVENOUS at 11:44

## 2018-01-15 NOTE — SOCIAL WORK
Rehab follow up:     CM received return call from Kelly Gutierrez at [facility]; they are unable to accept d/t not a participant of the patient's insurance  CM met with the patient to discuss dc plan, advised pt of decline of [facility]; pt expressed understanding of [facility] decline and stated preference to dc to [facility] rehab  Pt stated that she is feeling a little better but will not dc until tomorrow am  Pt stated that she will call her  to discuss when he can transport her  CM sent message to [facility] to confirm avail bed, report and fax numbers  CM will follow

## 2018-01-15 NOTE — SOCIAL WORK
SNF follow up:     Dr Edel Russell advising that the patient is medically ready for discharge  CM checked [facility] and noted that [place] has an avail bed and there was not response yet from [address] [facility] [address]  Pt's preference noted as [facility]; CM called and left voicemail with Estuardo Beck at [address] [facility] [address] to assess if they will accept pt  CM will follow

## 2018-01-15 NOTE — CASE MANAGEMENT
Continued Stay Review    Thank you,  [address] [facility] in the [facility] by Yonis Stover for 2017  Network Utilization Review Department  Phone: [phone]; Fax [phone]  ATTENTION: The Network Utilization Review Department is now centralized for our 7 Facilities  Make a note that we have a new phone and fax numbers for our Department  Please call with any questions or concerns to [phone] and carefully follow the prompts so that you are directed to the right person  All voicemails are confidential  Fax any determinations, approvals, denials, and requests for initial or continue stay review clinical to [phone]  Due to HIGH CALL volume, it would be easier if you could please send faxed requests to expedite your requests and in part, help us provide discharge notifications faster      Date: 1/15/2018    Vital Signs: /68   Pulse 78   Temp 98 °F (36 7 °C) (Oral)   Resp 20   Ht 5' 2" (1 575 m)   Wt 91 6 kg (201 lb 15 1 oz)   LMP  (LMP Unknown)   SpO2 97%   BMI 36 94 kg/m²     Medications:   Scheduled Meds:   alteplase 2 mg Intracatheter Once   heparin (porcine) 5,000 Units Subcutaneous Q8H [address]   metoprolol tartrate 12 5 mg Oral Q12H SHAGUFTA   nystatin  Topical BID   pantoprazole 40 mg Oral Early Morning     Continuous Infusions:    PRN Meds:   acetaminophen    calcium carbonate    HYDROmorphone 1 mg iv 1/14 x 1    LORazepam 0 5 mg iv 1/14 x 3 - 1/15 x 2     ondansetron    oxyCODONE 10 mg po 1/14 x 2 - 1/15 x 2 - 1/16 x 1    oxyCODONE 5 mg po 1/14 x 1    Nursing Orders - VS q3 - Daily weights - sequential compression device to le's - I & O q shift - elevate HOB - Oral care     Abnormal Labs/Diagnostic Results: no recent labs    Age/Sex: 47 y o  female     Assessment/Plan:   Assessment:  50-year-old female status post second-look exploratory laparotomy small bowel resection, ileostomy closure via hand-sewn anastomosis and transverse colostomy on 1/4/18     Plan:  Regular diet   Oral Metoprolol as per cardiology's rec's   Abdominal binder daily     Continue protonix for GI ppx   Discontinue left REILLY drain  SQH and venodynes for DVT ppx  Follow up on am labs and replete electrolytes as needed  Follow up with CM on authorization               PT/OT on dispo plan - Short term rehab  Encourage ambulation       Discharge Plan:  SNF rehab

## 2018-01-15 NOTE — PROGRESS NOTES
Progress Note - General Surgery   Vicky Cramer 47 y o  female MRN: [id number]  Unit/Bed#: [facility] 1-0 Encounter: [id number]    Assessment and Plan:  Patient Active Problem List   Diagnosis    Diverticulitis    Gastroesophageal reflux disease without esophagitis    Hyperlipidemia    Essential hypertension    Hypokalemia    Hypoalbuminemia    Low serum prealbumin    Severe protein-calorie malnutrition (HCC)    Microscopic hematuria    Hypomagnesemia    Anemia    Diverticulitis of large intestine with perforation and bleeding without abscess    Hematuria    Ileostomy in place ([address] )    Paroxysmal atrial fibrillation (HCC)       Assessment:  48-year-old female status post second-look exploratory laparotomy small bowel resection, ileostomy closure via hand-sewn anastomosis and transverse colostomy on 1/4/18    Plan:  Regular diet   Oral Metoprolol as per cardiology's rec's   Abdominal binder daily    Continue protonix for GI ppx   Discontinue left REILLY drain  SQH and venodynes for DVT ppx  Follow up on am labs and replete electrolytes as needed  Follow up with CM on authorization    PT/OT on dispo plan - Short term rehab  Encourage ambulation        Subjective:  States she did not feel well all day yesterday - ended up vomiting 1x   Feels much better this morning  She has been tolerating an oral diet, ambulating and having ostomy output  She had her right REILLY drain pulled yesterday Awaiting case management for placement    Objective:       Vitals   Vitals:    01/14/18 0742 01/14/18 1539 01/14/18 2159 01/14/18 2237   BP: 118/66 121/67 108/68 122/71   Pulse: 77 76 78 79   Resp: 18 18 18   Temp: 97 9 °F (36 6 °C) 97 9 °F (36 6 °C)  98 4 °F (36 9 °C)   TempSrc: Oral Oral  Oral   SpO2: 97% 97%  98%   Weight:       Height:         Temp  Min: 97 6 °F (36 4 °C)  Max: 100 9 °F (38 3 °C)  IBW: 50 1 kg   Body mass index is 35 94 kg/m²  I/O   I/O       01/03 0701 - 01/04 0700 01/04 0701 - 01/05 0700    P  O   0 I V  (mL/kg) 1504 8 (16 1) 6161 6 (66)    NG/GT  0    IV Piggyback 550 1550    Total Intake(mL/kg) 2054 8 (22) 7711 6 (82 6)    Urine (mL/kg/hr) 1100 800 (0 4)    Emesis/NG output  150 (0 1)    Drains 400 765 (0 3)    Stool 0 75 (0)    Blood  150 (0 1)    Total Output 1500 1940    Net +554 8 +5771 6          Unmeasured Emesis Occurrence  1 x          Intake/Output Summary (Last 24 hours) at 01/15/18 0605  Last data filed at 01/15/18 0335   Gross per 24 hour   Intake              440 ml   Output             1960 ml   Net            -1520 ml       Invasive  Devices  Invasive Devices     Peripherally Inserted Central Catheter Line            PICC Line 01/08/18 Right Brachial 6 days          Drain            Closed/Suction Drain Left LLQ Bulb 10 Fr  10 days    Closed/Suction Drain Right RLQ Bulb 10 Fr   10 days    Colostomy LLQ 10 days                Active medications  Scheduled Meds:    alteplase 2 mg Intracatheter Once   heparin (porcine) 5,000 Units Subcutaneous Q8H [facility]   metoprolol tartrate 12 5 mg Oral Q12H SHAGUFTA   nystatin  Topical BID   pantoprazole 40 mg Oral Early Morning     Continuous Infusions:     PRN Meds:     acetaminophen 650 mg Q6H PRN   calcium carbonate 500 mg TID PRN   HYDROmorphone 1 mg Q2H PRN   LORazepam 0 5 mg Q6H PRN   ondansetron 4 mg Q6H PRN   oxyCODONE 10 mg Q4H PRN   oxyCODONE 5 mg Q4H PRN       Physical Exam: /71   Pulse 79   Temp 98 4 °F (36 9 °C) (Oral)   Resp 18   Ht 5' 2" (1 575 m)   Wt 89 1 kg (196 lb 8 oz)   LMP  (LMP Unknown)   SpO2 98%   BMI 35 94 kg/m²     Physical Exam:  General Appearance: Alert, cooperative, no distress, appears stated age - NGT in place to sxn  Lungs: Clear to auscultation bilaterally, respirations unlablored   Heart: Regular rate and rhythm, S1 and S2 normal, no murmur, rub or gallop  Abdomen: Soft, appropriately tender  Left upper quadrant transverse colostomy with thin liquid output colostomy bag - dusky appearing stoma    No masses or organomegaly    REILLY drain (Left)  Serous - scant     Laboratory and Diagnostics:    Results from last 7 days  Lab Units 01/12/18  0658 01/11/18  0836 01/11/18  0523 01/10/18  0602  01/08/18  0622   WBC Thousand/uL 5 91  --  4 43 6 17  < > 5 89   HEMOGLOBIN g/dL 8 1* 8 1* 5 8* 7 6*  < > 7 2*   HEMATOCRIT % 25 0* 24 9* 18 3* 23 9*  < > 23 0*   PLATELETS Thousands/uL 264  --  201 304  < > 312   NEUTROS PCT %  --   --   --   --   --  76*   MONOS PCT %  --   --   --   --   --  6   MONO PCT MAN % 9  --   --   --   --   --    < > = values in this interval not displayed  Results from last 7 days  Lab Units 01/11/18  0523 01/10/18  0602 01/09/18  0628   SODIUM mmol/L 142 143 141   POTASSIUM mmol/L 3 7 3 7 3 5   CHLORIDE mmol/L 106 108 105   CO2 mmol/L 31 30 30   BUN mg/dL 2* 1* 2*   CREATININE mg/dL 0 39* 0 36* 0 37*   CALCIUM mg/dL 8 3 8 4 8 1*   GLUCOSE RANDOM mg/dL 78 92 107       Results from last 7 days  Lab Units 01/08/18  0622   MAGNESIUM mg/dL 2 0     Lab Results   Component Value Date    PHOS 3 1 01/07/2018    PHOS 2 5 (L) 01/06/2018    PHOS 2 7 01/05/2018          No results found for: TROPONINT  ABG:  Lab Results   Component Value Date    PHART 7 433 01/04/2018    SES7QBI 34 8 (L) 01/04/2018    PO2ART 136 0 (H) 01/04/2018    SWI6CLC 22 7 01/04/2018    BEART -1 1 01/04/2018    SOURCE Line, Arterial 01/04/2018       Blood Culture: No results found for: BLOODCX  Urine Culture:   Lab Results   Component Value Date    URINECX No Growth <100 cfu/mL 01/03/2018    URINECX No Growth <1000 cfu/mL 12/26/2017     Sputum Culture: No components found for: SPUTUMCX    Imaging: I have personally reviewed pertinent reports     and I have personally reviewed pertinent films in PACS    VTE Pharmacologic Prophylaxis: Heparin  VTE Mechanical Prophylaxis: sequential compression device

## 2018-01-15 NOTE — PROGRESS NOTES
Patient's dressing at midline changed as well as dressing for old drainage bulb site on right side of abdomen  No drainage from site  Suture line approximated  No induration  Colostomy appliance changed also as old appliance leaking  Patient tolerated the procedure but has an aversion to learning anything about care of her colostomy  Looking away, holding peppermint essential oil up to her nose and body rigid while writer cleaning area and applying new appliance

## 2018-01-15 NOTE — WOUND OSTOMY CARE
Progress Note- Ostomy  Franc Robert 47 y o  female  [id number]  [facility] 612-01        Assessment:  Patient seen this morning for pouch change and teaching, however on arrival patient informing [id number] nurse pouch was changed early this morning due to leakage  Pouch intact during wound care nurses arrival, patient getting ready to have PT session and agreeable with being seen tomorrow  Patient encouraged asking for wound care nurse for assist or teaching, patient with no questions this morning  Plan:  We Will continue following with ostomy care and teaching  Vitals:    01/15/18 0908   BP: 130/68   Pulse: 78   Resp: 20   Temp: 98 °F (36 7 °C)   SpO2: 97%       Colostomy LLQ (Active)   Stomal Appliance 2 piece 1/13/2018  8:15 AM   Stoma Assessment Black 1/13/2018  8:15 AM   Stoma size (in) 1 75 in 1/12/2018  1:21 PM   Stoma Shape Oval 1/12/2018  1:21 PM   Peristomal Assessment Clean; Intact 1/13/2018  8:15 AM   Treatment Site care 1/6/2018  8:00 AM   Output (mL) 50 mL 1/15/2018  3:35 AM   Number of days: 11         Please call wound care to ext [phone] with questions or patients request     Arie Riedel, RN, BSN, [facility]

## 2018-01-15 NOTE — PLAN OF CARE
Problem: PHYSICAL THERAPY ADULT  Goal: Performs mobility at highest level of function for planned discharge setting  See evaluation for individualized goals  Treatment/Interventions: Functional transfer training, Therapeutic exercise, Patient/family training, Equipment eval/education, Bed mobility, Gait training, Spoke to nursing, Spoke to case management  Equipment Recommended:  (probable need for RW  )       See flowsheet documentation for full assessment, interventions and recommendations  Outcome: Progressing  Prognosis: Fair  Problem List: Decreased strength, Decreased endurance, Impaired balance, Decreased mobility  Assessment: pt is   showing improvement  in mobility   she  did  complete  bed  mob  c  close superv  and  using  mechanics of  bed  (bed rail , HOB raised )  she is  able to  toilet  c superv    pt   did amb 60 ft x2 using  rw     gait is very slow  and  rw reliant     at end of tx pt  did  c/o nausea    pt will be alone at times    she  will benefit  from cont PT intervention  to  imrpove strength and functional mob   Barriers to Discharge: Decreased caregiver support     Recommendation:  (rehab)     PT - OK to Discharge: No    See flowsheet documentation for full assessment

## 2018-01-15 NOTE — RESTORATIVE TECHNICIAN NOTE
Restorative Specialist Mobility Note       Activity: Ambulate in room, [place] privileges, Ambulate in naik, Stand at bedside, Dangle (Educated/encouraged pt to ambulate w/assistance 3-4 x's/day  Chair alarm on   Pt callbell, phone/tray within reach )     Assistive Device: Front wheel walker        Batool GORDON, Restorative Technician, [employer] [facility]

## 2018-01-15 NOTE — PHYSICAL THERAPY NOTE
Physical Therapy Progress Note     01/15/18 [facility]   Pain Assessment   Pain Assessment 0-10   Pain Score 6   Pain Type Acute pain   Pain Location Abdomen   Pain Orientation Bilateral   Hospital Pain Intervention(s) Ambulation/increased activity   Response to Interventions tolerated    Restrictions/Precautions   Weight Bearing Precautions Per Order No   Other Precautions Pain   General   Response to Previous Treatment Patient with no complaints from previous session  Family/Caregiver Present No   Cognition   Overall Cognitive Status WFL   Arousal/Participation Alert; Cooperative   Orientation Level Oriented X4   Following Commands Follows all commands and directions without difficulty   Subjective   Subjective states  that she has  abd  op pain    Bed Mobility   Supine to Sit 5  Supervision   Additional items Assist x 1;HOB elevated; Increased time required;Verbal cues   Transfers   Sit to Stand 4  Minimal assistance   Additional items Assist x 1; Increased time required;Verbal cues   Stand to Sit 5  Supervision   Additional items Assist x 1; Increased time required;Verbal cues   Toilet transfer 5  Supervision   Additional items Assist x 1; Increased time required;Standard toilet  (grab  bars)   Ambulation/Elevation   Gait pattern Narrow KATI; Decreased foot clearance; Short stride; Excessively slow   Gait Assistance 4  Minimal assist  (cocntact guard)   Additional items Assist x 1;Verbal cues   Assistive Device Rolling walker   Distance 10 ft  60 ft x2   Balance   Static Sitting Fair +   Static Standing Fair   Ambulatory Fair -   Endurance Deficit   Endurance Deficit Yes   Endurance Deficit Description fatigue    Activity Tolerance   Activity Tolerance Patient limited by fatigue;Patient limited by pain  (nausea)   Medical Staff Made Aware Malu rn    Exercises   THR Sitting;20 reps;AROM; Bilateral   Assessment   Prognosis Fair   Problem List Decreased strength;Decreased endurance; Impaired balance;Decreased mobility Assessment pt is   showing improvement  in mobility   she  did  complete  bed  mob  c  close superv  and  using  mechanics of  bed  (bed rail , HOB raised )  she is  able to  toilet  c superv    pt   did amb 60 ft x2 using  rw     gait is very slow  and  rw reliant     at end of tx pt  did  c/o nausea    pt will be alone at times    she  will benefit  from cont PT intervention  to  imrpove strength and functional mob    Barriers to Discharge Decreased caregiver support   Goals   Patient Goals go to rehab   STG Expiration Date 01/18/18   Treatment Day 2   Plan   Treatment/Interventions Functional transfer training;LE strengthening/ROM; Patient/family training;Bed mobility;Gait training;Spoke to nursing   Progress Progressing toward goals   PT Frequency 5x/wk   Recommendation   Recommendation (rehab)   Equipment Recommended Sandhya Mendez   PT - OK to Discharge (to rehab  when med ready )     Gildardo Fair, PTA

## 2018-01-16 ENCOUNTER — APPOINTMENT (INPATIENT)
Dept: RADIOLOGY | Facility: HOSPITAL | Age: 53
DRG: 330 | End: 2018-01-16
Attending: SURGERY
Payer: COMMERCIAL

## 2018-01-16 LAB
ANION GAP SERPL CALCULATED.3IONS-SCNC: 6 MMOL/L (ref 4–13)
BUN SERPL-MCNC: 6 MG/DL (ref 5–25)
CALCIUM SERPL-MCNC: 8.4 MG/DL (ref 8.3–10.1)
CHLORIDE SERPL-SCNC: 102 MMOL/L (ref 100–108)
CO2 SERPL-SCNC: 32 MMOL/L (ref 21–32)
CREAT SERPL-MCNC: 0.54 MG/DL (ref 0.6–1.3)
ERYTHROCYTE [DISTWIDTH] IN BLOOD BY AUTOMATED COUNT: 17.9 % (ref 11.6–15.1)
GFR SERPL CREATININE-BSD FRML MDRD: 108 ML/MIN/1.73SQ M
GLUCOSE SERPL-MCNC: 95 MG/DL (ref 65–140)
HCT VFR BLD AUTO: 27.5 % (ref 34.8–46.1)
HGB BLD-MCNC: 8.9 G/DL (ref 11.5–15.4)
MAGNESIUM SERPL-MCNC: 1.5 MG/DL (ref 1.6–2.6)
MCH RBC QN AUTO: 29.7 PG (ref 26.8–34.3)
MCHC RBC AUTO-ENTMCNC: 32.4 G/DL (ref 31.4–37.4)
MCV RBC AUTO: 92 FL (ref 82–98)
PLATELET # BLD AUTO: 228 THOUSANDS/UL (ref 149–390)
PMV BLD AUTO: 9.7 FL (ref 8.9–12.7)
POTASSIUM SERPL-SCNC: 3.2 MMOL/L (ref 3.5–5.3)
RBC # BLD AUTO: 3 MILLION/UL (ref 3.81–5.12)
SODIUM SERPL-SCNC: 140 MMOL/L (ref 136–145)
WBC # BLD AUTO: 8.32 THOUSAND/UL (ref 4.31–10.16)

## 2018-01-16 PROCEDURE — 80048 BASIC METABOLIC PNL TOTAL CA: CPT | Performed by: PHYSICIAN ASSISTANT

## 2018-01-16 PROCEDURE — 83735 ASSAY OF MAGNESIUM: CPT | Performed by: PHYSICIAN ASSISTANT

## 2018-01-16 PROCEDURE — 97116 GAIT TRAINING THERAPY: CPT

## 2018-01-16 PROCEDURE — 74022 RADEX COMPL AQT ABD SERIES: CPT

## 2018-01-16 PROCEDURE — 97530 THERAPEUTIC ACTIVITIES: CPT

## 2018-01-16 PROCEDURE — 85027 COMPLETE CBC AUTOMATED: CPT | Performed by: PHYSICIAN ASSISTANT

## 2018-01-16 PROCEDURE — 97110 THERAPEUTIC EXERCISES: CPT

## 2018-01-16 PROCEDURE — 97535 SELF CARE MNGMENT TRAINING: CPT

## 2018-01-16 RX ORDER — POTASSIUM CHLORIDE 20 MEQ/1
80 TABLET, EXTENDED RELEASE ORAL ONCE
Status: COMPLETED | OUTPATIENT
Start: 2018-01-16 | End: 2018-01-16

## 2018-01-16 RX ORDER — GUAIFENESIN 600 MG
600 TABLET, EXTENDED RELEASE 12 HR ORAL EVERY 12 HOURS SCHEDULED
Status: DISCONTINUED | OUTPATIENT
Start: 2018-01-16 | End: 2018-01-19 | Stop reason: HOSPADM

## 2018-01-16 RX ADMIN — Medication 3 G: at 15:59

## 2018-01-16 RX ADMIN — ONDANSETRON 4 MG: 2 INJECTION INTRAMUSCULAR; INTRAVENOUS at 09:43

## 2018-01-16 RX ADMIN — GUAIFENESIN 600 MG: 600 TABLET, EXTENDED RELEASE ORAL at 21:15

## 2018-01-16 RX ADMIN — POTASSIUM CHLORIDE 80 MEQ: 1500 TABLET, EXTENDED RELEASE ORAL at 15:24

## 2018-01-16 RX ADMIN — Medication 500 MG: at 12:53

## 2018-01-16 RX ADMIN — METOPROLOL TARTRATE 12.5 MG: 25 TABLET ORAL at 21:15

## 2018-01-16 RX ADMIN — GUAIFENESIN 600 MG: 600 TABLET, EXTENDED RELEASE ORAL at 12:52

## 2018-01-16 RX ADMIN — METOPROLOL TARTRATE 12.5 MG: 25 TABLET ORAL at 09:43

## 2018-01-16 RX ADMIN — NYSTATIN: 100000 CREAM TOPICAL at 09:44

## 2018-01-16 RX ADMIN — LORAZEPAM 0.5 MG: 2 INJECTION INTRAMUSCULAR; INTRAVENOUS at 13:10

## 2018-01-16 RX ADMIN — HEPARIN SODIUM 5000 UNITS: 5000 INJECTION, SOLUTION INTRAVENOUS; SUBCUTANEOUS at 07:09

## 2018-01-16 RX ADMIN — NYSTATIN 1 APPLICATION: 100000 CREAM TOPICAL at 17:22

## 2018-01-16 RX ADMIN — HEPARIN SODIUM 5000 UNITS: 5000 INJECTION, SOLUTION INTRAVENOUS; SUBCUTANEOUS at 13:05

## 2018-01-16 RX ADMIN — HEPARIN SODIUM 5000 UNITS: 5000 INJECTION, SOLUTION INTRAVENOUS; SUBCUTANEOUS at 21:15

## 2018-01-16 RX ADMIN — PANTOPRAZOLE SODIUM 40 MG: 40 TABLET, DELAYED RELEASE ORAL at 07:09

## 2018-01-16 NOTE — WOUND OSTOMY CARE
Progress Note- Ostomy  Jalen Coburn 47 y o  female  [id number]  [facility] [address] [address]        Assessment:  Patient seen this morning for continued ostomy care and teaching  On assessment two piece pouching system with leakage, (+) semi formed stool and flatus in pouch  Peristomal skin remains intact with mild maceration, stoma is fully  from mucocutaneous junction secondary to necrosis, distal aspect of stoma with visible red, moist viable tissue, proximal aspect of stoma still with fibrous necrotic tissue with depth of 2 cm  Surgical PA Donn Magallanes notified of findings  Teaching included using one piece vs two piece pouching system, cleaning skin and stoma, using convex barrier for pouching a recessed stoma as well as diet and ADL  Patient with no questions, attempted to arranged time and date to educate  however patient declined stating "I don't know when he could get here and if anything i'll go to [place]"  Patient encouraged participating in pouch change, asked if she would be willing to participate hands on during next change however patient declined stating "i don't think I am there yet"    Plan:  1-We will continue following with ostomy care and teaching  Vitals:    01/16/18 0743   BP: 128/66   Pulse: 79   Resp: 20   Temp: 98 3 °F (36 8 °C)   SpO2: 95%     Incision 01/03/18 Abdomen Other (Comment) (Active)   Incision Description Clean;Dry; Intact 1/16/2018  7:09 AM   Gale-wound Assessment Clean;Dry; Intact 1/16/2018  7:09 AM   Closure Staples 1/16/2018  7:09 AM   Drainage Amount Small 1/16/2018  7:09 AM   Drainage Description Serosanguineous 1/16/2018  7:09 AM   Treatments Site care 1/16/2018  7:09 AM   Dressing Wound V A C  1/4/2018  4:00 PM   Dressing Changed New dressing applied 1/13/2018 11:01 AM   Patient Tolerance Tolerated well 1/13/2018 11:01 AM   Dressing Status Dry;Clean; Intact 1/16/2018  7:09 AM   Number of days: 13       Incision 01/04/18 Abdomen Other (Comment) (Active) Incision Description Clean;Dry; Intact 1/16/2018  7:09 AM   Gale-wound Assessment Clean;Dry; Intact 1/16/2018  7:09 AM   Closure Staples 1/16/2018  7:09 AM   Drainage Amount Moderate 1/16/2018  7:09 AM   Drainage Description Serosanguineous 1/16/2018  7:09 AM   Treatments Site care 1/16/2018  7:09 AM   Dressing ABD;Gauze 1/16/2018  7:09 AM   Wound packed? No 1/11/2018 10:00 PM   Dressing Changed New dressing applied 1/16/2018  7:09 AM   Patient Tolerance Tolerated well 1/16/2018  7:09 AM   Dressing Status Clean;Dry; Intact 1/16/2018  7:09 AM   Number of days: 12     Closed/Suction Drain Right RLQ Bulb 10 Fr  (Active)   Site Description Healing 1/13/2018  8:15 AM   Dressing Status Clean;Dry; Intact 1/13/2018  8:15 AM   Drainage Appearance Serosanguineous 1/13/2018  8:15 AM   Status To bulb suction 1/13/2018  8:15 AM   Output (mL) 0 mL 1/14/2018  6:30 PM   Number of days: 12       Closed/Suction Drain Left LLQ Bulb 10 Fr  (Active)   Site Description Healing 1/13/2018  8:15 AM   Dressing Status Clean;Dry; Intact 1/13/2018  8:15 AM   Drainage Appearance Serosanguineous 1/13/2018  8:15 AM   Status To bulb suction 1/13/2018  8:15 AM   Output (mL) 0 mL 1/15/2018  7:57 PM   Number of days: 12       Colostomy LLQ (Active)   Stomal Appliance 2 piece 1/16/2018 10:25 AM   Stoma Assessment Other (Comment) 1/16/2018 10:25 AM   Stoma size (in) 11/8in x 13/4 in 1/12/2018  1:21 PM   Stoma Shape Oval 1/16/2018 10:25 AM   Peristomal Assessment Intact 1/16/2018 10:25 AM   Treatment Other (Comment) 1/16/2018 10:25 AM   Output (mL) 150 mL 1/15/2018  7:57 PM   Number of days: 12       New one piece pouching system applied after cleaning skin with warm water, skin prep to peristomal skin, then delmar seal placed around stoma for extra sealant  Patient provided with pouches to take home, she was encouraged asking for wound care nurse if needed  We will continue following and teaching, please call ext [phone] or [phone]      Arie Riedel, RN, BSN, [facility]

## 2018-01-16 NOTE — RESTORATIVE TECHNICIAN NOTE
Restorative Specialist Mobility Note       Activity: Other (Comment) (Educated/encouraged pt to ambulate w/assistance 3-4 x's/day, pt refused 2* pain/nauseated RN aware  Bed alarm on  Pt callbell, phone/tray within reach )     Repositioned:  Other (Comment) (Rep /sat pt upright in bed  )           Range of Motion: Right leg, Left leg, Active     Luiza Mejia BS, Restorative Technician, [employer] [facility]

## 2018-01-16 NOTE — PLAN OF CARE
Nutrition/Hydration-ADULT     Nutrient/Hydration intake appropriate for improving, restoring or maintaining nutritional needs Not Progressing          DISCHARGE PLANNING - CARE MANAGEMENT     Discharge to post-acute care or home with appropriate resources Progressing        GASTROINTESTINAL - ADULT     Establish and maintain optimal ostomy function Progressing        Potential for Falls     Patient will remain free of falls Progressing        Prexisting or High Potential for Compromised Skin Integrity     Skin integrity is maintained or improved Progressing

## 2018-01-16 NOTE — PLAN OF CARE
Problem: PHYSICAL THERAPY ADULT  Goal: Performs mobility at highest level of function for planned discharge setting  See evaluation for individualized goals  Treatment/Interventions: Functional transfer training, Therapeutic exercise, Patient/family training, Equipment eval/education, Bed mobility, Gait training, Spoke to nursing, Spoke to case management  Equipment Recommended:  (probable need for RW  )       See flowsheet documentation for full assessment, interventions and recommendations  Outcome: Progressing  Prognosis: Fair  Problem List: Decreased strength, Decreased endurance, Impaired balance, Decreased mobility  Assessment: progressing slowly improving with functional mobility gait  and thex ex's for D/L LE strengthening   Pt still limited with overall conditioning  and endurance , pt states she a little depress d /t her outcome , pt will cont to benefit with PT session to meet d/c goals   Barriers to Discharge: Decreased caregiver support     Recommendation:  (rehab )     PT - OK to Discharge: Yes    See flowsheet documentation for full assessment

## 2018-01-16 NOTE — RESULT NOTES
Verified Results  (1) CBC/PLT/DIFF [id number] 11:04AM Lyn Bridges    Order Number: [id number]     Test Name Result Flag Reference   WBC COUNT 12 04 Thousand/uL H 4 31-10 16   RBC COUNT 4 60 Million/uL  3 81-5 12   HEMOGLOBIN 14 0 g/dL  11 5-15 4   HEMATOCRIT 40 1 %  34 8-46  1   MCV 87 fL  82-98   MCH 30 4 pg  26 8-34 3   MCHC 34 9 g/dL  31 4-37 4   RDW 11 7 %  11 6-15 1   MPV 10 1 fL  8 9-12 7   PLATELET COUNT 313 Thousands/uL  149-390   NEUTROPHILS RELATIVE PERCENT 77 % H 43-75   LYMPHOCYTES RELATIVE PERCENT 16 %  14-44   MONOCYTES RELATIVE PERCENT 7 %  4-12   EOSINOPHILS RELATIVE PERCENT 0 %  0-6   BASOPHILS RELATIVE PERCENT 0 %  0-1   NEUTROPHILS ABSOLUTE COUNT 9 12 Thousands/? ??L H 1 85-7 62   LYMPHOCYTES ABSOLUTE COUNT 1 98 Thousands/? ??L  0 60-4 47   MONOCYTES ABSOLUTE COUNT 0 87 Thousand/? ??L  0 17-1 22   EOSINOPHILS ABSOLUTE COUNT 0 02 Thousand/? ??L  0 00-0 61   BASOPHILS ABSOLUTE COUNT 0 05 Thousands/? ??L  0 00-0 10     * XR ABDOMEN OBSTRUCTION SERIES [id number] 10:38AM Gideon Romano Order Number: [id number]   Performing Comments: Possible bowel obstruction     Test Name Result Flag Reference   XR ABDOMEN OBSTRUCTION SERIES (Report)     OBSTRUCTION SERIES     INDICATION: Diffuse abdominal pain  Absent bowel movement for 2 weeks  COMPARISON: None     VIEWS: (Supine, erect abdomen and upright chest)     IMAGES: 5     FINDINGS:     There is a nonobstructive bowel gas pattern  No free air beneath the hemidiaphragms  No pathologic calcifications or soft tissue masses evident  Osseous structures are unremarkable  Examination of the chest reveals a normal cardiomediastinal silhouette  Lungs are clear  IMPRESSION:   Nonobstructive bowel gas pattern         Workstation performed: [id number]     Signed by:   Dannie Snellen, DO   11/29/17       Plan  Acute pain    · (1) CBC/PLT/DIFF; Status:Complete;   Done: [id number] 11:04AM   · * XR ABDOMEN OBSTRUCTION SERIES; Status:Complete;   Done: [id number]  10:38AM  Chronic constipation    · Linzess 72 MCG Oral Capsule; take 1 capsule daily

## 2018-01-16 NOTE — PLAN OF CARE
Problem: OCCUPATIONAL THERAPY ADULT  Goal: Performs self-care activities at highest level of function for planned discharge setting  See evaluation for individualized goals  Treatment Interventions: ADL retraining, Functional transfer training, UE strengthening/ROM, Endurance training, Patient/family training, Equipment evaluation/education, Compensatory technique education, Energy conservation, Activityengagement          See flowsheet documentation for full assessment, interventions and recommendations  Outcome: Progressing  Limitation: Decreased ADL status, Decreased UE strength, Decreased endurance, Decreased self-care trans, Decreased high-level ADLs  Prognosis: Fair  Assessment: Patient participated in skilled OT with focus on ADL skills inc bathing, dressing, bed mobiity, toileting, activity endurance/tolerance, compensatory techniques  Patient appearring drowsy and expressing not feeling well "all over"; nursing aware  Patient appeared less drowsy with activity inc ADL's which were performed supine secondary to fatigue and decreased activity tolerance today and "nauseous/vomiting earlier"  Patient would benefit from short term rehab with focus on increasing functional strength and independence skills for carryover into her own ennviroment       OT Discharge Recommendation: Short Term Rehab  OT - OK to Discharge: Yes (when medically cleared)   Katina Dorman

## 2018-01-16 NOTE — OCCUPATIONAL THERAPY NOTE
Occupational Therapy Treatment Note:       01/16/18 1528   Pain Assessment   Pain Assessment 0-10   Pain Score 5   Pain Type Acute pain   Pain Location Abdomen   ADL   Where Assessed Supine, bed  (and toilet)   Grooming Assistance 5  Supervision/Setup   Grooming Deficit Setup; Increased time to complete   UB Bathing Assistance 4  Minimal Assistance   UB Bathing Deficit Setup;Supervision/safety; Increased time to complete; Other (Comment)  (assist to wash back area)   LB Bathing Assistance 3  Moderate Assistance   LB Bathing Deficit Setup; Increased time to complete; Buttocks;Right lower leg including foot; Left lower leg including foot   UB Dressing Assistance 4  Minimal Assistance   UB Dressing Deficit Setup; Increased time to complete;Pull around back   UB Dressing Comments hospital gown   LB Dressing Assistance 3  Moderate Assistance   LB Dressing Deficit Don/doff R sock; Don/doff L sock   Functional Standing Tolerance   Time 3 minutes   Activity functional mobility to toilet and sink and back to supine position in bed   Bed Mobility   Rolling L 4  Minimal assistance   Additional items Assist x 1;HOB elevated; Bedrails; Increased time required   Transfers   Sit to Stand 5  Supervision   Additional items Assist x 1;Bedrails; Increased time required;Verbal cues   Stand to Sit 5  Supervision   Additional items Assist x 1;Bedrails; Increased time required;Verbal cues   Stand pivot 4  Minimal assistance   Additional items (CGA provided secondary to pt "drowsy")   Toilet transfer 5  Supervision   Additional items Assist x 1; Increased time required;Verbal cues;Standard toilet   Cognition   Overall Cognitive Status [facility]   Arousal/Participation Alert; Cooperative   Attention Within functional limits   Orientation Level Oriented X4   Memory Within functional limits   Following Commands Follows all commands and directions without difficulty   Comments Patient closing eyes intermittently; states that she just "received medication" Activity Tolerance   Activity Tolerance Patient limited by fatigue; Other (Comment)  (nursing present)   Medical Staff Made Aware nursing aware   Assessment   Assessment Patient participated in skilled OT with focus on ADL skills inc bathing, dressing, bed mobiity, toileting, activity endurance/tolerance, compensatory techniques  Patient appearring drowsy and expressing not feeling well "all over"; nursing aware  Patient appeared less drowsy with activity inc ADL's which were performed supine secondary to fatigue and decreased activity tolerance today and "nauseous/vomiting earlier"  Patient would benefit from short term rehab with focus on increasing functional strength and independence skills for carryover into her own ennviroment  Plan   Treatment Interventions ADL retraining;Functional transfer training; Endurance training;Equipment evaluation/education; Compensatory technique education; Energy conservation   Goal Expiration Date 01/22/18   Treatment Day 5   OT Frequency 3-5x/wk   Recommendation   OT Discharge Recommendation Short Term Rehab   OT - OK to Discharge Yes  (when medically cleared)   Barthel Index   Feeding 10   Bathing 0   Grooming Score 5   Dressing Score 5   Bladder Score 10   Bowels Score 10   Toilet Use Score 5   Transfers (Bed/Chair) Score 10   Mobility (Level Surface) Score 0   Stairs Score 0   Barthel Index Score 55   Modified [place] Scale   Modified [place] Scale 4   SHANNAN Bellamy

## 2018-01-16 NOTE — PROGRESS NOTES
Patient care rounds were completed with the patient's nurse today, Morales Alejo  We discussed the plan is to continue a diet as tolerated  Though initially the plan was for discharge to rehab today, the patient has had persistent nausea and an episode of emesis this morning after breakfast   The patient continues to feel intermittent nausea as well as the feeling of having phlegm in the back of her throat  We will obtain an obstruction series to evaluate her bowel gas pattern, but she continues to have some ostomy function  We reviewed all of the invasive devices/lines/telemetry orders   - None  Pain Assessment / Plan:  - Current pain medication regimen is adequate  Mobility Assessment / Plan:  - Continue PT and OT evaluation and treatment as indicated with out of bed as tolerated  Goals / Barriers for discharge:  - Persistent nausea and emesis is likely to delay her discharge today  - Case management following  All questions and concerns were addressed  I spent greater than 20 minutes reviewing the plan with the patient and the nurse, and coordinating her care for the day      Santos Coleman PA-C  1/16/2018 10:45 AM

## 2018-01-16 NOTE — PHYSICAL THERAPY NOTE
Physical Therapy Progress Note         01/16/18 0915   Pain Assessment   Pain Assessment 0-10   Pain Score 5   Pain Type Acute pain   Pain Location Abdomen   Pain Orientation Bilateral   Restrictions/Precautions   Weight Bearing Precautions Per Order No   Braces or Orthoses (abdmonial binder )   Other Precautions Pain   General   Chart Reviewed Yes   Subjective   Subjective pt reports feeling nausea  and a little sick but agreeable to perform PT session    Bed Mobility   Rolling R 4  Minimal assistance   Rolling L 4  Minimal assistance   Supine to Sit 5  Supervision   Additional items HOB elevated   Sit to Supine 5  Supervision   Additional items HOB elevated   Additional Comments pt improving with bed mobility and B/L LE strengthening    Transfers   Sit to Stand 5  Supervision   Stand to Sit 5  Supervision   Stand pivot 5  Supervision   Toilet transfer 5  Supervision   Additional Comments transfers at S level progressing slowly    Ambulation/Elevation   Gait pattern Improper Weight shift;Narrow KATI; Short stride; Excessively slow   Gait Assistance 5  Supervision   Assistive Device Rolling walker   Distance 60 x2   Stair Management Assistance Not tested   Balance   Static Sitting Fair +   Static Standing Fair   Ambulatory Fair   Endurance Deficit   Endurance Deficit Yes   Endurance Deficit Description fatigue    Activity Tolerance   Activity Tolerance Patient limited by fatigue;Patient limited by pain   Exercises   Quad Sets 15 reps   Heelslides 10 reps   Glute Sets 15 reps   Hip Abduction 10 reps   Hip Adduction 10 reps   Knee AROM Short Arc Quad 15 reps   Knee AROM Long Arc Quad 15 reps   Ankle Pumps 20 reps   Heel Cord Stretch PROM   Balance Training Standing   Balance training  standing balance in bathroom wasking hands w/o AD    Assessment   Prognosis Fair   Problem List Decreased strength;Decreased endurance; Impaired balance;Decreased mobility   Assessment progressing slowly improving with functional mobility gait  and thex ex's for D/L LE strengthening   Pt still limited with overall conditioning  and endurance , pt states she a little depress d /t her outcome , pt will cont to benefit with PT session to meet d/c goals    Barriers to Discharge Decreased caregiver support   Goals   Patient Goals go to rehab    STG Expiration Date 01/18/18   Plan   Treatment/Interventions Functional transfer training;LE strengthening/ROM; Therapeutic exercise; Endurance training;Patient/family training;Bed mobility;Gait training   Progress Progressing toward goals   PT Frequency 5x/wk   Recommendation   Recommendation (rehab )   Equipment Recommended Rob Boateng   PT - OK to Discharge Yes   Additional Comments when medically ready      Aleksandra Carrasquillo, PTA

## 2018-01-17 ENCOUNTER — APPOINTMENT (INPATIENT)
Dept: RADIOLOGY | Facility: HOSPITAL | Age: 53
DRG: 330 | End: 2018-01-17
Payer: COMMERCIAL

## 2018-01-17 LAB
ANION GAP SERPL CALCULATED.3IONS-SCNC: 6 MMOL/L (ref 4–13)
BUN SERPL-MCNC: 4 MG/DL (ref 5–25)
CALCIUM SERPL-MCNC: 8.7 MG/DL (ref 8.3–10.1)
CHLORIDE SERPL-SCNC: 105 MMOL/L (ref 100–108)
CO2 SERPL-SCNC: 30 MMOL/L (ref 21–32)
CREAT SERPL-MCNC: 0.56 MG/DL (ref 0.6–1.3)
GFR SERPL CREATININE-BSD FRML MDRD: 107 ML/MIN/1.73SQ M
GLUCOSE SERPL-MCNC: 84 MG/DL (ref 65–140)
MAGNESIUM SERPL-MCNC: 2.6 MG/DL (ref 1.6–2.6)
POTASSIUM SERPL-SCNC: 3.8 MMOL/L (ref 3.5–5.3)
SODIUM SERPL-SCNC: 141 MMOL/L (ref 136–145)

## 2018-01-17 PROCEDURE — 74177 CT ABD & PELVIS W/CONTRAST: CPT

## 2018-01-17 PROCEDURE — 83735 ASSAY OF MAGNESIUM: CPT | Performed by: SURGERY

## 2018-01-17 PROCEDURE — 80048 BASIC METABOLIC PNL TOTAL CA: CPT | Performed by: SURGERY

## 2018-01-17 RX ORDER — ONDANSETRON 2 MG/ML
4 INJECTION INTRAMUSCULAR; INTRAVENOUS EVERY 4 HOURS PRN
Status: DISCONTINUED | OUTPATIENT
Start: 2018-01-17 | End: 2018-01-19 | Stop reason: HOSPADM

## 2018-01-17 RX ADMIN — LORAZEPAM 0.5 MG: 2 INJECTION INTRAMUSCULAR; INTRAVENOUS at 23:39

## 2018-01-17 RX ADMIN — NYSTATIN 1 APPLICATION: 100000 CREAM TOPICAL at 09:18

## 2018-01-17 RX ADMIN — HEPARIN SODIUM 5000 UNITS: 5000 INJECTION, SOLUTION INTRAVENOUS; SUBCUTANEOUS at 23:07

## 2018-01-17 RX ADMIN — GUAIFENESIN 600 MG: 600 TABLET, EXTENDED RELEASE ORAL at 09:18

## 2018-01-17 RX ADMIN — PANTOPRAZOLE SODIUM 40 MG: 40 TABLET, DELAYED RELEASE ORAL at 05:54

## 2018-01-17 RX ADMIN — HEPARIN SODIUM 5000 UNITS: 5000 INJECTION, SOLUTION INTRAVENOUS; SUBCUTANEOUS at 05:54

## 2018-01-17 RX ADMIN — ONDANSETRON 4 MG: 2 INJECTION INTRAMUSCULAR; INTRAVENOUS at 14:16

## 2018-01-17 RX ADMIN — IOHEXOL 100 ML: 350 INJECTION, SOLUTION INTRAVENOUS at 21:50

## 2018-01-17 RX ADMIN — ACETAMINOPHEN 650 MG: 160 SUSPENSION ORAL at 02:01

## 2018-01-17 RX ADMIN — METOPROLOL TARTRATE 12.5 MG: 25 TABLET ORAL at 23:07

## 2018-01-17 RX ADMIN — IOHEXOL 25 ML: 240 INJECTION, SOLUTION INTRATHECAL; INTRAVASCULAR; INTRAVENOUS; ORAL at 17:20

## 2018-01-17 RX ADMIN — ALTEPLASE 2 MG: 2.2 INJECTION, POWDER, LYOPHILIZED, FOR SOLUTION INTRAVENOUS at 20:40

## 2018-01-17 RX ADMIN — NYSTATIN: 100000 CREAM TOPICAL at 18:25

## 2018-01-17 RX ADMIN — HYDROMORPHONE HYDROCHLORIDE 1 MG: 1 INJECTION, SOLUTION INTRAMUSCULAR; INTRAVENOUS; SUBCUTANEOUS at 14:10

## 2018-01-17 RX ADMIN — LORAZEPAM 0.5 MG: 2 INJECTION INTRAMUSCULAR; INTRAVENOUS at 02:01

## 2018-01-17 RX ADMIN — ONDANSETRON 4 MG: 2 INJECTION INTRAMUSCULAR; INTRAVENOUS at 11:30

## 2018-01-17 RX ADMIN — HEPARIN SODIUM 5000 UNITS: 5000 INJECTION, SOLUTION INTRAVENOUS; SUBCUTANEOUS at 14:43

## 2018-01-17 NOTE — CASE MANAGEMENT
Continued Stay Review    Thank you,  [address] [facility] in the [facility] by Yonis Stover for 2017  Network Utilization Review Department  Phone: [phone]; Fax [phone]  ATTENTION: The Network Utilization Review Department is now centralized for our 7 Facilities  Make a note that we have a new phone and fax numbers for our Department  Please call with any questions or concerns to [phone] and carefully follow the prompts so that you are directed to the right person  All voicemails are confidential  Fax any determinations, approvals, denials, and requests for initial or continue stay review clinical to [phone]  Due to HIGH CALL volume, it would be easier if you could please send faxed requests to expedite your requests and in part, help us provide discharge notifications faster      Date: 1/17/2018    Vital Signs: /62   Pulse 73   Temp (!) 94 4 °F (34 7 °C)   Resp 20   Ht 5' 2" (1 575 m)   Wt 92 8 kg (204 lb 9 4 oz)   LMP  (LMP Unknown)   SpO2 93%   BMI 37 42 kg/m²     Medications:   Scheduled Meds:   alteplase 2 mg Intracatheter Once   guaiFENesin 600 mg Oral Q12H [facility]   heparin (porcine) 5,000 Units Subcutaneous Q8H [facility]   metoprolol tartrate 12 5 mg Oral Q12H SHAGUFTA   nystatin  Topical BID   pantoprazole 40 mg Oral Early Morning     Continuous Infusions:    PRN Meds:   Acetaminophen po 1/17 x 1     calcium carbonate po 1/16 x 1     HYDROmorphone    LORazepam iv 1/15 x 1 - 1/16 x 1 - 1/17 x 1     Ondansetron iv 1/15 x 2 - 1/16 x 1     oxyCODONE 10 mg po 1/15 x 2     oxyCODONE     Nursing orders - VS q 3 - Daily weights - I & O q shift - Sequential compression device to le's - Turn pt q 2 - elevate HOB > 30 degrees - Oral care - Diet clear liquids - toast & crackers - Abd Binder     Abnormal Labs/Diagnostic Results:      Ref Range & Units 1/17/18 0553 1/16/18 1232 1/11/18 0523 1/10/18 0602 1/9/18 0628   Sodium 136 - 145 mmol/L 141  140  142 143  141    Potassium 3 5 - 5 3 mmol/L 3 8  3 2   3 7  3 7  3 5    Chloride 100 - 108 mmol/L 105  102  106  108  105    CO2 21 - 32 mmol/L 30  32  31  30  30    Anion Gap 4 - 13 mmol/L 6  6  5  5  6    BUN 5 - 25 mg/dL 4   6  2   1   2     Creatinine 0 60 - 1 30 mg/dL 0 56   0 54CM   0 39CM   0 36CM   0 37CM     Glucose 65 - 140 mg/dL 84  95CM  78CM  92CM  107CM    Calcium 8 3 - 10 1 mg/dL 8 7  8 4  8 3  8 4  8 1     eGFR ml/min/1 73sq m 107  108  120  124  122             Ref Range & Units 1/16/18 1232 1/12/18 0658 1/11/18 0836 1/11/18 0523 1/10/18 0602   WBC 4 31 - 10 16 Thousand/uL 8 32  5 91   4 43  6 17    RBC 3 81 - 5 12 Million/uL 3 00   2 75    2 00   2 63     Hemoglobin 11 5 - 15 4 g/dL 8 9   8 1   8 1   5 8CM   7 6     Hematocrit 34 8 - 46 1 % 27 5   25 0   24 9   18 3   23 9     MCV 82 - 98 fL 92  91   92  91    MCH 26 8 - 34 3 pg 29 7  29 5   29 0  28 9    MCHC 31 4 - 37 4 g/dL 32 4  32 4   31 7  31 8    RDW 11 6 - 15 1 % 17 9   15 9    15 5   15 0    Platelets 526 - 736 Thousands/uL 228  264   201  304    MPV 8 9 - 12 7 fL 9 7  9 2   9 0  9 1                Age/Sex: 47 y o  female     Assessment/Plan:   Progress note 1/17 -      Assessment:  42-year-old female status post second-look exploratory laparotomy small bowel resection, ileostomy closure via hand-sewn anastomosis and transverse colostomy on 1/4/18     Plan:  - advance diet as tolerated  - antiemetics PRN  - plan to d/c L RE[facility]Y prior to discharge  - continue binder  - OOB  - dispo planning       Discharge Plan:  [facility]  For rehab  When medically stable

## 2018-01-17 NOTE — RESTORATIVE TECHNICIAN NOTE
Restorative Specialist Mobility Note       Activity: Other (Comment) (Educated/encouraged pt to ambulate w/assistance 3-4 x's/day, pt refused 2* abdominal pain RN aware   Pt callbell, phone/tray within reach  )     Melita GORDON, Restorative Technician,

## 2018-01-17 NOTE — PROGRESS NOTES
01/17/18 1500   Plan of Care   Comments  attempted to visit pt , but pt was with nurse     Assessment Completed by: Unit visit

## 2018-01-17 NOTE — PROGRESS NOTES
Progress Note - General Surgery   Silvia Renteria 47 y o  female MRN: [id number]  Unit/Bed#: [facility] 1-0 Encounter: [id number]    Assessment:  70-year-old female status post second-look exploratory laparotomy small bowel resection, ileostomy closure via hand-sewn anastomosis and transverse colostomy on 1/4/18    Plan:  - advance diet as tolerated  - antiemetics PRN  - plan to d/c L REILLY prior to discharge  - continue binder  - OOB  - dispo planning    Subjective/Objective     Subjective: Patient had episode of vomiting yesterday x1  No further episodes  Has not tried to eat anything yesterday  Reports good ostomy function  Objective:     Vitals: Blood pressure 116/54, pulse 81, temperature 98 7 °F (37 1 °C), temperature source Oral, resp  rate 20, height 5' 2" (1 575 m), weight 92 8 kg (204 lb 9 4 oz), SpO2 93 %  ,Body mass index is 37 42 kg/m²  I/O       01/15 0701 - 01/16 0700 01/16 0701 - 01/17 0700    P  O  480 240    I V  (mL/kg)  30 (0 3)    IV Piggyback  100    Total Intake(mL/kg) 480 (5 2) 370 (4)    Urine (mL/kg/hr) 800 (0 4) 1750 (0 8)    Drains 5 (0) 0 (0)    Stool 325 (0 1) 150 (0 1)    Total Output 1130 1900    Net -650 -1530                Physical Exam:  GEN: NAD  HEENT: MMM  CV: RRR  Lung: Normal effort  Ab: Soft, NT/ND  Ostomy dark with +air and +gas in ostomy  Extrem: No CCE  Neuro:  A+Ox3    Lab, Imaging and other studies:   CBC with diff:   Lab Results   Component Value Date    WBC 8 32 01/16/2018    HGB 8 9 (L) 01/16/2018    HCT 27 5 (L) 01/16/2018    MCV 92 01/16/2018     01/16/2018    MCH 29 7 01/16/2018    MCHC 32 4 01/16/2018    RDW 17 9 (H) 01/16/2018    MPV 9 7 01/16/2018   , BMP/CMP:   Lab Results   Component Value Date     01/16/2018    K 3 2 (L) 01/16/2018     01/16/2018    CO2 32 01/16/2018    ANIONGAP 6 01/16/2018    BUN 6 01/16/2018    CREATININE 0 54 (L) 01/16/2018    GLUCOSE 95 01/16/2018    CALCIUM 8 4 01/16/2018    EGFR 108 01/16/2018   , Magnesium: No results found for: MAG  VTE Pharmacologic Prophylaxis: Heparin  VTE Mechanical Prophylaxis: sequential compression device

## 2018-01-17 NOTE — SOCIAL WORK
IP rehab follow up:     Patient anticipated for poss dc today pend tolerance of am diet  CM received call from [address] at [facility], [phone] ext [phone], advising that they have an available bed and can accept the patient for admission today pending authorization for SNF rehab  CM called [facility]  [phone], spoke with Julian Michael in [address] [place] [address] and obtained prelimary authorization for IP rehab  [address] @ [facility] JEANNIE  [phone]  [phone]     [facility]   NPI #[id number]  Dr Soheila Noriega    CM called and spoke with Maria De Jesus Tejada, at [facility] [phone], to set up [address] transport  Maria De Jesus Tejada gave quote of $225 for transport to [facility] and advised that the pt will need to pay in advance  [facility] avail for 3-3:30pm transport today  CM called and spoke with [facility] Resident who reported that the patient is unable to be medically cleared d/t small ileus and persistent episode of vomiting after po intake  Resident stated poss dc in 1-2 days  CM called and left vm with Daphney at [facility] and spoke with Maria De Jesus Tejada at [employer]; DC postponed and is pending medical clearance

## 2018-01-18 VITALS
RESPIRATION RATE: 20 BRPM | HEIGHT: 62 IN | TEMPERATURE: 98.7 F | HEART RATE: 82 BPM | BODY MASS INDEX: 37.65 KG/M2 | DIASTOLIC BLOOD PRESSURE: 63 MMHG | OXYGEN SATURATION: 96 % | WEIGHT: 204.59 LBS | SYSTOLIC BLOOD PRESSURE: 115 MMHG

## 2018-01-18 PROBLEM — K57.20 DIVERTICULITIS OF LARGE INTESTINE WITH PERFORATION WITHOUT ABSCESS OR BLEEDING: Status: ACTIVE | Noted: 2017-12-04

## 2018-01-18 PROBLEM — K56.7 ILEUS (HCC): Status: ACTIVE | Noted: 2018-01-18

## 2018-01-18 PROBLEM — D62 ACUTE BLOOD LOSS ANEMIA: Status: ACTIVE | Noted: 2018-01-01

## 2018-01-18 LAB
BASOPHILS # BLD AUTO: 0.02 THOUSANDS/ΜL (ref 0–0.1)
BASOPHILS NFR BLD AUTO: 0 % (ref 0–1)
EOSINOPHIL # BLD AUTO: 0.09 THOUSAND/ΜL (ref 0–0.61)
EOSINOPHIL NFR BLD AUTO: 1 % (ref 0–6)
ERYTHROCYTE [DISTWIDTH] IN BLOOD BY AUTOMATED COUNT: 18.7 % (ref 11.6–15.1)
HCT VFR BLD AUTO: 29.4 % (ref 34.8–46.1)
HGB BLD-MCNC: 9.2 G/DL (ref 11.5–15.4)
LYMPHOCYTES # BLD AUTO: 1.32 THOUSANDS/ΜL (ref 0.6–4.47)
LYMPHOCYTES NFR BLD AUTO: 16 % (ref 14–44)
MCH RBC QN AUTO: 29.4 PG (ref 26.8–34.3)
MCHC RBC AUTO-ENTMCNC: 31.3 G/DL (ref 31.4–37.4)
MCV RBC AUTO: 94 FL (ref 82–98)
MONOCYTES # BLD AUTO: 0.57 THOUSAND/ΜL (ref 0.17–1.22)
MONOCYTES NFR BLD AUTO: 7 % (ref 4–12)
NEUTROPHILS # BLD AUTO: 6.01 THOUSANDS/ΜL (ref 1.85–7.62)
NEUTS SEG NFR BLD AUTO: 76 % (ref 43–75)
NRBC BLD AUTO-RTO: 0 /100 WBCS
PLATELET # BLD AUTO: 243 THOUSANDS/UL (ref 149–390)
PMV BLD AUTO: 9.7 FL (ref 8.9–12.7)
RBC # BLD AUTO: 3.13 MILLION/UL (ref 3.81–5.12)
WBC # BLD AUTO: 8.04 THOUSAND/UL (ref 4.31–10.16)

## 2018-01-18 PROCEDURE — 85025 COMPLETE CBC W/AUTO DIFF WBC: CPT | Performed by: SURGERY

## 2018-01-18 PROCEDURE — 97530 THERAPEUTIC ACTIVITIES: CPT

## 2018-01-18 PROCEDURE — 97116 GAIT TRAINING THERAPY: CPT

## 2018-01-18 RX ORDER — POLYETHYLENE GLYCOL 3350 17 G/17G
17 POWDER, FOR SOLUTION ORAL DAILY PRN
Qty: 14 EACH | Refills: 0 | Status: ON HOLD | OUTPATIENT
Start: 2018-01-18 | End: 2018-02-13 | Stop reason: ALTCHOICE

## 2018-01-18 RX ORDER — CALCIUM CARBONATE 200(500)MG
500 TABLET,CHEWABLE ORAL 3 TIMES DAILY PRN
Refills: 0 | Status: SHIPPED | OUTPATIENT
Start: 2018-01-18 | End: 2018-03-26

## 2018-01-18 RX ORDER — ACETAMINOPHEN 325 MG/1
650 TABLET ORAL EVERY 4 HOURS PRN
Qty: 30 TABLET | Refills: 0 | Status: SHIPPED | OUTPATIENT
Start: 2018-01-18 | End: 2018-02-17

## 2018-01-18 RX ORDER — OXYCODONE HYDROCHLORIDE 5 MG/1
2.5-5 TABLET ORAL EVERY 4 HOURS PRN
Qty: 18 TABLET | Refills: 0 | Status: SHIPPED | OUTPATIENT
Start: 2018-01-18 | End: 2018-01-28

## 2018-01-18 RX ADMIN — GUAIFENESIN 600 MG: 600 TABLET, EXTENDED RELEASE ORAL at 10:59

## 2018-01-18 RX ADMIN — NYSTATIN: 100000 CREAM TOPICAL at 11:00

## 2018-01-18 RX ADMIN — PANTOPRAZOLE SODIUM 40 MG: 40 TABLET, DELAYED RELEASE ORAL at 06:50

## 2018-01-18 RX ADMIN — HEPARIN SODIUM 5000 UNITS: 5000 INJECTION, SOLUTION INTRAVENOUS; SUBCUTANEOUS at 06:51

## 2018-01-18 RX ADMIN — METOPROLOL TARTRATE 12.5 MG: 25 TABLET ORAL at 10:59

## 2018-01-18 NOTE — SOCIAL WORK
DC Plan completion:     LILIBETH Avelar advised pt is medically cleared for discharge  CM messaged [facility] and confirmed they can accept the patient today  CM spoke with RN Ethyl Kayser, who reported that the patient is feeling better and stated she is ready for dc to rehab  CM spoke with the patient, advised her of cost of [address] thru [facility] transport; pt agreeable and gave CM payment information  CM called and spoke with Cecilia Ruggiero at [facility];  set for 3-4pm today  CM called and left vm with Daphney at [facility] advising of  time; message also sent via [employer]  RN Ethyl Kayser and Charge WALLACE Silva advised that the patient is ready for dc

## 2018-01-18 NOTE — PROGRESS NOTES
01/18/18 1100   Plan of Care   Comments  attempted to visited pt , but nurse was attending to pt     Assessment Completed by: Unit visit

## 2018-01-18 NOTE — PHYSICAL THERAPY NOTE
Physical Therapy Progress Note         01/18/18 0957   Pain Assessment   Pain Assessment 0-10   Pain Score 3   Pain Type Acute pain;Surgical pain   Pain Location Abdomen   Pain Orientation Left   Restrictions/Precautions   Weight Bearing Precautions Per Order No   Other Precautions Pain  (Abdominal binder)   General   Chart Reviewed Yes   Family/Caregiver Present No   Cognition   Overall Cognitive Status WFL   Subjective   Subjective Agreed to participate in therapy  In bed upon entry  Bed Mobility   Supine to Sit 5  Supervision   Additional items Assist x 1;HOB elevated; Bedrails; Increased time required   Transfers   Sit to Stand 5  Supervision   Additional items Assist x 1; Increased time required;Armrests   Stand to Sit 5  Supervision   Additional items Assist x 1; Increased time required;Verbal cues;Armrests   Stand pivot 5  Supervision   Additional items Assist x 1   Ambulation/Elevation   Gait pattern Decreased foot clearance; Short stride; Excessively slow; Forward Flexion   Gait Assistance 5  Supervision   Additional items Assist x 1;Verbal cues   Assistive Device Rolling walker   Distance 120ft, 140ft   Endurance Deficit   Endurance Deficit Yes   Endurance Deficit Description Fatigue   Activity Tolerance   Activity Tolerance Patient limited by fatigue;Patient tolerated treatment well   Medical Staff Made Aware Yes   Exercises   TKR Sitting;Bilateral;AROM;20 reps   Assessment   Prognosis Fair   Problem List Decreased strength;Decreased endurance; Impaired balance;Decreased mobility;Pain;Decreased skin integrity   Assessment Patient seen for PT  Noted progress with overall mobility however continues to fatigue with activity  Patient able to perform transfers with S (supine to sit wth HOB elevated)  No overt LOB noted t/o session  Patient needed seated rest and reported fatigue at the end of ambulation  Did not attempt stair due to fatigue   Recommend d/c to rehab prior to return home to improve functional mobility and endurance and patient has FF step in the house  Barriers to Discharge Decreased caregiver support; Inaccessible home environment   Goals   Patient Goals Go to rehab   STG Expiration Date 01/18/18   Plan   Treatment/Interventions Functional transfer training;LE strengthening/ROM; Therapeutic exercise;Patient/family training;Equipment eval/education; Bed mobility;Gait training;Spoke to nursing;Spoke to case management   Progress Progressing toward goals   PT Frequency 5x/wk   Recommendation   Recommendation Post acute IP rehab   Equipment Recommended Tiffany Moses PTA

## 2018-01-18 NOTE — PLAN OF CARE
Problem: PHYSICAL THERAPY ADULT  Goal: Performs mobility at highest level of function for planned discharge setting  See evaluation for individualized goals  Treatment/Interventions: Functional transfer training, Therapeutic exercise, Patient/family training, Equipment eval/education, Bed mobility, Gait training, Spoke to nursing, Spoke to case management  Equipment Recommended:  (probable need for RW  )       See flowsheet documentation for full assessment, interventions and recommendations  Outcome: Progressing  Prognosis: Fair  Problem List: Decreased strength, Decreased endurance, Impaired balance, Decreased mobility, Pain, Decreased skin integrity  Assessment: Patient seen for PT  Noted progress with overall mobility however continues to fatigue with activity  Patient able to perform transfers with S (supine to sit wth HOB elevated)  No overt LOB noted t/o session  Patient needed seated rest and reported fatigue at the end of ambulation  Did not attempt stair due to fatigue  Recommend d/c to rehab prior to return home to improve functional mobility and endurance and patient has FF step in the house  Barriers to Discharge: Decreased caregiver support, Inaccessible home environment     Recommendation: Post acute IP rehab     PT - OK to Discharge: Yes    See flowsheet documentation for full assessment

## 2018-01-18 NOTE — ASSESSMENT & PLAN NOTE
- Resolving ileus with no acute findings on CT scan of the abdomen and pelvis on 01/17/2018  - Advance diet as tolerated  - Continue bowel regimen  - Limit narcotics as able

## 2018-01-18 NOTE — PROGRESS NOTES
Progress Note - General Surgery   Dwain Collins 47 y o  female MRN: [id number]  Unit/Bed#: [facility] 612-01 Encounter: [id number]    Assessment:  53-year-old female status post second-look exploratory laparotomy small bowel resection, ileostomy closure via hand-sewn anastomosis and transverse colostomy on 1/4/18    Plan:  - advance to regular diet today  - OOB, ambulate  - continue abdominal binder  - monitor ostomy  - dispo planning -- likely to rehab today    Subjective/Objective     Subjective: Patient feels well  Tolerating clears and a piece of toast yesterday  No nausea or vomiting  Objective:     Vitals: Blood pressure 106/59, pulse 75, temperature 98 8 °F (37 1 °C), resp  rate 20, height 5' 2" (1 575 m), weight 92 8 kg (204 lb 9 4 oz), SpO2 96 %  ,Body mass index is 37 42 kg/m²  I/O       01/16 0701 - 01/17 0700 01/17 0701 - 01/18 0700    P  O  240 800    I V  (mL/kg) 30 (0 3)     IV Piggyback 100     Total Intake(mL/kg) 370 (4) 800 (8 6)    Urine (mL/kg/hr) 1750 (0 8) 1215 (0 5)    Drains 0 (0) 0 (0)    Stool 150 (0 1) 350 (0 2)    Total Output 1900 1565    Net -1530 -765                Physical Exam:  GEN: NAD  HEENT: MMM  CV: RRR  Lung: Normal effort  Ab: Soft, NT/ND, ostomy dark with stool and air in bag  Extrem: No CCE  Neuro:  A+Ox3    Lab, Imaging and other studies: CBC with diff: No results found for: WBC, HGB, HCT, MCV, PLT, ADJUSTEDWBC, MCH, MCHC, RDW, MPV, NRBC, BMP/CMP: No results found for: NA, K, CL, CO2, ANIONGAP, BUN, CREATININE, GLUCOSE, CALCIUM, AST, ALT, ALKPHOS, PROT, ALBUMIN, BILITOT, EGFR, Magnesium: No results found for: MAG  VTE Pharmacologic Prophylaxis: Heparin  VTE Mechanical Prophylaxis: sequential compression device

## 2018-01-18 NOTE — ASSESSMENT & PLAN NOTE
- Status post multiple operative interventions with definitive exploratory laparotomy, small-bowel resection, closure of ileostomy with hand-sewn anastomosis, creation of end transverse colostomy, and sigmoid colon resection 01/14/2018   - Continue current diet  - Continue routine ostomy care  - Continue PT and OT evaluation and treatment as indicated  - Continue abdominal binder   - Continue current oral pain medication regimen   - Outpatient follow-up in the surgery clinic in approximately 2 weeks  - Anticipate discharge on 01/18/2018 to rehab

## 2018-01-18 NOTE — DISCHARGE INSTRUCTIONS
Acute Care Surgery Discharge Instructions    Please follow-up as instructed  Activity:  - PT and OT evaluation and treatment as indicated  - No lifting greater than 20 pounds or strenuous physical activity or exercise for at least 4 weeks  - Walking and normal light activities are encouraged  - Normal daily activities including climbing steps are okay  - No driving until no longer using pain medications and/or until cleared by a physician  Diet:    - You may resume your normal diet  Wound Care:  - May shower daily  No tub baths or swimming until cleared by your surgeon   - Wash incision gently with soap and water and pat dry  - Do not apply any creams or ointments unless instructed to do so by your surgeon   - Continue wearing abdominal binder daily when out of bed   - Continue with routine ostomy care and teaching  Medications:    - You may resume all of your regular medications, including blood thinners and aspirin, after going home unless otherwise instructed  Please refer to your discharge medication list for further details  - Please take the pain medications as directed  - You are encouraged to use non-narcotic pain medications first and whenever possible  Reserve the use of narcotic pain medication for moderate to severe pain not controlled by non-narcotic medications   - No driving while taking narcotic pain medications  - You may become constipated, especially if taking pain medications  You may take any over the counter stool softeners or laxatives as needed  Examples: Milk of Magnesia, Colace, Senna  Additional Instructions:  - If you have any questions or concerns after discharge please call the office   - Call office or return to ER if fever greater than 101, chills, persistent nausea/vomiting, worsening/uncontrollable pain, and/or increasing redness or purulent/foul smelling drainage from incision(s)

## 2018-01-18 NOTE — DISCHARGE SUMMARY
Discharge- Julien Gar 1965, 47 y o  female MRN: [id number]    Unit/Bed#: [facility] 612-01 Encounter: [id number]    Primary Care Provider: Bridget Bass DO   Date and time admitted to hospital: 1/3/2018  7:21 PM        Diverticulitis of large intestine with perforation without abscess or bleeding   Assessment & Plan    - Status post multiple operative interventions with definitive exploratory laparotomy, small-bowel resection, closure of ileostomy with hand-sewn anastomosis, creation of end transverse colostomy, and sigmoid colon resection 01/14/2018   - Continue current diet  - Continue routine ostomy care  - Continue PT and OT evaluation and treatment as indicated  - Continue abdominal binder   - Continue current oral pain medication regimen   - Outpatient follow-up in the surgery clinic in approximately 2 weeks  - Anticipate discharge on 01/18/2018 to rehab  Paroxysmal atrial fibrillation (HCC)   Assessment & Plan    - Adequate rate control with current medication regimen  Ileus [facility])   Assessment & Plan    - Resolving ileus with no acute findings on CT scan of the abdomen and pelvis on 01/17/2018  - Advance diet as tolerated  - Continue bowel regimen  - Limit narcotics as able  Hypokalemia   Assessment & Plan    - Resolved with repletion  Acute blood loss anemia   Assessment & Plan    - H&H stable postoperatively  - Continue to monitor as needed  Patient care rounds were completed with the patient's nurse today, Concha Hoffmann  We discussed the plan is to discharge her to rehab today  We reviewed all of the invasive devices/lines/telemetry orders   - PICC line to be discontinued  Goals / Barriers for discharge:  - Discharge to rehab today  - Case management following  All questions and concerns were addressed        Discharge Summary - General Surgery   Julien Gar 47 y o  female MRN: [id number]  Unit/Bed#: [facility] 612-01 Encounter: [id number]    Admission Date: 1/3/2018     Discharge Date: 1/18/2018     Admitting Diagnosis: Diverticulitis [K57 92]  Colostomy in place [facility]) [Z93 3]    Discharge Diagnosis: See above  Attending and Service: Dr [facility]  Consulting Physician(s):     1  Dr Tomy Jade [facility]  2  Dr Marietta Banda [facility]rology  Imaging and Procedures Performed:     1   CT scan of the abdomen and pelvis on 12/26/2017 showed subacute upon chronic sigmoid diverticulitis with findings suspicious for new microperforation (Intrinsic sigmoid mural lesion not excludable); cholecystectomy  2   CT scan of the abdomen and pelvis on 12/29/2017 showed anterior pelvic abscess measuring 3 6 cm x 2 7 cm x 1 9 cm (<10 cc)associated with sigmoid perforation, the collection is not large enough to hold a pigtail drainage catheter; unchanged length of the thickened segment of sigmoid colon for one month; persistent small foci of pneumoperitoneum within the central-lower abdomen  3   Chest x-ray on 01/03/2018 showed endotracheal and enteric tubes in expected position; no pneumothorax  4   Insertion of bilateral ureteral stents on 01/03/2018 by Dr Ananth Live  5   Exploratory laparotomy, small-bowel resection with ileostomy creation, aborted Pj's procedure, and VAC dressing placement on 01/03/2018 by Dr Kitty Long  6   Abdominal x-ray on 01/04/2018 showed no acute findings; no retained radiopaque foreign bodies; bilateral ureteral stents  7   Second-look exploratory laparotomy, small-bowel resection with closure of ileostomy and hand-sewn anastomosis, sigmoid colon resection and creation of end transverse colostomy on 01/04/2018 by Dr Prince Clark  8   Abdominal destruction series on 01/16/2018 showed mild abnormal small bowel gas pattern representing ileus versus partial small bowel obstruction      9   CT scan of the abdomen and pelvis on 01/17/2018 showed postoperative changes; there is no evidence of bowel obstruction, however, there is a short segment of mildly thick-walled distal ileum in the pelvis which may represent recurrent or persistent infectious or inflammatory enteritis; mild fat stranding in the pelvis may be postoperative; trace mesenteric ascites; no evidence of intra-abdominal abscess  Pathology: Final surgical pathology will be reviewed with patient at the time of her follow-up appointment  Hospital Course: Ej Sanchez is a 50-year-old female presented initially to [address] [place], [place] [address] on 12/26/2017 with sigmoid diverticulitis and a micro perforation  She had a presentation earlier in the month with similar symptoms  During her most recent presentation, she had a surgical evaluation was treated with IV antibiotics and bowel rest   Her symptoms failed to improve and repeat imaging with CT scan showed no significant improvement  After further discussion with her, operative intervention was recommended, and she agreed to proceed  On 01/03/2018, she underwent exploratory laparotomy small bowel resection with ileostomy creation, and attempted/aborted Pj's procedure with subsequent VAC dressing placement  Preoperatively, bilateral ureteral stents were placed by the Urology service  The Pj's procedure cannot be completed due to significant inflammation  Postoperatively, she remained intubated and sedated with subsequent transfer to [facility] for further evaluation and care  Once at [facility], she was able to return to the operating room on 01/04/2018 for second-look laparotomy, abdominal washout, additional small bowel resection with closure of her ileostomy, completion of a Pj's procedure, and abdominal closure  There were no intraoperative complications, and the patient tolerated the procedure well  Postoperatively, she returned to the ICU for further resuscitation and care    She was able to be extubated during her postoperative course  She continued a course of antibiotics postoperatively  Once stable from a critical care standpoint, she was transferred to a [facility] floor  PT and OT evaluated her and recommended rehab when medically appropriate for discharge  Her diet was slowly advanced and she had continued wound care for her colostomy and abdominal incisions  A REILLY drain placed during her operative intervention on 01/04/2018 was discontinued prior to her discharge  She was treated conservatively for postoperative ileus, but maintained colostomy function throughout her entire postoperative course  Additional imaging via CT scan of the abdomen and pelvis was performed on 01/17/2018 to rule out any postoperative complications as a cause of her lingering ileus  By 01/18/2018, she was deemed stable for discharge to rehab  On discharge, she is instructed to follow-up with her primary care provider in the next one month to review the events of her recent hospitalization  She is instructed to follow-up with the [facility] as scheduled on 2/1/2018 at 1:30 PM   She is instructed to follow up with [facility] in approximately 1 my for further evaluation and to arrange an event monitor  She may resume a regular diet  She should avoid lifting greater than 20 pounds and any strenuous physical exercise or activity for the next 2 weeks at least  She should have PT and OT evaluation and treatment as indicated  She may shower daily, but should avoid tub baths or swimming for 2 weeks  She is instructed to call our office or return to the ER if develops a fever greater than 101, shaking chills, persistent nausea or vomiting, worsening or uncontrollable pain, and/or any increasing redness or purulent drainage from her incision  Condition at Discharge: good     Discharge instructions/Information to patient and family:   See after visit summary for information provided to patient and family        Provisions for Follow-Up Care:  See after visit summary for information related to follow-up care and any pertinent home health orders  Disposition: See After Visit Summary for discharge disposition information  Planned Readmission: No    Discharge Statement   I spent 30 minutes discharging the patient  This time was spent on the day of discharge  I had direct contact with the patient on the day of discharge  Additional documentation is required if more than 30 minutes were spent on discharge  Discharge Medications:  See after visit summary for reconciled discharge medications provided to patient and family  I performed a search on the [address] website on this patient for past prescriptions of controlled substances      Jake Ervin PA-C  1/18/2018  9:40 AM

## 2018-01-18 NOTE — WOUND OSTOMY CARE
Progress Note- Ostomy  Janet Camarena 47 y o  female  [id number]  DISCHARGE POOL-DISCHARGE POOL        Assessment:  Patient seen this morning to assess ostomy pouch status, one piece pouching system is still in place, intact with no leakage  Stoma is passing flatus and brown effluent, proximal aspect continues sloughing  Patient is able to verbalize how to change pouch as well as using different accessories and pouching system  Patient is agreeable with receiving sample pouches, she is being discharge to rehab, patient encouraged participating with her ostomy care  Plan: In patient's admission is cancelled we will continue following with educations and needs  Incision 01/03/18 Abdomen Other (Comment) (Active)   Incision Description Clean;Dry; Intact 1/16/2018  7:09 AM   Gale-wound Assessment Clean;Dry; Intact 1/16/2018  7:09 AM   Closure Staples 1/16/2018  7:09 AM   Drainage Amount Small 1/16/2018  7:09 AM   Drainage Description Serosanguineous 1/16/2018  7:09 AM   Treatments Site care 1/16/2018  7:09 AM   Dressing Wound V A C  1/4/2018  4:00 PM   Dressing Changed New dressing applied 1/13/2018 11:01 AM   Patient Tolerance Tolerated well 1/13/2018 11:01 AM   Dressing Status Dry;Clean; Intact 1/16/2018  7:09 AM   Number of days: 15       Incision 01/04/18 Abdomen Other (Comment) (Active)   Incision Description AMNA 1/18/2018 12:00 AM   Gale-wound Assessment Clean;Dry; Intact 1/16/2018  4:00 PM   Closure Staples 1/16/2018  7:09 AM   Drainage Amount Small 1/16/2018  4:00 PM   Drainage Description Serosanguineous 1/16/2018  4:00 PM   Treatments Site care 1/16/2018  4:00 PM   Dressing ABD;Gauze 1/18/2018 12:00 AM   Wound packed? No 1/11/2018 10:00 PM   Dressing Changed New dressing applied 1/16/2018  7:09 AM   Patient Tolerance Tolerated well 1/16/2018  7:09 AM   Dressing Status Clean;Dry; Intact 1/18/2018 12:00 AM   Number of days: 14     Colostomy LLQ (Active)   Stomal Appliance 2 piece 1/17/2018  2:50 PM Stoma Assessment [place] 1/17/2018  2:50 PM   Stoma size (in) 1 75 in 1/12/2018  1:21 PM   Stoma Shape Round 1/17/2018  2:50 PM   Peristomal Assessment Intact 1/16/2018  4:00 PM   Treatment Placement checked 1/16/2018  4:00 PM   Output (mL) 125 mL 1/18/2018 11:47 AM   Number of days: 14         Patient provided with additional pouches to take with her, samples to be delivered home  Please call with questions, concerns      Seth Davis RN, BSN, [facility]

## 2018-01-19 NOTE — DISCHARGE SUMMARY
Discharge Summary - [address] [facility]    Patient Information: Barbara Howard 47 y o  female MRN: [id number]  Unit/Bed#: OR POOL Encounter: [id number]    Discharging Physician / Practitioner: Dane Hinds DO  PCP: Katie Montes DO  Admission Date: 12/26/2017  Discharge Date: 1/03/2018    Reason for Admission:     The patient is a 47year-old female who presented to the ED with the complaints of severe left lower quadrant abdominal pain  She was recently admitted from 12/4/17 through 12/7/17 for diverticulitis  At the time of discharge, her abdominal pain had resolved  Over the last 1-2 weeks, the patient has been experiencing intermittent left lower quadrant abdominal pain  She also complains of a decreased appetite and nausea  She admits to decreased PO fluid and food intake over the last few days secondary to nausea  Over the last few days, her abdominal pain has worsened  Nothing seemed to relieve her pain  Review of Systems:  Per HPI, all other systems have been reviewed and were negative  Discharge Diagnoses:     Principal Problem:    Diverticulitis of large intestine with perforation without abscess or bleeding  Active Problems:    Gastroesophageal reflux disease without esophagitis    Hyperlipidemia    Essential hypertension    Hypokalemia    Hypoalbuminemia    Low serum prealbumin    Severe protein-calorie malnutrition (HCC)    Microscopic hematuria    Hypomagnesemia    Acute blood loss anemia    Diverticulitis of large intestine with perforation and bleeding without abscess  Resolved Problems:    * No resolved hospital problems  *    Hospital Course:     Barbara Howard is a 47 y o  female patient who originally presented to the hospital on 12/26/2017 due to HPI as noted  Pt was transferred by Dr Lorenzo Fuller to [facility] for further surgical intervention      See op notes and accepting notes    Plan as of 1/3/18 prior to discharge  1) Sigmoid diverticulitis with microperforation/Anterior pelvic abscess: Plan for Pj's procedure today  Will also have bilateral ureteral stents placed to assist with ureter identification during surgery  Continue IV Zosyn and IV flagyl  Lovenox discontinued for planned surgery  2) Anemia: stable in the 10 range today    Likely due to chronic disease with low iron, low TIBC and high ferritin  Notes previous history of iron deficiency and iron infusions  Will add PO iron supplementation  Folate is low at 2 0, will start folic acid  Continue to monitor  May require blood transfusion perioperatively and patient is aware and agreeable  3) Hypokalemia: mild  Will continue IV normal saline with 20mEq of KCL  4) Hypoalbuminemia/Low serum prealbumin/Severe protein-calorie malnutrition: appreciate dietician consult  Will likely benefit from ensure supplements once tolerating a regular diet  5) Essential hypertension: slightly accelerated today, will monitor  6) Hyperlipidemia: will plan to restart statin once tolerating regular diet  7) Hypomagnesemia: remains low normal at 1 6  Plan to start PO magnesium supplementation following surgery  8) Microscopic hematuria: plan for outpatient follow-up with her PCP        Condition at Discharge: stable   Exam:   Physical Exam  See progress note dated same day      Disposition:     [address] Transfer to [facility]    Accepting Physician Dr Lopez Cancer

## 2018-01-22 VITALS
DIASTOLIC BLOOD PRESSURE: 68 MMHG | HEIGHT: 61 IN | WEIGHT: 212.38 LBS | TEMPERATURE: 100.7 F | BODY MASS INDEX: 40.1 KG/M2 | SYSTOLIC BLOOD PRESSURE: 102 MMHG

## 2018-01-23 VITALS
WEIGHT: 212 LBS | HEART RATE: 101 BPM | BODY MASS INDEX: 40.02 KG/M2 | HEIGHT: 61 IN | DIASTOLIC BLOOD PRESSURE: 55 MMHG | SYSTOLIC BLOOD PRESSURE: 90 MMHG | TEMPERATURE: 98.9 F

## 2018-01-23 NOTE — RESULT NOTES
Verified Results  (1) TISSUE EXAM [id number] 04:19PM Andres Mix     Test Name Result Flag Reference   LAB AP CASE REPORT (Report)     Surgical Pathology Report             Case: [id number]                   Authorizing Provider: Franny Johns DO     Collected:      01/03/2018 1619        Ordering Location:   [facility] Received:      01/03/2018 180                    Operating Room                                 Pathologist:      Jose Francisco MD                             Specimen:  Ileum, ileostomy stoma, terminal ileum   LAB AP FINAL DIAGNOSIS (Report)     A  Specimen designated terminal ileum:    - Segments of small intestine with transmural acute and chronic   inflammation with vascular congestion,      hemorrhage, area of perforation with stricture formation, severe   acute serositis with abscess formation      and reactive fibrosis  - Fibrovascular adhesions     - Serositis also involves margins     - Negative for malignancy  Electronically signed by Jose Francisco MD on 1/5/2018 at 4:43 PM   LAB AP SURGICAL ADDITIONAL INFORMATION (Report)     All controls performed with the immunohistochemical stains reported above   reacted appropriately  These tests were developed and their performance   characteristics determined by [facility] or   [address] [place] [address]  They may not be cleared or approved by the [place]  Food and Drug Administration  The FDA has determined that such clearance   or approval is not necessary  These tests are used for clinical purposes  They should not be regarded as investigational or for research  This   laboratory has been approved by [place] 88, designated as a high-complexity   laboratory and is qualified to perform these tests  LAB AP GROSS DESCRIPTION (Report)     A  The specimen is received in formalin, labeled with the patient's name   and hospital number, and is designated terminal ileum    The specimen   consists of an unorientated bowel segment which measures 6 3 cm in length   by 2 1 cm in average diameter  Both opposite resected ends are stapled   closed  The exposed serosa is smooth dull red tan  The mucosa is pale   green tan, wrinkled and grossly unremarkable  The bowel wall measures up   to 0 6 cm in thickness and no gross perforations/diverticula are noted  An additional unorientated bowel segment is submitted in the same   container measuring 16 3 cm in length and from 1 1 up to 3 1 cm diameter  Opposite resected ends are received stapled closed  The exposed serosa   appears dull tan-brown and the approximate central aspect of the bowel   segment is focally shaggy discolored dark brown and hemorrhagic  The   bowel segment is appears somewhat strictured this shaggy discolored dark   brown region, strictured site located 5 7 cm of the nearest resection   margin  Focally embedded tan suture material is identified at the   strictured region  The strictured bowel length measures approximately 1 8   cm in length by 1 0 cm in diameter  A perforation/defect is identified at   this strictured region measuring up to 0 9 cm, which is inked orange,   located 7 1 cm from one resection margin and 8 0 cm on the opposite   resection margin  The mucosa is wrinkled, red-tan and the bowel wall   measures up to 0 8 cm in thickness  An additional focally hemorrhagic   pale yellow-brown fibrofatty tissue fragment measures 5 3 x 3 7 x 2 1 cm   in greatest dimension  Representative sections are submitted as follows:  Small bowel segment;  1: Shaved section 1 resection margin  2: shave section, upset resection margin  3:  random sections  Larger bowel segment;  4 : Shave section, 1 resection margin  5: Shaved section, opposite resection margin  6: defect/perforation site   7-8: Additional sections  9: Random section  10: Random section, loose fibrofatty tissue fragment  11: 1 possible lymph node      Timi     (1) ANAEROBIC CULTURE [id number] 03:06PM Rachid Brewster     Test Name Result Flag Reference   CLINICAL REPORT (Report)     Test:        Anaerobic culture and Gram stain  Specimen Source:  Wound  Specimen Type:    Wound  Specimen Date:   1/3/2018 3:06 PM  Result Date:    1/5/2018 1:26 PM  Result Status:   Final result  Resulting Lab:   [facility] [address] [address]            Tel: [phone]      CULTURE                                       ------------------                                   No anaerobes isolated

## 2018-01-23 NOTE — PROCEDURES
Procedures by Kellie Daniel RN at 5/8/8123  10:54 AM      Author:  Kellie Daniel RN Service:  (none) Author Type:  Registered Nurse    Filed:  1/8/2018 10:56 AM Date of Service:  1/8/2018 10:54 AM Status:  Signed    :  Kellie Daniel RN (Registered Nurse)        Procedure Orders:       1  Insert PICC line [id number] ordered by Mohamud Clancy at 01/07/18 0736                  Insert PICC  line  Date/Time: 1/8/2018 10:54 AM  Performed by: Shashank Alfaro  Authorized by: Chel Cuenca     Patient location:  Bedside  Other Assisting Provider:  Yes (comment) (yusuf Fuentes)    Consent:     Consent obtained:  Written (MD obtained)    Consent given by:  Patient (consent in chart)  Universal protocol:     Patient identity confirmed:  Verbally with patient and arm band  Pre-procedure details:     Hand hygiene: Hand hygiene performed prior to insertion      Sterile barrier technique: All elements of maximal sterile technique followed      Skin preparation:  ChloraPrep    Skin preparation agent: Skin preparation agent completely dried prior to procedure    Indications:     PICC line indications: no peripheral vascular access    Anesthesia (see MAR for exact dosages):      Anesthesia method:  Local infiltration (3ml )    Local anesthetic:  Lidocaine 1% w/o epi  Procedure details:     Location:  Brachial    Vessel type: vein      Laterality:  Right    Patient position:  Flat    Procedural supplies:  Double lumen    Catheter size:  5 Fr    Landmarks identified: yes      Ultrasound guidance: yes      Sterile ultrasound techniques: Sterile gel and sterile probe covers were used      Number of attempts:  1    Successful placement: yes      Vessel of catheter tip end:  Sherlock 3CG confirmed    Total catheter length (cm):  37    Catheter out on skin (cm):  0    Max flow rate:  999/ml    Arm circumference:  34  Post-procedure details:     Post-procedure:  Dressing applied and securement device placed    Assessment: Blood return through all ports and free fluid flow    Post-procedure complications: none      Patient tolerance of procedure:   Tolerated well, no immediate complications  Comments:      RN notified ok to use picc                      Received for:Provider  EPIC   Jan 8 2018 10:56AM [facility] Standard [facility]

## 2018-01-23 NOTE — MISCELLANEOUS
Message  Patient was just d/c from hospital for diverticulitis  She is c/o not eating right, weak, shaky and vomiting bile  She also had blood after moving her bowels  She was advised to go to the ED  She understood and agreed  Active Problems    1  Acute pain (338 19) (R52)   2  Anemia (285 9) (D64 9)   3  Chronic constipation (564 00) (K59 09)   4  Diverticulitis of colon (562 11) (K57 32)   5  Diverticulosis (562 10) (K57 90)   6  Erysipelas (035) (A46)   7  Esophageal reflux (530 81) (K21 9)   8  Fatigue (780 79) (R53 83)   9  History of stomach ulcers (V12 79) (Z87 19)   10  Hyperlipidemia (272 4) (E78 5)   11  Hypertension (401 9) (I10)   12  Leg pain, left (729 5) (M79 605)   13  Non-rheumatic mitral regurgitation (424 0) (I34 0)   14  Primary osteoarthritis of left knee (715 16) (M17 12)   15  Systolic murmur (443 6) (T83 4)   16  Thyroid disorder (246 9) (E07 9)   17  Vaginal dryness, menopausal (627 2) (N95 1)   18  Vitamin D deficiency (268 9) (E55 9)   19  Vulvitis (616 10) (N76 2)    Current Meds   1  Amoxicillin 500 MG Oral Tablet; Therapy: (Recorded:52Iju7658) to Recorded   2  Amoxicillin-Pot Clavulanate 875-125 MG Oral Tablet; TAKE 1 TABLET EVERY 12   HOURS WITH MEALS UNTIL GONE;   Therapy: 01XWP3478 to (Evaluate:48Uar4823)  Requested for: 20SVX4749; Last   Rx:[id number] Ordered   3  Aspirin 81 MG CAPS; Therapy: (Recorded:94Uvm5263) to Recorded   4  Atorvastatin Calcium 10 MG Oral Tablet; take 1 tablet by mouth once daily; Therapy: 73WZX5963 to (Raphael Gonzalez)  Requested for: 61Fhm5784; Last   Rx:01Osi8239 Ordered   5  Lisinopril 10 MG Oral Tablet; TAKE 1 TABLET DAILY AS DIRECTED; Therapy: 21Jun2016 to (Evaluate:17Jan2018)  Requested for: 87Diq2426; Last   Rx:14Vty5702 Ordered   6  Ondansetron HCl - 4 MG Oral Tablet (Zofran); Take 1 Tab every 8 hours as needed for   nausea MDD:3 TDD:3;   Therapy: 12WWX2495 to (Last Rx:86Xkn9530)  Requested for: 28AIU5522 Ordered   7   Prevacid CPDR (Lansoprazole); TAKE 1 CAPSULE Daily Recorded   8  RA Vitamin D-3 2000 UNIT Oral Capsule; One daily; Therapy: 56Zdf4692 to (Last Rx:37Xkl6971) Ordered   9  Zofran 8 MG Oral Tablet (Ondansetron HCl); Therapy: (Recorded:16Fxe4078) to Recorded    Allergies    1   No Known Drug Allergies    Signatures   Electronically signed by : Lyle Corbett OM; Dec 27 2017  3:38PM EST                       (Author)

## 2018-01-23 NOTE — MISCELLANEOUS
Assessment    1  Former smoker (V15 82) (T02 741)   2  Diverticulitis of colon (562 11) (K57 32)    History of Present Illness  TCM Communication [facility]: The patient is being contacted for follow-up after hospitalization and PAT FROM [facility] CALLED TO SCHEDULE BLU APPT 12-11-17  She was hospitalized at [facility]  The date of admission: 12-4-17, date of discharge: 12-7-17  Diagnosis: DIVERTICULITIS  She was discharged to home  Medications were not reviewed today  [facility]  The patient is currently asymptomatic  Communication performed and completed by Princess Haque   HPI: PT PRESENTED TO ER WITH ABD PAIN AND CRAMPING WITH NAUSEA AND VOMITING DUE TO ANTIBIOTIC USE  PT PAIN WAS NOTED AT 5/10  PT HAD DIVERTICULITIS WITH SMALL PERICOLONIC ABSCESS WITH NON DRAINABLE COLLECTIONS  PT WAS SEEN BY DR JUAREZ [facility] WHILE ADMITTED AND DID RECEIVE IV FLUIDS AND ZOSYN  PT PAIN DID IMPROVE AND WAS DISCHARGED WITH PO AUGMENTIN  PT WILL FOLLOW UP WITH  [facility]  Review of Systems  Complete-Female:   Constitutional: fever and chills  Eyes: No complaints of eye pain, no red eyes, no eyesight problems, no discharge, no dry eyes, no itching of eyes  ENT: no complaints of earache, no loss of hearing, no nose bleeds, no nasal discharge, no sore throat, no hoarseness  Cardiovascular: No complaints of slow heart rate, no fast heart rate, no chest pain, no palpitations, no leg claudication, no lower extremity edema  Respiratory: No complaints of shortness of breath, no wheezing, no cough, no SOB on exertion, no orthopnea, no PND  Gastrointestinal: nausea  Genitourinary: No complaints of dysuria, no incontinence, no pelvic pain, no dysmenorrhea, no vaginal discharge or bleeding  Musculoskeletal: No complaints of arthralgias, no myalgias, no joint swelling or stiffness, no limb pain or swelling     Integumentary: No complaints of skin rash or lesions, no itching, no skin wounds, no breast pain or lump  Neurological: No complaints of headache, no confusion, no convulsions, no numbness, no dizziness or fainting, no tingling, no limb weakness, no difficulty walking  ROS Reviewed:   ROS reviewed  Active Problems    1  Ache in joint (719 40) (M25 50)   2  Acute pain (338 19) (R52)   3  Anemia (285 9) (D64 9)   4  Chronic constipation (564 00) (K59 09)   5  Depression screening (V79 0) (Z13 89)   6  Diverticulosis (562 10) (K57 90)   7  Encounter for routine gynecological examination (V72 31) (Z01 419)   8  Encounter for screening colonoscopy (V76 51) (Z12 11)   9  Encounter for screening mammogram for malignant neoplasm of breast (V76 12)   (Z12 31)   10  Erysipelas (035) (A46)   11  Esophageal reflux (530 81) (K21 9)   12  Fatigue (780 79) (R53 83)   13  History of stomach ulcers (V12 79) (Z87 19)   14  Hyperlipidemia (272 4) (E78 5)   15  Hypertension (401 9) (I10)   16  Leg pain, left (729 5) (M79 605)   17  Non-rheumatic mitral regurgitation (424 0) (I34 0)   18  Primary osteoarthritis of left knee (715 16) (M17 12)   19  Systolic murmur (870 1) (G45 2)   20  Thyroid disorder (246 9) (E07 9)   21  Vaginal dryness, menopausal (627 2) (N95 1)   22  Vitamin D deficiency (268 9) (E55 9)   23  Vulvitis (616 10) (N76 2)    Past Medical History    1  History of Acute pansinusitis (461 8) (J01 40)   2  History of Acute tracheobronchitis (466 0) (J20 9)   3  Common cold (460) (J00)   4  History of Dermatitis (692 9) (L30 9)   5  History of Endometrial polyp (621 0) (N84 0)   6  History of Hand Injury (959 4)   7  History of High risk medication use (V58 69) (Z79 899)   8  History Of 5  Previous Pregnancies (V61 5)   9  History of dysfunctional uterine bleeding (V13 29) (Z87 42)   10  History of Renal colic (757 4) (G10)   11  History of Sinusitis (473 9) (J32 9)   12  Sore throat (462) (J02 9)   13  History of Viral gastroenteritis (008 8) (A08 4)   14   History of Viral syndrome (079 99) (B34 9)    Surgical History    1  History of Cholecystectomy   2  History of Gynecologic Services Thermal Endometrial Ablation   3  History of Hysteroscopy   4  History of Near-Total Thyroidectomy   5  History of Tonsillectomy With Adenoidectomy   6  History of Tubal Ligation  Surgical History Reviewed: The surgical history was reviewed and updated today  Family History  Mother    1  Family history of Type 2 diabetes mellitus (250 00) (E11 9)  Father    2  Family history of congestive heart failure (V17 49) (Z82 49)  Family History    3  Denied: Family history of Breast Cancer   4  Denied: Family history of Colon Cancer   5  Denied: Family history of Osteoporosis   6  Denied: Family history of Ovarian Cancer   7  Denied: Family history of Uterine Cancer  Family History Reviewed: The family history was reviewed and updated today  Social History    · Being A Social Drinker   · Denied: Drug use (305 90) (F19 90)   · Former smoker (A88 18) (W06 036)  Social History Reviewed: The social history was reviewed and updated today  The social history was reviewed and is unchanged  Current Meds   1  Amoxicillin 500 MG Oral Tablet; Therapy: (Recorded:25Eto4611) to Recorded   2  Aspirin 81 MG CAPS; Therapy: (Recorded:57Jlb1986) to Recorded   3  Atorvastatin Calcium 10 MG Oral Tablet; take 1 tablet by mouth once daily; Therapy: 40LLV5932 to (Landon Ng)  Requested for: 10Xge3563; Last   Rx:01Aug2017 Ordered   4  Linzess 72 MCG Oral Capsule; take 1 capsule daily; Therapy: 02ZZN7569 to (Last Rx:27Nov2017) Ordered   5  Lisinopril 10 MG Oral Tablet; TAKE 1 TABLET DAILY AS DIRECTED; Therapy: 21Jun2016 to (Evaluate:17Jan2018)  Requested for: 73Xfo5008; Last   Rx:21Jul2017 Ordered   6  Prevacid CPDR; TAKE 1 CAPSULE Daily Recorded   7  RA Vitamin D-3 2000 UNIT Oral Capsule; One daily; Therapy: 23Lii4812 to (Last Rx:21Jul2016) Ordered   8  Zofran 8 MG Oral Tablet;    Therapy: (Recorded:27Zbz4609) to Recorded  Medication List Reviewed: The medication list was reviewed and updated today  Allergies    1  No Known Drug Allergies    Vitals  Signs   Recorded: 26VNK9423 01:30PM   Temperature: 100 7 F, Tympanic  Systolic: 754, LLE, Sitting  Diastolic: 68, LLE, Sitting  Height: 5 ft 1 in  Weight: 212 lb 6 oz  BMI Calculated: 40 13  BSA Calculated: 1 94    Physical Exam    Constitutional   General appearance: Abnormal   anxious  Ears, Nose, Mouth, and Throat   External inspection of ears and nose: Normal     Otoscopic examination: Tympanic membranes translucent with normal light reflex  Canals patent without erythema  Pulmonary   Respiratory effort: No increased work of breathing or signs of respiratory distress  Auscultation of lungs: Clear to auscultation  Cardiovascular   Palpation of heart: Normal PMI, no thrills  Auscultation of heart: Normal rate and rhythm, normal S1 and S2, without murmurs  Abdomen   Abdomen: Non-tender, no masses  Liver and spleen: No hepatomegaly or splenomegaly  Health Management  Encounter for screening colonoscopy   COLONOSCOPY (GI, SURG); every 3 years; Last 28BXN5904; Next Due: 16MTB9419; Active    Message   Recorded as Task   Date: 12/07/2017 12:24 PM, Created By: System   Task Name: Dev Rodriguez   Assigned To: Patrick Gaitan   Regarding Patient: Fred Schilder, Status:  In Progress   Comment:    System - 07 Dec 2017 12:24 PM     Patient discharged from hospital   Patient Name: Patrick Barfield  Patient YOB: 1965  Discharge Date: 12/7/2017  Facility: [facility] - 07 Dec 2017 1:52 PM     TASK IN PROGRESS     Future Appointments    Date/Time Provider Specialty Site   12/19/2017 10:15 AM Karin Piña DO Trauma [address]   12/11/2017 01:15 PM Patrick Gaitan DO [address]   Electronically signed by : Refugio Murray, ; Dec  7 2017  2:01PM EST (Author)    Electronically signed by : Florentino Malave DO; Dec 11 2017  1:48PM EST                       (Author)

## 2018-02-01 ENCOUNTER — OFFICE VISIT (OUTPATIENT)
Dept: SURGERY | Facility: HOSPITAL | Age: 53
End: 2018-02-01

## 2018-02-01 VITALS
HEIGHT: 62 IN | WEIGHT: 183 LBS | DIASTOLIC BLOOD PRESSURE: 93 MMHG | SYSTOLIC BLOOD PRESSURE: 123 MMHG | HEART RATE: 110 BPM | BODY MASS INDEX: 33.68 KG/M2 | TEMPERATURE: 97.9 F

## 2018-02-01 DIAGNOSIS — Z09 POSTOP CHECK: Primary | ICD-10-CM

## 2018-02-01 DIAGNOSIS — F41.9 ANXIETY: ICD-10-CM

## 2018-02-01 DIAGNOSIS — K57.20 DIVERTICULITIS OF LARGE INTESTINE WITH PERFORATION WITHOUT ABSCESS OR BLEEDING: ICD-10-CM

## 2018-02-01 PROBLEM — K59.09 CHRONIC CONSTIPATION: Status: ACTIVE | Noted: 2017-11-27

## 2018-02-01 PROBLEM — M25.50 ACHE IN JOINT: Status: ACTIVE | Noted: 2017-09-15

## 2018-02-01 PROBLEM — K57.32 DIVERTICULITIS OF COLON: Status: ACTIVE | Noted: 2017-12-26

## 2018-02-01 PROBLEM — R52 ACUTE PAIN: Status: ACTIVE | Noted: 2017-10-12

## 2018-02-01 PROBLEM — M17.12 PRIMARY OSTEOARTHRITIS OF LEFT KNEE: Status: ACTIVE | Noted: 2017-09-15

## 2018-02-01 PROCEDURE — 99024 POSTOP FOLLOW-UP VISIT: CPT | Performed by: SURGERY

## 2018-02-01 RX ORDER — LORAZEPAM 0.5 MG/1
0.5 TABLET ORAL
Qty: 30 TABLET | Refills: 0 | Status: SHIPPED | OUTPATIENT
Start: 2018-02-01 | End: 2018-04-24 | Stop reason: SDUPTHER

## 2018-02-01 NOTE — ASSESSMENT & PLAN NOTE
58-year-old female with history of acute complicated diverticulitis, status post Pj's procedure, presents today for follow-up  Overall she is doing okay  Tolerating diet somewhat although she feels that her anxiety depression at the best of her and she does occasionally vomit after eating  Her ostomy is working  Although she is having some leakage issues pain is well controlled  - will prescribe Ativan in the short term to help deal with her anxiety and helps her sleep  - continue diet as tolerated, encouraging high-protein snacks and shakes  - staples removed and Steri-Strips placed  - she will follow with me on Monday along with the ostomy nurse here in the office for evaluation of colostomy into hopefully could soft the peristomal leak

## 2018-02-01 NOTE — PROGRESS NOTES
Assessment/Plan:    Diverticulitis of large intestine with perforation without abscess or bleeding  65-year-old female with history of acute complicated diverticulitis, status post Pj's procedure, presents today for follow-up  Overall she is doing okay  Tolerating diet somewhat although she feels that her anxiety depression at the best of her and she does occasionally vomit after eating  Her ostomy is working  Although she is having some leakage issues pain is well controlled  - will prescribe Ativan in the short term to help deal with her anxiety and helps her sleep  - continue diet as tolerated, encouraging high-protein snacks and shakes  - staples removed and Steri-Strips placed  - she will follow with me on Monday along with the ostomy nurse here in the office for evaluation of colostomy into hopefully could soft the peristomal leak  Diagnoses and all orders for this visit:    Postop check    Diverticulitis of large intestine with perforation without abscess or bleeding          Subjective:      Patient ID: Lalita Barron is a 47 y o  female  65-year-old female with history of acute complicated diverticulitis, status post Pj's procedure, presents today for follow-up  Overall doing well  She is now home from nursing home and doing well  She is tolerating diet although she does have episodes of increased anxiety and depression which assess seem to be associated nausea vomiting  Her ostomy is working  Abdominal pain is well controlled  Her staples are still in  Denies any fevers or chills  She does complain of some leakage around the ostomy  The following portions of the patient's history were reviewed and updated as appropriate:   She  has a past medical history of Disease of thyroid gland; Gastroesophageal reflux disease without esophagitis (12/4/2017); Hyperlipidemia; Hypertension; and Vitamin D deficiency disease    She  does not have any pertinent problems on file   She  has a past surgical history that includes Cholecystectomy; Thyroidectomy, partial; Tonsillectomy and adenoidectomy; Tubal ligation; pr esophagogastroduodenoscopy transoral diagnostic (N/A, 10/21/2016); pr part removal colon w anastomosis (N/A, 1/3/2018); Ureteral stent placement (Bilateral, 1/3/2018); and LAPAROTOMY (N/A, 1/4/2018)  Her family history is not on file  She  reports that she has quit smoking  She has never used smokeless tobacco  She reports that she drinks alcohol  She reports that she does not use drugs  Current Outpatient Prescriptions   Medication Sig Dispense Refill    acetaminophen (TYLENOL) 325 mg tablet Take 2 tablets by mouth every 4 (four) hours as needed for mild pain for up to 30 days Do not exceed 4 g daily  30 tablet 0    aspirin 81 MG tablet Take 81 mg by mouth daily      atorvastatin (LIPITOR) 10 mg tablet Take 10 mg by mouth daily      calcium carbonate (TUMS) 500 mg chewable tablet Chew 1 tablet 3 (three) times a day as needed for indigestion or heartburn  0    Cholecalciferol (VITAMIN D) 2000 UNITS CAPS Take 1 capsule by mouth daily        famotidine (PEPCID) 20 mg tablet Take 1 tablet by mouth 2 (two) times a day 30 tablet 0    lansoprazole (PREVACID) 30 mg capsule Take 30 mg by mouth daily      lisinopril (ZESTRIL) 10 mg tablet Take 10 mg by mouth daily      metoprolol tartrate (LOPRESSOR) 25 mg tablet Take 0 5 tablets by mouth every 12 (twelve) hours  0    ondansetron (ZOFRAN-ODT) 4 mg disintegrating tablet Take 1 tablet by mouth every 6 (six) hours as needed for nausea or vomiting 6 tablet 0    polyethylene glycol (MIRALAX) 17 g packet Take 17 g by mouth daily as needed (Constipation) 14 each 0     No current facility-administered medications for this visit  She has No Known Allergies       Review of Systems   Constitutional: Negative  Gastrointestinal: Positive for abdominal pain, nausea and vomiting     Skin: Positive for wound (Small wound dehiscence of the incision at the level of the umbilicus )  Objective:     Physical Exam   Constitutional: She appears well-developed  Abdominal: Soft  She exhibits no mass  There is tenderness  There is no rebound and no guarding  Midline incision healing well, there is a small amount of dehiscence approximately 1 5 centimeters at the skin level at the level of the umbilicus  Remainder of wound is intact  The drainage sites appeared to have broken down somewhat and there is some mild redness material which was removed  There is a colostomy in the left upper quadrant and this appears to be healthy viable and producing    Staples of the midline incision were removed  Patient tolerated this well  Steri-Strips were then placed

## 2018-02-01 NOTE — LETTER
February 1, 2018     Patient: Benedicto Smith   YOB: 1965   Date of Visit: 2/1/2018       To Whom it May Concern:    Benedicto Smith is under my professional care  She was seen in my office on 2/1/2018  Her  will need to attend upcoming appointment on 02/05/2018 at 330PM, to learn care for patient  If you have any questions or concerns, please don't hesitate to call           Sincerely,          Alonso Narayanan DO        CC: No Recipients

## 2018-02-05 ENCOUNTER — OFFICE VISIT (OUTPATIENT)
Dept: SURGERY | Facility: HOSPITAL | Age: 53
End: 2018-02-05

## 2018-02-05 ENCOUNTER — DOCUMENTATION (OUTPATIENT)
Dept: WOUND CARE | Facility: HOSPITAL | Age: 53
End: 2018-02-05

## 2018-02-05 VITALS — DIASTOLIC BLOOD PRESSURE: 80 MMHG | TEMPERATURE: 97.9 F | SYSTOLIC BLOOD PRESSURE: 138 MMHG | HEART RATE: 122 BPM

## 2018-02-05 DIAGNOSIS — Z43.3 COLOSTOMY CARE (HCC): Primary | ICD-10-CM

## 2018-02-05 PROCEDURE — 99024 POSTOP FOLLOW-UP VISIT: CPT | Performed by: SURGERY

## 2018-02-05 NOTE — LETTER
February 7, 2018     Author DO Chidi Roberts    Patient: Luz Elena Pederson   YOB: 1965   Date of Visit: 2/5/2018       Dear Dr Joleen Recinos: Thank you for referring Luz Elena Pederson to me for evaluation  Below are my notes for this consultation  If you have questions, please do not hesitate to call me  I look forward to following your patient along with you  Sincerely,        Estrellita Crawford DO        CC: No Recipients  Estrellita Crawford DO  2/7/2018  3:40 PM  Sign at close encounter  Assessment/Plan:    Colostomy care [facility])  71-year-old female with a history of complicated acute diverticulitis requiring Pj's procedure and subsequent creation of colostomy, now presents to the office for discussion of colostomy care and maintenance  Patient still is having issues with keeping the area clean, keeping the skin intact and without irritation and also with occasional leaking of the colostomy bag      - the colostomy bag was removed and peristomal care was provided  The colostomy bag was subsequently changed  A long discussion was had with the wound ostomy nurse in conjunction with the wife and the wife's  regarding the overall peristomal care, in the care of the ostomy itself  This would include changing the ostomy and trouble shooting problems such as leakage for with the ostomy bag  The patient seems somewhat reluctant at 1st but has agreed to take small baby steps  At this time she will go ahead and start with just getting supplies ready  Her  is ready and willing to change the bag clean the area and trouble should any problems  The patient also has a good support system and that her sister went through a similar situation  Her brother-in-law also very helpful and dealing with the ostomy  She will follow up with me at the [facility] within the next few weeks         Diagnoses and all orders for this visit:    Colostomy care [facility])          Subjective:      Patient ID: Luke Duncan is a 47 y o  female  76-year-old female with a history of complicated acute diverticulitis requiring Pj's procedure and subsequent creation of colostomy, now presents to the office for discussion of colostomy care and maintenance  Patient still is having issues with keeping the area clean, keeping the skin intact and without irritation and also with occasional leaking of the colostomy bag  Patient still states that she is somewhat discussed id by the smell and just the fact that she has a colostomy  Otherwise should her appetite is improving and she is eating  She does have periods of vomiting which she thinks may be related to both the colostomy bag itself and her anxiety  Her incision remains clean and intact  Denies any fevers or chills  The following portions of the patient's history were reviewed and updated as appropriate:   She  has a past medical history of Disease of thyroid gland; Gastroesophageal reflux disease without esophagitis (12/4/2017); Hyperlipidemia; Hypertension; and Vitamin D deficiency disease  She  does not have any pertinent problems on file  She  has a past surgical history that includes Cholecystectomy; Thyroidectomy, partial; Tonsillectomy and adenoidectomy; Tubal ligation; pr esophagogastroduodenoscopy transoral diagnostic (N/A, 10/21/2016); pr part removal colon w anastomosis (N/A, 1/3/2018); Ureteral stent placement (Bilateral, 1/3/2018); and LAPAROTOMY (N/A, 1/4/2018)  Her family history is not on file  She  reports that she has quit smoking  She has never used smokeless tobacco  She reports that she drinks alcohol  She reports that she does not use drugs  Current Outpatient Prescriptions   Medication Sig Dispense Refill    acetaminophen (TYLENOL) 325 mg tablet Take 2 tablets by mouth every 4 (four) hours as needed for mild pain for up to 30 days Do not exceed 4 g daily   30 tablet 0    aspirin 81 MG tablet Take 81 mg by mouth daily      atorvastatin (LIPITOR) 10 mg tablet Take 10 mg by mouth daily      calcium carbonate (TUMS) 500 mg chewable tablet Chew 1 tablet 3 (three) times a day as needed for indigestion or heartburn  0    Cholecalciferol (VITAMIN D) 2000 UNITS CAPS Take 1 capsule by mouth daily        famotidine (PEPCID) 20 mg tablet Take 1 tablet by mouth 2 (two) times a day 30 tablet 0    lansoprazole (PREVACID) 30 mg capsule Take 30 mg by mouth daily      lisinopril (ZESTRIL) 10 mg tablet Take 10 mg by mouth daily      LORazepam (ATIVAN) 0 5 mg tablet Take 1 tablet (0 5 mg total) by mouth daily at bedtime 30 tablet 0    metoprolol tartrate (LOPRESSOR) 25 mg tablet Take 0 5 tablets by mouth every 12 (twelve) hours  0    ondansetron (ZOFRAN-ODT) 4 mg disintegrating tablet Take 1 tablet by mouth every 6 (six) hours as needed for nausea or vomiting 6 tablet 0    polyethylene glycol (MIRALAX) 17 g packet Take 17 g by mouth daily as needed (Constipation) 14 each 0     No current facility-administered medications for this visit  She has No Known Allergies       Review of Systems   Constitutional: Negative  Respiratory: Negative for shortness of breath  Cardiovascular: Negative for chest pain  Gastrointestinal: Positive for nausea and vomiting  Negative for abdominal pain and blood in stool  Skin:        Noted redness and irritation surrounding the ostomy  Psychiatric/Behavioral: The patient is nervous/anxious  Depression  Objective:     Physical Exam   Constitutional: She is oriented to person, place, and time  She appears well-developed and well-nourished  HENT:   Head: Normocephalic and atraumatic  Eyes: No scleral icterus  Neck: Normal range of motion  No tracheal deviation present  Pulmonary/Chest: Effort normal  No respiratory distress  Abdominal: Soft  She exhibits mass  She exhibits no distension  There is tenderness (Around the ostomy  )   There is no rebound  Musculoskeletal: Normal range of motion  She exhibits no edema or deformity  Neurological: She is alert and oriented to person, place, and time  No cranial nerve deficit  Skin: Skin is warm  Rash noted  There is erythema  Noted for erythema excoriation surrounding the ostomy due to irritation from the ostomy contents which is likely from a ill fitting ostomy bag  The ostomy itself appears healthy and viable, there is separation at the mucocutaneous border  The ostomy is somewhat contracted  It is functioning well  Psychiatric: Her mood appears anxious  She exhibits a depressed mood

## 2018-02-07 PROBLEM — Z43.3 COLOSTOMY CARE (HCC): Status: ACTIVE | Noted: 2018-02-07

## 2018-02-07 NOTE — PROGRESS NOTES
Assessment/Plan:    Colostomy care [facility])  67-year-old female with a history of complicated acute diverticulitis requiring Pj's procedure and subsequent creation of colostomy, now presents to the office for discussion of colostomy care and maintenance  Patient still is having issues with keeping the area clean, keeping the skin intact and without irritation and also with occasional leaking of the colostomy bag      - the colostomy bag was removed and peristomal care was provided  The colostomy bag was subsequently changed  A long discussion was had with the wound ostomy nurse in conjunction with the wife and the wife's  regarding the overall peristomal care, in the care of the ostomy itself  This would include changing the ostomy and trouble shooting problems such as leakage for with the ostomy bag  The patient seems somewhat reluctant at 1st but has agreed to take small baby steps  At this time she will go ahead and start with just getting supplies ready  Her  is ready and willing to change the bag clean the area and trouble should any problems  The patient also has a good support system and that her sister went through a similar situation  Her brother-in-law also very helpful and dealing with the ostomy  She will follow up with me at the [facility] within the next few weeks  Diagnoses and all orders for this visit:    Colostomy care [facility])          Subjective:      Patient ID: Benedicto Smith is a 47 y o  female  67-year-old female with a history of complicated acute diverticulitis requiring Pj's procedure and subsequent creation of colostomy, now presents to the office for discussion of colostomy care and maintenance  Patient still is having issues with keeping the area clean, keeping the skin intact and without irritation and also with occasional leaking of the colostomy bag    Patient still states that she is somewhat discussed id by the smell and just the fact that she has a colostomy  Otherwise should her appetite is improving and she is eating  She does have periods of vomiting which she thinks may be related to both the colostomy bag itself and her anxiety  Her incision remains clean and intact  Denies any fevers or chills  The following portions of the patient's history were reviewed and updated as appropriate:   She  has a past medical history of Disease of thyroid gland; Gastroesophageal reflux disease without esophagitis (12/4/2017); Hyperlipidemia; Hypertension; and Vitamin D deficiency disease  She  does not have any pertinent problems on file  She  has a past surgical history that includes Cholecystectomy; Thyroidectomy, partial; Tonsillectomy and adenoidectomy; Tubal ligation; pr esophagogastroduodenoscopy transoral diagnostic (N/A, 10/21/2016); pr part removal colon w anastomosis (N/A, 1/3/2018); Ureteral stent placement (Bilateral, 1/3/2018); and LAPAROTOMY (N/A, 1/4/2018)  Her family history is not on file  She  reports that she has quit smoking  She has never used smokeless tobacco  She reports that she drinks alcohol  She reports that she does not use drugs  Current Outpatient Prescriptions   Medication Sig Dispense Refill    acetaminophen (TYLENOL) 325 mg tablet Take 2 tablets by mouth every 4 (four) hours as needed for mild pain for up to 30 days Do not exceed 4 g daily   30 tablet 0    aspirin 81 MG tablet Take 81 mg by mouth daily      atorvastatin (LIPITOR) 10 mg tablet Take 10 mg by mouth daily      calcium carbonate (TUMS) 500 mg chewable tablet Chew 1 tablet 3 (three) times a day as needed for indigestion or heartburn  0    Cholecalciferol (VITAMIN D) 2000 UNITS CAPS Take 1 capsule by mouth daily        famotidine (PEPCID) 20 mg tablet Take 1 tablet by mouth 2 (two) times a day 30 tablet 0    lansoprazole (PREVACID) 30 mg capsule Take 30 mg by mouth daily      lisinopril (ZESTRIL) 10 mg tablet Take 10 mg by mouth daily      LORazepam (ATIVAN) 0 5 mg tablet Take 1 tablet (0 5 mg total) by mouth daily at bedtime 30 tablet 0    metoprolol tartrate (LOPRESSOR) 25 mg tablet Take 0 5 tablets by mouth every 12 (twelve) hours  0    ondansetron (ZOFRAN-ODT) 4 mg disintegrating tablet Take 1 tablet by mouth every 6 (six) hours as needed for nausea or vomiting 6 tablet 0    polyethylene glycol (MIRALAX) 17 g packet Take 17 g by mouth daily as needed (Constipation) 14 each 0     No current facility-administered medications for this visit  She has No Known Allergies       Review of Systems   Constitutional: Negative  Respiratory: Negative for shortness of breath  Cardiovascular: Negative for chest pain  Gastrointestinal: Positive for nausea and vomiting  Negative for abdominal pain and blood in stool  Skin:        Noted redness and irritation surrounding the ostomy  Psychiatric/Behavioral: The patient is nervous/anxious  Depression  Objective:     Physical Exam   Constitutional: She is oriented to person, place, and time  She appears well-developed and well-nourished  HENT:   Head: Normocephalic and atraumatic  Eyes: No scleral icterus  Neck: Normal range of motion  No tracheal deviation present  Pulmonary/Chest: Effort normal  No respiratory distress  Abdominal: Soft  She exhibits mass  She exhibits no distension  There is tenderness (Around the ostomy  )  There is no rebound  Musculoskeletal: Normal range of motion  She exhibits no edema or deformity  Neurological: She is alert and oriented to person, place, and time  No cranial nerve deficit  Skin: Skin is warm  Rash noted  There is erythema  Noted for erythema excoriation surrounding the ostomy due to irritation from the ostomy contents which is likely from a ill fitting ostomy bag  The ostomy itself appears healthy and viable, there is separation at the mucocutaneous border  The ostomy is somewhat contracted  It is functioning well  Psychiatric: Her mood appears anxious  She exhibits a depressed mood

## 2018-02-07 NOTE — ASSESSMENT & PLAN NOTE
62-year-old female with a history of complicated acute diverticulitis requiring Pj's procedure and subsequent creation of colostomy, now presents to the office for discussion of colostomy care and maintenance  Patient still is having issues with keeping the area clean, keeping the skin intact and without irritation and also with occasional leaking of the colostomy bag      - the colostomy bag was removed and peristomal care was provided  The colostomy bag was subsequently changed  A long discussion was had with the wound ostomy nurse in conjunction with the wife and the wife's  regarding the overall peristomal care, in the care of the ostomy itself  This would include changing the ostomy and trouble shooting problems such as leakage for with the ostomy bag  The patient seems somewhat reluctant at 1st but has agreed to take small baby steps  At this time she will go ahead and start with just getting supplies ready  Her  is ready and willing to change the bag clean the area and trouble should any problems  The patient also has a good support system and that her sister went through a similar situation  Her brother-in-law also very helpful and dealing with the ostomy  She will follow up with me at the [facility] within the next few weeks

## 2018-02-13 ENCOUNTER — APPOINTMENT (EMERGENCY)
Dept: CT IMAGING | Facility: HOSPITAL | Age: 53
DRG: 640 | End: 2018-02-13
Payer: COMMERCIAL

## 2018-02-13 ENCOUNTER — HOSPITAL ENCOUNTER (INPATIENT)
Facility: HOSPITAL | Age: 53
LOS: 2 days | Discharge: HOME | DRG: 640 | End: 2018-02-15
Attending: EMERGENCY MEDICINE | Admitting: INTERNAL MEDICINE
Payer: COMMERCIAL

## 2018-02-13 DIAGNOSIS — E87.6 ACUTE HYPOKALEMIA: Primary | ICD-10-CM

## 2018-02-13 DIAGNOSIS — Z93.2 ILEOSTOMY IN PLACE (HCC): ICD-10-CM

## 2018-02-13 DIAGNOSIS — E43 SEVERE PROTEIN-CALORIE MALNUTRITION (HCC): ICD-10-CM

## 2018-02-13 DIAGNOSIS — Z43.3 COLOSTOMY CARE (HCC): ICD-10-CM

## 2018-02-13 DIAGNOSIS — R11.2 INTRACTABLE VOMITING WITH NAUSEA: ICD-10-CM

## 2018-02-13 DIAGNOSIS — E86.0 DEHYDRATION: ICD-10-CM

## 2018-02-13 PROBLEM — R11.10 VOMITING: Status: ACTIVE | Noted: 2018-02-13

## 2018-02-13 LAB
ALBUMIN SERPL BCP-MCNC: 2.6 G/DL (ref 3.5–5)
ALP SERPL-CCNC: 138 U/L (ref 46–116)
ALT SERPL W P-5'-P-CCNC: 29 U/L (ref 12–78)
ANION GAP SERPL CALCULATED.3IONS-SCNC: 10 MMOL/L (ref 4–13)
ANION GAP SERPL CALCULATED.3IONS-SCNC: 10 MMOL/L (ref 4–13)
AST SERPL W P-5'-P-CCNC: 35 U/L (ref 5–45)
BASOPHILS # BLD AUTO: 0.04 THOUSANDS/ΜL (ref 0–0.1)
BASOPHILS NFR BLD AUTO: 0 % (ref 0–1)
BILIRUB SERPL-MCNC: 1 MG/DL (ref 0.2–1)
BUN SERPL-MCNC: 4 MG/DL (ref 5–25)
BUN SERPL-MCNC: 4 MG/DL (ref 5–25)
CALCIUM SERPL-MCNC: 7.1 MG/DL (ref 8.3–10.1)
CALCIUM SERPL-MCNC: 7.9 MG/DL (ref 8.3–10.1)
CHLORIDE SERPL-SCNC: 103 MMOL/L (ref 100–108)
CHLORIDE SERPL-SCNC: 96 MMOL/L (ref 100–108)
CO2 SERPL-SCNC: 26 MMOL/L (ref 21–32)
CO2 SERPL-SCNC: 32 MMOL/L (ref 21–32)
CREAT SERPL-MCNC: 0.57 MG/DL (ref 0.6–1.3)
CREAT SERPL-MCNC: 0.82 MG/DL (ref 0.6–1.3)
EOSINOPHIL # BLD AUTO: 0.06 THOUSAND/ΜL (ref 0–0.61)
EOSINOPHIL NFR BLD AUTO: 1 % (ref 0–6)
ERYTHROCYTE [DISTWIDTH] IN BLOOD BY AUTOMATED COUNT: 15.5 % (ref 11.6–15.1)
GFR SERPL CREATININE-BSD FRML MDRD: 106 ML/MIN/1.73SQ M
GFR SERPL CREATININE-BSD FRML MDRD: 82 ML/MIN/1.73SQ M
GLUCOSE SERPL-MCNC: 133 MG/DL (ref 65–140)
GLUCOSE SERPL-MCNC: 92 MG/DL (ref 65–140)
HCT VFR BLD AUTO: 39.8 % (ref 34.8–46.1)
HGB BLD-MCNC: 13.2 G/DL (ref 11.5–15.4)
LACTATE SERPL-SCNC: 1 MMOL/L (ref 0.5–2)
LACTATE SERPL-SCNC: 2.3 MMOL/L (ref 0.5–2)
LIPASE SERPL-CCNC: 225 U/L (ref 73–393)
LYMPHOCYTES # BLD AUTO: 1.42 THOUSANDS/ΜL (ref 0.6–4.47)
LYMPHOCYTES NFR BLD AUTO: 15 % (ref 14–44)
MAGNESIUM SERPL-MCNC: 0.9 MG/DL (ref 1.6–2.6)
MCH RBC QN AUTO: 28.9 PG (ref 26.8–34.3)
MCHC RBC AUTO-ENTMCNC: 33.2 G/DL (ref 31.4–37.4)
MCV RBC AUTO: 87 FL (ref 82–98)
MONOCYTES # BLD AUTO: 0.88 THOUSAND/ΜL (ref 0.17–1.22)
MONOCYTES NFR BLD AUTO: 9 % (ref 4–12)
NEUTROPHILS # BLD AUTO: 7.37 THOUSANDS/ΜL (ref 1.85–7.62)
NEUTS SEG NFR BLD AUTO: 75 % (ref 43–75)
PLATELET # BLD AUTO: 369 THOUSANDS/UL (ref 149–390)
PMV BLD AUTO: 10.5 FL (ref 8.9–12.7)
POTASSIUM SERPL-SCNC: 2.3 MMOL/L (ref 3.5–5.3)
POTASSIUM SERPL-SCNC: 2.8 MMOL/L (ref 3.5–5.3)
PROT SERPL-MCNC: 7.4 G/DL (ref 6.4–8.2)
RBC # BLD AUTO: 4.57 MILLION/UL (ref 3.81–5.12)
SODIUM SERPL-SCNC: 138 MMOL/L (ref 136–145)
SODIUM SERPL-SCNC: 139 MMOL/L (ref 136–145)
TSH SERPL DL<=0.05 MIU/L-ACNC: 3.99 UIU/ML (ref 0.36–3.74)
WBC # BLD AUTO: 9.77 THOUSAND/UL (ref 4.31–10.16)

## 2018-02-13 PROCEDURE — 74177 CT ABD & PELVIS W/CONTRAST: CPT

## 2018-02-13 PROCEDURE — 80048 BASIC METABOLIC PNL TOTAL CA: CPT | Performed by: INTERNAL MEDICINE

## 2018-02-13 PROCEDURE — 99284 EMERGENCY DEPT VISIT MOD MDM: CPT

## 2018-02-13 PROCEDURE — 99223 1ST HOSP IP/OBS HIGH 75: CPT | Performed by: INTERNAL MEDICINE

## 2018-02-13 PROCEDURE — 96375 TX/PRO/DX INJ NEW DRUG ADDON: CPT

## 2018-02-13 PROCEDURE — 83605 ASSAY OF LACTIC ACID: CPT | Performed by: INTERNAL MEDICINE

## 2018-02-13 PROCEDURE — 36415 COLL VENOUS BLD VENIPUNCTURE: CPT | Performed by: EMERGENCY MEDICINE

## 2018-02-13 PROCEDURE — 84443 ASSAY THYROID STIM HORMONE: CPT | Performed by: INTERNAL MEDICINE

## 2018-02-13 PROCEDURE — 96366 THER/PROPH/DIAG IV INF ADDON: CPT

## 2018-02-13 PROCEDURE — C9113 INJ PANTOPRAZOLE SODIUM, VIA: HCPCS | Performed by: INTERNAL MEDICINE

## 2018-02-13 PROCEDURE — 83690 ASSAY OF LIPASE: CPT | Performed by: EMERGENCY MEDICINE

## 2018-02-13 PROCEDURE — 83605 ASSAY OF LACTIC ACID: CPT | Performed by: EMERGENCY MEDICINE

## 2018-02-13 PROCEDURE — 85025 COMPLETE CBC W/AUTO DIFF WBC: CPT | Performed by: EMERGENCY MEDICINE

## 2018-02-13 PROCEDURE — 99024 POSTOP FOLLOW-UP VISIT: CPT | Performed by: SURGERY

## 2018-02-13 PROCEDURE — 80053 COMPREHEN METABOLIC PANEL: CPT | Performed by: EMERGENCY MEDICINE

## 2018-02-13 PROCEDURE — 83735 ASSAY OF MAGNESIUM: CPT | Performed by: INTERNAL MEDICINE

## 2018-02-13 PROCEDURE — 96365 THER/PROPH/DIAG IV INF INIT: CPT

## 2018-02-13 RX ORDER — SODIUM CHLORIDE AND POTASSIUM CHLORIDE .9; .15 G/100ML; G/100ML
125 SOLUTION INTRAVENOUS CONTINUOUS
Status: DISCONTINUED | OUTPATIENT
Start: 2018-02-13 | End: 2018-02-15

## 2018-02-13 RX ORDER — MAGNESIUM SULFATE HEPTAHYDRATE 40 MG/ML
4 INJECTION, SOLUTION INTRAVENOUS ONCE
Status: DISCONTINUED | OUTPATIENT
Start: 2018-02-13 | End: 2018-02-13

## 2018-02-13 RX ORDER — POTASSIUM CHLORIDE 14.9 MG/ML
20 INJECTION INTRAVENOUS ONCE
Status: COMPLETED | OUTPATIENT
Start: 2018-02-13 | End: 2018-02-15

## 2018-02-13 RX ORDER — POTASSIUM CHLORIDE 20 MEQ/1
20 TABLET, EXTENDED RELEASE ORAL ONCE
Status: COMPLETED | OUTPATIENT
Start: 2018-02-13 | End: 2018-02-13

## 2018-02-13 RX ORDER — ACETAMINOPHEN 325 MG/1
650 TABLET ORAL EVERY 6 HOURS PRN
Status: DISCONTINUED | OUTPATIENT
Start: 2018-02-13 | End: 2018-02-15 | Stop reason: HOSPADM

## 2018-02-13 RX ORDER — POTASSIUM CHLORIDE 20 MEQ/1
40 TABLET, EXTENDED RELEASE ORAL ONCE
Status: COMPLETED | OUTPATIENT
Start: 2018-02-13 | End: 2018-02-13

## 2018-02-13 RX ORDER — PANTOPRAZOLE SODIUM 40 MG/1
40 INJECTION, POWDER, FOR SOLUTION INTRAVENOUS EVERY 12 HOURS SCHEDULED
Status: DISCONTINUED | OUTPATIENT
Start: 2018-02-13 | End: 2018-02-15 | Stop reason: HOSPADM

## 2018-02-13 RX ORDER — ONDANSETRON 2 MG/ML
4 INJECTION INTRAMUSCULAR; INTRAVENOUS ONCE
Status: COMPLETED | OUTPATIENT
Start: 2018-02-13 | End: 2018-02-13

## 2018-02-13 RX ORDER — PROMETHAZINE HYDROCHLORIDE 25 MG/ML
25 INJECTION, SOLUTION INTRAMUSCULAR; INTRAVENOUS ONCE
Status: DISCONTINUED | OUTPATIENT
Start: 2018-02-13 | End: 2018-02-14

## 2018-02-13 RX ORDER — ONDANSETRON 2 MG/ML
8 INJECTION INTRAMUSCULAR; INTRAVENOUS EVERY 4 HOURS PRN
Status: DISCONTINUED | OUTPATIENT
Start: 2018-02-13 | End: 2018-02-15 | Stop reason: HOSPADM

## 2018-02-13 RX ORDER — MAGNESIUM SULFATE HEPTAHYDRATE 40 MG/ML
2 INJECTION, SOLUTION INTRAVENOUS
Status: COMPLETED | OUTPATIENT
Start: 2018-02-13 | End: 2018-02-15

## 2018-02-13 RX ORDER — PROMETHAZINE HYDROCHLORIDE 25 MG/ML
25 INJECTION, SOLUTION INTRAMUSCULAR; INTRAVENOUS ONCE
Status: COMPLETED | OUTPATIENT
Start: 2018-02-13 | End: 2018-02-13

## 2018-02-13 RX ADMIN — POTASSIUM CHLORIDE 40 MEQ: 1500 TABLET, EXTENDED RELEASE ORAL at 18:50

## 2018-02-13 RX ADMIN — POTASSIUM CHLORIDE 20 MEQ: 200 INJECTION, SOLUTION INTRAVENOUS at 13:27

## 2018-02-13 RX ADMIN — MAGNESIUM SULFATE HEPTAHYDRATE 2 G: 40 INJECTION, SOLUTION INTRAVENOUS at 18:53

## 2018-02-13 RX ADMIN — PANTOPRAZOLE SODIUM 40 MG: 40 INJECTION, POWDER, FOR SOLUTION INTRAVENOUS at 21:33

## 2018-02-13 RX ADMIN — SODIUM CHLORIDE 2000 ML: 0.9 INJECTION, SOLUTION INTRAVENOUS at 13:28

## 2018-02-13 RX ADMIN — MAGNESIUM SULFATE HEPTAHYDRATE 2 G: 40 INJECTION, SOLUTION INTRAVENOUS at 21:33

## 2018-02-13 RX ADMIN — ONDANSETRON 8 MG: 2 INJECTION INTRAMUSCULAR; INTRAVENOUS at 21:44

## 2018-02-13 RX ADMIN — IOHEXOL 100 ML: 350 INJECTION, SOLUTION INTRAVENOUS at 14:12

## 2018-02-13 RX ADMIN — PROMETHAZINE HYDROCHLORIDE 25 MG: 25 INJECTION, SOLUTION INTRAMUSCULAR; INTRAVENOUS at 14:27

## 2018-02-13 RX ADMIN — SODIUM CHLORIDE 1000 ML: 0.9 INJECTION, SOLUTION INTRAVENOUS at 15:57

## 2018-02-13 RX ADMIN — ONDANSETRON 4 MG: 2 INJECTION INTRAMUSCULAR; INTRAVENOUS at 12:58

## 2018-02-13 RX ADMIN — SODIUM CHLORIDE AND POTASSIUM CHLORIDE 125 ML/HR: .9; .15 SOLUTION INTRAVENOUS at 17:33

## 2018-02-13 RX ADMIN — POTASSIUM CHLORIDE 20 MEQ: 1500 TABLET, EXTENDED RELEASE ORAL at 13:28

## 2018-02-13 NOTE — ED PROVIDER NOTES
History  Chief Complaint   Patient presents with    Vomiting     VOMITING [address] [facility] 2 WEEKS     Patient complains of recurrent vomiting associated with increasing weakness and anorexia for the past 3 weeks  She had complicated diverticulitis with perforation and ischemic bowel for which she was flown from [facility] to [place] for more comprehensive surgical care  She had a Pj's procedure and was discharged to a nursing home for physical rehab where these symptoms began  She was discharged home but continued to worsen  She has discomfort but no new pain since the surgery  She has been making normal brown/yellow liquid stool  She vomits each time she empties the colostomy bag but also any time she attempts to eat anything  Therefore she stopped eating few days ago  She has been drinking what she can  No hematochezia, melena or hematemesis  No change in symptoms w/BM or voiding  She was given Zofran which is not alleviated her symptoms  No recent travel or similar sick contacts  Denies f/c, CP, SOB  12 system ROS o/w negative  History provided by:  Patient and medical records  Vomiting   Severity:  Severe  Duration:  3 weeks  Timing:  Intermittent  Number of daily episodes:  3-4  Quality:  Stomach contents  Able to tolerate:  Liquids  How soon after eating does vomiting occur:  1 minute  Progression:  Worsening  Chronicity:  Recurrent  Recent urination:  Decreased  Context: not post-tussive and not self-induced    Relieved by:  Nothing  Ineffective treatments:  Antiemetics  Associated symptoms: no abdominal pain (none new since surgery), no arthralgias, no chills, no cough, no fever, no headaches, no myalgias and no sore throat    Risk factors: prior abdominal surgery    Risk factors: no diabetes        Prior to Admission Medications   Prescriptions Last Dose Informant Patient Reported? Taking?    Cholecalciferol (VITAMIN D) 2000 UNITS CAPS Past Week at Unknown time Self Yes Yes   Sig: Take 1 capsule by mouth daily     LORazepam (ATIVAN) 0 5 mg tablet   No No   Sig: Take 1 tablet (0 5 mg total) by mouth daily at bedtime   Patient taking differently: Take 0 5 mg by mouth daily at bedtime Takes PRN at bedtime    acetaminophen (TYLENOL) 325 mg tablet  Self No No   Sig: Take 2 tablets by mouth every 4 (four) hours as needed for mild pain for up to 30 days Do not exceed 4 g daily     atorvastatin (LIPITOR) 10 mg tablet Past Week at Unknown time Self Yes Yes   Sig: Take 10 mg by mouth daily   calcium carbonate (TUMS) 500 mg chewable tablet Past Week at Unknown time Self No Yes   Sig: Chew 1 tablet 3 (three) times a day as needed for indigestion or heartburn   famotidine (PEPCID) 20 mg tablet More than a month at Unknown time Self No No   Sig: Take 1 tablet by mouth 2 (two) times a day   lansoprazole (PREVACID) 30 mg capsule 2/13/2018 at Unknown time Self Yes Yes   Sig: Take 30 mg by mouth daily   lisinopril (ZESTRIL) 10 mg tablet Past Week at Unknown time Self Yes Yes   Sig: Take 10 mg by mouth daily   metoprolol tartrate (LOPRESSOR) 25 mg tablet Past Month at Unknown time Self No Yes   Sig: Take 0 5 tablets by mouth every 12 (twelve) hours   ondansetron (ZOFRAN-ODT) 4 mg disintegrating tablet Past Month at Unknown time Self No Yes   Sig: Take 1 tablet by mouth every 6 (six) hours as needed for nausea or vomiting      Facility-Administered Medications: None       Past Medical History:   Diagnosis Date    Disease of thyroid gland     Gastroesophageal reflux disease without esophagitis 12/4/2017    Hyperlipidemia     Hypertension     Vitamin D deficiency disease        Past Surgical History:   Procedure Laterality Date    CHOLECYSTECTOMY      COLOSTOMY      LAPAROTOMY N/A 1/4/2018    Procedure: LAPAROTOMY EXPLORATORY, 2nd look, small bowel resection, ilieostomy closure via handsown anastamosis transverse colostomy;  Surgeon: Chaitanya Anderson DO;  [facility]: [facility];  Service: General    WA ESOPHAGOGASTRODUODENOSCOPY TRANSORAL DIAGNOSTIC N/A 10/21/2016    Procedure: EGD AND COLONOSCOPY;  Surgeon: Gagandeep Landa MD;  Location: [facility];  Service: Gastroenterology    WA PART REMOVAL COLON W ANASTOMOSIS N/A 1/3/2018    Procedure: Exploratory laparotomy, RESECTION COLON SIGMOID, possible ostomy;  Surgeon: Polina Anderson DO;  Location: [facility];  Service: General    THYROIDECTOMY, PARTIAL      TONSILLECTOMY AND ADENOIDECTOMY      TUBAL LIGATION      URETERAL STENT PLACEMENT Bilateral 1/3/2018    Procedure: INSERTION STENT URETERAL;  Surgeon: Chano Whipple MD;  Location: [facility];  Service: Urology       History reviewed  No pertinent family history  I have reviewed and agree with the history as documented  Social History   Substance Use Topics    Smoking status: Former Smoker    Smokeless tobacco: Never Used    Alcohol use Yes      Comment: social        Review of Systems   Constitutional: Positive for appetite change and unexpected weight change (6# loss)  Negative for chills, diaphoresis, fatigue and fever  HENT: Negative for congestion, rhinorrhea and sore throat  Respiratory: Negative for cough, shortness of breath and wheezing  Cardiovascular: Negative for chest pain, palpitations and leg swelling  Gastrointestinal: Positive for nausea and vomiting  Negative for abdominal pain (none new since surgery) and blood in stool  Endocrine: Negative for polydipsia, polyphagia and polyuria  Genitourinary: Negative for dysuria, flank pain, frequency, hematuria, urgency, vaginal discharge and vaginal pain  Musculoskeletal: Negative for arthralgias, back pain and myalgias  Skin: Negative for pallor and rash  Neurological: Negative for dizziness, syncope, weakness, light-headedness, numbness and headaches  Hematological: Negative for adenopathy  Psychiatric/Behavioral: Negative for confusion  All other systems reviewed and are negative        Physical Exam  ED Triage Vitals [02/13/18 1221]   Temperature Pulse Respirations Blood Pressure SpO2   98 9 °F (37 2 °C) (!) 116 20 160/98 98 %      Temp Source Heart Rate Source Patient Position - Orthostatic VS BP Location FiO2 (%)   Temporal Left Sitting Left arm --      Pain Score       No Pain           Orthostatic Vital Signs  Vitals:    02/13/18 1345 02/13/18 1400 02/13/18 1500 02/13/18 1646   BP:    160/87   Pulse: 90 91 86 86   Patient Position - Orthostatic VS:    Lying       Physical Exam   Constitutional: She is oriented to person, place, and time  She appears well-developed and well-nourished  No distress  Generally ill-appearing     HENT:   Head: Normocephalic and atraumatic  Mouth/Throat: Oropharynx is clear and moist    Eyes: Conjunctivae and EOM are normal  Pupils are equal, round, and reactive to light  Neck: Normal range of motion  Neck supple  Cardiovascular: Normal rate, regular rhythm and normal heart sounds  No murmur heard  Pulmonary/Chest: Effort normal and breath sounds normal  No respiratory distress  She has no wheezes  She has no rales  Abdominal: Soft  Bowel sounds are normal  She exhibits no distension  There is tenderness (mild, diffusely)  There is no rebound and no guarding  Brown/yellow stool in her colostomy  Site is c/d/i  Musculoskeletal: Normal range of motion  She exhibits no edema or tenderness  Neurological: She is alert and oriented to person, place, and time  She has normal reflexes  She exhibits normal muscle tone  Skin: Skin is warm and dry  Capillary refill takes less than 2 seconds  No rash noted  She is not diaphoretic  No pallor  Psychiatric: She has a normal mood and affect  Her behavior is normal  Thought content normal    Vitals reviewed        ED Medications  Medications   promethazine (PHENERGAN) injection 25 mg (25 mg Intravenous Not Given 2/13/18 1355)   sodium chloride 0 9 % with KCl 20 mEq/L infusion (premix) (125 mL/hr Intravenous New Bag 2/13/18 1733)   ondansetron (ZOFRAN) injection 8 mg (not administered)   enoxaparin (LOVENOX) subcutaneous injection 40 mg (not administered)   acetaminophen (TYLENOL) tablet 650 mg (not administered)   pantoprazole (PROTONIX) injection 40 mg (not administered)   magnesium sulfate 2 g/50 mL IVPB (premix) 2 g (2 g Intravenous New Bag 2/13/18 1853)   ondansetron (ZOFRAN) injection 4 mg (4 mg Intravenous Given 2/13/18 1258)   sodium chloride 0 9 % bolus 2,000 mL (0 mL Intravenous Stopped 2/13/18 1557)   potassium chloride (K-DUR,KLOR-CON) CR tablet 20 mEq (20 mEq Oral Given 2/13/18 1328)   potassium chloride 20 mEq IVPB (premix) (20 mEq Intravenous New Bag 2/13/18 1327)   promethazine (PHENERGAN) injection 25 mg (25 mg Intravenous Given 2/13/18 1427)   iohexol (OMNIPAQUE) 350 MG/ML injection (SINGLE-DOSE) 100 mL (100 mL Intravenous Given 2/13/18 1412)   sodium chloride 0 9 % bolus 1,000 mL (1,000 mL Intravenous New Bag 2/13/18 1557)   potassium chloride (K-DUR,KLOR-CON) CR tablet 40 mEq (40 mEq Oral Given 2/13/18 1850)       Diagnostic Studies  Results Reviewed     Procedure Component Value Units Date/Time    Lactic acid, plasma [[id number]]  (Abnormal) Collected:  02/13/18 1251    Lab Status:  Final result Specimen:  Blood from Arm, Left Updated:  02/13/18 1323     LACTIC ACID 2 3 (HH) mmol/L     Narrative:         Result may be elevated if tourniquet was used during collection      CMP [[id number]]  (Abnormal) Collected:  02/13/18 1251    Lab Status:  Final result Specimen:  Blood from Arm, Left Updated:  02/13/18 1322     Sodium 138 mmol/L      Potassium 2 3 (LL) mmol/L      Chloride 96 (L) mmol/L      CO2 32 mmol/L      Anion Gap 10 mmol/L      BUN 4 (L) mg/dL      Creatinine 0 82 mg/dL      Glucose 133 mg/dL      Calcium 7 9 (L) mg/dL      AST 35 U/L      ALT 29 U/L      Alkaline Phosphatase 138 (H) U/L      Total Protein 7 4 g/dL      Albumin 2 6 (L) g/dL      Total Bilirubin 1 00 mg/dL      eGFR 82 ml/min/1 73sq m Narrative:         National Kidney Disease Education Program recommendations are as follows:  GFR calculation is accurate only with a steady state creatinine  Chronic Kidney disease less than 60 ml/min/1 73 sq  meters  Kidney failure less than 15 ml/min/1 73 sq  meters  Lipase [[id number]]  (Normal) Collected:  02/13/18 1251    Lab Status:  Final result Specimen:  Blood from Arm, Left Updated:  02/13/18 1320     Lipase 225 u/L     CBC and differential [[id number]]  (Abnormal) Collected:  02/13/18 1251    Lab Status:  Final result Specimen:  Blood from Arm, Left Updated:  02/13/18 1259     WBC 9 77 Thousand/uL      RBC 4 57 Million/uL      Hemoglobin 13 2 g/dL      Hematocrit 39 8 %      MCV 87 fL      MCH 28 9 pg      MCHC 33 2 g/dL      RDW 15 5 (H) %      MPV 10 5 fL      Platelets 070 Thousands/uL      Neutrophils Relative 75 %      Lymphocytes Relative 15 %      Monocytes Relative 9 %      Eosinophils Relative 1 %      Basophils Relative 0 %      Neutrophils Absolute 7 37 Thousands/µL      Lymphocytes Absolute 1 42 Thousands/µL      Monocytes Absolute 0 88 Thousand/µL      Eosinophils Absolute 0 06 Thousand/µL      Basophils Absolute 0 04 Thousands/µL                  CT abdomen pelvis with contrast   Final Result by NISA Mota MD (02/13 1433)      Status post left partial colectomy with distal left transverse colostomy and oversewn sigmoid remnant  Residual recurrent inflammatory changes noted at the proximal tip of the sigmoid remnant with adjacent thickened walled small bowel segment and    mesenteric edema  Cholecystectomy  Leiomyomatous uterus  Workstation performed: [id number]                    Procedures  Procedures       Phone Contacts  ED Phone Contact    ED Course  ED Course as of Feb 13 2007   Jamin Villarreal Feb 13, 2018   1439 Dr Emeka Hernandez paged  Results reviewed with patient  Feels a little better after phenergan  Will admit for serial exams and treatment       1450 Case discussed with Dr Susan Faustin - thinks bowel thickening is postop, not acute colitis (does not recommend antibiotics)  He agrees with admission and will see patient in consultation  1454 Case d/w Dr Abram Clancy  Accepts admission  MDM  Number of Diagnoses or Management Options  Acute hypokalemia:   Dehydration:   Intractable vomiting with nausea:   Diagnosis management comments: DDx:  Nausea/vomiting/weakness -  ischemic bowel, colitis, anemia, abnormal electrolytes, doubt ACS/MI  A/P: Will check CT a/p, abdominal labs, urine, treat symptoms, reevaluate for further work up and disposition  Amount and/or Complexity of Data Reviewed  Clinical lab tests: reviewed and ordered  Tests in the radiology section of CPT®: reviewed and ordered  Decide to obtain previous medical records or to obtain history from someone other than the patient: yes  Review and summarize past medical records: yes  Discuss the patient with other providers: yes (Dr Susan Faustin)      The patient presented with a condition in which there was a high probability of imminent or life-threatening deterioration, and critical care services (excluding separately billable procedures) totalled 30-74 minutes          Disposition  Final diagnoses:   Acute hypokalemia   Intractable vomiting with nausea   Dehydration     Time reflects when diagnosis was documented in both MDM as applicable and the Disposition within this note     Time User Action Codes Description Comment    2/13/2018  2:57 PM Phuc Bentley Add [E87 6] Acute hypokalemia     2/13/2018  2:57 PM [address] [place] [address]  [address], [address] [R11 2] Intractable vomiting with nausea     2/13/2018  2:57  [facility] [E86 0] Dehydration     2/13/2018  4:44 PM Syeda Gill Modify [E87 6] Acute hypokalemia     2/13/2018  4:44 PM Cyn Flynn [R11 2] Intractable vomiting with nausea     2/13/2018  4:44 PM Syeda Gill Modify [E86 0] Dehydration     2/13/2018  4:44 PM Eric Adkins Add [Z43 3] Colostomy care ([facility] [address] )     2/13/2018  4:44 PM Viral Netter Modify [E87 6] Acute hypokalemia     2/13/2018  4:44 PM Viral Netter Modify [R11 2] Intractable vomiting with nausea     2/13/2018  4:44 PM Viral Netter Modify [E86 0] Dehydration     2/13/2018  4:44 PM Viral Netter Modify [E87 6] Acute hypokalemia     2/13/2018  4:44 PM Viral Netter Modify [R11 2] Intractable vomiting with nausea     2/13/2018  4:44 PM Viral Netter Modify [E86 0] Dehydration     2/13/2018  4:45 PM Viral Netter Modify [E87 6] Acute hypokalemia     2/13/2018  4:45 PM Viral Netter Modify [R11 2] Intractable vomiting with nausea     2/13/2018  4:45 PM Viral Netter Modify [E86 0] Dehydration     2/13/2018  4:45 PM Viral Netter Add [Z93 2] Ileostomy in place [facility])     2/13/2018  4:45 PM Viral Netter Add [E43] Severe protein-calorie malnutrition [facility])       ED Disposition     ED Disposition Condition Comment    Admit  Case was discussed with Dr Al Hernandez and the patient's admission status was agreed to be Admission Status: inpatient status to the service of Dr Al Hernandez            Follow-up Information    None       Current Discharge Medication List      CONTINUE these medications which have NOT CHANGED    Details   atorvastatin (LIPITOR) 10 mg tablet Take 10 mg by mouth daily      calcium carbonate (TUMS) 500 mg chewable tablet Chew 1 tablet 3 (three) times a day as needed for indigestion or heartburn  Refills: 0      Cholecalciferol (VITAMIN D) 2000 UNITS CAPS Take 1 capsule by mouth daily        lansoprazole (PREVACID) 30 mg capsule Take 30 mg by mouth daily      lisinopril (ZESTRIL) 10 mg tablet Take 10 mg by mouth daily      metoprolol tartrate (LOPRESSOR) 25 mg tablet Take 0 5 tablets by mouth every 12 (twelve) hours  Refills: 0      ondansetron (ZOFRAN-ODT) 4 mg disintegrating tablet Take 1 tablet by mouth every 6 (six) hours as needed for nausea or vomiting  Qty: 6 tablet, Refills: 0 acetaminophen (TYLENOL) 325 mg tablet Take 2 tablets by mouth every 4 (four) hours as needed for mild pain for up to 30 days Do not exceed 4 g daily  Qty: 30 tablet, Refills: 0      famotidine (PEPCID) 20 mg tablet Take 1 tablet by mouth 2 (two) times a day  Qty: 30 tablet, Refills: 0      LORazepam (ATIVAN) 0 5 mg tablet Take 1 tablet (0 5 mg total) by mouth daily at bedtime  Qty: 30 tablet, Refills: 0    Associated Diagnoses: Anxiety           No discharge procedures on file      ED Provider  Electronically Signed by           Luis Angel DO  02/13/18 2007

## 2018-02-13 NOTE — H&P
History and Physical - [facility] Internal Medicine    Patient Information: Luke Duncan 47 y o  female MRN: [id number]  Unit/Bed#: 683-99 Encounter: [id number]  Admitting Physician: Katharina Waller DO  PCP: Minal Denny DO  Date of Admission:  02/13/18    Assessment/Plan:    Hospital Problem List:     Principal Problem:    Dehydration  Active Problems:    Esophageal reflux    Hypertension    Acute hypokalemia    Ileostomy in place [facility])    Intractable vomiting with nausea   Hypomagnesemia  Hypokalemia    Plan for the Primary Problem(s):  · Dehydration/hypokalemia/hypomagnesemia  · IV fluid support, replace electrolytes and closely monitor labs  · Given severe electrolyte abnormalities patient needs continuous telemetry monitoring    Plan for Additional Problems:   · Suspected GERD/possible gastritis/possible esophagitis  · Twice daily PPI  · Add Carafate  · Clear liquid diet tonight, will advance to bland diet tomorrow  · History of recent bowel surgery due to severe diverticulitis    VTE Prophylaxis: Enoxaparin (Lovenox)  / sequential compression device   Code Status: full cde  POLST: There is no POLST form on file for this patient (pre-hospital)    Anticipated Length of Stay:  Patient will be admitted on an Inpatient basis with an anticipated length of stay of  greater than 2 midnights  Justification for Hospital Stay:  Severe electrolyte abnormalities which will require ongoing cardiac monitoring, given severity of both hypomagnesemia and hypokalemia patient will need prolonged inpatient stay  Chief Complaint:   Recurrent nausea and vomiting    History of Present Illness:    Luke Duncan is a 47 y o  female who presents with recurrent episodes of nausea vomiting, difficulty maintaining adequate p o  intake  Patient denies any significant abdominal pain at the time my examination  The patient has had good output from her ostomy      Review of Systems:    Review of Systems   All other systems reviewed and are negative  Past Medical and Surgical History:     Past Medical History:   Diagnosis Date    Disease of thyroid gland     Gastroesophageal reflux disease without esophagitis 12/4/2017    Hyperlipidemia     Hypertension     Vitamin D deficiency disease        Past Surgical History:   Procedure Laterality Date    CHOLECYSTECTOMY      COLOSTOMY      LAPAROTOMY N/A 1/4/2018    Procedure: LAPAROTOMY EXPLORATORY, 2nd look, small bowel resection, ilieostomy closure via [place] anastamosis transverse colostomy;  Surgeon: Edward Gagnon DO;  Location:  MAIN OR;  Service: General    NV ESOPHAGOGASTRODUODENOSCOPY TRANSORAL DIAGNOSTIC N/A 10/21/2016    Procedure: EGD AND COLONOSCOPY;  Surgeon: Makenna Santacruz MD;  Location: [facility] MAIN [facility];  Service: Gastroenterology    NV PART REMOVAL COLON W ANASTOMOSIS N/A 1/3/2018    Procedure: Exploratory laparotomy, RESECTION COLON SIGMOID, possible ostomy;  Surgeon: Alonso Narayanan DO;  Location: [facility] MA[facility];  Service: General    THYROIDECTOMY, PARTIAL      TONSILLECTOMY AND ADENOIDECTOMY      TUBAL LIGATION      URETERAL STENT PLACEMENT Bilateral 1/3/2018    Procedure: INSERTION STENT URETERAL;  Surgeon: Jose Maria Lara MD;  Location: [facility] MAIN [facility];  Service: Urology       Meds/Allergies:    Prior to Admission medications    Medication Sig Start Date End Date Taking?  Authorizing Provider   atorvastatin (LIPITOR) 10 mg tablet Take 10 mg by mouth daily   Yes Historical Provider, MD   calcium carbonate (TUMS) 500 mg chewable tablet Chew 1 tablet 3 (three) times a day as needed for indigestion or heartburn 1/18/18  Yes Akin Waters MD   Cholecalciferol (VITAMIN D) 2000 UNITS CAPS Take 1 capsule by mouth daily     Yes Historical Provider, MD   lansoprazole (PREVACID) 30 mg capsule Take 30 mg by mouth daily   Yes Historical Provider, MD   lisinopril (ZESTRIL) 10 mg tablet Take 10 mg by mouth daily   Yes Historical Provider, MD   metoprolol tartrate (LOPRESSOR) 25 mg tablet Take 0 5 tablets by mouth every 12 (twelve) hours 1/18/18  Yes Christophe Romero MD   ondansetron (ZOFRAN-ODT) 4 mg disintegrating tablet Take 1 tablet by mouth every 6 (six) hours as needed for nausea or vomiting 12/11/17  Yes Case K Enoc, DO   acetaminophen (TYLENOL) 325 mg tablet Take 2 tablets by mouth every 4 (four) hours as needed for mild pain for up to 30 days Do not exceed 4 g daily  1/18/18 2/17/18  Christophe Romero MD   famotidine (PEPCID) 20 mg tablet Take 1 tablet by mouth 2 (two) times a day 12/11/17   Case K Enoc, DO   LORazepam (ATIVAN) 0 5 mg tablet Take 1 tablet (0 5 mg total) by mouth daily at bedtime  Patient taking differently: Take 0 5 mg by mouth daily at bedtime Takes PRN at bedtime  2/1/18   Melburn Hernandez, DO   aspirin 81 MG tablet Take 81 mg by mouth daily  2/13/18  Historical MD Osiris   polyethylene glycol (MIRALAX) 17 g packet Take 17 g by mouth daily as needed (Constipation) 1/18/18 2/13/18  Christophe Romero MD     I have reviewed home medications with patient personally  Allergies: No Known Allergies    Social History:     Marital Status: /Civil Union   Substance Use History:   History   Alcohol Use    Yes     Comment: social     History   Smoking Status    Former Smoker   Smokeless Tobacco    Never Used     History   Drug Use No       Family History:    non-contributory    Physical Exam:     Vitals:   Blood Pressure: 160/87 (02/13/18 1646)  Pulse: 86 (02/13/18 1646)  Temperature: 99 9 °F (37 7 °C) (02/13/18 1646)  Temp Source: Temporal (02/13/18 1646)  Respirations: 18 (02/13/18 1646)  Height: 5' 2" (157 5 cm) (02/13/18 1646)  Weight - Scale: 82 8 kg (182 lb 8 7 oz) (02/13/18 1646)  SpO2: 96 % (02/13/18 1646)    Physical Exam   Constitutional: She is oriented to person, place, and time  She appears well-developed and well-nourished  No distress  Pulmonary/Chest: Effort normal and breath sounds normal  No respiratory distress  She has no wheezes  Abdominal: Soft  Bowel sounds are normal  She exhibits no distension  There is no tenderness  Ostomy with liquid brown stool   Neurological: She is alert and oriented to person, place, and time  No cranial nerve deficit  Coordination normal    Skin: She is not diaphoretic  Psychiatric: She has a normal mood and affect  Her behavior is normal  Judgment and thought content normal    Nursing note and vitals reviewed  Additional Data:     Lab Results: I have personally reviewed pertinent reports  Results from last 7 days  Lab Units 02/13/18  1251   WBC Thousand/uL 9 77   HEMOGLOBIN g/dL 13 2   HEMATOCRIT % 39 8   PLATELETS Thousands/uL 369   NEUTROS PCT % 75   LYMPHS PCT % 15   MONOS PCT % 9   EOS PCT % 1       Results from last 7 days  Lab Units 02/13/18  1706 02/13/18  1251   SODIUM mmol/L 139 138   POTASSIUM mmol/L 2 8* 2 3*   CHLORIDE mmol/L 103 96*   CO2 mmol/L 26 32   BUN mg/dL 4* 4*   CREATININE mg/dL 0 57* 0 82   CALCIUM mg/dL 7 1* 7 9*   TOTAL PROTEIN g/dL  --  7 4   BILIRUBIN TOTAL mg/dL  --  1 00   ALK PHOS U/L  --  138*   ALT U/L  --  29   AST U/L  --  35   GLUCOSE RANDOM mg/dL 92 133           Imaging: I have personally reviewed pertinent reports  Xr Abdomen Obstruction Series    Result Date: 1/16/2018  Narrative: OBSTRUCTION SERIES INDICATION:  Vomiting  COMPARISON: Plain radiographs January 4, 2018 and CT abdomen pelvis December 29, 2017  VIEWS:  (Supine, erect abdomen and upright chest) IMAGES:  7 FINDINGS: There is been interval surgery with midline abdominal incision  Small bowel mildly dilated and air-filled  Differential fluid levels in the erect position  Several loops of small bowel in the left upper quadrant demonstrate wall thickening  Paucity of air in the large bowel, consistent with history of ileostomy  Flat Sebastian-Mcmahon drainage catheter in the left hemipelvis  No free air beneath the hemidiaphragms   No pathologic calcifications or soft tissue masses evident  Osseous structures are unremarkable  Examination of the chest reveals a normal cardiomediastinal silhouette  Lung volumes diminished  Linear atelectasis or scarring in the left upper lobe and right middle lobe  Right upper extremity PICC line with tip in superior vena cava  Impression: Mild abnormal small bowel gas pattern representing ileus versus partial small bowel obstruction  Follow-up recommended  Consider follow-up CT scan of the abdomen and pelvis given the history of multiple surgeries and ileostomy  Workstation performed: [id number]     Ct Abdomen Pelvis With Contrast    Result Date: 2/13/2018  Narrative: CT ABDOMEN AND PELVIS WITH IV CONTRAST INDICATION: Weight loss, unintended, non-localized abd pain  History taken directly from the electronic ordering system  Patient complains of vomiting and nausea passed 2 weeks  Multiple bowel surgeries  COMPARISON: January 17, 2018 and March 17, 2014 TECHNIQUE:  CT examination of the abdomen and pelvis was performed  Reformatted images were created in axial, sagittal, and coronal planes  Radiation dose length product (DLP) for this visit:  539 99 mGy-cm   This examination, like all CT scans performed in the [facility], was performed utilizing techniques to minimize radiation dose exposure, including the use of iterative  reconstruction and automated exposure control  IV Contrast:  100 mL of iohexol (OMNIPAQUE) Enteric Contrast:  Enteric contrast was not administered  FINDINGS: ABDOMEN LOWER CHEST:  No clinically significant abnormality identified in the visualized lower chest  LIVER/BILIARY TREE:  Unremarkable  GALLBLADDER:  Gallbladder surgically absent  SPLEEN:  Unremarkable  PANCREAS:  Unremarkable  ADRENAL GLANDS:  Unremarkable  KIDNEYS/URETERS:  Unremarkable  No hydronephrosis  STOMACH AND BOWEL:  Stomach unremarkable   Prior resection of the splenic flexure and oversewn sigmoid remnant with proximal suture line contiguous with a segment of thick-walled mildly dilated distal small bowel  Adjacent mesenteric edema noted  No focal extraluminal fluid collection is appreciated  No free air  Thickened wall sigmoid remnant suggested  Left lower quadrant transverse colostomy present  APPENDIX:  No findings to suggest appendicitis  ABDOMINOPELVIC CAVITY:  No free intraperitoneal air  No lymphadenopathy  VESSELS:  Unremarkable for patient's age  PELVIS REPRODUCTIVE ORGANS:  Heterogeneous texture to the globular shaped uterine fundus consistent with leiomyomatous changes  URINARY BLADDER:  Unremarkable  ABDOMINAL WALL/INGUINAL REGIONS:  Midline fat-containing supraumbilical ventral hernia with 7 millimeter rent  Anterior abdominal pelvic wall postop changes  OSSEOUS STRUCTURES:  No acute fracture or destructive osseous lesion  Impression: Status post left partial colectomy with distal left transverse colostomy and oversewn sigmoid remnant  Residual recurrent inflammatory changes noted at the proximal tip of the sigmoid remnant with adjacent thickened walled small bowel segment and mesenteric edema  Cholecystectomy  Leiomyomatous uterus  Workstation performed: [id number]     Ct Abdomen Pelvis W Contrast    Result Date: 1/17/2018  Narrative: CT ABDOMEN AND PELVIS WITH IV CONTRAST INDICATION:  Nausea, multiple bowel surgeries  COMPARISON: CT abdomen pelvis 12/29/2017, obstruction series from yesterday TECHNIQUE:  CT examination of the abdomen and pelvis was performed  Reformatted images were created in axial, sagittal, and coronal planes  Radiation dose length product (DLP) for this visit:  729 41 mGy-cm   This examination, like all CT scans performed in the [facility], was performed utilizing techniques to minimize radiation dose exposure, including the use of iterative  reconstruction and automated exposure control   IV Contrast:  100 mL of iohexol (OMNIPAQUE)         Enteric Contrast:  Enteric contrast was administered  FINDINGS: ABDOMEN LOWER CHEST:  Mild dependent hypoventilatory changes  LIVER/BILIARY TREE:  Unremarkable  GALLBLADDER:  Gallbladder is surgically absent  SPLEEN:  Unremarkable  PANCREAS:  Unremarkable  ADRENAL GLANDS:  Unremarkable  KIDNEYS/URETERS:  Unremarkable  No hydronephrosis  STOMACH AND BOWEL:  The stomach is poorly distended, evaluation limited  There is no evidence of bowel obstruction  There is a mildly thick-walled loop of distal ileum seen such as series 2 image 62  This could represent residual or recurrent inflammatory or infectious enteritis  Oversewn sigmoid colon  There is contrast material seen within the colon without evidence of contrast extravasation  APPENDIX:  No findings to suggest appendicitis  ABDOMINOPELVIC CAVITY:  No free intraperitoneal air  No lymphadenopathy  There is mild fat stranding seen in the pelvis which may represent sequela of recent surgery  No rim-enhancing collection to suggest abscess  Trace presacral edema  There is trace ascites in the mesentery  VESSELS:  Unremarkable for patient's age  PELVIS REPRODUCTIVE ORGANS:  Lobulated fibroid uterus again noted  URINARY BLADDER:  Unremarkable  ABDOMINAL WALL/INGUINAL REGIONS:  Left lower quadrant colostomy  There is mild fat streaking in the ventral abdominal wall related to recent surgery  No focal fluid collections  Superficial midline abdominal surgical staples  OSSEOUS STRUCTURES:  No acute fracture or destructive osseous lesion  Mild degenerative changes lower thoracic spine  Impression: Postoperative changes as above  There is no evidence of bowel obstruction  However, there is a short segment of mildly thick-walled distal ileum in the pelvis (image 62) which may represent recurrent or persistent infectious or inflammatory enteritis  Mild fat stranding in the pelvis may be postoperative  Trace mesenteric ascites  No evidence of intra-abdominal abscess   Workstation performed: [id number]     [facility] Records Reviewed: Yes     ** Please Note: This note has been constructed using a voice recognition system   **

## 2018-02-13 NOTE — PROGRESS NOTES
Arrived via stretcher from ER to Room 417  Oriented to surroundings and call system  Call bell placed in reach

## 2018-02-13 NOTE — LETTER
February 20, 2018     [address] [place] [address]    Patient: Dania Braun   YOB: 1965   Date of Visit: 2/13/2018       Dear Dr Zayra Clarke:    Thank you for referring [facility] to me for evaluation  Below are the relevant portions of my H&P  If you have questions, please do not hesitate to call me  I look forward to following Vikki Lebron along with you  Sincerely,        No name on file  CC: No Recipients  Kamlesh Wood DO  2/13/2018  6:30 PM  Signed  History and Physical - [facility] Internal Medicine    Patient Information: Dania Braun 47 y o  female MRN: [id number]  Unit/Bed#: 278-72 Encounter: [id number]  Admitting Physician: Kamlesh Wood DO  PCP: Amber Goyal DO  Date of Admission:  02/13/18    Assessment/Plan:    Hospital Problem List:     Principal Problem:    Dehydration  Active Problems:    Esophageal reflux    Hypertension    Acute hypokalemia    Ileostomy in place [facility])    Intractable vomiting with nausea   Hypomagnesemia  Hypokalemia    Plan for the Primary Problem(s):  · Dehydration/hypokalemia/hypomagnesemia  · IV fluid support, replace electrolytes and closely monitor labs  · Given severe electrolyte abnormalities patient needs continuous telemetry monitoring    Plan for Additional Problems:   · Suspected GERD/possible gastritis/possible esophagitis  · Twice daily PPI  · Add Carafate  · Clear liquid diet tonight, will advance to bland diet tomorrow  · History of recent bowel surgery due to severe diverticulitis    VTE Prophylaxis: Enoxaparin (Lovenox)  / sequential compression device   Code Status: full cde  POLST: There is no POLST form on file for this patient (pre-hospital)    Anticipated Length of Stay:  Patient will be admitted on an Inpatient basis with an anticipated length of stay of  greater than 2 midnights     Justification for Hospital Stay:  Severe electrolyte abnormalities which will require ongoing cardiac monitoring, given severity of both hypomagnesemia and hypokalemia patient will need prolonged inpatient stay  Chief Complaint:   Recurrent nausea and vomiting    History of Present Illness:    Marilu Flores is a 47 y o  female who presents with recurrent episodes of nausea vomiting, difficulty maintaining adequate p o  intake  Patient denies any significant abdominal pain at the time my examination  The patient has had good output from her ostomy  Review of Systems:    Review of Systems   All other systems reviewed and are negative  Past Medical and Surgical History:     Past Medical History:   Diagnosis Date    Disease of thyroid gland     Gastroesophageal reflux disease without esophagitis 12/4/2017    Hyperlipidemia     Hypertension     Vitamin D deficiency disease        Past Surgical History:   Procedure Laterality Date    CHOLECYSTECTOMY      COLOSTOMY      LAPAROTOMY N/A 1/4/2018    Procedure: LAPAROTOMY EXPLORATORY, 2nd look, small bowel resection, ilieostomy closure via handsown anastamosis transverse colostomy;  Surgeon: Ad Castorena DO;  Location:  MAIN OR;  Service: General    [place] ESOPHAGOGASTRODUODENOSCOPY TRANSORAL DIAGNOSTIC N/A 10/21/2016    Procedure: EGD AND COLONOSCOPY;  Surgeon: Cierra Barrett MD;  [facility]: [facility];  Service: Gastroenterology    [place] PART REMOVAL COLON W ANASTOMOSIS N/A 1/3/2018    Procedure: Exploratory laparotomy, RESECTION COLON SIGMOID, possible ostomy;  Surgeon: Anup Byrne DO;  Location: [facility];  Service: General    THYROIDECTOMY, PARTIAL      TONSILLECTOMY AND ADENOIDECTOMY      TUBAL LIGATION      URETERAL STENT PLACEMENT Bilateral 1/3/2018    Procedure: INSERTION STENT URETERAL;  Surgeon: Davion Avery MD;  Location: [facility];  Service: Urology       Meds/Allergies:    Prior to Admission medications    Medication Sig Start Date End Date Taking?  Authorizing Provider   atorvastatin (LIPITOR) 10 mg tablet Take 10 mg by mouth daily   Yes Historical Provider, MD   calcium carbonate (TUMS) 500 mg chewable tablet Chew 1 tablet 3 (three) times a day as needed for indigestion or heartburn 1/18/18  Yes Sanjeev Hawkins MD   Cholecalciferol (VITAMIN D) 2000 UNITS CAPS Take 1 capsule by mouth daily     Yes Historical Provider, MD   lansoprazole (PREVACID) 30 mg capsule Take 30 mg by mouth daily   Yes Historical Provider, MD   lisinopril (ZESTRIL) 10 mg tablet Take 10 mg by mouth daily   Yes Historical Provider, MD   metoprolol tartrate (LOPRESSOR) 25 mg tablet Take 0 5 tablets by mouth every 12 (twelve) hours 1/18/18  Yes Sanjeev Hawkins MD   ondansetron (ZOFRAN-ODT) 4 mg disintegrating tablet Take 1 tablet by mouth every 6 (six) hours as needed for nausea or vomiting 12/11/17  Yes Escobar Stubbs DO   acetaminophen (TYLENOL) 325 mg tablet Take 2 tablets by mouth every 4 (four) hours as needed for mild pain for up to 30 days Do not exceed 4 g daily  1/18/18 2/17/18  Sanjeev Hawkins MD   famotidine (PEPCID) 20 mg tablet Take 1 tablet by mouth 2 (two) times a day 12/11/17   Case CM Stubbs DO   LORazepam (ATIVAN) 0 5 mg tablet Take 1 tablet (0 5 mg total) by mouth daily at bedtime  Patient taking differently: Take 0 5 mg by mouth daily at bedtime Takes PRN at bedtime  2/1/18   Bayron Sarah DO   aspirin 81 MG tablet Take 81 mg by mouth daily  2/13/18  Historical Provider, MD   polyethylene glycol (MIRALAX) 17 g packet Take 17 g by mouth daily as needed (Constipation) 1/18/18 2/13/18  Sanjeev Hawkins MD     I have reviewed home medications with patient personally      Allergies: No Known Allergies    Social History:     Marital Status: /Civil Union   Substance Use History:   History   Alcohol Use    Yes     Comment: social     History   Smoking Status    Former Smoker   Smokeless Tobacco    Never Used     History   Drug Use No       Family History:    non-contributory    Physical Exam:     Vitals:   Blood Pressure: 160/87 (02/13/18 1646)  Pulse: 86 (02/13/18 1646)  Temperature: 99 9 °F (37 7 °C) (02/13/18 1646)  Temp Source: Temporal (02/13/18 1646)  Respirations: 18 (02/13/18 1646)  Height: 5' 2" (157 5 cm) (02/13/18 1646)  Weight - Scale: 82 8 kg (182 lb 8 7 oz) (02/13/18 1646)  SpO2: 96 % (02/13/18 1646)    Physical Exam   Constitutional: She is oriented to person, place, and time  She appears well-developed and well-nourished  No distress  Pulmonary/Chest: Effort normal and breath sounds normal  No respiratory distress  She has no wheezes  Abdominal: Soft  Bowel sounds are normal  She exhibits no distension  There is no tenderness  Ostomy with liquid brown stool   Neurological: She is alert and oriented to person, place, and time  No cranial nerve deficit  Coordination normal    Skin: She is not diaphoretic  Psychiatric: She has a normal mood and affect  Her behavior is normal  Judgment and thought content normal    Nursing note and vitals reviewed  Additional Data:     Lab Results: I have personally reviewed pertinent reports  Results from last 7 days  Lab Units 02/13/18  1251   WBC Thousand/uL 9 77   HEMOGLOBIN g/dL 13 2   HEMATOCRIT % 39 8   PLATELETS Thousands/uL 369   NEUTROS PCT % 75   LYMPHS PCT % 15   MONOS PCT % 9   EOS PCT % 1       Results from last 7 days  Lab Units 02/13/18  1706 02/13/18  1251   SODIUM mmol/L 139 138   POTASSIUM mmol/L 2 8* 2 3*   CHLORIDE mmol/L 103 96*   CO2 mmol/L 26 32   BUN mg/dL 4* 4*   CREATININE mg/dL 0 57* 0 82   CALCIUM mg/dL 7 1* 7 9*   TOTAL PROTEIN g/dL  --  7 4   BILIRUBIN TOTAL mg/dL  --  1 00   ALK PHOS U/L  --  138*   ALT U/L  --  29   AST U/L  --  35   GLUCOSE RANDOM mg/dL 92 133           Imaging: I have personally reviewed pertinent reports  Xr Abdomen Obstruction Series    Result Date: 1/16/2018  Narrative: OBSTRUCTION SERIES INDICATION:  Vomiting  COMPARISON: Plain radiographs January 4, 2018 and CT abdomen pelvis December 29, 2017   VIEWS:  (Supine, erect abdomen and upright chest) IMAGES:  7 FINDINGS: There is been interval surgery with midline abdominal incision  Small bowel mildly dilated and air-filled  Differential fluid levels in the erect position  Several loops of small bowel in the left upper quadrant demonstrate wall thickening  Paucity of air in the large bowel, consistent with history of ileostomy  Flat Sebastian-Mcmahon drainage catheter in the left hemipelvis  No free air beneath the hemidiaphragms  No pathologic calcifications or soft tissue masses evident  Osseous structures are unremarkable  Examination of the chest reveals a normal cardiomediastinal silhouette  Lung volumes diminished  Linear atelectasis or scarring in the left upper lobe and right middle lobe  Right upper extremity PICC line with tip in superior vena cava  Impression: Mild abnormal small bowel gas pattern representing ileus versus partial small bowel obstruction  Follow-up recommended  Consider follow-up CT scan of the abdomen and pelvis given the history of multiple surgeries and ileostomy  Workstation performed: [id number]     Ct Abdomen Pelvis With Contrast    Result Date: 2/13/2018  Narrative: CT ABDOMEN AND PELVIS WITH IV CONTRAST INDICATION: Weight loss, unintended, non-localized abd pain  History taken directly from the electronic ordering system  Patient complains of vomiting and nausea passed 2 weeks  Multiple bowel surgeries  COMPARISON: January 17, 2018 and March 17, 2014 TECHNIQUE:  CT examination of the abdomen and pelvis was performed  Reformatted images were created in axial, sagittal, and coronal planes  Radiation dose length product (DLP) for this visit:  539 99 mGy-cm   This examination, like all CT scans performed in the [facility], was performed utilizing techniques to minimize radiation dose exposure, including the use of iterative  reconstruction and automated exposure control   IV Contrast:  100 mL of iohexol (OMNIPAQUE) Enteric Contrast:  Enteric contrast was not administered  FINDINGS: ABDOMEN LOWER CHEST:  No clinically significant abnormality identified in the visualized lower chest  LIVER/BILIARY TREE:  Unremarkable  GALLBLADDER:  Gallbladder surgically absent  SPLEEN:  Unremarkable  PANCREAS:  Unremarkable  ADRENAL GLANDS:  Unremarkable  KIDNEYS/URETERS:  Unremarkable  No hydronephrosis  STOMACH AND BOWEL:  Stomach unremarkable  Prior resection of the splenic flexure and oversewn sigmoid remnant with proximal suture line contiguous with a segment of thick-walled mildly dilated distal small bowel  Adjacent mesenteric edema noted  No focal extraluminal fluid collection is appreciated  No free air  Thickened wall sigmoid remnant suggested  Left lower quadrant transverse colostomy present  APPENDIX:  No findings to suggest appendicitis  ABDOMINOPELVIC CAVITY:  No free intraperitoneal air  No lymphadenopathy  VESSELS:  Unremarkable for patient's age  PELVIS REPRODUCTIVE ORGANS:  Heterogeneous texture to the globular shaped uterine fundus consistent with leiomyomatous changes  URINARY BLADDER:  Unremarkable  ABDOMINAL WALL/INGUINAL REGIONS:  Midline fat-containing supraumbilical ventral hernia with 7 millimeter rent  Anterior abdominal pelvic wall postop changes  OSSEOUS STRUCTURES:  No acute fracture or destructive osseous lesion  Impression: Status post left partial colectomy with distal left transverse colostomy and oversewn sigmoid remnant  Residual recurrent inflammatory changes noted at the proximal tip of the sigmoid remnant with adjacent thickened walled small bowel segment and mesenteric edema  Cholecystectomy  Leiomyomatous uterus  Workstation performed: [id number]     Ct Abdomen Pelvis W Contrast    Result Date: 1/17/2018  Narrative: CT ABDOMEN AND PELVIS WITH IV CONTRAST INDICATION:  Nausea, multiple bowel surgeries   COMPARISON: CT abdomen pelvis 12/29/2017, obstruction series from yesterday TECHNIQUE:  CT examination of the abdomen and pelvis was performed  Reformatted images were created in axial, sagittal, and coronal planes  Radiation dose length product (DLP) for this visit:  729 41 mGy-cm   This examination, like all CT scans performed in the [facility], was performed utilizing techniques to minimize radiation dose exposure, including the use of iterative  reconstruction and automated exposure control  IV Contrast:  100 mL of iohexol (OMNIPAQUE)         Enteric Contrast:  Enteric contrast was administered  FINDINGS: ABDOMEN LOWER CHEST:  Mild dependent hypoventilatory changes  LIVER/BILIARY TREE:  Unremarkable  GALLBLADDER:  Gallbladder is surgically absent  SPLEEN:  Unremarkable  PANCREAS:  Unremarkable  ADRENAL GLANDS:  Unremarkable  KIDNEYS/URETERS:  Unremarkable  No hydronephrosis  STOMACH AND BOWEL:  The stomach is poorly distended, evaluation limited  There is no evidence of bowel obstruction  There is a mildly thick-walled loop of distal ileum seen such as series 2 image 62  This could represent residual or recurrent inflammatory or infectious enteritis  Oversewn sigmoid colon  There is contrast material seen within the colon without evidence of contrast extravasation  APPENDIX:  No findings to suggest appendicitis  ABDOMINOPELVIC CAVITY:  No free intraperitoneal air  No lymphadenopathy  There is mild fat stranding seen in the pelvis which may represent sequela of recent surgery  No rim-enhancing collection to suggest abscess  Trace presacral edema  There is trace ascites in the mesentery  VESSELS:  Unremarkable for patient's age  PELVIS REPRODUCTIVE ORGANS:  Lobulated fibroid uterus again noted  URINARY BLADDER:  Unremarkable  ABDOMINAL WALL/INGUINAL REGIONS:  Left lower quadrant colostomy  There is mild fat streaking in the ventral abdominal wall related to recent surgery  No focal fluid collections  Superficial midline abdominal surgical staples   OSSEOUS STRUCTURES:  No acute fracture or destructive osseous lesion  Mild degenerative changes lower thoracic spine  Impression: Postoperative changes as above  There is no evidence of bowel obstruction  However, there is a short segment of mildly thick-walled distal ileum in the pelvis (image 62) which may represent recurrent or persistent infectious or inflammatory enteritis  Mild fat stranding in the pelvis may be postoperative  Trace mesenteric ascites  No evidence of intra-abdominal abscess  Workstation performed: [id number]     Allscripts / Epic Records Reviewed: Yes     ** Please Note: This note has been constructed using a voice recognition system   **

## 2018-02-14 LAB
ALBUMIN SERPL BCP-MCNC: 2 G/DL (ref 3.5–5)
ALP SERPL-CCNC: 111 U/L (ref 46–116)
ALT SERPL W P-5'-P-CCNC: 24 U/L (ref 12–78)
ANION GAP SERPL CALCULATED.3IONS-SCNC: 10 MMOL/L (ref 4–13)
ANION GAP SERPL CALCULATED.3IONS-SCNC: 11 MMOL/L (ref 4–13)
AST SERPL W P-5'-P-CCNC: 40 U/L (ref 5–45)
BILIRUB SERPL-MCNC: 0.8 MG/DL (ref 0.2–1)
BILIRUB UR QL STRIP: NEGATIVE
BUN SERPL-MCNC: 1 MG/DL (ref 5–25)
BUN SERPL-MCNC: 2 MG/DL (ref 5–25)
CALCIUM SERPL-MCNC: 7.2 MG/DL (ref 8.3–10.1)
CALCIUM SERPL-MCNC: 7.3 MG/DL (ref 8.3–10.1)
CHLORIDE SERPL-SCNC: 107 MMOL/L (ref 100–108)
CHLORIDE SERPL-SCNC: 107 MMOL/L (ref 100–108)
CLARITY UR: CLEAR
CO2 SERPL-SCNC: 24 MMOL/L (ref 21–32)
CO2 SERPL-SCNC: 25 MMOL/L (ref 21–32)
COLOR UR: YELLOW
CREAT SERPL-MCNC: 0.6 MG/DL (ref 0.6–1.3)
CREAT SERPL-MCNC: 0.73 MG/DL (ref 0.6–1.3)
ERYTHROCYTE [DISTWIDTH] IN BLOOD BY AUTOMATED COUNT: 16 % (ref 11.6–15.1)
GFR SERPL CREATININE-BSD FRML MDRD: 104 ML/MIN/1.73SQ M
GFR SERPL CREATININE-BSD FRML MDRD: 94 ML/MIN/1.73SQ M
GLUCOSE SERPL-MCNC: 124 MG/DL (ref 65–140)
GLUCOSE SERPL-MCNC: 87 MG/DL (ref 65–140)
GLUCOSE UR STRIP-MCNC: NEGATIVE MG/DL
HCT VFR BLD AUTO: 38.6 % (ref 34.8–46.1)
HGB BLD-MCNC: 12.3 G/DL (ref 11.5–15.4)
HGB UR QL STRIP.AUTO: NEGATIVE
KETONES UR STRIP-MCNC: NEGATIVE MG/DL
LEUKOCYTE ESTERASE UR QL STRIP: NEGATIVE
MAGNESIUM SERPL-MCNC: 2 MG/DL (ref 1.6–2.6)
MAGNESIUM SERPL-MCNC: 2.3 MG/DL (ref 1.6–2.6)
MCH RBC QN AUTO: 28.6 PG (ref 26.8–34.3)
MCHC RBC AUTO-ENTMCNC: 31.9 G/DL (ref 31.4–37.4)
MCV RBC AUTO: 90 FL (ref 82–98)
NITRITE UR QL STRIP: NEGATIVE
PH UR STRIP.AUTO: 5.5 [PH] (ref 4.5–8)
PHOSPHATE SERPL-MCNC: 3.2 MG/DL (ref 2.7–4.5)
PLATELET # BLD AUTO: 252 THOUSANDS/UL (ref 149–390)
PMV BLD AUTO: 10.7 FL (ref 8.9–12.7)
POTASSIUM SERPL-SCNC: 3 MMOL/L (ref 3.5–5.3)
POTASSIUM SERPL-SCNC: 3.3 MMOL/L (ref 3.5–5.3)
PROT SERPL-MCNC: 5.8 G/DL (ref 6.4–8.2)
PROT UR STRIP-MCNC: NEGATIVE MG/DL
RBC # BLD AUTO: 4.3 MILLION/UL (ref 3.81–5.12)
SODIUM SERPL-SCNC: 142 MMOL/L (ref 136–145)
SODIUM SERPL-SCNC: 142 MMOL/L (ref 136–145)
SP GR UR STRIP.AUTO: 1.01 (ref 1–1.03)
UROBILINOGEN UR QL STRIP.AUTO: 0.2 E.U./DL
WBC # BLD AUTO: 5.17 THOUSAND/UL (ref 4.31–10.16)

## 2018-02-14 PROCEDURE — 80053 COMPREHEN METABOLIC PANEL: CPT | Performed by: INTERNAL MEDICINE

## 2018-02-14 PROCEDURE — 80048 BASIC METABOLIC PNL TOTAL CA: CPT | Performed by: INTERNAL MEDICINE

## 2018-02-14 PROCEDURE — 85027 COMPLETE CBC AUTOMATED: CPT | Performed by: INTERNAL MEDICINE

## 2018-02-14 PROCEDURE — 84100 ASSAY OF PHOSPHORUS: CPT | Performed by: INTERNAL MEDICINE

## 2018-02-14 PROCEDURE — 99232 SBSQ HOSP IP/OBS MODERATE 35: CPT | Performed by: INTERNAL MEDICINE

## 2018-02-14 PROCEDURE — 99024 POSTOP FOLLOW-UP VISIT: CPT | Performed by: SURGERY

## 2018-02-14 PROCEDURE — 83735 ASSAY OF MAGNESIUM: CPT | Performed by: INTERNAL MEDICINE

## 2018-02-14 PROCEDURE — C9113 INJ PANTOPRAZOLE SODIUM, VIA: HCPCS | Performed by: INTERNAL MEDICINE

## 2018-02-14 PROCEDURE — 81003 URINALYSIS AUTO W/O SCOPE: CPT | Performed by: INTERNAL MEDICINE

## 2018-02-14 RX ORDER — POTASSIUM CHLORIDE 20 MEQ/1
40 TABLET, EXTENDED RELEASE ORAL ONCE
Status: COMPLETED | OUTPATIENT
Start: 2018-02-14 | End: 2018-02-14

## 2018-02-14 RX ORDER — SUCRALFATE ORAL 1 G/10ML
1000 SUSPENSION ORAL EVERY 6 HOURS SCHEDULED
Status: DISCONTINUED | OUTPATIENT
Start: 2018-02-14 | End: 2018-02-15 | Stop reason: HOSPADM

## 2018-02-14 RX ADMIN — SODIUM CHLORIDE AND POTASSIUM CHLORIDE 125 ML/HR: .9; .15 SOLUTION INTRAVENOUS at 12:48

## 2018-02-14 RX ADMIN — ONDANSETRON 8 MG: 2 INJECTION INTRAMUSCULAR; INTRAVENOUS at 08:13

## 2018-02-14 RX ADMIN — PANTOPRAZOLE SODIUM 40 MG: 40 INJECTION, POWDER, FOR SOLUTION INTRAVENOUS at 20:44

## 2018-02-14 RX ADMIN — SODIUM CHLORIDE AND POTASSIUM CHLORIDE 125 ML/HR: .9; .15 SOLUTION INTRAVENOUS at 20:46

## 2018-02-14 RX ADMIN — SUCRALFATE 1000 MG: 1 SUSPENSION ORAL at 12:46

## 2018-02-14 RX ADMIN — PANTOPRAZOLE SODIUM 40 MG: 40 INJECTION, POWDER, FOR SOLUTION INTRAVENOUS at 08:05

## 2018-02-14 RX ADMIN — SODIUM CHLORIDE AND POTASSIUM CHLORIDE 125 ML/HR: .9; .15 SOLUTION INTRAVENOUS at 05:05

## 2018-02-14 RX ADMIN — SUCRALFATE 1000 MG: 1 SUSPENSION ORAL at 18:03

## 2018-02-14 RX ADMIN — SUCRALFATE 1000 MG: 1 SUSPENSION ORAL at 23:04

## 2018-02-14 RX ADMIN — POTASSIUM CHLORIDE 40 MEQ: 20 TABLET, EXTENDED RELEASE ORAL at 09:44

## 2018-02-14 NOTE — OCCUPATIONAL THERAPY NOTE
Occupational Therapy         Patient Name: Gabriela Trevino  WLBEB'V Date: 2/14/2018      Order received and chart review performed; per nursing report, pt is performing at Independent level with self-care tasks as well as functional mobility with no device; spoke with pt in room and pt feels she is independent at this time with no difficulties; pt does not require skilled OT needs at this time; will d/c OT orders at this time

## 2018-02-14 NOTE — CONSULTS
Consultation - General Surgery   Janet Camarena 47 y o  female MRN: [id number]  Unit/Bed#: 485-43 Encounter: [id number]    Assessment/Plan     Assessment:  50-year-old female with a history of anxiety, acute complicated diverticulitis status post Pj's procedure and recent discharge from [facility], currently admitted with intermittent nausea vomiting, severe hypokalemia and hypomagnesemia  Etiology of nausea vomiting unclear  CT scan showing no bowel obstruction, mild inflammation of the Pj stump and nearby small bowel  No fluid collection  No fevers  No leukocytosis no abdominal pain    Plan:  -IV fluids  - replete electrolytes  -serial BMP to evaluate electrolytes  -anti the medics  -history of bile reflux and hiatal hernia, would continue PPI  -no antibiotics required this time given the patient is afebrile, no leukocytosis, no abdominal pain and no clinically significant CT scan findings  -no acute surgical intervention required at this time  -I will continue to follow    History of Present Illness     HPI:  Janet Camarena is a 47 y o  female with history of hypertension, anxiety, acute complicated diverticulitis requiring exploratory laparotomy, small-bowel resection, Pj's procedure with creation of colostomy and rectal stump, presents to the ER with intermittent nausea vomiting  The patient was seen in my office recently with noted intermittent nausea vomiting  Patient states that her colostomy is working with gas and stool  She states that she has been having a difficult time managing the colostomy in the it leaks occasionally  Usually her vomiting was associated with change in the back in the smell of the colostomy  However an extensive discussion was had with her and her  in conjunction with the ostomy nurse and the leakage of the colostomy was trouble suited and fixed    However since that time she continues to have intermittent nausea vomiting which as per the patient is has no particular pattern  She is usually able to tolerate liquids but unfortunately solid have been no other difficult  Denies any fevers or chills  No abdominal pain  No shortness of breath  No chest pain  The colostomy as stated before is functioning with gas and stool in the back  No previous history of significant nausea vomiting  Recent EGD and October of 2017, showed a 2 cm hiatal hernia  Normal stomach  Normal duodenum  Noted bowel reflux    Consults    Review of Systems     A 10 point review of systems was conducted, all negative except as noted above HPI      Historical Information   Past Medical History:   Diagnosis Date    Disease of thyroid gland     Gastroesophageal reflux disease without esophagitis 12/4/2017    Hyperlipidemia     Hypertension     Vitamin D deficiency disease      Past Surgical History:   Procedure Laterality Date    CHOLECYSTECTOMY      COLOSTOMY      LAPAROTOMY N/A 1/4/2018    Procedure: LAPAROTOMY EXPLORATORY, 2nd look, small bowel resection, ilieostomy closure via handsown anastamosis transverse colostomy;  Surgeon: Anaya Robles DO;  Location:  MAIN OR;  Service: General    [place] ESOPHAGOGASTRODUODENOSCOPY TRANSORAL DIAGNOSTIC N/A 10/21/2016    Procedure: EGD AND COLONOSCOPY;  Surgeon: Kristy Kevin MD;  [facility];  Service: Gastroenterology    [place] PART REMOVAL COLON W ANASTOMOSIS N/A 1/3/2018    Procedure: Exploratory laparotomy, RESECTION COLON SIGMOID, possible ostomy;  Surgeon: Kayla John DO;  [facility]: [facility];  Service: General    THYROIDECTOMY, PARTIAL      TONSILLECTOMY AND ADENOIDECTOMY      TUBAL LIGATION      URETERAL STENT PLACEMENT Bilateral 1/3/2018    Procedure: INSERTION STENT URETERAL;  Surgeon: Bettina Lunsford MD;  [facility]: [facility];  Service: Urology     Social History   History   Alcohol Use    Yes     Comment: social     History   Drug Use No     History   Smoking Status    Former Smoker Smokeless Tobacco    Never Used     Family History: non-contributory    Meds/Allergies   all current active meds have been reviewed  No Known Allergies    Objective   First Vitals:   Blood Pressure: 160/98 (02/13/18 1221)  Pulse: (!) 116 (02/13/18 1221)  Temperature: 98 9 °F (37 2 °C) (02/13/18 1221)  Temp Source: Temporal (02/13/18 1221)  Respirations: 20 (02/13/18 1221)  Height: 5' 2" (157 5 cm) (02/13/18 1646)  Weight - Scale: 81 4 kg (179 lb 7 3 oz) (02/13/18 1221)  SpO2: 98 % (02/13/18 1221)    Current Vitals:   Blood Pressure: 160/87 (02/13/18 1646)  Pulse: 86 (02/13/18 1646)  Temperature: 99 9 °F (37 7 °C) (02/13/18 1646)  Temp Source: Temporal (02/13/18 1646)  Respirations: 18 (02/13/18 1646)  Height: 5' 2" (157 5 cm) (02/13/18 1646)  Weight - Scale: 82 8 kg (182 lb 8 7 oz) (02/13/18 1646)  SpO2: 96 % (02/13/18 1646)      Intake/Output Summary (Last 24 hours) at 02/13/18 1923  Last data filed at 02/13/18 1853   Gross per 24 hour   Intake             2850 ml   Output                0 ml   Net             2850 ml       Invasive Devices     Peripheral Intravenous Line            Peripheral IV 02/13/18 Left;Ventral (anterior) Antecubital less than 1 day          Drain            Colostomy LLQ 39 days                Physical Exam   General:  No acute distress, resting comfortably  HEENT:  Normocephalic, atraumatic, trachea midline  CV:  S1-S2 all, regular rate and rhythm, no murmurs rubs or gallops  Pulmonary:  Clear to auscultation bilaterally, no wheezes rales rhonchi  GI:  Abdomen noted be abuse, nondistended, soft, nontender, midline incision healing well, left abdominal colostomy appears pink, healthy, viable with air and stool in the back  MSK:  Lower extremities without clubbing cyanosis or edema  Neurologic:  Alert oriented x3, cranial nerves 2-12 grossly intact  Psych:  Appears somewhat anxious, but otherwise mood and affect appropriate      Lab Results:   I have personally reviewed pertinent lab results  , CBC:   Lab Results   Component Value Date    WBC 9 77 02/13/2018    HGB 13 2 02/13/2018    HCT 39 8 02/13/2018    MCV 87 02/13/2018     02/13/2018    MCH 28 9 02/13/2018    MCHC 33 2 02/13/2018    RDW 15 5 (H) 02/13/2018    MPV 10 5 02/13/2018   , CMP:   Lab Results   Component Value Date     02/13/2018    K 2 8 (L) 02/13/2018     02/13/2018    CO2 26 02/13/2018    ANIONGAP 10 02/13/2018    BUN 4 (L) 02/13/2018    CREATININE 0 57 (L) 02/13/2018    GLUCOSE 92 02/13/2018    CALCIUM 7 1 (L) 02/13/2018    AST 35 02/13/2018    ALT 29 02/13/2018    ALKPHOS 138 (H) 02/13/2018    PROT 7 4 02/13/2018    BILITOT 1 00 02/13/2018    EGFR 106 02/13/2018   , Coagulation: No results found for: PT, INR, APTT, Urinalysis: No results found for: Rodrigo Link, SPECGRAV, PHUR, LEUKOCYTESUR, NITRITE, PROTEINUA, GLUCOSEU, KETONESU, BILIRUBINUR, BLOODU, Amylase: No results found for: AMYLASE, Lipase:   Lab Results   Component Value Date    LIPASE 225 02/13/2018     Imaging: I have personally reviewed pertinent films in PACS  EKG, Pathology, and Other Studies: I have personally reviewed pertinent reports

## 2018-02-14 NOTE — PROGRESS NOTES
[facility] Progress Note  Patient: Nathalia Renteria 47 y o  female   MRN: [id number]  PCP: Nikita Babin DO  Unit/Bed#: 359-91 Encounter: [id number]  Date Of Visit: 18    Assessment:    Principal Problem:    Dehydration  Active Problems:    Esophageal reflux    Hypertension    Acute hypokalemia    Ileostomy in place [facility])    Intractable vomiting with nausea      Plan:    · IV fluid/normal saline at 125 mL/hour with 20 mEq of potassium  · Replace electrolytes  · Repeat blood work today at 13:00  · Continue with proton pump inhibitor twice daily, add Carafate  · Add protein supplement  Current Length of Stay: 1 day(s)    Current Patient Status: Inpatient     Code Status: Level 1 - Full Code      Subjective:   Patient still nauseous at times, she is tolerating clear liquids  Objective:     Vitals:   Temp (24hrs), Av °F (37 2 °C), Min:98 4 °F (36 9 °C), Max:99 9 °F (37 7 °C)    HR:  [] 81  Resp:  [17-20] 18  BP: (112-160)/(60-98) 126/60  SpO2:  [92 %-98 %] 94 %  Body mass index is 33 39 kg/m²  Input and Output Summary (last 24 hours): Intake/Output Summary (Last 24 hours) at 18 1139  Last data filed at 18 0809   Gross per 24 hour   Intake             3270 ml   Output              850 ml   Net             2420 ml       Physical Exam:     Physical Exam   Constitutional: She is oriented to person, place, and time  She appears well-developed and well-nourished  No distress  Pulmonary/Chest: Effort normal and breath sounds normal  No respiratory distress  She has no wheezes  Abdominal: Soft  Bowel sounds are normal  She exhibits no distension  There is no tenderness  Neurological: She is alert and oriented to person, place, and time  No cranial nerve deficit  Coordination normal    Skin: She is not diaphoretic  Nursing note and vitals reviewed        Additional Data:     Labs:      Results from last 7 days  Lab Units 18  0615 18  1251   WBC Thousand/uL 5 17 9 77   HEMOGLOBIN g/dL 12 3 13 2   HEMATOCRIT % 38 6 39 8   PLATELETS Thousands/uL 252 369   NEUTROS PCT %  --  75   LYMPHS PCT %  --  15   MONOS PCT %  --  9   EOS PCT %  --  1       Results from last 7 days  Lab Units 02/14/18  0615   SODIUM mmol/L 142   POTASSIUM mmol/L 3 0*   CHLORIDE mmol/L 107   CO2 mmol/L 24   BUN mg/dL 1*   CREATININE mg/dL 0 60   CALCIUM mg/dL 7 3*   TOTAL PROTEIN g/dL 5 8*   BILIRUBIN TOTAL mg/dL 0 80   ALK PHOS U/L 111   ALT U/L 24   AST U/L 40   GLUCOSE RANDOM mg/dL 87           * I Have Reviewed All Lab Data Listed Above  * Additional Pertinent Lab Tests Reviewed:  [facility] Admission Reviewed    Recent Cultures (last 7 days):           Last 24 Hours Medication List:     Current Facility-Administered Medications:  acetaminophen 650 mg Oral Q6H PRN Nitin Nicole DO    enoxaparin 40 mg Subcutaneous Daily Kenrick Madison DO    ondansetron 8 mg Intravenous Q4H PRN Nitin Nicole DO    pantoprazole 40 mg Intravenous Q12H [address] Nitin Nicole DO    sodium chloride 0 9 % with KCl 20 mEq/L 125 mL/hr Intravenous Continuous Nitin Nicole DO Last Rate: 125 mL/hr (02/14/18 0505)   sucralfate 1,000 mg Oral Q6H [address]  13 , DO         Today, Patient Was Seen By: Nitin Nicole DO

## 2018-02-14 NOTE — PHYSICAL THERAPY NOTE
PT SCREEN:                Order received and chart review performed  Spoke with patient who states she is Independent and has been getting up ambulating in room without incident  Pt does not feel she needs therapy since she is ambulating ad jody  No skilled PT indicated, will complete order

## 2018-02-14 NOTE — PLAN OF CARE
Problem: DISCHARGE PLANNING - CARE MANAGEMENT  Goal: Discharge to post-acute care or home with appropriate resources  INTERVENTIONS:  - Conduct assessment to determine patient/family and health care team treatment goals, and need for post-acute services based on payer coverage, community resources, and patient preferences, and barriers to discharge  - Address psychosocial, clinical, and financial barriers to discharge as identified in assessment in conjunction with the patient/family and health care team  - Arrange appropriate level of post-acute services according to patient's   needs and preference and payer coverage in collaboration with the physician and health care team  - Communicate with and update the patient/family, physician, and health care team regarding progress on the discharge plan  - Arrange appropriate transportation to post-acute venues  Outcome: Progressing  -home on dc with outpatient follow up

## 2018-02-14 NOTE — CASE MANAGEMENT
Initial Clinical Review  Admission: Date/Time/Statement: 2/13/18 @ 1458   Orders Placed This Encounter   Procedures    Inpatient Admission (expected length of stay for this patient is greater than two midnights)     Standing Status:   Standing     Number of Occurrences:   1     Order Specific Question:   Admitting Physician     Answer:   Sonia Jane     Order Specific Question:   Level of Care     Answer:   Med Surg [16]     Order Specific Question:   Estimated length of stay     Answer:   More than 2 Midnights     Order Specific Question:   Certification     Answer:   I certify that inpatient services are medically necessary for this patient for a duration of greater than two midnights  See H&P and MD Progress Notes for additional information about the patient's course of treatment  ED: Date/Time/Mode of Arrival:   ED Arrival Information     Expected Arrival Acuity Means of Arrival Escorted By Service Admission Type    - 2/13/2018 12:12 Urgent Walk-In Family Member General Medicine Urgent    Arrival Complaint    vomiting      Chief Complaint:   Chief Complaint   Patient presents with    Vomiting     VOMITING [facility] 2 WEEKS   History of Illness:   Patient complains of recurrent vomiting associated with increasing weakness and anorexia for the past 3 weeks  She had complicated diverticulitis with perforation and ischemic bowel for which she was flown from [facility] to [place] for more comprehensive surgical care  She had a Pj's procedure and was discharged to a nursing home for physical rehab where these symptoms began  She was discharged home but continued to worsen  She has discomfort but no new pain since the surgery  She has been making normal brown/yellow liquid stool  She vomits each time she empties the colostomy bag but also any time she attempts to eat anything  Therefore she stopped eating few days ago  She has been drinking what she can    No hematochezia, melena or hematemesis  No change in symptoms w/BM or voiding  She was given Zofran which is not alleviated her symptoms  Abdominal: Soft  Bowel sounds are normal  She exhibits no distension  There is tenderness (mild, diffusely)  There is no rebound and no guarding  Brown/yellow stool in her colostomy  Site is c/d/i    ED Vital Signs:   ED Triage Vitals [02/13/18 1221]   Temperature Pulse Respirations Blood Pressure SpO2   98 9 °F (37 2 °C) (!) 116 20 160/98 98 %      Temp Source Heart Rate Source Patient Position - Orthostatic VS BP Location FiO2 (%)   Temporal Left Sitting Left arm --      Pain Score       No Pain        Wt Readings from Last 1 Encounters:   02/13/18 82 8 kg (182 lb 8 7 oz)   Vital Signs (abnormal):   T 99 9  Abnormal Labs/Diagnostic Test Results:   K 2 3 CL 96 BUN 4 CA 7 9 ALK PHOS 138 ALB 2 6 LACTIC ACID 2 3 MG 0 9 TSH 3 989  CT A/P=Status post left partial colectomy with distal left transverse colostomy and oversewn sigmoid remnant  Residual recurrent inflammatory changes noted at the proximal tip of the sigmoid remnant with adjacent thickened walled small bowel segment and mesenteric edema  Cholecystectomy   Leiomyomatous uterus  ED Treatment:   Medication Administration from 02/13/2018 1212 to 02/13/2018 1639       Date/Time Order Dose Route Action Action by Comments     02/13/2018 1258 ondansetron (ZOFRAN) injection 4 mg 4 mg Intravenous Given Carmen Hare RN      02/13/2018 1557 sodium chloride 0 9 % bolus 2,000 mL 0 mL Intravenous Stopped Carmen Hare RN      02/13/2018 1328 sodium chloride 0 9 % bolus 2,000 mL 2,000 mL Intravenous New Bag Carmen Hare RN      02/13/2018 1328 potassium chloride (K-DUR,KLOR-CON) CR tablet 20 mEq 20 mEq Oral Given Carmen Hare RN      02/13/2018 1327 potassium chloride 20 mEq IVPB (premix) 20 mEq Intravenous New Bag Carmen Hare RN      02/13/2018 1355 promethazine (PHENERGAN) injection 25 mg 25 mg Intravenous Not Given Silvia Calvin RN dropped vial     02/13/2018 1427 promethazine (PHENERGAN) injection 25 mg 25 mg Intravenous Given Silvia Calvin RN      02/13/2018 1412 iohexol (OMNIPAQUE) 350 MG/ML injection (SINGLE-DOSE) 100 mL 100 mL Intravenous Given Sam Estradaion      02/13/2018 1557 sodium chloride 0 9 % bolus 1,000 mL 1,000 mL Intravenous Cody Koch RN       Past Medical/Surgical History:    Active Ambulatory Problems     Diagnosis Date Noted    Diverticulitis of large intestine with perforation without abscess or bleeding 12/04/2017    Esophageal reflux 12/04/2017    Hyperlipidemia 12/04/2017    Hypertension 12/04/2017    Acute hypokalemia 12/04/2017    Hypoalbuminemia 12/26/2017    Low serum prealbumin 12/27/2017    Severe protein-calorie malnutrition ([facility] [address] ) 12/27/2017    Microscopic hematuria 12/28/2017    Hypomagnesemia 12/28/2017    Acute blood loss anemia 01/01/2018    Diverticulitis of colon 12/26/2017    Hematuria 01/03/2018    [facility]) 01/03/2018    Paroxysmal atrial fibrillation ([facility] [address] ) 01/05/2018    Ileus ([facility] [address] ) 01/18/2018    Ache in joint 09/15/2017    Acute pain 10/12/2017    Anemia 09/14/2016    Chronic constipation 11/27/2017    Diverticulosis 03/17/2014    Erysipelas 12/12/2014    Fatigue 06/21/2016    Leg pain, left 07/17/2015    Non-rheumatic mitral regurgitation 07/22/2016    Primary osteoarthritis of left knee 04/09/5124    Systolic murmur 19/82/5591    Thyroid disorder 09/14/2016    Vaginal dryness, menopausal 02/01/2016    Vitamin D deficiency 06/21/2016    Vulvitis 08/14/2013    Colostomy care ([facility] [place] [address] ) 02/07/2018     Resolved Ambulatory Problems     Diagnosis Date Noted    Pericolonic abscess 12/04/2017     Past Medical History:   Diagnosis Date    Disease of thyroid gland     Gastroesophageal reflux disease without esophagitis 12/4/2017    Hyperlipidemia     Hypertension     Vitamin D deficiency disease Admitting Diagnosis: Dehydration [E86 0]  Acute hypokalemia [E87 6]  Vomiting [R11 10]  Intractable vomiting with nausea [R11 2]  Age/Sex: 47 y o  female  Assessment/Plan:   Principal Problem:    Dehydration  Active Problems:    Esophageal reflux    Hypertension    Acute hypokalemia    Ileostomy in place [facility])    Intractable vomiting with nausea   Hypomagnesemia  Hypokalemia  Plan for the Primary Problem(s):  · Dehydration/hypokalemia/hypomagnesemia  ? IV fluid support, replace electrolytes and closely monitor labs  ? Given severe electrolyte abnormalities patient needs continuous telemetry monitoring  Plan for Additional Problems:   · Suspected GERD/possible gastritis/possible esophagitis  ? Twice daily PPI  ? Add Carafate  ? Clear liquid diet   · History of recent bowel surgery due to severe diverticulitis  Admission Orders:  TELEMETRY  PT/OT JAYRO & [facility]  Scheduled Meds:   Current Facility-Administered Medications:  acetaminophen 650 mg Oral Q6H PRN Rozann Christin, DO    enoxaparin 40 mg Subcutaneous Daily Kenrick Madison, DO    ondansetron 8 mg Intravenous Q4H PRN Rozann Christin, DO    pantoprazole 40 mg Intravenous Q12H [address] Rozann Christin, DO    potassium chloride 40 mEq Oral Once Rozann Christin, DO    sodium chloride 0 9 % with KCl 20 mEq/L 125 mL/hr Intravenous Continuous Rozann Christin, DO Last Rate: 125 mL/hr (02/14/18 0505)   Continuous Infusions:   sodium chloride 0 9 % with KCl 20 mEq/L 125 mL/hr Last Rate: 125 mL/hr (02/14/18 0505)   PRN Meds:   acetaminophen    ondansetron  Thank you,  [facility] [facility] in the [facility] by Yonis Stover for 2017  Network Utilization Review Department  Phone: [phone]; Fax [phone]  ATTENTION: The Network Utilization Review Department is now centralized for our 7 Facilities  Make a note that we have a new phone and fax numbers for our Department   Please call with any questions or concerns to [phone] and carefully follow the prompts so that you are directed to the right person  All voicemails are confidential  Fax any determinations, approvals, denials, and requests for initial or continue stay review clinical to [phone]  Due to HIGH CALL volume, it would be easier if you could please send faxed requests to expedite your requests and in part, help us provide discharge notifications faster

## 2018-02-14 NOTE — SOCIAL WORK
Cm met with the patient to evaluate the patients prior function and living situation and any barriers to d/c and form a safe d/c plan  Cm also evaluated the patient for any services in the home or needs for services  Pt is a readmission as she was recently at [facility] from 1/3-1/18  Pt was then dc'd to [facility] where she was there from 1/18-1/29  Pt resides at home with her spouse and son in a house  Has 18 DEANN then 12 steps to her bedroom/bathroom  Pt is independent with her adls and ambulation  [facility] (no longer active with them)  Only DME is pt's ostomy supplies  PCP is Maylin Palm and pharmacy is [facility] in [facility] [place] [place]  Pt and spouse both drive  Pt plans home on dc with no anticipated CM needs  Cm will continue to follow

## 2018-02-14 NOTE — PROGRESS NOTES
Progress Note - General Surgery   Matthew Canchola 47 y o  female MRN: [id number]  Unit/Bed#: 417-01 Encounter: [id number]    Assessment:  Dehydration, improving  Hypokalemia, potassium today 3 0  Hypomagnesemia  Intactable nausea and vomiting  S/P Lundberg's procedure with end colostomy for complicated diverticulitis last month  No leukocytosis, WBC today 5 17  Esophageal reflux disorder  Hypertension     Plan:  Continue IV fluids for hydration  Replete electrolytes, ongoing  Requires cardiac telemetry  Serial daily BMP to evaluate electrolytes  Antiemetics as needed  Advance bland surgical soft, non-ulcerogenic lite diet, personally discussed with dietitian  NG tube for decompression only  if worsening vomiting  Continue PPI therapy with Protonix bid, Carafate also added by hospitalist physician  No indication for antibiotics required at this time   No acute surgical intervention required at this time  Colostomy care, ongoing  General surgery will continue to follow, Dr Torsten Virk will personally examine the patient later today  DVT prophylaxis, subcu Lovenox and bilateral venous compression boots  No need for wound care of midline abdominal incision, wound appears to be healing nicely and left uncovered open to air  Subjective/Objective   Chief Complaint:  Patient feels improved  She still has occasional nausea though no more vomiting  She denies any abdominal pain at this time  The patient has noticed grumbling in her stomach  Also she has an end-colostomy  , no passage of stool from her rectum  Subjective:  Patient was admitted to the hospital yesterday with probable dehydration  She was found to have marked hypokalemia as well as hypo magnesiumemia  She was started on IV fluids and her electrolytes have started to be repleted by the hospitalist team   She needs continuous telemetry monitoring because of electrolyte abnormalities   Patient has been started on PPI therapy as well as added Carafate by the hospitalist team   The patient was having several episodes of nausea which was intractable and 3 episodes of vomiting  No hematemesis  Her colostomy seems to be functioning well  She has green colored liquid stool in her colostomy bag  She denies any fever or chills  The patient was hospitalized here and then [facility] [place]  She was discharged from [place] on January 18th and then sent to rehab in [place] from January 18th until discharged there on January 29th  Patient underwent a Pj's procedure with colostomy just over a month ago  CT scan 2/13/18 abdomen/pelvis with contrast showed Status post left partial colectomy with distal left transverse colostomy and oversewn sigmoid remnant  Residual recurrent inflammatory changes noted at the proximal tip of the sigmoid remnant with adjacent thickened walled small bowel segment and mesenteric edema  Personally discussed with Dr Nicolasa Armendariz and he believes this to be usual expected findings postop after Pj's procedure about 3 weeks ago    There did not appear to be any loculated fluid collection or suggestion of an intra-abdominal abscess on CT      Active Problems:    Esophageal reflux    Hypertension    Acute hypokalemia    Ileostomy in place [facility])    Intractable vomiting with nausea    Hypomagnesemia    Scheduled Meds:  Current Facility-Administered Medications:  acetaminophen 650 mg Oral Q6H PRN Nitin Nicole, DO    enoxaparin 40 mg Subcutaneous Daily Kenrick Madison, DO    ondansetron 8 mg Intravenous Q4H PRN Nitin Nicole, DO    pantoprazole 40 mg Intravenous Q12H [facility] Nitin Nicole, DO    potassium chloride 40 mEq Oral Once Nitin Nicole, DO    sodium chloride 0 9 % with KCl 20 mEq/L 125 mL/hr Intravenous Continuous Nitin Nicole, DO Last Rate: 125 mL/hr (02/14/18 0505)     Continuous Infusions:  sodium chloride 0 9 % with KCl 20 mEq/L 125 mL/hr Last Rate: 125 mL/hr (02/14/18 0505)     PRN Meds:   acetaminophen   ondansetron    Objective:     Blood pressure 126/60, pulse 81, temperature 98 4 °F (36 9 °C), temperature source Temporal, resp  rate 18, height 5' 2" (1 575 m), weight 82 8 kg (182 lb 8 7 oz), SpO2 94 %  ,Body mass index is 33 39 kg/m²  Intake/Output Summary (Last 24 hours) at 02/14/18 0943  Last data filed at 02/14/18 0809   Gross per 24 hour   Intake             3270 ml   Output              850 ml   Net             2420 ml       Invasive Devices     Peripheral Intravenous Line            Peripheral IV 02/13/18 Left;Ventral (anterior) Antecubital less than 1 day          Drain            Colostomy LLQ 40 days                Physical Exam:  The patient is awake and seems to be in good spirits  She is in no acute distress  Skin warm and dry to touch  No pallor jaundice  HEENT exam shows no scleral icterus  Conjunctiva pink  PERRLA, EOMI  TMs intact  Hearing acuity normal   Oral mucosa is somewhat dry  Nares patent  Neck supple  No increased JVP  Heart regular rate and rhythm  No murmur or gallop  Lungs clear  Back no CVA tenderness  Abdomen appears nondistended  Positive bowel sounds are heard 4 quadrants  There is a well healed midline surgical scar with surgical marks from previous staples which had been removed  The abdomen is soft and nontender  There is a functioning colostomy in the left mid quadrant with liquid green stool present  No sign of any skin irritation, erythema, or parastomal hernia  No calf tenderness no peripheral edema in either lower extremity  Ambulation was not observed  Lab, Imaging and other studies:  I have personally reviewed pertinent lab results       CBC:   Lab Results   Component Value Date    WBC 5 17 02/14/2018    HGB 12 3 02/14/2018    HCT 38 6 02/14/2018    MCV 90 02/14/2018     02/14/2018    MCH 28 6 02/14/2018    MCHC 31 9 02/14/2018    RDW 16 0 (H) 02/14/2018    MPV 10 7 02/14/2018   , CMP:   Lab Results   Component Value Date     02/14/2018 K 3 0 (L) 02/14/2018     02/14/2018    CO2 24 02/14/2018    ANIONGAP 11 02/14/2018    BUN 1 (L) 02/14/2018    CREATININE 0 60 02/14/2018    GLUCOSE 87 02/14/2018    CALCIUM 7 3 (L) 02/14/2018    AST 40 02/14/2018    ALT 24 02/14/2018    ALKPHOS 111 02/14/2018    PROT 5 8 (L) 02/14/2018    BILITOT 0 80 02/14/2018    EGFR 104 02/14/2018     Magnesium   Order: [id number]   Status:  Final result   Visible to patient:  No (Inaccessible in [address]) Next appt:  None     Ref Range & Units 2/14/18 0615 2/13/18 1706    Magnesium 1 6 - 2 6 mg/dL 2 3  0 9        Specimen Collected: 02/14/18 06:15 Last Resulted: 02/14/18 06:51                VTE Pharmacologic Prophylaxis: Enoxaparin (Lovenox)  VTE Mechanical Prophylaxis: sequential compression device     Time spent by this provider including review of all existing documentation, existing laboratory studies and imaging performed on the patient, as well as personal discussion with the consultant surgeon, Dr Alonso Narayanan, was 30 minutes performed by this examiner      Melyssa Bruner PA-C

## 2018-02-14 NOTE — MALNUTRITION/BMI
This medical record reflects one or more clinical indicators suggestive of malnutrition  Please indicate the one diagnosis below which you feel best reflects the clinical picture  Malnutrition Findings:   Malnutrition type: Chronic illness  Degree of Malnutrition: Other severe protein calorie malnutrition   <75% energy intake compared to estimated needs for >1 month,  Weight loss 9 4% in 1 month (1/15/18-2/14/18)    BMI Findings: Body mass index is 33 39 kg/m²  See Nutrition note dated 02/14/2018 for additional details  Completed nutrition assessment is viewable in the nutrition documentation

## 2018-02-15 VITALS
RESPIRATION RATE: 18 BRPM | WEIGHT: 182.54 LBS | TEMPERATURE: 98.3 F | BODY MASS INDEX: 33.59 KG/M2 | HEIGHT: 62 IN | HEART RATE: 81 BPM | DIASTOLIC BLOOD PRESSURE: 81 MMHG | SYSTOLIC BLOOD PRESSURE: 125 MMHG | OXYGEN SATURATION: 97 %

## 2018-02-15 LAB
ALBUMIN SERPL BCP-MCNC: 1.8 G/DL (ref 3.5–5)
ALP SERPL-CCNC: 112 U/L (ref 46–116)
ALT SERPL W P-5'-P-CCNC: 21 U/L (ref 12–78)
ANION GAP SERPL CALCULATED.3IONS-SCNC: 9 MMOL/L (ref 4–13)
AST SERPL W P-5'-P-CCNC: 26 U/L (ref 5–45)
BASOPHILS # BLD AUTO: 0.02 THOUSANDS/ΜL (ref 0–0.1)
BASOPHILS NFR BLD AUTO: 0 % (ref 0–1)
BILIRUB SERPL-MCNC: 0.5 MG/DL (ref 0.2–1)
BUN SERPL-MCNC: 2 MG/DL (ref 5–25)
CALCIUM SERPL-MCNC: 7.4 MG/DL (ref 8.3–10.1)
CHLORIDE SERPL-SCNC: 110 MMOL/L (ref 100–108)
CO2 SERPL-SCNC: 24 MMOL/L (ref 21–32)
CREAT SERPL-MCNC: 0.56 MG/DL (ref 0.6–1.3)
EOSINOPHIL # BLD AUTO: 0.18 THOUSAND/ΜL (ref 0–0.61)
EOSINOPHIL NFR BLD AUTO: 2 % (ref 0–6)
ERYTHROCYTE [DISTWIDTH] IN BLOOD BY AUTOMATED COUNT: 16.4 % (ref 11.6–15.1)
GFR SERPL CREATININE-BSD FRML MDRD: 107 ML/MIN/1.73SQ M
GLUCOSE SERPL-MCNC: 92 MG/DL (ref 65–140)
HCT VFR BLD AUTO: 34.5 % (ref 34.8–46.1)
HGB BLD-MCNC: 10.9 G/DL (ref 11.5–15.4)
LYMPHOCYTES # BLD AUTO: 1.52 THOUSANDS/ΜL (ref 0.6–4.47)
LYMPHOCYTES NFR BLD AUTO: 20 % (ref 14–44)
MAGNESIUM SERPL-MCNC: 1.7 MG/DL (ref 1.6–2.6)
MCH RBC QN AUTO: 28.9 PG (ref 26.8–34.3)
MCHC RBC AUTO-ENTMCNC: 31.6 G/DL (ref 31.4–37.4)
MCV RBC AUTO: 92 FL (ref 82–98)
MONOCYTES # BLD AUTO: 0.55 THOUSAND/ΜL (ref 0.17–1.22)
MONOCYTES NFR BLD AUTO: 7 % (ref 4–12)
NEUTROPHILS # BLD AUTO: 5.3 THOUSANDS/ΜL (ref 1.85–7.62)
NEUTS SEG NFR BLD AUTO: 71 % (ref 43–75)
PLATELET # BLD AUTO: 258 THOUSANDS/UL (ref 149–390)
PMV BLD AUTO: 10.7 FL (ref 8.9–12.7)
POTASSIUM SERPL-SCNC: 3.4 MMOL/L (ref 3.5–5.3)
PROT SERPL-MCNC: 5.4 G/DL (ref 6.4–8.2)
RBC # BLD AUTO: 3.77 MILLION/UL (ref 3.81–5.12)
SODIUM SERPL-SCNC: 143 MMOL/L (ref 136–145)
WBC # BLD AUTO: 7.57 THOUSAND/UL (ref 4.31–10.16)

## 2018-02-15 PROCEDURE — 80053 COMPREHEN METABOLIC PANEL: CPT | Performed by: INTERNAL MEDICINE

## 2018-02-15 PROCEDURE — 83735 ASSAY OF MAGNESIUM: CPT | Performed by: INTERNAL MEDICINE

## 2018-02-15 PROCEDURE — 99238 HOSP IP/OBS DSCHRG MGMT 30/<: CPT | Performed by: INTERNAL MEDICINE

## 2018-02-15 PROCEDURE — 85025 COMPLETE CBC W/AUTO DIFF WBC: CPT | Performed by: INTERNAL MEDICINE

## 2018-02-15 RX ORDER — PANTOPRAZOLE SODIUM 40 MG/1
40 TABLET, DELAYED RELEASE ORAL 2 TIMES DAILY
Qty: 60 TABLET | Refills: 0 | Status: SHIPPED | OUTPATIENT
Start: 2018-02-15 | End: 2018-04-26 | Stop reason: SDUPTHER

## 2018-02-15 RX ORDER — POTASSIUM CHLORIDE 20 MEQ/1
40 TABLET, EXTENDED RELEASE ORAL ONCE
Status: COMPLETED | OUTPATIENT
Start: 2018-02-15 | End: 2018-02-15

## 2018-02-15 RX ORDER — ONDANSETRON 4 MG/1
8 TABLET, ORALLY DISINTEGRATING ORAL EVERY 6 HOURS PRN
Qty: 60 TABLET | Refills: 0 | Status: SHIPPED | OUTPATIENT
Start: 2018-02-15 | End: 2018-03-26

## 2018-02-15 RX ORDER — POTASSIUM CHLORIDE 20 MEQ/1
20 TABLET, EXTENDED RELEASE ORAL DAILY
Qty: 10 TABLET | Refills: 0 | Status: SHIPPED | OUTPATIENT
Start: 2018-02-15 | End: 2018-03-05 | Stop reason: SDUPTHER

## 2018-02-15 RX ADMIN — POTASSIUM CHLORIDE 40 MEQ: 20 TABLET, EXTENDED RELEASE ORAL at 09:03

## 2018-02-15 RX ADMIN — SODIUM CHLORIDE AND POTASSIUM CHLORIDE 125 ML/HR: .9; .15 SOLUTION INTRAVENOUS at 05:41

## 2018-02-15 RX ADMIN — SUCRALFATE 1000 MG: 1 SUSPENSION ORAL at 05:42

## 2018-02-15 NOTE — SOCIAL WORK
Discussed with patient preferences on discharge;understanding how to manage health at home; purpose of taking medications; importance of follow up care/appointments; and symptoms to watch out for once discharged home  Pt is being dc'd home with no Cm needs

## 2018-02-15 NOTE — DISCHARGE SUMMARY
Discharge Summary - [facility] Internal Medicine    Patient Information: Janet Camarena 47 y o  female MRN: [id number]  Unit/Bed#: 563-16 Encounter: [id number]    Discharging Physician / Practitioner: Joni Sarmiento DO  PCP: Daniel Diaz DO  Admission Date: 2/13/2018  Discharge Date: 02/15/18    Disposition:     Home    Reason for Admission: Janet Camarena is a 47 y o  female who presents with recurrent episodes of nausea vomiting, difficulty maintaining adequate p o  intake  Patient denies any significant abdominal pain at the time my examination  The patient has had good output from her ostomy  Discharge Diagnoses:     Principal Problem:    Dehydration  Active Problems:    Esophageal reflux    Hypertension    Acute hypokalemia    Ileostomy in place [facility])    Intractable vomiting with nausea  Resolved Problems:    * No resolved hospital problems  *      Consultations During Hospital Stay:  · General surgical consult    Procedures Performed:     · None    Significant Findings / Test Results:     · Hypokalemia and hypomagnesemia    Incidental Findings:   · None     Test Results Pending at Discharge (will require follow up): · None     Outpatient Tests Requested:  · Repeat BMP and magnesium level in 2 weeks    Complications:  None    Hospital Course:     Janet Camarena is a 47 y o  female patient who originally presented to the hospital on 2/13/2018 due to HPI as noted above, patient received IV fluid hydration, had continuous telemetry monitoring due to severe hypokalemia, had aggressive replacement and monitoring of electrolytes  The patient is tolerating p o  intake, patient had her proton pump inhibitor change from Prevacid to Protonix  Dose increased to twice daily  Patient advised to adhere to a bland diet, patient also advised to increase p o  intake of protein via protein shakes      Condition at Discharge: good     Discharge Day Visit / Exam:     Subjective:  No significant pain, minimal nausea  Vitals: Blood Pressure: 125/81 (02/15/18 0832)  Pulse: 81 (02/15/18 0832)  Temperature: 98 3 °F (36 8 °C) (02/15/18 0832)  Temp Source: Temporal (02/15/18 1459)  Respirations: 18 (02/15/18 0832)  Height: 5' 2" (157 5 cm) (02/13/18 1646)  Weight - Scale: 82 8 kg (182 lb 8 7 oz) (02/13/18 1646)  SpO2: 97 % (02/15/18 0832)  Exam:   Physical Exam   Constitutional: She is oriented to person, place, and time  No distress  Pulmonary/Chest: Effort normal and breath sounds normal  No respiratory distress  She has no wheezes  Abdominal: Bowel sounds are normal    Neurological: She is alert and oriented to person, place, and time  No cranial nerve deficit  Coordination normal    Skin: She is not diaphoretic  Nursing note and vitals reviewed  Discharge instructions/Information to patient and family:   See after visit summary for information provided to patient and family  Provisions for Follow-Up Care:  See after visit summary for information related to follow-up care and any pertinent home health orders  Planned Readmission: no     Discharge Statement:  I spent 20 minutes discharging the patient  This time was spent on the day of discharge  I had direct contact with the patient on the day of discharge  Greater than 50% of the total time was spent examining patient, answering all patient questions, arranging and discussing plan of care with patient as well as directly providing post-discharge instructions  Additional time then spent on discharge activities  Discharge Medications:  See after visit summary for reconciled discharge medications provided to patient and family        ** Please Note: This note has been constructed using a voice recognition system **

## 2018-02-15 NOTE — PLAN OF CARE
Problem: DISCHARGE PLANNING - CARE MANAGEMENT  Goal: Discharge to post-acute care or home with appropriate resources  INTERVENTIONS:  - Conduct assessment to determine patient/family and health care team treatment goals, and need for post-acute services based on payer coverage, community resources, and patient preferences, and barriers to discharge  - Address psychosocial, clinical, and financial barriers to discharge as identified in assessment in conjunction with the patient/family and health care team  - Arrange appropriate level of post-acute services according to patient's   needs and preference and payer coverage in collaboration with the physician and health care team  - Communicate with and update the patient/family, physician, and health care team regarding progress on the discharge plan  - Arrange appropriate transportation to post-acute venues   Outcome: Completed Date Met: 02/15/18  -dc to home with no needs

## 2018-02-15 NOTE — NURSING NOTE
Reviewed AVS with pt,  present at bedside  D/c home, independent with care  VS stable, no signs of acute distress

## 2018-02-16 ENCOUNTER — TRANSITIONAL CARE MANAGEMENT (OUTPATIENT)
Dept: FAMILY MEDICINE CLINIC | Facility: CLINIC | Age: 53
End: 2018-02-16

## 2018-02-16 NOTE — CASE MANAGEMENT
Notification of Discharge  This is a Notification of Discharge from our facility [address]  Please be advised that this patient has been discharge from our facility  Below you will find the admission and discharge date and time including the patients disposition  PRESENTATION DATE: 2/13/2018 12:19 PM  IP ADMISSION DATE: 2/13/18 1458  DISCHARGE DATE: 2/15/2018  9:10 AM  DISPOSITION: [facility] in the [employer] by Yonis Stover for 2017  Network Utilization Review Department  Phone: [phone]; Fax [phone]  ATTENTION: The Network Utilization Review Department is now centralized for our 7 Facilities  Make a note that we have a new phone and fax numbers for our Department  Please call with any questions or concerns to [phone] and carefully follow the prompts so that you are directed to the right person  All voicemails are confidential  Fax any determinations, approvals, denials, and requests for initial or continue stay review clinical to [phone]  Due to HIGH CALL volume, it would be easier if you could please send faxed requests to expedite your requests and in part, help us provide discharge notifications faster

## 2018-02-19 ENCOUNTER — TRANSITIONAL CARE MANAGEMENT (OUTPATIENT)
Dept: FAMILY MEDICINE CLINIC | Facility: CLINIC | Age: 53
End: 2018-02-19

## 2018-03-01 ENCOUNTER — APPOINTMENT (OUTPATIENT)
Dept: WOUND CARE | Facility: HOSPITAL | Age: 53
End: 2018-03-01
Payer: COMMERCIAL

## 2018-03-01 ENCOUNTER — TRANSCRIBE ORDERS (OUTPATIENT)
Dept: LAB | Facility: MEDICAL CENTER | Age: 53
End: 2018-03-01

## 2018-03-01 ENCOUNTER — LAB (OUTPATIENT)
Dept: LAB | Facility: MEDICAL CENTER | Age: 53
End: 2018-03-01
Payer: COMMERCIAL

## 2018-03-01 DIAGNOSIS — R11.2 NAUSEA AND VOMITING, INTRACTABILITY OF VOMITING NOT SPECIFIED, UNSPECIFIED VOMITING TYPE: ICD-10-CM

## 2018-03-01 DIAGNOSIS — K94.00 COMPLICATION OF COLOSTOMY (HCC): Primary | ICD-10-CM

## 2018-03-01 DIAGNOSIS — R11.2 INTRACTABLE VOMITING WITH NAUSEA: ICD-10-CM

## 2018-03-01 LAB
ALBUMIN SERPL BCP-MCNC: 2.7 G/DL (ref 3.5–5)
ALP SERPL-CCNC: 130 U/L (ref 46–116)
ALT SERPL W P-5'-P-CCNC: 37 U/L (ref 12–78)
ANION GAP SERPL CALCULATED.3IONS-SCNC: 8 MMOL/L (ref 4–13)
AST SERPL W P-5'-P-CCNC: 37 U/L (ref 5–45)
BILIRUB SERPL-MCNC: 1.24 MG/DL (ref 0.2–1)
BUN SERPL-MCNC: 3 MG/DL (ref 5–25)
CALCIUM SERPL-MCNC: 9 MG/DL (ref 8.3–10.1)
CHLORIDE SERPL-SCNC: 99 MMOL/L (ref 100–108)
CO2 SERPL-SCNC: 29 MMOL/L (ref 21–32)
CREAT SERPL-MCNC: 0.67 MG/DL (ref 0.6–1.3)
GFR SERPL CREATININE-BSD FRML MDRD: 101 ML/MIN/1.73SQ M
GLUCOSE P FAST SERPL-MCNC: 101 MG/DL (ref 65–99)
POTASSIUM SERPL-SCNC: 2.9 MMOL/L (ref 3.5–5.3)
PROT SERPL-MCNC: 7.5 G/DL (ref 6.4–8.2)
SODIUM SERPL-SCNC: 136 MMOL/L (ref 136–145)

## 2018-03-01 PROCEDURE — 80053 COMPREHEN METABOLIC PANEL: CPT

## 2018-03-01 PROCEDURE — 36415 COLL VENOUS BLD VENIPUNCTURE: CPT

## 2018-03-01 PROCEDURE — 99213 OFFICE O/P EST LOW 20 MIN: CPT

## 2018-03-03 ENCOUNTER — APPOINTMENT (OUTPATIENT)
Dept: LAB | Facility: MEDICAL CENTER | Age: 53
End: 2018-03-03
Payer: COMMERCIAL

## 2018-03-03 DIAGNOSIS — R11.2 INTRACTABLE VOMITING WITH NAUSEA, UNSPECIFIED VOMITING TYPE: ICD-10-CM

## 2018-03-03 DIAGNOSIS — R11.2 NAUSEA AND VOMITING, INTRACTABILITY OF VOMITING NOT SPECIFIED, UNSPECIFIED VOMITING TYPE: ICD-10-CM

## 2018-03-03 DIAGNOSIS — K94.00 COMPLICATION OF COLOSTOMY (HCC): ICD-10-CM

## 2018-03-03 DIAGNOSIS — E87.6 HYPOKALEMIA: Primary | ICD-10-CM

## 2018-03-03 LAB
ALBUMIN SERPL BCP-MCNC: 2.7 G/DL (ref 3.5–5)
ALP SERPL-CCNC: 124 U/L (ref 46–116)
ALT SERPL W P-5'-P-CCNC: 36 U/L (ref 12–78)
ANION GAP SERPL CALCULATED.3IONS-SCNC: 9 MMOL/L (ref 4–13)
AST SERPL W P-5'-P-CCNC: 42 U/L (ref 5–45)
BASOPHILS # BLD AUTO: 0.05 THOUSANDS/ΜL (ref 0–0.1)
BASOPHILS NFR BLD AUTO: 1 % (ref 0–1)
BILIRUB SERPL-MCNC: 0.82 MG/DL (ref 0.2–1)
BUN SERPL-MCNC: 3 MG/DL (ref 5–25)
CALCIUM SERPL-MCNC: 8.7 MG/DL (ref 8.3–10.1)
CHLORIDE SERPL-SCNC: 103 MMOL/L (ref 100–108)
CO2 SERPL-SCNC: 28 MMOL/L (ref 21–32)
CREAT SERPL-MCNC: 0.62 MG/DL (ref 0.6–1.3)
EOSINOPHIL # BLD AUTO: 0.14 THOUSAND/ΜL (ref 0–0.61)
EOSINOPHIL NFR BLD AUTO: 2 % (ref 0–6)
ERYTHROCYTE [DISTWIDTH] IN BLOOD BY AUTOMATED COUNT: 15.5 % (ref 11.6–15.1)
GFR SERPL CREATININE-BSD FRML MDRD: 103 ML/MIN/1.73SQ M
GLUCOSE P FAST SERPL-MCNC: 101 MG/DL (ref 65–99)
HCT VFR BLD AUTO: 44.5 % (ref 34.8–46.1)
HGB BLD-MCNC: 14.6 G/DL (ref 11.5–15.4)
LYMPHOCYTES # BLD AUTO: 1.99 THOUSANDS/ΜL (ref 0.6–4.47)
LYMPHOCYTES NFR BLD AUTO: 34 % (ref 14–44)
MCH RBC QN AUTO: 28.6 PG (ref 26.8–34.3)
MCHC RBC AUTO-ENTMCNC: 32.8 G/DL (ref 31.4–37.4)
MCV RBC AUTO: 87 FL (ref 82–98)
MONOCYTES # BLD AUTO: 0.46 THOUSAND/ΜL (ref 0.17–1.22)
MONOCYTES NFR BLD AUTO: 8 % (ref 4–12)
NEUTROPHILS # BLD AUTO: 3.17 THOUSANDS/ΜL (ref 1.85–7.62)
NEUTS SEG NFR BLD AUTO: 55 % (ref 43–75)
NRBC BLD AUTO-RTO: 0 /100 WBCS
PLATELET # BLD AUTO: 412 THOUSANDS/UL (ref 149–390)
PMV BLD AUTO: 10.9 FL (ref 8.9–12.7)
POTASSIUM SERPL-SCNC: 3 MMOL/L (ref 3.5–5.3)
PREALB SERPL-MCNC: 16 MG/DL (ref 18–40)
PROT SERPL-MCNC: 7.1 G/DL (ref 6.4–8.2)
RBC # BLD AUTO: 5.1 MILLION/UL (ref 3.81–5.12)
SODIUM SERPL-SCNC: 140 MMOL/L (ref 136–145)
WBC # BLD AUTO: 5.82 THOUSAND/UL (ref 4.31–10.16)

## 2018-03-03 PROCEDURE — 80053 COMPREHEN METABOLIC PANEL: CPT

## 2018-03-03 PROCEDURE — 36415 COLL VENOUS BLD VENIPUNCTURE: CPT

## 2018-03-03 PROCEDURE — 85025 COMPLETE CBC W/AUTO DIFF WBC: CPT

## 2018-03-03 PROCEDURE — 84134 ASSAY OF PREALBUMIN: CPT

## 2018-03-05 RX ORDER — POTASSIUM CHLORIDE 20 MEQ/1
20 TABLET, EXTENDED RELEASE ORAL DAILY
Qty: 10 TABLET | Refills: 0 | Status: SHIPPED | OUTPATIENT
Start: 2018-03-05 | End: 2018-03-20 | Stop reason: SDUPTHER

## 2018-03-20 ENCOUNTER — OFFICE VISIT (OUTPATIENT)
Dept: SURGERY | Facility: HOSPITAL | Age: 53
End: 2018-03-20
Payer: COMMERCIAL

## 2018-03-20 ENCOUNTER — OFFICE VISIT (OUTPATIENT)
Dept: GASTROENTEROLOGY | Facility: HOSPITAL | Age: 53
End: 2018-03-20
Payer: COMMERCIAL

## 2018-03-20 VITALS
DIASTOLIC BLOOD PRESSURE: 84 MMHG | WEIGHT: 169 LBS | BODY MASS INDEX: 31.1 KG/M2 | HEART RATE: 98 BPM | TEMPERATURE: 98.2 F | HEIGHT: 62 IN | SYSTOLIC BLOOD PRESSURE: 155 MMHG

## 2018-03-20 DIAGNOSIS — R11.14 BILIOUS VOMITING WITH NAUSEA: ICD-10-CM

## 2018-03-20 DIAGNOSIS — E87.6 HYPOKALEMIA: Primary | ICD-10-CM

## 2018-03-20 DIAGNOSIS — R11.2 INTRACTABLE VOMITING WITH NAUSEA, UNSPECIFIED VOMITING TYPE: ICD-10-CM

## 2018-03-20 DIAGNOSIS — E87.6 ACUTE HYPOKALEMIA: ICD-10-CM

## 2018-03-20 DIAGNOSIS — K21.9 GASTROESOPHAGEAL REFLUX DISEASE WITHOUT ESOPHAGITIS: Primary | Chronic | ICD-10-CM

## 2018-03-20 PROCEDURE — 99213 OFFICE O/P EST LOW 20 MIN: CPT | Performed by: PHYSICIAN ASSISTANT

## 2018-03-20 PROCEDURE — 99213 OFFICE O/P EST LOW 20 MIN: CPT | Performed by: SURGERY

## 2018-03-20 RX ORDER — POTASSIUM CHLORIDE 20 MEQ/1
20 TABLET, EXTENDED RELEASE ORAL DAILY
Qty: 30 TABLET | Refills: 1 | Status: ON HOLD | OUTPATIENT
Start: 2018-03-20 | End: 2018-03-27

## 2018-03-20 NOTE — PROGRESS NOTES
[facility] - Outpatient Follow-up Note  Jalen Coburn 47 y o  female MRN: [id number]  Encounter: [id number]          ASSESSMENT AND PLAN:      1  Gastroesophageal reflux disease without esophagitis: admits to hx of GERD, this has been well controlled with protonix 40mg bid  She does not feel like this has been worsening  -continue 40mg protonix bid    2  Bilious vomiting with nausea: admits to nausea and vomiting with globus sensation since 11/2017  She states that she has had decreased appetite and 50-60lb weight loss secondary to not eating since onset  She recently underwent lap small bowel resection, ileosotomy closure and transverse colostomy on 1/4 for perforated sigmoid diverticulitis  Recent CT abdomen/pelvis revealed s/p partial colectomy with distal left transverse colostomy with recurrent inflammatory changes noted in sigmoid remnant and adjacent thickened walled small bowel segment and mesenteric edema  We will start with GES and EGD for further evaluation   -continue zofran as needed   -f/u GES  - Case request operating room: ESOPHAGOGASTRODUODENOSCOPY (EGD)  -f/u after procedures    ______________________________________________________________________    SUBJECTIVE:  48 yo female with pmh  GERD, HLD, HTN and s/p second look  Ex lap small bowel resection, ileosotomy closure and transverse colostomy on 1/4 for perforated sigmoid diverticulitis who failed conservative management  She was seen for post-op appointment today in the office with general surgery and was referred to GI for persistent nausea and vomiting  She admits to hx of reflux and states that the nausea and vomiting is new since 11/2017 prior to her surgery  She admits to 50-60lb weight loss since the start secondary to poor appetite and inability to eat  She admits to epigastric abdominal pain  She denies any melena or hematochezia   She had an EGD and colonoscopy 10/2016 that revealed bile reflux, small hiatal hernia and polypectomy with repeat recommended in 3 years  REVIEW OF SYSTEMS IS OTHERWISE NEGATIVE  Historical Information   Past Medical History:   Diagnosis Date    Disease of thyroid gland     Gastroesophageal reflux disease without esophagitis 12/4/2017    Hyperlipidemia     Hypertension     Vitamin D deficiency disease      Past Surgical History:   Procedure Laterality Date    CHOLECYSTECTOMY      COLOSTOMY      LAPAROTOMY N/A 1/4/2018    Procedure: LAPAROTOMY EXPLORATORY, 2nd look, small bowel resection, ilieostomy closure via handsown anastamosis transverse colostomy;  Surgeon: Eduardo Jett DO;  Location:  MAIN OR;  Service: General    NC ESOPHAGOGASTRODUODENOSCOPY TRANSORAL DIAGNOSTIC N/A 10/21/2016    Procedure: EGD AND COLONOSCOPY;  Surgeon: Monty Kennedy MD;  Location: [facility];  Service: Gastroenterology    NC PART REMOVAL COLON W ANASTOMOSIS N/A 1/3/2018    Procedure: Exploratory laparotomy, RESECTION COLON SIGMOID, possible ostomy;  Surgeon: Shantelle Dawson DO;  Location: [facility];  Service: General    THYROIDECTOMY, PARTIAL      TONSILLECTOMY AND ADENOIDECTOMY      TUBAL LIGATION      URETERAL STENT PLACEMENT Bilateral 1/3/2018    Procedure: INSERTION STENT URETERAL;  Surgeon: Colleen Domingo MD;  Location: [facility];  Service: Urology     Social History   History   Alcohol Use    Yes     Comment: social     History   Drug Use No     History   Smoking Status    Former Smoker   Smokeless Tobacco    Never Used     No family history on file      Meds/Allergies       Current Outpatient Prescriptions:     calcium carbonate (TUMS) 500 mg chewable tablet    Cholecalciferol (VITAMIN D) 2000 UNITS CAPS    LORazepam (ATIVAN) 0 5 mg tablet    ondansetron (ZOFRAN-ODT) 4 mg disintegrating tablet    pantoprazole (PROTONIX) 40 mg tablet    potassium chloride (K-DUR,KLOR-CON) 20 mEq tablet    No Known Allergies        Objective     There were no vitals taken for this visit  PHYSICAL EXAM:      General Appearance:   Alert, cooperative, no distress   HEENT:   Normocephalic, atraumatic, anicteric      Neck:  Supple, symmetrical, trachea midline   Lungs:   Clear to auscultation bilaterally; no rales, rhonchi or wheezing; respirations unlabored    Heart[de-identified]   Regular rate and rhythm; no murmur, rub, or gallop  Abdomen:   Soft, diffuse tenderness, non-distended; normal bowel sounds; no masses, no organomegaly, ostomy noted with brown stool     Genitalia:   Deferred    Rectal:   Deferred    Extremities:  No cyanosis, clubbing or edema        Skin:  No jaundice, rashes, or lesions    Lymph nodes:  No palpable cervical lymphadenopathy        Lab Results:   No visits with results within 1 Day(s) from this visit     Latest known visit with results is:   Appointment on 03/03/2018   Component Date Value    Prealbumin 03/03/2018 16 0*    WBC 03/03/2018 5 82     RBC 03/03/2018 5 10     Hemoglobin 03/03/2018 14 6     Hematocrit 03/03/2018 44 5     MCV 03/03/2018 87     MCH 03/03/2018 28 6     MCHC 03/03/2018 32 8     RDW 03/03/2018 15 5*    MPV 03/03/2018 10 9     Platelets 25/21/9665 412*    nRBC 03/03/2018 0     Neutrophils Relative 03/03/2018 55     Lymphocytes Relative 03/03/2018 34     Monocytes Relative 03/03/2018 8     Eosinophils Relative 03/03/2018 2     Basophils Relative 03/03/2018 1     Neutrophils Absolute 03/03/2018 3 17     Lymphocytes Absolute 03/03/2018 1 99     Monocytes Absolute 03/03/2018 0 46     Eosinophils Absolute 03/03/2018 0 14     Basophils Absolute 03/03/2018 0 05     Sodium 03/03/2018 140     Potassium 03/03/2018 3 0*    Chloride 03/03/2018 103     CO2 03/03/2018 28     Anion Gap 03/03/2018 9     BUN 03/03/2018 3*    Creatinine 03/03/2018 0 62     Glucose, Fasting 03/03/2018 101*    Calcium 03/03/2018 8 7     AST 03/03/2018 42     ALT 03/03/2018 36     Alkaline Phosphatase 03/03/2018 124*    Total Protein 03/03/2018 7 1  Albumin 03/03/2018 2 7*    Total Bilirubin 03/03/2018 0 82     eGFR 03/03/2018 103          Radiology Results:   No results found

## 2018-03-20 NOTE — LETTER
March 26, 2018     Patient: Raúl Ayers   YOB: 1965   Date of Visit: 3/20/2018       To Whom it May Concern:    Raúl Ayers is under my professional care  She was seen in my office on 3/20/2018  If you have any questions or concerns, please don't hesitate to call           Sincerely,          Archie Hernandez DO        CC: No Recipients

## 2018-03-24 ENCOUNTER — APPOINTMENT (OUTPATIENT)
Dept: LAB | Facility: MEDICAL CENTER | Age: 53
End: 2018-03-24
Payer: COMMERCIAL

## 2018-03-24 ENCOUNTER — TRANSCRIBE ORDERS (OUTPATIENT)
Dept: LAB | Facility: MEDICAL CENTER | Age: 53
End: 2018-03-24

## 2018-03-24 DIAGNOSIS — E87.6 HYPOKALEMIA: ICD-10-CM

## 2018-03-24 LAB
ALBUMIN SERPL BCP-MCNC: 2.9 G/DL (ref 3.5–5)
ALP SERPL-CCNC: 101 U/L (ref 46–116)
ALT SERPL W P-5'-P-CCNC: 46 U/L (ref 12–78)
ANION GAP SERPL CALCULATED.3IONS-SCNC: 10 MMOL/L (ref 4–13)
AST SERPL W P-5'-P-CCNC: 57 U/L (ref 5–45)
BILIRUB SERPL-MCNC: 1.79 MG/DL (ref 0.2–1)
BUN SERPL-MCNC: 2 MG/DL (ref 5–25)
CALCIUM SERPL-MCNC: 7.6 MG/DL (ref 8.3–10.1)
CHLORIDE SERPL-SCNC: 98 MMOL/L (ref 100–108)
CO2 SERPL-SCNC: 31 MMOL/L (ref 21–32)
CREAT SERPL-MCNC: 0.68 MG/DL (ref 0.6–1.3)
GFR SERPL CREATININE-BSD FRML MDRD: 100 ML/MIN/1.73SQ M
GLUCOSE P FAST SERPL-MCNC: 92 MG/DL (ref 65–99)
POTASSIUM SERPL-SCNC: 2.5 MMOL/L (ref 3.5–5.3)
PROT SERPL-MCNC: 6.9 G/DL (ref 6.4–8.2)
SODIUM SERPL-SCNC: 139 MMOL/L (ref 136–145)

## 2018-03-24 PROCEDURE — 36415 COLL VENOUS BLD VENIPUNCTURE: CPT

## 2018-03-24 PROCEDURE — 80053 COMPREHEN METABOLIC PANEL: CPT

## 2018-03-26 ENCOUNTER — HOSPITAL ENCOUNTER (OUTPATIENT)
Facility: HOSPITAL | Age: 53
Setting detail: OBSERVATION
Discharge: HOME | End: 2018-03-27
Attending: INTERNAL MEDICINE | Admitting: INTERNAL MEDICINE
Payer: COMMERCIAL

## 2018-03-26 ENCOUNTER — TELEPHONE (OUTPATIENT)
Dept: SURGERY | Facility: HOSPITAL | Age: 53
End: 2018-03-26

## 2018-03-26 DIAGNOSIS — E87.6 HYPOKALEMIA: Primary | ICD-10-CM

## 2018-03-26 DIAGNOSIS — E83.42 HYPOMAGNESEMIA: ICD-10-CM

## 2018-03-26 DIAGNOSIS — R11.2 INTRACTABLE VOMITING WITH NAUSEA, UNSPECIFIED VOMITING TYPE: ICD-10-CM

## 2018-03-26 LAB
ALBUMIN SERPL BCP-MCNC: 2.2 G/DL (ref 3.5–5)
ALP SERPL-CCNC: 87 U/L (ref 46–116)
ALT SERPL W P-5'-P-CCNC: 35 U/L (ref 12–78)
ANION GAP SERPL CALCULATED.3IONS-SCNC: 11 MMOL/L (ref 4–13)
ANION GAP SERPL CALCULATED.3IONS-SCNC: 9 MMOL/L (ref 4–13)
AST SERPL W P-5'-P-CCNC: 28 U/L (ref 5–45)
ATRIAL RATE: 73 BPM
BASOPHILS # BLD AUTO: 0.04 THOUSANDS/ΜL (ref 0–0.1)
BASOPHILS NFR BLD AUTO: 1 % (ref 0–1)
BILIRUB SERPL-MCNC: 1.6 MG/DL (ref 0.2–1)
BILIRUB UR QL STRIP: NEGATIVE
BUN SERPL-MCNC: 3 MG/DL (ref 5–25)
BUN SERPL-MCNC: 4 MG/DL (ref 5–25)
CALCIUM SERPL-MCNC: 6.9 MG/DL (ref 8.3–10.1)
CALCIUM SERPL-MCNC: 7.1 MG/DL (ref 8.3–10.1)
CHLORIDE SERPL-SCNC: 100 MMOL/L (ref 100–108)
CHLORIDE SERPL-SCNC: 103 MMOL/L (ref 100–108)
CLARITY UR: CLEAR
CO2 SERPL-SCNC: 26 MMOL/L (ref 21–32)
CO2 SERPL-SCNC: 33 MMOL/L (ref 21–32)
COLOR UR: YELLOW
CREAT SERPL-MCNC: 0.51 MG/DL (ref 0.6–1.3)
CREAT SERPL-MCNC: 0.63 MG/DL (ref 0.6–1.3)
EOSINOPHIL # BLD AUTO: 0.08 THOUSAND/ΜL (ref 0–0.61)
EOSINOPHIL NFR BLD AUTO: 1 % (ref 0–6)
ERYTHROCYTE [DISTWIDTH] IN BLOOD BY AUTOMATED COUNT: 14 % (ref 11.6–15.1)
GFR SERPL CREATININE-BSD FRML MDRD: 103 ML/MIN/1.73SQ M
GFR SERPL CREATININE-BSD FRML MDRD: 110 ML/MIN/1.73SQ M
GLUCOSE SERPL-MCNC: 103 MG/DL (ref 65–140)
GLUCOSE SERPL-MCNC: 79 MG/DL (ref 65–140)
GLUCOSE UR STRIP-MCNC: NEGATIVE MG/DL
HCT VFR BLD AUTO: 39 % (ref 34.8–46.1)
HGB BLD-MCNC: 13.2 G/DL (ref 11.5–15.4)
HGB UR QL STRIP.AUTO: NEGATIVE
KETONES UR STRIP-MCNC: NEGATIVE MG/DL
LEUKOCYTE ESTERASE UR QL STRIP: NEGATIVE
LYMPHOCYTES # BLD AUTO: 1.26 THOUSANDS/ΜL (ref 0.6–4.47)
LYMPHOCYTES NFR BLD AUTO: 22 % (ref 14–44)
MAGNESIUM SERPL-MCNC: 0.7 MG/DL (ref 1.6–2.6)
MAGNESIUM SERPL-MCNC: 2 MG/DL (ref 1.6–2.6)
MCH RBC QN AUTO: 28.8 PG (ref 26.8–34.3)
MCHC RBC AUTO-ENTMCNC: 33.8 G/DL (ref 31.4–37.4)
MCV RBC AUTO: 85 FL (ref 82–98)
MONOCYTES # BLD AUTO: 0.41 THOUSAND/ΜL (ref 0.17–1.22)
MONOCYTES NFR BLD AUTO: 7 % (ref 4–12)
NEUTROPHILS # BLD AUTO: 3.92 THOUSANDS/ΜL (ref 1.85–7.62)
NEUTS SEG NFR BLD AUTO: 69 % (ref 43–75)
NITRITE UR QL STRIP: NEGATIVE
P AXIS: 21 DEGREES
PH UR STRIP.AUTO: 6 [PH] (ref 4.5–8)
PHOSPHATE SERPL-MCNC: 2.9 MG/DL (ref 2.7–4.5)
PLATELET # BLD AUTO: 187 THOUSANDS/UL (ref 149–390)
PLATELET # BLD AUTO: 193 THOUSANDS/UL (ref 149–390)
PMV BLD AUTO: 11 FL (ref 8.9–12.7)
PMV BLD AUTO: 11.4 FL (ref 8.9–12.7)
POTASSIUM SERPL-SCNC: 2.6 MMOL/L (ref 3.5–5.3)
POTASSIUM SERPL-SCNC: 2.6 MMOL/L (ref 3.5–5.3)
PR INTERVAL: 132 MS
PROT SERPL-MCNC: 5.7 G/DL (ref 6.4–8.2)
PROT UR STRIP-MCNC: NEGATIVE MG/DL
QRS AXIS: 48 DEGREES
QRSD INTERVAL: 94 MS
QT INTERVAL: 408 MS
QTC INTERVAL: 449 MS
RBC # BLD AUTO: 4.59 MILLION/UL (ref 3.81–5.12)
SODIUM SERPL-SCNC: 140 MMOL/L (ref 136–145)
SODIUM SERPL-SCNC: 142 MMOL/L (ref 136–145)
SP GR UR STRIP.AUTO: <=1.005 (ref 1–1.03)
T WAVE AXIS: -1 DEGREES
TROPONIN I SERPL-MCNC: <0.02 NG/ML
TSH SERPL DL<=0.05 MIU/L-ACNC: 2.51 UIU/ML (ref 0.36–3.74)
UROBILINOGEN UR QL STRIP.AUTO: 0.2 E.U./DL
VENTRICULAR RATE: 73 BPM
WBC # BLD AUTO: 5.71 THOUSAND/UL (ref 4.31–10.16)

## 2018-03-26 PROCEDURE — 99284 EMERGENCY DEPT VISIT MOD MDM: CPT

## 2018-03-26 PROCEDURE — 36415 COLL VENOUS BLD VENIPUNCTURE: CPT | Performed by: PHYSICIAN ASSISTANT

## 2018-03-26 PROCEDURE — 81003 URINALYSIS AUTO W/O SCOPE: CPT | Performed by: PHYSICIAN ASSISTANT

## 2018-03-26 PROCEDURE — 80053 COMPREHEN METABOLIC PANEL: CPT | Performed by: PHYSICIAN ASSISTANT

## 2018-03-26 PROCEDURE — 99219 PR INITIAL OBSERVATION CARE/DAY 50 MINUTES: CPT | Performed by: INTERNAL MEDICINE

## 2018-03-26 PROCEDURE — 85049 AUTOMATED PLATELET COUNT: CPT | Performed by: INTERNAL MEDICINE

## 2018-03-26 PROCEDURE — 93005 ELECTROCARDIOGRAM TRACING: CPT | Performed by: PHYSICIAN ASSISTANT

## 2018-03-26 PROCEDURE — 80048 BASIC METABOLIC PNL TOTAL CA: CPT | Performed by: INTERNAL MEDICINE

## 2018-03-26 PROCEDURE — 84484 ASSAY OF TROPONIN QUANT: CPT | Performed by: PHYSICIAN ASSISTANT

## 2018-03-26 PROCEDURE — 96375 TX/PRO/DX INJ NEW DRUG ADDON: CPT

## 2018-03-26 PROCEDURE — 93010 ELECTROCARDIOGRAM REPORT: CPT | Performed by: INTERNAL MEDICINE

## 2018-03-26 PROCEDURE — 85025 COMPLETE CBC W/AUTO DIFF WBC: CPT | Performed by: PHYSICIAN ASSISTANT

## 2018-03-26 PROCEDURE — 83735 ASSAY OF MAGNESIUM: CPT | Performed by: PHYSICIAN ASSISTANT

## 2018-03-26 PROCEDURE — 84100 ASSAY OF PHOSPHORUS: CPT | Performed by: INTERNAL MEDICINE

## 2018-03-26 PROCEDURE — 84443 ASSAY THYROID STIM HORMONE: CPT | Performed by: INTERNAL MEDICINE

## 2018-03-26 PROCEDURE — 96374 THER/PROPH/DIAG INJ IV PUSH: CPT

## 2018-03-26 PROCEDURE — 83735 ASSAY OF MAGNESIUM: CPT | Performed by: INTERNAL MEDICINE

## 2018-03-26 RX ORDER — PROMETHAZINE HYDROCHLORIDE 25 MG/1
25 TABLET ORAL EVERY 6 HOURS PRN
Status: ON HOLD | COMMUNITY
End: 2018-06-15 | Stop reason: ALTCHOICE

## 2018-03-26 RX ORDER — MAGNESIUM SULFATE HEPTAHYDRATE 40 MG/ML
2 INJECTION, SOLUTION INTRAVENOUS ONCE
Status: COMPLETED | OUTPATIENT
Start: 2018-03-26 | End: 2018-03-26

## 2018-03-26 RX ORDER — POTASSIUM CHLORIDE 20MEQ/15ML
40 LIQUID (ML) ORAL ONCE
Status: COMPLETED | OUTPATIENT
Start: 2018-03-26 | End: 2018-03-26

## 2018-03-26 RX ORDER — ACETAMINOPHEN 325 MG/1
650 TABLET ORAL EVERY 6 HOURS PRN
Status: DISCONTINUED | OUTPATIENT
Start: 2018-03-26 | End: 2018-03-27 | Stop reason: HOSPADM

## 2018-03-26 RX ORDER — OXYCODONE HYDROCHLORIDE 5 MG/1
5 TABLET ORAL EVERY 4 HOURS PRN
Status: DISCONTINUED | OUTPATIENT
Start: 2018-03-26 | End: 2018-03-27 | Stop reason: HOSPADM

## 2018-03-26 RX ORDER — LORAZEPAM 0.5 MG/1
0.5 TABLET ORAL
Status: DISCONTINUED | OUTPATIENT
Start: 2018-03-26 | End: 2018-03-27 | Stop reason: HOSPADM

## 2018-03-26 RX ORDER — MAGNESIUM SULFATE HEPTAHYDRATE 40 MG/ML
4 INJECTION, SOLUTION INTRAVENOUS ONCE
Status: DISCONTINUED | OUTPATIENT
Start: 2018-03-26 | End: 2018-03-26 | Stop reason: RX

## 2018-03-26 RX ORDER — OXYCODONE HYDROCHLORIDE 5 MG/1
5 TABLET ORAL EVERY 4 HOURS PRN
Status: ON HOLD | COMMUNITY
End: 2018-06-15 | Stop reason: ALTCHOICE

## 2018-03-26 RX ORDER — POTASSIUM CHLORIDE 14.9 MG/ML
40 INJECTION INTRAVENOUS ONCE
Status: COMPLETED | OUTPATIENT
Start: 2018-03-26 | End: 2018-03-26

## 2018-03-26 RX ORDER — POTASSIUM CHLORIDE AND SODIUM CHLORIDE 900; 300 MG/100ML; MG/100ML
125 INJECTION, SOLUTION INTRAVENOUS CONTINUOUS
Status: DISCONTINUED | OUTPATIENT
Start: 2018-03-26 | End: 2018-03-27 | Stop reason: HOSPADM

## 2018-03-26 RX ORDER — PANTOPRAZOLE SODIUM 40 MG/1
40 TABLET, DELAYED RELEASE ORAL
Status: DISCONTINUED | OUTPATIENT
Start: 2018-03-26 | End: 2018-03-27 | Stop reason: HOSPADM

## 2018-03-26 RX ORDER — SODIUM CHLORIDE 9 MG/ML
250 INJECTION, SOLUTION INTRAVENOUS CONTINUOUS
Status: DISCONTINUED | OUTPATIENT
Start: 2018-03-26 | End: 2018-03-27 | Stop reason: HOSPADM

## 2018-03-26 RX ORDER — PROMETHAZINE HYDROCHLORIDE 25 MG/1
25 TABLET ORAL EVERY 6 HOURS PRN
Status: DISCONTINUED | OUTPATIENT
Start: 2018-03-26 | End: 2018-03-27 | Stop reason: HOSPADM

## 2018-03-26 RX ADMIN — POTASSIUM CHLORIDE 40 MEQ: 200 INJECTION, SOLUTION INTRAVENOUS at 11:46

## 2018-03-26 RX ADMIN — MAGNESIUM SULFATE HEPTAHYDRATE 2 G: 40 INJECTION, SOLUTION INTRAVENOUS at 14:02

## 2018-03-26 RX ADMIN — PANTOPRAZOLE SODIUM 40 MG: 40 TABLET, DELAYED RELEASE ORAL at 17:24

## 2018-03-26 RX ADMIN — LORAZEPAM 0.5 MG: 0.5 TABLET ORAL at 21:10

## 2018-03-26 RX ADMIN — POTASSIUM CHLORIDE 40 MEQ: 20 SOLUTION ORAL at 20:10

## 2018-03-26 RX ADMIN — SODIUM CHLORIDE AND POTASSIUM CHLORIDE 125 ML/HR: 9; 2.98 INJECTION, SOLUTION INTRAVENOUS at 20:10

## 2018-03-26 RX ADMIN — SODIUM CHLORIDE 100 ML/HR: 0.9 INJECTION, SOLUTION INTRAVENOUS at 11:45

## 2018-03-26 RX ADMIN — MAGNESIUM SULFATE HEPTAHYDRATE 2 G: 40 INJECTION, SOLUTION INTRAVENOUS at 12:00

## 2018-03-26 NOTE — ED NOTES
Pt to be admitted   Pt aware and agree to stay     Keri Pitts RN  03/26/18 920 Mily Toussaint RN  03/26/18 4714

## 2018-03-26 NOTE — ASSESSMENT & PLAN NOTE
Persistent nausea vomiting of unclear etiology  Ostomy is working fine  - continue Ativan as needed for anxiety  - will obtain gastric emptying study  - consultation with GI for likely upper endoscopy to rule out any type of forgot source of this persistent vomiting  - continue nutrition supplementation for increased calorie and protein intake

## 2018-03-26 NOTE — H&P
H&P- Luis Armando Teixeira 1965, 47 y o  female MRN: [id number]    Unit/Bed#: 418-01 Encounter: [id number]    Primary Care Provider: Eamon Maddox DO   Date and time admitted to hospital: 3/26/2018  8:32 AM        Hypokalemia   Assessment & Plan    Repeat labs now again in a m  Replace potassium as needed  Hypomagnesemia   Assessment & Plan    Patient received 4 g IV magnesium, repeat magnesium level now and again in a m , replace as needed        Ileostomy in place [facility])   Assessment & Plan    Functioning normally        Hypertension   Assessment & Plan    Home meds        Esophageal reflux   Assessment & Plan    Continue twice daily PPI          VTE Prophylaxis: Enoxaparin (Lovenox)  / reason for no mechanical VTE prophylaxis Not needed   Code Status:  Full code  Anticipated Length of Stay:  Patient will be admitted on an Observation basis with an anticipated length of stay of  less than 2 midnights  Justification for Hospital Stay:  Telemetry monitoring while electrolytes are replaced  Chief Complaint:   Abnormal outpatient blood work    History of Present Illness:    Luis Armando Teixeira is a 47 y o  female who presents with hypokalemia and hypomagnesemia  Patient had routine outpatient blood work, she is asymptomatic  She has no other acute complaints  Review of Systems:    Review of Systems   All other systems reviewed and are negative        Past Medical and Surgical History:     Past Medical History:   Diagnosis Date    Disease of thyroid gland     Gastroesophageal reflux disease without esophagitis 12/4/2017    Hyperlipidemia     Hypertension     Vitamin D deficiency disease        Past Surgical History:   Procedure Laterality Date    CHOLECYSTECTOMY      COLOSTOMY      LAPAROTOMY N/A 1/4/2018    Procedure: LAPAROTOMY EXPLORATORY, 2nd look, small bowel resection, ilieostomy closure via handsown anastamosis transverse colostomy;  Surgeon: Ivy Josue DO;  [facility]; Service: General    [place] ESOPHAGOGASTRODUODENOSCOPY TRANSORAL DIAGNOSTIC N/A 10/21/2016    Procedure: EGD AND COLONOSCOPY;  Surgeon: Joseph Lamb MD;  [facility];  Service: Gastroenterology    [place] PART REMOVAL COLON W ANASTOMOSIS N/A 1/3/2018    Procedure: Exploratory laparotomy, RESECTION COLON SIGMOID, possible ostomy;  Surgeon: René Martínez DO;  [facility]: [facility];  Service: General    THYROIDECTOMY, PARTIAL      TONSILLECTOMY AND ADENOIDECTOMY      TUBAL LIGATION      URETERAL STENT PLACEMENT Bilateral 1/3/2018    Procedure: INSERTION STENT URETERAL;  Surgeon: Ava Barber MD;  [facility]: [facility];  Service: Urology       Meds/Allergies:    Prior to Admission medications    Medication Sig Start Date End Date Taking?  Authorizing Provider   oxyCODONE (ROXICODONE) 5 mg immediate release tablet Take 5 mg by mouth every 4 (four) hours as needed for moderate pain   Yes Historical Provider, MD   pantoprazole (PROTONIX) 40 mg tablet Take 1 tablet (40 mg total) by mouth 2 (two) times a day 2/15/18  Yes Ludwig Skelton DO   potassium chloride (K-DUR,KLOR-CON) 20 mEq tablet Take 1 tablet (20 mEq total) by mouth daily 3/20/18  Yes René Martínez DO   promethazine (PHENERGAN) 25 mg tablet Take 25 mg by mouth every 6 (six) hours as needed for nausea or vomiting   Yes Historical Provider, MD   LORazepam (ATIVAN) 0 5 mg tablet Take 1 tablet (0 5 mg total) by mouth daily at bedtime  Patient taking differently: Take 0 5 mg by mouth daily at bedtime Takes PRN at bedtime  2/1/18   René Martínez DO   calcium carbonate (TUMS) 500 mg chewable tablet Chew 1 tablet 3 (three) times a day as needed for indigestion or heartburn 1/18/18 3/26/18  Radha Goode MD   Cholecalciferol (VITAMIN D) 2000 UNITS CAPS Take 1 capsule by mouth daily    3/26/18  Historical Provider, MD   ondansetron (ZOFRAN-ODT) 4 mg disintegrating tablet Take 2 tablets (8 mg total) by mouth every 6 (six) hours as needed for nausea or vomiting 2/15/18 3/26/18  Sandip Hughes DO     I have reviewed home medications with patient personally  Allergies: No Known Allergies    Social History:     Marital Status: /Civil Union     Substance Use History:   History   Alcohol Use    Yes     Comment: social     History   Smoking Status    Former Smoker   Smokeless Tobacco    Never Used     History   Drug Use No       Family History:    non-contributory    Physical Exam:     Vitals:   Blood Pressure: 133/65 (03/26/18 1635)  Pulse: 76 (03/26/18 1635)  Temperature: 98 8 °F (37 1 °C) (03/26/18 1635)  Temp Source: Temporal (03/26/18 1635)  Respirations: 16 (03/26/18 1635)  Height: 5' 2" (157 5 cm) (03/26/18 1600)  Weight - Scale: 76 6 kg (168 lb 14 oz) (03/26/18 1600)  SpO2: 96 % (03/26/18 1635)    Physical Exam   Constitutional: She is oriented to person, place, and time  She appears well-developed and well-nourished  No distress  Pulmonary/Chest: Effort normal and breath sounds normal  No respiratory distress  She has no wheezes  Abdominal: Soft  Bowel sounds are normal  She exhibits no distension  There is no tenderness  Ostomy with liquid brown stool   Neurological: She is alert and oriented to person, place, and time  No cranial nerve deficit  Coordination normal    Skin: Skin is warm and dry  No rash noted  She is not diaphoretic  No erythema  Psychiatric: She has a normal mood and affect  Her behavior is normal  Judgment and thought content normal    Nursing note and vitals reviewed  Additional Data:     Lab Results: I have personally reviewed pertinent reports          Results from last 7 days  Lab Units 03/26/18  1010   WBC Thousand/uL 5 71   HEMOGLOBIN g/dL 13 2   HEMATOCRIT % 39 0   PLATELETS Thousands/uL 187   NEUTROS PCT % 69   LYMPHS PCT % 22   MONOS PCT % 7   EOS PCT % 1       Results from last 7 days  Lab Units 03/26/18  1010   SODIUM mmol/L 142   POTASSIUM mmol/L 2 6*   CHLORIDE mmol/L 100   CO2 mmol/L 33*   BUN mg/dL 4* CREATININE mg/dL 0 63   CALCIUM mg/dL 7 1*   TOTAL PROTEIN g/dL 5 7*   BILIRUBIN TOTAL mg/dL 1 60*   ALK PHOS U/L 87   ALT U/L 35   AST U/L 28   GLUCOSE RANDOM mg/dL 79           Imaging: I have personally reviewed pertinent reports  No results found  [facility] Records Reviewed: Yes     ** Please Note: This note has been constructed using a voice recognition system   **

## 2018-03-26 NOTE — ED NOTES
Stated she has a poor diet  Has a colostomy and doesn't eat well              [facility]  03/26/18 1208

## 2018-03-26 NOTE — PLAN OF CARE
Problem: Potential for Falls  Goal: Patient will remain free of falls  INTERVENTIONS:  - Assess patient frequently for physical needs  -  Identify cognitive and physical deficits and behaviors that affect risk of falls    -  [place] fall precautions as indicated by assessment   - Educate patient/family on patient safety including physical limitations  - Instruct patient to call for assistance with activity based on assessment  - Modify environment to reduce risk of injury  - Consider OT/PT consult to assist with strengthening/mobility    Outcome: Progressing      Problem: PAIN - ADULT  Goal: Verbalizes/displays adequate comfort level or baseline comfort level  Interventions:  - Encourage patient to monitor pain and request assistance  - Assess pain using appropriate pain scale  - Administer analgesics based on type and severity of pain and evaluate response  - Implement non-pharmacological measures as appropriate and evaluate response  - Consider cultural and social influences on pain and pain management  - Notify physician/advanced practitioner if interventions unsuccessful or patient reports new pain   Outcome: Progressing      Problem: INFECTION - ADULT  Goal: Absence or prevention of progression during hospitalization  INTERVENTIONS:  - Assess and monitor for signs and symptoms of infection  - Monitor lab/diagnostic results  - Monitor all insertion sites, i e  indwelling lines, tubes, and drains  - Monitor endotracheal (as able) and nasal secretions for changes in amount and color  - [place] appropriate cooling/warming therapies per order  - Administer medications as ordered  - Instruct and encourage patient and family to use good hand hygiene technique  - Identify and instruct in appropriate isolation precautions for identified infection/condition   Outcome: Progressing      Problem: SAFETY ADULT  Goal: Maintain or return to baseline ADL function  INTERVENTIONS:  -  Assess patient's ability to carry out ADLs; assess patient's baseline for ADL function and identify physical deficits which impact ability to perform ADLs (bathing, care of mouth/teeth, toileting, grooming, dressing, etc )  - Assess/evaluate cause of self-care deficits   - Assess range of motion  - Assess patient's mobility; develop plan if impaired  - Assess patient's need for assistive devices and provide as appropriate  - Encourage maximum independence but intervene and supervise when necessary  ¯ Involve family in performance of ADLs  ¯ Assess for home care needs following discharge   ¯ Request OT consult to assist with ADL evaluation and planning for discharge  ¯ Provide patient education as appropriate   Outcome: Progressing    Goal: Maintain or return mobility status to optimal level  INTERVENTIONS:  - Assess patient's baseline mobility status (ambulation, transfers, stairs, etc )    - Identify cognitive and physical deficits and behaviors that affect mobility  - Identify mobility aids required to assist with transfers and/or ambulation (gait belt, sit-to-stand, lift, walker, cane, etc )  - [place] fall precautions as indicated by assessment  - Record patient progress and toleration of activity level on Mobility SBAR; progress patient to next Phase/Stage  - Instruct patient to call for assistance with activity based on assessment  - Request Rehabilitation consult to assist with strengthening/weightbearing, etc    Outcome: Progressing    Goal: Patient will remain free of falls  INTERVENTIONS:  - Assess patient frequently for physical needs  -  Identify cognitive and physical deficits and behaviors that affect risk of falls    -  [place] fall precautions as indicated by assessment   - Educate patient/family on patient safety including physical limitations  - Instruct patient to call for assistance with activity based on assessment  - Modify environment to reduce risk of injury  - Consider OT/PT consult to assist with strengthening/mobility Outcome: Progressing      Problem: DISCHARGE PLANNING  Goal: Discharge to home or other facility with appropriate resources  INTERVENTIONS:  - Identify barriers to discharge w/patient and caregiver  - Arrange for needed discharge resources and transportation as appropriate  - Identify discharge learning needs (meds, wound care, etc )  - Arrange for interpretive services to assist at discharge as needed  - Refer to Case Management Department for coordinating discharge planning if the patient needs post-hospital services based on physician/advanced practitioner order or complex needs related to functional status, cognitive ability, or social support system   Outcome: Progressing      Problem: Knowledge Deficit  Goal: Patient/family/caregiver demonstrates understanding of disease process, treatment plan, medications, and discharge instructions  Complete learning assessment and assess knowledge base    Interventions:  - Provide teaching at level of understanding  - Provide teaching via preferred learning methods   Outcome: Progressing

## 2018-03-26 NOTE — PROGRESS NOTES
Assessment/Plan:    Bilious vomiting with nausea  Persistent nausea vomiting of unclear etiology  Ostomy is working fine  - continue Ativan as needed for anxiety  - consultation with GI for likely upper endoscopy to rule out any type of forgot source of this persistent vomiting  - continue nutrition supplementation for increased calorie and protein intake  Acute hypokalemia  - continue p o  potassium supplementation  - recheck BMP       Diagnoses and all orders for this visit:    Hypokalemia  -     potassium chloride (K-DUR,KLOR-CON) 20 mEq tablet; Take 1 tablet (20 mEq total) by mouth daily  -     Comprehensive metabolic panel; Future    Intractable vomiting with nausea, unspecified vomiting type  -     potassium chloride (K-DUR,KLOR-CON) 20 mEq tablet; Take 1 tablet (20 mEq total) by mouth daily  -     NM gastric emptying; Future    Acute hypokalemia    Bilious vomiting with nausea          Subjective:      Patient ID: Maricruz Portillo is a 47 y o  female  54-year-old female with a history of acute diverticulitis status post Pj's procedure now with persistent nausea vomiting and hypokalemia, presents today for follow-up  Overall she is doing okay  She states her appetite is slightly improved she slightly taking in more food she continues with the nutritional supplementation shakes  She still admits to intermittent nausea vomiting  He on as the cannot give any rhyme or reason to what happens  Sometime this because of the smell and she gets sick when she changes her colostomy bag  Other times it just happens and does not seem to be food or time specific  She also complains of some upper abdominal pain  No changes in colostomy output  The following portions of the patient's history were reviewed and updated as appropriate:   She  has a past medical history of Disease of thyroid gland; Gastroesophageal reflux disease without esophagitis (12/4/2017); Hyperlipidemia;  Hypertension; and Vitamin D deficiency disease  She   Patient Active Problem List    Diagnosis Date Noted    Gastroesophageal reflux disease without esophagitis 03/20/2018    Bilious vomiting with nausea 03/20/2018    Dehydration 02/13/2018    Intractable vomiting with nausea 02/13/2018    Colostomy care ([place] [address] ) 02/07/2018    Ileus ([place] [address] ) 01/18/2018    Paroxysmal atrial fibrillation ([place] [address] ) 01/05/2018    Hematuria 01/03/2018    Ileostomy in place ([place] [address] ) 01/03/2018    Acute blood loss anemia 01/01/2018    Microscopic hematuria 12/28/2017    Hypomagnesemia 12/28/2017    Low serum prealbumin 12/27/2017    Severe protein-calorie malnutrition ([place] [address] ) 12/27/2017    Hypoalbuminemia 12/26/2017    Diverticulitis of colon 12/26/2017    Diverticulitis of large intestine with perforation without abscess or bleeding 12/04/2017    Esophageal reflux 12/04/2017    Hyperlipidemia 12/04/2017    Hypertension 12/04/2017    Acute hypokalemia 12/04/2017    Chronic constipation 11/27/2017    Acute pain 10/12/2017    Ache in joint 09/15/2017    Primary osteoarthritis of left knee 09/15/2017    Anemia 09/14/2016    Thyroid disorder 09/14/2016    Non-rheumatic mitral regurgitation 07/22/2016    Fatigue 53/48/3646    Systolic murmur 27/35/9628    Vitamin D deficiency 06/21/2016    Vaginal dryness, menopausal 02/01/2016    Leg pain, left 07/17/2015    Erysipelas 12/12/2014    Diverticulosis 03/17/2014    Vulvitis 08/14/2013     She  has a past surgical history that includes Cholecystectomy; Thyroidectomy, partial; Tonsillectomy and adenoidectomy; Tubal ligation; pr esophagogastroduodenoscopy transoral diagnostic (N/A, 10/21/2016); pr part removal colon w anastomosis (N/A, 1/3/2018); Ureteral stent placement (Bilateral, 1/3/2018); LAPAROTOMY (N/A, 1/4/2018); and Colostomy  Her family history is not on file  She  reports that she has quit smoking  She has never used smokeless tobacco  She reports that she drinks alcohol   She reports that she does not use drugs  Current Outpatient Prescriptions   Medication Sig Dispense Refill    calcium carbonate (TUMS) 500 mg chewable tablet Chew 1 tablet 3 (three) times a day as needed for indigestion or heartburn  0    Cholecalciferol (VITAMIN D) 2000 UNITS CAPS Take 1 capsule by mouth daily        LORazepam (ATIVAN) 0 5 mg tablet Take 1 tablet (0 5 mg total) by mouth daily at bedtime (Patient taking differently: Take 0 5 mg by mouth daily at bedtime Takes PRN at bedtime ) 30 tablet 0    ondansetron (ZOFRAN-ODT) 4 mg disintegrating tablet Take 2 tablets (8 mg total) by mouth every 6 (six) hours as needed for nausea or vomiting 60 tablet 0    pantoprazole (PROTONIX) 40 mg tablet Take 1 tablet (40 mg total) by mouth 2 (two) times a day 60 tablet 0    potassium chloride (K-DUR,KLOR-CON) 20 mEq tablet Take 1 tablet (20 mEq total) by mouth daily 30 tablet 1     No current facility-administered medications for this visit  She has No Known Allergies       Review of Systems   Constitutional: Negative for activity change, appetite change, chills, diaphoresis, fatigue, fever and unexpected weight change  Gastrointestinal: Positive for abdominal pain, nausea and vomiting  Negative for abdominal distention, blood in stool (upper abdominal pain), constipation and diarrhea  Objective:      /84   Pulse 98   Temp 98 2 °F (36 8 °C)   Ht 5' 2" (1 575 m)   Wt 76 7 kg (169 lb)   LMP  (LMP Unknown)   BMI 30 91 kg/m²          Physical Exam   Constitutional: She is oriented to person, place, and time  She appears well-developed  No distress  HENT:   Head: Normocephalic and atraumatic  Eyes: No scleral icterus  Neck: Normal range of motion  Cardiovascular: Normal rate, regular rhythm and normal heart sounds  Exam reveals no gallop and no friction rub  No murmur heard  Pulmonary/Chest: Effort normal  No respiratory distress  She has no wheezes  She has no rales   She exhibits no tenderness  Abdominal: Soft  She exhibits no distension and no mass  There is tenderness (Epigastric)  There is no rebound and no guarding  Colostomy in place with semi formed brown stool  Musculoskeletal: Normal range of motion  She exhibits no edema, tenderness or deformity  Neurological: She is alert and oriented to person, place, and time  No cranial nerve deficit  Skin: Skin is warm  No rash noted  She is not diaphoretic  No erythema  No pallor  Psychiatric:   Appears somewhat less anxious today    Again is emotional

## 2018-03-27 VITALS
WEIGHT: 168.87 LBS | HEART RATE: 80 BPM | BODY MASS INDEX: 31.08 KG/M2 | TEMPERATURE: 98.5 F | RESPIRATION RATE: 18 BRPM | SYSTOLIC BLOOD PRESSURE: 99 MMHG | HEIGHT: 62 IN | OXYGEN SATURATION: 95 % | DIASTOLIC BLOOD PRESSURE: 63 MMHG

## 2018-03-27 PROBLEM — E83.42 HYPOMAGNESEMIA: Status: RESOLVED | Noted: 2017-12-28 | Resolved: 2018-03-27

## 2018-03-27 LAB
ALBUMIN SERPL BCP-MCNC: 1.9 G/DL (ref 3.5–5)
ALP SERPL-CCNC: 77 U/L (ref 46–116)
ALT SERPL W P-5'-P-CCNC: 24 U/L (ref 12–78)
ANION GAP SERPL CALCULATED.3IONS-SCNC: 7 MMOL/L (ref 4–13)
AST SERPL W P-5'-P-CCNC: 22 U/L (ref 5–45)
BILIRUB SERPL-MCNC: 1.1 MG/DL (ref 0.2–1)
BUN SERPL-MCNC: 3 MG/DL (ref 5–25)
CALCIUM SERPL-MCNC: 6.7 MG/DL (ref 8.3–10.1)
CHLORIDE SERPL-SCNC: 109 MMOL/L (ref 100–108)
CO2 SERPL-SCNC: 28 MMOL/L (ref 21–32)
CREAT SERPL-MCNC: 0.52 MG/DL (ref 0.6–1.3)
ERYTHROCYTE [DISTWIDTH] IN BLOOD BY AUTOMATED COUNT: 14.2 % (ref 11.6–15.1)
GFR SERPL CREATININE-BSD FRML MDRD: 109 ML/MIN/1.73SQ M
GLUCOSE SERPL-MCNC: 80 MG/DL (ref 65–140)
HCT VFR BLD AUTO: 36.5 % (ref 34.8–46.1)
HGB BLD-MCNC: 12 G/DL (ref 11.5–15.4)
INR PPP: 1.07 (ref 0.86–1.16)
MAGNESIUM SERPL-MCNC: 1.8 MG/DL (ref 1.6–2.6)
MCH RBC QN AUTO: 28.8 PG (ref 26.8–34.3)
MCHC RBC AUTO-ENTMCNC: 32.9 G/DL (ref 31.4–37.4)
MCV RBC AUTO: 88 FL (ref 82–98)
PHOSPHATE SERPL-MCNC: 3.3 MG/DL (ref 2.7–4.5)
PLATELET # BLD AUTO: 187 THOUSANDS/UL (ref 149–390)
PMV BLD AUTO: 11.4 FL (ref 8.9–12.7)
POTASSIUM SERPL-SCNC: 3.1 MMOL/L (ref 3.5–5.3)
PROT SERPL-MCNC: 5 G/DL (ref 6.4–8.2)
PROTHROMBIN TIME: 13.8 SECONDS (ref 12.1–14.4)
RBC # BLD AUTO: 4.16 MILLION/UL (ref 3.81–5.12)
SODIUM SERPL-SCNC: 144 MMOL/L (ref 136–145)
WBC # BLD AUTO: 4.34 THOUSAND/UL (ref 4.31–10.16)

## 2018-03-27 PROCEDURE — 85027 COMPLETE CBC AUTOMATED: CPT | Performed by: INTERNAL MEDICINE

## 2018-03-27 PROCEDURE — 80053 COMPREHEN METABOLIC PANEL: CPT | Performed by: INTERNAL MEDICINE

## 2018-03-27 PROCEDURE — 84100 ASSAY OF PHOSPHORUS: CPT | Performed by: INTERNAL MEDICINE

## 2018-03-27 PROCEDURE — 85610 PROTHROMBIN TIME: CPT | Performed by: INTERNAL MEDICINE

## 2018-03-27 PROCEDURE — 99217 PR OBSERVATION CARE DISCHARGE MANAGEMENT: CPT | Performed by: INTERNAL MEDICINE

## 2018-03-27 PROCEDURE — 83735 ASSAY OF MAGNESIUM: CPT | Performed by: INTERNAL MEDICINE

## 2018-03-27 RX ORDER — POTASSIUM CHLORIDE 20 MEQ/1
40 TABLET, EXTENDED RELEASE ORAL ONCE
Status: COMPLETED | OUTPATIENT
Start: 2018-03-27 | End: 2018-03-27

## 2018-03-27 RX ORDER — POTASSIUM CHLORIDE 20 MEQ/1
40 TABLET, EXTENDED RELEASE ORAL DAILY
Qty: 30 TABLET | Refills: 0 | Status: SHIPPED | OUTPATIENT
Start: 2018-03-27 | End: 2018-05-03 | Stop reason: SDUPTHER

## 2018-03-27 RX ADMIN — PANTOPRAZOLE SODIUM 40 MG: 40 TABLET, DELAYED RELEASE ORAL at 08:40

## 2018-03-27 RX ADMIN — Medication 400 MG: at 08:40

## 2018-03-27 RX ADMIN — POTASSIUM CHLORIDE 40 MEQ: 1500 TABLET, EXTENDED RELEASE ORAL at 08:40

## 2018-03-27 RX ADMIN — SODIUM CHLORIDE AND POTASSIUM CHLORIDE 125 ML/HR: 9; 2.98 INJECTION, SOLUTION INTRAVENOUS at 05:31

## 2018-03-27 NOTE — PLAN OF CARE
DISCHARGE PLANNING     Discharge to home or other facility with appropriate resources Adequate for Discharge        INFECTION - ADULT     Absence or prevention of progression during hospitalization Adequate for Discharge        Knowledge Deficit     Patient/family/caregiver demonstrates understanding of disease process, treatment plan, medications, and discharge instructions Adequate for Discharge        PAIN - ADULT     Verbalizes/displays adequate comfort level or baseline comfort level Adequate for Discharge        Potential for Falls     Patient will remain free of falls Adequate for Discharge        SAFETY ADULT     Maintain or return to baseline ADL function Adequate for Discharge     Maintain or return mobility status to optimal level Adequate for Discharge     Patient will remain free of falls Adequate for Discharge

## 2018-03-27 NOTE — CASE MANAGEMENT
Initial Clinical Review    Admission: Date/Time/Statement:OBSERVATION 3/26/18@ 1200     Orders Placed This Encounter   Procedures    Place in Observation (expected length of stay for this patient is less than two midnights)     Standing Status:   Standing     Number of Occurrences:   1     Order Specific Question:   Admitting Physician     Answer:   Nidia Pryor     Order Specific Question:   Level of Care     Answer:   Med Surg [16]   ED: Date/Time/Mode of Arrival:   ED Arrival Information     Expected Arrival Acuity Means of Arrival Escorted By Service Admission Type    - 3/26/2018 08:23 Urgent Walk-In Spouse General Medicine Urgent    Arrival Complaint    Abnormal Labs      Chief Complaint:   Chief Complaint   Patient presents with    Abnormal Lab     Pt presents to the ED after her potassium level came back low at 2 5  Pt c/o tingling and cramping in her hands and legs    History of Illness:   Monet Rich is a 47 y o  female who presents with hypokalemia and hypomagnesemia  Patient had routine outpatient blood work, she is asymptomatic  She has no other acute complaints  ED Vital Signs:   ED Triage Vitals [03/26/18 0836]   Temperature Pulse Respirations Blood Pressure SpO2   98 1 °F (36 7 °C) 91 18 143/97 99 %      Temp Source Heart Rate Source Patient Position - Orthostatic VS BP Location FiO2 (%)   Temporal Monitor Lying Left arm --      Pain Score       No Pain        Wt Readings from Last 1 Encounters:   03/26/18 76 6 kg (168 lb 14 oz)   Vital Signs (abnormal):    T 99  Abnormal Labs/Diagnostic Test Results:   TROP NEG  K 3 1 CA 6 7  TBILI 1 10   EKG=  Ventricular Rate BPM 73    Atrial Rate BPM 73    HI Interval ms 132    QRSD Interval ms 94    QT Interval ms 408    QTC Interval ms 449    P [place] degrees 21    QRS Axis degrees 48    T Wave Axis degrees -1    Narrative     Normal sinus rhythm  Nonspecific ST and T wave abnormality  Abnormal ECG  When compared with ECG of 05-JAN-2018 23:02,  Sinus rhythm has replaced Atrial fibrillation  Vent  rate has decreased  BPM  ST no longer depressed in Inferior leads  T wave inversion less evident in Inferior leads  T wave inversion less evident in Lateral leads          ED Treatment:   Medication Administration from 03/26/2018 0823 to 03/26/2018 1553       Date/Time Order Dose Route Action Action by Comments     03/26/2018 1400 potassium chloride 20 mEq IVPB (premix) 0 mEq Intravenous Stopped Erika Linda RN      03/26/2018 1146 potassium chloride 20 mEq IVPB (premix) 40 mEq Intravenous [address] [facility]      03/26/2018 1528 sodium chloride 0 9 % infusion 250 mL/hr Intravenous Rate/Dose Change Lalo Scott RN      03/26/2018 1145 sodium chloride 0 9 % infusion 100 mL/hr Intravenous [address] [facility]      03/26/2018 1400 magnesium sulfate 2 g/50 mL IVPB (premix) 2 g 0 g Intravenous Stopped Renu Alexandre RN      03/26/2018 1200 magnesium sulfate 2 g/50 mL IVPB (premix) 2 g 2 g Intravenous [address] [facility]      03/26/2018 1402 magnesium sulfate 2 g/50 mL IVPB (premix) 2 g 2 g Intravenous New Bag Renu Alexandre RN           Past Medical/Surgical History:    Active Ambulatory Problems     Diagnosis Date Noted    Diverticulitis of large intestine with perforation without abscess or bleeding 12/04/2017    Esophageal reflux 12/04/2017    Hyperlipidemia 12/04/2017    Hypertension 12/04/2017    Hypokalemia 12/04/2017    Hypoalbuminemia 12/26/2017    Low serum prealbumin 12/27/2017    Severe protein-calorie malnutrition ([address] ) 12/27/2017    Microscopic hematuria 12/28/2017    Hypomagnesemia 12/28/2017    Acute blood loss anemia 01/01/2018    Diverticulitis of colon 12/26/2017    Hematuria 01/03/2018    Ileostomy in place [facility]) 01/03/2018    Paroxysmal atrial fibrillation ([facility] ) 01/05/2018    Ileus [address] [address] ) 01/18/2018    Ache in joint 09/15/2017    Acute pain 10/12/2017    Anemia 09/14/2016    Chronic constipation 11/27/2017    Diverticulosis 03/17/2014    Erysipelas 12/12/2014    Fatigue 06/21/2016    Leg pain, left 07/17/2015    Non-rheumatic mitral regurgitation 07/22/2016    Primary osteoarthritis of left knee 31/26/0586    Systolic murmur 61/25/6823    Thyroid disorder 09/14/2016    Vaginal dryness, menopausal 02/01/2016    Vitamin D deficiency 06/21/2016    Vulvitis 08/14/2013    Colostomy care ([facility] ) 02/07/2018    Dehydration 02/13/2018    Intractable vomiting with nausea 02/13/2018    Gastroesophageal reflux disease without esophagitis 03/20/2018    Bilious vomiting with nausea 03/20/2018     Resolved Ambulatory Problems     Diagnosis Date Noted    Pericolonic abscess 12/04/2017     Past Medical History:   Diagnosis Date    Disease of thyroid gland     Gastroesophageal reflux disease without esophagitis 12/4/2017    Hyperlipidemia     Hypertension     Vitamin D deficiency disease        Admitting Diagnosis: Hypokalemia [E87 6]  Hypomagnesemia [E83 42]  Abnormal laboratory test result [R89 9]    Age/Sex: 47 y o  female    Assessment/Plan:   Hypokalemia   Assessment & Plan     Repeat labs now again in a m      Replace potassium as needed           Hypomagnesemia   Assessment & Plan     Patient received 4 g IV magnesium, repeat magnesium level now and again in a m , replace as needed   Admission Orders:  TELEMETRY  Scheduled Meds:   Current Facility-Administered Medications:  acetaminophen 650 mg Oral Q6H PRN Bertha Barefoot, DO    enoxaparin 40 mg Subcutaneous Daily Kenrick Madison, DO    LORazepam 0 5 mg Oral HS Bertha Barefoot, DO    magnesium oxide 400 mg Oral BID With Meals Bertha Barefoot, DO    oxyCODONE 5 mg Oral Q4H PRN Bertha Barefoot, DO    pantoprazole 40 mg Oral BID AC Kenrick Madison, DO    promethazine 25 mg Oral Q6H PRN Bertha Barefoot, DO    sodium chloride 250 mL/hr Intravenous Continuous Nasra Prajapati PA-C Last Rate: Stopped (03/26/18 2004)   sodium chloride 0 9 % with KCl 40 mEq/L 125 mL/hr Intravenous Continuous Tiffany Alanis DO Last Rate: 125 mL/hr (03/27/18 0531)     Continuous Infusions:   sodium chloride 250 mL/hr Last Rate: Stopped (03/26/18 2004)   sodium chloride 0 9 % with KCl 40 mEq/L 125 mL/hr Last Rate: 125 mL/hr (03/27/18 0531)     PRN Meds:   acetaminophen    oxyCODONE    promethazine

## 2018-03-27 NOTE — ED PROVIDER NOTES
History  Chief Complaint   Patient presents with    Abnormal Lab     Pt presents to the ED after her potassium level came back low at 2 5  Pt c/o tingling and cramping in her hands and legs      47 yr female presents at request of her doctor for tx of low potassium  Pt with recent complications from diverticulitis, is s/p large bowel resection and transverse colostomy in January this year  Having normal output from her ostomy (liquid yellow/brown no blood) does not pass anything per rectum, urination normal  Has poor appetite due to ongoing issues with n/v and anorexia  No abdominal pain or back pain  No fevers  Had routine labs done 2 days ago got call this morning potassium was 2 5 to come to ER  Pt does take 20meq potassium suppl daily  Took regular meds this morning  No magnesium supplements  No known renal disease  This is a new but recurrent problem since her colostomy formation  Denies chest pain dizziness palpitations muscle cramping or swelling  History provided by:  Patient and medical records      Prior to Admission Medications   Prescriptions Last Dose Informant Patient Reported? Taking?    LORazepam (ATIVAN) 0 5 mg tablet More than a month at Unknown time Self No No   Sig: Take 1 tablet (0 5 mg total) by mouth daily at bedtime   Patient taking differently: Take 0 5 mg by mouth daily at bedtime Takes PRN at bedtime    oxyCODONE (ROXICODONE) 5 mg immediate release tablet 3/26/2018 at Unknown time  Yes Yes   Sig: Take 5 mg by mouth every 4 (four) hours as needed for moderate pain   pantoprazole (PROTONIX) 40 mg tablet 3/26/2018 at Unknown time Self No Yes   Sig: Take 1 tablet (40 mg total) by mouth 2 (two) times a day   potassium chloride (K-DUR,KLOR-CON) 20 mEq tablet 3/25/2018 at Unknown time  No Yes   Sig: Take 1 tablet (20 mEq total) by mouth daily   promethazine (PHENERGAN) 25 mg tablet Past Week at Unknown time  Yes Yes   Sig: Take 25 mg by mouth every 6 (six) hours as needed for nausea or vomiting      Facility-Administered Medications: None       Past Medical History:   Diagnosis Date    Disease of thyroid gland     Gastroesophageal reflux disease without esophagitis 12/4/2017    Hyperlipidemia     Hypertension     Vitamin D deficiency disease        Past Surgical History:   Procedure Laterality Date    CHOLECYSTECTOMY      COLOSTOMY      LAPAROTOMY N/A 1/4/2018    Procedure: LAPAROTOMY EXPLORATORY, 2nd look, small bowel resection, ilieostomy closure via handsown anastamosis transverse colostomy;  Surgeon: Fred Galeas DO;  Location:  MAIN OR;  Service: General    KS ESOPHAGOGASTRODUODENOSCOPY TRANSORAL DIAGNOSTIC N/A 10/21/2016    Procedure: EGD AND COLONOSCOPY;  Surgeon: Fernandez Phillips MD;  Location: [facility];  Service: Gastroenterology    KS PART REMOVAL COLON W ANASTOMOSIS N/A 1/3/2018    Procedure: Exploratory laparotomy, RESECTION COLON SIGMOID, possible ostomy;  Surgeon: Gianna Cameron DO;  Location: [facility];  Service: General    THYROIDECTOMY, PARTIAL      TONSILLECTOMY AND ADENOIDECTOMY      TUBAL LIGATION      URETERAL STENT PLACEMENT Bilateral 1/3/2018    Procedure: INSERTION STENT URETERAL;  Surgeon: Nicolle Allen MD;  Location: [facility];  Service: Urology       History reviewed  No pertinent family history  I have reviewed and agree with the history as documented  Social History   Substance Use Topics    Smoking status: Former Smoker    Smokeless tobacco: Never Used    Alcohol use Yes      Comment: social        Review of Systems   Constitutional: Positive for appetite change  Negative for activity change, chills, diaphoresis, fatigue, fever and unexpected weight change  HENT: Negative for congestion, ear pain, postnasal drip, rhinorrhea, sinus pressure and sore throat  Eyes: Negative for pain, discharge and redness  Respiratory: Negative for cough, chest tightness and shortness of breath      Cardiovascular: Negative for chest pain, palpitations and leg swelling  Gastrointestinal: Positive for nausea and vomiting  Negative for abdominal distention, abdominal pain, anal bleeding, blood in stool, constipation, diarrhea and rectal pain  Genitourinary: Negative for difficulty urinating, dysuria, flank pain, frequency, hematuria and urgency  Musculoskeletal: Negative for arthralgias, back pain and myalgias  Skin: Negative for color change, rash and wound  Allergic/Immunologic: Negative for immunocompromised state  Neurological: Negative for dizziness, tremors, syncope, weakness, numbness and headaches  Physical Exam  ED Triage Vitals [03/26/18 0836]   Temperature Pulse Respirations Blood Pressure SpO2   98 1 °F (36 7 °C) 91 18 143/97 99 %      Temp Source Heart Rate Source Patient Position - Orthostatic VS BP Location FiO2 (%)   Temporal Monitor Lying Left arm --      Pain Score       No Pain           Orthostatic Vital Signs  Vitals:    03/26/18 1541 03/26/18 1635 03/27/18 0010 03/27/18 0739   BP: 130/70 133/65 99/58 99/63   Pulse: 76 76 88 80   Patient Position - Orthostatic VS: Lying Sitting Lying Lying       Physical Exam   Constitutional: She is oriented to person, place, and time  Vital signs are normal  She appears well-developed and well-nourished  Non-toxic appearance  No distress  HENT:   Head: Normocephalic and atraumatic  Right Ear: Tympanic membrane and external ear normal    Left Ear: Tympanic membrane and external ear normal    Nose: Nose normal  No rhinorrhea  Mouth/Throat: Uvula is midline and oropharynx is clear and moist    Eyes: Conjunctivae, EOM and lids are normal  Pupils are equal, round, and reactive to light  Right eye exhibits no discharge  Left eye exhibits no discharge  No scleral icterus  Neck: Normal range of motion and full passive range of motion without pain  Neck supple  No JVD present  No thyromegaly present     Cardiovascular: Normal rate, regular rhythm, normal heart sounds and intact distal pulses  No murmur heard  Pulmonary/Chest: Effort normal and breath sounds normal  No accessory muscle usage  No tachypnea  No respiratory distress  She has no wheezes  She has no rales  She exhibits no tenderness  Abdominal: Soft  Normal appearance and bowel sounds are normal  She exhibits no distension  There is no hepatosplenomegaly  There is no tenderness  There is no rebound and no CVA tenderness  No hernia  Low left colostomy, yellow liquid seedy stool no blood or mucus  Nontender  Surrounding skin is nontender no redness or swelling  Musculoskeletal: Normal range of motion  She exhibits no tenderness  Lymphadenopathy:     She has no cervical adenopathy  Neurological: She is alert and oriented to person, place, and time  No cranial nerve deficit or sensory deficit  Skin: Skin is warm, dry and intact  Capillary refill takes less than 2 seconds  No rash noted  She is not diaphoretic  No erythema  No pallor  Psychiatric: She has a normal mood and affect  Her speech is normal and behavior is normal    Nursing note and vitals reviewed        ED Medications  Medications   sodium chloride 0 9 % infusion (0 mL/hr Intravenous Stopped 3/26/18 2004)   LORazepam (ATIVAN) tablet 0 5 mg (0 5 mg Oral Given 3/26/18 2110)   pantoprazole (PROTONIX) EC tablet 40 mg (40 mg Oral Given 3/27/18 0840)   promethazine (PHENERGAN) tablet 25 mg (not administered)   oxyCODONE (ROXICODONE) IR tablet 5 mg (not administered)   enoxaparin (LOVENOX) subcutaneous injection 40 mg (40 mg Subcutaneous Not Given 3/27/18 0840)   acetaminophen (TYLENOL) tablet 650 mg (not administered)   sodium chloride 0 9 % with KCl 40 mEq/L infusion (premix) (125 mL/hr Intravenous New Bag 3/27/18 0531)   magnesium oxide (MAG-OX) tablet 400 mg (400 mg Oral Given 3/27/18 0840)   potassium chloride 20 mEq IVPB (premix) (0 mEq Intravenous Stopped 3/26/18 1400)   magnesium sulfate 2 g/50 mL IVPB (premix) 2 g (0 g Intravenous Stopped 3/26/18 1400) Followed by   magnesium sulfate 2 g/50 mL IVPB (premix) 2 g (0 g Intravenous Stopped 3/26/18 1602)   potassium chloride 10 % oral solution 40 mEq (40 mEq Oral Given 3/26/18 2010)   potassium chloride (K-DUR,KLOR-CON) CR tablet 40 mEq (40 mEq Oral Given 3/27/18 0840)       Diagnostic Studies  Results Reviewed     Procedure Component Value Units Date/Time    Comprehensive metabolic panel [[id number]]  (Abnormal) Collected:  03/26/18 1010    Lab Status:  Final result Specimen:  Blood from Arm, Left Updated:  03/26/18 1133     Sodium 142 mmol/L      Potassium 2 6 (LL) mmol/L      Chloride 100 mmol/L      CO2 33 (H) mmol/L      Anion Gap 9 mmol/L      BUN 4 (L) mg/dL      Creatinine 0 63 mg/dL      Glucose 79 mg/dL      Calcium 7 1 (L) mg/dL      AST 28 U/L      ALT 35 U/L      Alkaline Phosphatase 87 U/L      Total Protein 5 7 (L) g/dL      Albumin 2 2 (L) g/dL      Total Bilirubin 1 60 (H) mg/dL      eGFR 103 ml/min/1 73sq m     Narrative:         National Kidney Disease Education Program recommendations are as follows:  GFR calculation is accurate only with a steady state creatinine  Chronic Kidney disease less than 60 ml/min/1 73 sq  meters  Kidney failure less than 15 ml/min/1 73 sq  meters  Magnesium [[id number]]  (Abnormal) Collected:  03/26/18 1010    Lab Status:  Final result Specimen:  Blood from Arm, Left Updated:  03/26/18 1133     Magnesium 0 7 (LL) mg/dL     Troponin I [[id number]]  (Normal) Collected:  03/26/18 1010    Lab Status:  Final result Specimen:  Blood from Arm, Left Updated:  03/26/18 1107     Troponin I <0 02 ng/mL     Narrative:         [employer] Chemistry analyzer 99% cutoff is > 0 04 ng/mL in network labs    o cTnI 99% cutoff is useful only when applied to patients in the clinical setting of myocardial ischemia  o cTnI 99% cutoff should be interpreted in the context of clinical history, ECG findings and possibly cardiac imaging to establish correct diagnosis    o cTnI 99% cutoff may be suggestive but clearly not indicative of a coronary event without the clinical setting of myocardial ischemia      UA w Reflex to Microscopic w Reflex to Culture [[id number]]  (Normal) Collected:  03/26/18 1025    Lab Status:  Final result Specimen:  Urine from Urine, Clean Catch Updated:  03/26/18 1033     Color, UA Yellow     Clarity, UA Clear     Specific Gravity, UA <=1 005     pH, UA 6 0     Leukocytes, UA Negative     Nitrite, UA Negative     Protein, UA Negative mg/dl      Glucose, UA Negative mg/dl      Ketones, UA Negative mg/dl      Urobilinogen, UA 0 2 E U /dl      Bilirubin, UA Negative     Blood, UA Negative    CBC and differential [[id number]]  (Normal) Collected:  03/26/18 1010    Lab Status:  Final result Specimen:  Blood from Arm, Left Updated:  03/26/18 1031     WBC 5 71 Thousand/uL      RBC 4 59 Million/uL      Hemoglobin 13 2 g/dL      Hematocrit 39 0 %      MCV 85 fL      MCH 28 8 pg      MCHC 33 8 g/dL      RDW 14 0 %      MPV 11 0 fL      Platelets 648 Thousands/uL      Neutrophils Relative 69 %      Lymphocytes Relative 22 %      Monocytes Relative 7 %      Eosinophils Relative 1 %      Basophils Relative 1 %      Neutrophils Absolute 3 92 Thousands/µL      Lymphocytes Absolute 1 26 Thousands/µL      Monocytes Absolute 0 41 Thousand/µL      Eosinophils Absolute 0 08 Thousand/µL      Basophils Absolute 0 04 Thousands/µL                  No orders to display              Procedures  ECG 12 Lead Documentation  Date/Time: 3/26/2018 10:27 AM  Performed by: Damon Michael  Authorized by: Damon Michael     Indications / Diagnosis:  Hypokalemia  ECG reviewed by me, the ED Provider: yes    Patient location:  ED  Rate:     ECG rate:  73  Rhythm:     Rhythm: sinus rhythm    Ectopy:     Ectopy: none    Conduction:     Conduction: normal    ST segments:     ST segments:  Normal  T waves:     T waves: non-specific             Phone Contacts  ED Phone Contact    ED Course  ED Course as of Mar 27 1245   Mon Mar 26, 2018   1052 Color, UA: Yellow   1052 Specific Gravity, UA: <=1 005   1053 WBC: 5 71   1053 Hemoglobin: 13 2   1053 Hematocrit: 39 0   1053 Platelets: 105   0571 Troponin I: <0 02   1141 Magnesium: (!!) 0 7   1141 Sodium: 142   1141 Potassium: (!!) 2 6   1141 Chloride: 100   1141 CO2: (!) 33   1141 Anion Gap: 9   1141 BUN: (!) 4   1141 Creatinine: 0 63   1141 Glucose: 79   1141 eGFR: 103   1144 Paged SLIM for admit  Pt qualifies for tele obs given electrolyte disturbance (low mg, low k)  MDM  Number of Diagnoses or Management Options  Hypokalemia: new and requires workup  Hypomagnesemia: new and requires workup     Amount and/or Complexity of Data Reviewed  Clinical lab tests: ordered and reviewed  Obtain history from someone other than the patient: yes  Review and summarize past medical records: yes  Discuss the patient with other providers: yes  Independent visualization of images, tracings, or specimens: yes      The patient presented with a condition in which there was a high probability of imminent or life-threatening deterioration, and critical care services (excluding separately billable procedures) totalled 30-74 minutes (critical electrolyte disturbance requiring IV repletion, continuous telemetry)          Disposition  Final diagnoses:   Hypokalemia   Hypomagnesemia     Time reflects when diagnosis was documented in both MDM as applicable and the Disposition within this note     Time User Action Codes Description Comment    3/26/2018 11:53 AM Karla Harvey Add [E87 6] Hypokalemia     3/26/2018 11:53 AM Karla Harvey Add [E83 42] Hypomagnesemia     3/27/2018  8:07 AM Donel Guild Add [R11 2] Intractable vomiting with nausea, unspecified vomiting type     3/27/2018  8:08 AM Donel Guild Modify [R11 2] Intractable vomiting with nausea, unspecified vomiting type     3/27/2018  8:09 AM Donel Guild Modify [R11 2] Intractable vomiting with nausea, unspecified vomiting type       ED Disposition     ED Disposition Condition Comment    Admit  Case was discussed with raúl and the patient's admission status was agreed to be Admission Status: observation status to the service of Dr Jeffry Jameson   Follow-up Information     Follow up With Specialties Details Why Contact Info    Siena Darnell DO General Surgery Follow up  [facility] Starr  [phone]          Current Discharge Medication List      START taking these medications    Details   magnesium oxide (MAG-OX) 400 mg Take 1 tablet (400 mg total) by mouth 2 (two) times a day with meals  Qty: 60 tablet, Refills: 3    Associated Diagnoses: Hypokalemia         CONTINUE these medications which have CHANGED    Details   potassium chloride (K-DUR,KLOR-CON) 20 mEq tablet Take 2 tablets (40 mEq total) by mouth daily  Qty: 30 tablet, Refills: 0    Associated Diagnoses: Intractable vomiting with nausea, unspecified vomiting type; Hypokalemia         CONTINUE these medications which have NOT CHANGED    Details   oxyCODONE (ROXICODONE) 5 mg immediate release tablet Take 5 mg by mouth every 4 (four) hours as needed for moderate pain      pantoprazole (PROTONIX) 40 mg tablet Take 1 tablet (40 mg total) by mouth 2 (two) times a day  Qty: 60 tablet, Refills: 0    Associated Diagnoses: Intractable vomiting with nausea      promethazine (PHENERGAN) 25 mg tablet Take 25 mg by mouth every 6 (six) hours as needed for nausea or vomiting      LORazepam (ATIVAN) 0 5 mg tablet Take 1 tablet (0 5 mg total) by mouth daily at bedtime  Qty: 30 tablet, Refills: 0    Associated Diagnoses: Anxiety             Outpatient Discharge Orders  Basic metabolic panel (BMP)   Standing Status: Future  Standing Exp  Date: 03/27/19     Magnesium   Standing Status: Future  Standing Exp   Date: 03/27/19     Discharge Diet     Activity as tolerated         ED Provider  Electronically Signed by           Miki Colón PA-C  03/27/18 9845

## 2018-03-27 NOTE — NURSING NOTE
Patient discharged to home in stable condition  Discharge paperwork reviewed with patient by Faraz Hinkle, W[facility]ACE  Patient chose to walk to the hospital exit instead of accepting the wheelchair that was offered

## 2018-03-27 NOTE — SOCIAL WORK
Pt is being discharged today with no Case Management needs  Pt has follow up appointments already set up   Case Management reviewed discharge planning process including the following: identifying help that is needed at home, pt's preference for discharge needs and Meds at [facility]  Reviewed with Pt that any member of the healthcare team can answer questions regarding : medications, jmportance of recognizing  Signs and symptoms of any  medical problems  Case Management also encouraged pt to follow up with all recommended appointments after discharge

## 2018-03-27 NOTE — SOCIAL WORK
Met with pt to discuss role as  in helping pt to develop discharge plan and to help pt carry out their plan  Pt lives in a house with her   Pt has 18 DEANN with a railing  Pt has 12 steps on the inside  Pt is independent with ADL's  The only DME the pt has is ostomy supplies  Pt has had home care in tthe past with [facility]   Pt has no services in the home  Pt's PCP is Dr France Posey  Pt uses the [facility] [place] [facility]  Pt with no Case Management needs will continue to follow

## 2018-03-27 NOTE — DISCHARGE SUMMARY
Discharge Summary - [address] Internal Medicine    Patient Information: Marvin Gibbs 47 y o  female MRN: [id number]  Unit/Bed#: 418-01 Encounter: [id number]    Discharging Physician / Practitioner: Duncan May DO  PCP: Demarcus Bryant DO  Admission Date: 3/26/2018  Discharge Date: 03/27/18    Disposition:     Home    Reason for Admission:  Hypokalemia and hypomagnesemia    Discharge Diagnoses:     Principal Problem:    Hypokalemia  Active Problems:    Ileostomy in place [facility])    Esophageal reflux  Resolved Problems:    Hypomagnesemia      Outpatient Tests Requested:  · Repeat BMP and magnesium in 2 weeks    Complications:  None    Hospital Course:     Marvin Gibbs is a 47 y o  female patient who originally presented to the hospital on 3/26/2018 due to asymptomatic electrolyte abnormalities, patient had severe hypokalemia and hypomagnesemia, was admitted for telemetry monitoring and electrolyte replacement  Patient is going to be discharged on 40 mEq of potassium once daily, magnesium 400 mg twice daily  Repeat BMP and magnesium in 2 weeks  Continue outpatient follow-up with Dr Himanshu Junior  Condition at Discharge: good     Discharge Day Visit / Exam:     Subjective:  No pain, patient overall feels well  Vitals: Blood Pressure: 99/63 (03/27/18 0739)  Pulse: 80 (03/27/18 0739)  Temperature: 98 5 °F (36 9 °C) (03/27/18 0739)  Temp Source: Temporal (03/27/18 0739)  Respirations: 18 (03/27/18 0739)  Height: 5' 2" (157 5 cm) (03/26/18 1600)  Weight - Scale: 76 6 kg (168 lb 14 oz) (03/26/18 1600)  SpO2: 95 % (03/27/18 0739)  Exam:   Physical Exam   Constitutional: She is oriented to person, place, and time  She appears well-developed and well-nourished  No distress  Pulmonary/Chest: Effort normal and breath sounds normal  No respiratory distress  She has no wheezes  Neurological: She is alert and oriented to person, place, and time  No cranial nerve deficit   Coordination normal    Skin: She is not diaphoretic  Psychiatric: She has a normal mood and affect  Her behavior is normal  Judgment and thought content normal    Nursing note and vitals reviewed  Discharge instructions/Information to patient and family:   See after visit summary for information provided to patient and family  Provisions for Follow-Up Care:  See after visit summary for information related to follow-up care and any pertinent home health orders  Planned Readmission:  No     Discharge Statement:  I spent 20 minutes discharging the patient  This time was spent on the day of discharge  I had direct contact with the patient on the day of discharge  Greater than 50% of the total time was spent examining patient, answering all patient questions, arranging and discussing plan of care with patient as well as directly providing post-discharge instructions  Additional time then spent on discharge activities  Discharge Medications:  See after visit summary for reconciled discharge medications provided to patient and family        ** Please Note: This note has been constructed using a voice recognition system **

## 2018-03-29 ENCOUNTER — APPOINTMENT (OUTPATIENT)
Dept: WOUND CARE | Facility: HOSPITAL | Age: 53
End: 2018-03-29
Payer: COMMERCIAL

## 2018-03-29 PROCEDURE — 99212 OFFICE O/P EST SF 10 MIN: CPT

## 2018-04-03 ENCOUNTER — TELEPHONE (OUTPATIENT)
Dept: GASTROENTEROLOGY | Facility: HOSPITAL | Age: 53
End: 2018-04-03

## 2018-04-03 NOTE — TELEPHONE ENCOUNTER
Called spoke with Charu Shah at [facility] to see if prior Marly Hughes is needed for EGD on 4/6  No auth required Ref   #- [id number]

## 2018-04-05 ENCOUNTER — ANESTHESIA EVENT (OUTPATIENT)
Dept: PERIOP | Facility: HOSPITAL | Age: 53
End: 2018-04-05
Payer: COMMERCIAL

## 2018-04-06 ENCOUNTER — ANESTHESIA (OUTPATIENT)
Dept: PERIOP | Facility: HOSPITAL | Age: 53
End: 2018-04-06
Payer: COMMERCIAL

## 2018-04-06 ENCOUNTER — HOSPITAL ENCOUNTER (OUTPATIENT)
Facility: HOSPITAL | Age: 53
Setting detail: OUTPATIENT SURGERY
Discharge: HOME | End: 2018-04-06
Attending: INTERNAL MEDICINE | Admitting: INTERNAL MEDICINE
Payer: COMMERCIAL

## 2018-04-06 VITALS
BODY MASS INDEX: 30.91 KG/M2 | DIASTOLIC BLOOD PRESSURE: 74 MMHG | HEART RATE: 74 BPM | SYSTOLIC BLOOD PRESSURE: 148 MMHG | TEMPERATURE: 98.9 F | OXYGEN SATURATION: 98 % | RESPIRATION RATE: 20 BRPM | HEIGHT: 62 IN | WEIGHT: 168 LBS

## 2018-04-06 DIAGNOSIS — K21.9 GASTROESOPHAGEAL REFLUX DISEASE WITHOUT ESOPHAGITIS: ICD-10-CM

## 2018-04-06 DIAGNOSIS — R11.14 BILIOUS VOMITING WITH NAUSEA: ICD-10-CM

## 2018-04-06 PROCEDURE — 88342 IMHCHEM/IMCYTCHM 1ST ANTB: CPT | Performed by: PATHOLOGY

## 2018-04-06 PROCEDURE — 88305 TISSUE EXAM BY PATHOLOGIST: CPT | Performed by: PATHOLOGY

## 2018-04-06 PROCEDURE — 88341 IMHCHEM/IMCYTCHM EA ADD ANTB: CPT | Performed by: PATHOLOGY

## 2018-04-06 PROCEDURE — 43239 EGD BIOPSY SINGLE/MULTIPLE: CPT | Performed by: INTERNAL MEDICINE

## 2018-04-06 RX ORDER — PROPOFOL 10 MG/ML
INJECTION, EMULSION INTRAVENOUS AS NEEDED
Status: DISCONTINUED | OUTPATIENT
Start: 2018-04-06 | End: 2018-04-06 | Stop reason: SURG

## 2018-04-06 RX ORDER — METOCLOPRAMIDE HYDROCHLORIDE 5 MG/ML
10 INJECTION INTRAMUSCULAR; INTRAVENOUS ONCE AS NEEDED
Status: DISCONTINUED | OUTPATIENT
Start: 2018-04-06 | End: 2018-04-06 | Stop reason: HOSPADM

## 2018-04-06 RX ORDER — ONDANSETRON 2 MG/ML
4 INJECTION INTRAMUSCULAR; INTRAVENOUS ONCE AS NEEDED
Status: DISCONTINUED | OUTPATIENT
Start: 2018-04-06 | End: 2018-04-06 | Stop reason: HOSPADM

## 2018-04-06 RX ORDER — LIDOCAINE HYDROCHLORIDE 10 MG/ML
INJECTION, SOLUTION INFILTRATION; PERINEURAL AS NEEDED
Status: DISCONTINUED | OUTPATIENT
Start: 2018-04-06 | End: 2018-04-06 | Stop reason: SURG

## 2018-04-06 RX ORDER — SODIUM CHLORIDE, SODIUM LACTATE, POTASSIUM CHLORIDE, CALCIUM CHLORIDE 600; 310; 30; 20 MG/100ML; MG/100ML; MG/100ML; MG/100ML
125 INJECTION, SOLUTION INTRAVENOUS CONTINUOUS
Status: DISCONTINUED | OUTPATIENT
Start: 2018-04-06 | End: 2018-04-06 | Stop reason: HOSPADM

## 2018-04-06 RX ADMIN — PROPOFOL 50 MG: 10 INJECTION, EMULSION INTRAVENOUS at 12:46

## 2018-04-06 RX ADMIN — LIDOCAINE HYDROCHLORIDE 50 MG: 10 INJECTION, SOLUTION INFILTRATION; PERINEURAL at 12:41

## 2018-04-06 RX ADMIN — PROPOFOL 50 MG: 10 INJECTION, EMULSION INTRAVENOUS at 12:43

## 2018-04-06 RX ADMIN — SODIUM CHLORIDE, SODIUM LACTATE, POTASSIUM CHLORIDE, AND CALCIUM CHLORIDE 125 ML/HR: 600; 310; 30; 20 INJECTION, SOLUTION INTRAVENOUS at 11:08

## 2018-04-06 RX ADMIN — PROPOFOL 30 MG: 10 INJECTION, EMULSION INTRAVENOUS at 12:48

## 2018-04-06 RX ADMIN — PROPOFOL 100 MG: 10 INJECTION, EMULSION INTRAVENOUS at 12:41

## 2018-04-06 RX ADMIN — PROPOFOL 20 MG: 10 INJECTION, EMULSION INTRAVENOUS at 12:49

## 2018-04-06 NOTE — ANESTHESIA POSTPROCEDURE EVALUATION
Post-Op Assessment Note      CV Status:  Stable    Mental Status:  Alert and awake    Hydration Status:  Euvolemic    PONV Controlled:  Controlled    Airway Patency:  Patent    Post Op Vitals Reviewed: Yes          Staff: Anesthesiologist            84   Temp  99 5   Pulse  87   Resp   12   SpO2   99

## 2018-04-06 NOTE — ANESTHESIA PREPROCEDURE EVALUATION
Review of Systems/Medical History  Patient summary reviewed  Chart reviewed  No history of anesthetic complications     Cardiovascular  EKG reviewed, Hyperlipidemia, Hypertension controlled,   Comment: nml TTE June 2016    NSR,  Pulmonary  Oxygen dependent intubated and ventilator,        GI/Hepatic    GERD well controlled,   Comment: Diverticulitis with abscess          Endo/Other  Negative endo/other ROS   Obesity    GYN  Negative gynecology ROS          Hematology  Anemia ,     Musculoskeletal       Neurology  Negative neurology ROS      Psychology   Negative psychology ROS            Physical Exam    Airway  Comment: Intubated and ventilated  Mallampati score: I  TM Distance: >3 FB  Neck ROM: full     Dental   No notable dental hx     Cardiovascular  Cardiovascular exam normal    Pulmonary  Pulmonary exam normal     Other Findings      Lab Results   Component Value Date    WBC 4 34 03/27/2018    HGB 12 0 03/27/2018    HCT 36 5 03/27/2018    MCV 88 03/27/2018     03/27/2018     Lab Results   Component Value Date    GLUCOSE 80 03/27/2018    CALCIUM 6 7 (L) 03/27/2018     03/27/2018    K 3 1 (L) 03/27/2018    CO2 28 03/27/2018     (H) 03/27/2018    BUN 3 (L) 03/27/2018    CREATININE 0 52 (L) 03/27/2018     Lab Results   Component Value Date    INR 1 07 03/27/2018    INR 1 39 (H) 01/04/2018    INR 1 51 (H) 01/03/2018    PROTIME 13 8 03/27/2018    PROTIME 17 1 (H) 01/04/2018    PROTIME 18 3 (H) 01/03/2018     Lab Results   Component Value Date    PTT 38 (H) 01/03/2018     Type and Screen:  O      Anesthesia Plan  ASA Score- 3     Anesthesia Type- general with ASA Monitors  Additional Monitors:   Airway Plan: ETT  Comment: Patient intubated and ventilated  Plan Factors-    Induction- intravenous  Postoperative Plan- Plan for postoperative opioid use  Informed Consent- Anesthetic plan and risks discussed with patient  I personally reviewed this patient with the CRNA   Discussed and agreed on the Anesthesia Plan with the CRNA  Dary Alcantara

## 2018-04-06 NOTE — INTERVAL H&P NOTE
H&P reviewed  After examining the patient I find no changes in the patients condition since the H&P had been written  I examined the patient and dicussed the EGD with her

## 2018-04-06 NOTE — H&P (VIEW-ONLY)
[facility] - Outpatient Follow-up Note  Zoila Lawler 47 y o  female MRN: [id number]  Encounter: [id number]          ASSESSMENT AND PLAN:      1  Gastroesophageal reflux disease without esophagitis: admits to hx of GERD, this has been well controlled with protonix 40mg bid  She does not feel like this has been worsening  -continue 40mg protonix bid    2  Bilious vomiting with nausea: admits to nausea and vomiting with globus sensation since 11/2017  She states that she has had decreased appetite and 50-60lb weight loss secondary to not eating since onset  She recently underwent lap small bowel resection, ileosotomy closure and transverse colostomy on 1/4 for perforated sigmoid diverticulitis  Recent CT abdomen/pelvis revealed s/p partial colectomy with distal left transverse colostomy with recurrent inflammatory changes noted in sigmoid remnant and adjacent thickened walled small bowel segment and mesenteric edema  We will start with GES and EGD for further evaluation   -continue zofran as needed   -f/u GES  - Case request operating room: ESOPHAGOGASTRODUODENOSCOPY (EGD)  -f/u after procedures    ______________________________________________________________________    SUBJECTIVE:  46 yo female with pmh  GERD, HLD, HTN and s/p second look  Ex lap small bowel resection, ileosotomy closure and transverse colostomy on 1/4 for perforated sigmoid diverticulitis who failed conservative management  She was seen for post-op appointment today in the office with general surgery and was referred to GI for persistent nausea and vomiting  She admits to hx of reflux and states that the nausea and vomiting is new since 11/2017 prior to her surgery  She admits to 50-60lb weight loss since the start secondary to poor appetite and inability to eat  She admits to epigastric abdominal pain  She denies any melena or hematochezia   She had an EGD and colonoscopy 10/2016 that revealed bile reflux, small hiatal hernia and polypectomy with repeat recommended in 3 years  REVIEW OF SYSTEMS IS OTHERWISE NEGATIVE  Historical Information   Past Medical History:   Diagnosis Date    Disease of thyroid gland     Gastroesophageal reflux disease without esophagitis 12/4/2017    Hyperlipidemia     Hypertension     Vitamin D deficiency disease      Past Surgical History:   Procedure Laterality Date    CHOLECYSTECTOMY      COLOSTOMY      LAPAROTOMY N/A 1/4/2018    Procedure: LAPAROTOMY EXPLORATORY, 2nd look, small bowel resection, ilieostomy closure via handsown anastamosis transverse colostomy;  Surgeon: Sabino Garcia DO;  Location:  MAIN OR;  Service: General    UT ESOPHAGOGASTRODUODENOSCOPY TRANSORAL DIAGNOSTIC N/A 10/21/2016    Procedure: EGD AND COLONOSCOPY;  Surgeon: Bairon Lopez MD;  Location: [facility];  Service: Gastroenterology    UT PART REMOVAL COLON W ANASTOMOSIS N/A 1/3/2018    Procedure: Exploratory laparotomy, RESECTION COLON SIGMOID, possible ostomy;  Surgeon: Odalis Zhu DO;  Location: [facility];  Service: General    THYROIDECTOMY, PARTIAL      TONSILLECTOMY AND ADENOIDECTOMY      TUBAL LIGATION      URETERAL STENT PLACEMENT Bilateral 1/3/2018    Procedure: INSERTION STENT URETERAL;  Surgeon: Marylou Winter MD;  Location: [facility];  Service: Urology     Social History   History   Alcohol Use    Yes     Comment: social     History   Drug Use No     History   Smoking Status    Former Smoker   Smokeless Tobacco    Never Used     No family history on file      Meds/Allergies       Current Outpatient Prescriptions:     calcium carbonate (TUMS) 500 mg chewable tablet    Cholecalciferol (VITAMIN D) 2000 UNITS CAPS    LORazepam (ATIVAN) 0 5 mg tablet    ondansetron (ZOFRAN-ODT) 4 mg disintegrating tablet    pantoprazole (PROTONIX) 40 mg tablet    potassium chloride (K-DUR,KLOR-CON) 20 mEq tablet    No Known Allergies        Objective     There were no vitals taken for this visit  PHYSICAL EXAM:      General Appearance:   Alert, cooperative, no distress   HEENT:   Normocephalic, atraumatic, anicteric      Neck:  Supple, symmetrical, trachea midline   Lungs:   Clear to auscultation bilaterally; no rales, rhonchi or wheezing; respirations unlabored    Heart[de-identified]   Regular rate and rhythm; no murmur, rub, or gallop  Abdomen:   Soft, diffuse tenderness, non-distended; normal bowel sounds; no masses, no organomegaly, ostomy noted with brown stool     Genitalia:   Deferred    Rectal:   Deferred    Extremities:  No cyanosis, clubbing or edema        Skin:  No jaundice, rashes, or lesions    Lymph nodes:  No palpable cervical lymphadenopathy        Lab Results:   No visits with results within 1 Day(s) from this visit     Latest known visit with results is:   Appointment on 03/03/2018   Component Date Value    Prealbumin 03/03/2018 16 0*    WBC 03/03/2018 5 82     RBC 03/03/2018 5 10     Hemoglobin 03/03/2018 14 6     Hematocrit 03/03/2018 44 5     MCV 03/03/2018 87     MCH 03/03/2018 28 6     MCHC 03/03/2018 32 8     RDW 03/03/2018 15 5*    MPV 03/03/2018 10 9     Platelets 03/51/6594 412*    nRBC 03/03/2018 0     Neutrophils Relative 03/03/2018 55     Lymphocytes Relative 03/03/2018 34     Monocytes Relative 03/03/2018 8     Eosinophils Relative 03/03/2018 2     Basophils Relative 03/03/2018 1     Neutrophils Absolute 03/03/2018 3 17     Lymphocytes Absolute 03/03/2018 1 99     Monocytes Absolute 03/03/2018 0 46     Eosinophils Absolute 03/03/2018 0 14     Basophils Absolute 03/03/2018 0 05     Sodium 03/03/2018 140     Potassium 03/03/2018 3 0*    Chloride 03/03/2018 103     CO2 03/03/2018 28     Anion Gap 03/03/2018 9     BUN 03/03/2018 3*    Creatinine 03/03/2018 0 62     Glucose, Fasting 03/03/2018 101*    Calcium 03/03/2018 8 7     AST 03/03/2018 42     ALT 03/03/2018 36     Alkaline Phosphatase 03/03/2018 124*    Total Protein 03/03/2018 7 1  Albumin 03/03/2018 2 7*    Total Bilirubin 03/03/2018 0 82     eGFR 03/03/2018 103          Radiology Results:   No results found

## 2018-04-06 NOTE — OP NOTE
**** GI/ENDOSCOPY REPORT ****     PATIENT NAME: Geni Friday - VISIT ID:     INTRODUCTION: Esophagogastroduodenoscopy - A 47 female patient presents   for an outpatient Esophagogastroduodenoscopy at [facility]  INDICATIONS: Nausea  Globus sensation  CONSENT: The benefits, risks, and alternatives to the procedure were   discussed and informed consent was obtained from the patient  PREPARATION:  EKG, pulse, pulse oximetry and blood pressure were monitored   throughout the procedure  ASA Classification: Class 2 - Patient has mild   to moderate systemic disturbance that may or may not be related to the   disorder requiring surgery  MEDICATIONS: Anesthesia-check records     PROCEDURE:  The endoscope was passed without difficulty through the mouth   under direct visualization and advanced to the 2nd portion of the   duodenum  The scope was withdrawn and the mucosa was carefully examined  FINDINGS:   Esophagus: The entire esophagus appeared to be normal       Three random biopsies was taken  The GE junction was observed 36 cm from   the entry site  There was a 1 cm hiatus hernia visible in the esophagus  Stomach: A localized area of gastritis was found in the body of the   stomach  Two forceps biopsies was taken  Otherwise, the stomach appeared   to be normal  Two random biopsies was taken from the body of the stomach  Duodenum: The duodenal bulb and 2nd portion of the duodenum appeared to be   normal      COMPLICATIONS: There were no complications  IMPRESSIONS: Normal entire esophagus  Three biopsies taken  A 1cm hiatus   hernia found  Gastritis found in the body of the stomach  Two biopsies   taken  Otherwise normal stomach - biopsy done to rule out H  Pylori  Normal duodenal bulb and 2nd portion of the duodenum  RECOMMENDATIONS: Follow-up on the results of the biopsy specimens in 2   weeks  Gastric emptying study will be ordered       ESTIMATED BLOOD LOSS: PATHOLOGY SPECIMENS: Three random biopsies taken  Two forceps biopsies   taken  Associated finding: Gastritis  Two random biopsies taken from the   body of the stomach  PROCEDURE CODES:     ICD-9 Codes: 787 02 Nausea alone 553 3 Diaphragmatic hernia without   mention of obstruction or gangrene 535 50 Unspecified gastritides and   gastroduodenitis, without mention of hemorrhage     ICD-10 Codes: R11 0 Nausea K44 Diaphragmatic hernia K29 Gastritis and   duodenitis     PERFORMED BY: MILA Robertson  on 04/06/2018  Version 1, electronically signed by MILA Robertson  on 04/06/2018 at   12:56

## 2018-04-14 ENCOUNTER — APPOINTMENT (OUTPATIENT)
Dept: LAB | Facility: MEDICAL CENTER | Age: 53
End: 2018-04-14
Payer: COMMERCIAL

## 2018-04-14 DIAGNOSIS — E87.6 HYPOKALEMIA: ICD-10-CM

## 2018-04-14 LAB
ANION GAP SERPL CALCULATED.3IONS-SCNC: 6 MMOL/L (ref 4–13)
BUN SERPL-MCNC: 9 MG/DL (ref 5–25)
CALCIUM SERPL-MCNC: 9.4 MG/DL
CHLORIDE SERPL-SCNC: 112 MMOL/L (ref 100–108)
CO2 SERPL-SCNC: 27 MMOL/L (ref 21–32)
CREAT SERPL-MCNC: 0.59 MG/DL (ref 0.6–1.3)
GFR SERPL CREATININE-BSD FRML MDRD: 105 ML/MIN/1.73SQ M
GLUCOSE P FAST SERPL-MCNC: 89 MG/DL (ref 65–99)
MAGNESIUM SERPL-MCNC: 1.8 MG/DL (ref 1.6–2.6)
POTASSIUM SERPL-SCNC: 3.8 MMOL/L (ref 3.5–5.3)
SODIUM SERPL-SCNC: 145 MMOL/L (ref 136–145)

## 2018-04-14 PROCEDURE — 80048 BASIC METABOLIC PNL TOTAL CA: CPT

## 2018-04-14 PROCEDURE — 36415 COLL VENOUS BLD VENIPUNCTURE: CPT

## 2018-04-14 PROCEDURE — 83735 ASSAY OF MAGNESIUM: CPT

## 2018-04-24 ENCOUNTER — OFFICE VISIT (OUTPATIENT)
Dept: GASTROENTEROLOGY | Facility: HOSPITAL | Age: 53
End: 2018-04-24
Payer: COMMERCIAL

## 2018-04-24 VITALS
DIASTOLIC BLOOD PRESSURE: 96 MMHG | WEIGHT: 176 LBS | BODY MASS INDEX: 32.39 KG/M2 | TEMPERATURE: 98.9 F | HEIGHT: 62 IN | HEART RATE: 100 BPM | SYSTOLIC BLOOD PRESSURE: 141 MMHG

## 2018-04-24 DIAGNOSIS — F41.9 ANXIETY: ICD-10-CM

## 2018-04-24 DIAGNOSIS — K21.9 GASTROESOPHAGEAL REFLUX DISEASE WITHOUT ESOPHAGITIS: Primary | Chronic | ICD-10-CM

## 2018-04-24 DIAGNOSIS — R11.14 BILIOUS VOMITING WITH NAUSEA: ICD-10-CM

## 2018-04-24 PROCEDURE — 99213 OFFICE O/P EST LOW 20 MIN: CPT | Performed by: PHYSICIAN ASSISTANT

## 2018-04-24 RX ORDER — LORAZEPAM 0.5 MG/1
0.5 TABLET ORAL
Qty: 30 TABLET | Refills: 0 | Status: ON HOLD | OUTPATIENT
Start: 2018-04-24 | End: 2018-07-18

## 2018-04-24 NOTE — PROGRESS NOTES
[facility] - Outpatient Follow-up Note  Debora Stiles 47 y o  female MRN: [id number]  Encounter: [id number]          ASSESSMENT AND PLAN:    1  GERD: longstanding hx of reflux, recent EGD 4/6 was normal without evidence of guillen's esophagus  Gastritis was noted in the stomach with negative biopsies  Admits to having symptoms 1-2 times per week  -continue protonix 40mg daily  -decrease caffeine, alcohol, spicy food, don't eat within 2-3 hours of bedtime   -can use zantac PRN    2  Nausea/vomiting: s/p EGD 4/6 that was normal  Fortunately her symptoms have resolved and she has not had any further vomiting  She gained 7 pounds since last office visit  She does not need to have GES study unless symptoms reoccur   -f/u PRN        ______________________________________________________________________    SUBJECTIVE:  Mikayla Durham is a 48 yo female GERD, HLD, HTN and s/p second look  Ex lap small bowel resection, ileosotomy closure and transverse colostomy on 1/4 for perforated sigmoid diverticulitis who failed conservative management, here for follow up of nausea and vomiting and EGD results  She was experiencing nausea, vomiting and globus sensation  She underwent EGD 4/6 that revealed 1cm hiatal hernia, gastritis in body of stomach  Otherwise normal with negative biopsies  Her symptoms have completely resolved an she is no longer experiencing nausea or vomiting  She has gained weight since last appointment  REVIEW OF SYSTEMS IS OTHERWISE NEGATIVE  Historical Information   Past Medical History:   Diagnosis Date    Disease of thyroid gland     Gastroesophageal reflux disease without esophagitis 12/4/2017    Hyperlipidemia     Hypertension     Vitamin D deficiency disease      Past Surgical History:   Procedure Laterality Date    CHOLECYSTECTOMY      COLOSTOMY      ESOPHAGOGASTRODUODENOSCOPY N/A 4/6/2018    Procedure: ESOPHAGOGASTRODUODENOSCOPY (EGD);   Surgeon: Prema Javier MD; [facility];  Service: Gastroenterology    LAPAROTOMY N/A 1/4/2018    Procedure: LAPAROTOMY EXPLORATORY, 2nd look, small bowel resection, ilieostomy closure via handsown anastamosis transverse colostomy;  Surgeon: Jannette Gipson DO;  Location:  MAIN OR;  Service: General    OR ESOPHAGOGASTRODUODENOSCOPY TRANSORAL DIAGNOSTIC N/A 10/21/2016    Procedure: EGD AND COLONOSCOPY;  Surgeon: Floyd Arrington MD;  [facility];  Service: Gastroenterology    OR PART REMOVAL COLON W ANASTOMOSIS N/A 1/3/2018    Procedure: Exploratory laparotomy, RESECTION COLON SIGMOID, possible ostomy;  Surgeon: Brenna Johnson DO;  [facility];  Service: General    THYROIDECTOMY, PARTIAL      TONSILLECTOMY AND ADENOIDECTOMY      TUBAL LIGATION      URETERAL STENT PLACEMENT Bilateral 1/3/2018    Procedure: INSERTION STENT URETERAL;  Surgeon: Christiane Comer MD;  [facility];  Service: Urology     Social History   History   Alcohol Use    Yes     Comment: social     History   Drug Use No     History   Smoking Status    Former Smoker   Smokeless Tobacco    Never Used     History reviewed  No pertinent family history  Meds/Allergies       Current Outpatient Prescriptions:     LORazepam (ATIVAN) 0 5 mg tablet    magnesium oxide (MAG-OX) 400 mg    pantoprazole (PROTONIX) 40 mg tablet    potassium chloride (K-DUR,KLOR-CON) 20 mEq tablet    Acetaminophen 500 MG    oxyCODONE (ROXICODONE) 5 mg immediate release tablet    promethazine (PHENERGAN) 25 mg tablet    No Known Allergies        Objective     Blood pressure 141/96, pulse 100, temperature 98 9 °F (37 2 °C), temperature source Tympanic, height 5' 2" (1 575 m), weight 79 8 kg (176 lb)  Body mass index is 32 19 kg/m²        PHYSICAL EXAM:      General Appearance:   Alert, cooperative, no distress   HEENT:   Normocephalic, atraumatic, anicteric      Neck:  Supple, symmetrical, trachea midline   Lungs:   Clear to auscultation bilaterally; no rales, rhonchi or wheezing; respirations unlabored    Heart[de-identified]   Regular rate and rhythm; no murmur, rub, or gallop  Abdomen:   Soft, non-tender, non-distended; normal bowel sounds; no masses, no organomegaly, ostomy site noted    Genitalia:   Deferred    Rectal:   Deferred    Extremities:  No cyanosis, clubbing or edema        Skin:  No jaundice, rashes, or lesions          Lab Results:   No visits with results within 1 Day(s) from this visit  Latest known visit with results is:   Appointment on 04/14/2018   Component Date Value    Sodium 04/14/2018 145     Potassium 04/14/2018 3 8     Chloride 04/14/2018 112*    CO2 04/14/2018 27     Anion Gap 04/14/2018 6     BUN 04/14/2018 9     Creatinine 04/14/2018 0 59*    Glucose, Fasting 04/14/2018 89     Calcium 04/14/2018 9 4     eGFR 04/14/2018 105     Magnesium 04/14/2018 1 8          Radiology Results:   No results found

## 2018-04-25 DIAGNOSIS — E87.6 HYPOKALEMIA: Primary | ICD-10-CM

## 2018-04-26 ENCOUNTER — PREP FOR PROCEDURE (OUTPATIENT)
Dept: GASTROENTEROLOGY | Facility: MEDICAL CENTER | Age: 53
End: 2018-04-26

## 2018-04-26 ENCOUNTER — TELEPHONE (OUTPATIENT)
Dept: GASTROENTEROLOGY | Facility: MEDICAL CENTER | Age: 53
End: 2018-04-26

## 2018-04-26 ENCOUNTER — TELEPHONE (OUTPATIENT)
Dept: SURGERY | Facility: HOSPITAL | Age: 53
End: 2018-04-26

## 2018-04-26 DIAGNOSIS — D36.9 ADENOMATOUS POLYPS: Primary | ICD-10-CM

## 2018-04-26 DIAGNOSIS — K21.9 GASTROESOPHAGEAL REFLUX DISEASE WITHOUT ESOPHAGITIS: Primary | ICD-10-CM

## 2018-04-26 DIAGNOSIS — L65.9 HAIR LOSS: ICD-10-CM

## 2018-04-26 DIAGNOSIS — Z12.11 SCREENING FOR COLON CANCER: ICD-10-CM

## 2018-04-26 DIAGNOSIS — E87.6 HYPOKALEMIA: Primary | ICD-10-CM

## 2018-04-26 RX ORDER — PANTOPRAZOLE SODIUM 40 MG/1
40 TABLET, DELAYED RELEASE ORAL 2 TIMES DAILY
Qty: 60 TABLET | Refills: 3 | Status: ON HOLD | OUTPATIENT
Start: 2018-04-26 | End: 2018-08-13 | Stop reason: SDUPTHER

## 2018-04-26 NOTE — TELEPHONE ENCOUNTER
----- Message from [facility] sent at 4/26/2018  9:41 AM EDT -----  Regarding: FW: Keiko Valenzuela  Please schedule patient for Colonoscopy in June for [facility] if possible       ----- Message -----  From: Moriah Holguin DO  Sent: 4/26/2018   9:25 AM  To: Simi Salgado  Subject: FW: Keiko Valenzuela                             Ordered thanks  ----- Message -----  From: [facility]  Sent: 4/26/2018   8:03 AM  To: Moriah Holguin DO  Subject: Keiko Valenzuela                                 Patient is seeing Dr Gideon Homans in July for a reversal Ostomy  She will need a colonoscopy prior as per Dr Gideon Homans in June  Can you please put order in, so this can be scheduled? She was recently seen by Annette Rutledge in office on 4/25/2018  Thank you

## 2018-04-26 NOTE — TELEPHONE ENCOUNTER
Called patient, made her aware an order for protonix was placed also made her aware that she should follow up with her PCP regarding the other issues she is having and order thyroid tests

## 2018-04-26 NOTE — TELEPHONE ENCOUNTER
Pt is sched at [facility] with dr Silvano Teixeira for colon on 06/15/2018 pt is aware she will get a call the day before with time   Pt is aware I am finding out about her prep since she has a ostomy bag

## 2018-04-26 NOTE — TELEPHONE ENCOUNTER
Pt can do suprep medication was sent to the pharmacy I called pt and left her detailed message instructions mailed out to the pt as well she is aware she will get a call the day before with procedure time

## 2018-04-30 ENCOUNTER — PREP FOR PROCEDURE (OUTPATIENT)
Dept: GASTROENTEROLOGY | Facility: MEDICAL CENTER | Age: 53
End: 2018-04-30

## 2018-04-30 ENCOUNTER — OFFICE VISIT (OUTPATIENT)
Dept: FAMILY MEDICINE CLINIC | Facility: CLINIC | Age: 53
End: 2018-04-30
Payer: COMMERCIAL

## 2018-04-30 VITALS
HEIGHT: 62 IN | BODY MASS INDEX: 33.34 KG/M2 | WEIGHT: 181.2 LBS | SYSTOLIC BLOOD PRESSURE: 136 MMHG | DIASTOLIC BLOOD PRESSURE: 82 MMHG

## 2018-04-30 DIAGNOSIS — B37.2 CUTANEOUS CANDIDIASIS: ICD-10-CM

## 2018-04-30 DIAGNOSIS — L67.9 HAIR ABNORMALITY: Primary | ICD-10-CM

## 2018-04-30 DIAGNOSIS — K57.32 DIVERTICULITIS OF LARGE INTESTINE WITHOUT PERFORATION OR ABSCESS WITHOUT BLEEDING: Primary | ICD-10-CM

## 2018-04-30 PROCEDURE — 99214 OFFICE O/P EST MOD 30 MIN: CPT | Performed by: PHYSICIAN ASSISTANT

## 2018-04-30 RX ORDER — KETOCONAZOLE 20 MG/G
CREAM TOPICAL 2 TIMES DAILY
Qty: 60 G | Refills: 1 | Status: ON HOLD | OUTPATIENT
Start: 2018-04-30 | End: 2018-08-03 | Stop reason: ALTCHOICE

## 2018-04-30 NOTE — PROGRESS NOTES
Assessment/Plan:    We discussed how hairloss could be due to genetics as her mother's hair thinned with age  It could also be due to increased stress and/or poor nutritional status  Advised she start a multi-vitamin for women along with checking a TSH level  Diagnoses and all orders for this visit:    Hair abnormality  -     TSH, 3rd generation with T4 reflex; Future    Cutaneous candidiasis  -     ketoconazole (NIZORAL) 2 % cream; Apply topically 2 (two) times a day          Subjective:      Patient ID: Jakob Montoya is a 47 y o  female  Chava Floyd is here today since experiencing hair loss since January 2018  She had a slightly elevated TSH in 2/18 which normalized by 3/18  Recently has been under stress due to other medical conditions, had diverticulitis resulting in an ostomy  She is scheduled for reversal 6/2018  Hair has been thinning in general, has not noticed any bald patches  Her mother's hair thinned with age  Chava Floyd has also had a poor nutritional status since 1/18 and lost 50-60 lbs due to her surgeries  Also complaining of a wet, red, itchy rash under her L breast  Has had similar symptoms in the past         The following portions of the patient's history were reviewed and updated as appropriate:   She  has a past medical history of Disease of thyroid gland; Endometrial polyp; Gastroesophageal reflux disease without esophagitis (12/4/2017); Hand injury; High risk medication use; Hyperlipidemia; Hypertension; Renal colic; Viral syndrome; and Vitamin D deficiency disease    She   Patient Active Problem List    Diagnosis Date Noted    Hair abnormality 04/30/2018    Hair loss 04/26/2018    Adenomatous polyps 04/26/2018    Screening for colon cancer 04/26/2018    Gastroesophageal reflux disease without esophagitis 03/20/2018    Bilious vomiting with nausea 03/20/2018    Dehydration 02/13/2018    Intractable vomiting with nausea 02/13/2018    [facility]ostomy care [facility] [address] ) 02/07/2018    Ileus ([facility] [address] ) 01/18/2018    [facility]ox[facility] 01/05/2018    Hematuria 01/03/2018    Ileostomy in place [facility]) 01/03/2018    Acute blood loss anemia 01/01/2018    Microscopic hematuria 12/28/2017    Low serum prealbumin 12/27/2017    Severe protein-calorie malnutrition (HCC) 12/27/2017    Hypoalbuminemia 12/26/2017    Diverticulitis of colon 12/26/2017    Diverticulitis of large intestine with perforation without abscess or bleeding 12/04/2017    Esophageal reflux 12/04/2017    Hyperlipidemia 12/04/2017    Hypertension 12/04/2017    Hypokalemia 12/04/2017    Chronic constipation 11/27/2017    Acute pain 10/12/2017    Ache in joint 09/15/2017    Primary osteoarthritis of left knee 09/15/2017    Anemia 09/14/2016    Thyroid disorder 09/14/2016    Non-rheumatic mitral regurgitation 07/22/2016    Fatigue 74/71/9085    Systolic murmur 87/45/9051    Vitamin D deficiency 06/21/2016    Vaginal dryness, menopausal 02/01/2016    Leg pain, left 07/17/2015    Erysipelas 12/12/2014    Diverticulosis 03/17/2014    Vulvitis 08/14/2013     She  has a past surgical history that includes Cholecystectomy; Thyroidectomy, partial; Tonsillectomy and adenoidectomy; Tubal ligation (1997); pr esophagogastroduodenoscopy transoral diagnostic (N/A, 10/21/2016); pr part removal colon w anastomosis (N/A, 1/3/2018); Ureteral stent placement (Bilateral, 1/3/2018); LAPAROTOMY (N/A, 1/4/2018); Colostomy; Esophagogastroduodenoscopy (N/A, 4/6/2018); Endometrial ablation (2007); Hysteroscopy; and Thyroidectomy  Her family history includes Diabetes type II in her mother; Heart failure in her father  She  reports that she quit smoking about 15 years ago  She has never used smokeless tobacco  She reports that she drinks alcohol  She reports that she does not use drugs    Current Outpatient Prescriptions   Medication Sig Dispense Refill    pantoprazole (PROTONIX) 40 mg tablet Take 1 tablet (40 mg total) by mouth 2 (two) times a day 60 tablet 3    Acetaminophen 500 MG Take 2 tablets by mouth every 4 (four) hours as needed      ketoconazole (NIZORAL) 2 % cream Apply topically 2 (two) times a day 60 g 1    LORazepam (ATIVAN) 0 5 mg tablet Take 1 tablet (0 5 mg total) by mouth daily at bedtime as needed for anxiety Takes PRN at bedtime 30 tablet 0    magnesium oxide (MAG-OX) 400 mg Take 1 tablet (400 mg total) by mouth 2 (two) times a day with meals 60 tablet 3    Na Sulfate-K Sulfate-Mg Sulf (SUPREP BOWEL PREP KIT) 17 5-3 13-1 6 GM/180ML SOLN day before, use 6 oz bottle  4 hours before procedure, take 2nd dose 2 Bottle 0    oxyCODONE (ROXICODONE) 5 mg immediate release tablet Take 5 mg by mouth every 4 (four) hours as needed for moderate pain      potassium chloride (K-DUR,KLOR-CON) 20 mEq tablet Take 2 tablets (40 mEq total) by mouth daily 30 tablet 0    promethazine (PHENERGAN) 25 mg tablet Take 25 mg by mouth every 6 (six) hours as needed for nausea or vomiting       No current facility-administered medications for this visit  Current Outpatient Prescriptions on File Prior to Visit   Medication Sig    pantoprazole (PROTONIX) 40 mg tablet Take 1 tablet (40 mg total) by mouth 2 (two) times a day    Acetaminophen 500 MG Take 2 tablets by mouth every 4 (four) hours as needed    LORazepam (ATIVAN) 0 5 mg tablet Take 1 tablet (0 5 mg total) by mouth daily at bedtime as needed for anxiety Takes PRN at bedtime    magnesium oxide (MAG-OX) 400 mg Take 1 tablet (400 mg total) by mouth 2 (two) times a day with meals    Na Sulfate-K Sulfate-Mg Sulf (SUPREP BOWEL PREP KIT) 17 5-3 13-1 6 GM/180ML SOLN day before, use 6 oz bottle   4 hours before procedure, take 2nd dose    oxyCODONE (ROXICODONE) 5 mg immediate release tablet Take 5 mg by mouth every 4 (four) hours as needed for moderate pain    potassium chloride (K-DUR,KLOR-CON) 20 mEq tablet Take 2 tablets (40 mEq total) by mouth daily    promethazine (PHENERGAN) 25 mg tablet Take 25 mg by mouth every 6 (six) hours as needed for nausea or vomiting     No current facility-administered medications on file prior to visit  She has No Known Allergies       Review of Systems   Constitutional: Negative for activity change, appetite change, chills, fatigue and fever  HENT: Negative for congestion, ear pain, postnasal drip, rhinorrhea, sinus pressure and sore throat  Respiratory: Negative for shortness of breath and wheezing  Cardiovascular: Negative for chest pain, palpitations and leg swelling  Gastrointestinal: Negative for abdominal pain, constipation and diarrhea  Skin: Positive for rash  Objective:      /82 (BP Location: Left arm, Patient Position: Sitting)   Ht 5' 2" (1 575 m)   Wt 82 2 kg (181 lb 3 2 oz)   LMP  (LMP Unknown)   BMI 33 14 kg/m²          Physical Exam   Constitutional: She is oriented to person, place, and time  She appears well-developed and well-nourished  No distress  HENT:   Head: Normocephalic and atraumatic  Right Ear: External ear normal    Left Ear: External ear normal    Mouth/Throat: Oropharynx is clear and moist  No oropharyngeal exudate  Eyes: Conjunctivae are normal  Pupils are equal, round, and reactive to light  Right eye exhibits no discharge  Left eye exhibits no discharge  No scleral icterus  Neck: Neck supple  No thyromegaly present  Cardiovascular: Normal rate, regular rhythm and normal heart sounds  Pulmonary/Chest: Effort normal and breath sounds normal  No respiratory distress  She has no wheezes  Lymphadenopathy:     She has no cervical adenopathy  Neurological: She is alert and oriented to person, place, and time  Skin: Rash (wet, red, erythematous rash under L breast) noted  She is not diaphoretic  Psychiatric: She has a normal mood and affect  Her behavior is normal  Judgment and thought content normal    Vitals reviewed

## 2018-05-01 ENCOUNTER — APPOINTMENT (OUTPATIENT)
Dept: LAB | Facility: MEDICAL CENTER | Age: 53
End: 2018-05-01
Payer: COMMERCIAL

## 2018-05-01 ENCOUNTER — TRANSCRIBE ORDERS (OUTPATIENT)
Dept: LAB | Facility: MEDICAL CENTER | Age: 53
End: 2018-05-01

## 2018-05-01 ENCOUNTER — TELEPHONE (OUTPATIENT)
Dept: SURGERY | Facility: HOSPITAL | Age: 53
End: 2018-05-01

## 2018-05-01 DIAGNOSIS — L67.9 HAIR ABNORMALITY: ICD-10-CM

## 2018-05-01 DIAGNOSIS — E87.6 HYPOKALEMIA: ICD-10-CM

## 2018-05-01 LAB
ANION GAP SERPL CALCULATED.3IONS-SCNC: 7 MMOL/L (ref 4–13)
BUN SERPL-MCNC: 8 MG/DL (ref 5–25)
CALCIUM SERPL-MCNC: 9 MG/DL (ref 8.3–10.1)
CHLORIDE SERPL-SCNC: 109 MMOL/L (ref 100–108)
CO2 SERPL-SCNC: 27 MMOL/L (ref 21–32)
CREAT SERPL-MCNC: 0.62 MG/DL (ref 0.6–1.3)
GFR SERPL CREATININE-BSD FRML MDRD: 103 ML/MIN/1.73SQ M
GLUCOSE P FAST SERPL-MCNC: 91 MG/DL (ref 65–99)
MAGNESIUM SERPL-MCNC: 1.7 MG/DL (ref 1.6–2.6)
POTASSIUM SERPL-SCNC: 3.3 MMOL/L (ref 3.5–5.3)
SODIUM SERPL-SCNC: 143 MMOL/L (ref 136–145)
TSH SERPL DL<=0.05 MIU/L-ACNC: 3.48 UIU/ML (ref 0.36–3.74)

## 2018-05-01 PROCEDURE — 36415 COLL VENOUS BLD VENIPUNCTURE: CPT

## 2018-05-01 PROCEDURE — 80048 BASIC METABOLIC PNL TOTAL CA: CPT

## 2018-05-01 PROCEDURE — 83735 ASSAY OF MAGNESIUM: CPT

## 2018-05-01 PROCEDURE — 84443 ASSAY THYROID STIM HORMONE: CPT

## 2018-05-02 ENCOUNTER — TELEPHONE (OUTPATIENT)
Dept: FAMILY MEDICINE CLINIC | Facility: CLINIC | Age: 53
End: 2018-05-02

## 2018-05-02 NOTE — TELEPHONE ENCOUNTER
Pt got a call from Dr Peter Browne office yesterday stating her K+ is low  They said she needed to call PCP to give her a prescription  Also, asking if she should continue taking the Mg?

## 2018-05-03 DIAGNOSIS — R79.0 LOW MAGNESIUM LEVEL: ICD-10-CM

## 2018-05-03 DIAGNOSIS — R11.2 INTRACTABLE VOMITING WITH NAUSEA, UNSPECIFIED VOMITING TYPE: ICD-10-CM

## 2018-05-03 DIAGNOSIS — E87.6 HYPOKALEMIA: Primary | ICD-10-CM

## 2018-05-03 DIAGNOSIS — E87.6 HYPOKALEMIA: ICD-10-CM

## 2018-05-03 RX ORDER — POTASSIUM CHLORIDE 20 MEQ/1
40 TABLET, EXTENDED RELEASE ORAL DAILY
Qty: 30 TABLET | Refills: 0 | Status: SHIPPED | OUTPATIENT
Start: 2018-05-03 | End: 2018-05-21 | Stop reason: SDUPTHER

## 2018-05-15 ENCOUNTER — APPOINTMENT (OUTPATIENT)
Dept: LAB | Facility: MEDICAL CENTER | Age: 53
End: 2018-05-15
Payer: COMMERCIAL

## 2018-05-15 ENCOUNTER — TRANSCRIBE ORDERS (OUTPATIENT)
Dept: LAB | Facility: MEDICAL CENTER | Age: 53
End: 2018-05-15

## 2018-05-15 DIAGNOSIS — R79.0 LOW MAGNESIUM LEVEL: ICD-10-CM

## 2018-05-15 DIAGNOSIS — E87.6 HYPOKALEMIA: ICD-10-CM

## 2018-05-15 LAB
ANION GAP SERPL CALCULATED.3IONS-SCNC: 4 MMOL/L (ref 4–13)
BUN SERPL-MCNC: 11 MG/DL (ref 5–25)
CALCIUM SERPL-MCNC: 9.5 MG/DL (ref 8.3–10.1)
CHLORIDE SERPL-SCNC: 109 MMOL/L (ref 100–108)
CO2 SERPL-SCNC: 27 MMOL/L (ref 21–32)
CREAT SERPL-MCNC: 0.67 MG/DL (ref 0.6–1.3)
GFR SERPL CREATININE-BSD FRML MDRD: 101 ML/MIN/1.73SQ M
GLUCOSE P FAST SERPL-MCNC: 91 MG/DL (ref 65–99)
MAGNESIUM SERPL-MCNC: 1.8 MG/DL (ref 1.6–2.6)
POTASSIUM SERPL-SCNC: 3.6 MMOL/L (ref 3.5–5.3)
SODIUM SERPL-SCNC: 140 MMOL/L (ref 136–145)

## 2018-05-15 PROCEDURE — 36415 COLL VENOUS BLD VENIPUNCTURE: CPT

## 2018-05-15 PROCEDURE — 80048 BASIC METABOLIC PNL TOTAL CA: CPT

## 2018-05-15 PROCEDURE — 83735 ASSAY OF MAGNESIUM: CPT

## 2018-05-21 DIAGNOSIS — R11.2 INTRACTABLE VOMITING WITH NAUSEA, UNSPECIFIED VOMITING TYPE: ICD-10-CM

## 2018-05-21 DIAGNOSIS — E87.6 HYPOKALEMIA: ICD-10-CM

## 2018-05-21 RX ORDER — POTASSIUM CHLORIDE 20 MEQ/1
40 TABLET, EXTENDED RELEASE ORAL DAILY
Qty: 30 TABLET | Refills: 0 | Status: SHIPPED | OUTPATIENT
Start: 2018-05-21 | End: 2018-06-04 | Stop reason: SDUPTHER

## 2018-06-04 DIAGNOSIS — R11.2 INTRACTABLE VOMITING WITH NAUSEA, UNSPECIFIED VOMITING TYPE: ICD-10-CM

## 2018-06-04 DIAGNOSIS — E87.6 HYPOKALEMIA: ICD-10-CM

## 2018-06-04 DIAGNOSIS — R79.9 ABNORMAL BLOOD CHEMISTRY: Primary | ICD-10-CM

## 2018-06-04 RX ORDER — POTASSIUM CHLORIDE 20 MEQ/1
40 TABLET, EXTENDED RELEASE ORAL DAILY
Qty: 30 TABLET | Refills: 0 | Status: SHIPPED | OUTPATIENT
Start: 2018-06-04 | End: 2018-06-19 | Stop reason: SDUPTHER

## 2018-06-06 ENCOUNTER — APPOINTMENT (OUTPATIENT)
Dept: LAB | Facility: HOSPITAL | Age: 53
End: 2018-06-06
Payer: COMMERCIAL

## 2018-06-06 DIAGNOSIS — R79.9 ABNORMAL BLOOD CHEMISTRY: ICD-10-CM

## 2018-06-06 LAB
ALBUMIN SERPL BCP-MCNC: 3.5 G/DL (ref 3.5–5)
ALP SERPL-CCNC: 106 U/L (ref 46–116)
ALT SERPL W P-5'-P-CCNC: 34 U/L (ref 12–78)
ANION GAP SERPL CALCULATED.3IONS-SCNC: 7 MMOL/L (ref 4–13)
AST SERPL W P-5'-P-CCNC: 20 U/L (ref 5–45)
BILIRUB SERPL-MCNC: 1.8 MG/DL (ref 0.2–1)
BUN SERPL-MCNC: 12 MG/DL (ref 5–25)
CALCIUM SERPL-MCNC: 9.4 MG/DL (ref 8.3–10.1)
CHLORIDE SERPL-SCNC: 106 MMOL/L (ref 100–108)
CO2 SERPL-SCNC: 29 MMOL/L (ref 21–32)
CREAT SERPL-MCNC: 0.72 MG/DL (ref 0.6–1.3)
GFR SERPL CREATININE-BSD FRML MDRD: 96 ML/MIN/1.73SQ M
GLUCOSE P FAST SERPL-MCNC: 92 MG/DL (ref 65–99)
MAGNESIUM SERPL-MCNC: 1.7 MG/DL (ref 1.6–2.6)
POTASSIUM SERPL-SCNC: 4.1 MMOL/L (ref 3.5–5.3)
PROT SERPL-MCNC: 6.7 G/DL (ref 6.4–8.2)
SODIUM SERPL-SCNC: 142 MMOL/L (ref 136–145)

## 2018-06-06 PROCEDURE — 80053 COMPREHEN METABOLIC PANEL: CPT

## 2018-06-06 PROCEDURE — 36415 COLL VENOUS BLD VENIPUNCTURE: CPT

## 2018-06-06 PROCEDURE — 83735 ASSAY OF MAGNESIUM: CPT

## 2018-06-14 ENCOUNTER — ANESTHESIA EVENT (OUTPATIENT)
Dept: PERIOP | Facility: HOSPITAL | Age: 53
End: 2018-06-14
Payer: COMMERCIAL

## 2018-06-15 ENCOUNTER — HOSPITAL ENCOUNTER (OUTPATIENT)
Facility: HOSPITAL | Age: 53
Setting detail: OUTPATIENT SURGERY
Discharge: HOME | End: 2018-06-15
Attending: INTERNAL MEDICINE | Admitting: INTERNAL MEDICINE
Payer: COMMERCIAL

## 2018-06-15 ENCOUNTER — ANESTHESIA (OUTPATIENT)
Dept: PERIOP | Facility: HOSPITAL | Age: 53
End: 2018-06-15
Payer: COMMERCIAL

## 2018-06-15 VITALS
SYSTOLIC BLOOD PRESSURE: 133 MMHG | TEMPERATURE: 98.2 F | HEART RATE: 67 BPM | RESPIRATION RATE: 18 BRPM | DIASTOLIC BLOOD PRESSURE: 67 MMHG | OXYGEN SATURATION: 97 %

## 2018-06-15 DIAGNOSIS — D36.9 ADENOMATOUS POLYPS: ICD-10-CM

## 2018-06-15 PROCEDURE — G0105 COLORECTAL SCRN; HI RISK IND: HCPCS | Performed by: INTERNAL MEDICINE

## 2018-06-15 PROCEDURE — 88305 TISSUE EXAM BY PATHOLOGIST: CPT | Performed by: PATHOLOGY

## 2018-06-15 RX ORDER — GLUCOSAMINE/D3/BOSWELLIA SERRA 1500MG-400
1 TABLET ORAL DAILY
Status: ON HOLD | COMMUNITY
End: 2018-08-03 | Stop reason: ALTCHOICE

## 2018-06-15 RX ORDER — LIDOCAINE HYDROCHLORIDE 10 MG/ML
INJECTION, SOLUTION INFILTRATION; PERINEURAL AS NEEDED
Status: DISCONTINUED | OUTPATIENT
Start: 2018-06-15 | End: 2018-06-15 | Stop reason: SURG

## 2018-06-15 RX ORDER — ALBUTEROL SULFATE 2.5 MG/3ML
2.5 SOLUTION RESPIRATORY (INHALATION) EVERY 4 HOURS PRN
Status: DISCONTINUED | OUTPATIENT
Start: 2018-06-15 | End: 2018-06-15 | Stop reason: HOSPADM

## 2018-06-15 RX ORDER — SODIUM CHLORIDE, SODIUM LACTATE, POTASSIUM CHLORIDE, CALCIUM CHLORIDE 600; 310; 30; 20 MG/100ML; MG/100ML; MG/100ML; MG/100ML
125 INJECTION, SOLUTION INTRAVENOUS CONTINUOUS
Status: DISCONTINUED | OUTPATIENT
Start: 2018-06-15 | End: 2018-06-15

## 2018-06-15 RX ORDER — SODIUM CHLORIDE, SODIUM LACTATE, POTASSIUM CHLORIDE, CALCIUM CHLORIDE 600; 310; 30; 20 MG/100ML; MG/100ML; MG/100ML; MG/100ML
125 INJECTION, SOLUTION INTRAVENOUS CONTINUOUS
Status: DISCONTINUED | OUTPATIENT
Start: 2018-06-15 | End: 2018-06-15 | Stop reason: HOSPADM

## 2018-06-15 RX ORDER — PROPOFOL 10 MG/ML
INJECTION, EMULSION INTRAVENOUS AS NEEDED
Status: DISCONTINUED | OUTPATIENT
Start: 2018-06-15 | End: 2018-06-15 | Stop reason: SURG

## 2018-06-15 RX ADMIN — LIDOCAINE HYDROCHLORIDE 30 MG: 10 INJECTION, SOLUTION INFILTRATION; PERINEURAL at 11:08

## 2018-06-15 RX ADMIN — PROPOFOL 30 MG: 10 INJECTION, EMULSION INTRAVENOUS at 11:19

## 2018-06-15 RX ADMIN — PROPOFOL 30 MG: 10 INJECTION, EMULSION INTRAVENOUS at 11:15

## 2018-06-15 RX ADMIN — SODIUM CHLORIDE, SODIUM LACTATE, POTASSIUM CHLORIDE, AND CALCIUM CHLORIDE: .6; .31; .03; .02 INJECTION, SOLUTION INTRAVENOUS at 10:50

## 2018-06-15 RX ADMIN — PROPOFOL 30 MG: 10 INJECTION, EMULSION INTRAVENOUS at 11:21

## 2018-06-15 RX ADMIN — PROPOFOL 30 MG: 10 INJECTION, EMULSION INTRAVENOUS at 11:17

## 2018-06-15 RX ADMIN — PROPOFOL 30 MG: 10 INJECTION, EMULSION INTRAVENOUS at 11:11

## 2018-06-15 RX ADMIN — SODIUM CHLORIDE, SODIUM LACTATE, POTASSIUM CHLORIDE, AND CALCIUM CHLORIDE 125 ML/HR: .6; .31; .03; .02 INJECTION, SOLUTION INTRAVENOUS at 11:27

## 2018-06-15 RX ADMIN — SODIUM CHLORIDE, SODIUM LACTATE, POTASSIUM CHLORIDE, AND CALCIUM CHLORIDE 125 ML/HR: .6; .31; .03; .02 INJECTION, SOLUTION INTRAVENOUS at 10:32

## 2018-06-15 RX ADMIN — PROPOFOL 120 MG: 10 INJECTION, EMULSION INTRAVENOUS at 11:08

## 2018-06-15 RX ADMIN — PROPOFOL 30 MG: 10 INJECTION, EMULSION INTRAVENOUS at 11:13

## 2018-06-15 RX ADMIN — PROPOFOL 10 MG: 10 INJECTION, EMULSION INTRAVENOUS at 11:22

## 2018-06-15 NOTE — H&P
History and Physical - [facility]  Rhonda Holt 47 y o  female MRN: [id number]                  HPI: Rhonda Holt is a 47y o  year old female who presents for follow up colonoscopy due to prior polyps removed in 2016 which were TA  REVIEW OF SYSTEMS: Per the HPI, and otherwise unremarkable  Historical Information   Past Medical History:   Diagnosis Date    Disease of thyroid gland     Endometrial polyp     Gastroesophageal reflux disease without esophagitis 12/4/2017    Hand injury     LAST ASSESSED: [id number]    High risk medication use     LAST ASSESSED: [id number]    Hyperlipidemia     Hypertension     Renal colic     LAST ASSESSED: [id number]    Viral syndrome     LAST ASSESSED: [id number]    Vitamin D deficiency disease      Past Surgical History:   Procedure Laterality Date    CHOLECYSTECTOMY      COLON SURGERY      COLOSTOMY      ENDOMETRIAL ABLATION  2007    THERMAL    ESOPHAGOGASTRODUODENOSCOPY N/A 4/6/2018    Procedure: ESOPHAGOGASTRODUODENOSCOPY (EGD);   Surgeon: Sheryle Congress, MD;  Location: [facility];  Service: Gastroenterology   [address] [place] [address], 2007    LAPAROTOMY N/A 1/4/2018    Procedure: LAPAROTOMY EXPLORATORY, 2nd look, small bowel resection, ilieostomy closure via [place]own anastamosis transverse colostomy;  Surgeon: Jannette Gipson DO;  Location:  MAIN OR;  Service: General    DC ESOPHAGOGASTRODUODENOSCOPY TRANSORAL DIAGNOSTIC N/A 10/21/2016    Procedure: EGD AND COLONOSCOPY;  Surgeon: Floyd Arrington MD;  Location: [facility];  Service: Gastroenterology    DC PART REMOVAL COLON W ANASTOMOSIS N/A 1/3/2018    Procedure: Exploratory laparotomy, RESECTION COLON SIGMOID, possible ostomy;  Surgeon: Brenna Johnson DO;  Location: [facility];  Service: General    THYROIDECTOMY      NEAR-TOTAL    THYROIDECTOMY, PARTIAL      TONSILLECTOMY AND ADENOIDECTOMY      TUBAL LIGATION  1997    URETERAL STENT PLACEMENT Bilateral 1/3/2018 Procedure: INSERTION STENT URETERAL;  Surgeon: Henrry Jacobson MD;  Location: [facility];  Service: Urology     Social History   History   Alcohol Use No     History   Drug Use No     History   Smoking Status    Former Smoker    Quit date: 2003   Smokeless Tobacco    Never Used     Comment: NEVER USED TOBACCO      Family History   Problem Relation Age of Onset    Diabetes type II Mother     Heart failure Father        Meds/Allergies     Prescriptions Prior to Admission   Medication    Biotin 15707 MCG TABS    ketoconazole (NIZORAL) 2 % cream    LORazepam (ATIVAN) 0 5 mg tablet    magnesium oxide (MAG-OX) 400 mg    pantoprazole (PROTONIX) 40 mg tablet    potassium chloride (K-DUR,KLOR-CON) 20 mEq tablet       No Known Allergies    Objective     Blood pressure 117/72, pulse 75, temperature 98 1 °F (36 7 °C), temperature source Tympanic, resp  rate 18, SpO2 94 %  PHYSICAL EXAM    Gen: NAD  CV: RRR  CHEST: Clear  ABD: soft, NT/ND  EXT: no edema      ASSESSMENT/PLAN:  This is a 47y o  year old female here for colonoscopy due to prior TA's      PLAN: colonoscopy

## 2018-06-15 NOTE — OP NOTE
**** GI/ENDOSCOPY REPORT ****     PATIENT NAME: Ro Tuttle ------ VISIT ID:  Patient ID:   [id number] YOB: 1965     INTRODUCTION: Colonoscopy - A 47 female patient presents for an outpatient   Colonoscopy at [facility]  PREVIOUS COLONOSCOPY: Patient's last colonoscopy was < 3 years ago  INDICATIONS: History of diverticulitis  For colonoscopy prior to   reconnection surgery  CONSENT:  The benefits, risks, and alternatives to the procedure were   discussed and informed consent was obtained from the patient  PREPARATION: EKG, pulse, pulse oximetry and blood pressure were monitored   throughout the procedure  The patient was identified by myself both   verbally and by visual inspection of ID band  ASA Classification: Class 2   - Patient has mild to moderate systemic disturbance that may or may not be   related to the disorder requiring surgery  Airway Assessment   Classification: Airway class 2 - Visualization of the soft palate, fauces   and uvula  MEDICATIONS: Anesthesia-check records     PROCEDURE:  The endoscope was passed without difficulty through the anus   under direct visualization and advanced to the sigmoid colon  The scope   was withdrawn and the mucosa was carefully examined  The quality of the   preparation was good  Then an upper endoscope was passed into the   patient's ostomy and the rest of the remaining colon was evaluated  The   cecum was identified with the appendiceal orifice and the TI was intubated  RECTAL EXAM: Normal rectal exam      FINDINGS:  There was a small polyp in the cecum which was sessile and less   than 5 mm  It was removed with cold biopsy forceps  The rectal stump was   also normal in appearance  COMPLICATIONS: There were no complications  IMPRESSIONS: One small polyp removed  The rest of the remaining colon was   normal      RECOMMENDATIONS: Proceed with reconnection surgery  Await polypectomy   result  Repeat colonoscopy based on pathology but likely 5 years  PATHOLOGY SPECIMENS:     ESTIMATED BLOOD LOSS:     PROCEDURE CODES:     ICD-9 Codes:     ICD-10 Codes:     PERFORMED BY: DEREK Winters O  on 06/15/2018  Version 1, electronically signed by DEREK Jernigan  on   06/15/2018 at 11:36

## 2018-06-15 NOTE — ANESTHESIA POSTPROCEDURE EVALUATION
Post-Op Assessment Note      CV Status:  Stable    Mental Status:  Alert and awake    Hydration Status:  Euvolemic    PONV Controlled:  Controlled    Airway Patency:  Patent    Post Op Vitals Reviewed: Yes          Staff: Anesthesiologist, CRNA       Comments: Awake, alert, VSS          BP (P) 116/70 (06/15/18 1133)    Temp (P) 98 1 °F (36 7 °C) (06/15/18 1133)    Pulse 84 (06/15/18 1132)   Resp (P) 20 (06/15/18 1133)    SpO2 97 % (06/15/18 1132)

## 2018-06-15 NOTE — ANESTHESIA PREPROCEDURE EVALUATION
Review of Systems/Medical History  Patient summary reviewed    No history of anesthetic complications     Cardiovascular  EKG reviewed, Hyperlipidemia, Hypertension controlled, Dysrhythmias , atrial fibrillation,   Comment: 06/2016  SUMMARY     LEFT VENTRICLE:  Size was normal   Systolic function was normal  Ejection fraction was estimated to be 60 %  There were no regional wall motion abnormalities  Wall thickness was normal      MITRAL VALVE:  There was mild regurgitation      TRICUSPID VALVE:  There was mild regurgitation  Estimated peak PA pressure was 25 mmHg ,  Pulmonary  Negative pulmonary ROS        GI/Hepatic    GERD well controlled, Bowel prep            Endo/Other    Comment: S/p partial thyroidectomy    GYN  Negative gynecology ROS          Hematology  Anemia ,     Musculoskeletal    Arthritis     Neurology  Negative neurology ROS      Psychology   Negative psychology ROS              Physical Exam    Airway    Mallampati score: II  TM Distance: >3 FB  Neck ROM: full     Dental   Comment: No loose,     Cardiovascular  Rhythm: regular, Rate: normal,     Pulmonary  Breath sounds clear to auscultation,     Other Findings        Anesthesia Plan  ASA Score- 2     Anesthesia Type- IV sedation with anesthesia with ASA Monitors  Additional Monitors:   Airway Plan:         Plan Factors-  Patient did not smoke on day of surgery  Induction- intravenous  Postoperative Plan-     Informed Consent- Anesthetic plan and risks discussed with patient  I personally reviewed this patient with the CRNA  Discussed and agreed on the Anesthesia Plan with the CRNA  Eun Buchanan

## 2018-06-19 DIAGNOSIS — E87.6 HYPOKALEMIA: ICD-10-CM

## 2018-06-19 DIAGNOSIS — R11.2 INTRACTABLE VOMITING WITH NAUSEA, UNSPECIFIED VOMITING TYPE: ICD-10-CM

## 2018-06-19 RX ORDER — POTASSIUM CHLORIDE 20 MEQ/1
40 TABLET, EXTENDED RELEASE ORAL DAILY
Qty: 30 TABLET | Refills: 0 | Status: SHIPPED | OUTPATIENT
Start: 2018-06-19 | End: 2018-07-06 | Stop reason: SDUPTHER

## 2018-06-25 ENCOUNTER — TELEPHONE (OUTPATIENT)
Dept: GASTROENTEROLOGY | Facility: CLINIC | Age: 53
End: 2018-06-25

## 2018-06-25 ENCOUNTER — OFFICE VISIT (OUTPATIENT)
Dept: SURGERY | Facility: HOSPITAL | Age: 53
End: 2018-06-25
Payer: COMMERCIAL

## 2018-06-25 VITALS
DIASTOLIC BLOOD PRESSURE: 84 MMHG | HEART RATE: 60 BPM | HEIGHT: 62 IN | BODY MASS INDEX: 34.04 KG/M2 | WEIGHT: 185 LBS | SYSTOLIC BLOOD PRESSURE: 144 MMHG | TEMPERATURE: 98.2 F

## 2018-06-25 DIAGNOSIS — K57.20 DIVERTICULITIS OF LARGE INTESTINE WITH PERFORATION WITHOUT ABSCESS OR BLEEDING: Primary | ICD-10-CM

## 2018-06-25 DIAGNOSIS — R11.14 BILIOUS VOMITING WITH NAUSEA: ICD-10-CM

## 2018-06-25 DIAGNOSIS — Z93.3 COLOSTOMY IN PLACE (HCC): ICD-10-CM

## 2018-06-25 PROCEDURE — 99213 OFFICE O/P EST LOW 20 MIN: CPT | Performed by: SURGERY

## 2018-06-25 RX ORDER — LANOLIN ALCOHOL/MO/W.PET/CERES
CREAM (GRAM) TOPICAL
Refills: 0 | COMMUNITY
Start: 2018-06-12 | End: 2018-06-25

## 2018-06-25 RX ORDER — METRONIDAZOLE 500 MG/1
500 TABLET ORAL SEE ADMIN INSTRUCTIONS
Qty: 3 TABLET | Refills: 0 | Status: SHIPPED | OUTPATIENT
Start: 2018-06-25 | End: 2018-06-26

## 2018-06-25 RX ORDER — CHLORHEXIDINE GLUCONATE 4 G/100ML
SOLUTION TOPICAL DAILY PRN
Status: CANCELLED | OUTPATIENT
Start: 2018-06-25 | End: 2019-06-25

## 2018-06-25 RX ORDER — NEOMYCIN SULFATE 500 MG/1
1500 TABLET ORAL SEE ADMIN INSTRUCTIONS
Qty: 9 TABLET | Refills: 0 | Status: SHIPPED | OUTPATIENT
Start: 2018-06-25 | End: 2018-06-26

## 2018-06-25 RX ORDER — SODIUM CHLORIDE, SODIUM LACTATE, POTASSIUM CHLORIDE, CALCIUM CHLORIDE 600; 310; 30; 20 MG/100ML; MG/100ML; MG/100ML; MG/100ML
125 INJECTION, SOLUTION INTRAVENOUS CONTINUOUS
Status: CANCELLED | OUTPATIENT
Start: 2018-06-25 | End: 2019-06-25

## 2018-06-25 NOTE — H&P
Discuss colostomy reversal     Assessment/Plan:    Diverticulitis of large intestine with perforation without abscess or bleeding    History of smoldering diverticulitis status post Pj's procedure  Overall doing well  Tolerating diet  Ostomy working  Patient is now agreeable to have her colostomy reversed  She recently underwent colonoscopy which was unremarkable  -  preop and consent for colostomy reversal, possible loop ileostomy  The procedure self including   All associatedrisks and benefits were discussed at great length the patient  The patient verbalized understands risks is willing proceed,   Consent was signed  -  A preoperatively  Patient will undergo routine lab work including CMP, CBC, type and screen  She will also undergo EKG and chest x-ray  She has no significant comorbidities and therefore not require any additional perioperative clearance  Diagnoses and all orders for this visit:    Diverticulitis of large intestine with perforation without abscess or bleeding    Bilious vomiting with nausea  -     NM gastric emptying    Other orders  -     Discontinue: magnesium Oxide (MAG-OX) 400 mg TABS;           Subjective:      Patient ID: Jordana Osorio is a 47 y o  female  A 67-year-old female with history of smoldering diverticulitis status post Pj's procedure, presents today for evaluation discussion of Pj's reversal   Patient recently underwent colonoscopy which was unremarkable exception of 1 polyp which was removed  Currently she denies any nausea vomiting  No fevers or chills  No   Significant abdominal pain,  Although she does complain of some intermittent left upper quadrant discomfort  Her ostomy continues to function but appears it is retracted  Denies any chest pain or shortness of breath          The following portions of the patient's history were reviewed and updated as appropriate:   She  has a past medical history of Disease of thyroid gland; Endometrial polyp; Gastroesophageal reflux disease without esophagitis (12/4/2017); Hand injury; High risk medication use; Hyperlipidemia; Hypertension; Renal colic; Viral syndrome; and Vitamin D deficiency disease  She   Patient Active Problem List    Diagnosis Date Noted    Hair abnormality 04/30/2018    Hair loss 04/26/2018    Adenomatous polyps 04/26/2018    Screening for colon cancer 04/26/2018    Gastroesophageal reflux disease without esophagitis 03/20/2018    Bilious vomiting with nausea 03/20/2018    Dehydration 02/13/2018    Intractable vomiting with nausea 02/13/2018    Colostomy care ([facility] ) 02/07/2018    [facility]leus ([facility] ) 01/18/2018    Paroxysmal atrial fibrillation ([facility] ) 01/05/2018    Hematuria 01/03/2018    Ileostomy in place ([facility] ) 01/03/2018    Acute blood loss anemia 01/01/2018    Microscopic hematuria 12/28/2017    Low serum prealbumin 12/27/2017    Severe protein-calorie malnutrition ([facility] ) 12/27/2017    Hypoalbuminemia 12/26/2017    Diverticulitis of colon 12/26/2017    Diverticulitis of large intestine with perforation without abscess or bleeding 12/04/2017    Esophageal reflux 12/04/2017    Hyperlipidemia 12/04/2017    Hypertension 12/04/2017    Hypokalemia 12/04/2017    Chronic constipation 11/27/2017    Acute pain 10/12/2017    Ache in joint 09/15/2017    Primary osteoarthritis of left knee 09/15/2017    Anemia 09/14/2016    Thyroid disorder 09/14/2016    Non-rheumatic mitral regurgitation 07/22/2016    Fatigue 70/11/0421    Systolic murmur 14/54/9719    Vitamin D deficiency 06/21/2016    Vaginal dryness, menopausal 02/01/2016    Leg pain, left 07/17/2015    Erysipelas 12/12/2014    Diverticulosis 03/17/2014    Vulvitis 08/14/2013     She  has a past surgical history that includes Cholecystectomy; Thyroidectomy, partial; Tonsillectomy and adenoidectomy;  Tubal ligation (1997); pr esophagogastroduodenoscopy transoral diagnostic (N/A, 10/21/2016); pr part removal colon w anastomosis (N/A, 1/3/2018); Ureteral stent placement (Bilateral, 1/3/2018); LAPAROTOMY (N/A, 1/4/2018); Colostomy; Esophagogastroduodenoscopy (N/A, 4/6/2018); Endometrial ablation (2007); Hysteroscopy; Thyroidectomy; Colon surgery; and Colonoscopy (N/A, 6/15/2018)  Her family history includes Diabetes type II in her mother; Heart failure in her father  She  reports that she quit smoking about 15 years ago  She has never used smokeless tobacco  She reports that she does not drink alcohol or use drugs  Current Outpatient Prescriptions   Medication Sig Dispense Refill    Biotin 46937 MCG TABS Take 1 tablet by mouth daily      ketoconazole (NIZORAL) 2 % cream Apply topically 2 (two) times a day 60 g 1    LORazepam (ATIVAN) 0 5 mg tablet Take 1 tablet (0 5 mg total) by mouth daily at bedtime as needed for anxiety Takes PRN at bedtime 30 tablet 0    magnesium oxide (MAG-OX) 400 mg Take 1 tablet (400 mg total) by mouth 2 (two) times a day with meals 60 tablet 3    pantoprazole (PROTONIX) 40 mg tablet Take 1 tablet (40 mg total) by mouth 2 (two) times a day 60 tablet 3    potassium chloride (K-DUR,KLOR-CON) 20 mEq tablet Take 2 tablets (40 mEq total) by mouth daily 30 tablet 0     No current facility-administered medications for this visit  She has No Known Allergies       Review of Systems        A 10 point review of systems was conducted, all negative except as noted above HPI  Objective:      /84   Pulse 60   Temp 98 2 °F (36 8 °C)   Ht 5' 2" (1 575 m)   Wt 83 9 kg (185 lb)   LMP  (LMP Unknown)   BMI 33 84 kg/m²           Physical Exam   Constitutional: She is oriented to person, place, and time  She appears well-developed and well-nourished  No distress  HENT:   Head: Normocephalic and atraumatic  Eyes: No scleral icterus  Neck: Normal range of motion  No tracheal deviation present  Cardiovascular: Normal rate, regular rhythm and normal heart sounds    Exam reveals no gallop and no friction rub  No murmur heard  Pulmonary/Chest: Effort normal  No respiratory distress  She has no wheezes  She has no rales  She exhibits no tenderness  Abdominal: Soft  She exhibits no distension and no mass  There is no tenderness  There is no rebound and no guarding  Abdomen is soft, nontender, left upper quadrant colostomy   Appears retracted significantly butintact, functioning with air in the bag  Ostomy appears viable  midline incision healed well   Musculoskeletal: Normal range of motion  She exhibits no edema, tenderness or deformity  Lymphadenopathy:     She has no cervical adenopathy  Neurological: She is alert and oriented to person, place, and time  No cranial nerve deficit  Skin: Skin is warm  No rash noted  She is not diaphoretic  No erythema  No pallor  Psychiatric: She has a normal mood and affect  Her behavior is normal    Vitals reviewed

## 2018-06-25 NOTE — ASSESSMENT & PLAN NOTE
History of smoldering diverticulitis status post Pj's procedure  Overall doing well  Tolerating diet  Ostomy working  Patient is now agreeable to have her colostomy reversed  She recently underwent colonoscopy which was unremarkable  -  preop and consent for colostomy reversal, possible loop ileostomy  The procedure self including   All associatedrisks and benefits were discussed at great length the patient  The patient verbalized understands risks is willing proceed,   Consent was signed  -  A preoperatively  Patient will undergo routine lab work including CMP, CBC, type and screen  She will also undergo EKG and chest x-ray  She has no significant comorbidities and therefore not require any additional perioperative clearance

## 2018-06-25 NOTE — PROGRESS NOTES
Assessment/Plan:    Diverticulitis of large intestine with perforation without abscess or bleeding    History of smoldering diverticulitis status post Pj's procedure  Overall doing well  Tolerating diet  Ostomy working  Patient is now agreeable to have her colostomy reversed  She recently underwent colonoscopy which was unremarkable  -  preop and consent for colostomy reversal, possible loop ileostomy  The procedure self including   All associatedrisks and benefits were discussed at great length the patient  The patient verbalized understands risks is willing proceed,   Consent was signed  -  A preoperatively  Patient will undergo routine lab work including CMP, CBC, type and screen  She will also undergo EKG and chest x-ray  She has no significant comorbidities and therefore not require any additional perioperative clearance  Diagnoses and all orders for this visit:    Diverticulitis of large intestine with perforation without abscess or bleeding    Bilious vomiting with nausea  -     NM gastric emptying    Other orders  -     Discontinue: magnesium Oxide (MAG-OX) 400 mg TABS;           Subjective:      Patient ID: Jakob Montoya is a 47 y o  female  A 48-year-old female with history of smoldering diverticulitis status post Pj's procedure, presents today for evaluation discussion of Pj's reversal   Patient recently underwent colonoscopy which was unremarkable exception of 1 polyp which was removed  Currently she denies any nausea vomiting  No fevers or chills  No   Significant abdominal pain,  Although she does complain of some intermittent left upper quadrant discomfort  Her ostomy continues to function but appears it is retracted  Denies any chest pain or shortness of breath  The following portions of the patient's history were reviewed and updated as appropriate:   She  has a past medical history of Disease of thyroid gland;  Endometrial polyp; Gastroesophageal reflux disease without esophagitis (12/4/2017); Hand injury; High risk medication use; Hyperlipidemia; Hypertension; Renal colic; Viral syndrome; and Vitamin D deficiency disease  She   Patient Active Problem List    Diagnosis Date Noted    Hair abnormality 04/30/2018    Hair loss 04/26/2018    Adenomatous polyps 04/26/2018    Screening for colon cancer 04/26/2018    Gastroesophageal reflux disease without esophagitis 03/20/2018    Bilious vomiting with nausea 03/20/2018    Dehydration 02/13/2018    Intractable vomiting with nausea 02/13/2018    Colostomy care ([facility] ) 02/07/2018    Ileus ([facility] ) 01/18/2018    Paroxysmal atrial fibrillation ([facility] ) 01/05/2018    Hematuria 01/03/2018    Ileostomy in place ([facility] ) 01/03/2018    Acute blood loss anemia 01/01/2018    Microscopic hematuria 12/28/2017    Low serum prealbumin 12/27/2017    Severe protein-calorie malnutrition ([facility] ) 12/27/2017    Hypoalbuminemia 12/26/2017    Diverticulitis of colon 12/26/2017    Diverticulitis of large intestine with perforation without abscess or bleeding 12/04/2017    Esophageal reflux 12/04/2017    Hyperlipidemia 12/04/2017    Hypertension 12/04/2017    Hypokalemia 12/04/2017    Chronic constipation 11/27/2017    Acute pain 10/12/2017    Ache in joint 09/15/2017    Primary osteoarthritis of left knee 09/15/2017    Anemia 09/14/2016    Thyroid disorder 09/14/2016    Non-rheumatic mitral regurgitation 07/22/2016    Fatigue 54/89/2544    Systolic murmur 20/41/7124    Vitamin D deficiency 06/21/2016    Vaginal dryness, menopausal 02/01/2016    Leg pain, left 07/17/2015    Erysipelas 12/12/2014    Diverticulosis 03/17/2014    Vulvitis 08/14/2013     She  has a past surgical history that includes Cholecystectomy; Thyroidectomy, partial; Tonsillectomy and adenoidectomy;  Tubal ligation (1997); pr esophagogastroduodenoscopy transoral diagnostic (N/A, 10/21/2016); pr part removal colon w anastomosis (N/A, 1/3/2018); Ureteral stent placement (Bilateral, 1/3/2018); LAPAROTOMY (N/A, 1/4/2018); Colostomy; Esophagogastroduodenoscopy (N/A, 4/6/2018); Endometrial ablation (2007); Hysteroscopy; Thyroidectomy; Colon surgery; and Colonoscopy (N/A, 6/15/2018)  Her family history includes Diabetes type II in her mother; Heart failure in her father  She  reports that she quit smoking about 15 years ago  She has never used smokeless tobacco  She reports that she does not drink alcohol or use drugs  Current Outpatient Prescriptions   Medication Sig Dispense Refill    Biotin 03788 MCG TABS Take 1 tablet by mouth daily      ketoconazole (NIZORAL) 2 % cream Apply topically 2 (two) times a day 60 g 1    LORazepam (ATIVAN) 0 5 mg tablet Take 1 tablet (0 5 mg total) by mouth daily at bedtime as needed for anxiety Takes PRN at bedtime 30 tablet 0    magnesium oxide (MAG-OX) 400 mg Take 1 tablet (400 mg total) by mouth 2 (two) times a day with meals 60 tablet 3    pantoprazole (PROTONIX) 40 mg tablet Take 1 tablet (40 mg total) by mouth 2 (two) times a day 60 tablet 3    potassium chloride (K-DUR,KLOR-CON) 20 mEq tablet Take 2 tablets (40 mEq total) by mouth daily 30 tablet 0     No current facility-administered medications for this visit  She has No Known Allergies       Review of Systems   Constitutional: Negative  Respiratory: Negative for cough and shortness of breath  Cardiovascular: Negative for chest pain, palpitations and leg swelling  Gastrointestinal: Negative for abdominal distention, abdominal pain, blood in stool, constipation, diarrhea, nausea and vomiting  Genitourinary: Negative for dysuria  Musculoskeletal: Negative for gait problem  Skin: Negative for color change, pallor, rash and wound             Objective:      /84   Pulse 60   Temp 98 2 °F (36 8 °C)   Ht 5' 2" (1 575 m)   Wt 83 9 kg (185 lb)   LMP  (LMP Unknown)   BMI 33 84 kg/m²          Physical Exam   Constitutional: She is oriented to person, place, and time  She appears well-developed and well-nourished  No distress  HENT:   Head: Normocephalic and atraumatic  Eyes: No scleral icterus  Neck: Normal range of motion  No tracheal deviation present  Cardiovascular: Normal rate, regular rhythm and normal heart sounds  Exam reveals no gallop and no friction rub  No murmur heard  Pulmonary/Chest: Effort normal  No respiratory distress  She has no wheezes  She has no rales  She exhibits no tenderness  Abdominal: Soft  She exhibits no distension and no mass  There is no tenderness  There is no rebound and no guarding  Abdomen is soft, nontender, left upper quadrant colostomy   Appears retracted significantly butintact, functioning with air in the bag  Ostomy appears viable  midline incision healed well   Musculoskeletal: Normal range of motion  She exhibits no edema, tenderness or deformity  Lymphadenopathy:     She has no cervical adenopathy  Neurological: She is alert and oriented to person, place, and time  No cranial nerve deficit  Skin: Skin is warm  No rash noted  She is not diaphoretic  No erythema  No pallor  Psychiatric: She has a normal mood and affect  Her behavior is normal    Vitals reviewed

## 2018-06-27 ENCOUNTER — HOSPITAL ENCOUNTER (OUTPATIENT)
Dept: RADIOLOGY | Facility: HOSPITAL | Age: 53
Discharge: HOME | End: 2018-06-27
Attending: SURGERY
Payer: COMMERCIAL

## 2018-06-27 ENCOUNTER — HOSPITAL ENCOUNTER (OUTPATIENT)
Dept: NON INVASIVE DIAGNOSTICS | Facility: HOSPITAL | Age: 53
Discharge: HOME | End: 2018-06-27
Attending: SURGERY
Payer: COMMERCIAL

## 2018-06-27 ENCOUNTER — APPOINTMENT (OUTPATIENT)
Dept: LAB | Facility: HOSPITAL | Age: 53
End: 2018-06-27
Attending: SURGERY
Payer: COMMERCIAL

## 2018-06-27 DIAGNOSIS — R89.9 ABNORMAL LABORATORY TEST RESULT: ICD-10-CM

## 2018-06-27 DIAGNOSIS — K57.20 DIVERTICULITIS OF LARGE INTESTINE WITH PERFORATION WITHOUT ABSCESS OR BLEEDING: ICD-10-CM

## 2018-06-27 DIAGNOSIS — R89.9 ABNORMAL LABORATORY TEST RESULT: Primary | ICD-10-CM

## 2018-06-27 LAB
ALBUMIN SERPL BCP-MCNC: 3.5 G/DL (ref 3.5–5)
ALP SERPL-CCNC: 102 U/L (ref 46–116)
ALT SERPL W P-5'-P-CCNC: 28 U/L (ref 12–78)
ANION GAP SERPL CALCULATED.3IONS-SCNC: 8 MMOL/L (ref 4–13)
APTT PPP: 30 SECONDS (ref 24–36)
AST SERPL W P-5'-P-CCNC: 16 U/L (ref 5–45)
ATRIAL RATE: 63 BPM
BASOPHILS # BLD AUTO: 0.04 THOUSANDS/ΜL (ref 0–0.1)
BASOPHILS NFR BLD AUTO: 1 % (ref 0–1)
BILIRUB SERPL-MCNC: 1.4 MG/DL (ref 0.2–1)
BUN SERPL-MCNC: 12 MG/DL (ref 5–25)
CALCIUM SERPL-MCNC: 9.1 MG/DL (ref 8.3–10.1)
CHLORIDE SERPL-SCNC: 106 MMOL/L (ref 100–108)
CO2 SERPL-SCNC: 29 MMOL/L (ref 21–32)
CREAT SERPL-MCNC: 0.68 MG/DL (ref 0.6–1.3)
EOSINOPHIL # BLD AUTO: 0.13 THOUSAND/ΜL (ref 0–0.61)
EOSINOPHIL NFR BLD AUTO: 2 % (ref 0–6)
ERYTHROCYTE [DISTWIDTH] IN BLOOD BY AUTOMATED COUNT: 12.5 % (ref 11.6–15.1)
EST. AVERAGE GLUCOSE BLD GHB EST-MCNC: 88 MG/DL
GFR SERPL CREATININE-BSD FRML MDRD: 100 ML/MIN/1.73SQ M
GLUCOSE P FAST SERPL-MCNC: 86 MG/DL (ref 65–99)
HBA1C MFR BLD: 4.7 % (ref 4.2–6.3)
HCT VFR BLD AUTO: 41 % (ref 34.8–46.1)
HGB BLD-MCNC: 14.5 G/DL (ref 11.5–15.4)
INR PPP: 0.96 (ref 0.86–1.17)
LYMPHOCYTES # BLD AUTO: 1.73 THOUSANDS/ΜL (ref 0.6–4.47)
LYMPHOCYTES NFR BLD AUTO: 33 % (ref 14–44)
MAGNESIUM SERPL-MCNC: 1.8 MG/DL (ref 1.6–2.6)
MCH RBC QN AUTO: 32.4 PG (ref 26.8–34.3)
MCHC RBC AUTO-ENTMCNC: 35.4 G/DL (ref 31.4–37.4)
MCV RBC AUTO: 92 FL (ref 82–98)
MONOCYTES # BLD AUTO: 0.34 THOUSAND/ΜL (ref 0.17–1.22)
MONOCYTES NFR BLD AUTO: 6 % (ref 4–12)
NEUTROPHILS # BLD AUTO: 3.07 THOUSANDS/ΜL (ref 1.85–7.62)
NEUTS SEG NFR BLD AUTO: 58 % (ref 43–75)
P AXIS: 59 DEGREES
PLATELET # BLD AUTO: 251 THOUSANDS/UL (ref 149–390)
PMV BLD AUTO: 10.4 FL (ref 8.9–12.7)
POTASSIUM SERPL-SCNC: 3.9 MMOL/L (ref 3.5–5.3)
PR INTERVAL: 158 MS
PROT SERPL-MCNC: 6.7 G/DL (ref 6.4–8.2)
PROTHROMBIN TIME: 12.3 SECONDS (ref 11.8–14.2)
QRS AXIS: 73 DEGREES
QRSD INTERVAL: 100 MS
QT INTERVAL: 390 MS
QTC INTERVAL: 399 MS
RBC # BLD AUTO: 4.47 MILLION/UL (ref 3.81–5.12)
SODIUM SERPL-SCNC: 143 MMOL/L (ref 136–145)
T WAVE AXIS: 49 DEGREES
VENTRICULAR RATE: 63 BPM
WBC # BLD AUTO: 5.31 THOUSAND/UL (ref 4.31–10.16)

## 2018-06-27 PROCEDURE — 36415 COLL VENOUS BLD VENIPUNCTURE: CPT

## 2018-06-27 PROCEDURE — 93005 ELECTROCARDIOGRAM TRACING: CPT

## 2018-06-27 PROCEDURE — 83735 ASSAY OF MAGNESIUM: CPT

## 2018-06-27 PROCEDURE — 85610 PROTHROMBIN TIME: CPT

## 2018-06-27 PROCEDURE — 85730 THROMBOPLASTIN TIME PARTIAL: CPT

## 2018-06-27 PROCEDURE — 71046 X-RAY EXAM CHEST 2 VIEWS: CPT

## 2018-06-27 PROCEDURE — 80053 COMPREHEN METABOLIC PANEL: CPT

## 2018-06-27 PROCEDURE — 93010 ELECTROCARDIOGRAM REPORT: CPT | Performed by: INTERNAL MEDICINE

## 2018-06-27 PROCEDURE — 85025 COMPLETE CBC W/AUTO DIFF WBC: CPT

## 2018-06-27 PROCEDURE — 83036 HEMOGLOBIN GLYCOSYLATED A1C: CPT

## 2018-06-29 DIAGNOSIS — Z12.11 SCREENING FOR COLON CANCER: ICD-10-CM

## 2018-07-02 ENCOUNTER — TELEPHONE (OUTPATIENT)
Dept: SURGERY | Facility: HOSPITAL | Age: 53
End: 2018-07-02

## 2018-07-02 DIAGNOSIS — Z93.3 COLOSTOMY IN PLACE (HCC): Primary | ICD-10-CM

## 2018-07-02 RX ORDER — MULTIVITAMIN
1 TABLET ORAL DAILY
Status: ON HOLD | COMMUNITY
End: 2018-08-03 | Stop reason: ALTCHOICE

## 2018-07-02 NOTE — PRE-PROCEDURE INSTRUCTIONS
Pre-Surgery Instructions:   Medication Instructions    Biotin 44919 MCG TABS Instructed patient per Anesthesia Guidelines   ketoconazole (NIZORAL) 2 % cream Instructed patient per Anesthesia Guidelines   LORazepam (ATIVAN) 0 5 mg tablet Instructed patient per Anesthesia Guidelines   magnesium oxide (MAG-OX) 400 mg Instructed patient per Anesthesia Guidelines   Multiple Vitamin (MULTIVITAMIN) tablet Instructed patient per Anesthesia Guidelines   pantoprazole (PROTONIX) 40 mg tablet Instructed patient per Anesthesia Guidelines   potassium chloride (K-DUR,KLOR-CON) 20 mEq tablet Instructed patient per Anesthesia Guidelines

## 2018-07-02 NOTE — PRE-PROCEDURE INSTRUCTIONS
Pre-Surgery Instructions:   Medication Instructions    Biotin 52989 MCG TABS Instructed patient per Anesthesia Guidelines   ketoconazole (NIZORAL) 2 % cream Instructed patient per Anesthesia Guidelines   LORazepam (ATIVAN) 0 5 mg tablet Instructed patient per Anesthesia Guidelines   magnesium oxide (MAG-OX) 400 mg Instructed patient per Anesthesia Guidelines   Multiple Vitamin (MULTIVITAMIN) tablet Instructed patient per Anesthesia Guidelines   pantoprazole (PROTONIX) 40 mg tablet Instructed patient per Anesthesia Guidelines   potassium chloride (K-DUR,KLOR-CON) 20 mEq tablet Instructed patient per Anesthesia Guidelines  Instructed pt to stop nsaids and vitamins one week prior to surgery  Pt instructed to take meds day of surgery with 1-2 sips of water only

## 2018-07-05 RX ORDER — SODIUM, POTASSIUM,MAG SULFATES 17.5-3.13G
SOLUTION, RECONSTITUTED, ORAL ORAL
Qty: 354 ML | Refills: 0 | Status: ON HOLD | OUTPATIENT
Start: 2018-07-05 | End: 2018-08-03 | Stop reason: ALTCHOICE

## 2018-07-06 ENCOUNTER — TELEPHONE (OUTPATIENT)
Dept: FAMILY MEDICINE CLINIC | Facility: CLINIC | Age: 53
End: 2018-07-06

## 2018-07-06 DIAGNOSIS — E87.6 HYPOKALEMIA: ICD-10-CM

## 2018-07-06 DIAGNOSIS — R11.2 INTRACTABLE VOMITING WITH NAUSEA, UNSPECIFIED VOMITING TYPE: ICD-10-CM

## 2018-07-06 RX ORDER — POTASSIUM CHLORIDE 20 MEQ/1
20 TABLET, EXTENDED RELEASE ORAL 2 TIMES DAILY
Qty: 60 TABLET | Refills: 0 | Status: ON HOLD | OUTPATIENT
Start: 2018-07-06 | End: 2018-08-15

## 2018-07-10 ENCOUNTER — ANESTHESIA EVENT (OUTPATIENT)
Dept: PERIOP | Facility: HOSPITAL | Age: 53
DRG: 330 | End: 2018-07-10
Payer: COMMERCIAL

## 2018-07-10 NOTE — ANESTHESIA PREPROCEDURE EVALUATION
Review of Systems/Medical History  Patient summary reviewed        Cardiovascular  Hypertension ,   Comment: Transthoracic Echocardiogram  2D, M-mode, Doppler, and Color Doppler     Study date:  2016     Patient: Roger Downing  MR number: [id number]  Account number: [de-identified]  : 1965  Age: 45 years  Gender: Female  Status: Outpatient  Location: Echo lab  Height: 61 in  Weight: 219 6 lb  BP: 130/ 80 mmHg     Indications: Hypertension     Diagnoses: 424 90 - ENDOCARDITIS NOS     Sonographer:  Eri Doll RD, CCT  Primary Physician:  Gavino Jensen DO  Referring Physician:  Taiwo Logan PA-C  Group:  [facility]  Interpreting Physician:  Jose Armendariz MD     SUMMARY     LEFT VENTRICLE:  Size was normal   Systolic function was normal  Ejection fraction was estimated to be 60 %  There were no regional wall motion abnormalities  Wall thickness was normal      MITRAL VALVE:  There was mild regurgitation      TRICUSPID VALVE:  There was mild regurgitation  Estimated peak PA pressure was 25 mmHg      HISTORY: PRIOR HISTORY: Patient has no history of cardiovascular disease      PROCEDURE: The procedure was performed in the echo lab  This was a routine  study  The transthoracic approach was used  The study included complete 2D  imaging, M-mode, complete spectral Doppler, and color Doppler  The heart rate  was 80 bpm, at the start of the study  This was a technically difficult study      LEFT VENTRICLE: Size was normal  Systolic function was normal  Ejection  fraction was estimated to be 60 %  There were no regional wall motion  abnormalities  Wall thickness was normal      RIGHT VENTRICLE: The size was normal  Systolic function was normal  Wall  thickness was normal      LEFT ATRIUM: Size was normal      RIGHT ATRIUM: Size was normal      MITRAL VALVE: Valve structure was normal  There was normal leaflet separation    DOPPLER: The transmitral velocity was within the normal range  There was no  evidence for stenosis  There was mild regurgitation      TRICUSPID VALVE: DOPPLER: There was mild regurgitation  Estimated peak PA  pressure was 25 mmHg      PULMONIC VALVE: Leaflets exhibited normal thickness, no calcification, and  normal cuspal separation  DOPPLER: The transpulmonic velocity was within the  normal range  There was no regurgitation      PERICARDIUM: There was no pericardial effusion  The pericardium was normal in  appearance ,  Pulmonary       GI/Hepatic    GERD ,             Endo/Other  History of thyroid disease ,      GYN       Hematology  Anemia ,     Musculoskeletal    Arthritis     Neurology   Psychology           Physical Exam    Airway    Mallampati score: II  TM Distance: >3 FB  Neck ROM: full     Dental       Cardiovascular      Pulmonary      Other Findings        Anesthesia Plan  ASA Score- 2     Anesthesia Type- general with ASA Monitors  Additional Monitors:   Airway Plan: ETT  Plan Factors-    Induction- intravenous  Postoperative Plan-     Informed Consent- Anesthetic plan and risks discussed with patient

## 2018-07-11 ENCOUNTER — HOSPITAL ENCOUNTER (INPATIENT)
Facility: HOSPITAL | Age: 53
LOS: 7 days | Discharge: HOME | DRG: 330 | End: 2018-07-18
Attending: SURGERY | Admitting: SURGERY
Payer: COMMERCIAL

## 2018-07-11 ENCOUNTER — ANESTHESIA (OUTPATIENT)
Dept: PERIOP | Facility: HOSPITAL | Age: 53
DRG: 330 | End: 2018-07-11
Payer: COMMERCIAL

## 2018-07-11 DIAGNOSIS — F41.9 ANXIETY: ICD-10-CM

## 2018-07-11 DIAGNOSIS — K57.20 DIVERTICULITIS OF LARGE INTESTINE WITH PERFORATION WITHOUT ABSCESS OR BLEEDING: ICD-10-CM

## 2018-07-11 DIAGNOSIS — I48.0 PAROXYSMAL ATRIAL FIBRILLATION (HCC): Primary | ICD-10-CM

## 2018-07-11 DIAGNOSIS — I10 HYPERTENSION, UNSPECIFIED TYPE: ICD-10-CM

## 2018-07-11 LAB
ABO GROUP BLD: NORMAL
BASE EXCESS BLDA CALC-SCNC: -3 MMOL/L (ref -2–3)
BLD GP AB SCN SERPL QL: NEGATIVE
CA-I BLD-SCNC: 1.05 MMOL/L (ref 1.12–1.32)
GLUCOSE SERPL-MCNC: 162 MG/DL (ref 65–140)
GLUCOSE SERPL-MCNC: 162 MG/DL (ref 65–140)
HCO3 BLDA-SCNC: 22.7 MMOL/L (ref 24–30)
HCT VFR BLD CALC: 30 % (ref 34.8–46.1)
HGB BLDA-MCNC: 10.2 G/DL (ref 11.5–15.4)
PCO2 BLD: 24 MMOL/L (ref 21–32)
PCO2 BLD: 40.2 MM HG (ref 42–50)
PH BLD: 7.36 [PH] (ref 7.3–7.4)
PO2 BLD: 79 MM HG (ref 35–45)
POTASSIUM BLD-SCNC: 3.5 MMOL/L (ref 3.5–5.3)
RH BLD: POSITIVE
SAO2 % BLD FROM PO2: 95 % (ref 95–98)
SODIUM BLD-SCNC: 144 MMOL/L (ref 136–145)
SPECIMEN EXPIRATION DATE: NORMAL
SPECIMEN SOURCE: ABNORMAL

## 2018-07-11 PROCEDURE — 82948 REAGENT STRIP/BLOOD GLUCOSE: CPT

## 2018-07-11 PROCEDURE — 82947 ASSAY GLUCOSE BLOOD QUANT: CPT

## 2018-07-11 PROCEDURE — 86901 BLOOD TYPING SEROLOGIC RH(D): CPT | Performed by: SURGERY

## 2018-07-11 PROCEDURE — 0DJD8ZZ INSPECTION OF LOWER INTESTINAL TRACT, VIA NATURAL OR ARTIFICIAL OPENING ENDOSCOPIC: ICD-10-PCS | Performed by: COLON & RECTAL SURGERY

## 2018-07-11 PROCEDURE — 82803 BLOOD GASES ANY COMBINATION: CPT

## 2018-07-11 PROCEDURE — 84295 ASSAY OF SERUM SODIUM: CPT

## 2018-07-11 PROCEDURE — 86850 RBC ANTIBODY SCREEN: CPT | Performed by: SURGERY

## 2018-07-11 PROCEDURE — 86900 BLOOD TYPING SEROLOGIC ABO: CPT | Performed by: SURGERY

## 2018-07-11 PROCEDURE — 4A1BXSH MONITORING OF GASTROINTESTINAL VASCULAR PERFUSION USING INDOCYANINE GREEN DYE, EXTERNAL APPROACH: ICD-10-PCS | Performed by: COLON & RECTAL SURGERY

## 2018-07-11 PROCEDURE — 44625 REPAIR BOWEL OPENING: CPT | Performed by: SURGERY

## 2018-07-11 PROCEDURE — 0DQE0ZZ REPAIR LARGE INTESTINE, OPEN APPROACH: ICD-10-PCS | Performed by: SURGERY

## 2018-07-11 PROCEDURE — 84132 ASSAY OF SERUM POTASSIUM: CPT

## 2018-07-11 PROCEDURE — 88304 TISSUE EXAM BY PATHOLOGIST: CPT | Performed by: PATHOLOGY

## 2018-07-11 PROCEDURE — 0DBH0ZZ EXCISION OF CECUM, OPEN APPROACH: ICD-10-PCS | Performed by: SURGERY

## 2018-07-11 PROCEDURE — 88307 TISSUE EXAM BY PATHOLOGIST: CPT | Performed by: PATHOLOGY

## 2018-07-11 PROCEDURE — 44130 BOWEL TO BOWEL FUSION: CPT | Performed by: SURGERY

## 2018-07-11 PROCEDURE — 94762 N-INVAS EAR/PLS OXIMTRY CONT: CPT

## 2018-07-11 PROCEDURE — 82330 ASSAY OF CALCIUM: CPT

## 2018-07-11 PROCEDURE — 85014 HEMATOCRIT: CPT

## 2018-07-11 PROCEDURE — 0DNW0ZZ RELEASE PERITONEUM, OPEN APPROACH: ICD-10-PCS | Performed by: SURGERY

## 2018-07-11 RX ORDER — ONDANSETRON 2 MG/ML
4 INJECTION INTRAMUSCULAR; INTRAVENOUS ONCE AS NEEDED
Status: COMPLETED | OUTPATIENT
Start: 2018-07-11 | End: 2018-07-11

## 2018-07-11 RX ORDER — SODIUM CHLORIDE 9 MG/ML
125 INJECTION, SOLUTION INTRAVENOUS CONTINUOUS
Status: DISCONTINUED | OUTPATIENT
Start: 2018-07-11 | End: 2018-07-13

## 2018-07-11 RX ORDER — HEPARIN SODIUM 5000 [USP'U]/ML
INJECTION, SOLUTION INTRAVENOUS; SUBCUTANEOUS AS NEEDED
Status: DISCONTINUED | OUTPATIENT
Start: 2018-07-11 | End: 2018-07-11 | Stop reason: SURG

## 2018-07-11 RX ORDER — MIDAZOLAM HYDROCHLORIDE 1 MG/ML
INJECTION INTRAMUSCULAR; INTRAVENOUS AS NEEDED
Status: DISCONTINUED | OUTPATIENT
Start: 2018-07-11 | End: 2018-07-11 | Stop reason: SURG

## 2018-07-11 RX ORDER — METOPROLOL TARTRATE 5 MG/5ML
INJECTION INTRAVENOUS AS NEEDED
Status: DISCONTINUED | OUTPATIENT
Start: 2018-07-11 | End: 2018-07-11 | Stop reason: SURG

## 2018-07-11 RX ORDER — SODIUM CHLORIDE, SODIUM LACTATE, POTASSIUM CHLORIDE, CALCIUM CHLORIDE 600; 310; 30; 20 MG/100ML; MG/100ML; MG/100ML; MG/100ML
125 INJECTION, SOLUTION INTRAVENOUS CONTINUOUS
Status: DISCONTINUED | OUTPATIENT
Start: 2018-07-11 | End: 2018-07-12 | Stop reason: HOSPADM

## 2018-07-11 RX ORDER — EPHEDRINE SULFATE 50 MG/ML
INJECTION, SOLUTION INTRAVENOUS AS NEEDED
Status: DISCONTINUED | OUTPATIENT
Start: 2018-07-11 | End: 2018-07-11 | Stop reason: SURG

## 2018-07-11 RX ORDER — HYDROMORPHONE HYDROCHLORIDE 2 MG/ML
INJECTION, SOLUTION INTRAMUSCULAR; INTRAVENOUS; SUBCUTANEOUS AS NEEDED
Status: DISCONTINUED | OUTPATIENT
Start: 2018-07-11 | End: 2018-07-11 | Stop reason: SURG

## 2018-07-11 RX ORDER — LIDOCAINE HYDROCHLORIDE 10 MG/ML
INJECTION, SOLUTION INFILTRATION; PERINEURAL AS NEEDED
Status: DISCONTINUED | OUTPATIENT
Start: 2018-07-11 | End: 2018-07-11 | Stop reason: SURG

## 2018-07-11 RX ORDER — ACETAMINOPHEN 325 MG/1
650 TABLET ORAL EVERY 6 HOURS PRN
Status: DISCONTINUED | OUTPATIENT
Start: 2018-07-11 | End: 2018-07-18 | Stop reason: HOSPADM

## 2018-07-11 RX ORDER — SODIUM CHLORIDE 9 MG/ML
INJECTION, SOLUTION INTRAVENOUS CONTINUOUS PRN
Status: DISCONTINUED | OUTPATIENT
Start: 2018-07-11 | End: 2018-07-11 | Stop reason: SURG

## 2018-07-11 RX ORDER — HYDRALAZINE HYDROCHLORIDE 20 MG/ML
INJECTION INTRAMUSCULAR; INTRAVENOUS AS NEEDED
Status: DISCONTINUED | OUTPATIENT
Start: 2018-07-11 | End: 2018-07-11 | Stop reason: SURG

## 2018-07-11 RX ORDER — FENTANYL CITRATE/PF 50 MCG/ML
25 SYRINGE (ML) INJECTION
Status: DISCONTINUED | OUTPATIENT
Start: 2018-07-11 | End: 2018-07-11 | Stop reason: HOSPADM

## 2018-07-11 RX ORDER — ONDANSETRON 2 MG/ML
INJECTION INTRAMUSCULAR; INTRAVENOUS AS NEEDED
Status: DISCONTINUED | OUTPATIENT
Start: 2018-07-11 | End: 2018-07-11 | Stop reason: SURG

## 2018-07-11 RX ORDER — DIPHENHYDRAMINE HYDROCHLORIDE 50 MG/ML
12.5 INJECTION INTRAMUSCULAR; INTRAVENOUS ONCE AS NEEDED
Status: COMPLETED | OUTPATIENT
Start: 2018-07-11 | End: 2018-07-11

## 2018-07-11 RX ORDER — PROPOFOL 10 MG/ML
INJECTION, EMULSION INTRAVENOUS AS NEEDED
Status: DISCONTINUED | OUTPATIENT
Start: 2018-07-11 | End: 2018-07-11 | Stop reason: SURG

## 2018-07-11 RX ORDER — INDOCYANINE GREEN AND WATER 25 MG
KIT INJECTION AS NEEDED
Status: DISCONTINUED | OUTPATIENT
Start: 2018-07-11 | End: 2018-07-11 | Stop reason: SURG

## 2018-07-11 RX ORDER — ROCURONIUM BROMIDE 10 MG/ML
INJECTION, SOLUTION INTRAVENOUS AS NEEDED
Status: DISCONTINUED | OUTPATIENT
Start: 2018-07-11 | End: 2018-07-11 | Stop reason: SURG

## 2018-07-11 RX ORDER — ALBUMIN, HUMAN INJ 5% 5 %
SOLUTION INTRAVENOUS CONTINUOUS PRN
Status: DISCONTINUED | OUTPATIENT
Start: 2018-07-11 | End: 2018-07-11 | Stop reason: SURG

## 2018-07-11 RX ORDER — DIPHENHYDRAMINE HYDROCHLORIDE 50 MG/ML
25 INJECTION INTRAMUSCULAR; INTRAVENOUS EVERY 6 HOURS PRN
Status: DISCONTINUED | OUTPATIENT
Start: 2018-07-11 | End: 2018-07-18 | Stop reason: HOSPADM

## 2018-07-11 RX ORDER — GLYCOPYRROLATE 0.2 MG/ML
INJECTION INTRAMUSCULAR; INTRAVENOUS AS NEEDED
Status: DISCONTINUED | OUTPATIENT
Start: 2018-07-11 | End: 2018-07-11 | Stop reason: SURG

## 2018-07-11 RX ORDER — HEPARIN SODIUM 5000 [USP'U]/ML
5000 INJECTION, SOLUTION INTRAVENOUS; SUBCUTANEOUS EVERY 8 HOURS SCHEDULED
Status: DISCONTINUED | OUTPATIENT
Start: 2018-07-11 | End: 2018-07-17

## 2018-07-11 RX ORDER — SODIUM CHLORIDE, SODIUM LACTATE, POTASSIUM CHLORIDE, CALCIUM CHLORIDE 600; 310; 30; 20 MG/100ML; MG/100ML; MG/100ML; MG/100ML
100 INJECTION, SOLUTION INTRAVENOUS CONTINUOUS
Status: DISCONTINUED | OUTPATIENT
Start: 2018-07-11 | End: 2018-07-11

## 2018-07-11 RX ORDER — ONDANSETRON 2 MG/ML
4 INJECTION INTRAMUSCULAR; INTRAVENOUS EVERY 6 HOURS PRN
Status: DISCONTINUED | OUTPATIENT
Start: 2018-07-11 | End: 2018-07-18 | Stop reason: HOSPADM

## 2018-07-11 RX ORDER — MAGNESIUM HYDROXIDE 1200 MG/15ML
LIQUID ORAL AS NEEDED
Status: DISCONTINUED | OUTPATIENT
Start: 2018-07-11 | End: 2018-07-11 | Stop reason: HOSPADM

## 2018-07-11 RX ORDER — SODIUM PHOSPHATE, DIBASIC AND SODIUM PHOSPHATE, MONOBASIC 7; 19 G/133ML; G/133ML
1 ENEMA RECTAL ONCE
Status: COMPLETED | OUTPATIENT
Start: 2018-07-11 | End: 2018-07-11

## 2018-07-11 RX ORDER — PANTOPRAZOLE SODIUM 40 MG/1
40 INJECTION, POWDER, FOR SOLUTION INTRAVENOUS
Status: DISCONTINUED | OUTPATIENT
Start: 2018-07-12 | End: 2018-07-13

## 2018-07-11 RX ORDER — CHLORHEXIDINE GLUCONATE 4 G/100ML
SOLUTION TOPICAL DAILY PRN
Status: DISCONTINUED | OUTPATIENT
Start: 2018-07-11 | End: 2018-07-11 | Stop reason: HOSPADM

## 2018-07-11 RX ORDER — LORAZEPAM 2 MG/ML
1 INJECTION INTRAMUSCULAR EVERY 6 HOURS PRN
Status: DISCONTINUED | OUTPATIENT
Start: 2018-07-11 | End: 2018-07-17

## 2018-07-11 RX ORDER — SODIUM CHLORIDE, SODIUM LACTATE, POTASSIUM CHLORIDE, CALCIUM CHLORIDE 600; 310; 30; 20 MG/100ML; MG/100ML; MG/100ML; MG/100ML
125 INJECTION, SOLUTION INTRAVENOUS CONTINUOUS
Status: DISCONTINUED | OUTPATIENT
Start: 2018-07-11 | End: 2018-07-12

## 2018-07-11 RX ORDER — FENTANYL CITRATE 50 UG/ML
INJECTION, SOLUTION INTRAMUSCULAR; INTRAVENOUS AS NEEDED
Status: DISCONTINUED | OUTPATIENT
Start: 2018-07-11 | End: 2018-07-11 | Stop reason: SURG

## 2018-07-11 RX ADMIN — ROCURONIUM BROMIDE 10 MG: 10 INJECTION INTRAVENOUS at 14:25

## 2018-07-11 RX ADMIN — HYDROMORPHONE HYDROCHLORIDE 0.5 MG: 2 INJECTION, SOLUTION INTRAMUSCULAR; INTRAVENOUS; SUBCUTANEOUS at 16:43

## 2018-07-11 RX ADMIN — SODIUM CHLORIDE: 0.9 INJECTION, SOLUTION INTRAVENOUS at 09:00

## 2018-07-11 RX ADMIN — HYDROMORPHONE HYDROCHLORIDE 1 MG: 2 INJECTION, SOLUTION INTRAMUSCULAR; INTRAVENOUS; SUBCUTANEOUS at 09:45

## 2018-07-11 RX ADMIN — ROCURONIUM BROMIDE 20 MG: 10 INJECTION INTRAVENOUS at 09:30

## 2018-07-11 RX ADMIN — ALBUMIN HUMAN: 0.05 INJECTION, SOLUTION INTRAVENOUS at 14:45

## 2018-07-11 RX ADMIN — METOPROLOL TARTRATE 2.5 MG: 1 INJECTION, SOLUTION INTRAVENOUS at 12:36

## 2018-07-11 RX ADMIN — PROPOFOL 200 MG: 10 INJECTION, EMULSION INTRAVENOUS at 08:34

## 2018-07-11 RX ADMIN — HYDRALAZINE HYDROCHLORIDE 4 MG: 20 INJECTION INTRAMUSCULAR; INTRAVENOUS at 10:11

## 2018-07-11 RX ADMIN — SODIUM PHOSPHATE 1 ENEMA: 7; 19 ENEMA RECTAL at 07:50

## 2018-07-11 RX ADMIN — LIDOCAINE HYDROCHLORIDE 50 MG: 10 INJECTION, SOLUTION INFILTRATION; PERINEURAL at 08:34

## 2018-07-11 RX ADMIN — ALBUMIN HUMAN: 0.05 INJECTION, SOLUTION INTRAVENOUS at 13:05

## 2018-07-11 RX ADMIN — ALBUMIN HUMAN: 0.05 INJECTION, SOLUTION INTRAVENOUS at 11:48

## 2018-07-11 RX ADMIN — HYDRALAZINE HYDROCHLORIDE 4 MG: 20 INJECTION INTRAMUSCULAR; INTRAVENOUS at 10:23

## 2018-07-11 RX ADMIN — HEPARIN SODIUM 5000 UNITS: 5000 INJECTION, SOLUTION INTRAVENOUS; SUBCUTANEOUS at 09:10

## 2018-07-11 RX ADMIN — HYDROMORPHONE HYDROCHLORIDE 1 MG: 2 INJECTION, SOLUTION INTRAMUSCULAR; INTRAVENOUS; SUBCUTANEOUS at 09:26

## 2018-07-11 RX ADMIN — CEFAZOLIN SODIUM 2000 MG: 2 SOLUTION INTRAVENOUS at 08:33

## 2018-07-11 RX ADMIN — ROCURONIUM BROMIDE 10 MG: 10 INJECTION INTRAVENOUS at 11:57

## 2018-07-11 RX ADMIN — INDOCYANINE GREEN 25 MG: KIT INTRAVENOUS at 13:46

## 2018-07-11 RX ADMIN — ROCURONIUM BROMIDE 20 MG: 10 INJECTION INTRAVENOUS at 12:45

## 2018-07-11 RX ADMIN — METOPROLOL TARTRATE 1 MG: 1 INJECTION, SOLUTION INTRAVENOUS at 10:01

## 2018-07-11 RX ADMIN — SODIUM CHLORIDE 125 ML/HR: 0.9 INJECTION, SOLUTION INTRAVENOUS at 17:43

## 2018-07-11 RX ADMIN — HYDROMORPHONE HYDROCHLORIDE 0.5 MG: 2 INJECTION, SOLUTION INTRAMUSCULAR; INTRAVENOUS; SUBCUTANEOUS at 15:43

## 2018-07-11 RX ADMIN — ALBUMIN HUMAN: 0.05 INJECTION, SOLUTION INTRAVENOUS at 15:58

## 2018-07-11 RX ADMIN — HYDROMORPHONE HYDROCHLORIDE: 10 INJECTION, SOLUTION INTRAMUSCULAR; INTRAVENOUS; SUBCUTANEOUS at 17:15

## 2018-07-11 RX ADMIN — GLYCOPYRROLATE 0.6 MG: 0.2 INJECTION, SOLUTION INTRAMUSCULAR; INTRAVENOUS at 16:01

## 2018-07-11 RX ADMIN — FENTANYL CITRATE 100 MCG: 50 INJECTION, SOLUTION INTRAMUSCULAR; INTRAVENOUS at 08:34

## 2018-07-11 RX ADMIN — EPHEDRINE SULFATE 10 MG: 50 INJECTION, SOLUTION INTRAMUSCULAR; INTRAVENOUS; SUBCUTANEOUS at 08:55

## 2018-07-11 RX ADMIN — SODIUM CHLORIDE, SODIUM LACTATE, POTASSIUM CHLORIDE, AND CALCIUM CHLORIDE: .6; .31; .03; .02 INJECTION, SOLUTION INTRAVENOUS at 13:51

## 2018-07-11 RX ADMIN — CEFAZOLIN SODIUM 2000 MG: 2 SOLUTION INTRAVENOUS at 12:25

## 2018-07-11 RX ADMIN — DEXAMETHASONE SODIUM PHOSPHATE 10 MG: 10 INJECTION INTRAMUSCULAR; INTRAVENOUS at 08:50

## 2018-07-11 RX ADMIN — DIPHENHYDRAMINE HYDROCHLORIDE 12.5 MG: 50 INJECTION, SOLUTION INTRAMUSCULAR; INTRAVENOUS at 17:43

## 2018-07-11 RX ADMIN — SODIUM CHLORIDE, SODIUM LACTATE, POTASSIUM CHLORIDE, AND CALCIUM CHLORIDE: .6; .31; .03; .02 INJECTION, SOLUTION INTRAVENOUS at 08:02

## 2018-07-11 RX ADMIN — METOPROLOL TARTRATE 1.5 MG: 1 INJECTION, SOLUTION INTRAVENOUS at 10:07

## 2018-07-11 RX ADMIN — FENTANYL CITRATE 100 MCG: 50 INJECTION, SOLUTION INTRAMUSCULAR; INTRAVENOUS at 09:20

## 2018-07-11 RX ADMIN — METRONIDAZOLE 500 MG: 500 INJECTION, SOLUTION INTRAVENOUS at 08:40

## 2018-07-11 RX ADMIN — ROCURONIUM BROMIDE 20 MG: 10 INJECTION INTRAVENOUS at 11:08

## 2018-07-11 RX ADMIN — METOPROLOL TARTRATE 2.5 MG: 1 INJECTION, SOLUTION INTRAVENOUS at 09:53

## 2018-07-11 RX ADMIN — ONDANSETRON 4 MG: 2 INJECTION, SOLUTION INTRAMUSCULAR; INTRAVENOUS at 17:26

## 2018-07-11 RX ADMIN — ONDANSETRON 4 MG: 2 INJECTION INTRAMUSCULAR; INTRAVENOUS at 15:43

## 2018-07-11 RX ADMIN — SODIUM CHLORIDE: 0.9 INJECTION, SOLUTION INTRAVENOUS at 12:42

## 2018-07-11 RX ADMIN — NEOSTIGMINE METHYLSULFATE 4 MG: 1 INJECTION, SOLUTION INTRAMUSCULAR; INTRAVENOUS; SUBCUTANEOUS at 16:01

## 2018-07-11 RX ADMIN — ROCURONIUM BROMIDE 50 MG: 10 INJECTION INTRAVENOUS at 08:34

## 2018-07-11 RX ADMIN — FENTANYL CITRATE 100 MCG: 50 INJECTION, SOLUTION INTRAMUSCULAR; INTRAVENOUS at 09:10

## 2018-07-11 RX ADMIN — HEPARIN SODIUM 5000 UNITS: 5000 INJECTION, SOLUTION INTRAVENOUS; SUBCUTANEOUS at 22:00

## 2018-07-11 RX ADMIN — MIDAZOLAM 2 MG: 1 INJECTION INTRAMUSCULAR; INTRAVENOUS at 08:25

## 2018-07-11 RX ADMIN — METOPROLOL TARTRATE 2.5 MG: 1 INJECTION, SOLUTION INTRAVENOUS at 11:35

## 2018-07-11 RX ADMIN — HYDRALAZINE HYDROCHLORIDE 4 MG: 20 INJECTION INTRAMUSCULAR; INTRAVENOUS at 11:10

## 2018-07-11 RX ADMIN — FENTANYL CITRATE 25 MCG: 50 INJECTION, SOLUTION INTRAMUSCULAR; INTRAVENOUS at 17:50

## 2018-07-11 NOTE — ANESTHESIA PROCEDURE NOTES
Peripheral Block    Patient location during procedure: OR  Start time: 7/11/2018 4:20 PM  Removal time: 7/11/2018 4:26 PM  Reason for block: at surgeon's request and post-op pain management  Staffing  Anesthesiologist: Mahin Nagy  Performed: anesthesiologist   Preanesthetic Checklist  Completed: patient identified, site marked, surgical consent, pre-op evaluation, timeout performed, IV checked, risks and benefits discussed and monitors and equipment checked  Peripheral Block  Patient position: supine  Prep: ChloraPrep  Patient monitoring: continuous pulse ox, frequent blood pressure checks, cardiac monitor and heart rate  Block type: TAP  Laterality: bilateral  Injection technique: single-shot  Procedures: ultrasound guided  Ultrasound permanent image saved  Local infiltration: ropivacaine  Infiltration strength: 0 2 %  Dose: 30 mL  Needle  Needle type: Stimuplex   Needle length: 5 cm  Needle localization: anatomical landmarks and ultrasound guidance  Test dose: negative  Assessment  Injection assessment: incremental injection and local visualized surrounding nerve on ultrasound  Paresthesia pain: none  Heart rate change: no  Slow fractionated injection: yes  Post-procedure:  site cleaned  patient tolerated the procedure well with no immediate complications  Additional Notes  30 ml per side of  0 2% Ropiv

## 2018-07-11 NOTE — H&P (VIEW-ONLY)
Discuss colostomy reversal     Assessment/Plan:    Diverticulitis of large intestine with perforation without abscess or bleeding    History of smoldering diverticulitis status post Pj's procedure  Overall doing well  Tolerating diet  Ostomy working  Patient is now agreeable to have her colostomy reversed  She recently underwent colonoscopy which was unremarkable  -  preop and consent for colostomy reversal, possible loop ileostomy  The procedure self including   All associatedrisks and benefits were discussed at great length the patient  The patient verbalized understands risks is willing proceed,   Consent was signed  -  A preoperatively  Patient will undergo routine lab work including CMP, CBC, type and screen  She will also undergo EKG and chest x-ray  She has no significant comorbidities and therefore not require any additional perioperative clearance  Diagnoses and all orders for this visit:    Diverticulitis of large intestine with perforation without abscess or bleeding    Bilious vomiting with nausea  -     NM gastric emptying    Other orders  -     Discontinue: magnesium Oxide (MAG-OX) 400 mg TABS;           Subjective:      Patient ID: Bettie Bridges is a 47 y o  female  A 60-year-old female with history of smoldering diverticulitis status post Pj's procedure, presents today for evaluation discussion of Pj's reversal   Patient recently underwent colonoscopy which was unremarkable exception of 1 polyp which was removed  Currently she denies any nausea vomiting  No fevers or chills  No   Significant abdominal pain,  Although she does complain of some intermittent left upper quadrant discomfort  Her ostomy continues to function but appears it is retracted  Denies any chest pain or shortness of breath          The following portions of the patient's history were reviewed and updated as appropriate:   She  has a past medical history of Disease of thyroid gland; Endometrial polyp; Gastroesophageal reflux disease without esophagitis (12/4/2017); Hand injury; High risk medication use; Hyperlipidemia; Hypertension; Renal colic; Viral syndrome; and Vitamin D deficiency disease  She   Patient Active Problem List    Diagnosis Date Noted    Hair abnormality 04/30/2018    Hair loss 04/26/2018    Adenomatous polyps 04/26/2018    Screening for colon cancer 04/26/2018    Gastroesophageal reflux disease without esophagitis 03/20/2018    Bilious vomiting with nausea 03/20/2018    Dehydration 02/13/2018    Intractable vomiting with nausea 02/13/2018    Colostomy care ([facility] ) 02/07/2018    [facility]us ([facility] ) 01/18/2018    Paroxysmal atrial fibrillation ([facility] ) 01/05/2018    Hematuria 01/03/2018    Ileostomy in place ([facility] ) 01/03/2018    Acute blood loss anemia 01/01/2018    Microscopic hematuria 12/28/2017    Low serum prealbumin 12/27/2017    Severe protein-calorie malnutrition ([facility] ) 12/27/2017    Hypoalbuminemia 12/26/2017    Diverticulitis of colon 12/26/2017    Diverticulitis of large intestine with perforation without abscess or bleeding 12/04/2017    Esophageal reflux 12/04/2017    Hyperlipidemia 12/04/2017    Hypertension 12/04/2017    Hypokalemia 12/04/2017    Chronic constipation 11/27/2017    Acute pain 10/12/2017    Ache in joint 09/15/2017    Primary osteoarthritis of left knee 09/15/2017    Anemia 09/14/2016    Thyroid disorder 09/14/2016    Non-rheumatic mitral regurgitation 07/22/2016    Fatigue 49/50/4588    Systolic murmur 95/86/4859    Vitamin D deficiency 06/21/2016    Vaginal dryness, menopausal 02/01/2016    Leg pain, left 07/17/2015    Erysipelas 12/12/2014    Diverticulosis 03/17/2014    Vulvitis 08/14/2013     She  has a past surgical history that includes Cholecystectomy; Thyroidectomy, partial; Tonsillectomy and adenoidectomy;  Tubal ligation (1997); pr esophagogastroduodenoscopy transoral diagnostic (N/A, 10/21/2016); pr part removal colon w anastomosis (N/A, 1/3/2018); Ureteral stent placement (Bilateral, 1/3/2018); LAPAROTOMY (N/A, 1/4/2018); Colostomy; Esophagogastroduodenoscopy (N/A, 4/6/2018); Endometrial ablation (2007); Hysteroscopy; Thyroidectomy; Colon surgery; and Colonoscopy (N/A, 6/15/2018)  Her family history includes Diabetes type II in her mother; Heart failure in her father  She  reports that she quit smoking about 15 years ago  She has never used smokeless tobacco  She reports that she does not drink alcohol or use drugs  Current Outpatient Prescriptions   Medication Sig Dispense Refill    Biotin 43216 MCG TABS Take 1 tablet by mouth daily      ketoconazole (NIZORAL) 2 % cream Apply topically 2 (two) times a day 60 g 1    LORazepam (ATIVAN) 0 5 mg tablet Take 1 tablet (0 5 mg total) by mouth daily at bedtime as needed for anxiety Takes PRN at bedtime 30 tablet 0    magnesium oxide (MAG-OX) 400 mg Take 1 tablet (400 mg total) by mouth 2 (two) times a day with meals 60 tablet 3    pantoprazole (PROTONIX) 40 mg tablet Take 1 tablet (40 mg total) by mouth 2 (two) times a day 60 tablet 3    potassium chloride (K-DUR,KLOR-CON) 20 mEq tablet Take 2 tablets (40 mEq total) by mouth daily 30 tablet 0     No current facility-administered medications for this visit  She has No Known Allergies       Review of Systems        A 10 point review of systems was conducted, all negative except as noted above HPI  Objective:      /84   Pulse 60   Temp 98 2 °F (36 8 °C)   Ht 5' 2" (1 575 m)   Wt 83 9 kg (185 lb)   LMP  (LMP Unknown)   BMI 33 84 kg/m²           Physical Exam   Constitutional: She is oriented to person, place, and time  She appears well-developed and well-nourished  No distress  HENT:   Head: Normocephalic and atraumatic  Eyes: No scleral icterus  Neck: Normal range of motion  No tracheal deviation present  Cardiovascular: Normal rate, regular rhythm and normal heart sounds    Exam reveals no gallop and no friction rub  No murmur heard  Pulmonary/Chest: Effort normal  No respiratory distress  She has no wheezes  She has no rales  She exhibits no tenderness  Abdominal: Soft  She exhibits no distension and no mass  There is no tenderness  There is no rebound and no guarding  Abdomen is soft, nontender, left upper quadrant colostomy   Appears retracted significantly butintact, functioning with air in the bag  Ostomy appears viable  midline incision healed well   Musculoskeletal: Normal range of motion  She exhibits no edema, tenderness or deformity  Lymphadenopathy:     She has no cervical adenopathy  Neurological: She is alert and oriented to person, place, and time  No cranial nerve deficit  Skin: Skin is warm  No rash noted  She is not diaphoretic  No erythema  No pallor  Psychiatric: She has a normal mood and affect  Her behavior is normal    Vitals reviewed

## 2018-07-11 NOTE — ANESTHESIA POSTPROCEDURE EVALUATION
Post-Op Assessment Note      CV Status:  Stable    Mental Status:  Alert and awake    Hydration Status:  Euvolemic    PONV Controlled:  Controlled    Airway Patency:  Patent    Post Op Vitals Reviewed: Yes          Staff: Anesthesiologist, CRNA           BP   142/82   Temp      Pulse  101   Resp   14   SpO2   100

## 2018-07-12 LAB
ANION GAP SERPL CALCULATED.3IONS-SCNC: 7 MMOL/L (ref 4–13)
BASOPHILS # BLD AUTO: 0.01 THOUSANDS/ΜL (ref 0–0.1)
BASOPHILS NFR BLD AUTO: 0 % (ref 0–1)
BUN SERPL-MCNC: 7 MG/DL (ref 5–25)
CALCIUM SERPL-MCNC: 8 MG/DL (ref 8.3–10.1)
CHLORIDE SERPL-SCNC: 110 MMOL/L (ref 100–108)
CO2 SERPL-SCNC: 26 MMOL/L (ref 21–32)
CREAT SERPL-MCNC: 0.58 MG/DL (ref 0.6–1.3)
EOSINOPHIL # BLD AUTO: 0 THOUSAND/ΜL (ref 0–0.61)
EOSINOPHIL NFR BLD AUTO: 0 % (ref 0–6)
ERYTHROCYTE [DISTWIDTH] IN BLOOD BY AUTOMATED COUNT: 12.5 % (ref 11.6–15.1)
GFR SERPL CREATININE-BSD FRML MDRD: 105 ML/MIN/1.73SQ M
GLUCOSE SERPL-MCNC: 144 MG/DL (ref 65–140)
HCT VFR BLD AUTO: 30.7 % (ref 34.8–46.1)
HGB BLD-MCNC: 10.2 G/DL (ref 11.5–15.4)
IMM GRANULOCYTES # BLD AUTO: 0.02 THOUSAND/UL (ref 0–0.2)
IMM GRANULOCYTES NFR BLD AUTO: 0 % (ref 0–2)
LYMPHOCYTES # BLD AUTO: 0.78 THOUSANDS/ΜL (ref 0.6–4.47)
LYMPHOCYTES NFR BLD AUTO: 7 % (ref 14–44)
MAGNESIUM SERPL-MCNC: 1.7 MG/DL (ref 1.6–2.6)
MCH RBC QN AUTO: 31.9 PG (ref 26.8–34.3)
MCHC RBC AUTO-ENTMCNC: 33.2 G/DL (ref 31.4–37.4)
MCV RBC AUTO: 96 FL (ref 82–98)
MONOCYTES # BLD AUTO: 0.71 THOUSAND/ΜL (ref 0.17–1.22)
MONOCYTES NFR BLD AUTO: 7 % (ref 4–12)
NEUTROPHILS # BLD AUTO: 9.19 THOUSANDS/ΜL (ref 1.85–7.62)
NEUTS SEG NFR BLD AUTO: 86 % (ref 43–75)
NRBC BLD AUTO-RTO: 0 /100 WBCS
PLATELET # BLD AUTO: 202 THOUSANDS/UL (ref 149–390)
PMV BLD AUTO: 10.2 FL (ref 8.9–12.7)
POTASSIUM SERPL-SCNC: 3.7 MMOL/L (ref 3.5–5.3)
RBC # BLD AUTO: 3.2 MILLION/UL (ref 3.81–5.12)
SODIUM SERPL-SCNC: 143 MMOL/L (ref 136–145)
WBC # BLD AUTO: 10.71 THOUSAND/UL (ref 4.31–10.16)

## 2018-07-12 PROCEDURE — 83735 ASSAY OF MAGNESIUM: CPT | Performed by: SURGERY

## 2018-07-12 PROCEDURE — C9113 INJ PANTOPRAZOLE SODIUM, VIA: HCPCS | Performed by: SURGERY

## 2018-07-12 PROCEDURE — 85025 COMPLETE CBC W/AUTO DIFF WBC: CPT | Performed by: SURGERY

## 2018-07-12 PROCEDURE — 99024 POSTOP FOLLOW-UP VISIT: CPT | Performed by: SURGERY

## 2018-07-12 PROCEDURE — 80048 BASIC METABOLIC PNL TOTAL CA: CPT | Performed by: SURGERY

## 2018-07-12 RX ORDER — MAGNESIUM SULFATE HEPTAHYDRATE 40 MG/ML
2 INJECTION, SOLUTION INTRAVENOUS ONCE
Status: COMPLETED | OUTPATIENT
Start: 2018-07-12 | End: 2018-07-12

## 2018-07-12 RX ORDER — POTASSIUM CHLORIDE 14.9 MG/ML
20 INJECTION INTRAVENOUS
Status: COMPLETED | OUTPATIENT
Start: 2018-07-12 | End: 2018-07-12

## 2018-07-12 RX ADMIN — HEPARIN SODIUM 5000 UNITS: 5000 INJECTION, SOLUTION INTRAVENOUS; SUBCUTANEOUS at 13:12

## 2018-07-12 RX ADMIN — HYDROMORPHONE HYDROCHLORIDE 0.5 MG: 1 INJECTION, SOLUTION INTRAMUSCULAR; INTRAVENOUS; SUBCUTANEOUS at 09:53

## 2018-07-12 RX ADMIN — MAGNESIUM SULFATE HEPTAHYDRATE 2 G: 40 INJECTION, SOLUTION INTRAVENOUS at 14:25

## 2018-07-12 RX ADMIN — PANTOPRAZOLE SODIUM 40 MG: 40 INJECTION, POWDER, FOR SOLUTION INTRAVENOUS at 08:02

## 2018-07-12 RX ADMIN — POTASSIUM CHLORIDE 20 MEQ: 200 INJECTION, SOLUTION INTRAVENOUS at 16:32

## 2018-07-12 RX ADMIN — SODIUM CHLORIDE 125 ML/HR: 0.9 INJECTION, SOLUTION INTRAVENOUS at 13:13

## 2018-07-12 RX ADMIN — HEPARIN SODIUM 5000 UNITS: 5000 INJECTION, SOLUTION INTRAVENOUS; SUBCUTANEOUS at 05:40

## 2018-07-12 RX ADMIN — SODIUM CHLORIDE 125 ML/HR: 0.9 INJECTION, SOLUTION INTRAVENOUS at 21:26

## 2018-07-12 RX ADMIN — POTASSIUM CHLORIDE 20 MEQ: 200 INJECTION, SOLUTION INTRAVENOUS at 14:25

## 2018-07-12 RX ADMIN — HEPARIN SODIUM 5000 UNITS: 5000 INJECTION, SOLUTION INTRAVENOUS; SUBCUTANEOUS at 21:26

## 2018-07-12 RX ADMIN — SODIUM CHLORIDE 125 ML/HR: 0.9 INJECTION, SOLUTION INTRAVENOUS at 04:23

## 2018-07-12 NOTE — INTERIM OP NOTE
COLOSTOMY TAKEDOWN / REVERSE COATES, ILEOCECECTOMY, LASER FLUORESCENCE ANGIOGRAPHY, LYSIS OF ADHESIONS  Postoperative Note  PATIENT NAME: Charu Ramsey  : 1965  MRN: [id number]  BE OR ROOM 03    Surgery Date: 2018    Preop Diagnosis:  Diverticulitis of large intestine with perforation without abscess or bleeding [K57 20]    Post-Op Diagnosis Codes:     * Diverticulitis of large intestine with perforation without abscess or bleeding [K57 20]    Procedure(s) (LRB):  COLOSTOMY TAKEDOWN / REVERSE COATES, ILEOCECECTOMY, LASER FLUORESCENCE ANGIOGRAPHY, LYSIS OF ADHESIONS (N/A)    Surgeon(s) and Role:      Jordin Arreola DO - Primary     * Ga Mcghee MD - Assisting     * Jean Mallory MD - Assisting    Specimens:  ID Type Source Tests Collected by Time Destination   1 : cecum and TI and portion rectal stump Tissue Large Intestine, Cecum TISSUE EXAM Justice Meals, DO 2018 1113    2 : RECTAL STUMP Tissue Rectum TISSUE EXAM Justice Meals, DO 2018 1214    3 : COLOSTOMY Tissue Large Intestine, NOS TISSUE EXAM Justice Meals, DO 2018 1358        Estimated Blood Loss:   750 mL    Anesthesia Type:   General     Findings:   Rectal stump eroding into small bowel/small bowel mesentery near site of old ileoileal anastomosis requiring resection and reanastomosis  Healthy colorectal anastomosis with negative bubble test  SPY angiography showed viable ileocolic and colorectal anastomoses    Complications:   None    SIGNATURE: Rosina Ennis   DATE: 2018   TIME: 12:31 AM

## 2018-07-12 NOTE — PROGRESS NOTES
Nurse / Provider rounds completed with staff nurse, Harleen Whyte and patient  Will remain NPO today, Soto catheter removed  Pain controlled with PCA pump

## 2018-07-12 NOTE — CASE MANAGEMENT
Thank you,  Ash Aqq  [facility] Utilization Review Department  Phone: [phone]; Fax [phone]  ATTENTION: The Network Utilization Review Department is now centralized for our 9 Facilities  Make a note that we have a new phone and fax numbers for our Department  Please call with any questions or concerns to [phone] and carefully follow the prompts so that you are directed to the right person  All voicemails are confidential  Fax any determinations, approvals, denials, and requests for initial or continue stay review clinical to [phone]  Due to HIGH CALL volume, it would be easier if you could please send faxed requests to expedite your requests and in part, help us provide discharge notifications faster    Initial Clinical Review    Age/Sex: 46 y o  female    Surgery Date: 7/11/18     Preop Diagnosis:  Diverticulitis of large intestine with perforation without abscess or bleeding [K57 20]     Post-Op Diagnosis Codes:     * Diverticulitis of large intestine with perforation without abscess or bleeding [K57 20]    Procedure: COLOSTOMY TAKEDOWN / REVERSE COATES, ILEOCECECTOMY, LASER FLUORESCENCE ANGIOGRAPHY, LYSIS OF ADHESIONS (N/A)    Anesthesia: General     Findings:   Rectal stump eroding into small bowel/small bowel mesentery near site of old ileoileal anastomosis requiring resection and reanastomosis  Healthy colorectal anastomosis with negative bubble test  SPY angiography showed viable ileocolic and colorectal anastomoses    Admission Orders: Date/Time/Statement:MILLY  7/11/18 @ 1627     Orders Placed This Encounter   Procedures    Inpatient Admission     Standing Status:   Standing     Number of Occurrences:   1     Order Specific Question:   Admitting Physician     Answer:   Jun Armendariz [[id number]]     Order Specific Question:   Level of Care     Answer:   Med Surg [16]     Order Specific Question:   Estimated length of stay     Answer:   More than 2 Midnights     Order Specific Question:   Certification     Answer:   I certify that inpatient services are medically necessary for this patient for a duration of greater than two midnights  See H&P and MD Progress Notes for additional information about the patient's course of treatment  Vital Signs: /56 (BP Location: Right arm)   Pulse 82   Temp 98 9 °F (37 2 °C) (Oral)   Resp 17   Ht 5' 2" (1 575 m)   Wt 83 5 kg (184 lb)   LMP  (LMP Unknown)   SpO2 92%   BMI 33 65 kg/m²     Diet:        Diet Orders            Start     Ordered    07/11/18 1626  Diet NPO; Sips with meds  Diet effective now     Question Answer Comment   Diet Type NPO    NPO Except: Sips with meds    RD to adjust diet per protocol?  Yes        07/11/18 1627          Mobility: AMBULATE    DVT Prophylaxis:   SEQ COMP DEVICE      INPT  NPO  IS    Scheduled Meds:  Current Facility-Administered Medications:  acetaminophen 650 mg Oral Q6H PRN Judy Scripture Lomas    diphenhydrAMINE 25 mg Intravenous Q6H PRN Judy Scripture Lomas    heparin (porcine) 5,000 Units Subcutaneous Q8H [facility] Corry Lomas    HYDROmorphone  Intravenous Continuous Corry Lomas    lactated ringers 125 mL/hr Intravenous Continuous Shira Fix, CRNA    LORazepam 1 mg Intravenous Q6H PRN Judy Scripture Lomas    ondansetron 4 mg Intravenous Q6H PRN Judy Scripture Lomas    pantoprazole 40 mg Intravenous Q24H [facility] Corry Lomas    sodium chloride 125 mL/hr Intravenous Continuous Kellie Fudge Last Rate: 125 mL/hr (07/12/18 0423)     Continuous Infusions:  HYDROmorphone     lactated ringers 125 mL/hr    sodium chloride 125 mL/hr Last Rate: 125 mL/hr (07/12/18 0423)     PRN Meds:   acetaminophen    diphenhydrAMINE    LORazepam    ondansetron

## 2018-07-12 NOTE — PLAN OF CARE
Problem: DISCHARGE PLANNING - CARE MANAGEMENT  Goal: Discharge to post-acute care or home with appropriate resources  INTERVENTIONS:  - Conduct assessment to determine patient/family and health care team treatment goals, and need for post-acute services based on payer coverage, community resources, and patient preferences, and barriers to discharge  - Address psychosocial, clinical, and financial barriers to discharge as identified in assessment in conjunction with the patient/family and health care team  - Arrange appropriate level of post-acute services according to patient's   needs and preference and payer coverage in collaboration with the physician and health care team  - Communicate with and update the patient/family, physician, and health care team regarding progress on the discharge plan  - Arrange appropriate transportation to post-acute venues  - Anticipated for patient to discharge home with necessary services pending medical team recommendations  Outcome: Progressing

## 2018-07-12 NOTE — PROGRESS NOTES
Post op check - General Surgery   Leane Sessions 46 y o  female MRN: [id number]  Unit/Bed#: [facility] 808-01 Encounter: [id number]    Assessment:  45 yo F s/p Ingrid Sebastian reversal, ileocecectomy, lysis of adhesions 7/11    Plan:  1  Diet -- NPO  2  Pain control -- PCA dilaudid  3  Incentive spirometry  4  OOB, ambulate  5  Maintain montoya catheter  6  Maintain abdominal packing    Subjective/Objective     Subjective: Patient reports pain with coughing, controlled with PCA  No nausea or vomiting  Objective:     Vitals: Blood pressure 103/58, pulse 103, temperature 99 1 °F (37 3 °C), temperature source Oral, resp  rate 16, height 5' 2" (1 575 m), weight 83 5 kg (184 lb), SpO2 95 %  ,Body mass index is 33 65 kg/m²  I/O       07/10 0701 - 07/11 0700 07/11 0701 - 07/12 0700    I V  (mL/kg)  5101 7 (61 1)    IV Piggyback  1000    Total Intake(mL/kg)  6101 7 (73 1)    Urine (mL/kg/hr)  1100 (0 5)    Blood  750    Total Output   1850    Net   +4251 7                Physical Exam:  GEN: NAD  HEENT: MMM  CV: RRR  Lung: normal effort  Ab: Soft, NT/ND, dressings C/D/I  Extrem: No CCE  Neuro:  A+Ox3, motor and sensation grossly intact      Lab, Imaging and other studies:   CBC with diff:   Lab Results   Component Value Date    HGB 10 2 (L) 07/11/2018   , BMP/CMP:   Lab Results   Component Value Date    GLUCOSE 162 (H) 07/11/2018   , Magnesium: No results found for: MAG  VTE Pharmacologic Prophylaxis: Heparin  VTE Mechanical Prophylaxis: sequential compression device

## 2018-07-12 NOTE — PROGRESS NOTES
Progress Note - General Surgery   Rufus Hammans 46 y o  female MRN: [id number]  Unit/Bed#: [facility] 808-01 Encounter: [id number]    Assessment:  47 y/o F s/p Pj's reversal, ileocecectomy, and lysis of adhesions, POD #1     Plan:  --NPO  --Dilaudid PCA for pain control  --IS  --OOB, encourage ambulation  --DVT ppx: Heparin  --GI ppx: Protonix    Subjective/Objective     Subjective:  No acute events overnight  Reports abdominal pain near her incision sites, but notes her pain has improved this AM with the Dilaudid PCA  Denies any bm, flatus  Objective:     Blood pressure 108/56, pulse 95, temperature 98 7 °F (37 1 °C), temperature source Oral, resp  rate 16, height 5' 2" (1 575 m), weight 83 5 kg (184 lb), SpO2 96 %  ,Body mass index is 33 65 kg/m²  Intake/Output Summary (Last 24 hours) at 07/12/18 0455  Last data filed at 07/12/18 0420   Gross per 24 hour   Intake          7428 78 ml   Output             2300 ml   Net          5128 78 ml       Invasive Devices     Peripheral Intravenous Line            Peripheral IV 07/11/18 Left Forearm less than 1 day    Peripheral IV 07/11/18 Left Forearm less than 1 day          Drain            Urethral Catheter Latex 16 Fr  less than 1 day                Physical Exam:     GEN: NAD  HEENT: MMM  CV: RRR  Lung: normal effort  Ab: Soft, + incisional tenderness, abdominal wounds c/d/i  Extrem: No CCE  Neuro:  A+Ox3, motor and sensation grossly intact      Lab, Imaging and other studies:  CBC:   Lab Results   Component Value Date    HGB 10 2 (L) 07/11/2018   , CMP:   Lab Results   Component Value Date    GLUCOSE 162 (H) 07/11/2018     VTE Pharmacologic Prophylaxis: Heparin  VTE Mechanical Prophylaxis: sequential compression device

## 2018-07-12 NOTE — OP NOTE
OPERATIVE REPORT  PATIENT NAME: Bettie Bridges    :  1965  MRN: [id number]  Pt Location: BE OR ROOM 03    SURGERY DATE: 2018    Surgeon(s) and Role: Oralia Monaco DO - Primary     * Linda Jones MD - Assisting     * José Miguel Loya MD - Assisting    Preop Diagnosis:  Diverticulitis of large intestine with perforation without abscess or bleeding [K57 20]    Post-Op Diagnosis Codes:     * Diverticulitis of large intestine with perforation without abscess or bleeding [K57 20]    Procedure(s) (LRB):  COLOSTOMY TAKEDOWN / REVERSE COATES, ILEOCECECTOMY, LASER FLUORESCENCE ANGIOGRAPHY, LYSIS OF ADHESIONS (N/A)    Specimen(s):  ID Type Source Tests Collected by Time Destination   1 : cecum and TI and portion rectal stump Tissue Large Intestine, Cecum TISSUE EXAM Gorge Bolds, DO 2018 1113    2 : RECTAL STUMP Tissue Other TISSUE EXAM Gorge Bolds, DO 2018 1214    3 : COLOSTOMY Tissue Other TISSUE EXAM Gorge Bolds, DO 2018 1358        Estimated Blood Loss:   750 mL    Drains:  Urethral Catheter Latex 16 Fr  (Active)   Site Assessment Clean;Skin intact 2018  5:30 PM   Collection Container Standard drainage bag 2018  5:30 PM   Securement Method Securing device (Describe) 2018  5:30 PM   Output (mL) 300 mL 2018 10:11 PM   Number of days: 0       [REMOVED] Colostomy LLQ (Removed)   Stomal Appliance 2 piece 2018  7:04 AM   Stoma Assessment Ferry 2018  7:04 AM   Stoma Shape Round 2018  7:04 AM   Peristomal Assessment Clean; Intact 2018  7:04 AM   Treatment Other (Comment) 2018  5:45 PM   Number of days: 188       Anesthesia Type:   General    Operative Indications:  Diverticulitis of large intestine with perforation without abscess or bleeding [K57 20]    Operative Findings:  Rectal stump eroding into small bowel/small bowel mesentery near site of old ileoileal anastomosis requiring resection and reanastomosis  Healthy colorectal anastomosis with negative bubble test  SPY angiography showed viable ileocolic and colorectal anastomoses     Complications:   None    Procedure and Technique:  The patient was identified by name and armband in the preoperative holding area  She was then taken to the operating room, where general anesthesia was induced, and endotracheal tube was placed by the anesthesiology team   She was placed in the supine position  Her abdomen was prepped and draped in the usual sterile fashion  A brief time-out was called  All in the room were in agreement  A midline laparotomy incision was made using a 15 blade over the site of a previous exploratory laparotomy incision  The subcutaneous tissues were dissected to the level of fascia using Bovie electrocautery  The abdominal cavity was then entered through direct visualization using Bovie electrocautery  Significant adhesions to the anterior abdominal wall were then lysed using sharp and blunt dissection  The pelvis was then explored for the rectal stump  It was found tagged with a single Prolene suture  It was adhered to a loop of distal small bowel adjacent to the previous anastomosis  Upon further inspection, it was deemed appropriate to resect the small bowel, as the rectal stump was unable to be freed from the adhesions  The rectal stump was freed to healthy tissue and transected with a TIA stapler  The new rectal stump appeared intact and healthy  The portion of small bowel adherent to the previous rectal stump was then freed up  Because of the position of this small bowel loop in the distal small bowel, it was necessary to perform an ileocecectomy  The distal small bowel proximal to the area of adhesions and the prior anastomosis was transected using a TIA staple with blue bowel load  The right colon was dissected free from the abdominal wall and transected using a TIA stapler   An EnSeal Super jaw was used to free small bowel from the mesentery  The transected ileus ectomy was sent for pathology  The small bowel was then anastomosed to the right colon using a CAMDEN stapler in a side-to-side fashion  This anastomosis appeared healthy and viable  Attention was then turned to the colostomy site in the left upper quadrant  The skin around the colostomy site was incised using a 15 blade  Subcutaneous fat was dissected using Bovie electrocautery  The ostomy set was then freed from the anterior abdominal wall and through the fascia  Because of areas of serosal tear, a TIA stapler was used to transect very end of the previous colostomy  Careful dissection was used to lyse adhesions in the left upper quadrant, where a previous splenic flexure mobilization had been performed  Transverse colon was also freed from the omentum in order that the previous colostomy could reach the rectal stump  And EEA stapler was then used to create a tension-free anastomosis from the left colon to the rectal stump  At this point Dr Dave Shaw was called in for assistance, as it was necessary to create the anastomosis toward the anterior rectal wall because of difficulty inserting the distal end of the EEA stapler  A flexible sigmoidoscopy showed an intact anastomosis with negative bubble test   Hemostasis was achieved through Bovie electrocautery and suture ligation  Spy angiography was performed, and both the ileo colonic and the colorectal anastomoses appeared viable with excellent blood flow  The fascia surrounding the previous colostomy site was closed using figure of eight 2-0 PDS sutures  After irrigation with normal saline solution, the abdomen was closed using 1-0 PDS suture in a running fashion  Skin was closed with staples  The skin of the previous colostomy site was closed using 3 0 Vicryl in a pursestring fashion  The colostomy site was packed with iodoform packing and dressed with a dry sterile dressing    All sponge, instrument, and needle counts were correct x2  RF wanding was negative for retained foreign bodies  Dr Lyssa Mohr was present for the entire procedure      Patient Disposition:  PACU     SIGNATURE: Jake Badillo  DATE: July 11, 2018  TIME: 11:32 PM

## 2018-07-12 NOTE — SOCIAL WORK
Cm met with patient to explain role of Cm role and discuss discharge planning  Patient presented as alert and oriented  Patient lives in 2 story home with , Zaina Morin who is emergency contact [phone]  Patient was independent with ADLs PTA  Patient has shower chair in the home  Patient drives and works  Patient has no POA/living will  Patient was at [address] in January for roughly 3 weeks and was open to [facility] in the past but was unable to recall name of agency  Patient denied mental health or ETOH treatment in the past   Patient is not on oxygen at home  Pharmacy: [facility] in [facility]  PCP: Dr Vidya Mathew  Patient's  to transport  Cm following  CM reviewed d/c planning process including the following: identifying help at home, patient preference for d/c planning needs, Discharge Lounge, [facility] Meds to Bed program, availability of treatment team to discuss questions or concerns patient and/or family may have regarding understanding medications and recognizing signs and symptoms once discharged  CM also encouraged patient to follow up with all recommended appointments after discharge  Patient advised of importance for patient and family to participate in managing patients medical well being

## 2018-07-13 LAB
ALBUMIN SERPL BCP-MCNC: 2.7 G/DL (ref 3.5–5)
ALP SERPL-CCNC: 49 U/L (ref 46–116)
ALT SERPL W P-5'-P-CCNC: 15 U/L (ref 12–78)
ANION GAP SERPL CALCULATED.3IONS-SCNC: 3 MMOL/L (ref 4–13)
AST SERPL W P-5'-P-CCNC: 21 U/L (ref 5–45)
BASOPHILS # BLD AUTO: 0.03 THOUSANDS/ΜL (ref 0–0.1)
BASOPHILS NFR BLD AUTO: 0 % (ref 0–1)
BILIRUB SERPL-MCNC: 2.19 MG/DL (ref 0.2–1)
BUN SERPL-MCNC: 5 MG/DL (ref 5–25)
CALCIUM SERPL-MCNC: 8.1 MG/DL (ref 8.3–10.1)
CHLORIDE SERPL-SCNC: 112 MMOL/L (ref 100–108)
CO2 SERPL-SCNC: 26 MMOL/L (ref 21–32)
CREAT SERPL-MCNC: 0.48 MG/DL (ref 0.6–1.3)
EOSINOPHIL # BLD AUTO: 0.01 THOUSAND/ΜL (ref 0–0.61)
EOSINOPHIL NFR BLD AUTO: 0 % (ref 0–6)
ERYTHROCYTE [DISTWIDTH] IN BLOOD BY AUTOMATED COUNT: 12.9 % (ref 11.6–15.1)
GFR SERPL CREATININE-BSD FRML MDRD: 112 ML/MIN/1.73SQ M
GLUCOSE SERPL-MCNC: 108 MG/DL (ref 65–140)
HCT VFR BLD AUTO: 29.2 % (ref 34.8–46.1)
HGB BLD-MCNC: 9.4 G/DL (ref 11.5–15.4)
IMM GRANULOCYTES # BLD AUTO: 0.07 THOUSAND/UL (ref 0–0.2)
IMM GRANULOCYTES NFR BLD AUTO: 1 % (ref 0–2)
LYMPHOCYTES # BLD AUTO: 1.09 THOUSANDS/ΜL (ref 0.6–4.47)
LYMPHOCYTES NFR BLD AUTO: 10 % (ref 14–44)
MAGNESIUM SERPL-MCNC: 2.2 MG/DL (ref 1.6–2.6)
MCH RBC QN AUTO: 31.8 PG (ref 26.8–34.3)
MCHC RBC AUTO-ENTMCNC: 32.2 G/DL (ref 31.4–37.4)
MCV RBC AUTO: 99 FL (ref 82–98)
MONOCYTES # BLD AUTO: 0.7 THOUSAND/ΜL (ref 0.17–1.22)
MONOCYTES NFR BLD AUTO: 6 % (ref 4–12)
NEUTROPHILS # BLD AUTO: 9.12 THOUSANDS/ΜL (ref 1.85–7.62)
NEUTS SEG NFR BLD AUTO: 83 % (ref 43–75)
NRBC BLD AUTO-RTO: 0 /100 WBCS
PLATELET # BLD AUTO: 183 THOUSANDS/UL (ref 149–390)
PMV BLD AUTO: 10.5 FL (ref 8.9–12.7)
POTASSIUM SERPL-SCNC: 3.8 MMOL/L (ref 3.5–5.3)
PROT SERPL-MCNC: 5.4 G/DL (ref 6.4–8.2)
RBC # BLD AUTO: 2.96 MILLION/UL (ref 3.81–5.12)
SODIUM SERPL-SCNC: 141 MMOL/L (ref 136–145)
WBC # BLD AUTO: 11.02 THOUSAND/UL (ref 4.31–10.16)

## 2018-07-13 PROCEDURE — 99024 POSTOP FOLLOW-UP VISIT: CPT | Performed by: SURGERY

## 2018-07-13 PROCEDURE — 85025 COMPLETE CBC W/AUTO DIFF WBC: CPT | Performed by: SURGERY

## 2018-07-13 PROCEDURE — 83735 ASSAY OF MAGNESIUM: CPT | Performed by: SURGERY

## 2018-07-13 PROCEDURE — 80053 COMPREHEN METABOLIC PANEL: CPT | Performed by: SURGERY

## 2018-07-13 PROCEDURE — C9113 INJ PANTOPRAZOLE SODIUM, VIA: HCPCS | Performed by: SURGERY

## 2018-07-13 RX ORDER — OXYCODONE HYDROCHLORIDE 5 MG/1
5 TABLET ORAL EVERY 4 HOURS PRN
Status: DISCONTINUED | OUTPATIENT
Start: 2018-07-13 | End: 2018-07-18 | Stop reason: HOSPADM

## 2018-07-13 RX ORDER — DEXTROSE, SODIUM CHLORIDE, AND POTASSIUM CHLORIDE 5; .45; .15 G/100ML; G/100ML; G/100ML
75 INJECTION INTRAVENOUS CONTINUOUS
Status: DISCONTINUED | OUTPATIENT
Start: 2018-07-13 | End: 2018-07-16

## 2018-07-13 RX ORDER — PANTOPRAZOLE SODIUM 40 MG/1
40 INJECTION, POWDER, FOR SOLUTION INTRAVENOUS EVERY 12 HOURS SCHEDULED
Status: DISCONTINUED | OUTPATIENT
Start: 2018-07-13 | End: 2018-07-18

## 2018-07-13 RX ORDER — OXYCODONE HYDROCHLORIDE 10 MG/1
10 TABLET ORAL EVERY 4 HOURS PRN
Status: DISCONTINUED | OUTPATIENT
Start: 2018-07-13 | End: 2018-07-18 | Stop reason: HOSPADM

## 2018-07-13 RX ORDER — MAGNESIUM HYDROXIDE/ALUMINUM HYDROXICE/SIMETHICONE 120; 1200; 1200 MG/30ML; MG/30ML; MG/30ML
30 SUSPENSION ORAL EVERY 4 HOURS PRN
Status: DISCONTINUED | OUTPATIENT
Start: 2018-07-13 | End: 2018-07-18 | Stop reason: HOSPADM

## 2018-07-13 RX ADMIN — DEXTROSE, SODIUM CHLORIDE, AND POTASSIUM CHLORIDE 100 ML/HR: 5; .45; .15 INJECTION INTRAVENOUS at 18:08

## 2018-07-13 RX ADMIN — SODIUM CHLORIDE 125 ML/HR: 0.9 INJECTION, SOLUTION INTRAVENOUS at 05:15

## 2018-07-13 RX ADMIN — PANTOPRAZOLE SODIUM 40 MG: 40 INJECTION, POWDER, FOR SOLUTION INTRAVENOUS at 08:57

## 2018-07-13 RX ADMIN — PANTOPRAZOLE SODIUM 40 MG: 40 INJECTION, POWDER, FOR SOLUTION INTRAVENOUS at 21:30

## 2018-07-13 RX ADMIN — HEPARIN SODIUM 5000 UNITS: 5000 INJECTION, SOLUTION INTRAVENOUS; SUBCUTANEOUS at 05:14

## 2018-07-13 RX ADMIN — DEXTROSE, SODIUM CHLORIDE, AND POTASSIUM CHLORIDE 100 ML/HR: 5; .45; .15 INJECTION INTRAVENOUS at 07:35

## 2018-07-13 RX ADMIN — HEPARIN SODIUM 5000 UNITS: 5000 INJECTION, SOLUTION INTRAVENOUS; SUBCUTANEOUS at 14:20

## 2018-07-13 RX ADMIN — HEPARIN SODIUM 5000 UNITS: 5000 INJECTION, SOLUTION INTRAVENOUS; SUBCUTANEOUS at 21:30

## 2018-07-13 RX ADMIN — HYDROMORPHONE HYDROCHLORIDE: 10 INJECTION, SOLUTION INTRAMUSCULAR; INTRAVENOUS; SUBCUTANEOUS at 18:15

## 2018-07-13 NOTE — PROGRESS NOTES
Progress Note - General Surgery   Jordana Osorio 46 y o  female MRN: [id number]  Unit/Bed#: [facility] 808-01 Encounter: [id number]    Assessment:  47 y/o F s/p Pj's reversal, ileocecectomy, and lysis of adhesions, POD#2    Plan:  --NPO; ADAT  --IVFs  --PCA for pain control  --OOB, encourage ambulation  --DVT ppx: Heparin  --GI ppx: Protonix    Subjective/Objective     Subjective:     No acute events overnight  Pt reports her abdominal pain is improved this AM  Requesting medication to help her sleep  Denies any flatus or bm  Has no appetite yet  Has not attempted to ambulate yet d/t her pain yesterday  Denies any N/V/D  Objective:     Blood pressure 119/58, pulse (!) 119, temperature 99 7 °F (37 6 °C), temperature source Oral, resp  rate 20, height 5' 2" (1 575 m), weight 83 5 kg (184 lb), SpO2 94 %  ,Body mass index is 33 65 kg/m²  Intake/Output Summary (Last 24 hours) at 07/13/18 0543  Last data filed at 07/13/18 0516   Gross per 24 hour   Intake          3114 47 ml   Output             1406 ml   Net          1708 47 ml       Invasive Devices     Peripheral Intravenous Line            Peripheral IV 07/11/18 Left Forearm 1 day    Peripheral IV 07/11/18 Left Wrist 1 day                Physical Exam:     GEN: NAD  HEENT: MMM  CV: RRR  Lung: normal effort  Ab: Soft, + incisional tenderness, abdominal wound dressings c/d/i  Extrem: No CCE  Neuro:  A+Ox3, motor and sensation grossly intact      Lab, Imaging and other studies:CBC: No results found for: WBC, HGB, HCT, MCV, PLT, ADJUSTEDWBC, MCH, MCHC, RDW, MPV, NRBC, CMP: No results found for: NA, K, CL, CO2, ANIONGAP, BUN, CREATININE, GLUCOSE, CALCIUM, AST, ALT, ALKPHOS, PROT, ALBUMIN, BILITOT, EGFR  VTE Pharmacologic Prophylaxis: Heparin  VTE Mechanical Prophylaxis: sequential compression device

## 2018-07-14 LAB
ALBUMIN SERPL BCP-MCNC: 2.4 G/DL (ref 3.5–5)
ALP SERPL-CCNC: 54 U/L (ref 46–116)
ALT SERPL W P-5'-P-CCNC: 15 U/L (ref 12–78)
ANION GAP SERPL CALCULATED.3IONS-SCNC: 2 MMOL/L (ref 4–13)
AST SERPL W P-5'-P-CCNC: 22 U/L (ref 5–45)
BASOPHILS # BLD AUTO: 0.04 THOUSANDS/ΜL (ref 0–0.1)
BASOPHILS NFR BLD AUTO: 0 % (ref 0–1)
BILIRUB SERPL-MCNC: 1.58 MG/DL (ref 0.2–1)
BUN SERPL-MCNC: 3 MG/DL (ref 5–25)
CALCIUM SERPL-MCNC: 8.1 MG/DL (ref 8.3–10.1)
CHLORIDE SERPL-SCNC: 105 MMOL/L (ref 100–108)
CO2 SERPL-SCNC: 31 MMOL/L (ref 21–32)
CREAT SERPL-MCNC: 0.48 MG/DL (ref 0.6–1.3)
EOSINOPHIL # BLD AUTO: 0.04 THOUSAND/ΜL (ref 0–0.61)
EOSINOPHIL NFR BLD AUTO: 0 % (ref 0–6)
ERYTHROCYTE [DISTWIDTH] IN BLOOD BY AUTOMATED COUNT: 12.8 % (ref 11.6–15.1)
GFR SERPL CREATININE-BSD FRML MDRD: 112 ML/MIN/1.73SQ M
GLUCOSE SERPL-MCNC: 152 MG/DL (ref 65–140)
HCT VFR BLD AUTO: 27.2 % (ref 34.8–46.1)
HGB BLD-MCNC: 8.7 G/DL (ref 11.5–15.4)
IMM GRANULOCYTES # BLD AUTO: 0.05 THOUSAND/UL (ref 0–0.2)
IMM GRANULOCYTES NFR BLD AUTO: 0 % (ref 0–2)
LYMPHOCYTES # BLD AUTO: 1.28 THOUSANDS/ΜL (ref 0.6–4.47)
LYMPHOCYTES NFR BLD AUTO: 11 % (ref 14–44)
MCH RBC QN AUTO: 31.4 PG (ref 26.8–34.3)
MCHC RBC AUTO-ENTMCNC: 32 G/DL (ref 31.4–37.4)
MCV RBC AUTO: 98 FL (ref 82–98)
MONOCYTES # BLD AUTO: 0.77 THOUSAND/ΜL (ref 0.17–1.22)
MONOCYTES NFR BLD AUTO: 7 % (ref 4–12)
NEUTROPHILS # BLD AUTO: 9.38 THOUSANDS/ΜL (ref 1.85–7.62)
NEUTS SEG NFR BLD AUTO: 82 % (ref 43–75)
NRBC BLD AUTO-RTO: 0 /100 WBCS
PLATELET # BLD AUTO: 172 THOUSANDS/UL (ref 149–390)
PMV BLD AUTO: 10.9 FL (ref 8.9–12.7)
POTASSIUM SERPL-SCNC: 4.2 MMOL/L (ref 3.5–5.3)
PROT SERPL-MCNC: 5.8 G/DL (ref 6.4–8.2)
RBC # BLD AUTO: 2.77 MILLION/UL (ref 3.81–5.12)
SODIUM SERPL-SCNC: 138 MMOL/L (ref 136–145)
WBC # BLD AUTO: 11.56 THOUSAND/UL (ref 4.31–10.16)

## 2018-07-14 PROCEDURE — C9113 INJ PANTOPRAZOLE SODIUM, VIA: HCPCS | Performed by: SURGERY

## 2018-07-14 PROCEDURE — 80053 COMPREHEN METABOLIC PANEL: CPT | Performed by: SURGERY

## 2018-07-14 PROCEDURE — 85025 COMPLETE CBC W/AUTO DIFF WBC: CPT | Performed by: SURGERY

## 2018-07-14 PROCEDURE — 99024 POSTOP FOLLOW-UP VISIT: CPT | Performed by: SURGERY

## 2018-07-14 RX ADMIN — DEXTROSE, SODIUM CHLORIDE, AND POTASSIUM CHLORIDE 75 ML/HR: 5; .45; .15 INJECTION INTRAVENOUS at 15:00

## 2018-07-14 RX ADMIN — HEPARIN SODIUM 5000 UNITS: 5000 INJECTION, SOLUTION INTRAVENOUS; SUBCUTANEOUS at 05:45

## 2018-07-14 RX ADMIN — DEXTROSE, SODIUM CHLORIDE, AND POTASSIUM CHLORIDE 100 ML/HR: 5; .45; .15 INJECTION INTRAVENOUS at 03:20

## 2018-07-14 RX ADMIN — PANTOPRAZOLE SODIUM 40 MG: 40 INJECTION, POWDER, FOR SOLUTION INTRAVENOUS at 09:41

## 2018-07-14 RX ADMIN — HEPARIN SODIUM 5000 UNITS: 5000 INJECTION, SOLUTION INTRAVENOUS; SUBCUTANEOUS at 13:35

## 2018-07-14 RX ADMIN — HEPARIN SODIUM 5000 UNITS: 5000 INJECTION, SOLUTION INTRAVENOUS; SUBCUTANEOUS at 21:09

## 2018-07-14 RX ADMIN — PANTOPRAZOLE SODIUM 40 MG: 40 INJECTION, POWDER, FOR SOLUTION INTRAVENOUS at 21:09

## 2018-07-14 RX ADMIN — ACETAMINOPHEN 650 MG: 325 TABLET, FILM COATED ORAL at 12:51

## 2018-07-14 NOTE — OP NOTE
OPERATIVE REPORT  PATIENT NAME: Sushma Cordero    :  1965  MRN: [id number]  Pt Location: [facility] OR ROOM 03    SURGERY DATE: 2018    Surgeon(s) and Role: Yusuf Donaldson, DO - Primary     * Carmella Robison MD - Medhat Juarez MD - Assisting    Preop Diagnosis:  Diverticulitis of large intestine with perforation without abscess or bleeding [K57 20]    Post-Op Diagnosis Codes:     * Diverticulitis of large intestine with perforation without abscess or bleeding [K57 20]    Procedure(s) (LRB):  COLOSTOMY TAKEDOWN / REVERSE COATES, ILEOCECECTOMY, LASER FLUORESCENCE ANGIOGRAPHY, LYSIS OF ADHESIONS (N/A)      I was asked to assist Dr Vesna Enamorado intraoperatively  I scrubbed in and assisted with minimal lysis of splenic flexure adhesions that enabled the descending colon to reach the pelvis  I confirmed the reach was adequate with Dr Vesna Enamorado and scrubbed out  I was present for less than 10 minutes      SIGNATURE: Gera Rasheed MD  DATE: 2018  TIME: 1:10 PM

## 2018-07-14 NOTE — PROGRESS NOTES
Progress Note - General Surgery   Santos Laguerre 46 y o  female MRN: [id number]  Unit/Bed#: [facility] 808-01 Encounter: [id number]    Assessment/Plan:  46 y o  female with hx Cammy procedure    * Diverticulitis of large intestine with perforation without abscess or bleeding   Assessment & Plan    S/p ex lap, cammy reversal, ileocecectomy, lysis of adhesions 7/11  Keep on clears  Await return of bowel function  OOB, ambulate  Keep on PCA          Subjective/Objective     Subjective: Patient reports improved pain  Patient has been ambulating  Tolerating clears without nausea or vomiting  No flatus or BM yet  Objective:     Vitals: Blood pressure 116/59, pulse 98, temperature 99 6 °F (37 6 °C), temperature source Oral, resp  rate 17, height 5' 2" (1 575 m), weight 83 5 kg (184 lb), SpO2 96 %  ,Body mass index is 33 65 kg/m²  I/O       07/12 0701 - 07/13 0700 07/13 0701 - 07/14 0700    P  O  0 1000    I V  (mL/kg) 3111 6 (37 3) 893 2 (10 7)    Total Intake(mL/kg) 3111 6 (37 3) 1893 2 (22 7)    Urine (mL/kg/hr) 1406 (0 7) 1300 (0 6)    Stool  0    Total Output 1406 1300    Net +1705 6 +593 2          Unmeasured Urine Occurrence 0 x     Unmeasured Stool Occurrence 0 x 0 x          Physical Exam:  GEN: NAD  HEENT: MMM  CV: RRR  Lung: Normal effort  Ab: Soft, NT/ND, incision and old ostomy site C/D/I  Extrem: No CCE  Neuro:  A+Ox3    Lab, Imaging and other studies:   CBC with diff:   Lab Results   Component Value Date    WBC 11 56 (H) 07/14/2018    HGB 8 7 (L) 07/14/2018    HCT 27 2 (L) 07/14/2018    MCV 98 07/14/2018     07/14/2018    MCH 31 4 07/14/2018    MCHC 32 0 07/14/2018    RDW 12 8 07/14/2018    MPV 10 9 07/14/2018    NRBC 0 07/14/2018   , BMP/CMP:   Lab Results   Component Value Date     07/14/2018    K 4 2 07/14/2018     07/14/2018    CO2 31 07/14/2018    ANIONGAP 2 (L) 07/14/2018    BUN 3 (L) 07/14/2018    CREATININE 0 48 (L) 07/14/2018    GLUCOSE 152 (H) 07/14/2018    CALCIUM 8 1 (L) 07/14/2018    AST 22 07/14/2018    ALT 15 07/14/2018    ALKPHOS 54 07/14/2018    PROT 5 8 (L) 07/14/2018    BILITOT 1 58 (H) 07/14/2018    EGFR 112 07/14/2018   , Magnesium: No results found for: MAG  VTE Pharmacologic Prophylaxis: Heparin  VTE Mechanical Prophylaxis: sequential compression device

## 2018-07-15 LAB
ANION GAP SERPL CALCULATED.3IONS-SCNC: 3 MMOL/L (ref 4–13)
ATRIAL RATE: 159 BPM
BASOPHILS # BLD AUTO: 0.03 THOUSANDS/ΜL (ref 0–0.1)
BASOPHILS NFR BLD AUTO: 0 % (ref 0–1)
BUN SERPL-MCNC: 3 MG/DL (ref 5–25)
CALCIUM SERPL-MCNC: 8.1 MG/DL (ref 8.3–10.1)
CHLORIDE SERPL-SCNC: 107 MMOL/L (ref 100–108)
CO2 SERPL-SCNC: 30 MMOL/L (ref 21–32)
CREAT SERPL-MCNC: 0.45 MG/DL (ref 0.6–1.3)
EOSINOPHIL # BLD AUTO: 0.16 THOUSAND/ΜL (ref 0–0.61)
EOSINOPHIL NFR BLD AUTO: 2 % (ref 0–6)
ERYTHROCYTE [DISTWIDTH] IN BLOOD BY AUTOMATED COUNT: 12.7 % (ref 11.6–15.1)
GFR SERPL CREATININE-BSD FRML MDRD: 114 ML/MIN/1.73SQ M
GLUCOSE SERPL-MCNC: 127 MG/DL (ref 65–140)
HCT VFR BLD AUTO: 26.9 % (ref 34.8–46.1)
HGB BLD-MCNC: 8.7 G/DL (ref 11.5–15.4)
IMM GRANULOCYTES # BLD AUTO: 0.02 THOUSAND/UL (ref 0–0.2)
IMM GRANULOCYTES NFR BLD AUTO: 0 % (ref 0–2)
LYMPHOCYTES # BLD AUTO: 1.02 THOUSANDS/ΜL (ref 0.6–4.47)
LYMPHOCYTES NFR BLD AUTO: 12 % (ref 14–44)
MCH RBC QN AUTO: 31.3 PG (ref 26.8–34.3)
MCHC RBC AUTO-ENTMCNC: 32.3 G/DL (ref 31.4–37.4)
MCV RBC AUTO: 97 FL (ref 82–98)
MONOCYTES # BLD AUTO: 0.48 THOUSAND/ΜL (ref 0.17–1.22)
MONOCYTES NFR BLD AUTO: 6 % (ref 4–12)
NEUTROPHILS # BLD AUTO: 6.64 THOUSANDS/ΜL (ref 1.85–7.62)
NEUTS SEG NFR BLD AUTO: 80 % (ref 43–75)
NRBC BLD AUTO-RTO: 0 /100 WBCS
PLATELET # BLD AUTO: 203 THOUSANDS/UL (ref 149–390)
PMV BLD AUTO: 10.7 FL (ref 8.9–12.7)
POTASSIUM SERPL-SCNC: 3.5 MMOL/L (ref 3.5–5.3)
QRS AXIS: 51 DEGREES
QRSD INTERVAL: 84 MS
QT INTERVAL: 272 MS
QTC INTERVAL: 461 MS
RBC # BLD AUTO: 2.78 MILLION/UL (ref 3.81–5.12)
SODIUM SERPL-SCNC: 140 MMOL/L (ref 136–145)
T WAVE AXIS: 241 DEGREES
VENTRICULAR RATE: 173 BPM
WBC # BLD AUTO: 8.35 THOUSAND/UL (ref 4.31–10.16)

## 2018-07-15 PROCEDURE — 99024 POSTOP FOLLOW-UP VISIT: CPT | Performed by: SURGERY

## 2018-07-15 PROCEDURE — 93005 ELECTROCARDIOGRAM TRACING: CPT

## 2018-07-15 PROCEDURE — 80048 BASIC METABOLIC PNL TOTAL CA: CPT | Performed by: SURGERY

## 2018-07-15 PROCEDURE — C9113 INJ PANTOPRAZOLE SODIUM, VIA: HCPCS | Performed by: SURGERY

## 2018-07-15 PROCEDURE — 85025 COMPLETE CBC W/AUTO DIFF WBC: CPT | Performed by: SURGERY

## 2018-07-15 PROCEDURE — 93010 ELECTROCARDIOGRAM REPORT: CPT | Performed by: INTERNAL MEDICINE

## 2018-07-15 PROCEDURE — 94762 N-INVAS EAR/PLS OXIMTRY CONT: CPT

## 2018-07-15 RX ORDER — MAGNESIUM SULFATE HEPTAHYDRATE 40 MG/ML
2 INJECTION, SOLUTION INTRAVENOUS ONCE
Status: COMPLETED | OUTPATIENT
Start: 2018-07-15 | End: 2018-07-15

## 2018-07-15 RX ORDER — POTASSIUM CHLORIDE 20 MEQ/1
40 TABLET, EXTENDED RELEASE ORAL ONCE
Status: COMPLETED | OUTPATIENT
Start: 2018-07-15 | End: 2018-07-15

## 2018-07-15 RX ORDER — CALCIUM CARBONATE 200(500)MG
500 TABLET,CHEWABLE ORAL 3 TIMES DAILY PRN
Status: DISCONTINUED | OUTPATIENT
Start: 2018-07-15 | End: 2018-07-18 | Stop reason: HOSPADM

## 2018-07-15 RX ADMIN — MAGNESIUM SULFATE HEPTAHYDRATE 2 G: 40 INJECTION, SOLUTION INTRAVENOUS at 06:12

## 2018-07-15 RX ADMIN — ACETAMINOPHEN 650 MG: 325 TABLET, FILM COATED ORAL at 12:57

## 2018-07-15 RX ADMIN — PANTOPRAZOLE SODIUM 40 MG: 40 INJECTION, POWDER, FOR SOLUTION INTRAVENOUS at 22:27

## 2018-07-15 RX ADMIN — HEPARIN SODIUM 5000 UNITS: 5000 INJECTION, SOLUTION INTRAVENOUS; SUBCUTANEOUS at 05:36

## 2018-07-15 RX ADMIN — DEXTROSE, SODIUM CHLORIDE, AND POTASSIUM CHLORIDE 75 ML/HR: 5; .45; .15 INJECTION INTRAVENOUS at 18:11

## 2018-07-15 RX ADMIN — LORAZEPAM 1 MG: 2 INJECTION INTRAMUSCULAR; INTRAVENOUS at 20:13

## 2018-07-15 RX ADMIN — AMIODARONE HYDROCHLORIDE 1 MG/MIN: 50 INJECTION, SOLUTION INTRAVENOUS at 06:54

## 2018-07-15 RX ADMIN — DEXTROSE 150 MG: 50 INJECTION, SOLUTION INTRAVENOUS at 06:37

## 2018-07-15 RX ADMIN — HEPARIN SODIUM 5000 UNITS: 5000 INJECTION, SOLUTION INTRAVENOUS; SUBCUTANEOUS at 22:27

## 2018-07-15 RX ADMIN — HEPARIN SODIUM 5000 UNITS: 5000 INJECTION, SOLUTION INTRAVENOUS; SUBCUTANEOUS at 14:40

## 2018-07-15 RX ADMIN — POTASSIUM CHLORIDE 40 MEQ: 1500 TABLET, EXTENDED RELEASE ORAL at 12:57

## 2018-07-15 RX ADMIN — AMIODARONE HYDROCHLORIDE 0.5 MG/MIN: 50 INJECTION, SOLUTION INTRAVENOUS at 22:23

## 2018-07-15 RX ADMIN — SODIUM CHLORIDE, SODIUM LACTATE, POTASSIUM CHLORIDE, AND CALCIUM CHLORIDE 500 ML: .6; .31; .03; .02 INJECTION, SOLUTION INTRAVENOUS at 06:00

## 2018-07-15 RX ADMIN — PANTOPRAZOLE SODIUM 40 MG: 40 INJECTION, POWDER, FOR SOLUTION INTRAVENOUS at 10:27

## 2018-07-15 RX ADMIN — Medication 500 MG: at 14:51

## 2018-07-15 RX ADMIN — DEXTROSE, SODIUM CHLORIDE, AND POTASSIUM CHLORIDE 75 ML/HR: 5; .45; .15 INJECTION INTRAVENOUS at 05:36

## 2018-07-15 RX ADMIN — HYDROMORPHONE HYDROCHLORIDE: 10 INJECTION, SOLUTION INTRAMUSCULAR; INTRAVENOUS; SUBCUTANEOUS at 18:21

## 2018-07-15 NOTE — PROGRESS NOTES
Progress Note - General Surgery   Tam Dai 46 y o  female MRN: [id number]  Unit/Bed#: [facility] 808-01 Encounter: [id number]    Assessment:  45 y/o F 4 Days Post-Op s/p Pj's reversal, ileocecectomy, and lysis of adhesions    Plan:  - -Diet Clear Liquid  -- Afib New Onset: Cardiology and Amiodarone  -- IVFs  -- PCA for pain control  -- OOB, encourage ambulation  -- DVT ppx: Heparin  -- GI ppx: Protonix    Subjective/Objective     Subjective: This morning patient went into Afi9b with RVR and amiodarone was started  Cardiology was consulted  Patient was mentating appropriately and denied any chest pains  Manual cuff pressure was 105/75  Pain is controlled with medications, feels fatigued, tolerating CLD, denies any N/V, flatus or BM  Objective:     Blood pressure 92/64, pulse (!) 168, temperature 98 4 °F (36 9 °C), temperature source Oral, resp  rate 18, height 5' 2" (1 575 m), weight 83 5 kg (184 lb), SpO2 92 %  ,Body mass index is 33 65 kg/m²  I/O       07/13 0701 - 07/14 0700 07/14 0701 - 07/15 0700    P  O  1300 320    I V  (mL/kg) 893 2 (10 7) 2035 8 (24 4)    Total Intake(mL/kg) 2193 2 (26 3) 2355 8 (28 2)    Urine (mL/kg/hr) 1300 (0 6) 1125 (0 6)    Stool 0 0    Total Output 1300 1125    Net +893 2 +1230 8          Unmeasured Urine Occurrence  1 x    Unmeasured Stool Occurrence 0 x 0 x          Invasive Devices     Peripheral Intravenous Line            Peripheral IV 07/11/18 Left Forearm 3 days    Peripheral IV 07/11/18 Left Wrist 3 days                Physical Exam:   GEN: NAD  HEENT: MMM  CV: RRR  Lung: normal effort  Ab: Soft, + incisional tenderness, abdominal wound dressings c/d/i  Extrem: No CCE  Neuro:  A+Ox3, motor and sensation grossly intact      Lab, Imaging and other studies:CBC:  Recent Labs      07/13/18   0446  07/14/18   0513   WBC  11 02*  11 56*   HGB  9 4*  8 7*   PLT  183  172     Recent Labs      07/13/18   0446  07/14/18   0513   NA  141  138   K  3 8  4 2   CO2  26  31 CREATININE  0 48*  0 48*   MG  2 2   --    CALCIUM  8 1*  8 1*           VTE Pharmacologic Prophylaxis: Heparin  VTE Mechanical Prophylaxis: sequential compression device

## 2018-07-15 NOTE — PROGRESS NOTES
Pt placed on Amiodarone gtt after pt was found to be in rapid afib w/ RVR  Red Sx notified  EKG obtained w/ Cards consult  Bp stable  Will continue to monitor

## 2018-07-15 NOTE — PHYSICAL THERAPY NOTE
PT CANCELLATION NOTE    Orders received  Chart reviewed  PT went to perform IE  Patient refused  PT will follow up tomorrow      Jose Juan, PT

## 2018-07-16 ENCOUNTER — APPOINTMENT (INPATIENT)
Dept: NON INVASIVE DIAGNOSTICS | Facility: HOSPITAL | Age: 53
DRG: 330 | End: 2018-07-16
Payer: COMMERCIAL

## 2018-07-16 LAB
ANION GAP SERPL CALCULATED.3IONS-SCNC: 4 MMOL/L (ref 4–13)
BUN SERPL-MCNC: 3 MG/DL (ref 5–25)
CALCIUM SERPL-MCNC: 8.5 MG/DL (ref 8.3–10.1)
CHLORIDE SERPL-SCNC: 107 MMOL/L (ref 100–108)
CO2 SERPL-SCNC: 31 MMOL/L (ref 21–32)
CREAT SERPL-MCNC: 0.39 MG/DL (ref 0.6–1.3)
GFR SERPL CREATININE-BSD FRML MDRD: 119 ML/MIN/1.73SQ M
GLUCOSE SERPL-MCNC: 132 MG/DL (ref 65–140)
MAGNESIUM SERPL-MCNC: 1.9 MG/DL (ref 1.6–2.6)
POTASSIUM SERPL-SCNC: 3.8 MMOL/L (ref 3.5–5.3)
SODIUM SERPL-SCNC: 142 MMOL/L (ref 136–145)

## 2018-07-16 PROCEDURE — C9113 INJ PANTOPRAZOLE SODIUM, VIA: HCPCS | Performed by: SURGERY

## 2018-07-16 PROCEDURE — 93306 TTE W/DOPPLER COMPLETE: CPT | Performed by: INTERNAL MEDICINE

## 2018-07-16 PROCEDURE — G8987 SELF CARE CURRENT STATUS: HCPCS

## 2018-07-16 PROCEDURE — G8988 SELF CARE GOAL STATUS: HCPCS

## 2018-07-16 PROCEDURE — 94762 N-INVAS EAR/PLS OXIMTRY CONT: CPT

## 2018-07-16 PROCEDURE — 80048 BASIC METABOLIC PNL TOTAL CA: CPT | Performed by: SURGERY

## 2018-07-16 PROCEDURE — 93306 TTE W/DOPPLER COMPLETE: CPT

## 2018-07-16 PROCEDURE — 99222 1ST HOSP IP/OBS MODERATE 55: CPT | Performed by: INTERNAL MEDICINE

## 2018-07-16 PROCEDURE — 97166 OT EVAL MOD COMPLEX 45 MIN: CPT

## 2018-07-16 PROCEDURE — 83735 ASSAY OF MAGNESIUM: CPT | Performed by: SURGERY

## 2018-07-16 RX ADMIN — HEPARIN SODIUM 5000 UNITS: 5000 INJECTION, SOLUTION INTRAVENOUS; SUBCUTANEOUS at 05:40

## 2018-07-16 RX ADMIN — METOPROLOL TARTRATE 12.5 MG: 25 TABLET ORAL at 21:41

## 2018-07-16 RX ADMIN — PANTOPRAZOLE SODIUM 40 MG: 40 INJECTION, POWDER, FOR SOLUTION INTRAVENOUS at 08:00

## 2018-07-16 RX ADMIN — HEPARIN SODIUM 5000 UNITS: 5000 INJECTION, SOLUTION INTRAVENOUS; SUBCUTANEOUS at 21:41

## 2018-07-16 RX ADMIN — OXYCODONE HYDROCHLORIDE 10 MG: 10 TABLET ORAL at 07:59

## 2018-07-16 RX ADMIN — PANTOPRAZOLE SODIUM 40 MG: 40 INJECTION, POWDER, FOR SOLUTION INTRAVENOUS at 21:41

## 2018-07-16 RX ADMIN — OXYCODONE HYDROCHLORIDE 10 MG: 10 TABLET ORAL at 11:57

## 2018-07-16 RX ADMIN — AMIODARONE HYDROCHLORIDE 0.5 MG/MIN: 50 INJECTION, SOLUTION INTRAVENOUS at 05:39

## 2018-07-16 RX ADMIN — HEPARIN SODIUM 5000 UNITS: 5000 INJECTION, SOLUTION INTRAVENOUS; SUBCUTANEOUS at 13:59

## 2018-07-16 RX ADMIN — OXYCODONE HYDROCHLORIDE 10 MG: 10 TABLET ORAL at 16:17

## 2018-07-16 RX ADMIN — METOPROLOL TARTRATE 12.5 MG: 25 TABLET ORAL at 11:57

## 2018-07-16 RX ADMIN — ONDANSETRON 4 MG: 2 INJECTION, SOLUTION INTRAMUSCULAR; INTRAVENOUS at 03:08

## 2018-07-16 RX ADMIN — LORAZEPAM 1 MG: 2 INJECTION INTRAMUSCULAR; INTRAVENOUS at 16:17

## 2018-07-16 RX ADMIN — OXYCODONE HYDROCHLORIDE 10 MG: 10 TABLET ORAL at 21:41

## 2018-07-16 NOTE — CASE MANAGEMENT
Continued Stay Review    Date: 7/14/18 POD # 3    Vital Signs:   07/14/18 1500  99 5 °F (37 5 °C)  93  18  103/60  93 %  None (Room air)  Sitting     Medications:   Scheduled Meds:   Current Facility-Administered Medications:  acetaminophen 650 mg Oral Q6H PRN   aluminum-magnesium hydroxide-simethicone 30 mL Oral Q4H PRN   calcium carbonate 500 mg Oral TID PRN   diphenhydrAMINE 25 mg Intravenous Q6H PRN   heparin (porcine) 5,000 Units Subcutaneous Q8H [facility]   LORazepam 1 mg Intravenous Q6H PRN   metoprolol tartrate 12 5 mg Oral Q12H [facility]   ondansetron 4 mg Intravenous Q6H PRN   oxyCODONE 10 mg Oral Q4H PRN   oxyCODONE 5 mg Oral Q4H PRN   pantoprazole 40 mg Intravenous Q12H [facility]     Continuous Infusions:  IV fluids w/ KCl at 75 ml /hr  Hydromorphone PCA     PRN Meds:   Acetaminophen x1    aluminum-magnesium hydroxide-simethicone    calcium carbonate    diphenhydrAMINE    LORazepam    ondansetron    oxyCODONE    oxyCODONE  Hydromorphone PCA     Abnormal Labs/Diagnostic Results:   BUN/Cr 3/0 48  Glucose 152  Carlos 8 1   Total protein 5 8  WBC 11 56  H/H 8 7/27 2    Age/Sex: 46 y o  female     Assessment/Plan:   7/14 Colorectal Surgery Progress Note  46 y o  female with hx Cammy procedure         * Diverticulitis of large intestine with perforation without abscess or bleeding   Assessment & Plan     S/p ex lap, cammy reversal, ileocecectomy, lysis of adhesions 7/11  Keep on clears  Await return of bowel function  OOB, ambulate  Keep on PCA     Discharge Plan: probably home     Thank you,  Ash Aqq  [address] Utilization Review Department  Phone: [phone]; Fax [phone]  ATTENTION: The Network Utilization Review Department is now centralized for our 9 Facilities  Make a note that we have a new phone and fax numbers for our Department  Please call with any questions or concerns to [phone] and carefully follow the prompts so that you are directed to the right person   All voicemails are confidential  Fax any determinations, approvals, denials, and requests for initial or continue stay review clinical to [phone]  Due to HIGH CALL volume, it would be easier if you could please send faxed requests to expedite your requests and in part, help us provide discharge notifications faster

## 2018-07-16 NOTE — PLAN OF CARE
Problem: OCCUPATIONAL THERAPY ADULT  Goal: Performs self-care activities at highest level of function for planned discharge setting  See evaluation for individualized goals  Treatment Interventions: ADL retraining, Functional transfer training, Endurance training, Patient/family training, Equipment evaluation/education, Compensatory technique education, Energy conservation  Equipment Recommended:  (TBD as pt progresses)       See flowsheet documentation for full assessment, interventions and recommendations  Limitation: Decreased ADL status, Decreased endurance, Decreased self-care trans, Decreased high-level ADLs     Assessment: Pt is a 46 y o  female seen for OT evaluation s/p admit to [facility] 7/11/2018 w/ Diverticulitis of large intestine with perforation without abscess or bleeding  S/p colostomy takedown/ burgess reversal on 7/11  Currently requires O2 supplement, monitoring HR (pt reports increase HR yesterday limited OOB, activity)  Comorbidities affecting pt's functional performance at time of assessment include: diverticulitits with complications, ileostomy, HTN, PAF, OA L knee, thyroid d/o  Personal factors affecting pt at time of IE include:steps to enter environment, difficulty performing ADLS, difficulty performing IADLS  and health management   Prior to admission, pt was I ADL, IADL, active, drives, employed  Lives with spouse, family and friends supportive  Upon evaluation: Pt requires min-mod a ADL, S functional mobility 2* the following deficits impacting occupational performance: general weakness, decreased tolerance and increased pain, fatigue  Pt to benefit from continued skilled OT tx while in the hospital to address deficits as defined above and maximize level of functional independence w ADL's and functional mobility    Occupational Performance areas to address include: bathing/shower, toilet hygiene, dressing, health maintenance, functional mobility, light household maintenance for when home alone, job performance/volunteering and social participation, equip assessment and education, education modified tech, ECT as needed to reach max ADL potential  From OT standpoint, recommendation at time of d/c would be home with family support    Will follow 3-5x/wk x 1 week for OT goals      OT Discharge Recommendation: Home with family support  OT - OK to Discharge: Yes (when medically clear)

## 2018-07-16 NOTE — CONSULTS
Consultation - Cardiology   Mini Wiley 46 y o  female MRN: [id number]  Unit/Bed#: [facility] 816-01 Encounter: [id number]      Assessment:  Principal Problem:    Diverticulitis of large intestine with perforation without abscess or bleeding    Assessment and plan    Paroxysmal Afib  - telemetry reviewed, paroxysmal sudden onset and termination of narrow complex supraventricular tachycardia to a heart rate of 180s yesterday, with immediate conversion to normal sinus rhythm  - she has been on amiodarone since yesterday, no further events noted overnight    -she did have episodes of AFib in the past briefly postop in 01/2018,now with recurrence post op again, she is at high risk for more recurrences in the future     she has also been off her metoprolol for the last few months    -for now, would stop amiodarone and start her on metoprolol tartrate 12 5 b i d if she tolerates p o  well, otherwise would have her on metoprolol 5 mg IV q 6h   Echo 2016 with EF 60%, PASP 25 mmHg  Would repeat echo for evaluation of valves  -CHADS2 Vasc score of 2,would start on Eliquis when okay from a surgical standpoint  -continue to monitor on telemetry  -monitor electrolytes, keep K > 4, Mg> 2, on supplements  -outpatient Sleep apnea testing  Outpatient follow-up set up with Dr Devi Hernandez at the [place] office on August 6, 10 a m  History of Sigmoid diverticulitis- Pj's/colostomy reversal, ilealcecectomy and lysis of adhesions on 07/11/2018    (history of sigmoid diverticulitis in 01/2018 , Underwent exploratory laparotomy, had difficulty intraoperatively due to significant inflammation, , with resulting injury to small bowel requiring small bowel resection and ileostomy   , subsequently underwent  ileostomy closure via hand-sewn anastomosis and transverse colostomy on 1/4/18  )    Hypertension:  Blood pressure currently at goal         History of Present Illness   Physician Requesting Consult: Wesley Bhakta,   Reason for Consult / Principal Problem:  HPI: Rufus Hammans is a 46y o  year old female with a past medical history of hypertension, diverticulitis, hyperlipidemia, admission for sigmoid diverticulitis with microperforation on CT scan in 01/2018 underwent exploratory laparotomy with resulting injury to small bowel requiring small bowel resection and ileostomy, then underwent ileostomy closure via hand-sewn anastomosis and transverse colostomy on 1/4/18, developed new onset rapid AFib in the setting, and was not started on anticoagulation, was discharged on metoprolol, subsequently had 2 admissions in February and March of 2018 with electrolyte abnormalities, currently not following a cardiologist     Patient was admitted electively and underwent Pj's/colostomy reversal, ilealcecectomy and lysis of adhesions on 07/11/2018, cardiology consulted for runs of AFib post procedure in this admission  Patient has been started on amiodarone drip yesterday for rapid AFib, patient is asymptomatic during these rapid episodes, she denies any palpitations, , chest pain or shortness of breath here or at home  it appears  metoprolol tartrate 12 5 b i d  that was started in January was discontinued somehow in February 2018 when she was admitted for electrolyte abnormalities post GI surgery           Inpatient consult to Cardiology     Performed by  Osiel Neil     Authorized by Eddie Figueroa              Review of Systems:  Review of Systems  As per HPI   Historical Information   Past Medical History:   Diagnosis Date    Disease of thyroid gland     Endometrial polyp     Gastroesophageal reflux disease without esophagitis 12/4/2017    Hand injury     LAST ASSESSED: [id number]    High risk medication use     LAST ASSESSED: [id number]    Hyperlipidemia     Hypertension     Renal colic     LAST ASSESSED: [id number]    Viral syndrome     LAST ASSESSED: [id number]    Vitamin D deficiency disease      Past Surgical History:   Procedure Laterality Date    CHOLECYSTECTOMY      COLON SURGERY      COLONOSCOPY N/A 6/15/2018    Procedure: COLONOSCOPY;  Surgeon: Justin Restrepo DO;  [facility];  Service: Gastroenterology    COLOSTOMY      ENDOMETRIAL ABLATION  2007    THERMAL    ESOPHAGOGASTRODUODENOSCOPY N/A 4/6/2018    Procedure: ESOPHAGOGASTRODUODENOSCOPY (EGD);   Surgeon: Radha Frias MD;  [facility];  Service: Gastroenterology   [address] [place] [address], 2002, 2007    LAPAROTOMY N/A 1/4/2018    Procedure: LAPAROTOMY EXPLORATORY, 2nd look, small bowel resection, ilieostomy closure via handsown anastamosis transverse colostomy;  Surgeon: Alecia Bo DO;  Location:  MAIN OR;  Service: General    WI CLOSE ENTEROSTOMY N/A 7/11/2018    Procedure: COLOSTOMY TAKEDOWN / Nadara Marie, ILEOCECECTOMY, LASER FLUORESCENCE ANGIOGRAPHY, LYSIS OF ADHESIONS;  Surgeon: Sanket Rinaldi DO;  Location:  MAIN OR;  Service: General    WI ESOPHAGOGASTRODUODENOSCOPY TRANSORAL DIAGNOSTIC N/A 10/21/2016    Procedure: EGD AND COLONOSCOPY;  Surgeon: Micah Bullock MD;  [facility];  Service: Gastroenterology    WI PART REMOVAL COLON W ANASTOMOSIS N/A 1/3/2018    Procedure: Exploratory laparotomy, RESECTION COLON SIGMOID, possible ostomy;  Surgeon: Sanket Rinaldi DO;  [facility];  Service: General    THYROIDECTOMY      NEAR-TOTAL    THYROIDECTOMY, PARTIAL      TONSILLECTOMY AND ADENOIDECTOMY      TUBAL LIGATION  1997    URETERAL STENT PLACEMENT Bilateral 1/3/2018    Procedure: INSERTION STENT URETERAL;  Surgeon: Melissa Lea MD;  [facility];  Service: Urology     History   Alcohol Use    1 2 oz/week    2 Standard drinks or equivalent per week     Comment: 1-2 mixed drinks per weekend     History   Drug Use No     History   Smoking Status    Former Smoker    Packs/day: 0 50    Years: 30 00    Quit date: 2003   Smokeless Tobacco    Never Used     Family History: non-contributory    Meds/Allergies   PTA meds:   Prior to Admission Medications   Prescriptions Last Dose Informant Patient Reported? Taking? Biotin 63715 MCG TABS 7/4/2018  Yes Yes   Sig: Take 1 tablet by mouth daily   LORazepam (ATIVAN) 0 5 mg tablet More than a month at Unknown time  No No   Sig: Take 1 tablet (0 5 mg total) by mouth daily at bedtime as needed for anxiety Takes PRN at bedtime   Multiple Vitamin (MULTIVITAMIN) tablet 7/4/2018  Yes Yes   Sig: Take 1 tablet by mouth daily   SUPREP BOWEL PREP KIT 17 5-3 13-1 6 GM/180ML SOLN 7/11/2018 at 26815  No Yes   Sig: DAY BEFORE USE 6 OZ BOTTLE, THEN 4 HRS BEFORE PROCEDURE TAKE 2ND DOSE   ketoconazole (NIZORAL) 2 % cream More than a month at Unknown time  No No   Sig: Apply topically 2 (two) times a day   magnesium oxide (MAG-OX) 400 mg 7/10/2018 at Unknown time Self No Yes   Sig: Take 1 tablet (400 mg total) by mouth 2 (two) times a day with meals   Patient taking differently: Take 800 mg by mouth daily     pantoprazole (PROTONIX) 40 mg tablet 7/10/2018 at Unknown time  No Yes   Sig: Take 1 tablet (40 mg total) by mouth 2 (two) times a day   Patient taking differently: Take 40 mg by mouth daily     potassium chloride (K-DUR,KLOR-CON) 20 mEq tablet 7/10/2018 at Unknown time  No Yes   Sig: Take 1 tablet (20 mEq total) by mouth 2 (two) times a day   Patient taking differently: Take 40 mEq by mouth daily        Facility-Administered Medications: None     No Known Allergies    Objective   Vitals: Blood pressure 122/77, pulse 91, temperature 99 1 °F (37 3 °C), temperature source Oral, resp  rate 17, height 5' 2" (1 575 m), weight 83 5 kg (184 lb), SpO2 96 %  , Body mass index is 33 65 kg/m² , Orthostatic Blood Pressures      Most Recent Value   Blood Pressure  122/77 filed at 07/16/2018 0700   Patient Position - Orthostatic VS  Lying filed at 07/16/2018 0700            Intake/Output Summary (Last 24 hours) at 07/16/18 1016  Last data filed at 07/16/18 1172   Gross per 24 hour   Intake           1077 4 ml   Output             3451 ml   Net          -2373 6 ml       Invasive Devices     Peripheral Intravenous Line            Peripheral IV 07/15/18 Right Forearm 1 day                      Physical Exam    Gen: No acute distress  HEENT: anicteric, mucous membranes moist  Neck: supple, no jugular venous distention, or carotid bruit  Heart: regular, normal s1 and s2, no murmur/rub or gallop  Lungs :clear to auscultation bilaterally, no rales/rhonchi or wheeze  Ext:  No edemaSkin: warm, no rashes  Neuro: AAO x 3, no focal findings  Psychiatric: normal affect  Musculoskeletal: no obvious joint deformities  Lab Results:     Lab Results   Component Value Date    CKTOTAL 14 (L) 12/27/2017    TROPONINI <0 02 03/26/2018    TROPONINI <0 02 12/27/2017    TROPONINI <0 02 12/11/2017       Lab Results   Component Value Date    GLUCOSE 132 07/16/2018    CALCIUM 8 5 07/16/2018     07/16/2018    K 3 8 07/16/2018    CO2 31 07/16/2018     07/16/2018    BUN 3 (L) 07/16/2018    CREATININE 0 39 (L) 07/16/2018       Lab Results   Component Value Date    WBC 8 35 07/15/2018    HGB 8 7 (L) 07/15/2018    HCT 26 9 (L) 07/15/2018    MCV 97 07/15/2018     07/15/2018       Lab Results   Component Value Date    CHOL 167 06/25/2016    CHOL 226 12/14/2014     Lab Results   Component Value Date    HDL 58 06/25/2016    HDL 69 12/14/2014     Lab Results   Component Value Date    LDLCALC 88 06/25/2016    LDLCALC 145 (H) 12/14/2014     Lab Results   Component Value Date    TRIG 107 06/25/2016    TRIG 61 12/14/2014       Lab Results   Component Value Date    ALT 15 07/14/2018    AST 22 07/14/2018               Imaging: I have personally reviewed pertinent reports

## 2018-07-16 NOTE — OCCUPATIONAL THERAPY NOTE
[address] Evaluation     Patient Name: Santos Laguerre  PEOLB'Q Date: 7/16/2018  Problem List  Patient Active Problem List   Diagnosis    Diverticulitis of large intestine with perforation without abscess or bleeding    Esophageal reflux    Hyperlipidemia    Hypertension    Hypokalemia    Hypoalbuminemia    Low serum prealbumin    Severe protein-calorie malnutrition (HCC)    Microscopic hematuria    Acute blood loss anemia    Diverticulitis of colon    Hematuria    [facility] [address] [facility]    Paroxysmal atrial fibrillation (HCC)    Ileus ([facility] [address] )    Ache in joint    Acute pain    Anemia    Chronic constipation    Diverticulosis    Erysipelas    Fatigue    Leg pain, left    Non-rheumatic mitral regurgitation    Primary osteoarthritis of left knee    Systolic murmur    Thyroid disorder    Vaginal dryness, menopausal    Vitamin D deficiency    Vulvitis    Colostomy care (HCC)    Dehydration    Intractable vomiting with nausea    Gastroesophageal reflux disease without esophagitis    Bilious vomiting with nausea    Hair loss    Adenomatous polyps    Screening for colon cancer    Hair abnormality    Colostomy in place [facility])     Past Medical History  Past Medical History:   Diagnosis Date    Disease of thyroid gland     Endometrial polyp     Gastroesophageal reflux disease without esophagitis 12/4/2017    Hand injury     LAST ASSESSED: [id number]    High risk medication use     LAST ASSESSED: [id number]    Hyperlipidemia     Hypertension     Renal colic     LAST ASSESSED: [id number]    Viral syndrome     LAST ASSESSED: [id number]    Vitamin D deficiency disease      Past Surgical History  Past Surgical History:   Procedure Laterality Date    CHOLECYSTECTOMY      COLON SURGERY      COLONOSCOPY N/A 6/15/2018    Procedure: COLONOSCOPY;  Surgeon: Evi Patiño DO;  [facility];  Service: Gastroenterology    COLOSTOMY      ENDOMETRIAL ABLATION  2007 THERMAL    ESOPHAGOGASTRODUODENOSCOPY N/A 4/6/2018    Procedure: ESOPHAGOGASTRODUODENOSCOPY (EGD); Surgeon: Mono Torres MD;  [facility];  Service: Gastroenterology   [address] [place] [address], 2002, 2007    LAPAROTOMY N/A 1/4/2018    Procedure: LAPAROTOMY EXPLORATORY, 2nd look, small bowel resection, ilieostomy closure via handsown anastamosis transverse colostomy;  Surgeon: Arian Sethi DO;  Location:  MAIN OR;  Service: General    WV CLOSE ENTEROSTOMY N/A 7/11/2018    Procedure: COLOSTOMY TAKEDOWN / Han Seton, ILEOCECECTOMY, LASER FLUORESCENCE ANGIOGRAPHY, LYSIS OF ADHESIONS;  Surgeon: Sonido Castro DO;  Location:  MAIN OR;  Service: General    WV ESOPHAGOGASTRODUODENOSCOPY TRANSORAL DIAGNOSTIC N/A 10/21/2016    Procedure: EGD AND COLONOSCOPY;  Surgeon: Valencia Russell MD;  [facility];  Service: Gastroenterology    WV PART REMOVAL COLON W ANASTOMOSIS N/A 1/3/2018    Procedure: Exploratory laparotomy, RESECTION COLON SIGMOID, possible ostomy;  Surgeon: Sonido Castro DO;  [facility];  Service: General    THYROIDECTOMY      NEAR-TOTAL    THYROIDECTOMY, PARTIAL      TONSILLECTOMY AND ADENOIDECTOMY      TUBAL LIGATION  1997    URETERAL STENT PLACEMENT Bilateral 1/3/2018    Procedure: INSERTION STENT URETERAL;  Surgeon: Ryan Graham MD;  [facility];  Service: Urology      07/16/18 7215   Note Type   Note type Eval/Treat   Restrictions/Precautions   Weight Bearing Precautions Per Order No   Other Precautions Multiple lines;Telemetry;O2;Fall Risk;Pain, lifting restriction   Pain Assessment   Pain Score 5   Pain Type Surgical pain   Pain Location Abdomen   Pain Descriptors Aching   Pain Frequency Constant/continuous   Hospital Pain Intervention(s) Repositioned; Ambulation/increased activity; Distraction; Emotional support   Home Living   Type of [address] [place] [address] Two level;1/2 bath on main level;Bed/bath upstairs   Bathroom Shower/Tub Tub/shower unit Bathroom Toilet Standard   Bathroom Equipment Commode   Bathroom Accessibility Accessible   Additional Comments uses BSC as shower chair (fits in tub)    Prior Function   Level of [place] Independent with ADLs and functional mobility   Lives With Spouse  (works days, will be home alone, has support prn )   Receives Help From Family;Friend(s)   ADL Assistance Independent   IADLs Independent   Falls in the last 6 months 0   Vocational Full time employment  ([employer])   Lifestyle   Autonomy I ADL, IADL, drives, employed, active   Psychosocial   Psychosocial (WDL) WDL   Subjective   Subjective complains of fatigue, poor sleeping, abd pain/discomfort   ADL   Where Assessed Edge of bed   Eating Assistance 5  Supervision/Setup   Grooming Assistance 5  Supervision/Setup   Grooming Deficit Wash/dry hands  (stand at sink)   UB Bathing Assistance 5  Supervision/Setup   LB Bathing Assistance 3  Moderate Assistance   LB Bathing Deficit Right lower leg including foot; Left lower leg including foot; Buttocks   UB Dressing Assistance 5  Supervision/Setup   UB Dressing Deficit (pull over)   LB Dressing Assistance 3  Moderate Assistance   LB Dressing Deficit Don/doff R sock; Don/doff L sock; Don/doff R shoe;Don/doff L shoe   Toileting Assistance  4  Minimal Assistance   Toileting Deficit Perineal hygiene  (rear)   Additional Comments limited tolerance    Bed Mobility   Rolling R 4  Minimal assistance   Additional items Bedrails   Supine to Sit 4  Minimal assistance   Additional items Bedrails   Sit to Supine 4  Minimal assistance   Additional items Bedrails   Additional Comments side<>sit for pain management abdomen   Transfers   Sit to Stand 5  Supervision   Stand to Sit 5  Supervision   Stand pivot 5  Supervision   Toilet transfer 5  Supervision   Additional items Standard toilet  (GB use)   Balance   Static Sitting Good   Dynamic Sitting Fair   Static Standing Fair   Activity Tolerance   Activity Tolerance Patient limited by fatigue;Patient limited by pain;Treatment limited secondary to medical complications (Comment)  (continuous pulse ox: 94%, HR 99 with bathroom actvity)   Medical Staff Made Aware tech arrival for echo   Nurse Made Aware RN consents   RUE Assessment   RUE Assessment WFL   LUE Assessment   LUE Assessment WFL   Hand Function   Gross Motor Coordination Functional   Fine Motor Coordination Functional   Sensation   Light Touch No apparent deficits   Vision-Basic Assessment   Current Vision Wears glasses only for reading   Cognition   Overall Cognitive Status WFL   Arousal/Participation Responsive; Cooperative   Attention Within functional limits   Orientation Level Oriented X4   Memory Within functional limits   Following Commands Follows all commands and directions without difficulty   Assessment   Limitation Decreased ADL status; Decreased endurance;Decreased self-care trans;Decreased high-level ADLs   Assessment Pt is a 46 y o  female seen for OT evaluation s/p admit to [facility] on 7/11/2018 w/ Diverticulitis of large intestine with perforation without abscess or bleeding  S/p colostomy takedown/ burgess reversal on 7/11  Currently requires O2 supplement, monitoring HR (pt reports increase HR yesterday limited OOB, activity)  Comorbidities affecting pt's functional performance at time of assessment include: diverticulitits with complications, ileostomy, HTN, PAF, OA L knee, thyroid d/o  Personal factors affecting pt at time of IE include:steps to enter environment, difficulty performing ADLS, difficulty performing IADLS  and health management   Prior to admission, pt was I ADL, IADL, active, drives, employed  Lives with spouse, family and friends supportive  Upon evaluation: Pt requires min-mod a ADL, S functional mobility 2* the following deficits impacting occupational performance: general weakness, decreased tolerance and increased pain, fatigue   Pt to benefit from continued skilled OT tx while in the hospital to address deficits as defined above and maximize level of functional independence w ADL's and functional mobility  Occupational Performance areas to address include: bathing/shower, toilet hygiene, dressing, health maintenance, functional mobility, light household maintenance for when home alone, job performance/volunteering and social participation, equip assessment and education, education modified tech, ECT as needed to reach max ADL potential  From OT standpoint, recommendation at time of d/c would be home with family support  Will follow 3-5x/wk x 1 week for OT goals    Goals   Patient Goals feel better for home    LTG Time Frame 7-10   Long Term Goal #1 see below   Plan   Treatment Interventions ADL retraining;Functional transfer training; Endurance training;Patient/family training;Equipment evaluation/education; Compensatory technique education; Energy conservation   Goal Expiration Date 07/23/18   OT Frequency 3-5x/wk   Recommendation   OT Discharge Recommendation Home with family support   Equipment Recommended (TBD as pt progresses)   OT - OK to Discharge Yes  (when medically clear)   Barthel Index   Feeding 10   Bathing 0   Grooming Score 5   Dressing Score 5   Bladder Score 10   Bowels Score 10   Toilet Use Score 5   Transfers (Bed/Chair) Score 10   Mobility (Level Surface) Score 0   Stairs Score 0   Barthel Index Score 55   Modified [place] Scale   Modified [place] Scale 4     OT Goals/pt goals    - Pt will demonstrate Good safety awareness, precautions for usual ADL routine  - Pt will be MI for ADL tasks with use of AD, AE as needed  - Pt will be MI for ADL transfers with use of appropriate AD  - Pt will demonstrate  Fair + unsupported stand for ADL tasks x 10 min with stable vitals  - Pt will complete 30 min ADL with good tolerance, no s/s fatigue  - Pt will be I for bedmobility  - Pt will be S for light kitchen activity/ functional mobility for ADL with AD, AE prn   - Pt will be I ECT tech for completion ADL routine prn     Jaci Henson, MOTR/L

## 2018-07-16 NOTE — PROGRESS NOTES
Progress Note - General Surgery   Luis Armando Teixeira 46 y o  female MRN: [id number]  Unit/Bed#: [facility] 816-01 Encounter: [id number]    Assessment:  45 y/o F 5 Days Post-Op s/p Pj's reversal, ileocecectomy, and lysis of adhesions    Plan:  - -Diet Clear Liquid  -- Afib New Onset: Cardiology and Amiodarone  --f/u echo  -- IVFs  -- PCA for pain control  -- OOB, encourage ambulation  -- DVT ppx: Heparin  -- GI ppx: Protonix    Subjective/Objective     Subjective:   No acute events overnight  Patient states her pain is well controlled this AM  Denies palpitations, chest pain, shortness of breath  Patient endorses some nausea this AM, denies vomiting  +BM/flatus  Denies f/c     Objective:     Blood pressure 134/82, pulse 94, temperature 98 5 °F (36 9 °C), temperature source Oral, resp  rate 14, height 5' 2" (1 575 m), weight 83 5 kg (184 lb), SpO2 100 %  ,Body mass index is 33 65 kg/m²  I/O       07/14 0701 - 07/15 0700 07/15 0701 - 07/16 0700    P  O  320 240    I V  (mL/kg) 2037 (24 4) 943 7 (11 3)    IV Piggyback  790    Total Intake(mL/kg) 2357 (28 2) 1973 7 (23 6)    Urine (mL/kg/hr) 1125 (0 6) 3000 (1 5)    Stool 0 1    Total Output 1125 3001    Net +1232 -1027 4          Unmeasured Urine Occurrence 1 x     Unmeasured Stool Occurrence 0 x 1 x            Invasive Devices     Peripheral Intravenous Line            Peripheral IV 07/15/18 Right Forearm less than 1 day                Physical Exam:   GEN: NAD  HEENT: MMM  CV: RRR  Lung: normal effort  Ab: Soft, + incisional tenderness, abdominal wound dressings c/d/i  Extrem: No CCE  Neuro: AAOx3, motor and sensation grossly intact      Lab, Imaging and other studies:CBC:  Lab Results   Component Value Date    GLUCOSE 127 07/15/2018    CALCIUM 8 1 (L) 07/15/2018     07/15/2018    K 3 5 07/15/2018    CO2 30 07/15/2018     07/15/2018    BUN 3 (L) 07/15/2018    CREATININE 0 45 (L) 07/15/2018     Lab Results   Component Value Date    WBC 8 35 07/15/2018    HGB 8 7 (L) 07/15/2018    HCT 26 9 (L) 07/15/2018    MCV 97 07/15/2018     07/15/2018       VTE Pharmacologic Prophylaxis: Heparin  VTE Mechanical Prophylaxis: sequential compression device

## 2018-07-17 LAB
ANION GAP SERPL CALCULATED.3IONS-SCNC: 4 MMOL/L (ref 4–13)
BASOPHILS # BLD AUTO: 0.01 THOUSANDS/ΜL (ref 0–0.1)
BASOPHILS NFR BLD AUTO: 0 % (ref 0–1)
BUN SERPL-MCNC: 6 MG/DL (ref 5–25)
CALCIUM SERPL-MCNC: 8.3 MG/DL (ref 8.3–10.1)
CHLORIDE SERPL-SCNC: 104 MMOL/L (ref 100–108)
CO2 SERPL-SCNC: 32 MMOL/L (ref 21–32)
CREAT SERPL-MCNC: 0.38 MG/DL (ref 0.6–1.3)
EOSINOPHIL # BLD AUTO: 0.21 THOUSAND/ΜL (ref 0–0.61)
EOSINOPHIL NFR BLD AUTO: 3 % (ref 0–6)
ERYTHROCYTE [DISTWIDTH] IN BLOOD BY AUTOMATED COUNT: 12.9 % (ref 11.6–15.1)
GFR SERPL CREATININE-BSD FRML MDRD: 120 ML/MIN/1.73SQ M
GLUCOSE SERPL-MCNC: 81 MG/DL (ref 65–140)
HCT VFR BLD AUTO: 27.6 % (ref 34.8–46.1)
HGB BLD-MCNC: 8.8 G/DL (ref 11.5–15.4)
IMM GRANULOCYTES # BLD AUTO: 0.04 THOUSAND/UL (ref 0–0.2)
IMM GRANULOCYTES NFR BLD AUTO: 1 % (ref 0–2)
LYMPHOCYTES # BLD AUTO: 1.19 THOUSANDS/ΜL (ref 0.6–4.47)
LYMPHOCYTES NFR BLD AUTO: 17 % (ref 14–44)
MAGNESIUM SERPL-MCNC: 1.7 MG/DL (ref 1.6–2.6)
MCH RBC QN AUTO: 31.2 PG (ref 26.8–34.3)
MCHC RBC AUTO-ENTMCNC: 31.9 G/DL (ref 31.4–37.4)
MCV RBC AUTO: 98 FL (ref 82–98)
MONOCYTES # BLD AUTO: 0.58 THOUSAND/ΜL (ref 0.17–1.22)
MONOCYTES NFR BLD AUTO: 8 % (ref 4–12)
NEUTROPHILS # BLD AUTO: 4.98 THOUSANDS/ΜL (ref 1.85–7.62)
NEUTS SEG NFR BLD AUTO: 71 % (ref 43–75)
NRBC BLD AUTO-RTO: 0 /100 WBCS
PLATELET # BLD AUTO: 219 THOUSANDS/UL (ref 149–390)
PMV BLD AUTO: 9.7 FL (ref 8.9–12.7)
POTASSIUM SERPL-SCNC: 3.6 MMOL/L (ref 3.5–5.3)
RBC # BLD AUTO: 2.82 MILLION/UL (ref 3.81–5.12)
SODIUM SERPL-SCNC: 140 MMOL/L (ref 136–145)
WBC # BLD AUTO: 7.01 THOUSAND/UL (ref 4.31–10.16)

## 2018-07-17 PROCEDURE — 99024 POSTOP FOLLOW-UP VISIT: CPT | Performed by: SURGERY

## 2018-07-17 PROCEDURE — 85025 COMPLETE CBC W/AUTO DIFF WBC: CPT | Performed by: SURGERY

## 2018-07-17 PROCEDURE — 83735 ASSAY OF MAGNESIUM: CPT | Performed by: SURGERY

## 2018-07-17 PROCEDURE — 97163 PT EVAL HIGH COMPLEX 45 MIN: CPT

## 2018-07-17 PROCEDURE — 80048 BASIC METABOLIC PNL TOTAL CA: CPT | Performed by: SURGERY

## 2018-07-17 PROCEDURE — 99231 SBSQ HOSP IP/OBS SF/LOW 25: CPT | Performed by: INTERNAL MEDICINE

## 2018-07-17 PROCEDURE — C9113 INJ PANTOPRAZOLE SODIUM, VIA: HCPCS | Performed by: SURGERY

## 2018-07-17 PROCEDURE — G8978 MOBILITY CURRENT STATUS: HCPCS

## 2018-07-17 PROCEDURE — G8979 MOBILITY GOAL STATUS: HCPCS

## 2018-07-17 PROCEDURE — 94762 N-INVAS EAR/PLS OXIMTRY CONT: CPT

## 2018-07-17 RX ORDER — LORAZEPAM 1 MG/1
1 TABLET ORAL EVERY 8 HOURS PRN
Status: DISCONTINUED | OUTPATIENT
Start: 2018-07-17 | End: 2018-07-18 | Stop reason: HOSPADM

## 2018-07-17 RX ORDER — MAGNESIUM SULFATE HEPTAHYDRATE 40 MG/ML
2 INJECTION, SOLUTION INTRAVENOUS ONCE
Status: COMPLETED | OUTPATIENT
Start: 2018-07-17 | End: 2018-07-17

## 2018-07-17 RX ORDER — POTASSIUM CHLORIDE 20 MEQ/1
40 TABLET, EXTENDED RELEASE ORAL ONCE
Status: COMPLETED | OUTPATIENT
Start: 2018-07-17 | End: 2018-07-17

## 2018-07-17 RX ADMIN — HEPARIN SODIUM 5000 UNITS: 5000 INJECTION, SOLUTION INTRAVENOUS; SUBCUTANEOUS at 05:35

## 2018-07-17 RX ADMIN — POTASSIUM CHLORIDE 40 MEQ: 1500 TABLET, EXTENDED RELEASE ORAL at 16:13

## 2018-07-17 RX ADMIN — ACETAMINOPHEN 650 MG: 325 TABLET, FILM COATED ORAL at 14:23

## 2018-07-17 RX ADMIN — OXYCODONE HYDROCHLORIDE 10 MG: 10 TABLET ORAL at 20:06

## 2018-07-17 RX ADMIN — OXYCODONE HYDROCHLORIDE 10 MG: 10 TABLET ORAL at 08:20

## 2018-07-17 RX ADMIN — OXYCODONE HYDROCHLORIDE 10 MG: 10 TABLET ORAL at 03:12

## 2018-07-17 RX ADMIN — MAGNESIUM SULFATE HEPTAHYDRATE 2 G: 40 INJECTION, SOLUTION INTRAVENOUS at 16:14

## 2018-07-17 RX ADMIN — PANTOPRAZOLE SODIUM 40 MG: 40 INJECTION, POWDER, FOR SOLUTION INTRAVENOUS at 08:20

## 2018-07-17 RX ADMIN — APIXABAN 5 MG: 5 TABLET, FILM COATED ORAL at 12:17

## 2018-07-17 RX ADMIN — APIXABAN 5 MG: 5 TABLET, FILM COATED ORAL at 20:06

## 2018-07-17 RX ADMIN — PANTOPRAZOLE SODIUM 40 MG: 40 INJECTION, POWDER, FOR SOLUTION INTRAVENOUS at 20:06

## 2018-07-17 RX ADMIN — METOPROLOL TARTRATE 12.5 MG: 25 TABLET ORAL at 20:12

## 2018-07-17 RX ADMIN — OXYCODONE HYDROCHLORIDE 10 MG: 10 TABLET ORAL at 12:17

## 2018-07-17 RX ADMIN — LORAZEPAM 1 MG: 1 TABLET ORAL at 23:29

## 2018-07-17 NOTE — PLAN OF CARE
Problem: PHYSICAL THERAPY ADULT  Goal: Performs mobility at highest level of function for planned discharge setting  See evaluation for individualized goals  Treatment/Interventions: Functional transfer training, LE strengthening/ROM, Elevations, Therapeutic exercise, Endurance training, Bed mobility, Gait training, Spoke to nursing  Equipment Recommended: Alejandra Johnson       See flowsheet documentation for full assessment, interventions and recommendations  Prognosis: Good  Problem List: Decreased strength, Decreased endurance, Decreased mobility, Pain  Assessment: Pt is 46 y o  female seen for PT evaluation s/p admit to [facility] on 7/11/2018 w/ Diverticulitis of large intestine with perforation without abscess or bleeding followed by surgical intervention on 7/14/2018: colostomy takedown/reverse burgess, ileocecectomy, laser fluorescence angiography, lysis of adhesions   PT consulted to assess pt's functional mobility and d/c needs  Order placed for PT eval and tx, w/ up and OOB as tolerated and ambulate patient order  Comorbidities affecting pt's physical performance at time of assessment include: Colostomy care, dehydration, intractable vomiting with nausea, gastroesophageal reflux disease without esophagitis, bilious vomiting with nausea, adenomatous polyps  PTA, pt was has 25 DEANN, lives w/  in 2 level house and works full time  Personal factors affecting pt at time of IE include: inaccessible home environment, ambulating w/ assistive device, stairs to enter home, inability to navigate community distances and inability to perform current job functions  Please find objective findings from PT assessment regarding body systems outlined above with impairments and limitations including weakness, decreased endurance, gait deviations, pain, decreased activity tolerance, decreased functional mobility tolerance and orthopedic restrictions   The following objective measures performed on IE also reveal limitations: Barthel Index: 55/100  Pt reported decreased endurance and was not able to tolerate amb the full floor, which she had done previously in the day with nursing  She tolerated 40' on room air and maintained >/=97% SpO2  Pt's clinical presentation is currently unstable/unpredictable seen in pt's presentation of abnormal labs, oxygen use, peripheral IV, continuous pulse oximetry  Pt to benefit from continued PT tx to address deficits as defined above and maximize level of functional independent mobility and consistency  From PT/mobility standpoint, recommendation at time of d/c would be OP PT pending progress and demonstration of safety with stair negotiation in order to facilitate return to PLOF  Barriers to Discharge: Inaccessible home environment (able to live on first floor, but 22 DEANN)     Recommendation: Home with family support, Outpatient PT (If progresses appropriately)     PT - OK to Discharge: Yes    See flowsheet documentation for full assessment

## 2018-07-17 NOTE — OCCUPATIONAL THERAPY NOTE
OCCUPATIONAL THERAPY CANCELLATION NOTE:  Patient presents supine in bed  "just went to the bathroom and waiting for pain medication  I just finished a walk also  I walked a few times today"  Will continue to follow on caseload     SHANNAN Odom

## 2018-07-17 NOTE — PROGRESS NOTES
Cardiology Progress Note - Ankit Murray 46 y o  female MRN: [id number]    Unit/Bed#: [facility] 816-01 Encounter: [id number]      Assessment:  Principal Problem:    Diverticulitis of large intestine with perforation without abscess or bleeding    postoperative paroxysmal AFib    Plan:    Anticoagulation with Eliquis 5 mg b i d  when okay with surgery  Patient wishes to follow up with Dr Monica Gonzalez at [facility] upon discharge  Continue metoprolol  She remains in sinus rhythm  We will sign off, please call with questions    Subjective:   Patient seen and examined  No significant events overnight  Lethargic   No CP or palpitations    Meds/Allergies   No Known Allergies    Current Facility-Administered Medications:     acetaminophen (TYLENOL) tablet 650 mg, 650 mg, Oral, Q6H PRN, Kamlesh Rakers, 650 mg at 07/15/18 1257    aluminum-magnesium hydroxide-simethicone (MYLANTA) 200-200-20 mg/5 mL oral suspension 30 mL, 30 mL, Oral, Q4H PRN, Madonna Martinez MD    [facility]) tablet 5 mg, 5 mg, Oral, BID, Christopher Fabian MD, 5 mg at 07/17/18 1217    calcium carbonate (TUMS) chewable tablet 500 mg, 500 mg, Oral, TID PRN, Christopher Fabian MD, 500 mg at 07/15/18 1451    diphenhydrAMINE (BENADRYL) injection 25 mg, 25 mg, Intravenous, Q6H PRN, Kamlesh Keller    LORazepam (ATIVAN) 2 mg/mL injection 1 mg, 1 mg, Intravenous, Q6H PRN, Kamlesh Rakers, 1 mg at 07/16/18 1617    metoprolol tartrate (LOPRESSOR) partial tablet 12 5 mg, 12 5 mg, Oral, Q12H [facility], Charlotte Garcia MD, 12 5 mg at 07/16/18 2141    ondansetron (ZOFRAN) injection 4 mg, 4 mg, Intravenous, Q6H PRN, Kamlesh Rakers, 4 mg at 07/16/18 0308    oxyCODONE (ROXICODONE) immediate release tablet 10 mg, 10 mg, Oral, Q4H PRN, Kamlesh Rakers, 10 mg at 07/17/18 1217    oxyCODONE (ROXICODONE) IR tablet 5 mg, 5 mg, Oral, Q4H PRN, Kamlesh Keller    pantoprazole (PROTONIX) injection 40 mg, 40 mg, Intravenous, Q12H [facility], Madonna Matrinez MD, 40 mg at 07/17/18 0820    Objective   Vitals: Blood pressure 110/57, pulse 90, temperature 98 4 °F (36 9 °C), temperature source Oral, resp  rate 18, height 5' 2" (1 575 m), weight 83 5 kg (184 lb), SpO2 100 %  , Body mass index is 33 65 kg/m² , Orthostatic Blood Pressures      Most Recent Value   Blood Pressure  110/57 filed at 07/17/2018 1058   Patient Position - Orthostatic VS  Sitting filed at 07/17/2018 2245        Systolic (92USB), UNF:624 , Min:97 , KKM:233     Diastolic (25OLA), CGU:92, Min:57, Max:84      Intake/Output Summary (Last 24 hours) at 07/17/18 1225  Last data filed at 07/17/18 0845   Gross per 24 hour   Intake              520 ml   Output              850 ml   Net             -330 ml     Weight (last 2 days)     None          No significant arrhythmias seen on telemetry review   In NSR    Physical Exam:    GEN: Kj Reynoso appears well, alert and oriented x 3, pleasant and cooperative   HEART: normal rate, regular rhythm, normal S1 and S2, no murmurs, clicks, gallops or rubs   LUNGS: clear to auscultation bilaterally; no wheezes, rales, or rhonchi   ABDOMEN: normal bowel sounds, soft, no tenderness, no distention  EXTREMITIES: peripheral pulses normal; no clubbing, cyanosis, or edema      Laboratory Results:        CBC with diff:   Results from last 7 days  Lab Units 07/17/18  0447 07/15/18  0502 07/14/18  0513 07/13/18  0446 07/12/18  0532 07/11/18  1458   WBC Thousand/uL 7 01 8 35 11 56* 11 02* 10 71*  --    HEMOGLOBIN g/dL 8 8* 8 7* 8 7* 9 4* 10 2*  --    I STAT HEMOGLOBIN g/dl  --   --   --   --   --  10 2*   HEMATOCRIT % 27 6* 26 9* 27 2* 29 2* 30 7*  --    MCV fL 98 97 98 99* 96  --    PLATELETS Thousands/uL 219 203 172 183 202  --    MCH pg 31 2 31 3 31 4 31 8 31 9  --    MCHC g/dL 31 9 32 3 32 0 32 2 33 2  --    RDW % 12 9 12 7 12 8 12 9 12 5  --    MPV fL 9 7 10 7 10 9 10 5 10 2  --    NRBC AUTO /100 WBCs 0 0 0 0 0  --          CMP:  Results from last 7 days  Lab Units 07/17/18  0447 07/16/18  0549 07/15/18  0502 18  0513 18  0446 18  0532 18  1458   SODIUM mmol/L 140 142 140 138 141 143  --    POTASSIUM mmol/L 3 6 3 8 3 5 4 2 3 8 3 7  --    CHLORIDE mmol/L 104 107 107 105 112* 110*  --    CO2 mmol/L 32 31 30 31 26 26  --    ANION GAP mmol/L 4 4 3* 2* 3* 7  --    BUN mg/dL 6 3* 3* 3* 5 7  --    CREATININE mg/dL 0 38* 0 39* 0 45* 0 48* 0 48* 0 58*  --    GLUCOSE RANDOM mg/dL 81 132 127 152* 108 144*  --    GLUCOSE, ISTAT mg/dl  --   --   --   --   --   --  162*   CALCIUM mg/dL 8 3 8 5 8 1* 8 1* 8 1* 8 0*  --    AST U/L  --   --   --  22 21  --   --    ALT U/L  --   --   --  15 15  --   --    ALK PHOS U/L  --   --   --  54 49  --   --    TOTAL PROTEIN g/dL  --   --   --  5 8* 5 4*  --   --    BILIRUBIN TOTAL mg/dL  --   --   --  1 58* 2 19*  --   --    EGFR ml/min/1 73sq m 120 119 114 112 112 105  --          BMP:  Results from last 7 days  Lab Units 18  0447 18  0549 07/15/18  0502 18  0513 18  0446 18  0532   SODIUM mmol/L 140 142 140 138 141 143   POTASSIUM mmol/L 3 6 3 8 3 5 4 2 3 8 3 7   CHLORIDE mmol/L 104 107 107 105 112* 110*   CO2 mmol/L 32 31 30 31 26 26   BUN mg/dL 6 3* 3* 3* 5 7   CREATININE mg/dL 0 38* 0 39* 0 45* 0 48* 0 48* 0 58*   GLUCOSE RANDOM mg/dL 81 132 127 152* 108 144*   CALCIUM mg/dL 8 3 8 5 8 1* 8 1* 8 1* 8 0*       BNP:  Invalid input(s): NTPROBNP    Magnesium:   Results from last 7 days  Lab Units 18  0447 18  0549 18  0446 18  0532   MAGNESIUM mg/dL 1 7 1 9 2 2 1 7       Cardiac testing:   Results for orders placed during the hospital encounter of 18   Echo complete with contrast if indicated    Ernesto Emmanuel [address]  [phone]    Transthoracic Echocardiogram  2D, M-mode, Doppler, and Color Doppler    Study date:  2018    Patient: Aida Stahl  MR number: [id number]  Account number: [de-identified]  : 1965  Age: 46 years  Gender: Female  Status: Inpatient  Location: Bedside  Height: 62 in  Weight: 183 7 lb  BP: 122/ 77 mmHg    Indications: Atrial fibrillation    Diagnoses: I48 0 - Atrial fibrillation    Sonographer:  PATRICIA Nesbitt  Primary Physician:  Emmy Avila DO  Referring Physician:  Miya Unger DO  Group:  [facility]  Cardiology Fellow:  Thang Sánchez MD  Interpreting Physician:  Bruce Helms MD    SUMMARY    LEFT VENTRICLE:  Systolic function was normal  Ejection fraction was estimated to be 65 %  There were no regional wall motion abnormalities  Doppler parameters were consistent with abnormal left ventricular relaxation (grade 1 diastolic dysfunction)  RIGHT VENTRICLE:  The size was normal   Systolic function was normal     TRICUSPID VALVE:  There was mild regurgitation  HISTORY: PRIOR HISTORY: Former smoker, hyperlipidemia, hypertension, anemia, atrial fibrillation, fatigue, mitral regurgitation, systolic murmur, GERD    PROCEDURE: The procedure was performed at the bedside  This was a routine study  The transthoracic approach was used  The study included complete 2D imaging, M-mode, complete spectral Doppler, and color Doppler  The heart rate was 84 bpm,  at the start of the study  Echocardiographic views were limited due to restricted patient mobility, surgical dressings, poor acoustic window availability, decreased penetration, and lung interference  This was a technically difficult study  LEFT VENTRICLE: Size was normal  Systolic function was normal  Ejection fraction was estimated to be 65 %  There were no regional wall motion abnormalities  Wall thickness was normal  There was no evidence of concentric hypertrophy  DOPPLER: Doppler parameters were consistent with abnormal left ventricular relaxation (grade 1 diastolic dysfunction)      RIGHT VENTRICLE: The size was normal  Systolic function was normal     LEFT ATRIUM: Size was normal     RIGHT ATRIUM: Size was normal     MITRAL VALVE: There was mild annular calcification  Valve structure was normal  There was normal leaflet separation  DOPPLER: The transmitral velocity was within the normal range  There was no evidence for stenosis  There was trace  regurgitation  AORTIC VALVE: The valve was trileaflet  Leaflets exhibited normal thickness and normal cuspal separation  DOPPLER: Transaortic velocity was within the normal range  There was no evidence for stenosis  There was no regurgitation  TRICUSPID VALVE: DOPPLER: The transtricuspid velocity was within the normal range  There was no evidence for stenosis  There was mild regurgitation  The tricuspid jet envelope definition was inadequate for estimation of RV systolic  pressure  PULMONIC VALVE: DOPPLER: The transpulmonic velocity was within the normal range  There was no evidence for stenosis  There was trace regurgitation  PERICARDIUM: There was no pericardial effusion  The pericardium was normal in appearance  AORTA: The root exhibited normal size  SYSTEM MEASUREMENT TABLES    2D  %FS: 33 3 %  Ao Diam: 2 33 cm  EDV(Teich): 80 51 ml  EF(Cube): 70 32 %  EF(Teich): 62 3 %  ESV(Cube): 22 68 ml  ESV(Teich): 30 35 ml  IVSd: 0 7 cm  LA Area: 14 92 cm2  LA Diam: 2 93 cm  LVEDV MOD A4C: 65 48 ml  LVEF MOD A4C: 63 9 %  LVESV MOD A4C: 23 64 ml  LVIDd: 4 24 cm  LVIDs: 2 83 cm  LVLd A4C: 8 74 cm  LVLs A4C: 6 86 cm  LVPWd: 0 84 cm  RA Area: 14 5 cm2  SI(Cube): 29 04 ml/m2  SI(Teich): 27 11 ml/m2  SV MOD A4C: 41 84 ml  SV(Cube): 53 73 ml  SV(Teich): 50 16 ml  rv diam: 3 52 cm    CW  AV Env  Ti: 243 99 ms  AV VTI: 29 67 cm  AV Vmax: 1 9 m/s  AV Vmean: 1 22 m/s  AV maxP 45 mmHg  AV meanP 79 mmHg    MM  TAPSE: 2 35 cm    PW  E': 0 07 m/s  E/E': 11 32  MV A Portillo: 0 89 m/s  MV Dec [place]: 3 04 m/s2  MV DecT: 248 89 ms  MV E Portillo: 0 76 m/s  MV E/A Ratio: 0 85    [employer] Accredited Echocardiography Laboratory    Prepared and electronically signed by    Gama Sewell MD  Signed 16-Jul-2018 12:24:38           Javier Adams MD    Portions of the record may have been created with voice recognition software   Occasional wrong word or "sound a like" substitutions may have occurred due to the inherent limitations of voice recognition software   Read the chart carefully and recognize, using context, where substitutions have occurred

## 2018-07-17 NOTE — PHYSICAL THERAPY NOTE
Physical Therapy Evaluation     Patient's Name: Mary Winter    Admitting Diagnosis  Diverticulitis of large intestine with perforation without abscess or bleeding [K57 20]    Problem List  Patient Active Problem List   Diagnosis    Diverticulitis of large intestine with perforation without abscess or bleeding    Esophageal reflux    Hyperlipidemia    Hypertension    Hypokalemia    Hypoalbuminemia    Low serum prealbumin    Severe protein-calorie malnutrition (HCC)    Microscopic hematuria    Acute blood loss anemia    Diverticulitis of colon    Hematuria    [facility] in place ([facility] )    Paroxysmal atrial fibrillation (HCC)    [facility]us ([facility] )    Ache in joint    Acute pain    Anemia    Chronic constipation    Diverticulosis    Erysipelas    Fatigue    Leg pain, left    Non-rheumatic mitral regurgitation    Primary osteoarthritis of left knee    Systolic murmur    Thyroid disorder    Vaginal dryness, menopausal    Vitamin D deficiency    Vulvitis    Colostomy care ([facility] )    Dehydration    Intractable vomiting with nausea    Gastroesophageal reflux disease without esophagitis    Bilious vomiting with nausea    Hair loss    Adenomatous polyps    Screening for colon cancer    Hair abnormality    Col[facility] in place [facility])       Past Medical History  Past Medical History:   Diagnosis Date    Disease of thyroid gland     Endometrial polyp     Gastroesophageal reflux disease without esophagitis 12/4/2017    Hand injury     LAST ASSESSED: [id number]    High risk medication use     LAST ASSESSED: [id number]    Hyperlipidemia     Hypertension     Renal colic     LAST ASSESSED: [id number]    Viral syndrome     LAST ASSESSED: [id number]    Vitamin D deficiency disease        Past Surgical History  Past Surgical History:   Procedure Laterality Date    CHOLECYSTECTOMY      COLON SURGERY      COLONOSCOPY N/A 6/15/2018    Procedure: COLONOSCOPY;  Surgeon: [employer], DO;  Location: [facility];  Service: Gastroenterology    COLOSTOMY      ENDOMETRIAL ABLATION  2007    THERMAL    ESOPHAGOGASTRODUODENOSCOPY N/A 4/6/2018    Procedure: ESOPHAGOGASTRODUODENOSCOPY (EGD); Surgeon: Felipa Anaya MD;  Location: [facility];  Service: Gastroenterology   [address] [place] [address], 2002, 2007    LAPAROTOMY N/A 1/4/2018    Procedure: LAPAROTOMY EXPLORATORY, 2nd look, small bowel resection, ilieostomy closure via [place]own anastamosis transverse colostomy;  Surgeon: Adina Gomez DO;  Location:  MAIN OR;  Service: General    UT CLOSE ENTEROSTOMY N/A 7/11/2018    Procedure: COLOSTOMY TAKEDOWN / Whyte Kail, ILEOCECECTOMY, LASER FLUORESCENCE ANGIOGRAPHY, LYSIS OF ADHESIONS;  Surgeon: Sherri Harp DO;  Location:  MAIN OR;  Service: General    UT ESOPHAGOGASTRODUODENOSCOPY TRANSORAL DIAGNOSTIC N/A 10/21/2016    Procedure: EGD AND COLONOSCOPY;  Surgeon: Rey Tompkins MD;  [facility];  Service: Gastroenterology    UT PART REMOVAL COLON W ANASTOMOSIS N/A 1/3/2018    Procedure: Exploratory laparotomy, RESECTION COLON SIGMOID, possible ostomy;  Surgeon: Sherri Harp DO;  [facility];  Service: General    THYROIDECTOMY      NEAR-TOTAL    THYROIDECTOMY, PARTIAL      TONSILLECTOMY AND ADENOIDECTOMY      TUBAL LIGATION  1997    URETERAL STENT PLACEMENT Bilateral 1/3/2018    Procedure: INSERTION STENT URETERAL;  Surgeon: Cierra Cadena MD;  [facility]: [facility];  Service: Urology        07/17/18 1134   Note Type   Note type Eval only   Pain Assessment   Pain Assessment 0-10   Pain Score No Pain  ("Uncomfortable and achy, but not painful")   Pain Type Acute pain   Pain Location Abdomen   Home Living   Type of [facility] Bed/bath upstairs;1/2 bath on main level; Two level  (22 DEANN with railing, able to sleep on couch on 1st level)   Bathroom Shower/Tub Tub/shower unit   Bathroom Toilet Standard   Bathroom Equipment Commode   Bathroom Accessibility Accessible   Prior Function   Level of [place] Independent with ADLs and functional mobility   Lives With Spouse   Receives Help From Family;Friend(s)   ADL Assistance Independent   IADLs Independent   Falls in the last 6 months 0   Vocational Full time employment  (Works as a  in a grocery store)   Restrictions/Precautions   [place] Bearing Precautions Per Order No   Other Precautions O2;Telemetry;Multiple lines   Cognition   Overall Cognitive Status WFL   Arousal/Participation Responsive   Attention Within functional limits   Orientation Level Oriented X4   Memory Within functional limits   Following Commands Follows all commands and directions without difficulty   RUE Assessment   RUE Assessment WFL   LUE Assessment   LUE Assessment WFL   RLE Assessment   RLE Assessment X   Strength RLE   RLE Overall Strength 4/5   LLE Assessment   LLE Assessment X   Strength LLE   LLE Overall Strength 4-/5  (reported abdominal discomfort w/ MMT of hip)   Coordination   Movements are Fluid and Coordinated 1   Bed Mobility   Additional Comments Seated in bedside chair on arrival   Transfers   Sit to Stand 5  Supervision   Stand to Sit 5  Supervision   Stand pivot 5  Supervision   Ambulation/Elevation   Gait pattern Wide KATI; Foward flexed   Gait Assistance 5  Supervision   Additional items Verbal cues  (Requires vc for upright posture with amb)   Assistive Device Rolling walker   Distance 40'   Balance   Static Sitting Good   Dynamic Sitting Fair   Static Standing Fair   Dynamic Standing Fair   Ambulatory Fair   Endurance Deficit   Endurance Deficit Yes   Endurance Deficit Description reported being able to walk the full floor with nursing, but fatigued at 40' today  (>/=97% SpO2 on room air with activity)   Activity Tolerance   Activity Tolerance Patient limited by pain; Patient limited by fatigue   Assessment   Prognosis Good   Problem List Decreased strength;Decreased endurance;Decreased mobility;Pain   Assessment Pt is 46 y o  female seen for PT evaluation s/p admit to [facility] on 7/11/2018 w/ Diverticulitis of large intestine with perforation without abscess or bleeding followed by surgical intervention on 7/14/2018: colostomy takedown/reverse burgess, ileocecectomy, laser fluorescence angiography, lysis of adhesions   PT consulted to assess pt's functional mobility and d/c needs  Order placed for PT eval and tx, w/ up and OOB as tolerated and ambulate patient order  Comorbidities affecting pt's physical performance at time of assessment include: Colostomy care, dehydration, intractable vomiting with nausea, gastroesophageal reflux disease without esophagitis, bilious vomiting with nausea, adenomatous polyps  PTA, pt was has 25 DEANN, lives w/  in 2 level house and works full time  Personal factors affecting pt at time of IE include: inaccessible home environment, ambulating w/ assistive device, stairs to enter home, inability to navigate community distances and inability to perform current job functions  Please find objective findings from PT assessment regarding body systems outlined above with impairments and limitations including weakness, decreased endurance, gait deviations, pain, decreased activity tolerance, decreased functional mobility tolerance and orthopedic restrictions  The following objective measures performed on IE also reveal limitations: Barthel Index: 55/100  Pt reported decreased endurance and was not able to tolerate amb the full floor, which she had done previously in the day with nursing  She tolerated 40' on room air and maintained >/=97% SpO2  Pt's clinical presentation is currently unstable/unpredictable seen in pt's presentation of abnormal labs, oxygen use, peripheral IV, continuous pulse oximetry  Pt to benefit from continued PT tx to address deficits as defined above and maximize level of functional independent mobility and consistency   From PT/mobility standpoint, recommendation at time of d/c would be OP PT pending progress and demonstration of safety with stair negotiation in order to facilitate return to PLOF  Barriers to Discharge Inaccessible home environment  (able to live on first floor, but 22 DEANN)   Goals   Patient Goals "I want to go home when I leave  I refuse to go to rehab again "   [facility] Expiration Date 07/27/18   Short Term Goal #1 Pt will demonstrate: 1) safe up/down 22 steps with S and use of bilateral handrails in order to return home 2) amb >/=400' with rw and S for return to work 3) Increased standing tolerance >/=10 minutes for progression of return to work 4) dynamic balance increased to good for reduced risk for falls 5) increase BLE strength by one grade to improve endurance and decrease fatigue   Plan   Treatment/Interventions Functional transfer training;LE strengthening/ROM; Elevations; Therapeutic exercise; Endurance training;Bed mobility;Gait training;Spoke to nursing   PT Frequency (3-5x/wk)   Recommendation   Recommendation Home with family support; Outpatient PT  (If progresses appropriately)   Equipment Recommended Walker   PT - OK to Discharge Yes   Additional Comments When medically stable   Barthel Index   Feeding 10   Bathing 0   Grooming Score 5   Dressing Score 5   Bladder Score 10   Bowels Score 10   Toilet Use Score 5   Transfers (Bed/Chair) Score 10   Mobility (Level Surface) Score 0   Stairs Score 0   Barthel Index Score 55         Kristy Monae, PT, DPT

## 2018-07-17 NOTE — PROGRESS NOTES
Patient care rounds were completed with the patient's nurse today, Isidro Valdez  We discussed the plan is to continue oral diet as tolerated  Wean off of oxygen today as patient was encouraged to continue being active and using an incentive spirometer  Patient no longer require step-down status or telemetry that she is nearing discharge with an anticipated discharge date on Wednesday, 07/18/2018  We reviewed all of the invasive devices/lines/telemetry orders  - Telemetry no longer needed  Pain Assessment / Plan:  - Continue current analgesic regimen  Mobility Assessment / Plan:  - Activity as tolerated with continued PT and OT evaluation and treatment as indicated  Goals / Barriers for discharge:  - Anticipate discharge on 07/18/2018 the parotid she continue to tolerate oral diet and have adequate oral analgesia  Awaiting patient being successfully weaned off of oxygen  - Case management following; case and discharge needs discussed  All questions and concerns were addressed  The patient's , Dany Rice, was updated via the telephone during rounds  I spent greater than 20 minutes reviewing the plan with the patient and the nurse, and coordinating care for the day      Eli Hernandez PA-C  7/17/2018 12:15 PM

## 2018-07-17 NOTE — SOCIAL WORK
Cm received a script from MD , cm met with pt and agreeable for cm to [facility] price check    Cm awaiting price

## 2018-07-17 NOTE — PROGRESS NOTES
Progress Note - General Surgery   Natali Mass 46 y o  female MRN: [id number]  Unit/Bed#: [facility] 816-01 Encounter: [id number]    Assessment:  45 y/o F 5 Days Post-Op s/p Pj's reversal, ileocecectomy, and lysis of adhesions  -ECHO-EF 78%, Grade 1 diastolic dysfunction  Plan:  Regular diet   Cardiology-Eliquis and metoprolol for new onset afib  Protonix   IS/OOB/ambualte  PT/OT  SQH/SCDS      Subjective/Objective   Chief Complaint:     Subjective: ANATOLIY  No OOB yesterday except for bathroom  Did not eat much, has no appetite, no N/V  Having bowel movements  Objective:     Blood pressure 120/69, pulse 98, temperature 97 8 °F (36 6 °C), temperature source Oral, resp  rate 18, height 5' 2" (1 575 m), weight 83 5 kg (184 lb), SpO2 100 %  ,Body mass index is 33 65 kg/m²  Intake/Output Summary (Last 24 hours) at 07/17/18 0457  Last data filed at 07/17/18 3073   Gross per 24 hour   Intake              490 ml   Output             1850 ml   Net            -1360 ml       Invasive Devices     Peripheral Intravenous Line            Peripheral IV 07/15/18 Right Forearm 1 day                Physical Exam: NAD  AAOX3  Normal respiratory effort 2L NC  Soft, NT, ND  Incision c/d/i with staples, old colostomy site c/d/i with minimal drainage  No c/d/e  +2DP pulses     Lab, Imaging and other studies:  I have personally reviewed pertinent lab results    , CBC:   Lab Results   Component Value Date    WBC 7 01 07/17/2018    HGB 8 8 (L) 07/17/2018    HCT 27 6 (L) 07/17/2018    MCV 98 07/17/2018     07/17/2018    MCH 31 2 07/17/2018    MCHC 31 9 07/17/2018    RDW 12 9 07/17/2018    MPV 9 7 07/17/2018    NRBC 0 07/17/2018   , CMP:   Lab Results   Component Value Date     07/16/2018    K 3 8 07/16/2018     07/16/2018    CO2 31 07/16/2018    ANIONGAP 4 07/16/2018    BUN 3 (L) 07/16/2018    CREATININE 0 39 (L) 07/16/2018    GLUCOSE 132 07/16/2018    CALCIUM 8 5 07/16/2018    EGFR 119 07/16/2018     VTE Pharmacologic Prophylaxis: Heparin  VTE Mechanical Prophylaxis: sequential compression device

## 2018-07-17 NOTE — SOCIAL WORK
CM received call from pharmacy and 0 copay for eliquis   Cm notified pt and when medically clear [facility] will deliver to room

## 2018-07-18 VITALS
WEIGHT: 184 LBS | TEMPERATURE: 99 F | BODY MASS INDEX: 33.86 KG/M2 | SYSTOLIC BLOOD PRESSURE: 123 MMHG | HEIGHT: 62 IN | DIASTOLIC BLOOD PRESSURE: 70 MMHG | HEART RATE: 103 BPM | OXYGEN SATURATION: 96 % | RESPIRATION RATE: 18 BRPM

## 2018-07-18 PROCEDURE — 99024 POSTOP FOLLOW-UP VISIT: CPT | Performed by: SURGERY

## 2018-07-18 RX ORDER — OXYCODONE HYDROCHLORIDE 5 MG/1
5 TABLET ORAL EVERY 4 HOURS PRN
Qty: 20 TABLET | Refills: 0 | Status: SHIPPED | OUTPATIENT
Start: 2018-07-18 | End: 2018-07-28

## 2018-07-18 RX ORDER — PANTOPRAZOLE SODIUM 40 MG/1
40 TABLET, DELAYED RELEASE ORAL
Status: DISCONTINUED | OUTPATIENT
Start: 2018-07-18 | End: 2018-07-18 | Stop reason: HOSPADM

## 2018-07-18 RX ORDER — LORAZEPAM 0.5 MG/1
0.5 TABLET ORAL
Qty: 15 TABLET | Refills: 0 | Status: SHIPPED | OUTPATIENT
Start: 2018-07-18 | End: 2019-09-12

## 2018-07-18 RX ADMIN — PANTOPRAZOLE SODIUM 40 MG: 40 TABLET, DELAYED RELEASE ORAL at 08:09

## 2018-07-18 RX ADMIN — METOPROLOL TARTRATE 12.5 MG: 25 TABLET ORAL at 08:04

## 2018-07-18 RX ADMIN — APIXABAN 5 MG: 5 TABLET, FILM COATED ORAL at 08:04

## 2018-07-18 RX ADMIN — LORAZEPAM 1 MG: 1 TABLET ORAL at 08:36

## 2018-07-18 NOTE — DISCHARGE SUMMARY
Discharge Summary - General Surgery   Zoila Lawler 46 y o  female MRN: [id number]  [facility] Encounter: [id number]    Admission Date: 7/11/2018     Discharge Date: 7/18/18    Admitting Diagnosis: Diverticulitis of large intestine with perforation without abscess or bleeding [K57 20]    Discharge Diagnosis:  Same    Attending and Service: Dr [facility]  Consulting Physician(s): Cardiology    Imaging and Procedures Performed: No orders of the defined types were placed in this encounter  1  COLOSTOMY TAKEDOWN / REVERSE COATES, ILEOCECECTOMY, LASER FLUORESCENCE ANGIOGRAPHY, LYSIS OF ADHESIONS     Pathology:   A  Cecum and terminal ileum and portion of rectal stump:  - Ileum: focal acute ileitis with submucosal abscess at anastomotic site  - Cecum & appendix: benign, negative for acute inflammation   - Rectal stump: benign, histologically viable colorectal tissue   - Resection margins appear histologically viable with mild acute serositis at proximal margin   - Four regional lymph nodes are free (0/4) of metastatic neoplasia and contain occasional, nonspecific histiocytic aggregates, likely reactive to the local ileitis  - No epithelial dysplasia and no evidence of malignancy      B  Rectal stump, segmental excision:  - Perforated acute diverticulitis in a segment of benign colorectum:  - Both resection margins appear histologically viable and free of acute inflammation      C  Colostomy:  - Benign colocutaneous fistula involved by nonspecific chronic inflammation site  - Colorectal resection margin appears histologically viable and free of acute inflammation   - Two lymph nodes are negative (0/2) for metastatic neoplasia  Hospital Course: Zoila Lawler is a 46year old female who presented for colostomy reversal on 7/11/18  Postoperatively, patient's activity and diet were gradually advanced    On POD#4, patient went into atrial fibrillation, which was controlled with amiodarone  Cardiology was consulted  Patient was started on metoprolol and Eliquis was recommended  She was discharged on POD #7 on Eliquis with outpatient follow ups with Cardiology and Dr Katarzyna Rabago  On discharge, the patient is instructed to follow-up with the patient's primary care provider in the next one month to review the events of the recent hospitalization  The patient may resume a regular diet  The patient should avoid lifting greater than 20 pounds and any strenuous physical exercise or activity including gym, sports and recess for the next 4 weeks at least  The patient may shower daily, but should avoid tub baths or swimming for 2 weeks  The patient is instructed to call our office or return to the ER if develops a fever greater than 101, shaking chills, persistent nausea or vomiting, worsening or uncontrollable pain, and/or any increasing redness or purulent drainage from the incision/wound  Condition at Discharge: good     Discharge instructions/Information to patient and family:   See after visit summary for information provided to patient and family  Provisions for Follow-Up Care:  See after visit summary for information related to follow-up care and any pertinent home health orders  Disposition: Home    Planned Readmission: No    Discharge Statement   I spent 23 minutes discharging the patient  This time was spent on the day of discharge  I had direct contact with the patient on the day of discharge  Additional documentation is required if more than 30 minutes were spent on discharge  Discharge Medications:  See after visit summary for reconciled discharge medications provided to patient and family        Mile King PA-C  7/18/2018  10:59 AM

## 2018-07-18 NOTE — PROGRESS NOTES
Progress Note - General Surgery   Luis Armando Teixeira 46 y o  female MRN: [id number]  Unit/Bed#: [facility] 816-01 Encounter: [id number]    Assessment:  47 y/o F 5 Days Post-Op s/p Pj's reversal, ileocecectomy, and lysis of adhesions  -ECHO-EF 16%, Grade 1 diastolic dysfunction  Plan:  D/c home w/ outpatient PT  Regular diet   Cardiology-Eliquis and metoprolol for new onset afib    Protonix   IS/OOB/ambualte  PT/OT  SQH/SCDS      Subjective/Objective   Chief Complaint:     Subjective: No acute events overnight  Tolerating diet, pain well controlled  Denies N/V  Having bowel movements  Patient states she would like to be home  Objective:     Blood pressure 123/70, pulse 103, temperature 99 °F (37 2 °C), temperature source Oral, resp  rate 18, height 5' 2" (1 575 m), weight 83 5 kg (184 lb), SpO2 96 %  ,Body mass index is 33 65 kg/m²  I/O       07/16 0701 - 07/17 0700 07/17 0701 - 07/18 0700 07/18 0701 - 07/19 0700    P  O  340 520 180    I V  (mL/kg) 150 (1 8)      IV Piggyback  50     Total Intake(mL/kg) 490 (5 9) 570 (6 8) 180 (2 2)    Urine (mL/kg/hr) 1400 (0 7) 850 (0 4) 600 (4)    Stool 100      Total Output 1500 850 600    Net -1010 -280 -420           Unmeasured Stool Occurrence 2 x 5 x 1 x            Invasive Devices          No matching active lines, drains, or airways          Physical Exam: NAD  AAOX3  Normal respiratory effort  Soft, NT, ND  Incision c/d/i with staples, old colostomy site c/d/i with minimal drainage  No c/d/e    Lab, Imaging and other studies:  I have personally reviewed pertinent lab results    , CBC:   No results found for: WBC, HGB, HCT, MCV, PLT, ADJUSTEDWBC, MCH, MCHC, RDW, MPV, NRBC, CMP:   No results found for: NA, K, CL, CO2, ANIONGAP, BUN, CREATININE, GLUCOSE, CALCIUM, AST, ALT, ALKPHOS, PROT, ALBUMIN, BILITOT, EGFR  VTE Pharmacologic Prophylaxis: Heparin  VTE Mechanical Prophylaxis: sequential compression device

## 2018-07-18 NOTE — PROGRESS NOTES
The pantoprazole has / have been converted to Oral per [facility] IV-to-PO Auto-Conversion Protocol for Adults as approved by the Pharmacy and Therapeutics Committee  The patient met all eligible criteria:  3 Age = 25years old   2) Received at least one dose of the IV form   3) Receiving at least one other scheduled oral/enteral medication   4) Tolerating an oral/enteral diet   and did not have any exclusions:   1) Critical care patient   2) Active GI bleed (IF assessing H2RAs or PPIs)   3) Continuous tube feeding (IF assessing cipro, doxycycline, levofloxacin, minocycline, rifampin, or voriconazole)   4) Receiving PO vancomycin (IF assessing metronidazole)   5) Persistent nausea and/or vomiting   6) Ileus or gastrointestinal obstruction   7) Dickson/nasogastric tube set for continuous suction   8) Specific order not to automatically convert to PO (in the order's comments or if discussed in the most recent Infectious Disease or primary team's progress notes)

## 2018-07-18 NOTE — DISCHARGE INSTRUCTIONS
Acute Care Surgery Discharge Instructions    Please follow-up as instructed  If you do not already have a follow-up appointment, please call the office when you leave to schedule an appointment to be seen in 5 days for post-operative re-evaluation  Activity:  - No lifting greater than 20 pounds or strenuous physical activity or exercise for at least 4 weeks  - Walking and normal light activities are encouraged  - Normal daily activities including climbing steps are okay  - No driving until no longer using pain medications  Return to work:    - You may return to work in 2 weeks or sooner if you are feeling well enough  Diet:    - You may resume your normal diet  Drink plenty of fluids  Wound Care:  - May shower daily  No tub baths or swimming until cleared by your surgeon   - Wash incision gently with soap and water and pat dry  - Do not apply any creams or ointments unless instructed to do so by your surgeon   - Whitley Rupa may apply ice as needed (no longer than 20 minutes an hour) for the first 48 hours  - Bruising is not unusual and will go away with a little time  You may apply a warm, moist compress that may help the bruising resolve quicker  - Place a dry sterile dressing over the ostomy site daily  Medications:    - You may resume all of your regular medications, including blood thinners and aspirin, after going home unless otherwise instructed  Please refer to your discharge medication list for further details  - Please take the pain medications as directed  - You are encouraged to use non-narcotic pain medications first and whenever possible  Reserve the use of narcotic pain medication for moderate to severe pain not controlled by non-narcotic medications   - No driving while taking narcotic pain medications  - You may become constipated, especially if taking pain medications   You may take any over the counter stool softeners such as colace    Additional Instructions:  - If you have any questions or concerns after discharge please call the office   - Call office or return to ER if fever greater than 101, chills, persistent nausea/vomiting, worsening/uncontrollable pain, and/or increasing redness or purulent/foul smelling drainage from incision(s)

## 2018-07-20 ENCOUNTER — TRANSITIONAL CARE MANAGEMENT (OUTPATIENT)
Dept: FAMILY MEDICINE CLINIC | Facility: CLINIC | Age: 53
End: 2018-07-20

## 2018-07-20 NOTE — CASE MANAGEMENT
Notification of Discharge  This is a Notification of Discharge from our facility [address]  Please be advised that this patient has been discharge from our facility  Below you will find the admission and discharge date and time including the patients disposition  PRESENTATION DATE: 7/11/2018  6:09 AM  IP ADMISSION DATE: 7/11/18 1627  DISCHARGE DATE: 7/18/2018 11:36 AM  DISPOSITION: Home/Self Care    [address] [facility] the [facility]  Phone: [phone]; Fax [phone]  ATTENTION: The Network Utilization Review Department is now centralized for our 9 Facilities  Make a note that we have a new phone and fax numbers for our Department  Please call with any questions or concerns to [phone] and carefully follow the prompts so that you are directed to the right person  All voicemails are confidential  Fax any determinations, approvals, denials, and requests for initial or continue stay review clinical to [phone]  Due to HIGH CALL volume, it would be easier if you could please send faxed requests to expedite your requests and in part, help us provide discharge notifications faster

## 2018-07-20 NOTE — CASE MANAGEMENT
CLINICAL  REVIEW   FOR  7/15  47 y/o F 4 Days Post-Op s/p Pj's reversal, ileocecectomy, and lysis of adhesions     Plan:  - -Diet Clear Liquid  -- Afib New Onset: Cardiology and Amiodarone  -- IVFs  -- PCA for pain control  -- OOB, encourage ambulation  -- DVT ppx: Heparin  -- GI ppx: Protonix    This morning patient went into Afi9b with RVR and amiodarone was started  Cardiology was consulted  Patient was mentating appropriately and denied any chest pains  Manual cuff pressure was 105/75  Pain is controlled with medications, feels fatigued, tolerating CLD, denies any N/V, flatus or BM    H/H  8 7/26 9  RBC  2 78     Blood pressure 92/64, pulse (!) 168, temperature 98 4 °F (36 9 °C), temperature source Oral, resp  rate 18, height 5' 2" (1 575 m), weight 83 5 kg (184 lb), SpO2 92 %  ,Body mass index is 33 65 kg/m²  MEds:  Amiodarone  Drip  IVF  75/hr  Dilaudid  PCA    PRN   MEDS:   IV  Ativan x1  IV  zofran  x1    PROGRESS  NOTE   7/16  47 y/o F 5 Days Post-Op s/p Pj's reversal, ileocecectomy, and lysis of adhesions     Plan:  - -Diet Clear Liquid  -- Afib New Onset: Cardiology and Amiodarone  --f/u echo  -- IVFs  -- PCA for pain control  -- OOB, encourage ambulation  -- DVT ppx: Heparin  -- GI ppx: Protonix  No acute events overnight  Patient states her pain is well controlled this AM  Denies palpitations, chest pain, shortness of breath  Patient endorses some nausea this AM, denies vomiting  +BM/flatus  Denies f/c  D/C  Dilaudid  PCA  D/C  Amiodarone drip  IV  zofran  X1    CARDIOLOGY  CONSULT   7/16  Paroxysmal Afib  - telemetry reviewed, paroxysmal sudden onset and termination of narrow complex supraventricular tachycardia to a heart rate of 180s yesterday, with immediate conversion to normal sinus rhythm     - she has been on amiodarone since yesterday, no further events noted overnight    -she did have episodes of AFib in the past briefly postop in 01/2018,now with recurrence post op again, she is at high risk for more recurrences in the future     she has also been off her metoprolol for the last few months    -for now, would stop amiodarone and start her on metoprolol tartrate 12 5 b i d if she tolerates p o  well, otherwise would have her on metoprolol 5 mg IV q 6h   Echo 2016 with EF 60%, PASP 25 mmHg  Would repeat echo for evaluation of valves  -CHADS2 Vasc score of 2,would start on Eliquis when okay from a surgical standpoint  -continue to monitor on telemetry  -monitor electrolytes, keep K > 4, Mg> 2, on supplements  -outpatient Sleep apnea testing  PROGRESS  NOTE   7/17  45 y/o F 5 Days Post-Op s/p Pj's reversal, ileocecectomy, and lysis of adhesions  -ECHO-EF 05%, Grade 1 diastolic dysfunction       Plan:  Regular diet   Cardiology-Eliquis and metoprolol for new onset afib  Protonix   IS/OOB/ambualte  PT/OT  SQH/SCDS    H/H  8 8/27 6    Pt  D/c  Home    7/18    Thank you,  Ash De Los Santos  [facility] Utilization Review Department  Phone: [phone]; Fax [phone]  ATTENTION: The Network Utilization Review Department is now centralized for our 9 Facilities  Make a note that we have a new phone and fax numbers for our Department  Please call with any questions or concerns to [phone] and carefully follow the prompts so that you are directed to the right person  All voicemails are confidential  Fax any determinations, approvals, denials, and requests for initial or continue stay review clinical to [phone]   Due to HIGH CALL volume, it would be easier if you could please send faxed requests to expedite your requests and in part, help us provide discharge notifications faster

## 2018-07-24 RX ORDER — LANOLIN ALCOHOL/MO/W.PET/CERES
400 CREAM (GRAM) TOPICAL
Refills: 0 | Status: ON HOLD | COMMUNITY
Start: 2018-07-19 | End: 2018-08-03 | Stop reason: SDUPTHER

## 2018-07-25 ENCOUNTER — OFFICE VISIT (OUTPATIENT)
Dept: SURGERY | Facility: HOSPITAL | Age: 53
End: 2018-07-25

## 2018-07-25 ENCOUNTER — APPOINTMENT (OUTPATIENT)
Dept: LAB | Facility: HOSPITAL | Age: 53
End: 2018-07-25
Attending: SURGERY
Payer: COMMERCIAL

## 2018-07-25 VITALS
SYSTOLIC BLOOD PRESSURE: 143 MMHG | WEIGHT: 169 LBS | DIASTOLIC BLOOD PRESSURE: 92 MMHG | BODY MASS INDEX: 31.1 KG/M2 | HEART RATE: 103 BPM | TEMPERATURE: 98.1 F | HEIGHT: 62 IN

## 2018-07-25 DIAGNOSIS — Z09 POSTOP CHECK: Primary | ICD-10-CM

## 2018-07-25 DIAGNOSIS — Z98.890 S/P COLOSTOMY TAKEDOWN: ICD-10-CM

## 2018-07-25 DIAGNOSIS — R19.7 DIARRHEA, UNSPECIFIED TYPE: ICD-10-CM

## 2018-07-25 PROBLEM — Z43.3 COLOSTOMY CARE (HCC): Status: RESOLVED | Noted: 2018-02-07 | Resolved: 2018-07-25

## 2018-07-25 PROBLEM — Z93.3 COLOSTOMY IN PLACE (HCC): Status: RESOLVED | Noted: 2018-06-25 | Resolved: 2018-07-25

## 2018-07-25 LAB
ANION GAP SERPL CALCULATED.3IONS-SCNC: 14 MMOL/L (ref 4–13)
BASOPHILS # BLD MANUAL: 0 THOUSAND/UL (ref 0–0.1)
BASOPHILS NFR MAR MANUAL: 0 % (ref 0–1)
BUN SERPL-MCNC: 7 MG/DL (ref 5–25)
CALCIUM SERPL-MCNC: 9.9 MG/DL (ref 8.3–10.1)
CHLORIDE SERPL-SCNC: 98 MMOL/L (ref 100–108)
CO2 SERPL-SCNC: 28 MMOL/L (ref 21–32)
CREAT SERPL-MCNC: 0.82 MG/DL (ref 0.6–1.3)
EOSINOPHIL # BLD MANUAL: 0.25 THOUSAND/UL (ref 0–0.4)
EOSINOPHIL NFR BLD MANUAL: 2 % (ref 0–6)
ERYTHROCYTE [DISTWIDTH] IN BLOOD BY AUTOMATED COUNT: 12.7 % (ref 11.6–15.1)
GFR SERPL CREATININE-BSD FRML MDRD: 81 ML/MIN/1.73SQ M
GLUCOSE SERPL-MCNC: 99 MG/DL (ref 65–140)
HCT VFR BLD AUTO: 38.5 % (ref 34.8–46.1)
HGB BLD-MCNC: 13.3 G/DL (ref 11.5–15.4)
LYMPHOCYTES # BLD AUTO: 2.96 THOUSAND/UL (ref 0.6–4.47)
LYMPHOCYTES # BLD AUTO: 24 % (ref 14–44)
MCH RBC QN AUTO: 31.3 PG (ref 26.8–34.3)
MCHC RBC AUTO-ENTMCNC: 34.5 G/DL (ref 31.4–37.4)
MCV RBC AUTO: 91 FL (ref 82–98)
MONOCYTES # BLD AUTO: 0 THOUSAND/UL (ref 0–1.22)
MONOCYTES NFR BLD: 0 % (ref 4–12)
NEUTROPHILS # BLD MANUAL: 9.14 THOUSAND/UL (ref 1.85–7.62)
NEUTS SEG NFR BLD AUTO: 74 % (ref 43–75)
PLATELET # BLD AUTO: 607 THOUSANDS/UL (ref 149–390)
PLATELET BLD QL SMEAR: ABNORMAL
PMV BLD AUTO: 9.3 FL (ref 8.9–12.7)
POTASSIUM SERPL-SCNC: 3.8 MMOL/L (ref 3.5–5.3)
RBC # BLD AUTO: 4.25 MILLION/UL (ref 3.81–5.12)
SODIUM SERPL-SCNC: 140 MMOL/L (ref 136–145)
TOTAL CELLS COUNTED SPEC: 100
WBC # BLD AUTO: 12.35 THOUSAND/UL (ref 4.31–10.16)

## 2018-07-25 PROCEDURE — 80048 BASIC METABOLIC PNL TOTAL CA: CPT

## 2018-07-25 PROCEDURE — 85007 BL SMEAR W/DIFF WBC COUNT: CPT

## 2018-07-25 PROCEDURE — 99024 POSTOP FOLLOW-UP VISIT: CPT | Performed by: SURGERY

## 2018-07-25 PROCEDURE — 87493 C DIFF AMPLIFIED PROBE: CPT

## 2018-07-25 PROCEDURE — 1111F DSCHRG MED/CURRENT MED MERGE: CPT | Performed by: SURGERY

## 2018-07-25 PROCEDURE — 85027 COMPLETE CBC AUTOMATED: CPT

## 2018-07-25 PROCEDURE — 36415 COLL VENOUS BLD VENIPUNCTURE: CPT

## 2018-07-25 NOTE — PROGRESS NOTES
Assessment/Plan:    S/P colostomy takedown  Status post colostomy takedown in addition to ileocecectomy due to erosion of rectal stump into mesentery of the terminal ileum  Overall doing okay  Having some postoperative issues with nausea vomiting diarrhea very similar to her previous postoperative course  However the diarrhea is excessive, but states this non foul smelling it appears cavity yellowish  Patient eating very little due to her intermittent nausea  - will check C diff to make sure that there is no Hospital choir to C difficile  - will also check CT CBC and BMP to follow up on possible anemia in addition to checking her electrolytes she has had hypokalemia in the past  - local wound care to ostomy site  - follow-up 1 month       Diagnoses and all orders for this visit:    Postop check    S/P colostomy takedown  -     Basic metabolic panel; Future  -     CBC and differential; Future  -     Clostridium difficile toxin by PCR; Future    Diarrhea, unspecified type  -     Clostridium difficile toxin by PCR; Future    Other orders  -     magnesium Oxide (MAG-OX) 400 mg TABS; Take 400 mg by mouth 2 (two) times a day          Subjective:      Patient ID: Mini Wiley is a 46 y o  female  79-year-old female with a history of diverticulitis status post Pj's, now is status post colostomy takedown in addition to an ileocecectomy both with primary anastomoses, presents today for follow-up  Overall doing okay  Does admit to a king postoperative course with nausea vomiting which unfortunately she had postoperative during her last procedure  Furthermore she complains of multiple bouts of diarrhea per day  She states that it set cavity yellowish but non foul smelling  No significant abdominal pain  No chest pain shortness of breath  Sitting is healing well with no evidence any drainage  She states her ostomy site is also healing very well          The following portions of the patient's history were reviewed and updated as appropriate:   She  has a past medical history of Disease of thyroid gland; Endometrial polyp; Gastroesophageal reflux disease without esophagitis (12/4/2017); Hand injury; High risk medication use; Hyperlipidemia; Hypertension; Renal colic; Viral syndrome; and Vitamin D deficiency disease  She   Patient Active Problem List    Diagnosis Date Noted    S/P colostomy takedown 07/25/2018    Diarrhea 07/25/2018    Hair abnormality 04/30/2018    Hair loss 04/26/2018    Adenomatous polyps 04/26/2018    Screening for colon cancer 04/26/2018    Gastroesophageal reflux disease without esophagitis 03/20/2018    Bilious vomiting with nausea 03/20/2018    Dehydration 02/13/2018    Intractable vomiting with nausea 02/13/2018    Ileus ([facility] [address] ) 01/18/2018    Paroxysmal atrial fibrillation ([facility] [address] ) 01/05/2018    Hematuria 01/03/2018    Ileostomy in place ([facility] [address] ) 01/03/2018    Acute blood loss anemia 01/01/2018    Microscopic hematuria 12/28/2017    Low serum prealbumin 12/27/2017    Severe protein-calorie malnutrition ([facility] [address] ) 12/27/2017    Hypoalbuminemia 12/26/2017    Diverticulitis of colon 12/26/2017    Diverticulitis of large intestine with perforation without abscess or bleeding 12/04/2017    Esophageal reflux 12/04/2017    Hyperlipidemia 12/04/2017    Hypertension 12/04/2017    Hypokalemia 12/04/2017    Chronic constipation 11/27/2017    Acute pain 10/12/2017    Ache in joint 09/15/2017    Primary osteoarthritis of left knee 09/15/2017    Anemia 09/14/2016    Thyroid disorder 09/14/2016    Non-rheumatic mitral regurgitation 07/22/2016    Fatigue 13/39/6634    Systolic murmur 22/75/6687    Vitamin D deficiency 06/21/2016    Vaginal dryness, menopausal 02/01/2016    Leg pain, left 07/17/2015    Erysipelas 12/12/2014    Diverticulosis 03/17/2014    Vulvitis 08/14/2013     She  has a past surgical history that includes Cholecystectomy;  Thyroidectomy, partial; Tonsillectomy and adenoidectomy; Tubal ligation (1997); pr esophagogastroduodenoscopy transoral diagnostic (N/A, 10/21/2016); pr part removal colon w anastomosis (N/A, 1/3/2018); Ureteral stent placement (Bilateral, 1/3/2018); LAPAROTOMY (N/A, 1/4/2018); Colostomy; Esophagogastroduodenoscopy (N/A, 4/6/2018); Endometrial ablation (2007); Hysteroscopy; Thyroidectomy; Colon surgery; Colonoscopy (N/A, 6/15/2018); and pr close enterostomy (N/A, 7/11/2018)  Her family history includes Diabetes type II in her mother; Heart failure in her father  She  reports that she quit smoking about 15 years ago  She has a 15 00 pack-year smoking history  She has never used smokeless tobacco  She reports that she drinks about 1 2 oz of alcohol per week   She reports that she does not use drugs    Current Outpatient Prescriptions   Medication Sig Dispense Refill    apixaban (ELIQUIS) 5 mg Take 1 tablet (5 mg total) by mouth 2 (two) times a day 60 tablet 3    apixaban (ELIQUIS) 5 mg Take 1 tablet (5 mg total) by mouth 2 (two) times a day 60 tablet 3    Biotin 34596 MCG TABS Take 1 tablet by mouth daily      ketoconazole (NIZORAL) 2 % cream Apply topically 2 (two) times a day 60 g 1    LORazepam (ATIVAN) 0 5 mg tablet Take 1 tablet (0 5 mg total) by mouth daily at bedtime as needed for anxiety for up to 10 days Takes PRN at bedtime 15 tablet 0    magnesium Oxide (MAG-OX) 400 mg TABS Take 400 mg by mouth 2 (two) times a day  0    magnesium oxide (MAG-OX) 400 mg Take 1 tablet (400 mg total) by mouth 2 (two) times a day with meals (Patient taking differently: Take 800 mg by mouth daily  ) 60 tablet 3    metoprolol tartrate (LOPRESSOR) 25 mg tablet Take 0 5 tablets (12 5 mg total) by mouth every 12 (twelve) hours 30 tablet 0    Multiple Vitamin (MULTIVITAMIN) tablet Take 1 tablet by mouth daily      oxyCODONE (ROXICODONE) 5 mg immediate release tablet Take 1 tablet (5 mg total) by mouth every 4 (four) hours as needed for moderate pain for up to 10 days Max Daily Amount: 30 mg 20 tablet 0    pantoprazole (PROTONIX) 40 mg tablet Take 1 tablet (40 mg total) by mouth 2 (two) times a day (Patient taking differently: Take 40 mg by mouth daily  ) 60 tablet 3    potassium chloride (K-DUR,KLOR-CON) 20 mEq tablet Take 1 tablet (20 mEq total) by mouth 2 (two) times a day (Patient taking differently: Take 40 mEq by mouth daily  ) 60 tablet 0    SUPREP BOWEL PREP KIT 17 5-3 13-1 6 GM/180ML SOLN DAY BEFORE USE 6 OZ BOTTLE, THEN 4 HRS BEFORE PROCEDURE TAKE 2ND DOSE 354 mL 0     No current facility-administered medications for this visit  She has No Known Allergies       Review of Systems   Constitutional: Positive for appetite change (Decreased appetite) and fatigue  Negative for chills, diaphoresis, fever and unexpected weight change  Gastrointestinal: Positive for diarrhea, nausea and vomiting  Negative for abdominal distention, abdominal pain and constipation  Skin: Positive for wound  Objective:      /92   Pulse 103   Temp 98 1 °F (36 7 °C)   Ht 5' 2" (1 575 m)   Wt 76 7 kg (169 lb)   LMP  (LMP Unknown)   BMI 30 91 kg/m²          Physical Exam   Constitutional: She appears well-developed and well-nourished  No distress  Abdominal: Soft  She exhibits no distension and no mass  There is no tenderness  There is no rebound and no guarding  Midline incision healed well is clean, dry, intact with staples  Left abdominal ostomy site healing very well with noted granulation tissue  Skin: She is not diaphoretic  Vitals reviewed  Procedure:    Staples removed without complication  Steri-Strips placed

## 2018-07-25 NOTE — ASSESSMENT & PLAN NOTE
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

## 2018-07-27 ENCOUNTER — TELEPHONE (OUTPATIENT)
Dept: SURGERY | Facility: HOSPITAL | Age: 53
End: 2018-07-27

## 2018-07-27 LAB — C DIFF TOX GENS STL QL NAA+PROBE: NORMAL

## 2018-07-31 ENCOUNTER — TELEPHONE (OUTPATIENT)
Dept: SURGERY | Facility: HOSPITAL | Age: 53
End: 2018-07-31

## 2018-08-03 ENCOUNTER — TELEPHONE (OUTPATIENT)
Dept: SURGERY | Facility: HOSPITAL | Age: 53
End: 2018-08-03

## 2018-08-03 ENCOUNTER — HOSPITAL ENCOUNTER (INPATIENT)
Facility: HOSPITAL | Age: 53
LOS: 10 days | Discharge: HOME | DRG: 372 | End: 2018-08-15
Attending: EMERGENCY MEDICINE | Admitting: INTERNAL MEDICINE
Payer: COMMERCIAL

## 2018-08-03 ENCOUNTER — APPOINTMENT (EMERGENCY)
Dept: CT IMAGING | Facility: HOSPITAL | Age: 53
DRG: 372 | End: 2018-08-03
Payer: COMMERCIAL

## 2018-08-03 DIAGNOSIS — D64.9 ANEMIA, UNSPECIFIED TYPE: ICD-10-CM

## 2018-08-03 DIAGNOSIS — R19.7 DIARRHEA, UNSPECIFIED TYPE: ICD-10-CM

## 2018-08-03 DIAGNOSIS — K21.9 GASTROESOPHAGEAL REFLUX DISEASE WITHOUT ESOPHAGITIS: ICD-10-CM

## 2018-08-03 DIAGNOSIS — E83.42 HYPOMAGNESEMIA: ICD-10-CM

## 2018-08-03 DIAGNOSIS — A04.72 CLOSTRIDIUM DIFFICILE COLITIS: ICD-10-CM

## 2018-08-03 DIAGNOSIS — A04.72 C. DIFFICILE COLITIS: ICD-10-CM

## 2018-08-03 DIAGNOSIS — R74.01 TRANSAMINITIS: ICD-10-CM

## 2018-08-03 DIAGNOSIS — R11.10 INTRACTABLE VOMITING: Primary | ICD-10-CM

## 2018-08-03 DIAGNOSIS — R11.2 INTRACTABLE VOMITING WITH NAUSEA, UNSPECIFIED VOMITING TYPE: ICD-10-CM

## 2018-08-03 DIAGNOSIS — E87.6 HYPOKALEMIA: ICD-10-CM

## 2018-08-03 DIAGNOSIS — K57.20 DIVERTICULITIS OF LARGE INTESTINE WITH PERFORATION WITHOUT ABSCESS OR BLEEDING: ICD-10-CM

## 2018-08-03 LAB
ALBUMIN SERPL BCP-MCNC: 3.6 G/DL (ref 3.5–5)
ALP SERPL-CCNC: 100 U/L (ref 46–116)
ALT SERPL W P-5'-P-CCNC: 64 U/L (ref 12–78)
ANION GAP SERPL CALCULATED.3IONS-SCNC: 11 MMOL/L (ref 4–13)
ANION GAP SERPL CALCULATED.3IONS-SCNC: 16 MMOL/L (ref 4–13)
AST SERPL W P-5'-P-CCNC: 40 U/L (ref 5–45)
BACTERIA UR QL AUTO: ABNORMAL /HPF
BASOPHILS # BLD AUTO: 0.04 THOUSANDS/ΜL (ref 0–0.1)
BASOPHILS NFR BLD AUTO: 1 % (ref 0–1)
BILIRUB SERPL-MCNC: 2 MG/DL (ref 0.2–1)
BILIRUB UR QL STRIP: ABNORMAL
BUN SERPL-MCNC: 5 MG/DL (ref 5–25)
BUN SERPL-MCNC: 7 MG/DL (ref 5–25)
CALCIUM SERPL-MCNC: 8.4 MG/DL (ref 8.3–10.1)
CALCIUM SERPL-MCNC: 9.5 MG/DL (ref 8.3–10.1)
CHLORIDE SERPL-SCNC: 102 MMOL/L (ref 100–108)
CHLORIDE SERPL-SCNC: 95 MMOL/L (ref 100–108)
CLARITY UR: CLEAR
CO2 SERPL-SCNC: 27 MMOL/L (ref 21–32)
CO2 SERPL-SCNC: 28 MMOL/L (ref 21–32)
COLOR UR: YELLOW
CREAT SERPL-MCNC: 0.77 MG/DL (ref 0.6–1.3)
CREAT SERPL-MCNC: 0.95 MG/DL (ref 0.6–1.3)
EOSINOPHIL # BLD AUTO: 0.14 THOUSAND/ΜL (ref 0–0.61)
EOSINOPHIL NFR BLD AUTO: 2 % (ref 0–6)
ERYTHROCYTE [DISTWIDTH] IN BLOOD BY AUTOMATED COUNT: 13.1 % (ref 11.6–15.1)
GFR SERPL CREATININE-BSD FRML MDRD: 68 ML/MIN/1.73SQ M
GFR SERPL CREATININE-BSD FRML MDRD: 88 ML/MIN/1.73SQ M
GLUCOSE P FAST SERPL-MCNC: 71 MG/DL (ref 65–99)
GLUCOSE SERPL-MCNC: 104 MG/DL (ref 65–140)
GLUCOSE SERPL-MCNC: 71 MG/DL (ref 65–140)
GLUCOSE UR STRIP-MCNC: NEGATIVE MG/DL
HCT VFR BLD AUTO: 42.1 % (ref 34.8–46.1)
HGB BLD-MCNC: 14.4 G/DL (ref 11.5–15.4)
HGB UR QL STRIP.AUTO: ABNORMAL
KETONES UR STRIP-MCNC: ABNORMAL MG/DL
LEUKOCYTE ESTERASE UR QL STRIP: NEGATIVE
LIPASE SERPL-CCNC: 173 U/L (ref 73–393)
LYMPHOCYTES # BLD AUTO: 1.64 THOUSANDS/ΜL (ref 0.6–4.47)
LYMPHOCYTES NFR BLD AUTO: 26 % (ref 14–44)
MAGNESIUM SERPL-MCNC: 1.6 MG/DL (ref 1.6–2.6)
MCH RBC QN AUTO: 30.4 PG (ref 26.8–34.3)
MCHC RBC AUTO-ENTMCNC: 34.2 G/DL (ref 31.4–37.4)
MCV RBC AUTO: 89 FL (ref 82–98)
MONOCYTES # BLD AUTO: 0.54 THOUSAND/ΜL (ref 0.17–1.22)
MONOCYTES NFR BLD AUTO: 9 % (ref 4–12)
NEUTROPHILS # BLD AUTO: 3.94 THOUSANDS/ΜL (ref 1.85–7.62)
NEUTS SEG NFR BLD AUTO: 63 % (ref 43–75)
NITRITE UR QL STRIP: NEGATIVE
NON-SQ EPI CELLS URNS QL MICRO: ABNORMAL /HPF
PH UR STRIP.AUTO: 5.5 [PH] (ref 4.5–8)
PHOSPHATE SERPL-MCNC: 3.8 MG/DL (ref 2.7–4.5)
PLATELET # BLD AUTO: 325 THOUSANDS/UL (ref 149–390)
PMV BLD AUTO: 11 FL (ref 8.9–12.7)
POTASSIUM SERPL-SCNC: 2.6 MMOL/L (ref 3.5–5.3)
POTASSIUM SERPL-SCNC: 2.8 MMOL/L (ref 3.5–5.3)
PROT SERPL-MCNC: 7.5 G/DL (ref 6.4–8.2)
PROT UR STRIP-MCNC: NEGATIVE MG/DL
RBC # BLD AUTO: 4.74 MILLION/UL (ref 3.81–5.12)
RBC #/AREA URNS AUTO: ABNORMAL /HPF
SODIUM SERPL-SCNC: 139 MMOL/L (ref 136–145)
SODIUM SERPL-SCNC: 140 MMOL/L (ref 136–145)
SP GR UR STRIP.AUTO: <=1.005 (ref 1–1.03)
UROBILINOGEN UR QL STRIP.AUTO: 0.2 E.U./DL
WBC # BLD AUTO: 6.3 THOUSAND/UL (ref 4.31–10.16)
WBC #/AREA URNS AUTO: ABNORMAL /HPF

## 2018-08-03 PROCEDURE — 74177 CT ABD & PELVIS W/CONTRAST: CPT

## 2018-08-03 PROCEDURE — 83735 ASSAY OF MAGNESIUM: CPT | Performed by: EMERGENCY MEDICINE

## 2018-08-03 PROCEDURE — 96375 TX/PRO/DX INJ NEW DRUG ADDON: CPT

## 2018-08-03 PROCEDURE — 96361 HYDRATE IV INFUSION ADD-ON: CPT

## 2018-08-03 PROCEDURE — 96365 THER/PROPH/DIAG IV INF INIT: CPT

## 2018-08-03 PROCEDURE — 84100 ASSAY OF PHOSPHORUS: CPT | Performed by: EMERGENCY MEDICINE

## 2018-08-03 PROCEDURE — 96374 THER/PROPH/DIAG INJ IV PUSH: CPT

## 2018-08-03 PROCEDURE — 80048 BASIC METABOLIC PNL TOTAL CA: CPT | Performed by: INTERNAL MEDICINE

## 2018-08-03 PROCEDURE — 99284 EMERGENCY DEPT VISIT MOD MDM: CPT

## 2018-08-03 PROCEDURE — 83690 ASSAY OF LIPASE: CPT | Performed by: EMERGENCY MEDICINE

## 2018-08-03 PROCEDURE — 99024 POSTOP FOLLOW-UP VISIT: CPT | Performed by: SURGERY

## 2018-08-03 PROCEDURE — 99219 PR INITIAL OBSERVATION CARE/DAY 50 MINUTES: CPT | Performed by: PHYSICIAN ASSISTANT

## 2018-08-03 PROCEDURE — 36415 COLL VENOUS BLD VENIPUNCTURE: CPT | Performed by: EMERGENCY MEDICINE

## 2018-08-03 PROCEDURE — 81001 URINALYSIS AUTO W/SCOPE: CPT | Performed by: EMERGENCY MEDICINE

## 2018-08-03 PROCEDURE — 85025 COMPLETE CBC W/AUTO DIFF WBC: CPT | Performed by: EMERGENCY MEDICINE

## 2018-08-03 PROCEDURE — 80053 COMPREHEN METABOLIC PANEL: CPT | Performed by: EMERGENCY MEDICINE

## 2018-08-03 RX ORDER — MAGNESIUM SULFATE 1 G/100ML
1 INJECTION INTRAVENOUS ONCE
Status: COMPLETED | OUTPATIENT
Start: 2018-08-03 | End: 2018-08-03

## 2018-08-03 RX ORDER — KETOROLAC TROMETHAMINE 30 MG/ML
30 INJECTION, SOLUTION INTRAMUSCULAR; INTRAVENOUS ONCE
Status: COMPLETED | OUTPATIENT
Start: 2018-08-03 | End: 2018-08-03

## 2018-08-03 RX ORDER — METOCLOPRAMIDE HYDROCHLORIDE 5 MG/ML
10 INJECTION INTRAMUSCULAR; INTRAVENOUS ONCE
Status: COMPLETED | OUTPATIENT
Start: 2018-08-03 | End: 2018-08-03

## 2018-08-03 RX ORDER — ONDANSETRON 2 MG/ML
4 INJECTION INTRAMUSCULAR; INTRAVENOUS EVERY 6 HOURS PRN
Status: DISCONTINUED | OUTPATIENT
Start: 2018-08-03 | End: 2018-08-06

## 2018-08-03 RX ORDER — PANTOPRAZOLE SODIUM 40 MG/1
40 TABLET, DELAYED RELEASE ORAL DAILY
Status: DISCONTINUED | OUTPATIENT
Start: 2018-08-04 | End: 2018-08-09

## 2018-08-03 RX ORDER — ACETAMINOPHEN 325 MG/1
650 TABLET ORAL EVERY 6 HOURS PRN
Status: DISCONTINUED | OUTPATIENT
Start: 2018-08-03 | End: 2018-08-14

## 2018-08-03 RX ORDER — LORAZEPAM 0.5 MG/1
0.5 TABLET ORAL
Status: DISCONTINUED | OUTPATIENT
Start: 2018-08-03 | End: 2018-08-15 | Stop reason: HOSPADM

## 2018-08-03 RX ORDER — POTASSIUM CHLORIDE AND SODIUM CHLORIDE 900; 300 MG/100ML; MG/100ML
100 INJECTION, SOLUTION INTRAVENOUS CONTINUOUS
Status: DISCONTINUED | OUTPATIENT
Start: 2018-08-03 | End: 2018-08-04

## 2018-08-03 RX ORDER — POTASSIUM CHLORIDE 20 MEQ/1
20 TABLET, EXTENDED RELEASE ORAL 2 TIMES DAILY
Status: DISCONTINUED | OUTPATIENT
Start: 2018-08-03 | End: 2018-08-04

## 2018-08-03 RX ORDER — POTASSIUM CHLORIDE 14.9 MG/ML
40 INJECTION INTRAVENOUS ONCE
Status: COMPLETED | OUTPATIENT
Start: 2018-08-03 | End: 2018-08-03

## 2018-08-03 RX ADMIN — IOHEXOL 50 ML: 240 INJECTION, SOLUTION INTRATHECAL; INTRAVASCULAR; INTRAVENOUS; ORAL at 15:24

## 2018-08-03 RX ADMIN — POTASSIUM CHLORIDE 40 MEQ: 200 INJECTION, SOLUTION INTRAVENOUS at 15:38

## 2018-08-03 RX ADMIN — METOCLOPRAMIDE 10 MG: 5 INJECTION, SOLUTION INTRAMUSCULAR; INTRAVENOUS at 13:43

## 2018-08-03 RX ADMIN — APIXABAN 5 MG: 5 TABLET, FILM COATED ORAL at 20:44

## 2018-08-03 RX ADMIN — POTASSIUM CHLORIDE 20 MEQ: 1500 TABLET, EXTENDED RELEASE ORAL at 20:44

## 2018-08-03 RX ADMIN — MAGNESIUM SULFATE HEPTAHYDRATE 1 G: 1 INJECTION, SOLUTION INTRAVENOUS at 19:25

## 2018-08-03 RX ADMIN — KETOROLAC TROMETHAMINE 30 MG: 30 INJECTION, SOLUTION INTRAMUSCULAR at 13:43

## 2018-08-03 RX ADMIN — ONDANSETRON 4 MG: 2 INJECTION INTRAMUSCULAR; INTRAVENOUS at 19:47

## 2018-08-03 RX ADMIN — POTASSIUM CHLORIDE AND SODIUM CHLORIDE 100 ML/HR: 900; 300 INJECTION, SOLUTION INTRAVENOUS at 19:25

## 2018-08-03 RX ADMIN — SODIUM CHLORIDE 2000 ML: 0.9 INJECTION, SOLUTION INTRAVENOUS at 13:42

## 2018-08-03 RX ADMIN — IOHEXOL 100 ML: 350 INJECTION, SOLUTION INTRAVENOUS at 15:24

## 2018-08-03 NOTE — ED PROVIDER NOTES
History  Chief Complaint   Patient presents with    Vomiting     Pt reports she has been vomiting for about a week  Pt had a ostomy reversal done on 7/11/18  Pt was instructed by Dr Kofi Adame to be seen  Patient is a 50-year-old female  She had an ileostomy reversal on July 11th this year  She had an ileostomy due to perforated diverticulitis  She initially did well  However over the last week she has experienced nausea and vomiting  She has been unable to tolerate p o  At times the vomiting has been bilious  Currently it is not  She has had diarrhea since the surgery  No hematemesis, hematochezia or melena  No sick contacts or suspect foods  No foreign travel  She did receive antibiotics at the time of the surgery  She is complaining of left lower quadrant abdominal pain  It does not radiate  Moderate in intensity  No aggravating or relieving factors  She has no urinary or vaginal complaints  No fever or chills  She was sent in by her general surgeon for evaluation and treatment  Prior to Admission Medications   Prescriptions Last Dose Informant Patient Reported? Taking?    Biotin 74485 MCG TABS  Self Yes No   Sig: Take 1 tablet by mouth daily   LORazepam (ATIVAN) 0 5 mg tablet  Self No No   Sig: Take 1 tablet (0 5 mg total) by mouth daily at bedtime as needed for anxiety for up to 10 days Takes PRN at bedtime   Multiple Vitamin (MULTIVITAMIN) tablet  Self Yes No   Sig: Take 1 tablet by mouth daily   SUPREP BOWEL PREP KIT 17 5-3 13-1 6 GM/180ML SOLN  Self No No   Sig: DAY BEFORE USE 6 OZ BOTTLE, THEN 4 HRS BEFORE PROCEDURE TAKE 2ND DOSE   apixaban (ELIQUIS) 5 mg  Self No No   Sig: Take 1 tablet (5 mg total) by mouth 2 (two) times a day   apixaban (ELIQUIS) 5 mg  Self No No   Sig: Take 1 tablet (5 mg total) by mouth 2 (two) times a day   ketoconazole (NIZORAL) 2 % cream  Self No No   Sig: Apply topically 2 (two) times a day   magnesium Oxide (MAG-OX) 400 mg TABS  Self Yes No   Sig: Take 400 mg by mouth 2 (two) times a day   magnesium oxide (MAG-OX) 400 mg  Self No No   Sig: Take 1 tablet (400 mg total) by mouth 2 (two) times a day with meals   Patient taking differently: Take 800 mg by mouth daily     metoprolol tartrate (LOPRESSOR) 25 mg tablet  Self No No   Sig: Take 0 5 tablets (12 5 mg total) by mouth every 12 (twelve) hours   pantoprazole (PROTONIX) 40 mg tablet  Self No No   Sig: Take 1 tablet (40 mg total) by mouth 2 (two) times a day   Patient taking differently: Take 40 mg by mouth daily     potassium chloride (K-DUR,KLOR-CON) 20 mEq tablet  Self No No   Sig: Take 1 tablet (20 mEq total) by mouth 2 (two) times a day   Patient taking differently: Take 40 mEq by mouth daily        Facility-Administered Medications: None       Past Medical History:   Diagnosis Date    Disease of thyroid gland     Endometrial polyp     Gastroesophageal reflux disease without esophagitis 12/4/2017    Hand injury     LAST ASSESSED: [id number]    High risk medication use     LAST ASSESSED: [id number]    Hyperlipidemia     Hypertension     Renal colic     LAST ASSESSED: [id number]    Viral syndrome     LAST ASSESSED: [id number]    Vitamin D deficiency disease        Past Surgical History:   Procedure Laterality Date    CHOLECYSTECTOMY      COLON SURGERY      COLONOSCOPY N/A 6/15/2018    Procedure: COLONOSCOPY;  Surgeon: Justin Restrepo DO;  Location: [facility];  Service: Gastroenterology    COLOSTOMY      ENDOMETRIAL ABLATION  2007    THERMAL    ESOPHAGOGASTRODUODENOSCOPY N/A 4/6/2018    Procedure: ESOPHAGOGASTRODUODENOSCOPY (EGD);   Surgeon: Radha Frias MD;  Location: [facility];  Service: Gastroenterology   [address] [place] [address], 2002, 2007    LAPAROTOMY N/A 1/4/2018    Procedure: LAPAROTOMY EXPLORATORY, 2nd look, small bowel resection, ilieostomy closure via [place]own anastamosis transverse colostomy;  Surgeon: Alecia Bo DO;  Location:  MAIN OR;  Service: General    SD CLOSE ENTEROSTOMY N/A 7/11/2018    Procedure: COLOSTOMY TAKEDOWN / REVERSE COATES, ILEOCECECTOMY, LASER FLUORESCENCE ANGIOGRAPHY, LYSIS OF ADHESIONS;  Surgeon: Darline Ramesh DO;  Location:  MAIN OR;  Service: General    TX ESOPHAGOGASTRODUODENOSCOPY TRANSORAL DIAGNOSTIC N/A 10/21/2016    Procedure: EGD AND COLONOSCOPY;  Surgeon: Ermias Spivey MD;  Location: [facility];  Service: Gastroenterology    TX PART REMOVAL COLON W ANASTOMOSIS N/A 1/3/2018    Procedure: Exploratory laparotomy, RESECTION COLON SIGMOID, possible ostomy;  Surgeon: Darline Ramesh DO;  [facility]: [facility];  Service: General    THYROIDECTOMY      NEAR-TOTAL    THYROIDECTOMY, PARTIAL      TONSILLECTOMY AND ADENOIDECTOMY      TUBAL LIGATION  1997    URETERAL STENT PLACEMENT Bilateral 1/3/2018    Procedure: INSERTION STENT URETERAL;  Surgeon: Hussain Antony MD;  Location: [facility];  Service: Urology       Family History   Problem Relation Age of Onset    Diabetes type II Mother     Heart failure Father      I have reviewed and agree with the history as documented  Social History   Substance Use Topics    Smoking status: Former Smoker     Packs/day: 0 50     Years: 30 00     Quit date: 2003    Smokeless tobacco: Never Used    Alcohol use 1 2 oz/week     2 Standard drinks or equivalent per week      Comment: 1-2 mixed drinks per weekend        Review of Systems   Constitutional: Negative for chills and fever  HENT: Negative for rhinorrhea and sore throat  Eyes: Negative for pain, redness and visual disturbance  Respiratory: Negative for cough and shortness of breath  Cardiovascular: Negative for chest pain and leg swelling  Gastrointestinal: Positive for abdominal pain, diarrhea, nausea and vomiting  Endocrine: Negative for polydipsia and polyuria  Genitourinary: Negative for dysuria, frequency, hematuria, vaginal bleeding and vaginal discharge  Musculoskeletal: Negative for back pain and neck pain     Skin: Negative for rash and wound  Allergic/Immunologic: Negative for immunocompromised state  Neurological: Negative for weakness, numbness and headaches  Hematological: Does not bruise/bleed easily  Psychiatric/Behavioral: Negative for hallucinations and suicidal ideas  All other systems reviewed and are negative  Physical Exam  Physical Exam   Constitutional: She is oriented to person, place, and time  She appears well-developed and well-nourished  No distress  HENT:   Head: Normocephalic and atraumatic  Mouth/Throat: Oropharynx is clear and moist    Eyes: Conjunctivae are normal  Right eye exhibits no discharge  Left eye exhibits no discharge  No scleral icterus  Neck: Normal range of motion  Neck supple  Cardiovascular: Regular rhythm, normal heart sounds and intact distal pulses  Exam reveals no gallop and no friction rub  No murmur heard  Tachycardic rate  Pulmonary/Chest: Effort normal and breath sounds normal  No stridor  No respiratory distress  She has no wheezes  She has no rales  Abdominal: Soft  Bowel sounds are normal  She exhibits no distension and no mass  There is tenderness  There is no rebound and no guarding  No hernia  There is moderate tenderness to the left lower quadrant  There are no peritoneal signs  Incision looks good  It is well approximated  There is no erythema or swelling  No drainage  Musculoskeletal: Normal range of motion  She exhibits no edema, tenderness or deformity  No CVA tenderness  No calf tenderness  Neurological: She is alert and oriented to person, place, and time  She has normal strength  No sensory deficit  GCS eye subscore is 4  GCS verbal subscore is 5  GCS motor subscore is 6  Skin: Skin is warm and dry  No rash noted  She is not diaphoretic  Psychiatric: She has a normal mood and affect  Her behavior is normal    Vitals reviewed        Vital Signs  ED Triage Vitals [08/03/18 1310]   Temperature Pulse Respirations Blood Pressure SpO2   98 4 °F (36 9 °C) (!) 107 19 142/80 95 %      Temp Source Heart Rate Source Patient Position - Orthostatic VS BP Location FiO2 (%)   Temporal Monitor Sitting Left arm --      Pain Score       5           Vitals:    08/03/18 1310   BP: 142/80   Pulse: (!) 107   Patient Position - Orthostatic VS: Sitting       Visual Acuity      ED Medications  Medications   sodium chloride 0 9 % bolus 2,000 mL (not administered)   metoclopramide (REGLAN) injection 10 mg (not administered)   ketorolac (TORADOL) injection 30 mg (not administered)       Diagnostic Studies  Results Reviewed     Procedure Component Value Units Date/Time    Magnesium [[id number]]     Lab Status:  No result Specimen:  Blood     Phosphorus [[id number]]     Lab Status:  No result Specimen:  Blood     CBC and differential [[id number]]     Lab Status:  No result Specimen:  Blood     Comprehensive metabolic panel [[id number]]     Lab Status:  No result Specimen:  Blood     Lipase [[id number]]     Lab Status:  No result Specimen:  Blood     UA w Reflex to Microscopic [[id number]]     Lab Status:  No result Specimen:  Urine                  CT abdomen pelvis with contrast    (Results Pending)              Procedures  Procedures       Phone Contacts  ED Phone Contact    ED Course                               MDM  Number of Diagnoses or Management Options  Diagnosis management comments: CT scan was negative for obstruction  Anastomosis is intact  Laboratory evaluation was significant for hypokalemia  Replacement was ordered IV  Patient was hydrated  She was given Reglan  She continues to feel nauseated  She does not feel she can go home and tolerate p o  Consulted with Dr Pooja Carl, her surgeon, who in turn consulted with Internal Medicine for admission         Amount and/or Complexity of Data Reviewed  Clinical lab tests: ordered and reviewed  Tests in the radiology section of CPT®: ordered and reviewed  Discuss the patient with other providers: yes      CritCare Time    Disposition  Final diagnoses:   None     ED Disposition     None      Follow-up Information    None         Patient's Medications   Discharge Prescriptions    No medications on file     No discharge procedures on file      ED Provider  Electronically Signed by           Chi Delacruz MD  08/03/18 [id number]

## 2018-08-03 NOTE — PLAN OF CARE
Problem: PAIN - ADULT  Goal: Verbalizes/displays adequate comfort level or baseline comfort level  Interventions:  - Encourage patient to monitor pain and request assistance  - Assess pain using appropriate pain scale, 0-10 pain scale  - Administer analgesics based on type and severity of pain and evaluate response  - Implement non-pharmacological measures as appropriate and evaluate response  - Consider cultural and social influences on pain and pain management  - Notify physician/advanced practitioner if interventions unsuccessful or patient reports new pain   Outcome: Progressing      Problem: INFECTION - ADULT  Goal: Absence or prevention of progression during hospitalization  INTERVENTIONS:  - Assess and monitor for signs and symptoms of infection  - Monitor lab/diagnostic results  - Monitor all insertion sites, i e  indwelling lines  - [place] appropriate cooling/warming therapies per order  - Administer medications as ordered  - Instruct and encourage patient and family to use good hand hygiene technique  - Identify and instruct in appropriate isolation precautions for identified infection/condition   Outcome: Progressing      Problem: SAFETY ADULT  Goal: Patient will remain free of falls  INTERVENTIONS:  - Assess patient frequently for physical needs  -  Identify cognitive and physical deficits and behaviors that affect risk of falls  -  [place] fall precautions as indicated by assessment   Medium fall risk   - Educate patient/family on patient safety including physical limitations  - Instruct patient to call for assistance with activity based on assessment  - Modify environment to reduce risk of injury  - Consider OT/PT consult to assist with strengthening/mobility   Outcome: Progressing    Goal: Maintain or return to baseline ADL function  INTERVENTIONS:  -  Assess patient's ability to carry out ADLs; assess patient's baseline for ADL function and identify physical deficits which impact ability to perform ADLs (bathing, care of mouth/teeth, toileting, grooming, dressing, etc )  - Assess/evaluate cause of self-care deficits Patient is independent with ADL's  - Assess range of motion  - Assess patient's mobility; develop plan if impaired  - Assess patient's need for assistive devices and provide as appropriate  - Encourage maximum independence but intervene and supervise when necessary  ¯ Involve family in performance of ADLs  ¯ Assess for home care needs following discharge   ¯ Request OT consult to assist with ADL evaluation and planning for discharge  ¯ Provide patient education as appropriate   Outcome: Progressing    Goal: Maintain or return mobility status to optimal level  INTERVENTIONS:  - Assess patient's baseline mobility status (ambulation, transfers, stairs, etc )  Baseline: independent  - Identify cognitive and physical deficits and behaviors that affect mobility  - Identify mobility aids required to assist with transfers and/or ambulation (gait belt, sit-to-stand, lift, walker, cane, etc )  - [place] fall precautions as indicated by assessment   Medium fall risk   - Record patient progress and toleration of activity level on Mobility SBAR; progress patient to next Phase/Stage  - Instruct patient to call for assistance with activity based on assessment  - Request Rehabilitation consult to assist with strengthening/weightbearing, etc    Outcome: Progressing      Problem: DISCHARGE PLANNING  Goal: Discharge to home or other facility with appropriate resources  INTERVENTIONS:  - Identify barriers to discharge w/patient and caregiver  - Arrange for needed discharge resources and transportation as appropriate  - Identify discharge learning needs (meds, wound care, etc )  - Arrange for interpretive services to assist at discharge as needed  - Refer to Case Management Department for coordinating discharge planning if the patient needs post-hospital services based on physician/advanced practitioner order or complex needs related to functional status, cognitive ability, or social support system   Outcome: Progressing      Problem: Knowledge Deficit  Goal: Patient/family/caregiver demonstrates understanding of disease process, treatment plan, medications, and discharge instructions  Complete learning assessment and assess knowledge base    Interventions:  - Provide teaching at level of understanding  - Provide teaching via preferred learning methods   Outcome: Progressing      Problem: GASTROINTESTINAL - ADULT  Goal: Minimal or absence of nausea and/or vomiting  INTERVENTIONS:  - Administer IV fluids as ordered to ensure adequate hydration  - Maintain NPO status until nausea and vomiting are resolved  - Nasogastric tube as ordered  - Administer ordered antiemetic medications as needed  - Provide nonpharmacologic comfort measures as appropriate  - Advance diet as tolerated, if ordered  - Nutrition services referral to assist patient with adequate nutrition and appropriate food choices   Outcome: Progressing    Goal: Maintains or returns to baseline bowel function  INTERVENTIONS:  - Assess bowel function  - Encourage oral fluids to ensure adequate hydration  - Administer IV fluids as ordered to ensure adequate hydration  - Administer ordered medications as needed  - Encourage mobilization and activity  - Nutrition services referral to assist patient with appropriate food choices   Outcome: Progressing

## 2018-08-04 PROBLEM — D62 ACUTE BLOOD LOSS ANEMIA: Status: RESOLVED | Noted: 2018-01-01 | Resolved: 2018-08-04

## 2018-08-04 PROBLEM — R52 ACUTE PAIN: Status: RESOLVED | Noted: 2017-10-12 | Resolved: 2018-08-04

## 2018-08-04 PROBLEM — Z93.2 ILEOSTOMY IN PLACE (HCC): Status: RESOLVED | Noted: 2018-01-03 | Resolved: 2018-08-04

## 2018-08-04 PROBLEM — R11.14 BILIOUS VOMITING WITH NAUSEA: Status: RESOLVED | Noted: 2018-03-20 | Resolved: 2018-08-04

## 2018-08-04 PROBLEM — K59.09 CHRONIC CONSTIPATION: Status: RESOLVED | Noted: 2017-11-27 | Resolved: 2018-08-04

## 2018-08-04 LAB
ALBUMIN SERPL BCP-MCNC: 2.5 G/DL (ref 3.5–5)
ALP SERPL-CCNC: 71 U/L (ref 46–116)
ALT SERPL W P-5'-P-CCNC: 48 U/L (ref 12–78)
ANION GAP SERPL CALCULATED.3IONS-SCNC: 3 MMOL/L (ref 4–13)
ANION GAP SERPL CALCULATED.3IONS-SCNC: 6 MMOL/L (ref 4–13)
APTT PPP: 39 SECONDS (ref 24–36)
AST SERPL W P-5'-P-CCNC: 28 U/L (ref 5–45)
BASOPHILS # BLD AUTO: 0.05 THOUSANDS/ΜL (ref 0–0.1)
BASOPHILS NFR BLD AUTO: 1 % (ref 0–1)
BILIRUB SERPL-MCNC: 1.2 MG/DL (ref 0.2–1)
BUN SERPL-MCNC: 4 MG/DL (ref 5–25)
BUN SERPL-MCNC: 5 MG/DL (ref 5–25)
CALCIUM SERPL-MCNC: 8.2 MG/DL (ref 8.3–10.1)
CALCIUM SERPL-MCNC: 8.5 MG/DL (ref 8.3–10.1)
CHLORIDE SERPL-SCNC: 105 MMOL/L (ref 100–108)
CHLORIDE SERPL-SCNC: 110 MMOL/L (ref 100–108)
CO2 SERPL-SCNC: 29 MMOL/L (ref 21–32)
CO2 SERPL-SCNC: 30 MMOL/L (ref 21–32)
CREAT SERPL-MCNC: 0.75 MG/DL (ref 0.6–1.3)
CREAT SERPL-MCNC: 0.88 MG/DL (ref 0.6–1.3)
EOSINOPHIL # BLD AUTO: 0.22 THOUSAND/ΜL (ref 0–0.61)
EOSINOPHIL NFR BLD AUTO: 4 % (ref 0–6)
ERYTHROCYTE [DISTWIDTH] IN BLOOD BY AUTOMATED COUNT: 13.3 % (ref 11.6–15.1)
GFR SERPL CREATININE-BSD FRML MDRD: 75 ML/MIN/1.73SQ M
GFR SERPL CREATININE-BSD FRML MDRD: 91 ML/MIN/1.73SQ M
GLUCOSE SERPL-MCNC: 143 MG/DL (ref 65–140)
GLUCOSE SERPL-MCNC: 77 MG/DL (ref 65–140)
HCT VFR BLD AUTO: 33.6 % (ref 34.8–46.1)
HGB BLD-MCNC: 11 G/DL (ref 11.5–15.4)
INR PPP: 1.52 (ref 0.86–1.17)
LYMPHOCYTES # BLD AUTO: 1.66 THOUSANDS/ΜL (ref 0.6–4.47)
LYMPHOCYTES NFR BLD AUTO: 33 % (ref 14–44)
MAGNESIUM SERPL-MCNC: 1.7 MG/DL (ref 1.6–2.6)
MCH RBC QN AUTO: 29.9 PG (ref 26.8–34.3)
MCHC RBC AUTO-ENTMCNC: 32.7 G/DL (ref 31.4–37.4)
MCV RBC AUTO: 91 FL (ref 82–98)
MONOCYTES # BLD AUTO: 0.39 THOUSAND/ΜL (ref 0.17–1.22)
MONOCYTES NFR BLD AUTO: 8 % (ref 4–12)
NEUTROPHILS # BLD AUTO: 2.68 THOUSANDS/ΜL (ref 1.85–7.62)
NEUTS SEG NFR BLD AUTO: 54 % (ref 43–75)
PHOSPHATE SERPL-MCNC: 3.3 MG/DL (ref 2.7–4.5)
PLATELET # BLD AUTO: 219 THOUSANDS/UL (ref 149–390)
PMV BLD AUTO: 11.3 FL (ref 8.9–12.7)
POTASSIUM SERPL-SCNC: 3.4 MMOL/L (ref 3.5–5.3)
POTASSIUM SERPL-SCNC: 3.9 MMOL/L (ref 3.5–5.3)
PROT SERPL-MCNC: 5.5 G/DL (ref 6.4–8.2)
PROTHROMBIN TIME: 17.6 SECONDS (ref 11.8–14.2)
RBC # BLD AUTO: 3.68 MILLION/UL (ref 3.81–5.12)
SODIUM SERPL-SCNC: 141 MMOL/L (ref 136–145)
SODIUM SERPL-SCNC: 142 MMOL/L (ref 136–145)
WBC # BLD AUTO: 5 THOUSAND/UL (ref 4.31–10.16)

## 2018-08-04 PROCEDURE — 99024 POSTOP FOLLOW-UP VISIT: CPT | Performed by: SURGERY

## 2018-08-04 PROCEDURE — G8979 MOBILITY GOAL STATUS: HCPCS | Performed by: PHYSICAL THERAPIST

## 2018-08-04 PROCEDURE — 85730 THROMBOPLASTIN TIME PARTIAL: CPT | Performed by: PHYSICIAN ASSISTANT

## 2018-08-04 PROCEDURE — 99225 PR SBSQ OBSERVATION CARE/DAY 25 MINUTES: CPT | Performed by: INTERNAL MEDICINE

## 2018-08-04 PROCEDURE — 87493 C DIFF AMPLIFIED PROBE: CPT | Performed by: PHYSICIAN ASSISTANT

## 2018-08-04 PROCEDURE — 85610 PROTHROMBIN TIME: CPT | Performed by: PHYSICIAN ASSISTANT

## 2018-08-04 PROCEDURE — 85025 COMPLETE CBC W/AUTO DIFF WBC: CPT | Performed by: INTERNAL MEDICINE

## 2018-08-04 PROCEDURE — G8978 MOBILITY CURRENT STATUS: HCPCS | Performed by: PHYSICAL THERAPIST

## 2018-08-04 PROCEDURE — 80053 COMPREHEN METABOLIC PANEL: CPT | Performed by: INTERNAL MEDICINE

## 2018-08-04 PROCEDURE — 84100 ASSAY OF PHOSPHORUS: CPT | Performed by: PHYSICIAN ASSISTANT

## 2018-08-04 PROCEDURE — 83735 ASSAY OF MAGNESIUM: CPT | Performed by: INTERNAL MEDICINE

## 2018-08-04 PROCEDURE — 97162 PT EVAL MOD COMPLEX 30 MIN: CPT | Performed by: PHYSICAL THERAPIST

## 2018-08-04 PROCEDURE — 80048 BASIC METABOLIC PNL TOTAL CA: CPT | Performed by: INTERNAL MEDICINE

## 2018-08-04 RX ORDER — DIPHENOXYLATE HYDROCHLORIDE AND ATROPINE SULFATE 2.5; .025 MG/1; MG/1
1 TABLET ORAL 4 TIMES DAILY
Status: DISCONTINUED | OUTPATIENT
Start: 2018-08-04 | End: 2018-08-05

## 2018-08-04 RX ORDER — SODIUM CHLORIDE AND POTASSIUM CHLORIDE .9; .15 G/100ML; G/100ML
50 SOLUTION INTRAVENOUS CONTINUOUS
Status: DISCONTINUED | OUTPATIENT
Start: 2018-08-04 | End: 2018-08-05

## 2018-08-04 RX ORDER — POTASSIUM CHLORIDE 20MEQ/15ML
20 LIQUID (ML) ORAL ONCE
Status: COMPLETED | OUTPATIENT
Start: 2018-08-04 | End: 2018-08-04

## 2018-08-04 RX ORDER — PANTOPRAZOLE SODIUM 40 MG/1
40 TABLET, DELAYED RELEASE ORAL DAILY
Refills: 0 | Status: CANCELLED
Start: 2018-08-05

## 2018-08-04 RX ADMIN — METOPROLOL TARTRATE 25 MG: 25 TABLET ORAL at 21:21

## 2018-08-04 RX ADMIN — POTASSIUM CHLORIDE 20 MEQ: 20 SOLUTION ORAL at 13:58

## 2018-08-04 RX ADMIN — APIXABAN 5 MG: 5 TABLET, FILM COATED ORAL at 17:12

## 2018-08-04 RX ADMIN — DIPHENOXYLATE HYDROCHLORIDE AND ATROPINE SULFATE 1 TABLET: 2.5; .025 TABLET ORAL at 13:58

## 2018-08-04 RX ADMIN — PANTOPRAZOLE SODIUM 40 MG: 40 TABLET, DELAYED RELEASE ORAL at 08:24

## 2018-08-04 RX ADMIN — APIXABAN 5 MG: 5 TABLET, FILM COATED ORAL at 08:25

## 2018-08-04 RX ADMIN — POTASSIUM CHLORIDE 20 MEQ: 1500 TABLET, EXTENDED RELEASE ORAL at 08:50

## 2018-08-04 RX ADMIN — MAGNESIUM OXIDE TAB 400 MG (241.3 MG ELEMENTAL MG) 800 MG: 400 (241.3 MG) TAB at 08:24

## 2018-08-04 RX ADMIN — DIPHENOXYLATE HYDROCHLORIDE AND ATROPINE SULFATE 1 TABLET: 2.5; .025 TABLET ORAL at 21:21

## 2018-08-04 RX ADMIN — POTASSIUM CHLORIDE AND SODIUM CHLORIDE 100 ML/HR: 900; 300 INJECTION, SOLUTION INTRAVENOUS at 05:31

## 2018-08-04 RX ADMIN — METOPROLOL TARTRATE 25 MG: 25 TABLET ORAL at 08:25

## 2018-08-04 RX ADMIN — SODIUM CHLORIDE AND POTASSIUM CHLORIDE 50 ML/HR: .9; .15 SOLUTION INTRAVENOUS at 14:00

## 2018-08-04 NOTE — ASSESSMENT & PLAN NOTE
Rate currently controlled  -Anticoagulated on Eliquis  -Has appointment with Dr Murphy Guzmán (cardiology) on 8/6/18  -Continue eliquis, metoprolol

## 2018-08-04 NOTE — PROGRESS NOTES
Progress Note - General Surgery   Jakob Montoya 46 y o  female MRN: [id number]  Unit/Bed#: 401-01 Encounter: [id number]    Assessment:  50-year-old female who is status post Pj's reversal and ileocecectomy with primary anastomosis on July 11, 2018, admitted with hypokalemia, persistent nausea vomiting  Patient had similar episodes after her initial Pj's procedure  CT scan showing an intact anastomosis ease no evidence of bowel obstruction  Unclear etiology,  less likely mechanical possibly psychosomatic  Previous episode resolved spontaneously although that time a gastric emptying study was being considered  Currently today feeling better  And tolerated some liquids  Does report little bit of gagging but no additional vomiting  Nausea present but improved  Hypokalemia improving    Plan:  -IV fluids  -continue to monitor potassium and I agree with p o  Supplementation  -diet as tolerated, will order additional dietary supplements including Ensure chocolate flavor as per patient request  -out of bed bed/ambulate  -potential discharge today patient continues to tolerate a diet  -continue Zofran, may add Compazine or possibly Decadron for nausea    Subjective/Objective   Chief Complaint:  Nausea    Subjective:  Feeling a bit better today  Tolerating liquid clears and some yogurt  No vomiting  Nausea still there but better    Objective:     Blood pressure 90/52, pulse 67, temperature 98 4 °F (36 9 °C), temperature source Temporal, resp  rate 20, height 5' 1" (1 549 m), weight 72 4 kg (159 lb 9 8 oz), SpO2 96 %  ,Body mass index is 30 16 kg/m²        Intake/Output Summary (Last 24 hours) at 08/04/18 1116  Last data filed at 08/04/18 0939   Gross per 24 hour   Intake             1340 ml   Output                0 ml   Net             1340 ml       Invasive Devices     Peripheral Intravenous Line            Peripheral IV 08/03/18 Right Antecubital less than 1 day                Physical Exam: General:  No acute distress, resting comfortably  HEENT:  Normocephalic, atraumatic, trachea midline  CV:  S1-S2 audible, regular rate rhythm  Pulmonary:  Clear auscultation bilaterally no wheezes rales rhonchi  GI:  Abdomen nondistended, soft nontender, again ostomy site healing well  MSK lower extremities without clubbing, cyanosis, edema  Neurologic:  Alert and x3, cranial 2 through 12 grossly intact  Psych:  Mood and affect appropriate patient feels little better and looks like she is in better spirits today              Lab, Imaging and other studies:  I have personally reviewed pertinent lab results    , CBC:   Lab Results   Component Value Date    WBC 5 00 08/04/2018    HGB 11 0 (L) 08/04/2018    HCT 33 6 (L) 08/04/2018    MCV 91 08/04/2018     08/04/2018    MCH 29 9 08/04/2018    MCHC 32 7 08/04/2018    RDW 13 3 08/04/2018    MPV 11 3 08/04/2018   , CMP:   Lab Results   Component Value Date     08/04/2018    K 3 4 (L) 08/04/2018     08/04/2018    CO2 30 08/04/2018    ANIONGAP 6 08/04/2018    BUN 5 08/04/2018    CREATININE 0 75 08/04/2018    GLUCOSE 77 08/04/2018    CALCIUM 8 2 (L) 08/04/2018    AST 28 08/04/2018    ALT 48 08/04/2018    ALKPHOS 71 08/04/2018    PROT 5 5 (L) 08/04/2018    BILITOT 1 20 (H) 08/04/2018    EGFR 91 08/04/2018   , Coagulation:   Lab Results   Component Value Date    INR 1 52 (H) 08/04/2018   , Urinalysis:   Lab Results   Component Value Date    COLORU Yellow 08/03/2018    CLARITYU Clear 08/03/2018    SPECGRAV <=1 005 08/03/2018    PHUR 5 5 08/03/2018    LEUKOCYTESUR Negative 08/03/2018    NITRITE Negative 08/03/2018    PROTEINUA Negative 08/03/2018    GLUCOSEU Negative 08/03/2018    KETONESU 40 (2+) (A) 08/03/2018    BILIRUBINUR Interference- unable to analyze (A) 08/03/2018    BLOODU Trace-Intact 08/03/2018   , Amylase: No results found for: AMYLASE, Lipase:   Lab Results   Component Value Date    LIPASE 173 08/03/2018     VTE Pharmacologic Prophylaxis:  On Eliquis  VTE Mechanical Prophylaxis: sequential compression device

## 2018-08-04 NOTE — H&P
[facility] [place] [facility]  H&P- Sea Kellogg 1965, 46 y o  female MRN: [id number]    Unit/Bed#: 401-01 Encounter: [id number]    Primary Care Provider: Tomma Lefort, DO   Date and time admitted to hospital: 8/3/2018  1:06 PM        * Intractable vomiting with nausea   Assessment & Plan    -Reports that she has had episodes of vomiting since her initial surgery in January of this year  -She reports increase everyday vomiting for about 1 week  -probably related to recent surgery (ostomy reversal) on 7/11/2018  -No fever or chills, no diarrhea, but reports loose stool  -Admit to med surg observation  -IV normal saline with KCL  -Monitor electrolytes  -Zofran PRN  -Surgery consult  -AM labs        Paroxysmal atrial fibrillation (HCC)   Assessment & Plan    Rate currently controlled  -Anticoagulated on Eliquis  -Has appointment with Dr Paulie Ovalle (cardiology) on 8/6/18  -Continue eliquis, metoprolol          Hypokalemia   Assessment & Plan    -Potassium at 2 6  -She has reports episodes of hypokalemia probably due to ongoing episodes of vomiting  -Replace potassium  -repeat BMP  AM labs        Esophageal reflux   Assessment & Plan    -Continue protonix              VTE Prophylaxis: Apixaban (Eliquis)  / sequential compression device   Code Status: level 1 Full code  POLST: POLST is not applicable to this patient  Discussion with family: None    Anticipated Length of Stay:  Patient will be admitted on an Observation basis with an anticipated length of stay of  < 2 midnights  Justification for Hospital Stay: Intractable nausea and vomiting, Hypokalemia    Total Time for Visit, including Counseling / Coordination of Care: 30 minutes  Greater than 50% of this total time spent on direct patient counseling and coordination of care      Chief Complaint:   Nausea and vomiting    History of Present Illness:    Sea Kellogg is a 46 y o  female who presents to the ER complaining of increased episodes of nausea and vomiting for approximately 1 week  She states that  She has been having episodes of nausea and vomiting since her surgery in January of this year which had improved  She did an Ileostomy reversal surgery on 7/11/18 and reports that since then she has been having almost daily episodes of of nausea and vomiting for about 1 week getting progressively worst  She also reports having intermittent abdominal pain  She states that Dr Janet Obrien recommended that she come to the ER for further evaluation  She denies chest pain, SOB, dizziness, Weakness, fever, chills  She admits to feeling very tired  Review of Systems:    Review of Systems   Constitutional: Positive for fatigue  Negative for activity change, appetite change, fever and unexpected weight change  HENT: Negative for congestion, sore throat, tinnitus, trouble swallowing and voice change  Eyes: Negative for photophobia, redness and visual disturbance  Respiratory: Negative for cough, chest tightness, shortness of breath and wheezing  Cardiovascular: Negative for chest pain, palpitations and leg swelling  Gastrointestinal: Positive for abdominal pain, nausea and vomiting  Negative for diarrhea  Endocrine: Negative for polydipsia, polyphagia and polyuria  Genitourinary: Negative for dysuria, flank pain, frequency and hematuria  Musculoskeletal: Negative for arthralgias, back pain, gait problem and neck pain  Skin: Negative for color change, pallor and rash  Allergic/Immunologic: Negative for environmental allergies and food allergies  Neurological: Negative for dizziness, syncope, weakness, light-headedness and headaches  Hematological: Negative for adenopathy  Does not bruise/bleed easily  Psychiatric/Behavioral: Negative for agitation, confusion and sleep disturbance         Past Medical and Surgical History:     Past Medical History:   Diagnosis Date    Disease of thyroid gland     Endometrial polyp     Gastroesophageal reflux disease without esophagitis 12/4/2017    Hand injury     LAST ASSESSED: [id number]    High risk medication use     LAST ASSESSED: [id number]    Hyperlipidemia     Hypertension     Renal colic     LAST ASSESSED: [id number]    Viral syndrome     LAST ASSESSED: [id number]    Vitamin D deficiency disease        Past Surgical History:   Procedure Laterality Date    CHOLECYSTECTOMY      COLON SURGERY      COLONOSCOPY N/A 6/15/2018    Procedure: COLONOSCOPY;  Surgeon: Carrie Cabrales DO;  Location: [facility];  Service: Gastroenterology    COLOSTOMY      ENDOMETRIAL ABLATION  2007    THERMAL    ESOPHAGOGASTRODUODENOSCOPY N/A 4/6/2018    Procedure: ESOPHAGOGASTRODUODENOSCOPY (EGD);   Surgeon: Miya Quintana MD;  Location: [facility];  Service: Gastroenterology   [address] [place] [address], 2002, 2007    LAPAROTOMY N/A 1/4/2018    Procedure: LAPAROTOMY EXPLORATORY, 2nd look, small bowel resection, ilieostomy closure via handsown anastamosis transverse colostomy;  Surgeon: Brent Vela DO;  Location:  MAIN OR;  Service: General    MD CLOSE ENTEROSTOMY N/A 7/11/2018    Procedure: COLOSTOMY TAKEDOWN / Durenda Blase, ILEOCECECTOMY, LASER FLUORESCENCE ANGIOGRAPHY, LYSIS OF ADHESIONS;  Surgeon: Mel Leary DO;  [facility];  Service: General    MD ESOPHAGOGASTRODUODENOSCOPY TRANSORAL DIAGNOSTIC N/A 10/21/2016    Procedure: EGD AND COLONOSCOPY;  Surgeon: Jamir Jaramillo MD;  [facility]: [facility];  Service: Gastroenterology    MD PART REMOVAL COLON W ANASTOMOSIS N/A 1/3/2018    Procedure: Exploratory laparotomy, RESECTION COLON SIGMOID, possible ostomy;  Surgeon: Mel Leary DO;  [facility]: [facility];  Service: General    THYROIDECTOMY      NEAR-TOTAL    THYROIDECTOMY, PARTIAL      TONSILLECTOMY AND ADENOIDECTOMY      TUBAL LIGATION  1997    URETERAL STENT PLACEMENT Bilateral 1/3/2018    Procedure: INSERTION STENT URETERAL;  Surgeon: Jossie Fuentes MD;  Location: [facility];  Service: Urology Meds/Allergies:    Prior to Admission medications    Medication Sig Start Date End Date Taking?  Authorizing Provider   apixaban (ELIQUIS) 5 mg Take 1 tablet (5 mg total) by mouth 2 (two) times a day 7/17/18  Yes Fredrick Mercedes MD   LORazepam (ATIVAN) 0 5 mg tablet Take 1 tablet (0 5 mg total) by mouth daily at bedtime as needed for anxiety for up to 10 days Takes PRN at bedtime 7/18/18 8/3/18 Yes Reinaldo Henry MD   magnesium oxide (MAG-OX) 400 mg Take 1 tablet (400 mg total) by mouth 2 (two) times a day with meals  Patient taking differently: Take 800 mg by mouth daily   3/27/18  Yes Inna Shows, DO   metoprolol tartrate (LOPRESSOR) 25 mg tablet Take 0 5 tablets (12 5 mg total) by mouth every 12 (twelve) hours  Patient taking differently: Take 25 mg by mouth every 12 (twelve) hours   7/18/18  Yes Reinaldo Henry MD   pantoprazole (PROTONIX) 40 mg tablet Take 1 tablet (40 mg total) by mouth 2 (two) times a day  Patient taking differently: Take 40 mg by mouth daily   4/26/18  Yes Jacob Rivers PA-C   potassium chloride (K-DUR,KLOR-CON) 20 mEq tablet Take 1 tablet (20 mEq total) by mouth 2 (two) times a day  Patient not taking: Reported on 8/3/2018  7/6/18   Silvia Lai PA-C   apixaban (ELIQUIS) 5 mg Take 1 tablet (5 mg total) by mouth 2 (two) times a day 7/17/18 8/3/18  Fredrick Mercedes MD   Biotin 40790 MCG TABS Take 1 tablet by mouth daily  8/3/18  Historical Provider, MD   ketoconazole (NIZORAL) 2 % cream Apply topically 2 (two) times a day 4/30/18 8/3/18  Silvia Lai PA-C   magnesium Oxide (MAG-OX) 400 mg TABS Take 400 mg by mouth 2 (two) times a day 7/19/18 8/3/18  Historical Provider, MD   Multiple Vitamin (MULTIVITAMIN) tablet Take 1 tablet by mouth daily  8/3/18  Historical Provider, MD   SUPREP BOWEL PREP KIT 17 5-3 13-1 6 GM/180ML SOLN DAY BEFORE USE 6 OZ BOTTLE, THEN 4 HRS BEFORE PROCEDURE TAKE 2ND DOSE 7/5/18 8/3/18  Jacob Rivers PA-C     I have reviewed home medications with patient personally  Allergies: No Known Allergies    Social History:     Marital Status: /Civil Union   Occupation:   Patient Pre-hospital Living Situation: Lives with family  Patient Pre-hospital Level of Mobility: Active  Patient Pre-hospital Diet Restrictions: None reported  Substance Use History:   History   Alcohol Use    1 2 oz/week    2 Standard drinks or equivalent per week     Comment: 1-2 mixed drinks per weekend     History   Smoking Status    Former Smoker    Packs/day: 0 50    Years: 30 00    Quit date: 2003   Smokeless Tobacco    Never Used     History   Drug Use No       Family History:    Family History   Problem Relation Age of Onset    Diabetes type II Mother     Heart failure Father        Physical Exam:     Vitals:   Blood Pressure: 117/67 (08/03/18 2323)  Pulse: 77 (08/03/18 2323)  Temperature: 98 2 °F (36 8 °C) (08/03/18 2323)  Temp Source: Temporal (08/03/18 2323)  Respirations: 20 (08/03/18 2323)  Height: 5' 1" (154 9 cm) (08/03/18 1723)  Weight - Scale: 73 8 kg (162 lb 11 2 oz) (08/03/18 1933)  SpO2: 95 % (08/03/18 2323)    Physical Exam   Constitutional: She is oriented to person, place, and time  She appears well-developed and well-nourished  No distress  HENT:   Head: Normocephalic and atraumatic  Mouth/Throat: Oropharynx is clear and moist  No oropharyngeal exudate  Eyes: EOM are normal  Right eye exhibits no discharge  Left eye exhibits no discharge  Neck: Normal range of motion  Neck supple  No JVD present  Cardiovascular: Normal rate  Exam reveals no friction rub  No murmur heard  Pulmonary/Chest: Effort normal and breath sounds normal  No stridor  No respiratory distress  She has no wheezes  Abdominal: Soft  She exhibits no distension  There is tenderness  Intact surgical dressing,    Musculoskeletal: Normal range of motion  She exhibits no edema, tenderness or deformity  Neurological: She is oriented to person, place, and time  Skin: Skin is warm and dry  No rash noted  No erythema  No pallor  Psychiatric: She has a normal mood and affect  Additional Data:     Lab Results: I have personally reviewed pertinent reports  Results from last 7 days  Lab Units 08/03/18  1342   WBC Thousand/uL 6 30   HEMOGLOBIN g/dL 14 4   HEMATOCRIT % 42 1   PLATELETS Thousands/uL 325   NEUTROS PCT % 63   LYMPHS PCT % 26   MONOS PCT % 9   EOS PCT % 2       Results from last 7 days  Lab Units 08/03/18  1946 08/03/18  1342   SODIUM mmol/L 140 139   POTASSIUM mmol/L 2 8* 2 6*   CHLORIDE mmol/L 102 95*   CO2 mmol/L 27 28   BUN mg/dL 5 7   CREATININE mg/dL 0 77 0 95   CALCIUM mg/dL 8 4 9 5   TOTAL PROTEIN g/dL  --  7 5   BILIRUBIN TOTAL mg/dL  --  2 00*   ALK PHOS U/L  --  100   ALT U/L  --  64   AST U/L  --  40   GLUCOSE RANDOM mg/dL 71 104                   Imaging: I have personally reviewed pertinent reports  CT abdomen pelvis with contrast   Final Result by Ludwig Skelton MD (08/03 1607)      Small volume of air and fluid in the anterior abdominal wall subcutaneous tissues related to recent colostomy takedown  Intact colorectal anastomosis  No bowel obstruction  Stable myomatous uterus  Workstation performed: [id number]             EKG, Pathology, and Other Studies Reviewed on Admission:   · EKG:     Allscripts / Epic Records Reviewed: Yes     ** Please Note: This note has been constructed using a voice recognition system   **

## 2018-08-04 NOTE — CASE MANAGEMENT
Thank you,  Ash Aqq  [facility] Utilization Review Department  Phone: [phone]; Fax [phone]  ATTENTION: The Network Utilization Review Department is now centralized for our 9 Facilities  Make a note that we have a new phone and fax numbers for our Department  Please call with any questions or concerns to [phone] and carefully follow the prompts so that you are directed to the right person  All voicemails are confidential  Fax any determinations, approvals, denials, and requests for initial or continue stay review clinical to [phone]  Due to HIGH CALL volume, it would be easier if you could please send faxed requests to expedite your requests and in part, help us provide discharge notifications faster  Initial Clinical Review    Admission: Date/Time/Statement: [email or web address] UPGRADED TO INPT 8/5@ 1024   D/T HYPERNATREMIA AND HYPOMAGNESEMIA   08/05/18 1025  Inpatient Admission Once     Transfer Service: General Medicine    Expected Discharge Date: 08/05/18       Question Answer Comment   Admitting Physician DEBORA MATT    Level of Care Med Surg    Estimated length of stay More than 2 Midnights    Certification I certify that inpatient services are medically necessary for this patient for a duration of greater than two midnights  See H&P and MD Progress Notes for additional information about the patient's course of treatment  ED: Date/Time/Mode of Arrival:   ED Arrival Information     Expected Arrival Acuity Means of Arrival Escorted By Service Admission Type    8/3/2018 11:56 8/3/2018 12:52 Urgent Walk-In Self General Medicine Urgent    Arrival Complaint    Vomiting          Chief Complaint:   Chief Complaint   Patient presents with    Vomiting     Pt reports she has been vomiting for about a week  Pt had a ostomy reversal done on 7/11/18  Pt was instructed by Dr Justina Mckoy to be seen          History of Illness: Jose Carlos Solitario is a 46 y o  female who presents to the ER complaining of increased episodes of nausea and vomiting for approximately 1 week  She states that  She has been having episodes of nausea and vomiting since her surgery in January of this year which had improved  She did an Ileostomy reversal surgery on 7/11/18 and reports that since then she has been having almost daily episodes of of nausea and vomiting for about 1 week getting progressively worst  She also reports having intermittent abdominal pain  She states that Dr Jarvis Vickers recommended that she come to the ER for further evaluation  She admits to feeling very tired       ED Vital Signs:   ED Triage Vitals [08/03/18 1310]   Temperature Pulse Respirations Blood Pressure SpO2   98 4 °F (36 9 °C) (!) 107 19 142/80 95 %      Temp Source Heart Rate Source Patient Position - Orthostatic VS BP Location FiO2 (%)   Temporal Monitor Sitting Left arm --      Pain Score       5        Wt Readings from Last 1 Encounters:   08/04/18 72 4 kg (159 lb 9 8 oz)       Vital Signs (abnormal):   08/03/18 1723   97 2 °F (36 2 °C)  72  16  124/92  --  98 %  None (Room air)  Lying   08/03/18 1600  --  69  18  116/72  --  97 %  None (Room air)  Lying   08/03/18 1320  --  --  --  --  --  --  None (Room air)  --   08/03/18 1310  98 4 °F (36 9 °C)   107  19  142/80  --  95 %  None (Room air)  Sitting         Abnormal Labs/Diagnostic Test Results:   Lab Units 08/03/18  1342   WBC Thousand/uL 6 30   HEMOGLOBIN g/dL 14 4   HEMATOCRIT % 42 1   PLATELETS Thousands/uL 325   NEUTROS PCT % 63   LYMPHS PCT % 26   MONOS PCT % 9   EOS PCT % 2         Results from last 7 days  Lab Units 08/03/18  1946 08/03/18  1342   SODIUM mmol/L 140 139   POTASSIUM mmol/L 2 8* 2 6*   CHLORIDE mmol/L 102 95*   CO2 mmol/L 27 28   BUN mg/dL 5 7   CREATININE mg/dL 0 77 0 95   CALCIUM mg/dL 8 4 9 5   TOTAL PROTEIN g/dL  --  7 5   BILIRUBIN TOTAL mg/dL  --  2 00*   ALK PHOS U/L  --  100   ALT U/L  --  64   AST U/L  --  40   GLUCOSE RANDOM mg/dL 71 104                   Imaging: I have personally reviewed pertinent reports        CT abdomen pelvis with contrast   Final Result by Rocío Pride MD (08/03 1607)       Small volume of air and fluid in the anterior abdominal wall subcutaneous tissues related to recent colostomy takedown        Intact colorectal anastomosis  No bowel obstruction        Stable myomatous uterus            ED Treatment:   Medication Administration from 08/03/2018 1155 to 08/03/2018 1709       Date/Time Order Dose Route Action Action by Comments     08/03/2018 1538 sodium chloride 0 9 % bolus 2,000 mL 0 mL Intravenous Stopped Perla Rm RN      08/03/2018 1342 sodium chloride 0 9 % bolus 2,000 mL 2,000 mL Intravenous New Bag Pam Alexandre RN      08/03/2018 1343 metoclopramide (REGLAN) injection 10 mg 10 mg Intravenous Given Pma Alexandre RN      08/03/2018 1343 ketorolac (TORADOL) injection 30 mg 30 mg Intravenous Given Pam Alexandre RN      08/03/2018 1524 iohexol (OMNIPAQUE) 240 MG/ML solution 50 mL 50 mL Oral Given [place]cl[place]      08/03/2018 1538 potassium chloride 20 mEq IVPB (premix) 40 mEq Intravenous [address] Perla Rm RN      08/03/2018 1524 iohexol (OMNIPAQUE) 350 MG/ML injection (MULTI-DOSE) 100 mL 100 mL Intravenous Given [facility]           Past Medical/Surgical History:    Active Ambulatory Problems     Diagnosis Date Noted    Diverticulitis of large intestine with perforation without abscess or bleeding 12/04/2017    Esophageal reflux 12/04/2017    Hyperlipidemia 12/04/2017    Hypertension 12/04/2017    Hypokalemia 12/04/2017    Hypoalbuminemia 12/26/2017    Low serum prealbumin 12/27/2017    Severe protein-calorie malnutrition ([facility] [address] ) 12/27/2017    Microscopic hematuria 12/28/2017    Acute blood loss anemia 01/01/2018    Diverticulitis of colon 12/26/2017    Hematuria 01/03/2018    Ileostomy in place [facility]) 01/03/2018    Paroxysmal atrial fibrillation ([address] ) 01/05/2018    Ileus ([facility] [address] ) 01/18/2018    Ache in joint 09/15/2017    Acute pain 10/12/2017    Anemia 09/14/2016    Chronic constipation 11/27/2017    Diverticulosis 03/17/2014    Erysipelas 12/12/2014    Fatigue 06/21/2016    Leg pain, left 07/17/2015    Non-rheumatic mitral regurgitation 07/22/2016    Primary osteoarthritis of left knee 73/34/0014    Systolic murmur 20/63/2448    Thyroid disorder 09/14/2016    Vaginal dryness, menopausal 02/01/2016    Vitamin D deficiency 06/21/2016    Vulvitis 08/14/2013    Dehydration 02/13/2018    Intractable vomiting with nausea 02/13/2018    Gastroesophageal reflux disease without esophagitis 03/20/2018    Bilious vomiting with nausea 03/20/2018    Hair loss 04/26/2018    Adenomatous polyps 04/26/2018    Screening for colon cancer 04/26/2018    Hair abnormality 04/30/2018    S/P colostomy takedown 07/25/2018    Diarrhea 07/25/2018     Resolved Ambulatory Problems     Diagnosis Date Noted    Pericolonic abscess 12/04/2017    Hypomagnesemia 12/28/2017    Colostomy care ([facility] [address] ) 02/07/2018    Colostomy in place [facility]) 06/25/2018     Past Medical History:   Diagnosis Date    Disease of thyroid gland     Endometrial polyp     Gastroesophageal reflux disease without esophagitis 12/4/2017    Hand injury     High risk medication use     Hyperlipidemia     Hypertension     Renal colic     Viral syndrome     Vitamin D deficiency disease        Admitting Diagnosis: Vomiting [R11 10]  Intractable vomiting [R11 10]    Age/Sex: 46 y o  female    Assessment/Plan:   * Intractable vomiting with nausea   Assessment & Plan     -Reports that she has had episodes of vomiting since her initial surgery in January of this year  -She reports increase everyday vomiting for about 1 week  -probably related to recent surgery (ostomy reversal) on 7/11/2018  -No fever or chills, no diarrhea, but reports loose stool  -Admit to med surg observation  -IV normal saline with KCL  -Monitor electrolytes  -Zofran PRN  -Surgery consult  -AM labs          Paroxysmal atrial fibrillation (HCC)   Assessment & Plan     Rate currently controlled  -Anticoagulated on Eliquis  -Has appointment with Dr Sissy Dominguez (cardiology) on 8/6/18  -Continue eliquis, metoprolol             Hypokalemia   Assessment & Plan     -Potassium at 2 6  -She has reports episodes of hypokalemia probably due to ongoing episodes of vomiting  -Replace potassium  -repeat BMP  AM labs          Esophageal reflux   Assessment & Plan     -Continue protonix                   VTE Prophylaxis: Apixaban (Eliquis)  / sequential compression device   Code Status: level 1 Full code  POLST: POLST is not applicable to this patient  Discussion with family: None     Anticipated Length of Stay:  Patient will be admitted on an Observation basis with an anticipated length of stay of  < 2 midnights     Justification for Hospital Stay: Intractable nausea and vomiting, Hypokalemia     PROGRESS NOTE 8/5:         * Intractable vomiting with nausea   Assessment & Plan     Continue to increase diet as tolerated, continue with protein supplement shakes      Patient still with very poor p o  intake, not maintaining adequate oral hydration, see notes below           Hypernatremia   Assessment & Plan     Patient with worsening free water deficit, changed to half normal saline, encourage free water intake by mouth      Patient likely with ongoing secretory diarrhea causing significant free water deficit      Patient not maintaining adequate oral intake           Hypomagnesemia   Assessment & Plan     Patient with worsening hypomagnesemia despite oral supplement, patient will require IV magnesium today          Hypokalemia   Assessment & Plan     Improving, will change to oral potassium supplement          Diarrhea   Assessment & Plan     Improved in the last 12 hours with the initiation of Lomotil, follow-up C diff testing, recent C diff testing negative, I am not suspicious of acute infection           Anemia   Assessment & Plan     Hemoglobin stable     Hemoglobin did decrease significantly, no signs of GI bleed, suspected this is hemodilution, check repeat CBC tomorrow          Paroxysmal atrial fibrillation (HCC)   Assessment & Plan     Blood pressure running low, decreased Lopressor 12 5 b i d   -Anticoagulated on Eliquis                Hypertension   Assessment & Plan     Blood pressure running low, beta-blocker dosing decreased           Esophageal reflux   Assessment & Plan     -Continue protonix                VTE Pharmacologic Prophylaxis:   Pharmacologic: Apixaban (Eliquis)  Mechanical VTE Prophylaxis in Place:  Yes     Patient Centered Rounds: I have performed bedside rounds with nursing staff today      Current Length of Stay: 0 day(s)     Current Patient Status: Observation   Certification Statement: The patient will continue to require additional inpatient hospital stay         Admission Orders:  [place] [facility]  OOB  PT/OT    Scheduled Meds:   Current Facility-Administered Medications:  acetaminophen 650 mg Oral Q6H PRN Benedict Joe, PA-C    apixaban 5 mg Oral BID Benedict Joe, PA-C    LORazepam 0 5 mg Oral HS PRN Benedict Joe, PA-C    magnesium oxide 800 mg Oral Daily Benedict Joe, PA-C    metoprolol tartrate 25 mg Oral Q12H [address] Benedict Joe, PA-C    ondansetron 4 mg Intravenous Q6H PRN Benedict Joe, PA-C    pantoprazole 40 mg Oral Daily Benedict Joe, PA-C    potassium chloride 20 mEq Oral BID Benedict Joe, PA-C    sodium chloride 0 9 % with KCl 40 mEq/L 100 mL/hr Intravenous Continuous Ludwig Skelton DO Last Rate: 100 mL/hr (08/04/18 0531)     Continuous Infusions:   sodium chloride 0 9 % with KCl 40 mEq/L 100 mL/hr Last Rate: 100 mL/hr (08/04/18 0531)     PRN Meds:   acetaminophen    LORazepam    Ondansetron X1

## 2018-08-04 NOTE — PROGRESS NOTES
Progress Note - Kj Reynoso 1965, 46 y o  female MRN: [id number]    Unit/Bed#: 401-01 Encounter: [id number]    Primary Care Provider: Ermelinda Duffy DO   Date and time admitted to hospital: 8/3/2018  1:06 PM        * Intractable vomiting with nausea   Assessment & Plan    Continue to gradually advance diet, continue with IV fluid but decreased rate, follow up a m  Labs  Repeat BMP at 18:00        Hypokalemia   Assessment & Plan    Improving, discontinue telemetry  Anemia   Assessment & Plan    Follow-up repeat CBC tomorrow  Hemoglobin did decrease significantly, no signs of GI bleed, suspected this is hemodilution         Paroxysmal atrial fibrillation (HCC)   Assessment & Plan    Rate currently controlled  -Anticoagulated on Eliquis  -Has appointment with Dr Srinivasan Cross (cardiology) on 18  -Continue eliquis, metoprolol          Hypertension   Assessment & Plan    Continue current dosing of beta-blocker, monitor blood pressure, blood pressure is somewhat on the low side  Esophageal reflux   Assessment & Plan    -Continue protonix        Diarrhea   Assessment & Plan    Start Lomotil            VTE Pharmacologic Prophylaxis:   Pharmacologic: Apixaban (Eliquis)  Mechanical VTE Prophylaxis in Place: Yes    Patient Centered Rounds: I have performed bedside rounds with nursing staff today  Current Length of Stay: 0 day(s)    Current Patient Status: Observation   Certification Statement:  Continue with observation status, follow up a m  labs          Code Status: Level 1 - Full Code      Subjective:   No pain, worsening diarrhea today  Objective:     Vitals:   Temp (24hrs), Av 9 °F (36 6 °C), Min:97 2 °F (36 2 °C), Max:98 4 °F (36 9 °C)    HR:  [64-77] 67  Resp:  [16-20] 20  BP: ()/(52-92) 90/52  SpO2:  [95 %-98 %] 96 %  Body mass index is 30 16 kg/m²  Input and Output Summary (last 24 hours):        Intake/Output Summary (Last 24 hours) at 18 1400  Last data filed at 08/04/18 1246   Gross per 24 hour   Intake             1540 ml   Output                0 ml   Net             1540 ml       Physical Exam:     Physical Exam   Constitutional: She is oriented to person, place, and time  She appears well-developed and well-nourished  No distress  Pulmonary/Chest: Effort normal and breath sounds normal  No respiratory distress  She has no wheezes  Musculoskeletal: Normal range of motion  She exhibits no edema, tenderness or deformity  Neurological: She is alert and oriented to person, place, and time  No cranial nerve deficit  Coordination normal    Skin: She is not diaphoretic  Nursing note and vitals reviewed  Additional Data:     Labs:      Results from last 7 days  Lab Units 08/04/18  0434   WBC Thousand/uL 5 00   HEMOGLOBIN g/dL 11 0*   HEMATOCRIT % 33 6*   PLATELETS Thousands/uL 219   NEUTROS PCT % 54   LYMPHS PCT % 33   MONOS PCT % 8   EOS PCT % 4       Results from last 7 days  Lab Units 08/04/18  0434   SODIUM mmol/L 141   POTASSIUM mmol/L 3 4*   CHLORIDE mmol/L 105   CO2 mmol/L 30   BUN mg/dL 5   CREATININE mg/dL 0 75   CALCIUM mg/dL 8 2*   TOTAL PROTEIN g/dL 5 5*   BILIRUBIN TOTAL mg/dL 1 20*   ALK PHOS U/L 71   ALT U/L 48   AST U/L 28   GLUCOSE RANDOM mg/dL 77       Results from last 7 days  Lab Units 08/04/18  0434   INR  1 52*       * I Have Reviewed All Lab Data Listed Above  * Additional Pertinent Lab Tests Reviewed:  [address] Admission Reviewed        Recent Cultures (last 7 days):           Last 24 Hours Medication List:     Current Facility-Administered Medications:  acetaminophen 650 mg Oral Q6H PRN Benedict Joe PA-C   apixaban 5 mg Oral BID Benedict Joe PA-C   diphenoxylate-atropine 1 tablet Oral 4x Daily Kenrick Madison DO   LORazepam 0 5 mg Oral HS PRN Benedict Joe PA-C   magnesium oxide 800 mg Oral Daily Benedict Joe PA-C   metoprolol tartrate 25 mg Oral Q12H [address] Benedict Joe PA-C   ondansetron 4 mg Intravenous Q6H PRN Benedict LILIBETH Joe   pantoprazole 40 mg Oral Daily Benedict Joe PA-C   potassium chloride 20 mEq Oral Once Yani Mane DO   sodium chloride 0 9 % with KCl 20 mEq/L 50 mL/hr Intravenous Continuous Yani Mane DO        Today, Patient Was Seen By: Yani Mane DO

## 2018-08-04 NOTE — ASSESSMENT & PLAN NOTE
Follow-up repeat CBC tomorrow      Hemoglobin did decrease significantly, no signs of GI bleed, suspected this is hemodilution

## 2018-08-04 NOTE — ASSESSMENT & PLAN NOTE
Continue to gradually advance diet, continue with IV fluid but decreased rate, follow up a m  Labs      Repeat BMP at 18:00

## 2018-08-04 NOTE — ASSESSMENT & PLAN NOTE
Continue current dosing of beta-blocker, monitor blood pressure, blood pressure is somewhat on the low side

## 2018-08-04 NOTE — SOCIAL WORK
Case management met with the patient to assess the prior level of function and form a safe d/c plan  The patient was given and I reviewed the case management  discharge checklist with the patient  The patient is aware that the d/c planning starts on the day of admission and that if SHE  has any questions or needs they  is to contact case management  The patient is independent at home with all adls and she lives with her spouse Jimi Pang  She does drive and she uses no dme at home  Her pcp is Dr Donte José and [facility] uses [facility] [place]  No needs anticipated at this time

## 2018-08-04 NOTE — CONSULTS
Consultation - General Surgery   Sneha Gonzalez 46 y o  female MRN: [id number]  Unit/Bed#: 401-01 Encounter: [id number]    Assessment/Plan     Assessment:  50-year-old female who is status post Pj's reversal and ileocecectomy with primary anastomosis on July 11, 2018, admitted with hypokalemia, persistent nausea vomiting  Patient had similar episodes after her initial Pj's procedure  CT scan showing an intact anastomosis ease no evidence of bowel obstruction  Unclear etiology,  less likely mechanical possibly psychosomatic  Previous episode resolved spontaneously although that time a gastric emptying study was being considered  Plan:  -IV fluids  -replete potassium, check BMP  -clear diet plus supplements  -out of bed /ambulate      History of Present Illness     HPI:  Sneha Gonzalez is a 46 y o  female with a history of diverticulitis status post Pj's procedure with subsequent recent Pj's reversal and ileocecectomy with primary anastomosis on July 11, 2018, also known paroxysmal AFib currently on Eliquis, presents to the ER with intractable nausea vomiting  Patient states this is exactly what happened after her initial Pj's procedure  Unclear exactly why she is persistently nauseous  She has taken Zofran at home and that seems to have little affect  She states that sometimes she can eat but most the time eating makes her nauseous  Sometimes the smell of food or even putting food in her mouth makes her nauseous  She is having bowel movements  She is passing flatus  Her bowel move she states her little loose but that was help with some Imodium  She realizes that because she has had a portion recall removed in addition to her ileocecal valve that her transit time is increased and that she may have looser and more frequent stools  Denies any fevers or chills  No chest pain or shortness of breath  She states she had some mild discomfort, crampy in nature    in the left lower quadrant near her previous ostomy site but that has since resolved  She has been occasionally taking Ativan to help with her anxiety issues especially at night  The patient states that she feels that there is a possible mental component of this situation as she states that sometimes she is telling herself she is going to be sick even before she gets the food in her mouth  The patient was recently seen in the office with similar symptoms including the diarrhea  AC diff was sent and that was negative  Consults    Review of Systems     A 10 point review systems was conducted, all negative except as noted in the above HPI  Historical Information   Past Medical History:   Diagnosis Date    Disease of thyroid gland     Endometrial polyp     Gastroesophageal reflux disease without esophagitis 12/4/2017    Hand injury     LAST ASSESSED: [id number]    High risk medication use     LAST ASSESSED: [id number]    Hyperlipidemia     Hypertension     Renal colic     LAST ASSESSED: [id number]    Viral syndrome     LAST ASSESSED: [id number]    Vitamin D deficiency disease      Past Surgical History:   Procedure Laterality Date    CHOLECYSTECTOMY      COLON SURGERY      COLONOSCOPY N/A 6/15/2018    Procedure: COLONOSCOPY;  Surgeon: Evi Patiño DO;  [facility]: [facility];  Service: Gastroenterology    COLOSTOMY      ENDOMETRIAL ABLATION  2007    THERMAL    ESOPHAGOGASTRODUODENOSCOPY N/A 4/6/2018    Procedure: ESOPHAGOGASTRODUODENOSCOPY (EGD);   Surgeon: Vidya Soto MD;  Location: [facility];  Service: Gastroenterology   [address] [place] [address], 2002, 2007    LAPAROTOMY N/A 1/4/2018    Procedure: LAPAROTOMY EXPLORATORY, 2nd look, small bowel resection, ilieostomy closure via [place]own anastamosis transverse colostomy;  Surgeon: Cecelia Lea DO;  Location:  MAIN OR;  Service: General    MD CLOSE ENTEROSTOMY N/A 7/11/2018    Procedure: COLOSTOMY TAKEDOWN / Aaliyah Gui, ILEOCECECTOMY, LASER FLUORESCENCE ANGIOGRAPHY, LYSIS OF ADHESIONS;  Surgeon: Arian Ch DO;  Location:  MAIN OR;  Service: General    MS ESOPHAGOGASTRODUODENOSCOPY TRANSORAL DIAGNOSTIC N/A 10/21/2016    Procedure: EGD AND COLONOSCOPY;  Surgeon: Jessica Fisher MD;  [facility];  Service: Gastroenterology    MS PART REMOVAL COLON W ANASTOMOSIS N/A 1/3/2018    Procedure: Exploratory laparotomy, RESECTION COLON SIGMOID, possible ostomy;  Surgeon: Arian Ch DO;  [facility];  Service: General    THYROIDECTOMY      NEAR-TOTAL    THYROIDECTOMY, PARTIAL      TONSILLECTOMY AND ADENOIDECTOMY      TUBAL LIGATION  1997    URETERAL STENT PLACEMENT Bilateral 1/3/2018    Procedure: INSERTION STENT URETERAL;  Surgeon: Karley Layne MD;  Location: [facility];  Service: Urology     Social History   History   Alcohol Use    1 2 oz/week    2 Standard drinks or equivalent per week     Comment: 1-2 mixed drinks per weekend     History   Drug Use No     History   Smoking Status    Former Smoker    Packs/day: 0 50    Years: 30 00    Quit date: 2003   Smokeless Tobacco    Never Used     Family History: non-contributory    Meds/Allergies   all current active meds have been reviewed  No Known Allergies    Objective   First Vitals:   Blood Pressure: 142/80 (08/03/18 1310)  Pulse: (!) 107 (08/03/18 1310)  Temperature: 98 4 °F (36 9 °C) (08/03/18 1310)  Temp Source: Temporal (08/03/18 1310)  Respirations: 19 (08/03/18 1310)  Height: 5' 1" (154 9 cm) (08/03/18 1310)  Weight - Scale: 73 8 kg (162 lb 11 2 oz) (08/03/18 1310)  SpO2: 95 % (08/03/18 1310)    Current Vitals:   Blood Pressure: 90/52 (08/04/18 0716)  Pulse: 67 (08/04/18 0716)  Temperature: 98 4 °F (36 9 °C) (08/04/18 0716)  Temp Source: Temporal (08/04/18 0716)  Respirations: 20 (08/04/18 0716)  Height: 5' 1" (154 9 cm) (08/03/18 1723)  Weight - Scale: 72 4 kg (159 lb 9 8 oz) (08/04/18 0543)  SpO2: 96 % (08/04/18 5344)      Intake/Output Summary (Last 24 hours) at 08/04/18 1106  Last data filed at 08/04/18 1705   Gross per 24 hour   Intake             1340 ml   Output                0 ml   Net             1340 ml       Invasive Devices     Peripheral Intravenous Line            Peripheral IV 08/03/18 Right Antecubital less than 1 day                Physical Exam   General:  No acute distress, resting comfortably  HEENT:  Normocephalic, atraumatic  CV:  S1-S2 audible, regular rhythm, no murmurs rubs or gallops  Pulmonary:  Clear auscultation bilaterally no wheezes rales rhonchi  GI:  Abdomen is obese, nondistended, soft, nontender, in the left abdomen there is a healing ostomy site which appears healthy  MSK:  Lower extremities without clubbing cyanosis or edema  Neurologic:  Alert and oriented x3, cranial nerves 2-12 grossly intact  Psych:  Patient does appear somewhat anxious  Lab Results: I have personally reviewed pertinent lab results  Imaging: I have personally reviewed pertinent films in PACS with a Radiologist   EKG, Pathology, and Other Studies: I have personally reviewed pertinent reports

## 2018-08-04 NOTE — ASSESSMENT & PLAN NOTE
Rate currently controlled  -Anticoagulated on Eliquis  -Has appointment with Dr Ignacia Lind (cardiology) on 8/6/18  -Continue eliquis, metoprolol

## 2018-08-04 NOTE — ASSESSMENT & PLAN NOTE
-Potassium at 2 6  -She has reports episodes of hypokalemia probably due to ongoing episodes of vomiting  -Replace potassium  -repeat BMP  AM labs

## 2018-08-04 NOTE — PLAN OF CARE
DISCHARGE PLANNING     Discharge to home or other facility with appropriate resources Progressing        GASTROINTESTINAL - ADULT     Minimal or absence of nausea and/or vomiting Progressing     Maintains or returns to baseline bowel function Progressing        INFECTION - ADULT     Absence or prevention of progression during hospitalization Progressing        Knowledge Deficit     Patient/family/caregiver demonstrates understanding of disease process, treatment plan, medications, and discharge instructions Progressing        Nutrition/Hydration-ADULT     Nutrient/Hydration intake appropriate for improving, restoring or maintaining nutritional needs Progressing        PAIN - ADULT     Verbalizes/displays adequate comfort level or baseline comfort level Progressing        Potential for Falls     Patient will remain free of falls Progressing        Prexisting or High Potential for Compromised Skin Integrity     Skin integrity is maintained or improved Progressing        SAFETY ADULT     Patient will remain free of falls Progressing     Maintain or return to baseline ADL function Progressing     Maintain or return mobility status to optimal level Progressing

## 2018-08-04 NOTE — ASSESSMENT & PLAN NOTE
-Reports that she has had episodes of vomiting since her initial surgery in January of this year  -She reports increase everyday vomiting for about 1 week  -probably related to recent surgery (ostomy reversal) on 7/11/2018  -No fever or chills, no diarrhea, but reports loose stool  -Admit to med surg observation  -IV normal saline with KCL  -Monitor electrolytes  -Zofran PRN  -Surgery consult  -AM labs

## 2018-08-05 PROBLEM — E87.0 HYPERNATREMIA: Status: ACTIVE | Noted: 2018-08-05

## 2018-08-05 LAB
ALBUMIN SERPL BCP-MCNC: 2.5 G/DL (ref 3.5–5)
ALP SERPL-CCNC: 76 U/L (ref 46–116)
ALT SERPL W P-5'-P-CCNC: 38 U/L (ref 12–78)
ANION GAP SERPL CALCULATED.3IONS-SCNC: 8 MMOL/L (ref 4–13)
AST SERPL W P-5'-P-CCNC: 21 U/L (ref 5–45)
BASOPHILS # BLD AUTO: 0.02 THOUSANDS/ΜL (ref 0–0.1)
BASOPHILS NFR BLD AUTO: 0 % (ref 0–1)
BILIRUB SERPL-MCNC: 0.8 MG/DL (ref 0.2–1)
BUN SERPL-MCNC: 3 MG/DL (ref 5–25)
C DIFF TOX GENS STL QL NAA+PROBE: ABNORMAL
CALCIUM SERPL-MCNC: 8.2 MG/DL (ref 8.3–10.1)
CHLORIDE SERPL-SCNC: 111 MMOL/L (ref 100–108)
CO2 SERPL-SCNC: 29 MMOL/L (ref 21–32)
CREAT SERPL-MCNC: 0.66 MG/DL (ref 0.6–1.3)
EOSINOPHIL # BLD AUTO: 0.23 THOUSAND/ΜL (ref 0–0.61)
EOSINOPHIL NFR BLD AUTO: 5 % (ref 0–6)
ERYTHROCYTE [DISTWIDTH] IN BLOOD BY AUTOMATED COUNT: 13.7 % (ref 11.6–15.1)
GFR SERPL CREATININE-BSD FRML MDRD: 100 ML/MIN/1.73SQ M
GLUCOSE SERPL-MCNC: 92 MG/DL (ref 65–140)
HCT VFR BLD AUTO: 33.7 % (ref 34.8–46.1)
HGB BLD-MCNC: 10.7 G/DL (ref 11.5–15.4)
LYMPHOCYTES # BLD AUTO: 1.51 THOUSANDS/ΜL (ref 0.6–4.47)
LYMPHOCYTES NFR BLD AUTO: 30 % (ref 14–44)
MAGNESIUM SERPL-MCNC: 1.5 MG/DL (ref 1.6–2.6)
MCH RBC QN AUTO: 29.9 PG (ref 26.8–34.3)
MCHC RBC AUTO-ENTMCNC: 31.8 G/DL (ref 31.4–37.4)
MCV RBC AUTO: 94 FL (ref 82–98)
MONOCYTES # BLD AUTO: 0.35 THOUSAND/ΜL (ref 0.17–1.22)
MONOCYTES NFR BLD AUTO: 7 % (ref 4–12)
NEUTROPHILS # BLD AUTO: 2.92 THOUSANDS/ΜL (ref 1.85–7.62)
NEUTS SEG NFR BLD AUTO: 58 % (ref 43–75)
PLATELET # BLD AUTO: 191 THOUSANDS/UL (ref 149–390)
PMV BLD AUTO: 11.7 FL (ref 8.9–12.7)
POTASSIUM SERPL-SCNC: 3.7 MMOL/L (ref 3.5–5.3)
PROT SERPL-MCNC: 5.5 G/DL (ref 6.4–8.2)
RBC # BLD AUTO: 3.58 MILLION/UL (ref 3.81–5.12)
SODIUM SERPL-SCNC: 148 MMOL/L (ref 136–145)
WBC # BLD AUTO: 5.03 THOUSAND/UL (ref 4.31–10.16)

## 2018-08-05 PROCEDURE — 83735 ASSAY OF MAGNESIUM: CPT | Performed by: INTERNAL MEDICINE

## 2018-08-05 PROCEDURE — 99024 POSTOP FOLLOW-UP VISIT: CPT | Performed by: SURGERY

## 2018-08-05 PROCEDURE — 85025 COMPLETE CBC W/AUTO DIFF WBC: CPT | Performed by: INTERNAL MEDICINE

## 2018-08-05 PROCEDURE — 99232 SBSQ HOSP IP/OBS MODERATE 35: CPT | Performed by: INTERNAL MEDICINE

## 2018-08-05 PROCEDURE — 80053 COMPREHEN METABOLIC PANEL: CPT | Performed by: INTERNAL MEDICINE

## 2018-08-05 RX ORDER — MAGNESIUM SULFATE HEPTAHYDRATE 40 MG/ML
2 INJECTION, SOLUTION INTRAVENOUS ONCE
Status: COMPLETED | OUTPATIENT
Start: 2018-08-05 | End: 2018-08-05

## 2018-08-05 RX ORDER — SODIUM CHLORIDE 450 MG/100ML
100 INJECTION, SOLUTION INTRAVENOUS ONCE
Status: COMPLETED | OUTPATIENT
Start: 2018-08-05 | End: 2018-08-05

## 2018-08-05 RX ORDER — POTASSIUM CHLORIDE 20MEQ/15ML
20 LIQUID (ML) ORAL ONCE
Status: COMPLETED | OUTPATIENT
Start: 2018-08-05 | End: 2018-08-05

## 2018-08-05 RX ADMIN — POTASSIUM CHLORIDE 20 MEQ: 20 SOLUTION ORAL at 08:29

## 2018-08-05 RX ADMIN — APIXABAN 5 MG: 5 TABLET, FILM COATED ORAL at 08:30

## 2018-08-05 RX ADMIN — DIPHENOXYLATE HYDROCHLORIDE AND ATROPINE SULFATE 1 TABLET: 2.5; .025 TABLET ORAL at 08:33

## 2018-08-05 RX ADMIN — APIXABAN 5 MG: 5 TABLET, FILM COATED ORAL at 18:10

## 2018-08-05 RX ADMIN — SODIUM CHLORIDE 100 ML/HR: 0.45 INJECTION, SOLUTION INTRAVENOUS at 10:09

## 2018-08-05 RX ADMIN — VANCOMYCIN 125 MG: KIT at 23:00

## 2018-08-05 RX ADMIN — METOPROLOL TARTRATE 12.5 MG: 25 TABLET ORAL at 21:50

## 2018-08-05 RX ADMIN — PANTOPRAZOLE SODIUM 40 MG: 40 TABLET, DELAYED RELEASE ORAL at 08:30

## 2018-08-05 RX ADMIN — MAGNESIUM OXIDE TAB 400 MG (241.3 MG ELEMENTAL MG) 800 MG: 400 (241.3 MG) TAB at 08:29

## 2018-08-05 RX ADMIN — MAGNESIUM SULFATE HEPTAHYDRATE 2 G: 40 INJECTION, SOLUTION INTRAVENOUS at 08:30

## 2018-08-05 RX ADMIN — VANCOMYCIN 125 MG: KIT at 12:55

## 2018-08-05 RX ADMIN — VANCOMYCIN 125 MG: KIT at 18:10

## 2018-08-05 NOTE — ASSESSMENT & PLAN NOTE
Patient with worsening hypomagnesemia despite oral supplement, patient will require IV magnesium today

## 2018-08-05 NOTE — PLAN OF CARE
DISCHARGE PLANNING     Discharge to home or other facility with appropriate resources Progressing        DISCHARGE PLANNING - CARE MANAGEMENT     Discharge to post-acute care or home with appropriate resources Progressing        GASTROINTESTINAL - ADULT     Minimal or absence of nausea and/or vomiting Progressing     Maintains or returns to baseline bowel function Progressing        INFECTION - ADULT     Absence or prevention of progression during hospitalization Progressing        Knowledge Deficit     Patient/family/caregiver demonstrates understanding of disease process, treatment plan, medications, and discharge instructions Progressing        Nutrition/Hydration-ADULT     Nutrient/Hydration intake appropriate for improving, restoring or maintaining nutritional needs Progressing        PAIN - ADULT     Verbalizes/displays adequate comfort level or baseline comfort level Progressing        Potential for Falls     Patient will remain free of falls Progressing        Prexisting or High Potential for Compromised Skin Integrity     Skin integrity is maintained or improved Progressing        SAFETY ADULT     Patient will remain free of falls Progressing     Maintain or return to baseline ADL function Progressing     Maintain or return mobility status to optimal level Progressing

## 2018-08-05 NOTE — ASSESSMENT & PLAN NOTE
Hemoglobin stable    Hemoglobin did decrease significantly, no signs of GI bleed, suspected this is hemodilution, check repeat CBC tomorrow

## 2018-08-05 NOTE — PHYSICIAN ADVISOR
Current patient class: Inpatient  The patient is currently on Hospital Day: 3 at [address]           After review of the relevant documentation, labs, vital signs and test results, the patient is appropriate for INPATIENT ADMISSION  Rationale is as follows: The patient is a 74-year-old female who presented to the hospital on August 3, 2018  She was placed under observation status for intractable nausea and vomiting  Her surgeon recommended that she proceed to the emergency department for further management  She had reversal of ileostomy on July 11th  The patient was found to have significant hypokalemia, 2 6  CT showed an intact anastomosis and no obvious bowel obstruction  On August 4th, she had worsened diarrhea  Attempts were made to gradually advance her diet  Potassium remains low at 3 4 and replacement continued  UM discussed the case with the attending physician today August 5th, and the progress note was reviewed  The patient continues to have very poor oral intake and is not maintaining adequate oral hydration  She has worsened free water deficit with hypernatremia (148)  Intravenous fluids will be changed to half normal saline  She also has worsened hypomagnesemia and she will receive intravenous magnesium today  She did receive oral magnesium yesterday  The patient has been hospitalized for approximately 48 hours under observation status but requires ongoing hospital management including intravenous fluids at 100 cc/hour and electrolyte management  I agree with change in status to inpatient level care      The patients vitals on arrival were ED Triage Vitals [08/03/18 1310]   Temperature Pulse Respirations Blood Pressure SpO2   98 4 °F (36 9 °C) (!) 107 19 142/80 95 %      Temp Source Heart Rate Source Patient Position - Orthostatic VS BP Location FiO2 (%)   Temporal Monitor Sitting Left arm --      Pain Score       5           Past Medical History: Diagnosis Date    Disease of thyroid gland     Endometrial polyp     Gastroesophageal reflux disease without esophagitis 12/4/2017    Hand injury     LAST ASSESSED: [id number]    High risk medication use     LAST ASSESSED: [id number]    Hyperlipidemia     Hypertension     Renal colic     LAST ASSESSED: [id number]    Viral syndrome     LAST ASSESSED: [id number]    Vitamin D deficiency disease      Past Surgical History:   Procedure Laterality Date    CHOLECYSTECTOMY      COLON SURGERY      COLONOSCOPY N/A 6/15/2018    Procedure: COLONOSCOPY;  Surgeon: Nelli Vicente DO;  Location: [facility];  Service: Gastroenterology    COLOSTOMY      ENDOMETRIAL ABLATION  2007    THERMAL    ESOPHAGOGASTRODUODENOSCOPY N/A 4/6/2018    Procedure: ESOPHAGOGASTRODUODENOSCOPY (EGD);   Surgeon: Risa George MD;  Location: [facility];  Service: Gastroenterology   [address] [place] [address], 2002, 2007    LAPAROTOMY N/A 1/4/2018    Procedure: LAPAROTOMY EXPLORATORY, 2nd look, small bowel resection, ilieostomy closure via [place]own anastamosis transverse colostomy;  Surgeon: Adina Gomez DO;  Location:  MAIN OR;  Service: General    AK CLOSE ENTEROSTOMY N/A 7/11/2018    Procedure: COLOSTOMY TAKEDOWN / Whyte Kail, ILEOCECECTOMY, LASER FLUORESCENCE ANGIOGRAPHY, LYSIS OF ADHESIONS;  Surgeon: Sherri Harp DO;  [facility];  Service: General    AK ESOPHAGOGASTRODUODENOSCOPY TRANSORAL DIAGNOSTIC N/A 10/21/2016    Procedure: EGD AND COLONOSCOPY;  Surgeon: Rey Tompkins MD;  [facility];  Service: Gastroenterology    AK PART REMOVAL COLON W ANASTOMOSIS N/A 1/3/2018    Procedure: Exploratory laparotomy, RESECTION COLON SIGMOID, possible ostomy;  Surgeon: Sherri Harp DO;  [facility];  Service: General    THYROIDECTOMY      NEAR-TOTAL    THYROIDECTOMY, PARTIAL      TONSILLECTOMY AND ADENOIDECTOMY      TUBAL LIGATION  1997    URETERAL STENT PLACEMENT Bilateral 1/3/2018    Procedure: Chuy Marley STENT URETERAL;  Surgeon: Gavin Silva MD;  [facility]: [facility];  Service: Urology       The patient was admitted to the hospital at 1024 on 8/5/18 for the following diagnosis:  Vomiting [R11 10]  Intractable vomiting [R11 10]    Consults have been placed to:   IP CONSULT TO ACUTE CARE SURGERY    Vitals:    08/04/18 2307 08/05/18 0600 08/05/18 0729 08/05/18 0832   BP: 113/76  96/62    BP Location: Left arm  Left arm    Pulse: 79  68    Resp: 18  18    Temp: 97 8 °F (36 6 °C)   97 8 °F (36 6 °C)   TempSrc: Temporal   Temporal   SpO2: 95%  96%    Weight:  77 5 kg (170 lb 13 7 oz)     Height:           Most recent labs:    Recent Labs      08/03/18   1342   08/04/18   0434   08/05/18   0603   WBC  6 30   --   5 00   --   5 03   HGB  14 4   --   11 0*   --   10 7*   HCT  42 1   --   33 6*   --   33 7*   PLT  325   --   219   --   191   K  2 6*   < >  3 4*   < >  3 7   NA  139   < >  141   < >  148*   CALCIUM  9 5   < >  8 2*   < >  8 2*   BUN  7   < >  5   < >  3*   CREATININE  0 95   < >  0 75   < >  0 66   LIPASE  173   --    --    --    --    INR   --    --   1 52*   --    --    AST  40   --   28   --   21   ALT  64   --   48   --   38   ALKPHOS  100   --   71   --   76   BILITOT  2 00*   --   1 20*   --   0 80    < > = values in this interval not displayed         Scheduled Meds:  Current Facility-Administered Medications:  acetaminophen 650 mg Oral Q6H PRN Benedict Joe PA-C   apixaban 5 mg Oral BID Benedict Joe PA-C   diphenoxylate-atropine 1 tablet Oral 4x Daily Kenrick Madison DO   LORazepam 0 5 mg Oral HS PRN Benedict Joe PA-C   magnesium oxide 800 mg Oral Daily Benedict Joe PA-C   metoprolol tartrate 12 5 mg Oral Q12H [facility] Kenrick Madison DO   ondansetron 4 mg Intravenous Q6H PRN Benedict Joe, PA-C   pantoprazole 40 mg Oral Daily Benedict Joe PA-C     Continuous Infusions:   PRN Meds:   acetaminophen    LORazepam    ondansetron    Surgical procedures (if appropriate):

## 2018-08-05 NOTE — ASSESSMENT & PLAN NOTE
Improved in the last 12 hours with the initiation of Lomotil, follow-up C diff testing, recent C diff testing negative, I am not suspicious of acute infection

## 2018-08-05 NOTE — PROGRESS NOTES
Progress Note - General Surgery   Natali Mass 46 y o  female MRN: [id number]  Unit/Bed#: 401-01 Encounter: [id number]    Assessment:  79-year-old female who is status post Pj's reversal and ileocecectomy with primary anastomosis on July 11, 2018, admitted with hypokalemia, persistent nausea vomiting  Patient had similar episodes after her initial Pj's procedure  CT scan showing an intact anastomosis ease no evidence of bowel obstruction  Unclear etiology,  less likely mechanical possibly psychosomatic    tolerating some clears  Minimal solid food intake  Nausea is still present but improved  Diarrhea still present  C diff now positive  Previous C diff on July 26 was negative  Plan:  -IV fluids  -continue to monitor potassium and I agree with p o  Supplementation  -diet as tolerated, will order additional dietary supplements including Ensure chocolate flavor as per patient request  -out of bed bed/ambulate  -antibiotic treatment for C diff infection  -hypernatremia -IV fluids change to half-normal saline   -remainder of medical management as per medical team     Subjective/Objective   Chief Complaint:  Nausea    Subjective:  Feeling a bit better today, still feels pretty washed out  Tolerating liquid clears  No vomiting  Nausea still there but better    Objective:     Blood pressure 96/62, pulse 68, temperature 97 8 °F (36 6 °C), temperature source Temporal, resp  rate 18, height 5' 1" (1 549 m), weight 77 5 kg (170 lb 13 7 oz), SpO2 96 %  ,Body mass index is 32 28 kg/m²        Intake/Output Summary (Last 24 hours) at 08/05/18 1249  Last data filed at 08/05/18 8251   Gross per 24 hour   Intake              720 ml   Output              475 ml   Net              245 ml       Invasive Devices     Peripheral Intravenous Line            Peripheral IV 08/03/18 Right Antecubital 1 day                Physical Exam:   General:  No acute distress, resting comfortably  HEENT:  Normocephalic, atraumatic, trachea midline  CV:  S1-S2 audible, regular rate rhythm  Pulmonary:  Clear auscultation bilaterally no wheezes rales rhonchi  GI:  Abdomen nondistended, soft nontender, again ostomy site healing well  MSK lower extremities without clubbing, cyanosis, edema  Neurologic:  Alert and x3, cranial 2 through 12 grossly intact  Psych:  Mood and affect appropriate patient feels little better and looks like she is in better spirits today              Lab, Imaging and other studies:  I have personally reviewed pertinent lab results    , CBC:   Lab Results   Component Value Date    WBC 5 03 08/05/2018    HGB 10 7 (L) 08/05/2018    HCT 33 7 (L) 08/05/2018    MCV 94 08/05/2018     08/05/2018    MCH 29 9 08/05/2018    MCHC 31 8 08/05/2018    RDW 13 7 08/05/2018    MPV 11 7 08/05/2018   , CMP:   Lab Results   Component Value Date     (H) 08/05/2018    K 3 7 08/05/2018     (H) 08/05/2018    CO2 29 08/05/2018    ANIONGAP 8 08/05/2018    BUN 3 (L) 08/05/2018    CREATININE 0 66 08/05/2018    GLUCOSE 92 08/05/2018    CALCIUM 8 2 (L) 08/05/2018    AST 21 08/05/2018    ALT 38 08/05/2018    ALKPHOS 76 08/05/2018    PROT 5 5 (L) 08/05/2018    BILITOT 0 80 08/05/2018    EGFR 100 08/05/2018   , Coagulation:   No results found for: PT, INR, APTT, Urinalysis:   No results found for: COLORU, CLARITYU, SPECGRAV, PHUR, LEUKOCYTESUR, NITRITE, PROTEINUA, GLUCOSEU, KETONESU, BILIRUBINUR, BLOODU, Amylase: No results found for: AMYLASE, Lipase:   No results found for: LIPASE  VTE Pharmacologic Prophylaxis:  On Eliquis  VTE Mechanical Prophylaxis: sequential compression device

## 2018-08-05 NOTE — PROGRESS NOTES
Progress Note - [place] Moon 1965, 46 y o  female MRN: [id number]    Unit/Bed#: 401-01 Encounter: [id number]    Primary Care Provider: Aida Gary DO   Date and time admitted to hospital: 8/3/2018  1:06 PM        * Intractable vomiting with nausea   Assessment & Plan    Continue to increase diet as tolerated, continue with protein supplement shakes  Patient still with very poor p o  intake, not maintaining adequate oral hydration, see notes below  Hypernatremia   Assessment & Plan    Patient with worsening free water deficit, changed to half normal saline, encourage free water intake by mouth  Patient likely with ongoing secretory diarrhea causing significant free water deficit  Patient not maintaining adequate oral intake  Hypomagnesemia   Assessment & Plan    Patient with worsening hypomagnesemia despite oral supplement, patient will require IV magnesium today        Hypokalemia   Assessment & Plan    Improving, will change to oral potassium supplement        Diarrhea   Assessment & Plan    Improved in the last 12 hours with the initiation of Lomotil, follow-up C diff testing, recent C diff testing negative, I am not suspicious of acute infection  Anemia   Assessment & Plan    Hemoglobin stable    Hemoglobin did decrease significantly, no signs of GI bleed, suspected this is hemodilution, check repeat CBC tomorrow        Paroxysmal atrial fibrillation (HCC)   Assessment & Plan    Blood pressure running low, decreased Lopressor 12 5 b i d   -Anticoagulated on Eliquis            Hypertension   Assessment & Plan    Blood pressure running low, beta-blocker dosing decreased  Esophageal reflux   Assessment & Plan    -Continue protonix            VTE Pharmacologic Prophylaxis:   Pharmacologic: Apixaban (Eliquis)  Mechanical VTE Prophylaxis in Place: Yes    Patient Centered Rounds: I have performed bedside rounds with nursing staff today      Current Length of Stay: 0 day(s)    Current Patient Status: Observation   Certification Statement: The patient will continue to require additional inpatient hospital stay due to no pain      Code Status: Level 1 - Full Code      Subjective:   No pain    Objective:     Vitals:   Temp (24hrs), Av 8 °F (36 6 °C), Min:97 8 °F (36 6 °C), Max:97 8 °F (36 6 °C)    HR:  [65-85] 68  Resp:  [18] 18  BP: ()/(50-76) 96/62  SpO2:  [95 %-96 %] 96 %  Body mass index is 32 28 kg/m²  Input and Output Summary (last 24 hours): Intake/Output Summary (Last 24 hours) at 18 0958  Last data filed at 18 2195   Gross per 24 hour   Intake              920 ml   Output              475 ml   Net              445 ml       Physical Exam:     Physical Exam   Constitutional: She is oriented to person, place, and time  She appears well-developed and well-nourished  No distress  Pulmonary/Chest: Effort normal and breath sounds normal  No respiratory distress  She has no wheezes  Musculoskeletal: Normal range of motion  She exhibits no edema, tenderness or deformity  Neurological: She is alert and oriented to person, place, and time  No cranial nerve deficit  Coordination normal    Skin: She is not diaphoretic  Nursing note and vitals reviewed  Additional Data:     Labs:      Results from last 7 days  Lab Units 18  0603   WBC Thousand/uL 5 03   HEMOGLOBIN g/dL 10 7*   HEMATOCRIT % 33 7*   PLATELETS Thousands/uL 191   NEUTROS PCT % 58   LYMPHS PCT % 30   MONOS PCT % 7   EOS PCT % 5       Results from last 7 days  Lab Units 18  0603   SODIUM mmol/L 148*   POTASSIUM mmol/L 3 7   CHLORIDE mmol/L 111*   CO2 mmol/L 29   BUN mg/dL 3*   CREATININE mg/dL 0 66   CALCIUM mg/dL 8 2*   TOTAL PROTEIN g/dL 5 5*   BILIRUBIN TOTAL mg/dL 0 80   ALK PHOS U/L 76   ALT U/L 38   AST U/L 21   GLUCOSE RANDOM mg/dL 92       Results from last 7 days  Lab Units 18  0434   INR  1 52*       * I Have Reviewed All Lab Data Listed Above    * Additional Pertinent Lab Tests Reviewed:  [address] Admission Reviewed      Recent Cultures (last 7 days):           Last 24 Hours Medication List:     Current Facility-Administered Medications:  acetaminophen 650 mg Oral Q6H PRN Benedict Joe PA-C   apixaban 5 mg Oral BID Benedict Joe PA-C   diphenoxylate-atropine 1 tablet Oral 4x Daily Kenrick Madison DO   LORazepam 0 5 mg Oral HS PRN Benedict Joe PA-C   magnesium oxide 800 mg Oral Daily Benedict Joe PA-C   magnesium sulfate 2 g Intravenous Once Arnav Cotton DO   metoprolol tartrate 12 5 mg Oral Q12H [address] Kenrick Madison DO   ondansetron 4 mg Intravenous Q6H PRN Benedict Joe PA-C   pantoprazole 40 mg Oral Daily Benedict Joe PA-C   sodium chloride 100 mL/hr Intravenous Once Arnav Cotton DO        Today, Patient Was Seen By: Arnav Cotton DO

## 2018-08-05 NOTE — ASSESSMENT & PLAN NOTE
Patient with worsening free water deficit, changed to half normal saline, encourage free water intake by mouth  Patient likely with ongoing secretory diarrhea causing significant free water deficit  Patient not maintaining adequate oral intake

## 2018-08-06 PROBLEM — A04.72 C. DIFFICILE COLITIS: Status: ACTIVE | Noted: 2018-08-06

## 2018-08-06 LAB
ALBUMIN SERPL BCP-MCNC: 2.5 G/DL (ref 3.5–5)
ALP SERPL-CCNC: 79 U/L (ref 46–116)
ALT SERPL W P-5'-P-CCNC: 36 U/L (ref 12–78)
ANION GAP SERPL CALCULATED.3IONS-SCNC: 7 MMOL/L (ref 4–13)
AST SERPL W P-5'-P-CCNC: 21 U/L (ref 5–45)
BASOPHILS # BLD AUTO: 0.03 THOUSANDS/ΜL (ref 0–0.1)
BASOPHILS NFR BLD AUTO: 1 % (ref 0–1)
BILIRUB SERPL-MCNC: 0.8 MG/DL (ref 0.2–1)
BUN SERPL-MCNC: 4 MG/DL (ref 5–25)
CALCIUM SERPL-MCNC: 8.6 MG/DL (ref 8.3–10.1)
CHLORIDE SERPL-SCNC: 110 MMOL/L (ref 100–108)
CO2 SERPL-SCNC: 28 MMOL/L (ref 21–32)
CREAT SERPL-MCNC: 0.69 MG/DL (ref 0.6–1.3)
EOSINOPHIL # BLD AUTO: 0.33 THOUSAND/ΜL (ref 0–0.61)
EOSINOPHIL NFR BLD AUTO: 7 % (ref 0–6)
ERYTHROCYTE [DISTWIDTH] IN BLOOD BY AUTOMATED COUNT: 13.5 % (ref 11.6–15.1)
GFR SERPL CREATININE-BSD FRML MDRD: 99 ML/MIN/1.73SQ M
GLUCOSE SERPL-MCNC: 87 MG/DL (ref 65–140)
HCT VFR BLD AUTO: 35.4 % (ref 34.8–46.1)
HGB BLD-MCNC: 11.5 G/DL (ref 11.5–15.4)
LYMPHOCYTES # BLD AUTO: 1.56 THOUSANDS/ΜL (ref 0.6–4.47)
LYMPHOCYTES NFR BLD AUTO: 31 % (ref 14–44)
MAGNESIUM SERPL-MCNC: 1.8 MG/DL (ref 1.6–2.6)
MCH RBC QN AUTO: 30.8 PG (ref 26.8–34.3)
MCHC RBC AUTO-ENTMCNC: 32.5 G/DL (ref 31.4–37.4)
MCV RBC AUTO: 95 FL (ref 82–98)
MONOCYTES # BLD AUTO: 0.39 THOUSAND/ΜL (ref 0.17–1.22)
MONOCYTES NFR BLD AUTO: 8 % (ref 4–12)
NEUTROPHILS # BLD AUTO: 2.74 THOUSANDS/ΜL (ref 1.85–7.62)
NEUTS SEG NFR BLD AUTO: 54 % (ref 43–75)
PHOSPHATE SERPL-MCNC: 3.4 MG/DL (ref 2.7–4.5)
PLATELET # BLD AUTO: 167 THOUSANDS/UL (ref 149–390)
PMV BLD AUTO: 12 FL (ref 8.9–12.7)
POTASSIUM SERPL-SCNC: 4 MMOL/L (ref 3.5–5.3)
PROT SERPL-MCNC: 5.8 G/DL (ref 6.4–8.2)
RBC # BLD AUTO: 3.73 MILLION/UL (ref 3.81–5.12)
SODIUM SERPL-SCNC: 145 MMOL/L (ref 136–145)
WBC # BLD AUTO: 5.05 THOUSAND/UL (ref 4.31–10.16)

## 2018-08-06 PROCEDURE — 99232 SBSQ HOSP IP/OBS MODERATE 35: CPT | Performed by: INTERNAL MEDICINE

## 2018-08-06 PROCEDURE — 83735 ASSAY OF MAGNESIUM: CPT | Performed by: INTERNAL MEDICINE

## 2018-08-06 PROCEDURE — 85025 COMPLETE CBC W/AUTO DIFF WBC: CPT | Performed by: INTERNAL MEDICINE

## 2018-08-06 PROCEDURE — 99222 1ST HOSP IP/OBS MODERATE 55: CPT | Performed by: INTERNAL MEDICINE

## 2018-08-06 PROCEDURE — 84100 ASSAY OF PHOSPHORUS: CPT | Performed by: INTERNAL MEDICINE

## 2018-08-06 PROCEDURE — 80053 COMPREHEN METABOLIC PANEL: CPT | Performed by: INTERNAL MEDICINE

## 2018-08-06 PROCEDURE — 97116 GAIT TRAINING THERAPY: CPT

## 2018-08-06 PROCEDURE — 99024 POSTOP FOLLOW-UP VISIT: CPT | Performed by: SURGERY

## 2018-08-06 RX ADMIN — MAGNESIUM OXIDE TAB 400 MG (241.3 MG ELEMENTAL MG) 800 MG: 400 (241.3 MG) TAB at 09:25

## 2018-08-06 RX ADMIN — APIXABAN 5 MG: 5 TABLET, FILM COATED ORAL at 09:25

## 2018-08-06 RX ADMIN — VANCOMYCIN 125 MG: KIT at 23:58

## 2018-08-06 RX ADMIN — METOPROLOL TARTRATE 12.5 MG: 25 TABLET ORAL at 09:26

## 2018-08-06 RX ADMIN — METOPROLOL TARTRATE 12.5 MG: 25 TABLET ORAL at 21:45

## 2018-08-06 RX ADMIN — APIXABAN 5 MG: 5 TABLET, FILM COATED ORAL at 17:16

## 2018-08-06 RX ADMIN — VANCOMYCIN 125 MG: KIT at 05:46

## 2018-08-06 RX ADMIN — VANCOMYCIN 125 MG: KIT at 13:05

## 2018-08-06 RX ADMIN — VANCOMYCIN 125 MG: KIT at 17:16

## 2018-08-06 RX ADMIN — PANTOPRAZOLE SODIUM 40 MG: 40 TABLET, DELAYED RELEASE ORAL at 09:26

## 2018-08-06 NOTE — CONSULTS
Consultation - Infectious Disease   Mini Wiley 46 y o  female MRN: [id number]  Unit/Bed#: 401-01 Encounter: [id number]      Inpatient consult to Infectious Diseases  Consult performed by: Sabina Chi ordered by: Abram Gibson            Assessment/Recommendations     46year old female with history of diverticulitis s/p Pj's procedure with subsequent reversal presents with intractable nausea, vomiting diarrhea, possible c diff associated diarrhea    1  Possible c diff associated diarrhea  - It is unusual that the patient had a negative c diff assay few days prior when she was still symptomatic which raises concerns for colonization rather than true infection  - Regardless, given multiple watery stools would favor treatment  - Afebrile, normal white count, minimal abdominal pain    · Continue PO Vanco 125 qid  · Recommend 10 days of treatment  · Monitor clinical course    2  History of diverticulitis   - s/p Pj's reversal and ileocecectomy with primary anastamosis on 7/10   - Unremarkable pre-op colonoscopy  - CT imaging shows no obstruction, intact anastamosis    · If symptoms of nausea, vomiting, diarrhea fail to improve or worsen would recommend GI evaluation     Thank you for involving me in the care of your patient  Please call with questions, change in clinical status or if tests recommended above are abnormal      Discussed with the primary service  History:     HISTORY OF PRESENT ILLNESS:    Reason for Consult: C Diff colitis  HPI: Mini Wiley is a 46y o  year old female with a history of sigmoid diverticulitis requiring an ileostomy in January 2018  Her post op course was complicated by intractable nausea, vomiting and poor appetite  She reports increased ileostomy output at the time  Evaluation by GI with an endoscopy failed to reveal a cause and symptoms resolved a few months later    She subsequently underwent a Pj's reversal and ileocecectomy with primary anastamosis on 7/11  Pre-op, colonoscopy was unremarkable  Shortly after her surgery she once again developed nausea, vomiting, poor appetite as well as multiple episodes of watery stools  She had dull left sided cramping abdominal pain but no fever or blood in stools  She had an office visit [facility] Dr Vesna Enamorado on 7/25 for these symptoms  A c diff assay was checked at the time and was negative  Her symptoms continued to progressively worsen with poor appetite  She presented to the ER on 08/03  She was afebrile but tachycardic  CT scan showing an intact anastomosis with no evidence of bowel obstruction  A C diff assay on 8/4 was positive and she was started on PO Vancomcyin  She has remained afebrile without leukocytosis but has continued to have nausea with poor oral intake  She has had no further episodes of vomiting  She is tolerating a diet  She had noted semi formed stools last night but this morning her stools were loose and watery  Infectious disease is being consulted for diagnostic work up and antibiotic management  Review of Systems  A [id number] point system-based review of systems is otherwise negative      PAST MEDICAL HISTORY:  Past Medical History:   Diagnosis Date    Disease of thyroid gland     Endometrial polyp     Gastroesophageal reflux disease without esophagitis 12/4/2017    Hand injury     LAST ASSESSED: [id number]    High risk medication use     LAST ASSESSED: [id number]    Hyperlipidemia     Hypertension     Renal colic     LAST ASSESSED: [id number]    Viral syndrome     LAST ASSESSED: [id number]    Vitamin D deficiency disease      Past Surgical History:   Procedure Laterality Date    CHOLECYSTECTOMY      COLON SURGERY      COLONOSCOPY N/A 6/15/2018    Procedure: COLONOSCOPY;  Surgeon: Jaskaran Burton DO;  Location: [facility];  Service: Gastroenterology    COLOSTOMY      ENDOMETRIAL ABLATION  2007    THERMAL    ESOPHAGOGASTRODUODENOSCOPY N/A 4/6/2018    Procedure: ESOPHAGOGASTRODUODENOSCOPY (EGD); Surgeon: Erika Salmeron MD;  Location: [facility];  Service: Gastroenterology   [address] [place] [address], ,     LAPAROTOMY N/A 2018    Procedure: LAPAROTOMY EXPLORATORY, 2nd look, small bowel resection, ilieostomy closure via [place] anastamosis transverse colostomy;  Surgeon: Fred Galeas DO;  Location:  MAIN OR;  Service: General    DC CLOSE ENTEROSTOMY N/A 2018    Procedure: COLOSTOMY TAKEDOWN / Foye Bristle, ILEOCECECTOMY, LASER FLUORESCENCE ANGIOGRAPHY, LYSIS OF ADHESIONS;  Surgeon: Gianna Cameron DO;  Location:  MAIN OR;  Service: General    DC ESOPHAGOGASTRODUODENOSCOPY TRANSORAL DIAGNOSTIC N/A 10/21/2016    Procedure: EGD AND COLONOSCOPY;  Surgeon: Fernandez Phillips MD;  [facility];  Service: Gastroenterology    DC PART REMOVAL COLON W ANASTOMOSIS N/A 1/3/2018    Procedure: Exploratory laparotomy, RESECTION COLON SIGMOID, possible ostomy;  Surgeon: Gianna Cameron DO;  [facility]: [facility];  Service: General    THYROIDECTOMY      NEAR-TOTAL    THYROIDECTOMY, PARTIAL      TONSILLECTOMY AND ADENOIDECTOMY      TUBAL LIGATION      URETERAL STENT PLACEMENT Bilateral 1/3/2018    Procedure: INSERTION STENT URETERAL;  Surgeon: Nicolle Allen MD;  [facility]: [facility];  Service: Urology       FAMILY HISTORY:  Non-contributory    SOCIAL HISTORY:  Social History   /Civil Union  History   Alcohol Use    1 2 oz/week    2 Standard drinks or equivalent per week     Comment: 1-2 mixed drinks per weekend     History   Drug Use No     History   Smoking Status    Former Smoker    Packs/day: 0 50    Years: 30 00    Quit date:    Smokeless Tobacco    Never Used       ALLERGIES:  No Known Allergies    MEDICATIONS:  All current active medications have been reviewed        Physical exam:     HR:  [68-77] 68  Resp:  [18] 18  BP: (110-116)/(68-69) 116/69  SpO2:  [96 %-98 %] 96 %  Temp (24hrs), Av 4 °F (36 3 °C), Min:97 3 °F (36 3 °C), Max:97 4 °F (36 3 °C)  Current: Temperature: (!) 97 4 °F (36 3 °C)    Intake/Output Summary (Last 24 hours) at 08/06/18 1021  Last data filed at 08/06/18 0900   Gross per 24 hour   Intake              480 ml   Output                0 ml   Net              480 ml       General Appearance:  Appearing well, nontoxic, and in no distress, appears stated age   Head:  Normocephalic, without obvious abnormality, atraumatic   Eyes:  PERRL, conjunctiva pink and sclera anicteric, both eyes   Nose: Nares normal, mucosa normal, no drainage   Throat: Oropharynx moist without lesions; lips, mucosa, and tongue normal; teeth and gums normal   Neck: Supple, symmetrical, trachea midline, no adenopathy, no tenderness/mass/nodules   Back:   Symmetric, no curvature, ROM normal, no CVA tenderness   Lungs:   Clear to auscultation bilaterally, no audible wheezes, rhonchi and rales, respirations unlabored   Chest Wall:  No tenderness or deformity   Heart:  Regular rate and rhythm, S1, S2 normal, no murmur, rub or gallop   Abdomen:   Soft, non-tender, non-distended, positive bowel sounds, no masses, no organomegaly    No CVA tenderness   Extremities: Extremities normal, atraumatic, no cyanosis, clubbing or edema   Skin: Skin color, texture, turgor normal, no rashes or lesions  No draining wounds noted  Lymph nodes: Cervical, supraclavicular, and axillary nodes normal   Neurologic: Alert and oriented times 3, extremity strength 5/5 and symmetric       Invasive Devices:   Peripheral IV 08/03/18 Right Antecubital (Active)   Site Assessment Clean;Dry; Intact 8/6/2018  8:00 AM   Dressing Type Transparent;Securing device 8/6/2018  8:00 AM   Line Status Infusing 8/6/2018  8:00 AM   Dressing Status Clean;Dry; Intact 8/6/2018  8:00 AM         Labs, Imaging, & Other Studies     Lab Results:    I have personally reviewed pertinent labs        Results from last 7 days  Lab Units 08/06/18  0542 08/05/18  0603 08/04/18  0434   WBC Thousand/uL 5 05 5  03 5 00   HEMOGLOBIN g/dL 11 5 10 7* 11 0*   PLATELETS Thousands/uL 167 191 219       Results from last 7 days  Lab Units 08/06/18  0542 08/05/18  0603 08/04/18  1754 08/04/18  0434   SODIUM mmol/L 145 148* 142 141   POTASSIUM mmol/L 4 0 3 7 3 9 3 4*   CHLORIDE mmol/L 110* 111* 110* 105   CO2 mmol/L 28 29 29 30   ANION GAP mmol/L 7 8 3* 6   BUN mg/dL 4* 3* 4* 5   CREATININE mg/dL 0 69 0 66 0 88 0 75   EGFR ml/min/1 73sq m 99 100 75 91   GLUCOSE RANDOM mg/dL 87 92 143* 77   CALCIUM mg/dL 8 6 8 2* 8 5 8 2*   AST U/L 21 21  --  28   ALT U/L 36 38  --  48   ALK PHOS U/L 79 76  --  71   TOTAL PROTEIN g/dL 5 8* 5 5*  --  5 5*   BILIRUBIN TOTAL mg/dL 0 80 0 80  --  1 20*       Results from last 7 days  Lab Units 08/04/18  0031   C DIFF TOXIN B  POSITIVE for C difficle toxin by PCR  *       Imaging Studies:   I have personally reviewed pertinent imaging study reports and images in PACS  EKG, Pathology, and Other Studies:   I have personally reviewed pertinent reports  Counseling/Coordination of care:      Labs, medical tests and imaging studies were independently reviewed by me as noted above in HPI and old records were obtained and summarized as noted above in HPI

## 2018-08-06 NOTE — CASE MANAGEMENT
Continued Stay Review    Thank you,  [facility], [place]  Network Utilization Review Department  Phone: [phone]; Fax [phone]  ATTENTION: The Network Utilization Review Department is now centralized for our 9 Facilities  Make a note that we have a new phone and fax numbers for our Department  Please call with any questions or concerns to [phone] and carefully follow the prompts so that you are directed to the right person  All voicemails are confidential  Fax any determinations, approvals, denials, and requests for initial or continue stay review clinical to [phone]  Due to HIGH CALL volume, it would be easier if you could please send faxed requests to expedite your requests and in part, help us provide discharge notifications faster      Date: 08/06/18    Vital Signs: /69 (BP Location: Right arm)   Pulse 68   Temp (!) 97 4 °F (36 3 °C) (Temporal)   Resp 18   Ht 5' 1" (1 549 m)   Wt 78 4 kg (172 lb 12 8 oz)   LMP  (LMP Unknown)   SpO2 96%   BMI 32 65 kg/m²     Medications:   Scheduled Meds:   Current Facility-Administered Medications:  acetaminophen 650 mg Oral Q6H PRN ROB Pena-TEA   apixaban 5 mg Oral BID Benedict Joe PA-C   LORazepam 0 5 mg Oral HS PRN ROB Pena-TEA   magnesium oxide 800 mg Oral Daily Benedict Joe PA-C   metoprolol tartrate 12 5 mg Oral Q12H [address] Kenrick [place], DO   ondansetron 8 mg Intravenous Q4H PRN Ramos [place], DO   pantoprazole 40 mg Oral Daily Benedict Joe PA-C   vancomycin 125 mg Oral Q6H [address] Ramos [place], DO     Continuous Infusions:    PRN Meds:   acetaminophen    LORazepam    ondansetron    Abnormal Labs/Diagnostic Results: 8/6 -- , BUN 4, Tl protein 5 8    Age/Sex: 46 y o  female     Assessment/Plan:          * Intractable vomiting with nausea   Assessment & Plan     Continue to increase diet as tolerated, continue with protein supplement shakes      Patient still with very poor p o  intake, not maintaining adequate oral hydration, patient with persistent nausea, no further vomiting           C  difficile colitis   Assessment & Plan     C diff testing positive, patient on oral vancomycin 125 mg 4 times a day, today is day 2 of antibiotic therapy     Consult ID          Hypernatremia   Assessment & Plan     Resolved, encourage p o  intake           Hypomagnesemia   Assessment & Plan     Continue oral supplement          Hypokalemia   Assessment & Plan     Improving, continue oral supplement          Diarrhea   Assessment & Plan     Patient reports improvement overnight, has had 1 formed stool           Anemia   Assessment & Plan     Hemoglobin stable             Paroxysmal atrial fibrillation (HCC)   Assessment & Plan     Blood pressure running low, decreased Lopressor 12 5 b i d   -Anticoagulated on Eliquis                Hypertension   Assessment & Plan     Blood pressure running low, maintain reduced dose of beta-blocker 12 5 b i d           Esophageal reflux   Assessment & Plan     -Continue protonix          S/P colostomy takedown   Assessment & Plan     Patient continues to improve from a recent reversal of colostomy             VTE Pharmacologic Prophylaxis:   Pharmacologic: Apixaban (Eliquis)  Mechanical VTE Prophylaxis in Place:  Yes     Current Patient Status: Inpatient   Certification Statement: The patient will continue to require additional inpatient hospital stay due to Continue inpatient treatment to ensure adequate p o  intake       Discharge Plan / Estimated Discharge Date:   8/7/2018

## 2018-08-06 NOTE — PHYSICAL THERAPY NOTE
PT Treatment Note      08/06/18 0815   Restrictions/Precautions   Other Precautions Contact/isolation   Cognition   Overall Cognitive Status WFL   Following Commands Follows all commands and directions without difficulty   Subjective   Subjective Reports she is feeling better and would like to ambulate  Reports she has "discomfort and pulling" in abdominal area  Bed Mobility   Supine to Sit 7  Independent   Additional items Bedrails;HOB elevated   Transfers   Sit to Stand 6  Modified independent   Additional items Bedrails   Stand to Sit 6  Modified independent   Ambulation/Elevation   Gait pattern Forward Flexion  (mild)   Gait Assistance 5  Supervision   Assistive Device None   Distance 450' slow chema   Stair Management Assistance (declined stairs)   Balance   Static Sitting Good   Dynamic Sitting Good   Static Standing Fair +   Dynamic Standing Fair +   Ambulatory Fair +   Endurance Deficit   Endurance Deficit Yes   Activity Tolerance   Activity Tolerance Patient tolerated treatment well   Assessment   Prognosis Good   Problem List Decreased strength;Decreased endurance;Decreased mobility   Assessment Ambulated in hallway with supervison  Progressing distance with good tolerance  She will benefit from continued PT to address deficits in order to maximize return to PLOF  Plan   Treatment/Interventions LE strengthening/ROM; Therapeutic exercise; Endurance training;Bed mobility   Progress Progressing toward goals   Recommendation   Recommendation Home independently   Pt  OOB  with call bell within reach at end of PT session  Discussed with Shyann Regan, PT today's treatment and patient's current level of function for care coordination

## 2018-08-06 NOTE — PLAN OF CARE
Problem: Nutrition/Hydration-ADULT  Goal: Nutrient/Hydration intake appropriate for improving, restoring or maintaining nutritional needs  Monitor and assess patient's nutrition/hydration status for malnutrition (ex- brittle hair, bruises, dry skin, pale skin and conjunctiva, muscle wasting, smooth red tongue, and disorientation)  Collaborate with interdisciplinary team and initiate plan and interventions as ordered  Monitor patient's weight and dietary intake as ordered or per policy  Utilize nutrition screening tool and intervene per policy  Determine patient's food preferences and provide high-protein, high-caloric foods as appropriate  INTERVENTIONS:  - Monitor oral intake, urinary output, labs, and treatment plans  - Assess nutrition and hydration status and recommend course of action  - Evaluate amount of meals eaten  - Assist patient with eating if necessary   - Allow adequate time for meals  - Recommend/ encourage appropriate diets, oral nutritional supplements, and vitamin/mineral supplements  - Order, calculate, and assess calorie counts as needed  - Recommend, monitor, and adjust tube feedings and TPN/PPN based on assessed needs  - Assess need for intravenous fluids  - Provide specific nutrition/hydration education as appropriate  - Include patient/family/caregiver in decisions related to nutrition   Outcome: Progressing  Goal: Pt will consume and tolerate >75% of meals & supplements

## 2018-08-06 NOTE — OCCUPATIONAL THERAPY NOTE
Occupational Therapy         Patient Name: Rhonda Holt  [id number] Date: 8/6/2018        Order received and chart review performed; pt seen by PT this AM and performed at (I) level with no skilled OT intervention indicated at this time; will d/c OT orders at this time

## 2018-08-06 NOTE — ASSESSMENT & PLAN NOTE
Continue to increase diet as tolerated, continue with protein supplement shakes  Patient still with very poor p o  intake, not maintaining adequate oral hydration, patient with persistent nausea, no further vomiting

## 2018-08-06 NOTE — PROGRESS NOTES
Progress Note - Luis Armando Teixeira 1965, 46 y o  female MRN: [id number]    Unit/Bed#: 401-01 Encounter: [id number]    Primary Care Provider: Eamon Maddox DO   Date and time admitted to hospital: 8/3/2018  1:06 PM        * Intractable vomiting with nausea   Assessment & Plan    Continue to increase diet as tolerated, continue with protein supplement shakes  Patient still with very poor p o  intake, not maintaining adequate oral hydration, patient with persistent nausea, no further vomiting  C  difficile colitis   Assessment & Plan    C diff testing positive, patient on oral vancomycin 125 mg 4 times a day, today is day 2 of antibiotic therapy    Consult ID        Hypernatremia   Assessment & Plan    Resolved, encourage p o  intake  Hypomagnesemia   Assessment & Plan    Continue oral supplement        Hypokalemia   Assessment & Plan    Improving, continue oral supplement        Diarrhea   Assessment & Plan    Patient reports improvement overnight, has had 1 formed stool  Anemia   Assessment & Plan    Hemoglobin stable          Paroxysmal atrial fibrillation (HCC)   Assessment & Plan    Blood pressure running low, decreased Lopressor 12 5 b i d   -Anticoagulated on Eliquis            Hypertension   Assessment & Plan    Blood pressure running low, maintain reduced dose of beta-blocker 12 5 b i d  Esophageal reflux   Assessment & Plan    -Continue protonix        S/P colostomy takedown   Assessment & Plan    Patient continues to improve from a recent reversal of colostomy          VTE Pharmacologic Prophylaxis:   Pharmacologic: Apixaban (Eliquis)  Mechanical VTE Prophylaxis in Place: Yes    Patient Centered Rounds: I have performed bedside rounds with nursing staff today  Discussions with Specialists or Other Care Team Provider:   Plan of care discussed with general surgical team, contacted ID to request consult      Education and Discussions with Family / Patient:  Plan of care discussed patient bedside    Time Spent for Care: 45 minutes  More than 50% of total time spent on counseling and coordination of care as described above  Current Length of Stay: 1 day(s)    Current Patient Status: Inpatient   Certification Statement: The patient will continue to require additional inpatient hospital stay due to Continue inpatient treatment to ensure adequate p o  intake    Discharge Plan / Estimated Discharge Date:   2018      Code Status: Level 1 - Full Code      Subjective:   No pain, still nauseous  Objective:     Vitals:   Temp (24hrs), Av 4 °F (36 3 °C), Min:97 3 °F (36 3 °C), Max:97 4 °F (36 3 °C)    HR:  [68-77] 68  Resp:  [18] 18  BP: (110-116)/(68-69) 116/69  SpO2:  [96 %-98 %] 96 %  Body mass index is 32 65 kg/m²  Input and Output Summary (last 24 hours): Intake/Output Summary (Last 24 hours) at 18 1008  Last data filed at 18 0900   Gross per 24 hour   Intake              480 ml   Output                0 ml   Net              480 ml       Physical Exam:     Physical Exam   Constitutional: She is oriented to person, place, and time  She appears well-developed and well-nourished  No distress  Pulmonary/Chest: Effort normal and breath sounds normal  No respiratory distress  She has no wheezes  Abdominal: Soft  Bowel sounds are normal  She exhibits no distension  There is no tenderness  Neurological: She is alert and oriented to person, place, and time  No cranial nerve deficit  Coordination normal    Skin: She is not diaphoretic  Psychiatric: She has a normal mood and affect  Her behavior is normal  Judgment and thought content normal    Nursing note and vitals reviewed          Additional Data:     Labs:      Results from last 7 days  Lab Units 18  0542   WBC Thousand/uL 5 05   HEMOGLOBIN g/dL 11 5   HEMATOCRIT % 35 4   PLATELETS Thousands/uL 167   NEUTROS PCT % 54   LYMPHS PCT % 31   MONOS PCT % 8   EOS PCT % 7*       Results from last 7 days  Lab Units 08/06/18  0542   SODIUM mmol/L 145   POTASSIUM mmol/L 4 0   CHLORIDE mmol/L 110*   CO2 mmol/L 28   BUN mg/dL 4*   CREATININE mg/dL 0 69   CALCIUM mg/dL 8 6   TOTAL PROTEIN g/dL 5 8*   BILIRUBIN TOTAL mg/dL 0 80   ALK PHOS U/L 79   ALT U/L 36   AST U/L 21   GLUCOSE RANDOM mg/dL 87       Results from last 7 days  Lab Units 08/04/18  0434   INR  1 52*       * I Have Reviewed All Lab Data Listed Above  * Additional Pertinent Lab Tests Reviewed: [facility]        Recent Cultures (last 7 days):       Results from last 7 days  Lab Units 08/04/18  0031   C DIFF TOXIN B  POSITIVE for C difficle toxin by PCR  *       Last 24 Hours Medication List:     Current Facility-Administered Medications:  acetaminophen 650 mg Oral Q6H PRN Benedict Joe PA-C   apixaban 5 mg Oral BID Benedict Joe PA-C   LORazepam 0 5 mg Oral HS PRN Benedict Joe PA-C   magnesium oxide 800 mg Oral Daily Benedict Joe PA-C   metoprolol tartrate 12 5 mg Oral Q12H [address] Frannie Scott DO   ondansetron 8 mg Intravenous Q4H PRN Frannie Scott DO   pantoprazole 40 mg Oral Daily Benedict Joe PA-C   vancomycin 125 mg Oral Q6H [address] Frannie Scott DO        Today, Patient Was Seen By: Frannie Scott DO

## 2018-08-06 NOTE — ASSESSMENT & PLAN NOTE
C diff testing positive, patient on oral vancomycin 125 mg 4 times a day, today is day 2 of antibiotic therapy    Consult ID

## 2018-08-07 PROBLEM — E87.0 HYPERNATREMIA: Status: RESOLVED | Noted: 2018-08-05 | Resolved: 2018-08-07

## 2018-08-07 PROCEDURE — 97116 GAIT TRAINING THERAPY: CPT

## 2018-08-07 PROCEDURE — 99024 POSTOP FOLLOW-UP VISIT: CPT | Performed by: SURGERY

## 2018-08-07 PROCEDURE — 99232 SBSQ HOSP IP/OBS MODERATE 35: CPT | Performed by: INTERNAL MEDICINE

## 2018-08-07 RX ORDER — CHOLESTYRAMINE LIGHT 4 G/5.7G
4 POWDER, FOR SUSPENSION ORAL 2 TIMES DAILY
Status: DISCONTINUED | OUTPATIENT
Start: 2018-08-07 | End: 2018-08-14

## 2018-08-07 RX ADMIN — PANTOPRAZOLE SODIUM 40 MG: 40 TABLET, DELAYED RELEASE ORAL at 09:26

## 2018-08-07 RX ADMIN — MAGNESIUM OXIDE TAB 400 MG (241.3 MG ELEMENTAL MG) 800 MG: 400 (241.3 MG) TAB at 09:26

## 2018-08-07 RX ADMIN — METOPROLOL TARTRATE 12.5 MG: 25 TABLET ORAL at 20:25

## 2018-08-07 RX ADMIN — VANCOMYCIN 125 MG: KIT at 17:46

## 2018-08-07 RX ADMIN — METOPROLOL TARTRATE 12.5 MG: 25 TABLET ORAL at 09:26

## 2018-08-07 RX ADMIN — APIXABAN 5 MG: 5 TABLET, FILM COATED ORAL at 09:26

## 2018-08-07 RX ADMIN — VANCOMYCIN 125 MG: KIT at 12:09

## 2018-08-07 RX ADMIN — VANCOMYCIN 125 MG: KIT at 05:52

## 2018-08-07 RX ADMIN — APIXABAN 5 MG: 5 TABLET, FILM COATED ORAL at 17:46

## 2018-08-07 RX ADMIN — CHOLESTYRAMINE 4 G: 4 POWDER, FOR SUSPENSION ORAL at 17:46

## 2018-08-07 NOTE — PROGRESS NOTES
Progress Note - General Surgery   Jordana Osorio 46 y o  female MRN: [id number]  Unit/Bed#: 401-01 Encounter: [id number]    Assessment:  69-year-old female who is status post Pj's reversal and ileocecectomy with primary anastomosis on July 11, 2018, admitted with hypokalemia, persistent nausea vomiting  Patient had similar episodes after her initial Pj's procedure  CT scan showing an intact anastomosis ease no evidence of bowel obstruction  -good day today  Tolerating solid food  Nausea present but very minimal   No vomiting  Diarrhea still present  Being treated for C diff colitis  Potassium stabilized    Plan:    -diet as tolerated, plus supplements  -out of bed bed/ambulate  -antibiotic treatment for C diff infection as per ID recommendations  -remainder of medical management as per medical team     Subjective/Objective   Chief Complaint:  Nausea    Subjective:  Having a good day today  Tolerating solid food  Diarrhea still present  Objective:     Blood pressure 128/74, pulse 65, temperature 98 °F (36 7 °C), temperature source Temporal, resp  rate 18, height 5' 1" (1 549 m), weight 76 9 kg (169 lb 8 5 oz), SpO2 98 %  ,Body mass index is 32 03 kg/m²        Intake/Output Summary (Last 24 hours) at 08/07/18 1340  Last data filed at 08/07/18 1259   Gross per 24 hour   Intake              720 ml   Output                0 ml   Net              720 ml       Invasive Devices     Peripheral Intravenous Line            Peripheral IV 08/03/18 Right Antecubital 4 days                Physical Exam:   General:  No acute distress, resting comfortably  HEENT:  Normocephalic, atraumatic, trachea midline  CV:  S1-S2 audible, regular rate rhythm  Pulmonary:  Clear auscultation bilaterally no wheezes rales rhonchi  GI:  Abdomen nondistended, soft nontender, again ostomy site healing well  MSK lower extremities without clubbing, cyanosis, edema  Neurologic:  Alert and x3, cranial 2 through 12 grossly intact  Psych: Mood and affect appropriate patient feels little better and looks like she is in better spirits today              Lab, Imaging and other studies:  I have personally reviewed pertinent lab results    , CBC:   No results found for: WBC, HGB, HCT, MCV, PLT, ADJUSTEDWBC, MCH, MCHC, RDW, MPV, NRBC, CMP:   No results found for: NA, K, CL, CO2, ANIONGAP, BUN, CREATININE, GLUCOSE, CALCIUM, AST, ALT, ALKPHOS, PROT, ALBUMIN, BILITOT, EGFR, Coagulation:   No results found for: PT, INR, APTT, Urinalysis:   No results found for: COLORU, CLARITYU, SPECGRAV, PHUR, LEUKOCYTESUR, NITRITE, PROTEINUA, GLUCOSEU, KETONESU, BILIRUBINUR, BLOODU, Amylase: No results found for: AMYLASE, Lipase:   No results found for: LIPASE  VTE Pharmacologic Prophylaxis:  On Eliquis  VTE Mechanical Prophylaxis: sequential compression device

## 2018-08-07 NOTE — CASE MANAGEMENT
Thank you,  Ash Aqq  [facility] Utilization Review Department  Phone: [phone]; Fax [phone]  ATTENTION: The Network Utilization Review Department is now centralized for our 9 Facilities  Make a note that we have a new phone and fax numbers for our Department  Please call with any questions or concerns to [phone] and carefully follow the prompts so that you are directed to the right person  All voicemails are confidential  Fax any determinations, approvals, denials, and requests for initial or continue stay review clinical to [phone]  Due to HIGH CALL volume, it would be easier if you could please send faxed requests to expedite your requests and in part, help us provide discharge notifications faster  Continued Stay Review    Date: 8/5/2018    Vital Signs: 97 3--70-18  111/68--98% on ra lying down    Medications:   Scheduled Meds:   Current Facility-Administered Medications:  acetaminophen 650 mg Oral Q6H PRN Benedict Joe PA-C   apixaban 5 mg Oral BID Benedict Joe PA-C   LORazepam 0 5 mg Oral HS PRN Benedict Joe PA-C   magnesium oxide 800 mg Oral Daily Benedict Joe PA-C   metoprolol tartrate 12 5 mg Oral Q12H [address] Kenrick Madison DO   ondansetron 8 mg Intravenous Q4H PRN Justo Minerva, DO   pantoprazole 40 mg Oral Daily Benedict Joe PA-C   vancomycin 125 mg Oral Q6H [address] Kenrick Madison, DO   iv nss  With 20 meq kcl at 50cc/hr  kcl iv 120 meq  Continuous Infusions:    PRN Meds:   acetaminophen    LORazepam    ondansetron    Abnormal Labs/Diagnostic Results: na 148---cl 111--bun 3--- ca 8 2---t  Pro 5 5--alb 2 5----mg 1 5  hgb 10 7/33 7  8/6 + for c-diff  Age/Sex: 46 y o  female     Assessment/Plan: Assessment:  34-year-old female who is status post Pj's reversal and ileocecectomy with primary anastomosis on July 11, 2018, admitted with hypokalemia, persistent nausea vomiting   Patient had similar episodes after her initial Pj's procedure   CT scan showing an intact anastomosis ease no evidence of bowel obstruction  Unclear etiology,  less likely mechanical possibly psychosomatic    tolerating some clears  Minimal solid food intake  Nausea is still present but improved  Diarrhea still present  C diff now positive  Previous C diff on July 26 was negative      Plan:  -IV fluids  -continue to monitor potassium and I agree with p o   Supplementation  -diet as tolerated, will order additional dietary supplements including Ensure chocolate flavor as per patient request  -out of bed bed/ambulate  -antibiotic treatment for C diff infection  -hypernatremia -IV fluids change to half-normal saline   -remainder of medical management as per medical team     Discharge Plan: home

## 2018-08-07 NOTE — PROGRESS NOTES
Progress Note - General Surgery   Chaka Booker 46 y o  female MRN: [id number]  Unit/Bed#: 401-01 Encounter: [id number]    Assessment:  59-year-old female who is status post Pj's reversal and ileocecectomy with primary anastomosis on July 11, 2018, admitted with hypokalemia, persistent nausea vomiting  Patient had similar episodes after her initial Pj's procedure  CT scan showing an intact anastomosis ease no evidence of bowel obstruction  Not have any good day today  Still complains a lot of diarrhea  More nausea      Plan:    -diet as tolerated, plus supplements  -out of bed bed/ambulate  -antibiotic treatment for C diff infection as per ID recommendations  -discussed with GI and ID were the etiology of this severe diarrhea whether it was purely infectious secondary to C diff for was there particular a mechanical problem as she no longer has any was cecal valve and could this be from increases bilious enteric contents  We will start her on cholestyramine and see if that helps with the diarrhea  If no improvement then she will likely go for a sigmoidoscopy on Friday to evaluate for pseudomembranes  -remainder of medical management as per medical team     Subjective/Objective   Chief Complaint:  Nausea and diarrhea    Subjective:  Not having a good day  Diarrhea is more less worse and it is almost every hour  Nausea little worse today   Objective:     Blood pressure 116/74, pulse 74, temperature 98 °F (36 7 °C), temperature source Temporal, resp  rate 18, height 5' 1" (1 549 m), weight 76 9 kg (169 lb 8 5 oz), SpO2 96 %  ,Body mass index is 32 03 kg/m²        Intake/Output Summary (Last 24 hours) at 08/07/18 1822  Last data filed at 08/07/18 1259   Gross per 24 hour   Intake              480 ml   Output                0 ml   Net              480 ml       Invasive Devices     Peripheral Intravenous Line            Peripheral IV 08/03/18 Right Antecubital 4 days                Physical Exam: General:  No acute distress, resting comfortably  HEENT:  Normocephalic, atraumatic, trachea midline  CV:  S1-S2 audible, regular rate rhythm  Pulmonary:  Clear auscultation bilaterally no wheezes rales rhonchi  GI:  Abdomen nondistended, soft nontender, again ostomy site healing well  MSK lower extremities without clubbing, cyanosis, edema  Neurologic:  Alert and x3, cranial 2 through 12 grossly intact  Psych:  Mood and affect appropriate patient feels little better and looks like she is in better spirits today              Lab, Imaging and other studies:  I have personally reviewed pertinent lab results    , CBC:   No results found for: WBC, HGB, HCT, MCV, PLT, ADJUSTEDWBC, MCH, MCHC, RDW, MPV, NRBC, CMP:   No results found for: NA, K, CL, CO2, ANIONGAP, BUN, CREATININE, GLUCOSE, CALCIUM, AST, ALT, ALKPHOS, PROT, ALBUMIN, BILITOT, EGFR, Coagulation:   No results found for: PT, INR, APTT, Urinalysis:   No results found for: COLORU, CLARITYU, SPECGRAV, PHUR, LEUKOCYTESUR, NITRITE, PROTEINUA, GLUCOSEU, KETONESU, BILIRUBINUR, BLOODU, Amylase: No results found for: AMYLASE, Lipase:   No results found for: LIPASE  VTE Pharmacologic Prophylaxis:  On Eliquis  VTE Mechanical Prophylaxis: sequential compression device

## 2018-08-07 NOTE — PLAN OF CARE
Problem: PHYSICAL THERAPY ADULT  Goal: Performs mobility at highest level of function for planned discharge setting  See evaluation for individualized goals  Outcome: Progressing  Prognosis: Good  Problem List: Decreased strength, Decreased endurance, Impaired balance, Decreased mobility  Assessment: Ambulated in hallway with supervison  Ambulation limited this session 2* not feeling well, upset stomach  She will benefit from continued PT to address deficits in order to maximize return to PLOF  Barriers to Discharge: None     Recommendation: Home with family support     PT - OK to Discharge: Yes    See flowsheet documentation for full assessment

## 2018-08-08 LAB
ALBUMIN SERPL BCP-MCNC: 2.9 G/DL (ref 3.5–5)
ALP SERPL-CCNC: 93 U/L (ref 46–116)
ALT SERPL W P-5'-P-CCNC: 33 U/L (ref 12–78)
ANION GAP SERPL CALCULATED.3IONS-SCNC: 11 MMOL/L (ref 4–13)
AST SERPL W P-5'-P-CCNC: 16 U/L (ref 5–45)
BILIRUB SERPL-MCNC: 0.9 MG/DL (ref 0.2–1)
BUN SERPL-MCNC: 10 MG/DL (ref 5–25)
CALCIUM SERPL-MCNC: 9 MG/DL (ref 8.3–10.1)
CHLORIDE SERPL-SCNC: 108 MMOL/L (ref 100–108)
CO2 SERPL-SCNC: 25 MMOL/L (ref 21–32)
CREAT SERPL-MCNC: 0.7 MG/DL (ref 0.6–1.3)
ERYTHROCYTE [DISTWIDTH] IN BLOOD BY AUTOMATED COUNT: 13.4 % (ref 11.6–15.1)
GFR SERPL CREATININE-BSD FRML MDRD: 99 ML/MIN/1.73SQ M
GLUCOSE SERPL-MCNC: 93 MG/DL (ref 65–140)
HCT VFR BLD AUTO: 38.7 % (ref 34.8–46.1)
HGB BLD-MCNC: 12.6 G/DL (ref 11.5–15.4)
MAGNESIUM SERPL-MCNC: 1.7 MG/DL (ref 1.6–2.6)
MCH RBC QN AUTO: 30.7 PG (ref 26.8–34.3)
MCHC RBC AUTO-ENTMCNC: 32.6 G/DL (ref 31.4–37.4)
MCV RBC AUTO: 94 FL (ref 82–98)
PHOSPHATE SERPL-MCNC: 4.4 MG/DL (ref 2.7–4.5)
PLATELET # BLD AUTO: 201 THOUSANDS/UL (ref 149–390)
PMV BLD AUTO: 12.3 FL (ref 8.9–12.7)
POTASSIUM SERPL-SCNC: 3.2 MMOL/L (ref 3.5–5.3)
PROT SERPL-MCNC: 6.3 G/DL (ref 6.4–8.2)
RBC # BLD AUTO: 4.11 MILLION/UL (ref 3.81–5.12)
SODIUM SERPL-SCNC: 144 MMOL/L (ref 136–145)
WBC # BLD AUTO: 5.55 THOUSAND/UL (ref 4.31–10.16)

## 2018-08-08 PROCEDURE — 85027 COMPLETE CBC AUTOMATED: CPT | Performed by: INTERNAL MEDICINE

## 2018-08-08 PROCEDURE — 83735 ASSAY OF MAGNESIUM: CPT | Performed by: INTERNAL MEDICINE

## 2018-08-08 PROCEDURE — 99024 POSTOP FOLLOW-UP VISIT: CPT | Performed by: SURGERY

## 2018-08-08 PROCEDURE — 84100 ASSAY OF PHOSPHORUS: CPT | Performed by: INTERNAL MEDICINE

## 2018-08-08 PROCEDURE — 80053 COMPREHEN METABOLIC PANEL: CPT | Performed by: INTERNAL MEDICINE

## 2018-08-08 PROCEDURE — 99232 SBSQ HOSP IP/OBS MODERATE 35: CPT | Performed by: INTERNAL MEDICINE

## 2018-08-08 PROCEDURE — 99222 1ST HOSP IP/OBS MODERATE 55: CPT | Performed by: INTERNAL MEDICINE

## 2018-08-08 RX ORDER — CALCIUM CARBONATE 200(500)MG
500 TABLET,CHEWABLE ORAL DAILY PRN
Status: DISCONTINUED | OUTPATIENT
Start: 2018-08-08 | End: 2018-08-08

## 2018-08-08 RX ORDER — LACTOBACILLUS ACIDOPHILUS / LACTOBACILLUS BULGARICUS 100 MILLION CFU STRENGTH
1 GRANULES ORAL
Status: DISCONTINUED | OUTPATIENT
Start: 2018-08-08 | End: 2018-08-15 | Stop reason: HOSPADM

## 2018-08-08 RX ORDER — MAGNESIUM HYDROXIDE/ALUMINUM HYDROXICE/SIMETHICONE 120; 1200; 1200 MG/30ML; MG/30ML; MG/30ML
15 SUSPENSION ORAL ONCE
Status: COMPLETED | OUTPATIENT
Start: 2018-08-08 | End: 2018-08-08

## 2018-08-08 RX ORDER — CALCIUM CARBONATE 200(500)MG
500 TABLET,CHEWABLE ORAL EVERY 4 HOURS PRN
Status: DISCONTINUED | OUTPATIENT
Start: 2018-08-08 | End: 2018-08-09

## 2018-08-08 RX ORDER — DEXTROSE, SODIUM CHLORIDE, AND POTASSIUM CHLORIDE 5; .45; .3 G/100ML; G/100ML; G/100ML
125 INJECTION INTRAVENOUS CONTINUOUS
Status: DISCONTINUED | OUTPATIENT
Start: 2018-08-08 | End: 2018-08-09

## 2018-08-08 RX ADMIN — VANCOMYCIN 125 MG: KIT at 05:31

## 2018-08-08 RX ADMIN — DEXTROSE, SODIUM CHLORIDE, AND POTASSIUM CHLORIDE 125 ML/HR: 5; .45; .3 INJECTION INTRAVENOUS at 12:50

## 2018-08-08 RX ADMIN — CHOLESTYRAMINE 4 G: 4 POWDER, FOR SUSPENSION ORAL at 18:10

## 2018-08-08 RX ADMIN — METOPROLOL TARTRATE 12.5 MG: 25 TABLET ORAL at 22:07

## 2018-08-08 RX ADMIN — VANCOMYCIN 125 MG: KIT at 00:10

## 2018-08-08 RX ADMIN — ALUMINUM HYDROXIDE, MAGNESIUM HYDROXIDE, AND SIMETHICONE 15 ML: 200; 200; 20 SUSPENSION ORAL at 22:05

## 2018-08-08 RX ADMIN — PANTOPRAZOLE SODIUM 40 MG: 40 TABLET, DELAYED RELEASE ORAL at 09:11

## 2018-08-08 RX ADMIN — ANTACID TABLETS 500 MG: 500 TABLET, CHEWABLE ORAL at 08:48

## 2018-08-08 RX ADMIN — APIXABAN 5 MG: 5 TABLET, FILM COATED ORAL at 09:10

## 2018-08-08 RX ADMIN — CHOLESTYRAMINE 4 G: 4 POWDER, FOR SUSPENSION ORAL at 09:10

## 2018-08-08 RX ADMIN — DEXTROSE, SODIUM CHLORIDE, AND POTASSIUM CHLORIDE 125 ML/HR: 5; .45; .3 INJECTION INTRAVENOUS at 20:59

## 2018-08-08 RX ADMIN — LACTOBACILLUS ACIDOPHILUS / LACTOBACILLUS BULGARICUS 1 PACKET: 100 MILLION CFU STRENGTH GRANULES at 16:40

## 2018-08-08 RX ADMIN — MAGNESIUM OXIDE TAB 400 MG (241.3 MG ELEMENTAL MG) 800 MG: 400 (241.3 MG) TAB at 09:10

## 2018-08-08 RX ADMIN — METOPROLOL TARTRATE 12.5 MG: 25 TABLET ORAL at 09:11

## 2018-08-08 RX ADMIN — ANTACID TABLETS 500 MG: 500 TABLET, CHEWABLE ORAL at 04:43

## 2018-08-08 RX ADMIN — ANTACID TABLETS 500 MG: 500 TABLET, CHEWABLE ORAL at 16:40

## 2018-08-08 RX ADMIN — VANCOMYCIN 125 MG: KIT at 12:21

## 2018-08-08 RX ADMIN — VANCOMYCIN 125 MG: KIT at 18:10

## 2018-08-08 NOTE — PROGRESS NOTES
[facility] Progress Note  Patient: Rhonda Holt 46 y o  female   MRN: [id number]  PCP: David Goode DO  Unit/Bed#: 260-97 Encounter: [id number]  Date Of Visit: 18    Assessment:    Principal Problem:    C  difficile colitis  Active Problems:    Esophageal reflux    Hypertension    Hypokalemia    Hypomagnesemia    Paroxysmal atrial fibrillation (HCC)    Anemia    Intractable vomiting with nausea    S/P colostomy takedown    Diarrhea      Plan:    · Frequency of diarrhea slightly improved this evening, attributed to cholestyramine by patient  · Cont PO vancomycin & cholestyramine  · Replete serum electrolyte deficits PRN  · Cont NOAC & BB for PAFib  · Further interventions per gen surg      VTE Pharmacologic Prophylaxis:   Pharmacologic: Apixaban (Eliquis)  Mechanical VTE Prophylaxis in Place: Yes    Patient Centered Rounds: I have performed bedside rounds with nursing staff today  Current Length of Stay: 2 day(s)    Current Patient Status: Inpatient     Code Status: Level 1 - Full Code      Subjective:   Patient seen & examined tonight  Less nauseous, still having crampy abd pain with defecation  Nonbloody diarrhea persists  Objective:     Vitals:   Temp (24hrs), Av 1 °F (36 7 °C), Min:98 °F (36 7 °C), Max:98 3 °F (36 8 °C)    HR:  [65-74] 73  Resp:  [17-18] 18  BP: (100-131)/(64-76) 131/76  SpO2:  [96 %-98 %] 96 %  Body mass index is 32 03 kg/m²  Input and Output Summary (last 24 hours):        Intake/Output Summary (Last 24 hours) at 18  Last data filed at 18 1259   Gross per 24 hour   Intake              480 ml   Output                0 ml   Net              480 ml       Physical Exam:   General: in no overt distress  HEENT: not pale, not jaundiced  Chest: clear lungs, no wheeze  Heart: S1 S2 audible, regular  Abd: soft, nontender, bowel sounds present  Psych: appropriately oriented in all spheres  Neuro: Awake, alert, essentially nonfocal      Additional Data:     Labs:      Results from last 7 days  Lab Units 08/06/18  0542   WBC Thousand/uL 5 05   HEMOGLOBIN g/dL 11 5   HEMATOCRIT % 35 4   PLATELETS Thousands/uL 167   NEUTROS PCT % 54   LYMPHS PCT % 31   MONOS PCT % 8   EOS PCT % 7*       Results from last 7 days  Lab Units 08/06/18  0542   SODIUM mmol/L 145   POTASSIUM mmol/L 4 0   CHLORIDE mmol/L 110*   CO2 mmol/L 28   BUN mg/dL 4*   CREATININE mg/dL 0 69   CALCIUM mg/dL 8 6   TOTAL PROTEIN g/dL 5 8*   BILIRUBIN TOTAL mg/dL 0 80   ALK PHOS U/L 79   ALT U/L 36   AST U/L 21   GLUCOSE RANDOM mg/dL 87       Results from last 7 days  Lab Units 08/04/18  0434   INR  1 52*     Invalid input(s): ESTEPHANIA    * I Have Reviewed All Lab Data Listed Above  * Additional Pertinent Lab Tests Reviewed: [facility] Admission Reviewed    Imaging:    Imaging Reports Reviewed Today Include: CT      Recent Cultures (last 7 days):       Results from last 7 days  Lab Units 08/04/18  0031   C DIFF TOXIN B  POSITIVE for C difficle toxin by PCR  *       Last 24 Hours Medication List:     Current Facility-Administered Medications:  acetaminophen 650 mg Oral Q6H PRN Benedict Joe PA-C   apixaban 5 mg Oral BID Benedict Joe PA-C   cholestyramine sugar free 4 g Oral BID Abdon Montenegro, DO   LORazepam 0 5 mg Oral HS PRN ROB Pena-TEA   magnesium oxide 800 mg Oral Daily Benedict Joe PA-C   metoprolol tartrate 12 5 mg Oral Q12H [address] Kenrick Marshallol, DO   ondansetron 8 mg Intravenous Q4H PRN Aaliyah Hart, DO   pantoprazole 40 mg Oral Daily Benedict Joe PA-C   vancomycin 125 mg Oral Q6H [address] , DO        Today, Patient Was Seen By: Gustavo Ochoa MD    ** Please Note: Dragon 360 Dictation voice to text software may have been used in the creation of this document   **

## 2018-08-08 NOTE — CONSULTS
Consultation - [facility]  Monet Rich 46 y o  female MRN: [id number]  Unit/Bed#: 401-01 Encounter: [id number]        ASSESSMENT/PLAN:   C diff  Diarrhea  Nausea/vomiting    1   C diff/diarrhea - patient has had diarrhea since her reversal on 7/11  She has tried Imodium as an outpatient without relief  Her initial C diff was negative however repeat was positive  She has been on vancomycin for the last 5 days with no improvement  She was started on Questran last night  She is having difficulty drinking it as she is also nauseated  Is unclear if this C diff is a false positive and her diarrhea possibly related to bile reabsorption since she has no ileocecal valve  This was discussed with Dr Meg Wick, & we would recommend continuing with Questran and vancomycin for the time being and planning for colonoscopy on Friday for further evaluation of pseudomembranes and possible biopsy  Therefore I'll hold her Eliquis today  She will be on clears tomorrow & have a golytely prep which she can sip on throughout the day to help improve tolerability    -Hold Eliquis  -Clears tomorrow  -Golytely tomorrow  -Colonoscopy on Friday    2  N/V - she has had intermittent N/V also since the surgery  Diet as tolerated  Anti-emetics as needed  Inpatient consult to gastroenterology  Consult performed by: Sheryn Lesch ordered by: Marifer Dent          Reason for Consult / Principal Problem: C  difficile colitis    HPI: Monet Rich is a 46y o  year old female with a PMH of diverticulitis status post Pj's procedure with subsequent reversal an ileocecectomy on July 11th  She states since the surgery she has had profuse diarrhea  She describes it as liquid and going almost every hour  There is no bleeding  She was started on Imodium as an outpatient with little improvement  She was checked for C diff on 7/26 which was negative   She presented back to the hospital on 08/03 with continued diarrhea as well as nausea and vomiting  She was again checked for C diff which came back positive  She was started on vancomycin  Unfortunately the diarrhea has continued  She was started on Questran yesterday as she no longer has a cecal valve and may have increased bilious material   She has only had 1 dose so far  She is having difficulty drinking it today as she is nauseous and had 1 episode of vomiting  She denies any abdominal pain  Review of Systems: as per HPI  Review of Systems   All other systems reviewed and are negative  Historical Information   Past Medical History:   Diagnosis Date    Disease of thyroid gland     Endometrial polyp     Gastroesophageal reflux disease without esophagitis 12/4/2017    Hand injury     LAST ASSESSED: [id number]    High risk medication use     LAST ASSESSED: [id number]    Hyperlipidemia     Hypertension     Renal colic     LAST ASSESSED: [id number]    Viral syndrome     LAST ASSESSED: [id number]    Vitamin D deficiency disease      Past Surgical History:   Procedure Laterality Date    CHOLECYSTECTOMY      COLON SURGERY      COLONOSCOPY N/A 6/15/2018    Procedure: COLONOSCOPY;  Surgeon: Bere Tong DO;  Location: [facility];  Service: Gastroenterology    COLOSTOMY      ENDOMETRIAL ABLATION  2007    THERMAL    ESOPHAGOGASTRODUODENOSCOPY N/A 4/6/2018    Procedure: ESOPHAGOGASTRODUODENOSCOPY (EGD);   Surgeon: Misael Isbell MD;  Location: [facility];  Service: Gastroenterology   [address] [place] [address], 2002, 2007    LAPAROTOMY N/A 1/4/2018    Procedure: LAPAROTOMY EXPLORATORY, 2nd look, small bowel resection, ilieostomy closure via [place] anastamosis transverse colostomy;  Surgeon: Naveen Delatorre DO;  Location:  MAIN OR;  Service: General    CO CLOSE ENTEROSTOMY N/A 7/11/2018    Procedure: COLOSTOMY TAKEDOWN / REVERSE COATES, ILEOCECECTOMY, LASER FLUORESCENCE ANGIOGRAPHY, LYSIS OF ADHESIONS;  Surgeon: Nika Koo DO;  [facility];  Service: General    AL ESOPHAGOGASTRODUODENOSCOPY TRANSORAL DIAGNOSTIC N/A 10/21/2016    Procedure: EGD AND COLONOSCOPY;  Surgeon: Sally Beach MD;  [facility];  Service: Gastroenterology    AL PART REMOVAL COLON W ANASTOMOSIS N/A 1/3/2018    Procedure: Exploratory laparotomy, RESECTION COLON SIGMOID, possible ostomy;  Surgeon: Catie Sullivan DO;  [facility];  Service: General    THYROIDECTOMY      NEAR-TOTAL    THYROIDECTOMY, PARTIAL      TONSILLECTOMY AND ADENOIDECTOMY      TUBAL LIGATION  1997    URETERAL STENT PLACEMENT Bilateral 1/3/2018    Procedure: INSERTION STENT URETERAL;  Surgeon: Regis Stuart MD;  [facility]: [facility];  Service: Urology     Social History   History   Alcohol Use    1 2 oz/week    2 Standard drinks or equivalent per week     Comment: 1-2 mixed drinks per weekend     History   Drug Use No     History   Smoking Status    Former Smoker    Packs/day: 0 50    Years: 30 00    Quit date: 2003   Smokeless Tobacco    Never Used     Family History   Problem Relation Age of Onset    Diabetes type II Mother     Heart failure Father        Meds/Allergies     Prescriptions Prior to Admission   Medication    apixaban (ELIQUIS) 5 mg    LORazepam (ATIVAN) 0 5 mg tablet    magnesium oxide (MAG-OX) 400 mg    metoprolol tartrate (LOPRESSOR) 25 mg tablet    pantoprazole (PROTONIX) 40 mg tablet    potassium chloride (K-DUR,KLOR-CON) 20 mEq tablet     Current Facility-Administered Medications   Medication Dose Route Frequency    acetaminophen (TYLENOL) tablet 650 mg  650 mg Oral Q6H PRN    apixaban (ELIQUIS) tablet 5 mg  5 mg Oral BID    calcium carbonate (TUMS) chewable tablet 500 mg  500 mg Oral Q4H PRN    cholestyramine sugar free (QUESTRAN LIGHT) packet 4 g  4 g Oral BID    LORazepam (ATIVAN) tablet 0 5 mg  0 5 mg Oral HS PRN    magnesium oxide (MAG-OX) tablet 800 mg  800 mg Oral Daily    metoprolol tartrate (LOPRESSOR) partial tablet 12 5 mg  12 5 mg Oral Q12H [address]    ondansetron (ZOFRAN) 8 mg in sodium chloride 0 9 % 50 mL IVPB  8 mg Intravenous Q4H PRN    pantoprazole (PROTONIX) EC tablet 40 mg  40 mg Oral Daily    vancomycin (VANCOCIN) oral solution 125 mg  125 mg Oral Q6H [address]       No Known Allergies    Objective     Blood pressure 122/71, pulse 81, temperature 97 6 °F (36 4 °C), temperature source Temporal, resp  rate 16, height 5' 1" (1 549 m), weight 76 2 kg (168 lb), SpO2 97 %  Intake/Output Summary (Last 24 hours) at 08/08/18 1120  Last data filed at 08/08/18 0700   Gross per 24 hour   Intake              240 ml   Output                0 ml   Net              240 ml       PHYSICAL EXAM     Physical Exam   Constitutional: She is oriented to person, place, and time  She appears well-developed and well-nourished  HENT:   Head: Normocephalic and atraumatic  Eyes: Conjunctivae and EOM are normal    Neck: Normal range of motion  Cardiovascular: Normal rate and regular rhythm  Pulmonary/Chest: Effort normal and breath sounds normal    Abdominal: Soft  Bowel sounds are normal  She exhibits no distension  There is no tenderness  Musculoskeletal: Normal range of motion  Neurological: She is alert and oriented to person, place, and time  Skin: Skin is warm and dry  Psychiatric:   Patient was tearful during the exam stating that she has been through a lot and just wants to get better         Lab Results:   CBC: Lab Results   Component Value Date    WBC 5 55 08/08/2018    HGB 12 6 08/08/2018    HCT 38 7 08/08/2018    MCV 94 08/08/2018     08/08/2018    MCH 30 7 08/08/2018    MCHC 32 6 08/08/2018    RDW 13 4 08/08/2018    MPV 12 3 08/08/2018   ,   CMP: Lab Results   Component Value Date     08/08/2018    K 3 2 (L) 08/08/2018     08/08/2018    CO2 25 08/08/2018    ANIONGAP 11 08/08/2018    BUN 10 08/08/2018    CREATININE 0 70 08/08/2018    GLUCOSE 93 08/08/2018    CALCIUM 9 0 08/08/2018    AST 16 08/08/2018    ALT 33 08/08/2018    ALKPHOS 93 08/08/2018    PROT 6 3 (L) 08/08/2018    BILITOT 0 90 08/08/2018    EGFR 99 08/08/2018   ,   Lipase: No results found for: LIPASE,  PT/INR: No results found for: PT, INR,   Troponin: No results found for: TROPONINI,   Magnesium: No results found for: MAG,   Phosphorous:   Lab Results   Component Value Date    PHOS 4 4 08/08/2018     Imaging Studies: I have personally reviewed pertinent reports  CT abd/pelvis:  Small volume of air and fluid in the anterior abdominal wall subcutaneous tissues related to recent colostomy takedown      Intact colorectal anastomosis  No bowel obstruction      Stable myomatous uterus

## 2018-08-08 NOTE — PROGRESS NOTES
[facility] Progress Note  Patient: Marvin Gibbs 46 y o  female   MRN: [id number]  PCP: Demarcus Bryant DO  Unit/Bed#: 429-89 Encounter: [id number]  Date Of Visit: 18    Assessment:    Principal Problem:    C  difficile colitis  Active Problems:    Esophageal reflux    Hypertension    Hypokalemia    Hypomagnesemia    Paroxysmal atrial fibrillation (HCC)    Anemia    Intractable vomiting with nausea    S/P colostomy takedown    Diarrhea      Plan:    · Cont PO vanco, replace KCl IV with D51/2NS  · Further interventions per GI & gen surg  · May benefit from a flex sigmoidoscopy      VTE Pharmacologic Prophylaxis:   Pharmacologic: Apixaban (Eliquis)  Mechanical VTE Prophylaxis in Place: Yes    Patient Centered Rounds: I have performed bedside rounds with nursing staff today  Current Length of Stay: 3 day(s)    Current Patient Status: Inpatient     Code Status: Level 1 - Full Code      Subjective:   Patient seen & examined this AM  Abd pain improved, still having frequent watery diarrhea & nausea  No fever, malaise, acute confusion or lethargy  Objective:     Vitals:   Temp (24hrs), Av 9 °F (36 6 °C), Min:97 6 °F (36 4 °C), Max:98 2 °F (36 8 °C)    HR:  [72-81] 81  Resp:  [16-18] 16  BP: (116-131)/(71-77) 122/71  SpO2:  [96 %-98 %] 97 %  Body mass index is 31 74 kg/m²  Input and Output Summary (last 24 hours):        Intake/Output Summary (Last 24 hours) at 18 1224  Last data filed at 18 0700   Gross per 24 hour   Intake              240 ml   Output                0 ml   Net              240 ml       Physical Exam:   General: in no overt painful distress  HEENT: oral mucosa dry, not pale, not jaundiced  Chest: clear lungs, no wheeze  Heart: S1 S2 audible, regular  Abd: soft, nontender, not distended, bowel sounds present, no rebound or guarding  Psych: appropriately oriented in all spheres  Neuro: Awake, alert, essentially nonfocal      Additional Data: Labs:      Results from last 7 days  Lab Units 08/08/18  0430 08/06/18  0542   WBC Thousand/uL 5 55 5 05   HEMOGLOBIN g/dL 12 6 11 5   HEMATOCRIT % 38 7 35 4   PLATELETS Thousands/uL 201 167   NEUTROS PCT %  --  54   LYMPHS PCT %  --  31   MONOS PCT %  --  8   EOS PCT %  --  7*       Results from last 7 days  Lab Units 08/08/18  0742   SODIUM mmol/L 144   POTASSIUM mmol/L 3 2*   CHLORIDE mmol/L 108   CO2 mmol/L 25   BUN mg/dL 10   CREATININE mg/dL 0 70   CALCIUM mg/dL 9 0   TOTAL PROTEIN g/dL 6 3*   BILIRUBIN TOTAL mg/dL 0 90   ALK PHOS U/L 93   ALT U/L 33   AST U/L 16   GLUCOSE RANDOM mg/dL 93       Results from last 7 days  Lab Units 08/04/18  0434   INR  1 52*     Invalid input(s): TROIP    * I Have Reviewed All Lab Data Listed Above  * Additional Pertinent Lab Tests Reviewed: All [facility] Admission Reviewed      Recent Cultures (last 7 days):       Results from last 7 days  Lab Units 08/04/18  0031   C DIFF TOXIN B  POSITIVE for C difficle toxin by PCR  *       Last 24 Hours Medication List:     Current Facility-Administered Medications:  acetaminophen 650 mg Oral Q6H PRN Benedict Joe PA-C   [START ON 8/9/2018] bisacodyl 10 mg Oral Once Celeste Rowe PA-C   calcium carbonate 500 mg Oral Q4H PRN Cameron Santana MD   cholestyramine sugar free 4 g Oral BID Abdon Montenegro DO   dextrose 5 % and sodium chloride 0 45 % with KCl 40 mEq/L 125 mL/hr Intravenous Continuous Cameron Santana MD   lactobacillus acidophilus-bulgaricus 1 packet Oral TID With Meals Cameron Santana MD   LORazepam 0 5 mg Oral HS PRN Benedict Joe PA-C   magnesium oxide 800 mg Oral Daily Benedict Joe PA-C   metoprolol tartrate 12 5 mg Oral Q12H [facility] Harmony Adkins DO   ondansetron 8 mg Intravenous Q4H PRN Harmony Adkins DO   pantoprazole 40 mg Oral Daily Benedict Joe PA-C   [START ON 8/9/2018] polyethylene glycol 4,000 mL Oral Once Celeste Rowe PA-C   vancomycin 125 mg Oral Q6H [address] , DO        Today, Patient Was Seen By: Fredo Griffiths MD    ** Please Note: Dragon 360 Dictation voice to text software may have been used in the creation of this document   **

## 2018-08-08 NOTE — PROGRESS NOTES
Progress Note - General Surgery   Jericho Delgado 46 y o  female MRN: [id number]  Unit/Bed#: 401-01 Encounter: [id number]    Assessment:  28-year-old female who is status post Pj's reversal and ileocecectomy with primary anastomosis on July 11, 2018, admitted with hypokalemia, persistent nausea vomiting  Patient had similar episodes after her initial Pj's procedure  CT scan showing an intact anastomosis ease no evidence of bowel obstruction  Having a better day today  Did have some vomiting this morning however has been tolerating some liquids since that time  She is tolerating her cholestyramine but she states this mass the   Diarrhea slightly ease duct  Plan:    -diet as tolerated, plus supplements  -out of bed bed/ambulate  -antibiotic treatment for C diff infection as per ID recommendations  -continue cholestyramine for now to hopefully help with bile so she diarrhea, will likely need a flexible sigmoidoscopy by GI on Friday to rule out pseudo membranes and active C diff colitis  -remainder of medical management as per medical team     Subjective/Objective   Chief Complaint:  Nausea and diarrhea    Subjective:  Having a better day     Diarrhea slightly better  Nausea little better today   Objective:     Blood pressure 111/67, pulse 73, temperature (!) 97 4 °F (36 3 °C), temperature source Temporal, resp  rate 16, height 5' 1" (1 549 m), weight 76 2 kg (168 lb), SpO2 96 %  ,Body mass index is 31 74 kg/m²        Intake/Output Summary (Last 24 hours) at 08/08/18 1830  Last data filed at 08/08/18 1700   Gross per 24 hour   Intake              480 ml   Output                0 ml   Net              480 ml       Invasive Devices     Peripheral Intravenous Line            Peripheral IV 08/08/18 Right;Dorsal (posterior) Forearm less than 1 day                Physical Exam:   General:  No acute distress, resting comfortably  HEENT:  Normocephalic, atraumatic, trachea midline  CV:  S1-S2 audible, regular rate rhythm  Pulmonary:  Clear auscultation bilaterally no wheezes rales rhonchi  GI:  Abdomen nondistended, soft nontender, again ostomy site healing well  MSK lower extremities without clubbing, cyanosis, edema  Neurologic:  Alert and x3, cranial 2 through 12 grossly intact  Psych:  Mood and affect appropriate patient feels little better and looks like she is in better spirits today              Lab, Imaging and other studies:  I have personally reviewed pertinent lab results    , CBC:   Lab Results   Component Value Date    WBC 5 55 08/08/2018    HGB 12 6 08/08/2018    HCT 38 7 08/08/2018    MCV 94 08/08/2018     08/08/2018    MCH 30 7 08/08/2018    MCHC 32 6 08/08/2018    RDW 13 4 08/08/2018    MPV 12 3 08/08/2018   , CMP:   Lab Results   Component Value Date     08/08/2018    K 3 2 (L) 08/08/2018     08/08/2018    CO2 25 08/08/2018    ANIONGAP 11 08/08/2018    BUN 10 08/08/2018    CREATININE 0 70 08/08/2018    GLUCOSE 93 08/08/2018    CALCIUM 9 0 08/08/2018    AST 16 08/08/2018    ALT 33 08/08/2018    ALKPHOS 93 08/08/2018    PROT 6 3 (L) 08/08/2018    BILITOT 0 90 08/08/2018    EGFR 99 08/08/2018   , Coagulation:   No results found for: PT, INR, APTT, Urinalysis:   No results found for: COLORU, CLARITYU, SPECGRAV, PHUR, LEUKOCYTESUR, NITRITE, PROTEINUA, GLUCOSEU, KETONESU, BILIRUBINUR, BLOODU, Amylase: No results found for: AMYLASE, Lipase:   No results found for: LIPASE  VTE Pharmacologic Prophylaxis:  On Eliquis  VTE Mechanical Prophylaxis: sequential compression device

## 2018-08-08 NOTE — SOCIAL WORK
Discussed the POC with the interdisciplinary team   Pt is independent at home and will not need any services  Spouse will transport home

## 2018-08-08 NOTE — PROGRESS NOTES
C/O nausea with an emesis of 250 ml  Undigested food particles  Sips of Ginger ale taken by patient  Medicated for indigestion with Tums  No further distress noted

## 2018-08-09 ENCOUNTER — ANESTHESIA EVENT (INPATIENT)
Dept: PERIOP | Facility: HOSPITAL | Age: 53
DRG: 372 | End: 2018-08-09
Payer: COMMERCIAL

## 2018-08-09 PROBLEM — R10.13 DYSPEPSIA: Status: ACTIVE | Noted: 2018-08-09

## 2018-08-09 LAB
ANION GAP SERPL CALCULATED.3IONS-SCNC: 9 MMOL/L (ref 4–13)
BUN SERPL-MCNC: 5 MG/DL (ref 5–25)
CALCIUM SERPL-MCNC: 9 MG/DL (ref 8.3–10.1)
CHLORIDE SERPL-SCNC: 108 MMOL/L (ref 100–108)
CO2 SERPL-SCNC: 25 MMOL/L (ref 21–32)
CREAT SERPL-MCNC: 0.71 MG/DL (ref 0.6–1.3)
GFR SERPL CREATININE-BSD FRML MDRD: 97 ML/MIN/1.73SQ M
GLUCOSE SERPL-MCNC: 90 MG/DL (ref 65–140)
MAGNESIUM SERPL-MCNC: 1.7 MG/DL (ref 1.6–2.6)
POTASSIUM SERPL-SCNC: 4.1 MMOL/L (ref 3.5–5.3)
SODIUM SERPL-SCNC: 142 MMOL/L (ref 136–145)

## 2018-08-09 PROCEDURE — 80048 BASIC METABOLIC PNL TOTAL CA: CPT | Performed by: INTERNAL MEDICINE

## 2018-08-09 PROCEDURE — 99232 SBSQ HOSP IP/OBS MODERATE 35: CPT | Performed by: INTERNAL MEDICINE

## 2018-08-09 PROCEDURE — 83735 ASSAY OF MAGNESIUM: CPT | Performed by: INTERNAL MEDICINE

## 2018-08-09 RX ORDER — MAGNESIUM HYDROXIDE/ALUMINUM HYDROXICE/SIMETHICONE 120; 1200; 1200 MG/30ML; MG/30ML; MG/30ML
30 SUSPENSION ORAL EVERY 4 HOURS PRN
Status: DISCONTINUED | OUTPATIENT
Start: 2018-08-09 | End: 2018-08-15 | Stop reason: HOSPADM

## 2018-08-09 RX ORDER — PANTOPRAZOLE SODIUM 40 MG/1
40 TABLET, DELAYED RELEASE ORAL
Status: DISCONTINUED | OUTPATIENT
Start: 2018-08-09 | End: 2018-08-14

## 2018-08-09 RX ADMIN — PANTOPRAZOLE SODIUM 40 MG: 40 TABLET, DELAYED RELEASE ORAL at 17:58

## 2018-08-09 RX ADMIN — BISACODYL 10 MG: 5 TABLET, COATED ORAL at 12:30

## 2018-08-09 RX ADMIN — MAGNESIUM OXIDE TAB 400 MG (241.3 MG ELEMENTAL MG) 800 MG: 400 (241.3 MG) TAB at 08:19

## 2018-08-09 RX ADMIN — LACTOBACILLUS ACIDOPHILUS / LACTOBACILLUS BULGARICUS 1 PACKET: 100 MILLION CFU STRENGTH GRANULES at 08:20

## 2018-08-09 RX ADMIN — LACTOBACILLUS ACIDOPHILUS / LACTOBACILLUS BULGARICUS 1 PACKET: 100 MILLION CFU STRENGTH GRANULES at 12:30

## 2018-08-09 RX ADMIN — METOPROLOL TARTRATE 12.5 MG: 25 TABLET ORAL at 22:13

## 2018-08-09 RX ADMIN — VANCOMYCIN 125 MG: KIT at 17:58

## 2018-08-09 RX ADMIN — VANCOMYCIN 125 MG: KIT at 23:41

## 2018-08-09 RX ADMIN — VANCOMYCIN 125 MG: KIT at 12:30

## 2018-08-09 RX ADMIN — POLYETHYLENE GLYCOL 3350, SODIUM SULFATE ANHYDROUS, SODIUM BICARBONATE, SODIUM CHLORIDE, POTASSIUM CHLORIDE 4000 ML: 236; 22.74; 6.74; 5.86; 2.97 POWDER, FOR SOLUTION ORAL at 12:37

## 2018-08-09 RX ADMIN — CHOLESTYRAMINE 4 G: 4 POWDER, FOR SUSPENSION ORAL at 08:17

## 2018-08-09 RX ADMIN — METOPROLOL TARTRATE 12.5 MG: 25 TABLET ORAL at 08:20

## 2018-08-09 RX ADMIN — VANCOMYCIN 125 MG: KIT at 05:16

## 2018-08-09 RX ADMIN — VANCOMYCIN 125 MG: KIT at 00:06

## 2018-08-09 RX ADMIN — PANTOPRAZOLE SODIUM 40 MG: 40 TABLET, DELAYED RELEASE ORAL at 08:20

## 2018-08-09 NOTE — CASE MANAGEMENT
Thank you,  Ash Aqq  [facility] Utilization Review Department  Phone: [phone]; Fax [phone]  ATTENTION: The Network Utilization Review Department is now centralized for our 9 Facilities  Make a note that we have a new phone and fax numbers for our Department  Please call with any questions or concerns to [phone] and carefully follow the prompts so that you are directed to the right person  All voicemails are confidential  Fax any determinations, approvals, denials, and requests for initial or continue stay review clinical to [phone]  Due to HIGH CALL volume, it would be easier if you could please send faxed requests to expedite your requests and in part, help us provide discharge notifications faster    Continued Stay Review    Date: 8/9/18    Vital Signs: /61 (BP Location: Left arm)   Pulse 71   Temp 98 °F (36 7 °C) (Temporal)   Resp 16   Ht 5' 1" (1 549 m)   Wt 77 2 kg (170 lb 1 6 oz)   LMP  (LMP Unknown)   SpO2 96%   BMI 32 14 kg/m²     Medications:   Scheduled Meds:   Current Facility-Administered Medications:  acetaminophen 650 mg Oral Q6H PRN Benedict Joe PA-C   bisacodyl 10 mg Oral Once Mcdonald FootLILIBETH   calcium carbonate 500 mg Oral Q4H PRN Lyla Ballard MD   cholestyramine sugar free 4 g Oral BID Wesley Bhakta, DO   lactobacillus acidophilus-bulgaricus 1 packet Oral TID With Meals Lyla Ballard MD   LORazepam 0 5 mg Oral HS PRN Benedict Joe PA-C   magnesium oxide 800 mg Oral Daily Benedict Joe PA-C   metoprolol tartrate 12 5 mg Oral Q12H [facility] Remi Martinez DO   ondansetron 8 mg Intravenous Q4H PRN Remi Martinez DO   pantoprazole 40 mg Oral Daily Benedict Joe PA-C   polyethylene glycol 4,000 mL Oral Once Mcdonald Foot, LILIBETH   vancomycin 125 mg Oral Q6H [facility] Remi Martinez, DO     Continuous Infusions:    PRN Meds:   acetaminophen    calcium carbonate    LORazepam    ondansetron    Abnormal Labs/Diagnostic Results:   Lab Units 08/08/18  0430 08/06/18  0542   WBC Thousand/uL 5 55 5 05   HEMOGLOBIN g/dL 12 6 11 5   HEMATOCRIT % 38 7 35 4   PLATELETS Thousands/uL 201 167   NEUTROS PCT %  --  54   LYMPHS PCT %  --  31   MONOS PCT %  --  8   EOS PCT %  --  7*         Results from last 7 days  Lab Units 08/08/18  0742   SODIUM mmol/L 144   POTASSIUM mmol/L 3 2*   CHLORIDE mmol/L 108   CO2 mmol/L 25   BUN mg/dL 10   CREATININE mg/dL 0 70   CALCIUM mg/dL 9 0   TOTAL PROTEIN g/dL 6 3*   BILIRUBIN TOTAL mg/dL 0 90   ALK PHOS U/L 93   ALT U/L 33   AST U/L 16   GLUCOSE RANDOM mg/dL 93         Results from last 7 days  Lab Units 08/04/18  0434   INR   1 52*      Invalid input(s): ESTEPHANIA     * I Have Reviewed All Lab Data Listed Above  * Additional Pertinent Lab Tests Reviewed: [facility]        Recent Cultures (last 7 days):         Results from last 7 days  Lab Units 08/04/18  0031   C DIFF TOXIN B   POSITIVE for C difficle toxin by PCR  *        Age/Sex: 46 y o  female     Assessment/Plan: Assessment:     Principal Problem:    C  difficile colitis  Active Problems:    Esophageal reflux    Hypertension    Hypokalemia    Hypomagnesemia    Paroxysmal atrial fibrillation (HCC)    Anemia    Intractable vomiting with nausea    S/P colostomy takedown    Diarrhea        Plan:     · Cont PO vanco, replace KCl IV with D51/2NS  · Further interventions per GI & gen surg  · May benefit from a flex sigmoidoscopy        VTE Pharmacologic Prophylaxis:   Pharmacologic: Apixaban (Eliquis)  Mechanical VTE Prophylaxis in Place:  Yes     Patient Centered Rounds: I have performed bedside rounds with nursing staff today      Current Length of Stay: 3 day(s)     Current Patient Status: Inpatient      Code Status: Level 1 - Full Code          Discharge Plan: to be determined

## 2018-08-09 NOTE — SOCIAL WORK
Discussed the POC with the interdisciplinary team   Pt is positive for CDiff, will be having a colonoscopy in the am   Pt is independent at home and will not need any services upon discharge

## 2018-08-09 NOTE — PROGRESS NOTES
[facility] Progress Note  Patient: Van Moon 46 y o  female   MRN: [id number]  PCP: Aida Gary DO  Unit/Bed#: 833-18 Encounter: [id number]  Date Of Visit: 18    Assessment:    Principal Problem:    C  difficile colitis  Active Problems:    Esophageal reflux    Hypertension    Hypokalemia    Hypomagnesemia    Paroxysmal atrial fibrillation (HCC)    Anemia    Intractable vomiting with nausea    S/P colostomy takedown    Diarrhea    Dyspepsia      Plan:    · Continue oral vancomycin, wean off IV fluids, increase PPI to 2x daily  · Will defer anti motility agent to Gastroenterology  · Replete serum electrolyte deficits as needed  · Colonoscopy planned tomorrow morning per GI  · Continue management of paroxysmal atrial fibrillation with beta blocker and Eliquis  · Further interventions per Gen Surg      VTE Pharmacologic Prophylaxis:   Pharmacologic: Apixaban (Eliquis)  Mechanical VTE Prophylaxis in Place: Yes    Patient Centered Rounds: I have performed bedside rounds with nursing staff today  Current Length of Stay: 4 day(s)    Current Patient Status: Inpatient     Code Status: Level 1 - Full Code      Subjective:   Patient seen and examined this morning  Frequency of diarrhea a bit improved  She was experiencing heartburn last night  No abdominal pain, nausea or vomiting today  Objective:     Vitals:   Temp (24hrs), Av °F (36 7 °C), Min:97 4 °F (36 3 °C), Max:98 7 °F (37 1 °C)    HR:  [71-75] 71  Resp:  [16-18] 16  BP: (111-122)/(61-74) 119/61  SpO2:  [96 %-98 %] 96 %  Body mass index is 32 14 kg/m²  Input and Output Summary (last 24 hours):        Intake/Output Summary (Last 24 hours) at 18 1056  Last data filed at 18 0900   Gross per 24 hour   Intake          2735 42 ml   Output              650 ml   Net          2085 42 ml       Physical Exam:   General: in no overt distress  HEENT:  not pale, not jaundiced  Chest: clear lungs, no wheeze  Heart: S1 S2 audible, regular  Abd: soft, nontender, not distended, bowel sounds present, no rebound or guarding  Psych: appropriately oriented in all spheres, good insight  Neuro: Awake, alert, essentially nonfocal      Additional Data:     Labs:      Results from last 7 days  Lab Units 08/08/18  0430 08/06/18  0542   WBC Thousand/uL 5 55 5 05   HEMOGLOBIN g/dL 12 6 11 5   HEMATOCRIT % 38 7 35 4   PLATELETS Thousands/uL 201 167   NEUTROS PCT %  --  54   LYMPHS PCT %  --  31   MONOS PCT %  --  8   EOS PCT %  --  7*       Results from last 7 days  Lab Units 08/09/18  0933 08/08/18  0742   SODIUM mmol/L 142 144   POTASSIUM mmol/L 4 1 3 2*   CHLORIDE mmol/L 108 108   CO2 mmol/L 25 25   BUN mg/dL 5 10   CREATININE mg/dL 0 71 0 70   CALCIUM mg/dL 9 0 9 0   TOTAL PROTEIN g/dL  --  6 3*   BILIRUBIN TOTAL mg/dL  --  0 90   ALK PHOS U/L  --  93   ALT U/L  --  33   AST U/L  --  16   GLUCOSE RANDOM mg/dL 90 93       Results from last 7 days  Lab Units 08/04/18  0434   INR  1 52*     Invalid input(s): TROIP    * I Have Reviewed All Lab Data Listed Above  * Additional Pertinent Lab Tests Reviewed: All Labs Within Last 24 Hours Reviewed      Recent Cultures (last 7 days):       Results from last 7 days  Lab Units 08/04/18  0031   C DIFF TOXIN B  POSITIVE for C difficle toxin by PCR  *       Last 24 Hours Medication List:     Current Facility-Administered Medications:  acetaminophen 650 mg Oral Q6H PRN Benedict Joe PA-C   aluminum-magnesium hydroxide-simethicone 30 mL Oral Q4H PRN Sharrie Nageotte, MD   bisacodyl 10 mg Oral Once Helena Timmons PA-C   cholestyramine sugar free 4 g [facility] BID [facility]   lactobacillus acidophilus-bulgaricus 1 packet Oral TID With Meals Sharrie Nageotte, MD   LORazepam 0 5 mg Oral HS PRN Benedict Joe PA-C   magnesium oxide 800 mg Oral Daily Benedict Joe PA-C   metoprolol tartrate 12 5 mg Oral Q12H [address] Charley Mitchell DO   ondansetron 8 mg Intravenous Q4H PRN Charley Mitchell DO   pantoprazole 40 mg Oral BID AC Kodi Adams MD   polyethylene glycol 4,000 mL Oral Once Salud Gant PA-C   vancomycin 125 mg Oral Q6H [address] Dedrick December, DO        Today, Patient Was Seen By: Kodi Adams MD    ** Please Note: Dragon 360 Dictation voice to text software may have been used in the creation of this document   **

## 2018-08-09 NOTE — PLAN OF CARE
Problem: PAIN - ADULT  Goal: Verbalizes/displays adequate comfort level or baseline comfort level  Interventions:  - Encourage patient to monitor pain and request assistance  - Assess pain using appropriate pain scale, 0-10 pain scale  - Administer analgesics based on type and severity of pain and evaluate response  - Implement non-pharmacological measures as appropriate and evaluate response  - Consider cultural and social influences on pain and pain management  - Notify physician/advanced practitioner if interventions unsuccessful or patient reports new pain   Outcome: Progressing      Problem: INFECTION - ADULT  Goal: Absence or prevention of progression during hospitalization  INTERVENTIONS:  - Assess and monitor for signs and symptoms of infection  - Monitor lab/diagnostic results  - Monitor all insertion sites, i e  indwelling lines  - [place] appropriate cooling/warming therapies per order  - Administer medications as ordered  - Instruct and encourage patient and family to use good hand hygiene technique  - Identify and instruct in appropriate isolation precautions for identified infection/condition   Outcome: Progressing      Problem: SAFETY ADULT  Goal: Patient will remain free of falls  INTERVENTIONS:  - Assess patient frequently for physical needs  -  Identify cognitive and physical deficits and behaviors that affect risk of falls  -  [place] fall precautions as indicated by assessment  Medium fall risk   - Educate patient/family on patient safety including physical limitations  - Instruct patient to call for assistance with activity based on assessment  - Modify environment to reduce risk of injury  - Consider OT/PT consult to assist with strengthening/mobility    INTERVENTIONS:  - Assess patient frequently for physical needs  -  Identify cognitive and physical deficits and behaviors that affect risk of falls    -  [place] fall precautions as indicated by assessment   - Educate patient/family on patient safety including physical limitations  - Instruct patient to call for assistance with activity based on assessment  - Modify environment to reduce risk of injury  - Consider OT/PT consult to assist with strengthening/mobility   Outcome: Progressing    Goal: Maintain or return to baseline ADL function  INTERVENTIONS:  -  Assess patient's ability to carry out ADLs; assess patient's baseline for ADL function and identify physical deficits which impact ability to perform ADLs (bathing, care of mouth/teeth, toileting, grooming, dressing, etc )  - Assess/evaluate cause of self-care deficits Patient is independent with ADL's  - Assess range of motion  - Assess patient's mobility; develop plan if impaired  - Assess patient's need for assistive devices and provide as appropriate  - Encourage maximum independence but intervene and supervise when necessary  ¯ Involve family in performance of ADLs  ¯ Assess for home care needs following discharge   ¯ Request OT consult to assist with ADL evaluation and planning for discharge  ¯ Provide patient education as appropriate    Outcome: Progressing    Goal: Maintain or return mobility status to optimal level  INTERVENTIONS:  - Assess patient's baseline mobility status (ambulation, transfers, stairs, etc )  Baseline: independent  - Identify cognitive and physical deficits and behaviors that affect mobility  - Identify mobility aids required to assist with transfers and/or ambulation (gait belt, sit-to-stand, lift, walker, cane, etc )  - [place] fall precautions as indicated by assessment   Medium fall risk   - Record patient progress and toleration of activity level on Mobility SBAR; progress patient to next Phase/Stage  - Instruct patient to call for assistance with activity based on assessment  - Request Rehabilitation consult to assist with strengthening/weightbearing, etc     Outcome: Progressing      Problem: DISCHARGE PLANNING  Goal: Discharge to home or other facility with appropriate resources  INTERVENTIONS:  - Identify barriers to discharge w/patient and caregiver  - Arrange for needed discharge resources and transportation as appropriate  - Identify discharge learning needs (meds, wound care, etc )  - Arrange for interpretive services to assist at discharge as needed  - Refer to Case Management Department for coordinating discharge planning if the patient needs post-hospital services based on physician/advanced practitioner order or complex needs related to functional status, cognitive ability, or social support system   Outcome: Progressing      Problem: Knowledge Deficit  Goal: Patient/family/caregiver demonstrates understanding of disease process, treatment plan, medications, and discharge instructions  Complete learning assessment and assess knowledge base    Interventions:  - Provide teaching at level of understanding  - Provide teaching via preferred learning methods   Outcome: Progressing      Problem: GASTROINTESTINAL - ADULT  Goal: Minimal or absence of nausea and/or vomiting  INTERVENTIONS:  - Administer IV fluids as ordered to ensure adequate hydration  - Maintain NPO status until nausea and vomiting are resolved  - Nasogastric tube as ordered  - Administer ordered antiemetic medications as needed  - Provide nonpharmacologic comfort measures as appropriate  - Advance diet as tolerated, if ordered  - Nutrition services referral to assist patient with adequate nutrition and appropriate food choices   Outcome: Progressing    Goal: Maintains or returns to baseline bowel function  INTERVENTIONS:  - Assess bowel function  - Encourage oral fluids to ensure adequate hydration  - Administer IV fluids as ordered to ensure adequate hydration  - Administer ordered medications as needed  - Encourage mobilization and activity  - Nutrition services referral to assist patient with appropriate food choices   Outcome: Progressing      Problem: Potential for Falls  Goal: Patient will remain free of falls  INTERVENTIONS:  - Assess patient frequently for physical needs  -  Identify cognitive and physical deficits and behaviors that affect risk of falls  -  [place] fall precautions as indicated by assessment  Medium fall risk   - Educate patient/family on patient safety including physical limitations  - Instruct patient to call for assistance with activity based on assessment  - Modify environment to reduce risk of injury  - Consider OT/PT consult to assist with strengthening/mobility    INTERVENTIONS:  - Assess patient frequently for physical needs  -  Identify cognitive and physical deficits and behaviors that affect risk of falls    -  [place] fall precautions as indicated by assessment   - Educate patient/family on patient safety including physical limitations  - Instruct patient to call for assistance with activity based on assessment  - Modify environment to reduce risk of injury  - Consider OT/PT consult to assist with strengthening/mobility   Outcome: Progressing      Problem: Prexisting or High Potential for Compromised Skin Integrity  Goal: Skin integrity is maintained or improved  INTERVENTIONS:  - Identify patients at risk for skin breakdown  - Assess and monitor skin integrity  - Assess and monitor nutrition and hydration status  - Monitor labs (i e  albumin)  - Assess for incontinence   - Turn and reposition patient  - Assist with mobility/ambulation  - Relieve pressure over bony prominences  - Avoid friction and shearing  - Provide appropriate hygiene as needed including keeping skin clean and dry  - Evaluate need for skin moisturizer/barrier cream  - Collaborate with interdisciplinary team (i e  Nutrition, Rehabilitation, etc )   - Patient/family teaching   Outcome: Progressing      Problem: Nutrition/Hydration-ADULT  Goal: Nutrient/Hydration intake appropriate for improving, restoring or maintaining nutritional needs  Monitor and assess patient's nutrition/hydration status for malnutrition (ex- brittle hair, bruises, dry skin, pale skin and conjunctiva, muscle wasting, smooth red tongue, and disorientation)  Collaborate with interdisciplinary team and initiate plan and interventions as ordered  Monitor patient's weight and dietary intake as ordered or per policy  Utilize nutrition screening tool and intervene per policy  Determine patient's food preferences and provide high-protein, high-caloric foods as appropriate       INTERVENTIONS:  - Monitor oral intake, urinary output, labs, and treatment plans  - Assess nutrition and hydration status and recommend course of action  - Evaluate amount of meals eaten  - Assist patient with eating if necessary   - Allow adequate time for meals  - Recommend/ encourage appropriate diets, oral nutritional supplements, and vitamin/mineral supplements  - Order, calculate, and assess calorie counts as needed  - Recommend, monitor, and adjust tube feedings and TPN/PPN based on assessed needs  - Assess need for intravenous fluids  - Provide specific nutrition/hydration education as appropriate  - Include patient/family/caregiver in decisions related to nutrition   Outcome: Progressing      Problem: DISCHARGE PLANNING - CARE MANAGEMENT  Goal: Discharge to post-acute care or home with appropriate resources  INTERVENTIONS:  - Conduct assessment to determine patient/family and health care team treatment goals, and need for post-acute services based on payer coverage, community resources, and patient preferences, and barriers to discharge  - Address psychosocial, clinical, and financial barriers to discharge as identified in assessment in conjunction with the patient/family and health care team  - Arrange appropriate level of post-acute services according to patient's   needs and preference and payer coverage in collaboration with the physician and health care team  - Communicate with and update the patient/family, physician, and health care team regarding progress on the discharge plan  - Arrange appropriate transportation to post-acute venues   Outcome: Progressing

## 2018-08-10 ENCOUNTER — ANESTHESIA (INPATIENT)
Dept: PERIOP | Facility: HOSPITAL | Age: 53
DRG: 372 | End: 2018-08-10
Payer: COMMERCIAL

## 2018-08-10 LAB
ALBUMIN SERPL BCP-MCNC: 2.8 G/DL (ref 3.5–5)
ALP SERPL-CCNC: 105 U/L (ref 46–116)
ALT SERPL W P-5'-P-CCNC: 66 U/L (ref 12–78)
ANION GAP SERPL CALCULATED.3IONS-SCNC: 7 MMOL/L (ref 4–13)
AST SERPL W P-5'-P-CCNC: 51 U/L (ref 5–45)
BILIRUB SERPL-MCNC: 1.5 MG/DL (ref 0.2–1)
BUN SERPL-MCNC: 4 MG/DL (ref 5–25)
CALCIUM SERPL-MCNC: 9 MG/DL (ref 8.3–10.1)
CHLORIDE SERPL-SCNC: 106 MMOL/L (ref 100–108)
CO2 SERPL-SCNC: 28 MMOL/L (ref 21–32)
CREAT SERPL-MCNC: 0.69 MG/DL (ref 0.6–1.3)
ERYTHROCYTE [DISTWIDTH] IN BLOOD BY AUTOMATED COUNT: 13.4 % (ref 11.6–15.1)
GFR SERPL CREATININE-BSD FRML MDRD: 99 ML/MIN/1.73SQ M
GLUCOSE SERPL-MCNC: 83 MG/DL (ref 65–140)
HCT VFR BLD AUTO: 38.8 % (ref 34.8–46.1)
HGB BLD-MCNC: 12.8 G/DL (ref 11.5–15.4)
MCH RBC QN AUTO: 30.1 PG (ref 26.8–34.3)
MCHC RBC AUTO-ENTMCNC: 33 G/DL (ref 31.4–37.4)
MCV RBC AUTO: 91 FL (ref 82–98)
PHOSPHATE SERPL-MCNC: 4.7 MG/DL (ref 2.7–4.5)
PLATELET # BLD AUTO: 175 THOUSANDS/UL (ref 149–390)
PMV BLD AUTO: 11.5 FL (ref 8.9–12.7)
POTASSIUM SERPL-SCNC: 3.5 MMOL/L (ref 3.5–5.3)
PROT SERPL-MCNC: 6.6 G/DL (ref 6.4–8.2)
RBC # BLD AUTO: 4.25 MILLION/UL (ref 3.81–5.12)
SODIUM SERPL-SCNC: 141 MMOL/L (ref 136–145)
WBC # BLD AUTO: 4.01 THOUSAND/UL (ref 4.31–10.16)

## 2018-08-10 PROCEDURE — 88305 TISSUE EXAM BY PATHOLOGIST: CPT | Performed by: PATHOLOGY

## 2018-08-10 PROCEDURE — 0DBE8ZX EXCISION OF LARGE INTESTINE, VIA NATURAL OR ARTIFICIAL OPENING ENDOSCOPIC, DIAGNOSTIC: ICD-10-PCS | Performed by: INTERNAL MEDICINE

## 2018-08-10 PROCEDURE — 45380 COLONOSCOPY AND BIOPSY: CPT | Performed by: INTERNAL MEDICINE

## 2018-08-10 PROCEDURE — 80053 COMPREHEN METABOLIC PANEL: CPT | Performed by: INTERNAL MEDICINE

## 2018-08-10 PROCEDURE — 85027 COMPLETE CBC AUTOMATED: CPT | Performed by: INTERNAL MEDICINE

## 2018-08-10 PROCEDURE — 99231 SBSQ HOSP IP/OBS SF/LOW 25: CPT | Performed by: INTERNAL MEDICINE

## 2018-08-10 PROCEDURE — 84100 ASSAY OF PHOSPHORUS: CPT | Performed by: INTERNAL MEDICINE

## 2018-08-10 PROCEDURE — 99232 SBSQ HOSP IP/OBS MODERATE 35: CPT | Performed by: INTERNAL MEDICINE

## 2018-08-10 PROCEDURE — 99024 POSTOP FOLLOW-UP VISIT: CPT | Performed by: SURGERY

## 2018-08-10 RX ORDER — LOPERAMIDE HYDROCHLORIDE 2 MG/1
2 CAPSULE ORAL 4 TIMES DAILY
Status: DISCONTINUED | OUTPATIENT
Start: 2018-08-10 | End: 2018-08-12

## 2018-08-10 RX ORDER — DEXTROSE, SODIUM CHLORIDE, AND POTASSIUM CHLORIDE 5; .45; .3 G/100ML; G/100ML; G/100ML
100 INJECTION INTRAVENOUS CONTINUOUS
Status: ACTIVE | OUTPATIENT
Start: 2018-08-10 | End: 2018-08-11

## 2018-08-10 RX ORDER — PROPOFOL 10 MG/ML
INJECTION, EMULSION INTRAVENOUS AS NEEDED
Status: DISCONTINUED | OUTPATIENT
Start: 2018-08-10 | End: 2018-08-10 | Stop reason: SURG

## 2018-08-10 RX ORDER — LIDOCAINE HYDROCHLORIDE 10 MG/ML
INJECTION, SOLUTION INFILTRATION; PERINEURAL AS NEEDED
Status: DISCONTINUED | OUTPATIENT
Start: 2018-08-10 | End: 2018-08-10 | Stop reason: SURG

## 2018-08-10 RX ORDER — SODIUM CHLORIDE, SODIUM LACTATE, POTASSIUM CHLORIDE, CALCIUM CHLORIDE 600; 310; 30; 20 MG/100ML; MG/100ML; MG/100ML; MG/100ML
125 INJECTION, SOLUTION INTRAVENOUS CONTINUOUS
Status: DISCONTINUED | OUTPATIENT
Start: 2018-08-10 | End: 2018-08-10

## 2018-08-10 RX ORDER — DEXTROSE, SODIUM CHLORIDE, AND POTASSIUM CHLORIDE 5; .45; .3 G/100ML; G/100ML; G/100ML
100 INJECTION INTRAVENOUS CONTINUOUS
Status: DISCONTINUED | OUTPATIENT
Start: 2018-08-10 | End: 2018-08-10

## 2018-08-10 RX ORDER — ONDANSETRON 2 MG/ML
8 INJECTION INTRAMUSCULAR; INTRAVENOUS EVERY 4 HOURS PRN
Status: DISCONTINUED | OUTPATIENT
Start: 2018-08-10 | End: 2018-08-15 | Stop reason: HOSPADM

## 2018-08-10 RX ADMIN — PROPOFOL 50 MG: 10 INJECTION, EMULSION INTRAVENOUS at 16:27

## 2018-08-10 RX ADMIN — PROPOFOL 50 MG: 10 INJECTION, EMULSION INTRAVENOUS at 16:30

## 2018-08-10 RX ADMIN — VANCOMYCIN 125 MG: KIT at 18:20

## 2018-08-10 RX ADMIN — LOPERAMIDE HYDROCHLORIDE 2 MG: 2 CAPSULE ORAL at 18:19

## 2018-08-10 RX ADMIN — LIDOCAINE HYDROCHLORIDE 20 MG: 10 INJECTION, SOLUTION INFILTRATION; PERINEURAL at 16:26

## 2018-08-10 RX ADMIN — PROPOFOL 50 MG: 10 INJECTION, EMULSION INTRAVENOUS at 16:29

## 2018-08-10 RX ADMIN — SODIUM CHLORIDE, SODIUM LACTATE, POTASSIUM CHLORIDE, AND CALCIUM CHLORIDE 125 ML/HR: .6; .31; .03; .02 INJECTION, SOLUTION INTRAVENOUS at 12:27

## 2018-08-10 RX ADMIN — VANCOMYCIN 125 MG: KIT at 06:30

## 2018-08-10 RX ADMIN — PROPOFOL 50 MG: 10 INJECTION, EMULSION INTRAVENOUS at 16:32

## 2018-08-10 RX ADMIN — PANTOPRAZOLE SODIUM 40 MG: 40 TABLET, DELAYED RELEASE ORAL at 18:20

## 2018-08-10 RX ADMIN — VANCOMYCIN 125 MG: KIT at 12:01

## 2018-08-10 RX ADMIN — LOPERAMIDE HYDROCHLORIDE 2 MG: 2 CAPSULE ORAL at 21:25

## 2018-08-10 RX ADMIN — DEXTROSE, SODIUM CHLORIDE, AND POTASSIUM CHLORIDE 100 ML/HR: 5; .45; .3 INJECTION INTRAVENOUS at 09:20

## 2018-08-10 RX ADMIN — MAGNESIUM OXIDE TAB 400 MG (241.3 MG ELEMENTAL MG) 800 MG: 400 (241.3 MG) TAB at 18:19

## 2018-08-10 RX ADMIN — METOPROLOL TARTRATE 12.5 MG: 25 TABLET ORAL at 21:25

## 2018-08-10 RX ADMIN — PANTOPRAZOLE SODIUM 40 MG: 40 TABLET, DELAYED RELEASE ORAL at 06:28

## 2018-08-10 RX ADMIN — METOPROLOL TARTRATE 12.5 MG: 25 TABLET ORAL at 09:19

## 2018-08-10 RX ADMIN — PROPOFOL 50 MG: 10 INJECTION, EMULSION INTRAVENOUS at 16:26

## 2018-08-10 RX ADMIN — CHOLESTYRAMINE 4 G: 4 POWDER, FOR SUSPENSION ORAL at 18:20

## 2018-08-10 NOTE — PROGRESS NOTES
Progress Note- Charu Ramsey 46 y o  female MRN: [id number]    Unit/Bed#: [facility] [place] Encounter: [id number]      Assessment and Plan:    46year old with recent reconnection surgery and ileocectomy presented with diarrhea after surgery  Now C diff positive  Treating for that  Colonoscopy done today without pseudomembranes  Mucosa is normal appearing  Biopsied  Will add imodium ATC 2 mg q6 ( can increase to a max dose of 16 mg daily )    Once home can also start colestipol ATC as she tells me she can't tolerate questran powder     _____________________________________________________________    Subjective:     Still with diarrhea  Tolerated the bowel prep      Medication Administration - last 24 hours from 08/09/2018 1643 to 08/10/2018 1643       Date/Time Order Dose Route Action Action by     08/13/2018 0900 magnesium oxide (MAG-OX) tablet 800 mg   Oral Dose Auto Held Automatic Transfer Provider     08/12/2018 0900 magnesium oxide (MAG-OX) tablet 800 mg   Oral Dose Auto Held Automatic Transfer Provider     08/11/2018 0900 magnesium oxide (MAG-OX) tablet 800 mg   Oral Dose Auto Held Automatic Transfer Provider     08/10/2018 1614 magnesium oxide (MAG-OX) tablet 800 mg   Oral MAR Hold Automatic Transfer Provider     08/10/2018 1614 LORazepam (ATIVAN) tablet 0 5 mg   Oral MAR Hold Automatic Transfer Provider     08/10/2018 1614 acetaminophen (TYLENOL) tablet 650 mg   Oral MAR Hold Automatic Transfer Provider     08/14/2018 2100 metoprolol tartrate (LOPRESSOR) partial tablet 12 5 mg   Oral Dose Auto Held Automatic Transfer Provider     08/14/2018 0900 metoprolol tartrate (LOPRESSOR) partial tablet 12 5 mg   Oral Dose Auto Held Automatic Transfer Provider     08/13/2018 2100 metoprolol tartrate (LOPRESSOR) partial tablet 12 5 mg   Oral Dose Auto Held Automatic Transfer Provider     08/13/2018 0900 metoprolol tartrate (LOPRESSOR) partial tablet 12 5 mg   Oral Dose Auto Held Automatic Transfer Provider     08/12/2018 2100 metoprolol tartrate (LOPRESSOR) partial tablet 12 5 mg   Oral Dose Auto Held Automatic Transfer Provider     08/12/2018 0900 metoprolol tartrate (LOPRESSOR) partial tablet 12 5 mg   Oral Dose Auto Held Automatic Transfer Provider     08/11/2018 2100 metoprolol tartrate (LOPRESSOR) partial tablet 12 5 mg   Oral Dose Auto Held Automatic Transfer Provider     08/11/2018 0900 metoprolol tartrate (LOPRESSOR) partial tablet 12 5 mg   Oral Dose Auto Held Automatic Transfer Provider     08/10/2018 2100 metoprolol tartrate (LOPRESSOR) partial tablet 12 5 mg   Oral Dose Auto Held Automatic Transfer Provider     08/10/2018 1614 metoprolol tartrate (LOPRESSOR) partial tablet 12 5 mg   Oral MAR Hold Automatic Transfer Provider     08/10/2018 0919 metoprolol tartrate (LOPRESSOR) partial tablet 12 5 mg 12 5 mg Oral Given Edmar Stiles, RN     08/09/2018 2213 metoprolol tartrate (LOPRESSOR) partial tablet 12 5 mg 12 5 mg Oral Given Yaya Goodman RN     08/14/2018 1800 vancomycin (VANCOCIN) oral solution 125 mg   Oral Dose Auto Held Automatic Transfer Provider     08/14/2018 1200 vancomycin (VANCOCIN) oral solution 125 mg   Oral Dose Auto Held Automatic Transfer Provider     08/14/2018 0600 vancomycin (VANCOCIN) oral solution 125 mg   Oral Dose Auto Held Automatic Transfer Provider     08/14/2018 0000 vancomycin (VANCOCIN) oral solution 125 mg   Oral Dose Auto Held Automatic Transfer Provider     08/13/2018 1800 vancomycin (VANCOCIN) oral solution 125 mg   Oral Dose Auto Held Automatic Transfer Provider     08/13/2018 1200 vancomycin (VANCOCIN) oral solution 125 mg   Oral Dose Auto Held Automatic Transfer Provider     08/13/2018 0600 vancomycin (VANCOCIN) oral solution 125 mg   Oral Dose Auto Held Automatic Transfer Provider     08/13/2018 0000 vancomycin (VANCOCIN) oral solution 125 mg   Oral Dose Auto Held Automatic Transfer Provider     08/12/2018 1800 vancomycin (VANCOCIN) oral solution 125 mg   Oral Dose Auto Held Automatic Transfer Provider     08/12/2018 1200 vancomycin (VANCOCIN) oral solution 125 mg   Oral Dose Auto Held Automatic Transfer Provider     08/12/2018 0600 vancomycin (VANCOCIN) oral solution 125 mg   Oral Dose Auto Held Automatic Transfer Provider     08/12/2018 0000 vancomycin (VANCOCIN) oral solution 125 mg   Oral Dose Auto Held Automatic Transfer Provider     08/11/2018 1800 vancomycin (VANCOCIN) oral solution 125 mg   Oral Dose Auto Held Automatic Transfer Provider     08/11/2018 1200 vancomycin (VANCOCIN) oral solution 125 mg   Oral Dose Auto Held Automatic Transfer Provider     08/11/2018 0600 vancomycin (VANCOCIN) oral solution 125 mg   Oral Dose Auto Held Automatic Transfer Provider     08/11/2018 0000 vancomycin (VANCOCIN) oral solution 125 mg   Oral Dose Auto Held Automatic Transfer Provider     08/10/2018 1800 vancomycin (VANCOCIN) oral solution 125 mg   Oral Dose Auto Held Automatic Transfer Provider     08/10/2018 1614 vancomycin (VANCOCIN) oral solution 125 mg   Oral MAR Hold Automatic Transfer Provider     08/10/2018 1201 vancomycin (VANCOCIN) oral solution 125 mg 125 mg Oral Given Eduard Walker RN     08/10/2018 0630 vancomycin (VANCOCIN) oral solution 125 mg 125 mg Oral Given [employer], RN     08/09/2018 2341 [facility]) oral solution 125 mg 125 mg Oral Given [employer], RN     08/09/2018 1758 vancomycin (VANCOCIN) oral solution 125 mg 125 mg Oral Given Anneliese Goldsmith RN     08/13/2018 1800 cholestyramine sugar free (QUESTRAN LIGHT) packet 4 g   Oral Dose Auto Held Automatic Transfer Provider     08/13/2018 0900 cholestyramine sugar free (QUESTRAN LIGHT) packet 4 g   Oral Dose Auto Held Automatic Transfer Provider     08/12/2018 1800 cholestyramine sugar free (QUESTRAN LIGHT) packet 4 g   Oral Dose Auto Held Automatic Transfer Provider     08/12/2018 0900 cholestyramine sugar free (QUESTRAN LIGHT) packet 4 g   Oral Dose Auto Held Automatic Transfer Provider 08/11/2018 1800 cholestyramine sugar free (QUESTRAN LIGHT) packet 4 g   Oral Dose Auto Held Automatic Transfer Provider     08/11/2018 0900 cholestyramine sugar free (QUESTRAN LIGHT) packet 4 g   Oral Dose Auto Held Automatic Transfer Provider     08/10/2018 1800 cholestyramine sugar free (QUESTRAN LIGHT) packet 4 g   Oral Dose Auto Held Automatic Transfer Provider     08/10/2018 1614 cholestyramine sugar free (QUESTRAN LIGHT) packet 4 g   Oral MAR Hold Automatic Transfer Provider     08/10/2018 0918 cholestyramine sugar free (QUESTRAN LIGHT) packet 4 g 4 g Oral Not Given Carina Morocho RN     08/09/2018 1803 cholestyramine sugar free (QUESTRAN LIGHT) packet 4 g 4 g Oral Not Given Jeannette Amos RN     08/14/2018 1630 lactobacillus acidophilus-bulgaricus [facility]) packet 1 packet   Oral Dose Auto Held Automatic Transfer Provider     08/14/2018 1200 [facility]) packet 1 packet   Oral Dose Auto Held Automatic Transfer Provider     08/14/2018 0730 lactobacillus acidophilus-bulgaricus [facility]) packet 1 packet   Oral Dose Auto Held Automatic Transfer Provider     08/13/2018 1630 lactobacillus acidophilus-bulgaricus [facility]) packet 1 packet   Oral Dose Auto Held Automatic Transfer Provider     08/13/2018 1200 lactobacillus acidophilus-bulgaricus [facility]) packet 1 packet   Oral Dose Auto Held Automatic Transfer Provider     08/13/2018 0730 lactobacillus acidophilus-bulgaricus [facility]) packet 1 packet   Oral Dose Auto Held Automatic Transfer Provider     08/12/2018 1630 lactobacillus acidophilus-bulgaricus [facility]) packet 1 packet   Oral Dose Auto Held Automatic Transfer Provider     08/12/2018 1200 lactobacillus acidophilus-bulgaricus [facility]) packet 1 packet   Oral Dose Auto Held Automatic Transfer Provider     08/12/2018 0730 [facility]) packet 1 packet   Oral Dose Auto Held Automatic Transfer Provider     08/11/2018 1630 lactobacillus acidophilus-bulgaricus [facility]) packet 1 packet   Oral Dose Auto Held Automatic Transfer Provider     08/11/2018 1200 [facility]) packet 1 packet   Oral Dose Auto Held Automatic Transfer Provider     08/11/2018 0730 lact[facility]) packet 1 packet   Oral Dose Auto Held Automatic Transfer Provider     08/10/2018 1630 [facility]) packet 1 packet   Oral Dose Auto Held Automatic Transfer Provider     08/10/2018 1614 lact[facility]) packet 1 packet   Oral MAR Hold Automatic Transfer Provider     08/10/2018 1118 lact[facility]) packet 1 packet 1 packet Oral Not Given Guy Haney RN     08/10/2018 4337 lact[facility]) packet 1 packet 1 packet Oral Not Given Nino Garcia RN     08/09/2018 18[facility] packet 1 packet 1 packet Oral Not Given Franny Tamayo RN     08/10/2018 1614 aluminum-magnesium hydroxide-simethicone (MYLANTA) 200-200-20 mg/5 mL oral suspension 30 mL   Oral MAR Hold Automatic Transfer Provider     08/15/2018 1600 pantoprazole (PROTONIX) EC tablet 40 mg   Oral Dose Auto Held Automatic Transfer Provider     08/15/2018 0700 pantoprazole (PROTONIX) EC tablet 40 mg   Oral Dose Auto Held Automatic Transfer Provider     08/14/2018 1600 pantoprazole (PROTONIX) EC tablet 40 mg   Oral Dose Auto Held Automatic Transfer Provider     08/14/2018 0700 pantoprazole (PROTONIX) EC tablet 40 mg   Oral Dose Auto Held Automatic Transfer Provider     08/13/2018 1600 pantoprazole (PROTONIX) EC tablet 40 mg   Oral Dose Auto Held Automatic Transfer Provider     08/13/2018 0700 pantoprazole (PROTONIX) EC tablet 40 mg   Oral Dose Auto Held Automatic Transfer Provider     08/12/2018 1600 pantoprazole (PROTONIX) EC tablet 40 mg   Oral Dose Auto Held Automatic Transfer Provider     08/12/2018 0700 pantoprazole (PROTONIX) EC tablet 40 mg   Oral Dose Auto Held Automatic Transfer Provider     08/11/2018 1600 pantoprazole (PROTONIX) EC tablet 40 mg   Oral Dose Auto Held Automatic Transfer Provider     08/11/2018 0700 pantoprazole (PROTONIX) EC tablet 40 mg   Oral Dose Auto Held Automatic Transfer Provider     08/10/2018 1614 pantoprazole (PROTONIX) EC tablet 40 mg   Oral MAR Hold Automatic Transfer Provider     08/10/2018 0628 pantoprazole (PROTONIX) EC tablet 40 mg 40 mg Oral [facility], [facility]     08/09/2018 1758 pantoprazole (PROTONIX) EC tablet 40 mg 40 mg Oral Given Nabil Schmitt, RN     08/10/2018 1632 lactated ringers infusion   Intravenous Anesthesia Volume Adjustment Jane Spencer, ALLA     08/10/2018 1227 lactated ringers infusion 125 mL/hr Intravenous [address] Tommy Freeman, RN     08/10/2018 1218 dextrose 5 % and sodium chloride 0 45 % with KCl 40 mEq/L infusion (premix) 0 mL/hr Intravenous Stopped Tommy Freeman, RN     08/10/2018 0920 dextrose 5 % and sodium chloride 0 45 % with KCl 40 mEq/L infusion (premix) 100 mL/hr Intravenous New Bag Grace Clay, RN     08/10/2018 1614 ondansetron (ZOFRAN) injection 8 mg   Intravenous MAR Hold Automatic Transfer Provider          Objective:     Vitals: Blood pressure 127/78, pulse 76, temperature 97 5 °F (36 4 °C), temperature source Temporal, resp  rate 18, height 5' 1" (1 549 m), weight 75 7 kg (166 lb 14 4 oz), SpO2 97 %  ,Body mass index is 31 54 kg/m²        Intake/Output Summary (Last 24 hours) at 08/10/18 1643  Last data filed at 08/10/18 1632   Gross per 24 hour   Intake              500 ml   Output                0 ml   Net              500 ml       Physical Exam:   General Appearance: Awake and alert, in no acute distress  Abdomen: Soft, non-tender, non-distended; bowel sounds normal; no masses or no organomegaly    Invasive Devices     Peripheral Intravenous Line            Peripheral IV 08/10/18 Right Wrist less than 1 day                Lab Results:  Admission on 08/03/2018   No results displayed because visit has over 200 results  Imaging Studies: I have personally reviewed pertinent imaging studies

## 2018-08-10 NOTE — PHYSICAL THERAPY NOTE
Attempted PT tx  Pt is going for a colonoscopy  Pt reported that she did not feel up to PT at this time  Will continue to follow

## 2018-08-10 NOTE — MALNUTRITION/BMI
This medical record reflects one or more clinical indicators suggestive of malnutrition and/or morbid obesity  Malnutrition Findings:   Malnutrition type: Chronic illness  Degree of Malnutrition: Malnutrition of moderate degree  Malnutrition Characteristics: Fat loss, Muscle loss, Inadequate energy, Weight loss (Pt with a 25 6% wt change x 10 months and a more recent 10% wt change x 1 month  Pt likely not consuming adequate kcal >1 month) Awaiting results of colonoscopy and diet adjustment from NPO to supplement  BMI Findings: Body mass index is 31 54 kg/m²  See Nutrition note dated  08/10/2018 for additional details  Completed nutrition assessment is viewable in the nutrition documentation

## 2018-08-10 NOTE — PROGRESS NOTES
Pt could not tolerate go lytely prep  She was only able to handle about 1 and a half cups from 1230 in the afternoon on 8/9  Highly encouraged that she drank prep for colonoscopy but pt stated that, "she could not handle it, and made her feel sick " Mixed juice with prep to make it more tolerable and put over ice, still not able to drink

## 2018-08-10 NOTE — PROGRESS NOTES
[facility] Progress Note  Patient: Jericho Delgado 46 y o  female   MRN: [id number]  PCP: Federico Marino DO  Unit/Bed#: 543-52 Encounter: [id number]  Date Of Visit: 08/10/18    Assessment:    Principal Problem:    C  difficile colitis  Active Problems:    Esophageal reflux    Hypertension    Hypokalemia    Hypomagnesemia    Paroxysmal atrial fibrillation (HCC)    Anemia    Dehydration    Intractable vomiting with nausea    S/P colostomy takedown    Diarrhea    Dyspepsia      Plan:    · Scheduled for colonoscopy today  · Give IV fluids with added potassium chloride  · Otherwise continue other current medical management including oral vancomycin  VTE Pharmacologic Prophylaxis:   Pharmacologic: Apixaban (Eliquis)  Mechanical VTE Prophylaxis in Place: Yes    Patient Centered Rounds: I have performed bedside rounds with nursing staff today  Current Length of Stay: 5 day(s)    Current Patient Status: Inpatient     Code Status: Level 1 - Full Code      Subjective:   Patient seen and examined this morning  No longer nauseous, denies abdominal pain, frequency of watery diarrhea about the same  Objective:     Vitals:   Temp (24hrs), Av 1 °F (36 7 °C), Min:97 5 °F (36 4 °C), Max:98 8 °F (37 1 °C)    HR:  [60-84] 71  Resp:  [18] 18  BP: (118-138)/(58-79) 132/75  SpO2:  [99 %] 99 %  Body mass index is 31 54 kg/m²  Input and Output Summary (last 24 hours):        Intake/Output Summary (Last 24 hours) at 08/10/18 1305  Last data filed at 18 1749   Gross per 24 hour   Intake                0 ml   Output                0 ml   Net                0 ml       Physical Exam:   General: pleasant, well nourished  HEENT: oral mucosa dry, not pale, not jaundiced  Chest: clear lungs, no wheeze  Heart: S1 S2 audible, regular  Abd: soft, nontender, not distended, bowel sounds present, no rebound or guarding  Psych: appropriately oriented in all spheres  Neuro: Awake, alert, moves all extremities, essentially nonfocal      Additional Data:     Labs:      Results from last 7 days  Lab Units 08/10/18  0504  08/06/18  0542   WBC Thousand/uL 4 01*  < > 5 05   HEMOGLOBIN g/dL 12 8  < > 11 5   HEMATOCRIT % 38 8  < > 35 4   PLATELETS Thousands/uL 175  < > 167   NEUTROS PCT %  --   --  54   LYMPHS PCT %  --   --  31   MONOS PCT %  --   --  8   EOS PCT %  --   --  7*   < > = values in this interval not displayed  Results from last 7 days  Lab Units 08/10/18  0504   SODIUM mmol/L 141   POTASSIUM mmol/L 3 5   CHLORIDE mmol/L 106   CO2 mmol/L 28   BUN mg/dL 4*   CREATININE mg/dL 0 69   CALCIUM mg/dL 9 0   TOTAL PROTEIN g/dL 6 6   BILIRUBIN TOTAL mg/dL 1 50*   ALK PHOS U/L 105   ALT U/L 66   AST U/L 51*   GLUCOSE RANDOM mg/dL 83       Results from last 7 days  Lab Units 08/04/18  0434   INR  1 52*     Invalid input(s): TROIP    * I Have Reviewed All Lab Data Listed Above  * Additional Pertinent Lab Tests Reviewed: All Labs Within Last 24 Hours Reviewed      Recent Cultures (last 7 days):       Results from last 7 days  Lab Units 08/04/18  0031   C DIFF TOXIN B  POSITIVE for C difficle toxin by PCR  *       Last 24 Hours Medication List:     Current Facility-Administered Medications:  acetaminophen 650 mg Oral Q6H PRN Benedict Joe PA-C    aluminum-magnesium hydroxide-simethicone 30 mL Oral Q4H PRN Liza Wynne MD    cholestyramine sugar free 4 g Oral BID Abdon Montenegro DO    dextrose 5 % and sodium chloride 0 45 % with KCl 40 mEq/L 100 mL/hr Intravenous Continuous Liza Wynne MD Last Rate: Stopped (08/10/18 1218)   lactated ringers 125 mL/hr Intravenous Continuous Pat Portillo CRNA Last Rate: 125 mL/hr (08/10/18 1227)   lactobacillus acidophilus-bulgaricus 1 packet Oral TID With Meals Liza Wynne MD    LORazepam 0 5 mg Oral HS PRN Benedict Joe PA-C    magnesium oxide 800 mg Oral Daily Benedict Joe PA-C    metoprolol tartrate 12 5 mg Oral Q12H [address] Ken[place] DO [place]    ondansetron 8 mg Intravenous Q4H PRN Rocío Pride DO    pantoprazole 40 mg Oral BID AC Areclia Franz MD    vancomycin 125 mg Oral [facility] Rocío Pride DO         Today, Patient Was Seen By: Arcelia Franz MD    ** Please Note: Dragon 360 Dictation voice to text software may have been used in the creation of this document   **

## 2018-08-10 NOTE — OP NOTE
**** GI/ENDOSCOPY REPORT ****     PATIENT NAME: Ashley Dean ------ VISIT ID:  Patient ID:   [id number] YOB: 1965     INTRODUCTION: Colonoscopy - A 46 female patient presents for an inpatient   Colonoscopy at [facility]  PREVIOUS COLONOSCOPY: This colonoscopy is being performed for diagnostic   indication     INDICATIONS: Diarrhea after colon resection; C diff positive     CONSENT:  The benefits, risks, and alternatives to the procedure were   discussed and informed consent was obtained from the patient  PREPARATION: EKG, pulse, pulse oximetry and blood pressure were monitored   throughout the procedure  The patient was identified by myself both   verbally and by visual inspection of ID band  ASA Classification: Class 2   - Patient has mild to moderate systemic disturbance that may or may not be   related to the disorder requiring surgery  Airway Assessment   Classification: Airway class 2 - Visualization of the soft palate, fauces   and uvula  MEDICATIONS: Anesthesia-check records     PROCEDURE:  The endoscope was passed without difficulty through the anus   under direct visualization  The scope was withdrawn and the mucosa of the   terminal ileum was carefully examined  The quality of the preparation was   good  RECTAL EXAM: Normal rectal exam      FINDINGS:  Surgical anastomosis on the right side of the colon as well as   the rectum was seen  Pt had prior recent ileocecectomy and reconnection   surgery after diverticulitis  Otherwise, the colon appeared to be normal    No pseudomembranes seen  Random biopsies obtained from the colon  COMPLICATIONS: There were no complications  IMPRESSIONS: Both surgical anastomoses seen  Otherwise the colon appeared   normal   No pseudomembranes  Random biopsies obtained       RECOMMENDATIONS:     PATHOLOGY SPECIMENS:     ESTIMATED BLOOD LOSS:     PROCEDURE CODES:     ICD-9 Codes:     ICD-10 Codes:     PERFORMED BY: DEREK Bower O  on 08/10/2018  Version 1, electronically signed by DEREK Aguero O  on   08/10/2018 at 16:42

## 2018-08-10 NOTE — ANESTHESIA POSTPROCEDURE EVALUATION
Post-Op Assessment Note      CV Status:  Stable    Mental Status:  Alert and awake    Hydration Status:  Euvolemic    PONV Controlled:  Controlled    Airway Patency:  Patent    Post Op Vitals Reviewed: Yes          Staff: Anesthesiologist           BP   108/72   Temp   98   Pulse  77   Resp   16   SpO2   98

## 2018-08-10 NOTE — ANESTHESIA PREPROCEDURE EVALUATION
Review of Systems/Medical History  Patient summary reviewed  Chart reviewed  No history of anesthetic complications     Cardiovascular  Exercise tolerance (METS): >4,  Hyperlipidemia, Hypertension , Dysrhythmias , atrial fibrillation,    Pulmonary       GI/Hepatic    GERD ,   Comment: C diff          Endo/Other  History of thyroid disease , hypothyroidism,      GYN       Hematology   Musculoskeletal    Arthritis     Neurology   Psychology           Physical Exam    Airway    Mallampati score: II  TM Distance: >3 FB  Neck ROM: full     Dental   No notable dental hx     Cardiovascular  Rhythm: regular, Cardiovascular exam normal    Pulmonary  Pulmonary exam normal     Other Findings        Anesthesia Plan  ASA Score- 3     Anesthesia Type- IV sedation with anesthesia with ASA Monitors  Additional Monitors:   Airway Plan:         Plan Factors-    Induction- intravenous  Postoperative Plan-     Informed Consent- Anesthetic plan and risks discussed with patient  I personally reviewed this patient with the CRNA  Discussed and agreed on the Anesthesia Plan with the CRNA  Irma Anaya

## 2018-08-10 NOTE — PROGRESS NOTES
Progress Note - General Surgery   Jordana Osorio 46 y o  female MRN: [id number]  Unit/Bed#: 401-01 Encounter: [id number]    Assessment:  58-year-old female who is status post Pj's reversal and ileocecectomy with primary anastomosis on July 11, 2018, admitted with hypokalemia, persistent nausea vomiting  Patient had similar episodes after her initial Pj's procedure  CT scan showing an intact anastomosis ease no evidence of bowel obstruction  Too Golytely prep yesterday for colonoscopy today  Plan:    -NPO for colonoscopy today  -out of bed bed/ambulate  -antibiotic treatment for C diff infection as per ID recommendations  -if no endoscopic evidence of c  Diff then will proceed with antidiarrheal medication   -remainder of medical management as per medical team     Subjective/Objective   Chief Complaint:  Nausea and diarrhea    Subjective:  Not feeling great  Ready to get the scope done  Objective:     Blood pressure 127/78, pulse 76, temperature 97 5 °F (36 4 °C), temperature source Temporal, resp  rate 18, height 5' 1" (1 549 m), weight 75 7 kg (166 lb 14 4 oz), SpO2 97 %  ,Body mass index is 31 54 kg/m²        Intake/Output Summary (Last 24 hours) at 08/10/18 1558  Last data filed at 08/09/18 1749   Gross per 24 hour   Intake                0 ml   Output                0 ml   Net                0 ml       Invasive Devices     Peripheral Intravenous Line            Peripheral IV 08/10/18 Right Wrist less than 1 day                Physical Exam:   General:  No acute distress, resting comfortably  HEENT:  Normocephalic, atraumatic, trachea midline  CV:  S1-S2 audible, regular rate rhythm  Pulmonary:  Clear auscultation bilaterally no wheezes rales rhonchi  GI:  Abdomen nondistended, soft nontender, again ostomy site healing well  MSK lower extremities without clubbing, cyanosis, edema  Neurologic:  Alert and x3, cranial 2 through 12 grossly intact  Psych:  Mood and affect appropriate patient feels little better and looks like she is in better spirits today              Lab, Imaging and other studies:  I have personally reviewed pertinent lab results    , CBC:   Lab Results   Component Value Date    WBC 4 01 (L) 08/10/2018    HGB 12 8 08/10/2018    HCT 38 8 08/10/2018    MCV 91 08/10/2018     08/10/2018    MCH 30 1 08/10/2018    MCHC 33 0 08/10/2018    RDW 13 4 08/10/2018    MPV 11 5 08/10/2018   , CMP:   Lab Results   Component Value Date     08/10/2018    K 3 5 08/10/2018     08/10/2018    CO2 28 08/10/2018    ANIONGAP 7 08/10/2018    BUN 4 (L) 08/10/2018    CREATININE 0 69 08/10/2018    GLUCOSE 83 08/10/2018    CALCIUM 9 0 08/10/2018    AST 51 (H) 08/10/2018    ALT 66 08/10/2018    ALKPHOS 105 08/10/2018    PROT 6 6 08/10/2018    BILITOT 1 50 (H) 08/10/2018    EGFR 99 08/10/2018   , Coagulation:   No results found for: PT, INR, APTT, Urinalysis:   No results found for: COLORU, CLARITYU, SPECGRAV, PHUR, LEUKOCYTESUR, NITRITE, PROTEINUA, GLUCOSEU, KETONESU, BILIRUBINUR, BLOODU, Amylase: No results found for: AMYLASE, Lipase:   No results found for: LIPASE  VTE Pharmacologic Prophylaxis:  On Eliquis  VTE Mechanical Prophylaxis: sequential compression device

## 2018-08-11 PROBLEM — E44.0 MODERATE PROTEIN-CALORIE MALNUTRITION (HCC): Status: ACTIVE | Noted: 2018-08-11

## 2018-08-11 LAB
ALBUMIN SERPL BCP-MCNC: 2.9 G/DL (ref 3.5–5)
ALP SERPL-CCNC: 113 U/L (ref 46–116)
ALT SERPL W P-5'-P-CCNC: 103 U/L (ref 12–78)
ANION GAP SERPL CALCULATED.3IONS-SCNC: 7 MMOL/L (ref 4–13)
AST SERPL W P-5'-P-CCNC: 65 U/L (ref 5–45)
BILIRUB SERPL-MCNC: 1.2 MG/DL (ref 0.2–1)
BUN SERPL-MCNC: 4 MG/DL (ref 5–25)
CALCIUM SERPL-MCNC: 8.9 MG/DL (ref 8.3–10.1)
CHLORIDE SERPL-SCNC: 106 MMOL/L (ref 100–108)
CO2 SERPL-SCNC: 27 MMOL/L (ref 21–32)
CREAT SERPL-MCNC: 0.75 MG/DL (ref 0.6–1.3)
ERYTHROCYTE [DISTWIDTH] IN BLOOD BY AUTOMATED COUNT: 13.3 % (ref 11.6–15.1)
GFR SERPL CREATININE-BSD FRML MDRD: 91 ML/MIN/1.73SQ M
GLUCOSE SERPL-MCNC: 108 MG/DL (ref 65–140)
HCT VFR BLD AUTO: 38.4 % (ref 34.8–46.1)
HGB BLD-MCNC: 12.8 G/DL (ref 11.5–15.4)
INR PPP: 1.06 (ref 0.86–1.17)
MCH RBC QN AUTO: 30.4 PG (ref 26.8–34.3)
MCHC RBC AUTO-ENTMCNC: 33.3 G/DL (ref 31.4–37.4)
MCV RBC AUTO: 91 FL (ref 82–98)
PLATELET # BLD AUTO: 190 THOUSANDS/UL (ref 149–390)
PMV BLD AUTO: 11.5 FL (ref 8.9–12.7)
POTASSIUM SERPL-SCNC: 3.7 MMOL/L (ref 3.5–5.3)
PROT SERPL-MCNC: 6.7 G/DL (ref 6.4–8.2)
PROTHROMBIN TIME: 13.3 SECONDS (ref 11.8–14.2)
RBC # BLD AUTO: 4.21 MILLION/UL (ref 3.81–5.12)
SODIUM SERPL-SCNC: 140 MMOL/L (ref 136–145)
WBC # BLD AUTO: 4.19 THOUSAND/UL (ref 4.31–10.16)

## 2018-08-11 PROCEDURE — 99232 SBSQ HOSP IP/OBS MODERATE 35: CPT | Performed by: INTERNAL MEDICINE

## 2018-08-11 PROCEDURE — 80053 COMPREHEN METABOLIC PANEL: CPT | Performed by: INTERNAL MEDICINE

## 2018-08-11 PROCEDURE — 99024 POSTOP FOLLOW-UP VISIT: CPT | Performed by: SURGERY

## 2018-08-11 PROCEDURE — 85610 PROTHROMBIN TIME: CPT | Performed by: INTERNAL MEDICINE

## 2018-08-11 PROCEDURE — 85027 COMPLETE CBC AUTOMATED: CPT | Performed by: INTERNAL MEDICINE

## 2018-08-11 RX ORDER — DEXTROSE, SODIUM CHLORIDE, AND POTASSIUM CHLORIDE 5; .45; .3 G/100ML; G/100ML; G/100ML
100 INJECTION INTRAVENOUS CONTINUOUS
Status: DISCONTINUED | OUTPATIENT
Start: 2018-08-11 | End: 2018-08-12

## 2018-08-11 RX ADMIN — VANCOMYCIN 125 MG: KIT at 12:40

## 2018-08-11 RX ADMIN — MAGNESIUM OXIDE TAB 400 MG (241.3 MG ELEMENTAL MG) 800 MG: 400 (241.3 MG) TAB at 09:18

## 2018-08-11 RX ADMIN — LACTOBACILLUS ACIDOPHILUS / LACTOBACILLUS BULGARICUS 1 PACKET: 100 MILLION CFU STRENGTH GRANULES at 17:45

## 2018-08-11 RX ADMIN — VANCOMYCIN 125 MG: KIT at 00:36

## 2018-08-11 RX ADMIN — LORAZEPAM 0.5 MG: 0.5 TABLET ORAL at 22:29

## 2018-08-11 RX ADMIN — LOPERAMIDE HYDROCHLORIDE 2 MG: 2 CAPSULE ORAL at 22:17

## 2018-08-11 RX ADMIN — PANTOPRAZOLE SODIUM 40 MG: 40 TABLET, DELAYED RELEASE ORAL at 06:19

## 2018-08-11 RX ADMIN — LOPERAMIDE HYDROCHLORIDE 2 MG: 2 CAPSULE ORAL at 17:45

## 2018-08-11 RX ADMIN — DEXTROSE, SODIUM CHLORIDE, AND POTASSIUM CHLORIDE 80 ML/HR: 5; .45; .3 INJECTION INTRAVENOUS at 16:43

## 2018-08-11 RX ADMIN — VANCOMYCIN 125 MG: KIT at 23:39

## 2018-08-11 RX ADMIN — VANCOMYCIN 125 MG: KIT at 17:45

## 2018-08-11 RX ADMIN — DEXTROSE, SODIUM CHLORIDE, AND POTASSIUM CHLORIDE 100 ML/HR: 5; .45; .3 INJECTION INTRAVENOUS at 03:50

## 2018-08-11 RX ADMIN — PANTOPRAZOLE SODIUM 40 MG: 40 TABLET, DELAYED RELEASE ORAL at 16:18

## 2018-08-11 RX ADMIN — LACTOBACILLUS ACIDOPHILUS / LACTOBACILLUS BULGARICUS 1 PACKET: 100 MILLION CFU STRENGTH GRANULES at 09:18

## 2018-08-11 RX ADMIN — ANORECTAL OINTMENT: 15.7; .44; 24; 20.6 OINTMENT TOPICAL at 09:20

## 2018-08-11 RX ADMIN — VANCOMYCIN 125 MG: KIT at 06:19

## 2018-08-11 RX ADMIN — ANORECTAL OINTMENT: 15.7; .44; 24; 20.6 OINTMENT TOPICAL at 17:51

## 2018-08-11 RX ADMIN — METOPROLOL TARTRATE 12.5 MG: 25 TABLET ORAL at 09:18

## 2018-08-11 RX ADMIN — LOPERAMIDE HYDROCHLORIDE 2 MG: 2 CAPSULE ORAL at 09:18

## 2018-08-11 RX ADMIN — LACTOBACILLUS ACIDOPHILUS / LACTOBACILLUS BULGARICUS 1 PACKET: 100 MILLION CFU STRENGTH GRANULES at 12:40

## 2018-08-11 NOTE — PLAN OF CARE
Problem: PAIN - ADULT  Goal: Verbalizes/displays adequate comfort level or baseline comfort level  Interventions:  - Encourage patient to monitor pain and request assistance  - Assess pain using appropriate pain scale, 0-10 pain scale  - Administer analgesics based on type and severity of pain and evaluate response  - Implement non-pharmacological measures as appropriate and evaluate response  - Consider cultural and social influences on pain and pain management  - Notify physician/advanced practitioner if interventions unsuccessful or patient reports new pain   Outcome: Progressing      Problem: INFECTION - ADULT  Goal: Absence or prevention of progression during hospitalization  INTERVENTIONS:  - Assess and monitor for signs and symptoms of infection  - Monitor lab/diagnostic results  - Monitor all insertion sites, i e  indwelling lines  - [place] appropriate cooling/warming therapies per order  - Administer medications as ordered  - Instruct and encourage patient and family to use good hand hygiene technique  - Identify and instruct in appropriate isolation precautions for identified infection/condition   Outcome: Progressing      Problem: SAFETY ADULT  Goal: Patient will remain free of falls  INTERVENTIONS:  - Assess patient frequently for physical needs  -  Identify cognitive and physical deficits and behaviors that affect risk of falls  -  [place] fall precautions as indicated by assessment  Medium fall risk   - Educate patient/family on patient safety including physical limitations  - Instruct patient to call for assistance with activity based on assessment  - Modify environment to reduce risk of injury  - Consider OT/PT consult to assist with strengthening/mobility    INTERVENTIONS:  - Assess patient frequently for physical needs  -  Identify cognitive and physical deficits and behaviors that affect risk of falls    -  [place] fall precautions as indicated by assessment   - Educate patient/family on patient safety including physical limitations  - Instruct patient to call for assistance with activity based on assessment  - Modify environment to reduce risk of injury  - Consider OT/PT consult to assist with strengthening/mobility   Outcome: Progressing    Goal: Maintain or return to baseline ADL function  INTERVENTIONS:  -  Assess patient's ability to carry out ADLs; assess patient's baseline for ADL function and identify physical deficits which impact ability to perform ADLs (bathing, care of mouth/teeth, toileting, grooming, dressing, etc )  - Assess/evaluate cause of self-care deficits Patient is independent with ADL's  - Assess range of motion  - Assess patient's mobility; develop plan if impaired  - Assess patient's need for assistive devices and provide as appropriate  - Encourage maximum independence but intervene and supervise when necessary  ¯ Involve family in performance of ADLs  ¯ Assess for home care needs following discharge   ¯ Request OT consult to assist with ADL evaluation and planning for discharge  ¯ Provide patient education as appropriate    Outcome: Progressing    Goal: Maintain or return mobility status to optimal level  INTERVENTIONS:  - Assess patient's baseline mobility status (ambulation, transfers, stairs, etc )  Baseline: independent  - Identify cognitive and physical deficits and behaviors that affect mobility  - Identify mobility aids required to assist with transfers and/or ambulation (gait belt, sit-to-stand, lift, walker, cane, etc )  - [place] fall precautions as indicated by assessment   Medium fall risk   - Record patient progress and toleration of activity level on Mobility SBAR; progress patient to next Phase/Stage  - Instruct patient to call for assistance with activity based on assessment  - Request Rehabilitation consult to assist with strengthening/weightbearing, etc     Outcome: Progressing      Problem: DISCHARGE PLANNING  Goal: Discharge to home or other facility with appropriate resources  INTERVENTIONS:  - Identify barriers to discharge w/patient and caregiver  - Arrange for needed discharge resources and transportation as appropriate  - Identify discharge learning needs (meds, wound care, etc )  - Arrange for interpretive services to assist at discharge as needed  - Refer to Case Management Department for coordinating discharge planning if the patient needs post-hospital services based on physician/advanced practitioner order or complex needs related to functional status, cognitive ability, or social support system   Outcome: Progressing      Problem: Knowledge Deficit  Goal: Patient/family/caregiver demonstrates understanding of disease process, treatment plan, medications, and discharge instructions  Complete learning assessment and assess knowledge base    Interventions:  - Provide teaching at level of understanding  - Provide teaching via preferred learning methods   Outcome: Progressing      Problem: GASTROINTESTINAL - ADULT  Goal: Minimal or absence of nausea and/or vomiting  INTERVENTIONS:  - Administer IV fluids as ordered to ensure adequate hydration  - Maintain NPO status until nausea and vomiting are resolved  - Nasogastric tube as ordered  - Administer ordered antiemetic medications as needed  - Provide nonpharmacologic comfort measures as appropriate  - Advance diet as tolerated, if ordered  - Nutrition services referral to assist patient with adequate nutrition and appropriate food choices   Outcome: Progressing    Goal: Maintains or returns to baseline bowel function  INTERVENTIONS:  - Assess bowel function  - Encourage oral fluids to ensure adequate hydration  - Administer IV fluids as ordered to ensure adequate hydration  - Administer ordered medications as needed  - Encourage mobilization and activity  - Nutrition services referral to assist patient with appropriate food choices   Outcome: Progressing      Problem: Potential for Falls  Goal: Patient will remain free of falls  INTERVENTIONS:  - Assess patient frequently for physical needs  -  Identify cognitive and physical deficits and behaviors that affect risk of falls  -  [place] fall precautions as indicated by assessment  Medium fall risk   - Educate patient/family on patient safety including physical limitations  - Instruct patient to call for assistance with activity based on assessment  - Modify environment to reduce risk of injury  - Consider OT/PT consult to assist with strengthening/mobility    INTERVENTIONS:  - Assess patient frequently for physical needs  -  Identify cognitive and physical deficits and behaviors that affect risk of falls    -  [place] fall precautions as indicated by assessment   - Educate patient/family on patient safety including physical limitations  - Instruct patient to call for assistance with activity based on assessment  - Modify environment to reduce risk of injury  - Consider OT/PT consult to assist with strengthening/mobility   Outcome: Progressing      Problem: Prexisting or High Potential for Compromised Skin Integrity  Goal: Skin integrity is maintained or improved  INTERVENTIONS:  - Identify patients at risk for skin breakdown  - Assess and monitor skin integrity  - Assess and monitor nutrition and hydration status  - Monitor labs (i e  albumin)  - Assess for incontinence   - Turn and reposition patient  - Assist with mobility/ambulation  - Relieve pressure over bony prominences  - Avoid friction and shearing  - Provide appropriate hygiene as needed including keeping skin clean and dry  - Evaluate need for skin moisturizer/barrier cream  - Collaborate with interdisciplinary team (i e  Nutrition, Rehabilitation, etc )   - Patient/family teaching   Outcome: Progressing      Problem: Nutrition/Hydration-ADULT  Goal: Nutrient/Hydration intake appropriate for improving, restoring or maintaining nutritional needs  Monitor and assess patient's nutrition/hydration status for malnutrition (ex- brittle hair, bruises, dry skin, pale skin and conjunctiva, muscle wasting, smooth red tongue, and disorientation)  Collaborate with interdisciplinary team and initiate plan and interventions as ordered  Monitor patient's weight and dietary intake as ordered or per policy  Utilize nutrition screening tool and intervene per policy  Determine patient's food preferences and provide high-protein, high-caloric foods as appropriate       INTERVENTIONS:  - Monitor oral intake, urinary output, labs, and treatment plans  - Assess nutrition and hydration status and recommend course of action  - Evaluate amount of meals eaten  - Assist patient with eating if necessary   - Allow adequate time for meals  - Recommend/ encourage appropriate diets, oral nutritional supplements, and vitamin/mineral supplements  - Order, calculate, and assess calorie counts as needed  - Recommend, monitor, and adjust tube feedings and TPN/PPN based on assessed needs  - Assess need for intravenous fluids  - Provide specific nutrition/hydration education as appropriate  - Include patient/family/caregiver in decisions related to nutrition   Outcome: Progressing      Problem: DISCHARGE PLANNING - CARE MANAGEMENT  Goal: Discharge to post-acute care or home with appropriate resources  INTERVENTIONS:  - Conduct assessment to determine patient/family and health care team treatment goals, and need for post-acute services based on payer coverage, community resources, and patient preferences, and barriers to discharge  - Address psychosocial, clinical, and financial barriers to discharge as identified in assessment in conjunction with the patient/family and health care team  - Arrange appropriate level of post-acute services according to patient's   needs and preference and payer coverage in collaboration with the physician and health care team  - Communicate with and update the patient/family, physician, and health care team regarding progress on the discharge plan  - Arrange appropriate transportation to post-acute venues   Outcome: Progressing

## 2018-08-11 NOTE — PROGRESS NOTES
[facility] Progress Note  Patient: Mary Pica 46 y o  female   MRN: [id number]  PCP: Samy Pierce DO  Unit/Bed#: 663-01 Encounter: [id number]  Date Of Visit: 18    Assessment:    Principal Problem:    C  difficile colitis  Active Problems:    Esophageal reflux    Hypertension    Hypokalemia    Hypomagnesemia    Paroxysmal atrial fibrillation (HCC)    Anemia    Dehydration    Intractable vomiting with nausea    S/P colostomy takedown    Diarrhea    Dyspepsia    Moderate protein-calorie malnutrition ([address] )      Plan:    · Per GI report, no pseudomembranes seen from yesterday's colonoscopy  · Subjective improvement of diarrhea according to the patient, stools more formed today  · Continue oral vancomycin and as needed Imodium, colestipol at discharge 2/2 Questran powder intolerance  · Wean off IV fluids  · Cont BB & NOAC      VTE Pharmacologic Prophylaxis:   Pharmacologic: Apixaban (Eliquis)  Mechanical VTE Prophylaxis in Place: Yes    Patient Centered Rounds: I have performed bedside rounds with nursing staff today  Current Length of Stay: 6 day(s)    Current Patient Status: Inpatient     Code Status: Level 1 - Full Code      Subjective:   Patient seen and examined this afternoon  Decreased frequency of diarrhea since colonoscopy yesterday evening  Has only gone to the bathroom twice following breakfast today  Not tolerating Questran powder with nausea  Denies abdominal pain  Objective:     Vitals:   Temp (24hrs), Av 7 °F (36 5 °C), Min:97 5 °F (36 4 °C), Max:98 °F (36 7 °C)    HR:  [64-81] 75  Resp:  [13-20] 16  BP: (122-146)/(73-83) 126/73  SpO2:  [96 %-100 %] 100 %  Body mass index is 31 54 kg/m²  Input and Output Summary (last 24 hours):        Intake/Output Summary (Last 24 hours) at 18 1446  Last data filed at 18 1252   Gross per 24 hour   Intake          2596 67 ml   Output                0 ml   Net          2596 67 ml       Physical Exam:   General: in no overt distress  HEENT: not pale, not jaundiced  Chest: clear lungs, no wheeze  Heart: S1 S2 audible, regular  Abd: soft, nontender, no distension, bowel sounds present  Psych: appropriately oriented in all spheres  Neuro: Awake, alert, essentially nonfocal      Additional Data:     Labs:      Results from last 7 days  Lab Units 08/11/18  0440  08/06/18  0542   WBC Thousand/uL 4 19*  < > 5 05   HEMOGLOBIN g/dL 12 8  < > 11 5   HEMATOCRIT % 38 4  < > 35 4   PLATELETS Thousands/uL 190  < > 167   NEUTROS PCT %  --   --  54   LYMPHS PCT %  --   --  31   MONOS PCT %  --   --  8   EOS PCT %  --   --  7*   < > = values in this interval not displayed  Results from last 7 days  Lab Units 08/11/18  0440   SODIUM mmol/L 140   POTASSIUM mmol/L 3 7   CHLORIDE mmol/L 106   CO2 mmol/L 27   BUN mg/dL 4*   CREATININE mg/dL 0 75   CALCIUM mg/dL 8 9   TOTAL PROTEIN g/dL 6 7   BILIRUBIN TOTAL mg/dL 1 20*   ALK PHOS U/L 113   ALT U/L 103*   AST U/L 65*   GLUCOSE RANDOM mg/dL 108       Results from last 7 days  Lab Units 08/11/18  0440   INR  1 06     Invalid input(s): TROIP    * I Have Reviewed All Lab Data Listed Above  * Additional Pertinent Lab Tests Reviewed:  All Labs Within Last 24 Hours Reviewed      Recent Cultures (last 7 days):           Last 24 Hours Medication List:     Current Facility-Administered Medications:  acetaminophen 650 mg Oral Q6H PRN Benedict Joe PA-C   aluminum-magnesium hydroxide-simethicone 30 mL Oral Q4H PRN Whitney Guajardo MD   cholestyramine sugar free 4 g Oral BID Abdon Montenegro,    dextrose 5 % and sodium chloride 0 45 % with KCl 40 mEq/L 100 mL/hr Intravenous Continuous Whitney Guajardo MD   lactobacillus acidophilus-bulgaricus 1 packet Oral TID With Meals Whitney Guajardo MD   loperamide 2 mg Oral 4x Daily Abdon Montenegro DO   LORazepam 0 5 mg Oral HS PRN Benedict Joe PA-C   magnesium oxide 800 mg Oral Daily Benedict Joe PA-C   menthol-zinc oxide  Topical BID Whitney Guajardo MD   metoprolol tartrate 12 5 mg Oral Q12H [facility] Aaliyah Hart, DO   ondansetron 8 mg Intravenous Q4H PRN Neelima Cordero DO   pantoprazole 40 mg Oral BID AC Gustavo Ochoa MD   vancomycin 125 mg Oral Q6H B[address] , DO        Today, Patient Was Seen By: Gustavo Ochoa MD    ** Please Note: Dragon 360 Dictation voice to text software may have been used in the creation of this document   **

## 2018-08-12 LAB
ALBUMIN SERPL BCP-MCNC: 2.7 G/DL (ref 3.5–5)
ALP SERPL-CCNC: 111 U/L (ref 46–116)
ALT SERPL W P-5'-P-CCNC: 114 U/L (ref 12–78)
ANION GAP SERPL CALCULATED.3IONS-SCNC: 7 MMOL/L (ref 4–13)
AST SERPL W P-5'-P-CCNC: 66 U/L (ref 5–45)
BILIRUB SERPL-MCNC: 1.5 MG/DL (ref 0.2–1)
BUN SERPL-MCNC: 4 MG/DL (ref 5–25)
CALCIUM SERPL-MCNC: 8.9 MG/DL (ref 8.3–10.1)
CHLORIDE SERPL-SCNC: 106 MMOL/L (ref 100–108)
CO2 SERPL-SCNC: 28 MMOL/L (ref 21–32)
CREAT SERPL-MCNC: 0.69 MG/DL (ref 0.6–1.3)
ERYTHROCYTE [DISTWIDTH] IN BLOOD BY AUTOMATED COUNT: 13.3 % (ref 11.6–15.1)
GFR SERPL CREATININE-BSD FRML MDRD: 99 ML/MIN/1.73SQ M
GLUCOSE SERPL-MCNC: 87 MG/DL (ref 65–140)
HCT VFR BLD AUTO: 35.2 % (ref 34.8–46.1)
HGB BLD-MCNC: 11.8 G/DL (ref 11.5–15.4)
MAGNESIUM SERPL-MCNC: 1.7 MG/DL (ref 1.6–2.6)
MCH RBC QN AUTO: 30.5 PG (ref 26.8–34.3)
MCHC RBC AUTO-ENTMCNC: 33.5 G/DL (ref 31.4–37.4)
MCV RBC AUTO: 91 FL (ref 82–98)
PHOSPHATE SERPL-MCNC: 4.6 MG/DL (ref 2.7–4.5)
PLATELET # BLD AUTO: 184 THOUSANDS/UL (ref 149–390)
PMV BLD AUTO: 10.8 FL (ref 8.9–12.7)
POTASSIUM SERPL-SCNC: 3.7 MMOL/L (ref 3.5–5.3)
PROT SERPL-MCNC: 6.2 G/DL (ref 6.4–8.2)
RBC # BLD AUTO: 3.87 MILLION/UL (ref 3.81–5.12)
SODIUM SERPL-SCNC: 141 MMOL/L (ref 136–145)
WBC # BLD AUTO: 4.34 THOUSAND/UL (ref 4.31–10.16)

## 2018-08-12 PROCEDURE — 83735 ASSAY OF MAGNESIUM: CPT | Performed by: INTERNAL MEDICINE

## 2018-08-12 PROCEDURE — 85027 COMPLETE CBC AUTOMATED: CPT | Performed by: INTERNAL MEDICINE

## 2018-08-12 PROCEDURE — 80053 COMPREHEN METABOLIC PANEL: CPT | Performed by: INTERNAL MEDICINE

## 2018-08-12 PROCEDURE — 84100 ASSAY OF PHOSPHORUS: CPT | Performed by: INTERNAL MEDICINE

## 2018-08-12 PROCEDURE — 99024 POSTOP FOLLOW-UP VISIT: CPT | Performed by: SURGERY

## 2018-08-12 PROCEDURE — 99232 SBSQ HOSP IP/OBS MODERATE 35: CPT | Performed by: INTERNAL MEDICINE

## 2018-08-12 RX ORDER — LOPERAMIDE HYDROCHLORIDE 2 MG/1
4 CAPSULE ORAL 4 TIMES DAILY
Status: DISCONTINUED | OUTPATIENT
Start: 2018-08-12 | End: 2018-08-14

## 2018-08-12 RX ADMIN — LACTOBACILLUS ACIDOPHILUS / LACTOBACILLUS BULGARICUS 1 PACKET: 100 MILLION CFU STRENGTH GRANULES at 12:33

## 2018-08-12 RX ADMIN — LOPERAMIDE HYDROCHLORIDE 4 MG: 2 CAPSULE ORAL at 16:13

## 2018-08-12 RX ADMIN — VANCOMYCIN 125 MG: KIT at 23:42

## 2018-08-12 RX ADMIN — LOPERAMIDE HYDROCHLORIDE 4 MG: 2 CAPSULE ORAL at 12:32

## 2018-08-12 RX ADMIN — METOPROLOL TARTRATE 12.5 MG: 25 TABLET ORAL at 22:01

## 2018-08-12 RX ADMIN — VANCOMYCIN 125 MG: KIT at 17:24

## 2018-08-12 RX ADMIN — ANORECTAL OINTMENT: 15.7; .44; 24; 20.6 OINTMENT TOPICAL at 17:25

## 2018-08-12 RX ADMIN — VANCOMYCIN 125 MG: KIT at 06:20

## 2018-08-12 RX ADMIN — LOPERAMIDE HYDROCHLORIDE 4 MG: 2 CAPSULE ORAL at 21:58

## 2018-08-12 RX ADMIN — LACTOBACILLUS ACIDOPHILUS / LACTOBACILLUS BULGARICUS 1 PACKET: 100 MILLION CFU STRENGTH GRANULES at 09:05

## 2018-08-12 RX ADMIN — MAGNESIUM OXIDE TAB 400 MG (241.3 MG ELEMENTAL MG) 800 MG: 400 (241.3 MG) TAB at 09:05

## 2018-08-12 RX ADMIN — LOPERAMIDE HYDROCHLORIDE 2 MG: 2 CAPSULE ORAL at 09:05

## 2018-08-12 RX ADMIN — ANORECTAL OINTMENT: 15.7; .44; 24; 20.6 OINTMENT TOPICAL at 09:07

## 2018-08-12 RX ADMIN — METOPROLOL TARTRATE 12.5 MG: 25 TABLET ORAL at 09:05

## 2018-08-12 RX ADMIN — LACTOBACILLUS ACIDOPHILUS / LACTOBACILLUS BULGARICUS 1 PACKET: 100 MILLION CFU STRENGTH GRANULES at 17:24

## 2018-08-12 RX ADMIN — PANTOPRAZOLE SODIUM 40 MG: 40 TABLET, DELAYED RELEASE ORAL at 16:13

## 2018-08-12 RX ADMIN — PANTOPRAZOLE SODIUM 40 MG: 40 TABLET, DELAYED RELEASE ORAL at 06:20

## 2018-08-12 RX ADMIN — LORAZEPAM 0.5 MG: 0.5 TABLET ORAL at 22:01

## 2018-08-12 RX ADMIN — VANCOMYCIN 125 MG: KIT at 12:33

## 2018-08-12 NOTE — PROGRESS NOTES
IV site infiltrated  IVF stopped and site removed  Pt refused another IV site  Zbigniew Giancarlo RIVAS notified

## 2018-08-12 NOTE — PROGRESS NOTES
[facility] Progress Note  Patient: Mami Garcia 46 y o  female   MRN: [id number]  PCP: Juan M Alas DO  Unit/Bed#: 877-67 Encounter: [id number]  Date Of Visit: 18    Assessment:    Principal Problem:    C  difficile colitis  Active Problems:    Esophageal reflux    Hypertension    Hypokalemia    Hypomagnesemia    Paroxysmal atrial fibrillation (HCC)    Anemia    Dehydration    Intractable vomiting with nausea    S/P colostomy takedown    Diarrhea    Dyspepsia    Moderate protein-calorie malnutrition (HCC)      Plan:    · Slight subjective improvement of diarrhea overnight  · Continue current management and discharge home tomorrow on colestipol and PO vancomycin if diarrhea much better      VTE Pharmacologic Prophylaxis:   Pharmacologic: Apixaban (Eliquis)  Mechanical VTE Prophylaxis in Place: Yes    Patient Centered Rounds: I have performed bedside rounds with nursing staff today  Current Length of Stay: 7 day(s)    Current Patient Status: Inpatient     Code Status: Level 1 - Full Code      Subjective:   Patient seen and examined this afternoon  Denies abdominal pain, nausea, vomiting  No fever, chills, malaise, or anorexia  Diarrhea seems a bit better today with liquid to soft stool  Objective:     Vitals:   Temp (24hrs), Av 7 °F (36 5 °C), Min:96 9 °F (36 1 °C), Max:98 6 °F (37 °C)    HR:  [69-72] 69  Resp:  [16-18] 16  BP: (108-124)/(55-78) 124/78  SpO2:  [96 %-99 %] 96 %  Body mass index is 31 7 kg/m²  Input and Output Summary (last 24 hours):        Intake/Output Summary (Last 24 hours) at 18 1302  Last data filed at 18 1218   Gross per 24 hour   Intake           925 33 ml   Output                0 ml   Net           925 33 ml       Physical Exam:   General: in no overt distress  HEENT: oral mucosa moist, not pale, not jaundiced  Psych: appropriately oriented in all spheres, good insight  Neuro: Awake, alert, moves all extremities equally, essentially nonfocal      Additional Data:     Labs:      Results from last 7 days  Lab Units 08/12/18  0639  08/06/18  0542   WBC Thousand/uL 4 34  < > 5 05   HEMOGLOBIN g/dL 11 8  < > 11 5   HEMATOCRIT % 35 2  < > 35 4   PLATELETS Thousands/uL 184  < > 167   NEUTROS PCT %  --   --  54   LYMPHS PCT %  --   --  31   MONOS PCT %  --   --  8   EOS PCT %  --   --  7*   < > = values in this interval not displayed  Results from last 7 days  Lab Units 08/12/18  0640   SODIUM mmol/L 141   POTASSIUM mmol/L 3 7   CHLORIDE mmol/L 106   CO2 mmol/L 28   BUN mg/dL 4*   CREATININE mg/dL 0 69   CALCIUM mg/dL 8 9   TOTAL PROTEIN g/dL 6 2*   BILIRUBIN TOTAL mg/dL 1 50*   ALK PHOS U/L 111   ALT U/L 114*   AST U/L 66*   GLUCOSE RANDOM mg/dL 87       Results from last 7 days  Lab Units 08/11/18  0440   INR  1 06     Invalid input(s): TROIP    * I Have Reviewed All Lab Data Listed Above  * Additional Pertinent Lab Tests Reviewed:  All Labs Within Last 24 Hours Reviewed    Imaging:        Recent Cultures (last 7 days):           Last 24 Hours Medication List:     Current Facility-Administered Medications:  acetaminophen 650 mg Oral Q6H PRN Benedict Joe PA-C   aluminum-magnesium hydroxide-simethicone 30 mL Oral Q4H PRN Cameron Santana MD   cholestyramine sugar free 4 g Oral BID Arian Ch DO   lactobacillus acidophilus-bulgaricus 1 packet Oral TID With Meals Cameron Santana MD   loperamide 4 mg Oral 4x Daily Abdon Montenegro DO   LORazepam 0 5 mg Oral HS PRN Benedict Joe PA-C   magnesium oxide 800 mg Oral Daily Benedict Joe PA-C   menthol-zinc oxide  Topical BID Cameron Santana MD   metoprolol tartrate 12 5 mg Oral Q12H [address] Harmony Adkins,    ondansetron 8 mg Intravenous Q4H PRN Arian Ch,    pantoprazole 40 mg Oral BID AC Cameron Santana MD   vancomycin 125 mg Oral Q6H [address] , DO        Today, Patient Was Seen By: Cameron Santana MD    ** Please Note: Dragon 360 Dictation voice to text software may have been used in the creation of this document   **

## 2018-08-12 NOTE — PROGRESS NOTES
Progress Note - General Surgery   Camron Silvestre 46 y o  female MRN: [id number]  Unit/Bed#: 401-01 Encounter: [id number]    Assessment:  50-year-old female who is status post Pj's reversal and ileocecectomy with primary anastomosis on July 11, 2018, admitted with hypokalemia, persistent nausea vomiting  Patient had similar episodes after her initial Pj's procedure  CT scan showing an intact anastomosis ease no evidence of bowel obstruction  Colonoscopy showing intact anastomosis ease with no evidence of active colitis no pseudomembranes is suggest the diff colitis  Feel better today states that the diarrhea is definitely slowed down although that is not formed frequency is definitely improved since starting the moaning  Plan:    -diet as tolerated  -out of bed bed/ambulate  -will increase Imodium to 4 mg 4 times a day to be taken 30 minutes before meals and at bedtime  -remainder of medical management as per medical team     Subjective/Objective   Chief Complaint:  Diarrhea     Subjective:  Feeling better today  Eating more  No fevers or chills  Objective:     Blood pressure 124/78, pulse 69, temperature (!) 96 9 °F (36 1 °C), temperature source Temporal, resp  rate 16, height 5' 1" (1 549 m), weight 76 1 kg (167 lb 12 3 oz), SpO2 96 %  ,Body mass index is 31 7 kg/m²        Intake/Output Summary (Last 24 hours) at 08/12/18 1115  Last data filed at 08/11/18 2217   Gross per 24 hour   Intake           925 33 ml   Output                0 ml   Net           925 33 ml       Invasive Devices          No matching active lines, drains, or airways          Physical Exam:   General:  No acute distress, resting comfortably  HEENT:  Normocephalic, atraumatic, trachea midline  CV:  S1-S2 audible, regular rate rhythm  Pulmonary:  Clear auscultation bilaterally no wheezes rales rhonchi  GI:  Abdomen nondistended, soft nontender, again ostomy site healing well  MSK lower extremities without clubbing, cyanosis, edema  Neurologic:  Alert and x3, cranial 2 through 12 grossly intact  Psych:  Mood and affect appropriate patient feels little better and looks like she is in better spirits today              Lab, Imaging and other studies:  I have personally reviewed pertinent lab results    , CBC:   Lab Results   Component Value Date    WBC 4 34 08/12/2018    HGB 11 8 08/12/2018    HCT 35 2 08/12/2018    MCV 91 08/12/2018     08/12/2018    MCH 30 5 08/12/2018    MCHC 33 5 08/12/2018    RDW 13 3 08/12/2018    MPV 10 8 08/12/2018   , CMP:   Lab Results   Component Value Date     08/12/2018    K 3 7 08/12/2018     08/12/2018    CO2 28 08/12/2018    ANIONGAP 7 08/12/2018    BUN 4 (L) 08/12/2018    CREATININE 0 69 08/12/2018    GLUCOSE 87 08/12/2018    CALCIUM 8 9 08/12/2018    AST 66 (H) 08/12/2018     (H) 08/12/2018    ALKPHOS 111 08/12/2018    PROT 6 2 (L) 08/12/2018    BILITOT 1 50 (H) 08/12/2018    EGFR 99 08/12/2018   , Coagulation:   No results found for: PT, INR, APTT, Urinalysis:   No results found for: COLORU, CLARITYU, SPECGRAV, PHUR, LEUKOCYTESUR, NITRITE, PROTEINUA, GLUCOSEU, KETONESU, BILIRUBINUR, BLOODU, Amylase: No results found for: AMYLASE, Lipase:   No results found for: LIPASE  VTE Pharmacologic Prophylaxis:  On Eliquis  VTE Mechanical Prophylaxis: sequential compression device

## 2018-08-12 NOTE — PLAN OF CARE
Problem: PAIN - ADULT  Goal: Verbalizes/displays adequate comfort level or baseline comfort level  Interventions:  - Encourage patient to monitor pain and request assistance  - Assess pain using appropriate pain scale, 0-10 pain scale  - Administer analgesics based on type and severity of pain and evaluate response  - Implement non-pharmacological measures as appropriate and evaluate response  - Consider cultural and social influences on pain and pain management  - Notify physician/advanced practitioner if interventions unsuccessful or patient reports new pain   Outcome: Progressing      Problem: INFECTION - ADULT  Goal: Absence or prevention of progression during hospitalization  INTERVENTIONS:  - Assess and monitor for signs and symptoms of infection  - Monitor lab/diagnostic results  - Monitor all insertion sites, i e  indwelling lines  - [place] appropriate cooling/warming therapies per order  - Administer medications as ordered  - Instruct and encourage patient and family to use good hand hygiene technique  - Identify and instruct in appropriate isolation precautions for identified infection/condition   Outcome: Progressing      Problem: SAFETY ADULT  Goal: Patient will remain free of falls  INTERVENTIONS:  - Assess patient frequently for physical needs  -  Identify cognitive and physical deficits and behaviors that affect risk of falls  -  [place] fall precautions as indicated by assessment  Medium fall risk   - Educate patient/family on patient safety including physical limitations  - Instruct patient to call for assistance with activity based on assessment  - Modify environment to reduce risk of injury  - Consider OT/PT consult to assist with strengthening/mobility    INTERVENTIONS:  - Assess patient frequently for physical needs  -  Identify cognitive and physical deficits and behaviors that affect risk of falls    -  [place] fall precautions as indicated by assessment   - Educate patient/family on patient safety including physical limitations  - Instruct patient to call for assistance with activity based on assessment  - Modify environment to reduce risk of injury  - Consider OT/PT consult to assist with strengthening/mobility   Outcome: Progressing    Goal: Maintain or return to baseline ADL function  INTERVENTIONS:  -  Assess patient's ability to carry out ADLs; assess patient's baseline for ADL function and identify physical deficits which impact ability to perform ADLs (bathing, care of mouth/teeth, toileting, grooming, dressing, etc )  - Assess/evaluate cause of self-care deficits Patient is independent with ADL's  - Assess range of motion  - Assess patient's mobility; develop plan if impaired  - Assess patient's need for assistive devices and provide as appropriate  - Encourage maximum independence but intervene and supervise when necessary  ¯ Involve family in performance of ADLs  ¯ Assess for home care needs following discharge   ¯ Request OT consult to assist with ADL evaluation and planning for discharge  ¯ Provide patient education as appropriate    Outcome: Progressing    Goal: Maintain or return mobility status to optimal level  INTERVENTIONS:  - Assess patient's baseline mobility status (ambulation, transfers, stairs, etc )  Baseline: independent  - Identify cognitive and physical deficits and behaviors that affect mobility  - Identify mobility aids required to assist with transfers and/or ambulation (gait belt, sit-to-stand, lift, walker, cane, etc )  - [place] fall precautions as indicated by assessment   Medium fall risk   - Record patient progress and toleration of activity level on Mobility SBAR; progress patient to next Phase/Stage  - Instruct patient to call for assistance with activity based on assessment  - Request Rehabilitation consult to assist with strengthening/weightbearing, etc     Outcome: Progressing      Problem: DISCHARGE PLANNING  Goal: Discharge to home or other facility with appropriate resources  INTERVENTIONS:  - Identify barriers to discharge w/patient and caregiver  - Arrange for needed discharge resources and transportation as appropriate  - Identify discharge learning needs (meds, wound care, etc )  - Arrange for interpretive services to assist at discharge as needed  - Refer to Case Management Department for coordinating discharge planning if the patient needs post-hospital services based on physician/advanced practitioner order or complex needs related to functional status, cognitive ability, or social support system   Outcome: Progressing      Problem: Knowledge Deficit  Goal: Patient/family/caregiver demonstrates understanding of disease process, treatment plan, medications, and discharge instructions  Complete learning assessment and assess knowledge base    Interventions:  - Provide teaching at level of understanding  - Provide teaching via preferred learning methods   Outcome: Progressing      Problem: GASTROINTESTINAL - ADULT  Goal: Minimal or absence of nausea and/or vomiting  INTERVENTIONS:  - Administer IV fluids as ordered to ensure adequate hydration  - Maintain NPO status until nausea and vomiting are resolved  - Nasogastric tube as ordered  - Administer ordered antiemetic medications as needed  - Provide nonpharmacologic comfort measures as appropriate  - Advance diet as tolerated, if ordered  - Nutrition services referral to assist patient with adequate nutrition and appropriate food choices   Outcome: Progressing    Goal: Maintains or returns to baseline bowel function  INTERVENTIONS:  - Assess bowel function  - Encourage oral fluids to ensure adequate hydration  - Administer IV fluids as ordered to ensure adequate hydration  - Administer ordered medications as needed  - Encourage mobilization and activity  - Nutrition services referral to assist patient with appropriate food choices   Outcome: Progressing      Problem: Potential for Falls  Goal: Patient will remain free of falls  INTERVENTIONS:  - Assess patient frequently for physical needs  -  Identify cognitive and physical deficits and behaviors that affect risk of falls  -  [place] fall precautions as indicated by assessment  Medium fall risk   - Educate patient/family on patient safety including physical limitations  - Instruct patient to call for assistance with activity based on assessment  - Modify environment to reduce risk of injury  - Consider OT/PT consult to assist with strengthening/mobility    INTERVENTIONS:  - Assess patient frequently for physical needs  -  Identify cognitive and physical deficits and behaviors that affect risk of falls    -  [place] fall precautions as indicated by assessment   - Educate patient/family on patient safety including physical limitations  - Instruct patient to call for assistance with activity based on assessment  - Modify environment to reduce risk of injury  - Consider OT/PT consult to assist with strengthening/mobility   Outcome: Progressing      Problem: Prexisting or High Potential for Compromised Skin Integrity  Goal: Skin integrity is maintained or improved  INTERVENTIONS:  - Identify patients at risk for skin breakdown  - Assess and monitor skin integrity  - Assess and monitor nutrition and hydration status  - Monitor labs (i e  albumin)  - Assess for incontinence   - Turn and reposition patient  - Assist with mobility/ambulation  - Relieve pressure over bony prominences  - Avoid friction and shearing  - Provide appropriate hygiene as needed including keeping skin clean and dry  - Evaluate need for skin moisturizer/barrier cream  - Collaborate with interdisciplinary team (i e  Nutrition, Rehabilitation, etc )   - Patient/family teaching   Outcome: Progressing      Problem: Nutrition/Hydration-ADULT  Goal: Nutrient/Hydration intake appropriate for improving, restoring or maintaining nutritional needs  Monitor and assess patient's nutrition/hydration status for malnutrition (ex- brittle hair, bruises, dry skin, pale skin and conjunctiva, muscle wasting, smooth red tongue, and disorientation)  Collaborate with interdisciplinary team and initiate plan and interventions as ordered  Monitor patient's weight and dietary intake as ordered or per policy  Utilize nutrition screening tool and intervene per policy  Determine patient's food preferences and provide high-protein, high-caloric foods as appropriate       INTERVENTIONS:  - Monitor oral intake, urinary output, labs, and treatment plans  - Assess nutrition and hydration status and recommend course of action  - Evaluate amount of meals eaten  - Assist patient with eating if necessary   - Allow adequate time for meals  - Recommend/ encourage appropriate diets, oral nutritional supplements, and vitamin/mineral supplements  - Order, calculate, and assess calorie counts as needed  - Recommend, monitor, and adjust tube feedings and TPN/PPN based on assessed needs  - Assess need for intravenous fluids  - Provide specific nutrition/hydration education as appropriate  - Include patient/family/caregiver in decisions related to nutrition   Outcome: Progressing      Problem: DISCHARGE PLANNING - CARE MANAGEMENT  Goal: Discharge to post-acute care or home with appropriate resources  INTERVENTIONS:  - Conduct assessment to determine patient/family and health care team treatment goals, and need for post-acute services based on payer coverage, community resources, and patient preferences, and barriers to discharge  - Address psychosocial, clinical, and financial barriers to discharge as identified in assessment in conjunction with the patient/family and health care team  - Arrange appropriate level of post-acute services according to patient's   needs and preference and payer coverage in collaboration with the physician and health care team  - Communicate with and update the patient/family, physician, and health care team regarding progress on the discharge plan  - Arrange appropriate transportation to post-acute venues   Outcome: Progressing

## 2018-08-12 NOTE — PROGRESS NOTES
Progress Note - General Surgery   Jakob Castillor 46 y o  female MRN: [id number]  Unit/Bed#: 401-01 Encounter: [id number]    Assessment:  79-year-old female who is status post Pj's reversal and ileocecectomy with primary anastomosis on July 11, 2018, admitted with hypokalemia, persistent nausea vomiting  Patient had similar episodes after her initial Pj's procedure  CT scan showing an intact anastomosis ease no evidence of bowel obstruction  Colonoscopy showing intact anastomosis ease with no evidence of active colitis no pseudomembranes is suggest the diff colitis      Plan:    -diet as tolerated  -out of bed bed/ambulate  -given no endoscopic evidence of C diff colitis I agree with Imodium in addition to cholestyramine for control diarrhea as per GI   -remainder of medical management as per medical team     Subjective/Objective   Chief Complaint:  Mild nausea  Diarrhea improved since starting Imodium  Subjective:  Feel better today  Mild nausea  No vomiting  No fevers or chills  Objective:     Blood pressure 124/78, pulse 69, temperature (!) 96 9 °F (36 1 °C), temperature source Temporal, resp  rate 16, height 5' 1" (1 549 m), weight 76 1 kg (167 lb 12 3 oz), SpO2 96 %  ,Body mass index is 31 7 kg/m²        Intake/Output Summary (Last 24 hours) at 08/12/18 1114  Last data filed at 08/11/18 2217   Gross per 24 hour   Intake           925 33 ml   Output                0 ml   Net           925 33 ml       Invasive Devices          No matching active lines, drains, or airways          Physical Exam:   General:  No acute distress, resting comfortably  HEENT:  Normocephalic, atraumatic, trachea midline  CV:  S1-S2 audible, regular rate rhythm  Pulmonary:  Clear auscultation bilaterally no wheezes rales rhonchi  GI:  Abdomen nondistended, soft nontender, again ostomy site healing well  MSK lower extremities without clubbing, cyanosis, edema  Neurologic:  Alert and x3, cranial 2 through 12 grossly intact  Psych:  Mood and affect appropriate patient feels little better and looks like she is in better spirits today              Lab, Imaging and other studies:  I have personally reviewed pertinent lab results    , CBC:   Lab Results   Component Value Date    WBC 4 34 08/12/2018    HGB 11 8 08/12/2018    HCT 35 2 08/12/2018    MCV 91 08/12/2018     08/12/2018    MCH 30 5 08/12/2018    MCHC 33 5 08/12/2018    RDW 13 3 08/12/2018    MPV 10 8 08/12/2018   , CMP:   Lab Results   Component Value Date     08/12/2018    K 3 7 08/12/2018     08/12/2018    CO2 28 08/12/2018    ANIONGAP 7 08/12/2018    BUN 4 (L) 08/12/2018    CREATININE 0 69 08/12/2018    GLUCOSE 87 08/12/2018    CALCIUM 8 9 08/12/2018    AST 66 (H) 08/12/2018     (H) 08/12/2018    ALKPHOS 111 08/12/2018    PROT 6 2 (L) 08/12/2018    BILITOT 1 50 (H) 08/12/2018    EGFR 99 08/12/2018   , Coagulation:   No results found for: PT, INR, APTT, Urinalysis:   No results found for: COLORU, CLARITYU, SPECGRAV, PHUR, LEUKOCYTESUR, NITRITE, PROTEINUA, GLUCOSEU, KETONESU, BILIRUBINUR, BLOODU, Amylase: No results found for: AMYLASE, Lipase:   No results found for: LIPASE  VTE Pharmacologic Prophylaxis:  On Eliquis  VTE Mechanical Prophylaxis: sequential compression device

## 2018-08-13 DIAGNOSIS — K21.9 GASTROESOPHAGEAL REFLUX DISEASE WITHOUT ESOPHAGITIS: ICD-10-CM

## 2018-08-13 PROCEDURE — 99024 POSTOP FOLLOW-UP VISIT: CPT | Performed by: SURGERY

## 2018-08-13 PROCEDURE — 97116 GAIT TRAINING THERAPY: CPT | Performed by: PHYSICAL THERAPIST

## 2018-08-13 PROCEDURE — 99232 SBSQ HOSP IP/OBS MODERATE 35: CPT | Performed by: INTERNAL MEDICINE

## 2018-08-13 RX ORDER — PANTOPRAZOLE SODIUM 40 MG/1
TABLET, DELAYED RELEASE ORAL
Qty: 60 TABLET | Refills: 3 | Status: SHIPPED | OUTPATIENT
Start: 2018-08-13 | End: 2018-08-15

## 2018-08-13 RX ADMIN — MAGNESIUM OXIDE TAB 400 MG (241.3 MG ELEMENTAL MG) 800 MG: 400 (241.3 MG) TAB at 09:57

## 2018-08-13 RX ADMIN — METOPROLOL TARTRATE 12.5 MG: 25 TABLET ORAL at 21:45

## 2018-08-13 RX ADMIN — LOPERAMIDE HYDROCHLORIDE 4 MG: 2 CAPSULE ORAL at 16:20

## 2018-08-13 RX ADMIN — LOPERAMIDE HYDROCHLORIDE 4 MG: 2 CAPSULE ORAL at 11:38

## 2018-08-13 RX ADMIN — PANTOPRAZOLE SODIUM 40 MG: 40 TABLET, DELAYED RELEASE ORAL at 16:20

## 2018-08-13 RX ADMIN — VANCOMYCIN 125 MG: KIT at 13:27

## 2018-08-13 RX ADMIN — LOPERAMIDE HYDROCHLORIDE 4 MG: 2 CAPSULE ORAL at 06:37

## 2018-08-13 RX ADMIN — ANORECTAL OINTMENT: 15.7; .44; 24; 20.6 OINTMENT TOPICAL at 18:37

## 2018-08-13 RX ADMIN — VANCOMYCIN 125 MG: KIT at 18:36

## 2018-08-13 RX ADMIN — LACTOBACILLUS ACIDOPHILUS / LACTOBACILLUS BULGARICUS 1 PACKET: 100 MILLION CFU STRENGTH GRANULES at 11:39

## 2018-08-13 RX ADMIN — VANCOMYCIN 125 MG: KIT at 23:46

## 2018-08-13 RX ADMIN — VANCOMYCIN 125 MG: KIT at 06:37

## 2018-08-13 RX ADMIN — LACTOBACILLUS ACIDOPHILUS / LACTOBACILLUS BULGARICUS 1 PACKET: 100 MILLION CFU STRENGTH GRANULES at 09:57

## 2018-08-13 RX ADMIN — LACTOBACILLUS ACIDOPHILUS / LACTOBACILLUS BULGARICUS 1 PACKET: 100 MILLION CFU STRENGTH GRANULES at 16:19

## 2018-08-13 RX ADMIN — ANORECTAL OINTMENT: 15.7; .44; 24; 20.6 OINTMENT TOPICAL at 09:58

## 2018-08-13 RX ADMIN — PANTOPRAZOLE SODIUM 40 MG: 40 TABLET, DELAYED RELEASE ORAL at 06:37

## 2018-08-13 RX ADMIN — METOPROLOL TARTRATE 12.5 MG: 25 TABLET ORAL at 09:57

## 2018-08-13 RX ADMIN — LOPERAMIDE HYDROCHLORIDE 4 MG: 2 CAPSULE ORAL at 21:46

## 2018-08-13 NOTE — PLAN OF CARE
Problem: PHYSICAL THERAPY ADULT  Goal: Performs mobility at highest level of function for planned discharge setting  See evaluation for individualized goals  Outcome: Completed Date Met: 08/13/18  Prognosis: Good  Problem List: Decreased strength, Decreased endurance, Impaired balance, Decreased mobility  Assessment: Pt is performing all bed mobility transfers and ambulation at an (I) level with good balance safety and endurance  Pt has met PT goals and will be discharged from caseload  Pt safe to ambulate in room and hallway ad jody  Barriers to Discharge: None     Recommendation: Home independently     PT - OK to Discharge: Yes    See flowsheet documentation for full assessment

## 2018-08-13 NOTE — PHYSICAL THERAPY NOTE
PT Treatment     08/13/18 1031   Pain Assessment   Pain Assessment No/denies pain   Pain Score No Pain   Subjective   Subjective Pt states she feels better and denies pain  Bed Mobility   Supine to Sit 7  Independent   Sit to Supine 7  Independent   Additional Comments no difficulty with bed mobility or transfers   Transfers   Sit to Stand 7  Independent   Stand to Sit 7  Independent   Stand pivot 7  Independent   Additional Comments Pt with normal gait pattern and no LOB or complaint during ambulation  Pt states she feels she will be able to do her stairs without a problem at home and defered stair negotiation  Pt safe to ambulate in room and hallway ad jody  Ambulation/Elevation   Gait pattern WNL   Gait Assistance 7  Independent   Assistive Device None   Distance 450ft no A D  (I) no LOB or instability  pt safe to ambulate in room and hallway ad jody  Balance   Static Sitting Good   Dynamic Sitting Good   Static Standing Good   Dynamic Standing Good   Ambulatory Good   Endurance Deficit   Endurance Deficit No   Activity Tolerance   Activity Tolerance Patient tolerated treatment well   Assessment   Prognosis Good   Assessment Pt is performing all bed mobility transfers and ambulation at an (I) level with good balance safety and endurance  Pt has met PT goals and will be discharged from caseload  Pt safe to ambulate in room and hallway ad jody  Plan   Treatment/Interventions Functional transfer training; Endurance training;Patient/family training;Bed mobility;Gait training   Progress Discontinue PT   Recommendation   Recommendation Home independently   PT - OK to Discharge Yes   No SCD's  Pt seated at EOB with call bell in reach

## 2018-08-13 NOTE — PLAN OF CARE
Problem: Nutrition/Hydration-ADULT  Goal: Nutrient/Hydration intake appropriate for improving, restoring or maintaining nutritional needs  Monitor and assess patient's nutrition/hydration status for malnutrition (ex- brittle hair, bruises, dry skin, pale skin and conjunctiva, muscle wasting, smooth red tongue, and disorientation)  Collaborate with interdisciplinary team and initiate plan and interventions as ordered  Monitor patient's weight and dietary intake as ordered or per policy  Utilize nutrition screening tool and intervene per policy  Determine patient's food preferences and provide high-protein, high-caloric foods as appropriate  INTERVENTIONS:  - Monitor oral intake, urinary output, labs, and treatment plans  - Assess nutrition and hydration status and recommend course of action  - Evaluate amount of meals eaten  - Assist patient with eating if necessary   - Allow adequate time for meals  - Recommend/ encourage appropriate diets, oral nutritional supplements, and vitamin/mineral supplements  - Order, calculate, and assess calorie counts as needed  - Recommend, monitor, and adjust tube feedings and TPN/PPN based on assessed needs  - Assess need for intravenous fluids  - Provide specific nutrition/hydration education as appropriate  - Include patient/family/caregiver in decisions related to nutrition   Outcome: Progressing  Goal: Pt will consume and tolerate % of meals and have no weight loss by disch

## 2018-08-13 NOTE — SOCIAL WORK
The patient will be for a possible d/c to home tomorrow and she has no needs  She is independent at home

## 2018-08-13 NOTE — CASE MANAGEMENT
Continued Stay Review    Thank you,  [facility]  Network Utilization Review Department  Phone: [phone]; Fax [phone]  ATTENTION: The Network Utilization Review Department is now centralized for our 9 Facilities  Make a note that we have a new phone and fax numbers for our Department  Please call with any questions or concerns to [phone] and carefully follow the prompts so that you are directed to the right person  All voicemails are confidential  Fax any determinations, approvals, denials, and requests for initial or continue stay review clinical to [phone]  Due to HIGH CALL volume, it would be easier if you could please send faxed requests to expedite your requests and in part, help us provide discharge notifications faster        Date: 0813/18    Vital Signs: /78 (BP Location: Right arm)   Pulse 62   Temp 97 9 °F (36 6 °C) (Temporal)   Resp 18   Ht 5' 1" (1 549 m)   Wt 76 9 kg (169 lb 8 5 oz)   LMP  (LMP Unknown)   SpO2 96%   BMI 32 03 kg/m²     Medications:   Scheduled Meds:   Current Facility-Administered Medications:  acetaminophen 650 mg Oral Q6H PRN Benedict Joe PA-C   aluminum-magnesium hydroxide-simethicone 30 mL Oral Q4H PRN Moustapha James MD   cholestyramine sugar free 4 g Oral BID Mel Leary, DO   lactobacillus acidophilus-bulgaricus 1 packet Oral TID With Meals Moustapha James MD   loperamide 4 mg Oral 4x Daily Abdon Montenegro, DO   LORazepam 0 5 mg Oral HS PRN Benedict Joe PA-C   magnesium oxide 800 mg Oral Daily Benedict Joe PA-C   menthol-zinc oxide  Topical BID Moustapha James MD   metoprolol tartrate 12 5 mg Oral Q12H [facility] Theotis Dose, DO   ondansetron 8 mg Intravenous Q4H PRN Mel Leary, DO   pantoprazole 40 mg Oral BID AC Moustapha James MD   vancomycin 125 mg Oral Q6H [facility] Theotis Dose, DO     PRN Meds:   acetaminophen    aluminum-magnesium hydroxide-simethicone    LORazepam    ondansetron    Abnormal Labs/Diagnostic Results:        Ref Range & Units 8/4/18 0031 Flag   C difficile toxin by PCR NEGATIVE for C difficle toxin by PCR  POSITIVE for C difficle toxin by PCR     A       Specimen Collected: 08/04/18 00:31 Last Resulted: 08/05/18 11:10                         Ref Range & Units 08/12/18 0640   Sodium 136 - 145 mmol/L 141    Potassium 3 5 - 5 3 mmol/L 3 7    Chloride 100 - 108 mmol/L 106    CO2 21 - 32 mmol/L 28    Anion Gap 4 - 13 mmol/L 7    BUN 5 - 25 mg/dL 4     Creatinine 0 60 - 1 30 mg/dL 0 69    Glucose 65 - 140 mg/dL 87    Calcium 8 3 - 10 1 mg/dL 8 9    AST 5 - 45 U/L 66     ALT 12 - 78 U/L 114     Alkaline Phosphatase 46 - 116 U/L 111    Total Protein 6 4 - 8 2 g/dL 6 2     Albumin 3 5 - 5 0 g/dL 2 7     Total Bilirubin 0 20 - 1 00 mg/dL 1 50         Age/Sex: 46 y o  female     Assessment/Plan:    Assessment:     Principal Problem:    C  difficile colitis  Active Problems:    Esophageal reflux    Hypertension    Hypokalemia    Hypomagnesemia    Paroxysmal atrial fibrillation (HCC)    Anemia    Dehydration    Intractable vomiting with nausea    S/P colostomy takedown    Diarrhea    Dyspepsia    Moderate protein-calorie malnutrition (HCC)      Plan:   · Slight subjective improvement of diarrhea overnight  · Continue current management and discharge home tomorrow on colestipol and PO vancomycin if diarrhea much better         Discharge Plan: home when medically stable -- tent d/c 8/14

## 2018-08-13 NOTE — PROGRESS NOTES
[facility] Internal Medicine Progress Note  Patient: Peter Dumont 46 y o  female   MRN: [id number]  PCP: Ebonie Gibbons DO  Unit/Bed#: 239-02 Encounter: [id number]  Date Of Visit: 18    Assessment:    Principal Problem:    C  difficile colitis  Active Problems:    Esophageal reflux    Hypertension    Hypokalemia    Hypomagnesemia    Paroxysmal atrial fibrillation (HCC)    Anemia    Dehydration    Intractable vomiting with nausea    S/P colostomy takedown    Diarrhea    Dyspepsia    Moderate protein-calorie malnutrition ([address] )      Plan:    · Slow and steady improvement of diarrhea noted with Imodium which will be continued  · Discharge home tomorrow if diarrhea still improving and patient tolerating p o , on colestipol and vancomycin and Imodium, to follow up with General surgery and GI outpatient as instructed  VTE Pharmacologic Prophylaxis:   Pharmacologic: Apixaban (Eliquis)  Mechanical VTE Prophylaxis in Place: Yes    Patient Centered Rounds: I have performed bedside rounds with nursing staff today  Current Length of Stay: 8 day(s)    Current Patient Status: Inpatient     Code Status: Level 1 - Full Code      Subjective:   Patient seen and examined this afternoon after lunch  Denies abdominal pain, nausea or vomiting  Stools slowly becoming less watery and more formed  She wishes to be discharged home tomorrow  Objective:     Vitals:   Temp (24hrs), Av 8 °F (36 6 °C), Min:97 6 °F (36 4 °C), Max:97 9 °F (36 6 °C)    HR:  [62-87] 62  Resp:  [18] 18  BP: (104-120)/(57-78) 120/78  SpO2:  [96 %] 96 %  Body mass index is 32 03 kg/m²  Input and Output Summary (last 24 hours):        Intake/Output Summary (Last 24 hours) at 18 1213  Last data filed at 18 0848   Gross per 24 hour   Intake              840 ml   Output                0 ml   Net              840 ml       Physical Exam:   General: in no overt distress  HEENT: not pale, not jaundiced  Psych: appropriately oriented in all spheres  Neuro: Awake, alert, essentially nonfocal      Additional Data:     Labs:      Results from last 7 days  Lab Units 08/12/18  0639   WBC Thousand/uL 4 34   HEMOGLOBIN g/dL 11 8   HEMATOCRIT % 35 2   PLATELETS Thousands/uL 184       Results from last 7 days  Lab Units 08/12/18  0640   SODIUM mmol/L 141   POTASSIUM mmol/L 3 7   CHLORIDE mmol/L 106   CO2 mmol/L 28   BUN mg/dL 4*   CREATININE mg/dL 0 69   CALCIUM mg/dL 8 9   TOTAL PROTEIN g/dL 6 2*   BILIRUBIN TOTAL mg/dL 1 50*   ALK PHOS U/L 111   ALT U/L 114*   AST U/L 66*   GLUCOSE RANDOM mg/dL 87       Results from last 7 days  Lab Units 08/11/18  0440   INR  1 06     Invalid input(s): TROIP    * I Have Reviewed All Lab Data Listed Above  * Additional Pertinent Lab Tests Reviewed: All Labs Within Last 24 Hours Reviewed      Recent Cultures (last 7 days):           Last 24 Hours Medication List:     Current Facility-Administered Medications:  acetaminophen 650 mg Oral Q6H PRN Benedict Joe PA-C   aluminum-magnesium hydroxide-simethicone 30 mL Oral Q4H PRN Ginger Grey MD   cholestyramine sugar free 4 g Oral BID Nika Koo, DO   lactobacillus acidophilus-bulgaricus 1 packet Oral TID With Meals Ginger Grey MD   loperamide 4 mg Oral 4x Daily Abdon Montenegro, DO   LORazepam 0 5 mg Oral HS PRN Benedict Joe PA-C   magnesium oxide 800 mg Oral Daily Benedict Joe PA-C   menthol-zinc oxide  Topical BID Ginger Grey MD   metoprolol tartrate 12 5 mg Oral Q12H [facility] Duncan May, DO   ondansetron 8 mg Intravenous Q4H PRN Nika Koo, DO   pantoprazole 40 mg Oral BID AC Ginger Grey MD   vancomycin 125 mg Oral Q6H [address] , DO        Today, Patient Was Seen By: Ginger Grey MD    ** Please Note: Dragon 360 Dictation voice to text software may have been used in the creation of this document   **

## 2018-08-13 NOTE — PROGRESS NOTES
Progress Note - General Surgery   Sushma Cordero 46 y o  female MRN: [id number]  Unit/Bed#: 401-01 Encounter: [id number]    Assessment:  24-year-old female who is status post Pj's reversal and ileocecectomy with primary anastomosis on July 11, 2018, admitted with hypokalemia, persistent nausea vomiting  Patient had similar episodes after her initial Pj's procedure  CT scan showing an intact anastomosis ease no evidence of bowel obstruction  Colonoscopy showing intact anastomosis with no evidence of active colitis no pseudomembranes to suggest the diff colitis  Diarrhea continues to slow down and BMs are less frequent and more formed since increasing imodium  Plan:    -diet as tolerated  -out of bed bed/ambulate  -continue Imodium 4mg, 4 times a day to be taken 30 minutes before meals and at bedtime  -remainder of medical management as per medical team   - anticipate discharge tomorrow, patient can follow up with me as outpatient in one week    Subjective/Objective   Chief Complaint:  Diarrhea     Subjective:  Feeling better today  Eating more  No fevers or chills  Diarrhea improved  Objective:     Blood pressure 120/78, pulse 62, temperature 97 9 °F (36 6 °C), temperature source Temporal, resp  rate 18, height 5' 1" (1 549 m), weight 76 9 kg (169 lb 8 5 oz), SpO2 96 %  ,Body mass index is 32 03 kg/m²  Intake/Output Summary (Last 24 hours) at 08/13/18 1206  Last data filed at 08/13/18 0848   Gross per 24 hour   Intake              840 ml   Output                0 ml   Net              840 ml       Invasive Devices          No matching active lines, drains, or airways          Physical Exam:   General:  No acute distress, resting comfortably  HEENT:  Normocephalic, atraumatic, trachea midline  CV:  S1-S2 audible, regular rate rhythm  Pulmonary:  Clear auscultation bilaterally no wheezes rales rhonchi  GI:  Abdomen nondistended, soft nontender, again ostomy site now healed    MSK lower extremities without clubbing, cyanosis, edema  Neurologic:  Alert and x3, cranial 2 through 12 grossly intact  Psych:  Mood and affect appropriate patient feels little better and looks like she is in better spirits today              Lab, Imaging and other studies:  I have personally reviewed pertinent lab results    , CBC:   No results found for: WBC, HGB, HCT, MCV, PLT, ADJUSTEDWBC, MCH, MCHC, RDW, MPV, NRBC, CMP:   No results found for: NA, K, CL, CO2, ANIONGAP, BUN, CREATININE, GLUCOSE, CALCIUM, AST, ALT, ALKPHOS, PROT, ALBUMIN, BILITOT, EGFR, Coagulation:   No results found for: PT, INR, APTT, Urinalysis:   No results found for: COLORU, CLARITYU, SPECGRAV, PHUR, LEUKOCYTESUR, NITRITE, PROTEINUA, GLUCOSEU, KETONESU, BILIRUBINUR, BLOODU, Amylase: No results found for: AMYLASE, Lipase:   No results found for: LIPASE  VTE Pharmacologic Prophylaxis:  On Eliquis  VTE Mechanical Prophylaxis: sequential compression device

## 2018-08-14 PROBLEM — R74.01 TRANSAMINITIS: Status: ACTIVE | Noted: 2017-12-27

## 2018-08-14 PROCEDURE — 99024 POSTOP FOLLOW-UP VISIT: CPT | Performed by: SURGERY

## 2018-08-14 PROCEDURE — 99232 SBSQ HOSP IP/OBS MODERATE 35: CPT | Performed by: INTERNAL MEDICINE

## 2018-08-14 RX ORDER — LOPERAMIDE HYDROCHLORIDE 2 MG/1
6 CAPSULE ORAL
Status: DISCONTINUED | OUTPATIENT
Start: 2018-08-14 | End: 2018-08-15 | Stop reason: HOSPADM

## 2018-08-14 RX ORDER — PANTOPRAZOLE SODIUM 40 MG/1
40 TABLET, DELAYED RELEASE ORAL
Status: DISCONTINUED | OUTPATIENT
Start: 2018-08-15 | End: 2018-08-15 | Stop reason: HOSPADM

## 2018-08-14 RX ORDER — POTASSIUM CHLORIDE 20 MEQ/1
40 TABLET, EXTENDED RELEASE ORAL ONCE
Status: COMPLETED | OUTPATIENT
Start: 2018-08-14 | End: 2018-08-14

## 2018-08-14 RX ORDER — DIPHENOXYLATE HYDROCHLORIDE AND ATROPINE SULFATE 2.5; .025 MG/1; MG/1
1 TABLET ORAL 4 TIMES DAILY
Status: DISCONTINUED | OUTPATIENT
Start: 2018-08-14 | End: 2018-08-15 | Stop reason: HOSPADM

## 2018-08-14 RX ORDER — UREA 10 %
500 LOTION (ML) TOPICAL
Status: DISCONTINUED | OUTPATIENT
Start: 2018-08-14 | End: 2018-08-15 | Stop reason: HOSPADM

## 2018-08-14 RX ORDER — MAGNESIUM SULFATE HEPTAHYDRATE 40 MG/ML
2 INJECTION, SOLUTION INTRAVENOUS ONCE
Status: DISCONTINUED | OUTPATIENT
Start: 2018-08-14 | End: 2018-08-14

## 2018-08-14 RX ADMIN — VANCOMYCIN HYDROCHLORIDE 250 MG: 1 INJECTION, POWDER, LYOPHILIZED, FOR SOLUTION INTRAVENOUS at 17:14

## 2018-08-14 RX ADMIN — POTASSIUM CHLORIDE 40 MEQ: 1500 TABLET, EXTENDED RELEASE ORAL at 10:35

## 2018-08-14 RX ADMIN — DIPHENOXYLATE HYDROCHLORIDE AND ATROPINE SULFATE 1 TABLET: 2.5; .025 TABLET ORAL at 10:35

## 2018-08-14 RX ADMIN — LOPERAMIDE HYDROCHLORIDE 6 MG: 2 CAPSULE ORAL at 21:36

## 2018-08-14 RX ADMIN — LACTOBACILLUS ACIDOPHILUS / LACTOBACILLUS BULGARICUS 1 PACKET: 100 MILLION CFU STRENGTH GRANULES at 12:28

## 2018-08-14 RX ADMIN — LOPERAMIDE HYDROCHLORIDE 4 MG: 2 CAPSULE ORAL at 06:25

## 2018-08-14 RX ADMIN — METOPROLOL TARTRATE 12.5 MG: 25 TABLET ORAL at 09:01

## 2018-08-14 RX ADMIN — LACTOBACILLUS ACIDOPHILUS / LACTOBACILLUS BULGARICUS 1 PACKET: 100 MILLION CFU STRENGTH GRANULES at 17:14

## 2018-08-14 RX ADMIN — DIPHENOXYLATE HYDROCHLORIDE AND ATROPINE SULFATE 1 TABLET: 2.5; .025 TABLET ORAL at 17:14

## 2018-08-14 RX ADMIN — APIXABAN 5 MG: 5 TABLET, FILM COATED ORAL at 17:14

## 2018-08-14 RX ADMIN — ANORECTAL OINTMENT: 15.7; .44; 24; 20.6 OINTMENT TOPICAL at 09:04

## 2018-08-14 RX ADMIN — PANTOPRAZOLE SODIUM 40 MG: 40 TABLET, DELAYED RELEASE ORAL at 06:25

## 2018-08-14 RX ADMIN — ANORECTAL OINTMENT: 15.7; .44; 24; 20.6 OINTMENT TOPICAL at 17:16

## 2018-08-14 RX ADMIN — VANCOMYCIN HYDROCHLORIDE 250 MG: 1 INJECTION, POWDER, LYOPHILIZED, FOR SOLUTION INTRAVENOUS at 23:54

## 2018-08-14 RX ADMIN — Medication 500 MG: at 17:14

## 2018-08-14 RX ADMIN — VANCOMYCIN HYDROCHLORIDE 250 MG: 1 INJECTION, POWDER, LYOPHILIZED, FOR SOLUTION INTRAVENOUS at 12:28

## 2018-08-14 RX ADMIN — LOPERAMIDE HYDROCHLORIDE 6 MG: 2 CAPSULE ORAL at 17:14

## 2018-08-14 RX ADMIN — VANCOMYCIN 125 MG: KIT at 06:25

## 2018-08-14 RX ADMIN — LACTOBACILLUS ACIDOPHILUS / LACTOBACILLUS BULGARICUS 1 PACKET: 100 MILLION CFU STRENGTH GRANULES at 09:01

## 2018-08-14 RX ADMIN — MAGNESIUM OXIDE TAB 400 MG (241.3 MG ELEMENTAL MG) 800 MG: 400 (241.3 MG) TAB at 09:01

## 2018-08-14 RX ADMIN — APIXABAN 5 MG: 5 TABLET, FILM COATED ORAL at 10:35

## 2018-08-14 RX ADMIN — DIPHENOXYLATE HYDROCHLORIDE AND ATROPINE SULFATE 1 TABLET: 2.5; .025 TABLET ORAL at 21:36

## 2018-08-14 NOTE — PROGRESS NOTES
Progress Note- Jordana Osorio 46 y o  female MRN: [id number]    Unit/Bed#: 401-01 Encounter: [id number]      Assessment and Plan:    Clostridium difficile colitis: We will increase her oral vancomycin to 250 mg by mouth 4 times a day  If her symptoms do not improve tomorrow we may consider adding intravenous Flagyl  We will continue to follow her abdominal exam and white blood count  Elevated liver enzymes: We are awaiting her viral hepatitis panel and trending her liver enzymes  If her liver enzymes continue to increase we may check an ultrasound as the next step     ______________________________________________________________________    Subjective:     She continues to have diarrhea and mild generalized abdominal discomfort  She has not had any bleeding or vomiting      Medication Administration - last 24 hours from 08/13/2018 1832 to 08/14/2018 1832       Date/Time Order Dose Route Action Action by     08/14/2018 0901 magnesium oxide (MAG-OX) tablet 800 mg 800 mg Oral Given Meggan Lind RN     08/14/2018 0901 metoprolol tartrate (LOPRESSOR) partial tablet 12 5 mg 12 5 mg Oral Given Meggan Lind RN     08/13/2018 2145 metoprolol tartrate (LOPRESSOR) partial tablet 12 5 mg 12 5 mg Oral Given Selam Vitale RN     08/14/2018 0625 vancomycin (VANCOCIN) oral solution 125 mg 125 mg Oral Given Liana Pride RN     08/13/2018 2346 vancomycin (VANCOCIN) oral solution 125 mg 125 mg Oral Given Liana Pride RN     08/13/2018 1836 vancomycin (VANCOCIN) oral solution 125 mg 125 mg Oral Given Selam Vitale RN     08/14/2018 0905 cholestyramine sugar free (QUESTRAN LIGHT) packet 4 g 4 g Oral Not Given Meggan Lind RN     08/13/2018 1837 cholestyramine sugar free (QUESTRAN LIGHT) packet 4 g 4 g Oral Not Given Selam Vitale RN     08/14/2018 1714 lactobacillus acidophilus-bulgaricus [facility]) packet 1 packet 1 packet Oral Given Jabari Flores RN     08/14/2018 1228 [facility]us-bulgaricus [facility] packet 1 packet 1 packet Oral Given Samy Wiggins RN     08/14/2018 0901 lactobacillus acidophilus-bulgaricus [facility] packet 1 packet 1 packet Oral Given Samy Wiggins RN     08/14/2018 0625 pantoprazole (PROTONIX) EC tablet 40 mg 40 mg Oral Given Ricardo Benson RN     08/14/2018 1716 menthol-zinc oxide (CALMOSEPTINE) 0 44-20 6 % ointment   Topical Given Burgess Marco RN     08/14/2018 5811 menthol-zinc oxide (CALMOSEPTINE) 0 44-20 6 % ointment   Topical Given Samy Wiggins RN     08/13/2018 1837 menthol-zinc oxide (CALMOSEPTINE) 0 44-20 6 % ointment   Topical Given Mauricio Morocho RN     08/14/2018 8505 loperamide (IMODIUM) capsule 4 mg 4 mg Oral Given Ricardo Benson RN     08/13/2018 2146 loperamide (IMODIUM) capsule 4 mg 4 mg Oral Given Mauricio Morocho RN     08/14/2018 1035 potassium chloride (K-DUR,KLOR-CON) CR tablet 40 mEq 40 mEq Oral Given Samy Wiggins RN     08/14/2018 1035 magnesium sulfate 2 g/50 mL IVPB (premix) 2 g 2 g Intravenous Not Given Samy Wiggins RN     08/14/2018 1714 vancomycin (VANCOCIN) oral solution 250 mg 250 mg Oral Given Burgess Marco RN     08/14/2018 1228 vancomycin (VANCOCIN) oral solution 250 mg 250 mg Oral Given Samy Wiggins RN     08/14/2018 1714 diphenoxylate-atropine (LOMOTIL) 2 5-0 025 mg per tablet 1 tablet 1 tablet Oral Given Burgess Marco RN     08/14/2018 1035 diphenoxylate-atropine (LOMOTIL) 2 5-0 025 mg per tablet 1 tablet 1 tablet Oral Given Samy Wiggins RN     08/14/2018 1714 apixaban (ELIQUIS) tablet 5 mg 5 mg Oral Given Burgess Marco RN     08/14/2018 1035 apixaban (ELIQUIS) tablet 5 mg 5 mg Oral Given Samy Wiggins RN     08/14/2018 1714 magnesium gluconate (MAGONATE) tablet 500 mg 500 mg Oral Given Burgess Marco RN     08/14/2018 1714 loperamide (IMODIUM) capsule 6 mg 6 mg Oral Given Burgess Marco RN          Objective:     Vitals: Blood pressure 102/65, pulse 70, temperature 97 5 °F (36 4 °C), temperature source Temporal, resp  rate 18, height 5' 1" (1 549 m), weight 75 7 kg (166 lb 14 4 oz), SpO2 97 %  ,Body mass index is 31 54 kg/m²  Intake/Output Summary (Last 24 hours) at 08/14/18 1832  Last data filed at 08/14/18 1253   Gross per 24 hour   Intake              420 ml   Output                0 ml   Net              420 ml       Physical Exam:   General Appearance: Awake and alert, in no acute distress  Abdomen: Soft, mild generalized tenderness, obese, non-distended; bowel sounds normal; no masses or no organomegaly    Invasive Devices          No matching active lines, drains, or airways          Lab Results:  Admission on 08/03/2018   No results displayed because visit has over 200 results  Imaging Studies: I have personally reviewed pertinent imaging studies

## 2018-08-14 NOTE — PROGRESS NOTES
Progress Note - General Surgery   Coletta Abo 46 y o  female MRN: [id number]  Unit/Bed#: 401-01 Encounter: [id number]    Assessment:  50-year-old female who is status post Pj's reversal and ileocecectomy with primary anastomosis on July 11, 2018, admitted with hypokalemia, persistent nausea vomiting  Patient had similar episodes after her initial Pj's procedure  CT scan showing an intact anastomosis ease no evidence of bowel obstruction  Colonoscopy showing intact anastomosis with no evidence of active colitis no pseudomembranes to suggest the diff colitis  Diarrhea Stable since yesterday  Frequency is improved but she is still going multiple times a day  Plan:    -diet as tolerated  -out of bed bed/ambulate  -  Will increase Imodium to 6 mg 4 times a day and 30 min before meals and at bedtime  In addition will add  Lomotil   -remainder of medical management as per medical team   - anticipate discharge tomorrow, patient can follow up with me as outpatient in one week    Subjective/Objective   Chief Complaint:  Diarrhea     Subjective:   Nausea manageable  Eating more  No fevers or chills  Diarrhea improved  Objective:     Blood pressure 102/65, pulse 70, temperature 97 5 °F (36 4 °C), temperature source Temporal, resp  rate 18, height 5' 1" (1 549 m), weight 75 7 kg (166 lb 14 4 oz), SpO2 97 %  ,Body mass index is 31 54 kg/m²        Intake/Output Summary (Last 24 hours) at 08/14/18 1609  Last data filed at 08/14/18 1253   Gross per 24 hour   Intake              660 ml   Output                0 ml   Net              660 ml       Invasive Devices          No matching active lines, drains, or airways          Physical Exam:   General:  No acute distress, resting comfortably  HEENT:  Normocephalic, atraumatic, trachea midline  CV:  S1-S2 audible, regular rate rhythm  Pulmonary:  Clear auscultation bilaterally no wheezes rales rhonchi  GI:  Abdomen nondistended, soft nontender, again ostomy site now healed  MSK lower extremities without clubbing, cyanosis, edema  Neurologic:  Alert and x3, cranial 2 through 12 grossly intact  Psych:  Mood and affect appropriate patient feels little better and looks like she is in better spirits today              Lab, Imaging and other studies:  I have personally reviewed pertinent lab results    , CBC:   No results found for: WBC, HGB, HCT, MCV, PLT, ADJUSTEDWBC, MCH, MCHC, RDW, MPV, NRBC, CMP:   No results found for: NA, K, CL, CO2, ANIONGAP, BUN, CREATININE, GLUCOSE, CALCIUM, AST, ALT, ALKPHOS, PROT, ALBUMIN, BILITOT, EGFR, Coagulation:   No results found for: PT, INR, APTT, Urinalysis:   No results found for: COLORU, CLARITYU, SPECGRAV, PHUR, LEUKOCYTESUR, NITRITE, PROTEINUA, GLUCOSEU, KETONESU, BILIRUBINUR, BLOODU, Amylase: No results found for: AMYLASE, Lipase:   No results found for: LIPASE  VTE Pharmacologic Prophylaxis:  On Eliquis  VTE Mechanical Prophylaxis: sequential compression device

## 2018-08-14 NOTE — ASSESSMENT & PLAN NOTE
-continues to have multiple episodes of diarrhea daily  -increase vancomycin to 250 mg PO QID  -add banatrol nutritional supplements BID  -Gastroenterology is following the patient  -the patient underwent a colonoscopy on 08/10/2018, which showed a normal appearing colon without any signs of pseudomembranes

## 2018-08-14 NOTE — ASSESSMENT & PLAN NOTE
-check acute hepatitis panel  -follow the liver function tests  -avoid all hepatotoxic agents  -she will need a liver ultrasound if the transaminitis persists  -Gastroenterology is following the patient

## 2018-08-14 NOTE — ASSESSMENT & PLAN NOTE
Malnutrition Findings:   Malnutrition type: Chronic illness  Degree of Malnutrition: Malnutrition of moderate degree    BMI Findings:         Body mass index is 31 54 kg/m²      -add banatrol nutritional supplements Location Indication Override (Is Already Calculated Based On Selected Body Location): Area L

## 2018-08-14 NOTE — CASE MANAGEMENT
Continued Stay Review  Date: 8/14/18  Vital Signs: /86 (BP Location: Right arm)   Pulse 70   Temp (!) 97 °F (36 1 °C) (Temporal)   Resp 18   Ht 5' 1" (1 549 m)   Wt 75 7 kg (166 lb 14 4 oz)   LMP  (LMP Unknown)   SpO2 100%   BMI 31 54 kg/m²   Medications:   Scheduled Meds:  Current Facility-Administered Medications:  aluminum-magnesium hydroxide-simethicone 30 mL Oral Q4H PRN Jg Singleton MD   apixaban 5 mg Oral BID Tona Dias, DO   diphenoxylate-atropine 1 tablet Oral 4x Daily Tonatiffany Dias, DO   lactobacillus acidophilus-bulgaricus 1 packet Oral TID With Meals Jg Singleton MD   LORazepam 0 5 mg Oral HS PRN Benedict Joe PA-C   magnesium gluconate 500 mg Oral BID AC Tona Dias, DO   menthol-zinc oxide  Topical BID Jg Singleton MD   metoprolol tartrate 12 5 mg Oral Q12H [address] Antoni Barnes DO   ondansetron 8 mg Intravenous Q4H PRN Cristine Barber, DO   pantoprazole 40 mg Oral Early Morning Tonatiffany Dias, DO   vancomycin 250 mg Oral Q6H [address] Antoni Barnes DO   Continuous Infusions:   PRN Meds:   aluminum-magnesium hydroxide-simethicone    LORazepam    ondansetron  Abnormal Labs/Diagnostic Results:   Age/Sex: 46 y o  female   Assessment/Plan:   PERSISTENT MULTIPLE EPISODES OF DIARRHEA DAILY  ABD WITH GENERALIZED TENDERNESS UPON PALPATION  VANCO DOSING DOUBLED TO 250MG Q6H, MAG OXIDE CHANGED TO MAG GLUCONATE AS POSSIBLE CAUSE FOR DIARRHEA  Discharge Plan: TBD  Thank you,  [facility]  Network Utilization Review Department  Phone: [phone]; Fax [phone]  ATTENTION: The Network Utilization Review Department is now centralized for our 9 Facilities  Make a note that we have a new phone and fax numbers for our Department  Please call with any questions or concerns to [phone] and carefully follow the prompts so that you are directed to the right person   All voicemails are confidential  Fax any determinations, approvals, denials, and requests for initial or continue stay review clinical to [phone]  Due to HIGH CALL volume, it would be easier if you could please send faxed requests to expedite your requests and in part, help us provide discharge notifications faster

## 2018-08-14 NOTE — PROGRESS NOTES
Progress Note - Jericho Delgado 1965, 46 y o  female MRN: [id number]    Unit/Bed#: 401-01 Encounter: [id number]    Primary Care Provider: Federico Marino DO   Date and time admitted to hospital: 8/3/2018  1:06 PM        * Clostridium difficile colitis   Assessment & Plan    -continues to have multiple episodes of diarrhea daily  -increase vancomycin to 250 mg PO QID  -add banatrol nutritional supplements BID  -Gastroenterology is following the patient  -the patient underwent a colonoscopy on 08/10/2018, which showed a normal appearing colon without any signs of pseudomembranes          Diarrhea   Assessment & Plan    -persistent  -secondary to Clostridium difficile colitis versus the patient being S/P Pj's reversal with primary anastmosis on 07/11/2018  -discontinue imodium and initiate lomotil  -the patient cannot tolerate questran  -colestipol will be ordered upon discharge  -colestipol is not on hospital formulary  -add banatrol nutritional supplements BID  -Gastroenterology is following the patient            Moderate protein-calorie malnutrition ([address] )   Assessment & Plan    Malnutrition Findings:   Malnutrition type: Chronic illness  Degree of Malnutrition: Malnutrition of moderate degree    BMI Findings:         Body mass index is 31 54 kg/m²      -add banatrol nutritional supplements          Paroxysmal atrial fibrillation (HCC)   Assessment & Plan    -continue eliquis for full anticoagulation  -continue PO metoprolol          Hypomagnesemia   Assessment & Plan    -discontinue magnesium oxide, which may be causing diarrhea  -change magnesium supplementation to magnesium gluconate  -continue to follow the magnesium level        Transaminitis   Assessment & Plan    -check acute hepatitis panel  -follow the liver function tests  -avoid all hepatotoxic agents  -she will need a liver ultrasound if the transaminitis persists  -Gastroenterology is following the patient        Hypokalemia   Assessment & Plan    -improved  -continue to monitor her potassium level        Essential hypertension   Assessment & Plan    -continue PO metoprolol  -follow the blood pressure trend            VTE Pharmacologic Prophylaxis:   Pharmacologic: Apixaban (Eliquis)  Mechanical VTE Prophylaxis in Place: Yes    Patient Centered Rounds: I have performed bedside rounds with nursing staff today  Time Spent for Care: 20 minutes  More than 50% of total time spent on counseling and coordination of care as described above  Current Length of Stay: 9 day(s)    Current Patient Status: Inpatient   Certification Statement: The patient will continue to require additional inpatient hospital stay due to the persistent diarrhea  Discharge Plan:  She will likely be discharged home in 24 hours if improvement in her diarrhea can be achieved  Code Status: Level 1 - Full Code      Subjective: The patient was seen and examined  The patient continues to have multiple episodes of diarrhea  No chest pain  No shortness of breath  Objective:     Vitals:   Temp (24hrs), Av 7 °F (36 5 °C), Min:97 °F (36 1 °C), Max:98 1 °F (36 7 °C)    HR:  [63-71] 70  Resp:  [18] 18  BP: (101-121)/(62-86) 121/86  SpO2:  [93 %-100 %] 100 %  Body mass index is 31 54 kg/m²  Input and Output Summary (last 24 hours):        Intake/Output Summary (Last 24 hours) at 18 1312  Last data filed at 18 1253   Gross per 24 hour   Intake              900 ml   Output                0 ml   Net              900 ml       Physical Exam:     Physical Exam  General:  NAD, awake, alert, oriented x 3  HEENT:  NC/AT, mucous membranes dry  Neck:  Supple, No JVP elevation  CV:  + S1, + S2, RRR  Pulm:  Lung fields are CTA bilaterally  Abd:  Soft, Mild generalized tenderness with palpation, Non-distended  Ext:  No clubbing/cyanosis/edema  Skin:  No rashes      Additional Data:     Labs:      Results from last 7 days  Lab Units 18  0639   WBC Thousand/uL 4 34 HEMOGLOBIN g/dL 11 8   HEMATOCRIT % 35 2   PLATELETS Thousands/uL 184       Results from last 7 days  Lab Units 08/12/18  0640   SODIUM mmol/L 141   POTASSIUM mmol/L 3 7   CHLORIDE mmol/L 106   CO2 mmol/L 28   BUN mg/dL 4*   CREATININE mg/dL 0 69   CALCIUM mg/dL 8 9   TOTAL PROTEIN g/dL 6 2*   BILIRUBIN TOTAL mg/dL 1 50*   ALK PHOS U/L 111   ALT U/L 114*   AST U/L 66*   GLUCOSE RANDOM mg/dL 87       Results from last 7 days  Lab Units 08/11/18  0440   INR  1 06                 * I Have Reviewed All Lab Data Listed Above  * Additional Pertinent Lab Tests Reviewed: [address] Admission Reviewed        Recent Cultures (last 7 days):           Last 24 Hours Medication List:     Current Facility-Administered Medications:  aluminum-magnesium hydroxide-simethicone 30 mL Oral Q4H PRN Elle Barahona MD   apixaban 5 mg Oral BID Dominique Plascencia DO   diphenoxylate-atropine 1 tablet Oral 4x Daily Dominique Plascencia DO   lactobacillus acidophilus-bulgaricus 1 packet Oral TID With Meals Elle Barahona MD   LORazepam 0 5 mg Oral HS PRN Benedict Joe PA-C   magnesium gluconate 500 mg Oral BID AC Dominique Plascencia DO   menthol-zinc oxide  Topical BID Elle Barahona MD   metoprolol tartrate 12 5 mg Oral Q12H [address] Antoni Barnes DO   ondansetron 8 mg Intravenous Q4H PRN Sonido Castro DO   pantoprazole 40 mg Oral BID AC Elle Barahona MD   vancomycin 250 mg Oral Q6H [address] Deepthi Wiley DO        Today, Patient Was Seen By: Dominique Plascencia DO    ** Please Note: Dictation voice to text software may have been used in the creation of this document   **

## 2018-08-14 NOTE — ASSESSMENT & PLAN NOTE
-persistent  -secondary to Clostridium difficile colitis versus the patient being S/P Pj's reversal with primary anastmosis on 07/11/2018  -discontinue imodium and initiate lomotil  -the patient cannot tolerate questran  -colestipol will be ordered upon discharge  -colestipol is not on hospital formulary  -add banatrol nutritional supplements BID  -Gastroenterology is following the patient

## 2018-08-14 NOTE — ASSESSMENT & PLAN NOTE
-discontinue magnesium oxide, which may be causing diarrhea  -change magnesium supplementation to magnesium gluconate  -continue to follow the magnesium level

## 2018-08-15 ENCOUNTER — TELEPHONE (OUTPATIENT)
Dept: GASTROENTEROLOGY | Facility: CLINIC | Age: 53
End: 2018-08-15

## 2018-08-15 VITALS
HEART RATE: 70 BPM | WEIGHT: 170.42 LBS | RESPIRATION RATE: 18 BRPM | HEIGHT: 61 IN | SYSTOLIC BLOOD PRESSURE: 101 MMHG | DIASTOLIC BLOOD PRESSURE: 63 MMHG | TEMPERATURE: 97.6 F | BODY MASS INDEX: 32.17 KG/M2 | OXYGEN SATURATION: 98 %

## 2018-08-15 LAB
ALBUMIN SERPL BCP-MCNC: 2.6 G/DL (ref 3.5–5)
ALP SERPL-CCNC: 129 U/L (ref 46–116)
ALT SERPL W P-5'-P-CCNC: 114 U/L (ref 12–78)
ANION GAP SERPL CALCULATED.3IONS-SCNC: 2 MMOL/L (ref 4–13)
AST SERPL W P-5'-P-CCNC: 57 U/L (ref 5–45)
BASOPHILS # BLD AUTO: 0.04 THOUSANDS/ΜL (ref 0–0.1)
BASOPHILS NFR BLD AUTO: 1 % (ref 0–1)
BILIRUB SERPL-MCNC: 1.2 MG/DL (ref 0.2–1)
BUN SERPL-MCNC: 6 MG/DL (ref 5–25)
CALCIUM SERPL-MCNC: 8.8 MG/DL (ref 8.3–10.1)
CHLORIDE SERPL-SCNC: 108 MMOL/L (ref 100–108)
CK SERPL-CCNC: 16 U/L (ref 26–192)
CO2 SERPL-SCNC: 31 MMOL/L (ref 21–32)
CREAT SERPL-MCNC: 0.66 MG/DL (ref 0.6–1.3)
EOSINOPHIL # BLD AUTO: 0.25 THOUSAND/ΜL (ref 0–0.61)
EOSINOPHIL NFR BLD AUTO: 6 % (ref 0–6)
ERYTHROCYTE [DISTWIDTH] IN BLOOD BY AUTOMATED COUNT: 13.3 % (ref 11.6–15.1)
GFR SERPL CREATININE-BSD FRML MDRD: 100 ML/MIN/1.73SQ M
GLUCOSE SERPL-MCNC: 86 MG/DL (ref 65–140)
HCT VFR BLD AUTO: 34.8 % (ref 34.8–46.1)
HGB BLD-MCNC: 11.4 G/DL (ref 11.5–15.4)
IMM GRANULOCYTES # BLD AUTO: 0.01 THOUSAND/UL (ref 0–0.2)
IMM GRANULOCYTES NFR BLD AUTO: 0 % (ref 0–2)
LACTATE SERPL-SCNC: 1.2 MMOL/L (ref 0.5–2)
LYMPHOCYTES # BLD AUTO: 1.79 THOUSANDS/ΜL (ref 0.6–4.47)
LYMPHOCYTES NFR BLD AUTO: 42 % (ref 14–44)
MAGNESIUM SERPL-MCNC: 1.8 MG/DL (ref 1.6–2.6)
MCH RBC QN AUTO: 30.2 PG (ref 26.8–34.3)
MCHC RBC AUTO-ENTMCNC: 32.8 G/DL (ref 31.4–37.4)
MCV RBC AUTO: 92 FL (ref 82–98)
MONOCYTES # BLD AUTO: 0.29 THOUSAND/ΜL (ref 0.17–1.22)
MONOCYTES NFR BLD AUTO: 7 % (ref 4–12)
NEUTROPHILS # BLD AUTO: 1.92 THOUSANDS/ΜL (ref 1.85–7.62)
NEUTS SEG NFR BLD AUTO: 44 % (ref 43–75)
NRBC BLD AUTO-RTO: 0 /100 WBCS
PHOSPHATE SERPL-MCNC: 4.3 MG/DL (ref 2.7–4.5)
PLATELET # BLD AUTO: 174 THOUSANDS/UL (ref 149–390)
PMV BLD AUTO: 10.9 FL (ref 8.9–12.7)
POTASSIUM SERPL-SCNC: 3.5 MMOL/L (ref 3.5–5.3)
PROCALCITONIN SERPL-MCNC: <0.05 NG/ML
PROT SERPL-MCNC: 6 G/DL (ref 6.4–8.2)
RBC # BLD AUTO: 3.77 MILLION/UL (ref 3.81–5.12)
SODIUM SERPL-SCNC: 141 MMOL/L (ref 136–145)
WBC # BLD AUTO: 4.3 THOUSAND/UL (ref 4.31–10.16)

## 2018-08-15 PROCEDURE — 83735 ASSAY OF MAGNESIUM: CPT | Performed by: INTERNAL MEDICINE

## 2018-08-15 PROCEDURE — 80053 COMPREHEN METABOLIC PANEL: CPT | Performed by: INTERNAL MEDICINE

## 2018-08-15 PROCEDURE — 80074 ACUTE HEPATITIS PANEL: CPT | Performed by: PHYSICIAN ASSISTANT

## 2018-08-15 PROCEDURE — 84100 ASSAY OF PHOSPHORUS: CPT | Performed by: INTERNAL MEDICINE

## 2018-08-15 PROCEDURE — 99239 HOSP IP/OBS DSCHRG MGMT >30: CPT | Performed by: INTERNAL MEDICINE

## 2018-08-15 PROCEDURE — 83605 ASSAY OF LACTIC ACID: CPT | Performed by: INTERNAL MEDICINE

## 2018-08-15 PROCEDURE — 82550 ASSAY OF CK (CPK): CPT | Performed by: INTERNAL MEDICINE

## 2018-08-15 PROCEDURE — 84145 PROCALCITONIN (PCT): CPT | Performed by: INTERNAL MEDICINE

## 2018-08-15 PROCEDURE — 85025 COMPLETE CBC W/AUTO DIFF WBC: CPT | Performed by: INTERNAL MEDICINE

## 2018-08-15 RX ORDER — LOPERAMIDE HYDROCHLORIDE 2 MG/1
4 CAPSULE ORAL
Refills: 0
Start: 2018-08-15 | End: 2019-09-12

## 2018-08-15 RX ORDER — VANCOMYCIN HYDROCHLORIDE 125 MG/1
125 CAPSULE ORAL 4 TIMES DAILY
Qty: 16 CAPSULE | Refills: 0 | Status: SHIPPED | OUTPATIENT
Start: 2018-08-15 | End: 2018-08-19

## 2018-08-15 RX ORDER — PANTOPRAZOLE SODIUM 40 MG/1
40 TABLET, DELAYED RELEASE ORAL
Refills: 0
Start: 2018-08-15 | End: 2018-11-09 | Stop reason: DRUGHIGH

## 2018-08-15 RX ORDER — LACTOBACILLUS ACIDOPHILUS / LACTOBACILLUS BULGARICUS 100 MILLION CFU STRENGTH
1 GRANULES ORAL
Refills: 0
Start: 2018-08-15 | End: 2019-03-18

## 2018-08-15 RX ORDER — POTASSIUM CHLORIDE 20 MEQ/1
20 TABLET, EXTENDED RELEASE ORAL DAILY
Refills: 0
Start: 2018-08-15 | End: 2018-10-09 | Stop reason: SDUPTHER

## 2018-08-15 RX ORDER — DIPHENOXYLATE HYDROCHLORIDE AND ATROPINE SULFATE 2.5; .025 MG/1; MG/1
1 TABLET ORAL 4 TIMES DAILY
Qty: 56 TABLET | Refills: 0 | Status: SHIPPED | OUTPATIENT
Start: 2018-08-15 | End: 2018-08-27 | Stop reason: SDUPTHER

## 2018-08-15 RX ADMIN — PANTOPRAZOLE SODIUM 40 MG: 40 TABLET, DELAYED RELEASE ORAL at 06:07

## 2018-08-15 RX ADMIN — LACTOBACILLUS ACIDOPHILUS / LACTOBACILLUS BULGARICUS 1 PACKET: 100 MILLION CFU STRENGTH GRANULES at 08:39

## 2018-08-15 RX ADMIN — VANCOMYCIN HYDROCHLORIDE 250 MG: 1 INJECTION, POWDER, LYOPHILIZED, FOR SOLUTION INTRAVENOUS at 12:18

## 2018-08-15 RX ADMIN — DIPHENOXYLATE HYDROCHLORIDE AND ATROPINE SULFATE 1 TABLET: 2.5; .025 TABLET ORAL at 12:13

## 2018-08-15 RX ADMIN — LOPERAMIDE HYDROCHLORIDE 6 MG: 2 CAPSULE ORAL at 12:13

## 2018-08-15 RX ADMIN — METOPROLOL TARTRATE 12.5 MG: 25 TABLET ORAL at 08:40

## 2018-08-15 RX ADMIN — ANORECTAL OINTMENT: 15.7; .44; 24; 20.6 OINTMENT TOPICAL at 10:30

## 2018-08-15 RX ADMIN — LACTOBACILLUS ACIDOPHILUS / LACTOBACILLUS BULGARICUS 1 PACKET: 100 MILLION CFU STRENGTH GRANULES at 12:16

## 2018-08-15 RX ADMIN — DIPHENOXYLATE HYDROCHLORIDE AND ATROPINE SULFATE 1 TABLET: 2.5; .025 TABLET ORAL at 08:40

## 2018-08-15 RX ADMIN — APIXABAN 5 MG: 5 TABLET, FILM COATED ORAL at 08:40

## 2018-08-15 RX ADMIN — Medication 500 MG: at 06:08

## 2018-08-15 RX ADMIN — LOPERAMIDE HYDROCHLORIDE 6 MG: 2 CAPSULE ORAL at 06:07

## 2018-08-15 RX ADMIN — VANCOMYCIN HYDROCHLORIDE 250 MG: 1 INJECTION, POWDER, LYOPHILIZED, FOR SOLUTION INTRAVENOUS at 06:08

## 2018-08-15 NOTE — DISCHARGE SUMMARY
Discharge- Marc Whyte 1965, 46 y o  female MRN: [id number]    Unit/Bed#: 401-01 Encounter: [id number]    Primary Care Provider: Debora Castrejon DO   Date and time admitted to hospital: 8/3/2018  1:06 PM        * Clostridium difficile colitis   Assessment & Plan    -she was treated with p o  vancomycin and will complete a total of a 14-day course of p o  vancomycin after discharge  -banatrol nutritional supplements were utilized during the hospitalization  -the patient was seen by Gastroenterology and by General Surgery during the hospitalization  -the patient underwent a colonoscopy on 08/10/2018, which showed a normal appearing colon without any signs of pseudomembranes          Diarrhea   Assessment & Plan    -persistent  -secondary to Clostridium difficile colitis versus the patient being S/P Pj's reversal with primary anastmosis on 07/11/2018  -she was treated with Imodium and Lomotil during the hospitalization, which will continue upon discharge  -the patient cannot tolerate questran  -colestipol is another option after discharge  -colestipol is not on hospital formulary  -banatrol nutritional supplements BID were utilized during the hospitalization  -the patient was seen by Gastroenterology and by General Surgery during the hospitalization  -she will need outpatient follow-up with General Surgery and with Gastroenterology as well as with her PCP          Moderate protein-calorie malnutrition ([facility] [address] )   Assessment & Plan    Malnutrition Findings:   Malnutrition type: Chronic illness  Degree of Malnutrition: Malnutrition of moderate degree    BMI Findings:         Body mass index is 32 2 kg/m²      -banatrol nutritional supplements were utilized during the hospitalization            Paroxysmal atrial fibrillation ([facility] [address] )   Assessment & Plan    -continue eliquis for full anticoagulation  -continue PO metoprolol          Hypomagnesemia   Assessment & Plan    -improved with magnesium supplementation during the hospitalization  -magnesium oxide may be causing the diarrhea  -continue to follow the magnesium level as an outpatient with outpatient blood work and follow-up with her PCP        Transaminitis   Assessment & Plan    -an acute hepatitis panel was drawn with results pending at the time of discharge  -follow the liver function tests as an outpatient with outpatient blood work with her PCP  -continue to avoid all hepatotoxic agents including tylenol/acetaminophen and alcohol  -she will need a liver ultrasound if the transaminitis persists as an outpatient  -she will need outpatient follow-up with Gastroenterology        Hypokalemia   Assessment & Plan    -improved  -continue to monitor her potassium level with outpatient blood work and follow-up with her PCP        Essential hypertension   Assessment & Plan    -continue PO metoprolol  -follow the blood pressure trend  -outpatient follow-up with her PCP in regards to this matter              Discharging Physician / Practitioner: Mika Packer DO  PCP: Alejandra Aranda DO  Admission Date: 08/03/18  Discharge Date: 08/15/18      Consultations During Hospital Stay:  · Gastroenterology  · General Surgery    Procedures Performed:     · Colonoscopy on 08/10/2018 with the following findings/impressions:  FINDINGS:  Surgical anastomosis on the right side of the colon as well as   the rectum was seen  Pt had prior recent ileocecectomy and reconnection   surgery after diverticulitis  Otherwise, the colon appeared to be normal    No pseudomembranes seen  Random biopsies obtained from the colon  COMPLICATIONS: There were no complications  IMPRESSIONS: Both surgical anastomoses seen  Otherwise the colon appeared   normal   No pseudomembranes  Random biopsies obtained      Significant Findings / Test Results:     Procedure Component Value - Date/Time   Clostridium difficile toxin by PCR [[id number]] (Abnormal) Collected: 08/04/18 0031   Lab Status: Final result Specimen: Stool from Per Rectum Updated: 08/05/18 1110    C difficile toxin by PCR POSITIVE for C difficle toxin by PCR  (A)     · CT scan of the abdomen/pelvis (08/03/2018): FINDINGS:     ABDOMEN     LOWER CHEST:  No clinically significant abnormality identified in the visualized lower chest      LIVER/BILIARY TREE:  Unremarkable      GALLBLADDER:  Removed      SPLEEN:  Unremarkable      PANCREAS:  Unremarkable      ADRENAL GLANDS:  Unremarkable      KIDNEYS/URETERS:  Unremarkable  No hydronephrosis      STOMACH AND BOWEL:  No bowel obstruction  Intact colorectal anastomosis in the pelvis      APPENDIX:  No findings to suggest appendicitis      ABDOMINOPELVIC CAVITY:  No ascites or free intraperitoneal air  Postsurgical changes in the mesentery and pelvis related to previous surgery  No lymphadenopathy      VESSELS:  Unremarkable for patient's age      PELVISUnremarkable for patient's age  Stable myomatous uterus      REPRODUCTIVE ORGANS:       URINARY BLADDER:  Unremarkable      ABDOMINAL WALL/INGUINAL REGIONS:  Small volume of air and fluid in the anterior abdominal wall subcutaneous tissues related to recent colostomy takedown      OSSEOUS STRUCTURES:  No acute fracture or destructive osseous lesion      IMPRESSION:     Small volume of air and fluid in the anterior abdominal wall subcutaneous tissues related to recent colostomy takedown      Intact colorectal anastomosis  No bowel obstruction      Stable myomatous uterus  Incidental Findings:   · Stable myomatous uterus on CT scan of the abdomen/pelvis, which will need follow-up with OB/Gyn     Test Results Pending at Discharge (will require follow up):    · An acute hepatitis panel is pending at the time of discharge, which will need outpatient follow-up with her PCP     Outpatient Tests Requested:  · CBC with diff , CMP, Mag, in 5 days with PCP follow-up    Complications:  None    Reason for Admission:  Diarrhea, nausea, and vomiting      Hospital Course:     Fernandez Gracia is a 46 y o  female patient who originally presented to the hospital on 8/3/2018 due to intractable nausea, vomiting, and diarrhea  Please see above list of diagnoses and related plan for additional information  Condition at Discharge: good     Discharge Day Visit / Exam:     Subjective: The patient was seen and examined  Her diarrhea is improving  Vitals: Blood Pressure: 101/63 (08/15/18 0735)  Pulse: 70 (08/15/18 0735)  Temperature: 97 6 °F (36 4 °C) (08/15/18 0854)  Temp Source: Temporal (08/15/18 0854)  Respirations: 18 (08/15/18 0735)  Height: 5' 1" (154 9 cm) (08/06/18 1544)  Weight - Scale: 77 3 kg (170 lb 6 7 oz) (08/15/18 0600)  SpO2: 98 % (08/15/18 0735)  Exam:   Physical Exam  General:  NAD, awake, alert, oriented x 3  HEENT:  NC/AT, mucous membranes moist  Neck:  Supple, No JVP elevation  CV:  + S1, + S2, RRR  Pulm:  Lung fields are CTA bilaterally  Abd:  Soft, Minimal generalized tenderness with palpation, Non-distended  Ext:  No clubbing/cyanosis/edema  Skin:  No rashes      Discharge instructions/Information to patient and family:   See after visit summary for information provided to patient and family  Provisions for Follow-Up Care:  See after visit summary for information related to follow-up care and any pertinent home health orders  Disposition:     Home      Planned Readmission:  No     Discharge Statement:  I spent 45 minutes discharging the patient  This time was spent on the day of discharge  I had direct contact with the patient on the day of discharge  Greater than 50% of the total time was spent examining patient, answering all patient questions, arranging and discussing plan of care with patient as well as directly providing post-discharge instructions  Additional time then spent on discharge activities  Discharge Medications:  See after visit summary for reconciled discharge medications provided to patient and family        ** Please Note: This note has been constructed using a voice recognition system **

## 2018-08-15 NOTE — DISCHARGE INSTRUCTIONS
Clostridium Difficile Infection   WHAT YOU NEED TO KNOW:   Clostridium difficile infection, or C  difficile, is an infection in your colon caused by bacteria  Different types of bacteria live inside the colon, creating a healthy balance between good and bad bacteria  If the C  difficile bacteria grow rapidly, this can disrupt the healthy balance of the colon  This can cause the lining of the colon to swell, which leads to an infection  DISCHARGE INSTRUCTIONS:   Medicines:   · Antibiotics: This medicine is given to keep the C  difficile bacteria from growing and allow the normal bacteria in your intestines to grow  Always take your antibiotics exactly as ordered by your healthcare provider  Do not stop taking your medicine unless directed by your healthcare provider  Never save antibiotics or take leftover antibiotics that were given to you for another illness  · Take your medicine as directed  Contact your healthcare provider if you think your medicine is not helping or if you have side effects  Tell him or her if you are allergic to any medicine  Keep a list of the medicines, vitamins, and herbs you take  Include the amounts, and when and why you take them  Bring the list or the pill bottles to follow-up visits  Carry your medicine list with you in case of an emergency  Follow up with your healthcare provider within 1 to 2 days:  Write down your questions so you remember to ask them during your visits  Self care:   · Drink liquids to prevent dehydration:  Ask how much liquid you should drink to prevent dehydration caused by diarrhea  Most adults should drink between 9 and 13 eight-ounce cups of liquid every day  For most people, good liquids to drink are water, juice, and broth  · Wash your hands:  Wash your hands often with germ-killing soap and warm, running water  Alcohol-based hand rubs do not kill C  difficile bacteria   Always wash your hands well after you use the toilet, diaper a child, and before you prepare or serve food  Tell anyone who touches you to wear gloves and wash their hands  · Clean surfaces with bleach:  Clean tabletops, desks, and other surfaces before anyone else touches or uses them  Clean with chlorine-based disinfectants, such as household bleach  · Avoid the spread of C  difficile:  Do not share any items with other people  Use as many disposable items, such as paper plates, as you can  Do this until your diarrhea has gone away  Contact your healthcare provider if:   · You have a fever  · Your signs and symptoms do not go away, or they come back, even after treatment  · You have questions or concerns about your condition or care  Seek care immediately or call 911 if:   · Your diarrhea and stomach cramps get worse  · Your abdomen is hard or feels swollen  · You have black or bright red stools  · You vomit blood  · You cannot eat or drink  · You are short of breath, or feel like you are going to faint  · You have 1 or more of the following signs of dehydration:     ¨ Dizziness or weakness, or extreme sleepiness    ¨ Dry mouth, cracked lips, or you feel very thirsty    ¨ Fast heartbeat or rapid breathing    ¨ More sleepy than usual    ¨ Very little urine or no urine    ¨ Sunken eyes     ¨ A child may be more irritable or fussy than usual  The soft spot on a baby's head may look sunken in   © 2017 [address] is for End User's use only and may not be sold, redistributed or otherwise used for commercial purposes  All illustrations and images included in CareNotes® are the copyrighted property of A D [employer] M [employer]  or Yonis Stover  The above information is an  only  It is not intended as medical advice for individual conditions or treatments  Talk to your doctor, nurse or pharmacist before following any medical regimen to see if it is safe and effective for you        Clostridium Difficile Infection   WHAT YOU NEED TO KNOW:   What is a Clostridium difficile infection? Clostridium difficile infection, or C  difficile, is an infection in your colon caused by bacteria  Different types of bacteria live inside the colon, creating a healthy balance between good and bad bacteria  If the C  difficile bacteria grow rapidly, this can disrupt the healthy balance of the colon  This can cause the lining of the colon to swell, which leads to an infection  How does a Clostridium difficile infection spread? The bowel movement of a person with a C  difficile infection contains C  difficile bacteria  Infected people who do not wash their hands after having a bowel movement can spread the infection  C  difficile bacteria may also be on surfaces, such as the tops of tables  The bacteria are often spread in a healthcare setting, such as a hospital    What increases my risk for a Clostridium difficile infection? · Antibiotics:  Antibiotic medicine kills both good and bad bacteria, which may upset the normal balance of bacteria in the colon  Your risk of getting C  difficile infection increases the longer you take antibiotic medicine  Your risk also increases if you take more than 1 type of antibiotic  · Hospital stay:  A long hospital stay, or sharing a room with a C  difficile-infected patient, increases your risk  · Age:  Older adults may get infections more easily than younger people because of body changes that occur with age  · Medical conditions:  A weak immune system caused by medicines, such as chemotherapy or steroids, can increase your risk for C  difficile  Major surgery, such as an organ transplant, can weaken your immune system  Your risk is increased if you have inflammatory bowel disease  · Dormant infection:  Inactive C  difficile bacteria may be in your body from a previous infection  This bacteria can cause a new infection  · Antacids:  Antacid medicine decreases stomach acid   Stomach acid normally works to kill harmful bacteria  This can upset the balance of bacteria in your colon and increase your risk of C  difficile infection  What are the signs and symptoms of a Clostridium difficile infection? Diarrhea is the most common symptom of C  difficile infection  You may have bad-smelling diarrhea many times a day  You may see blood, mucus, or pus in your bowel movements  You may also have any of the following:  · Fever or cramping pain in your abdomen    · Nausea or vomiting    · Dehydration (loss of too much body fluid)  How is a Clostridium difficile infection diagnosed? · Bowel movement tests:  A sample of your bowel movement is sent to a lab for testing  This test may show what kind of bacteria is causing your illness, and helps healthcare providers know what treatment is best for you  · Blood tests:  Blood tests can show signs of an infection  The blood may be taken from a blood vessel in your hand, arm, or the bend in your elbow  · Colonoscopy or sigmoidoscopy:  A scope is a long, bendable tube with a light on the end  The scope may also have a camera on it  During a colonoscopy or sigmoidoscopy, the scope is put into your anus and moved forward into your large colon  Healthcare providers look for problems, take pictures, and collect samples that are sent to the lab for tests  · CT scan: An x-ray machine uses a computer to take pictures of your colon  Before taking the pictures, you may be given dye through an IV  The dye helps your colon show up better in the pictures  People who are allergic to shellfish (lobster, crab, or shrimp) may be allergic to this dye  Tell your healthcare provider if you are allergic to any of these  How is a Clostridium difficile infection treated? The goal of treatment is to restore the healthy balance of bacteria to your colon  This should help stop your diarrhea   You may need the following:  · Antibiotics:  Antibiotics help treat or prevent an infection caused by bacteria  If antibiotics caused your C  difficile infection, you may need to stop taking them and switch to a different antibiotic  Ask your healthcare provider for more information  · Oral rehydration therapy:  You will need to drink plenty of liquids to avoid dehydration  You may also drink an oral rehydration solution (ORS)  An ORS has the right amounts of water, salts, and sugar needed to replace body fluids  Ask your healthcare provider where to buy ORS and how much to drink  · Immune globulin medicine: You may need this to help your immune system if you have a severe C  difficile infection  · Surgery: If your C  difficile infection is severe or has damaged your colon, you may need surgery called colectomy  During surgery, part of your colon is removed  How can I manage my Clostridium difficile infection? · Wash your hands:  Wash your hands often with germ-killing soap and warm, running water  Alcohol-based hand rubs do not kill C  difficile bacteria  Always wash your hands well after you use the toilet, diaper a child, and before you prepare or serve food  Tell anyone who touches you to wear gloves and wash their hands  · Clean surfaces with bleach:  Clean tabletops, desks, and other surfaces before anyone else touches or uses them  Clean with chlorine-based disinfectants, such as household bleach  · Avoid the spread of C  difficile:  Do not share any items with other people  Use as many disposable items, such as paper plates, as you can  Do this until your diarrhea has gone away  When should I contact my healthcare provider? · You have a fever  · Your diarrhea is getting worse  · Your signs and symptoms get worse  · Your signs and symptoms do not go away, or they come back, even after treatment  · You cannot eat or drink  · You have questions or concerns about your condition or care  When should I seek immediate help or call 911?    · You have a fever and stomach cramps that get worse, or do not go away  · Your abdomen is hard or feels swollen  · You have black or bright red stools  · You vomit blood  · You are short of breath, or feel like you are going to faint  · You have 1 or more of the following signs of dehydration:     ¨ Dizziness or weakness, or extreme sleepiness  ¨ Dry mouth, cracked lips, or you feel very thirsty    ¨ Fast heartbeat or rapid breathing    ¨ Very little urine or no urine    ¨ Sunken eyes     ¨ A child may be more irritable or fussy than usual  The soft spot on a baby's head may look sunken in  CARE AGREEMENT:   You have the right to help plan your care  Learn about your health condition and how it may be treated  Discuss treatment options with your caregivers to decide what care you want to receive  You always have the right to refuse treatment  The above information is an  only  It is not intended as medical advice for individual conditions or treatments  Talk to your doctor, nurse or pharmacist before following any medical regimen to see if it is safe and effective for you  © 2017 [address]  Information is for End User's use only and may not be sold, redistributed or otherwise used for commercial purposes  All illustrations and images included in CareNotes® are the copyrighted property of A D A M , Inc  or Yonis Stovre

## 2018-08-15 NOTE — TELEPHONE ENCOUNTER
----- Message from Shana Estrella PA-C sent at 8/15/2018  3:15 PM EDT -----   Can you please call her for follow-up in the next month? Thank you! With a PA is fine

## 2018-08-15 NOTE — ASSESSMENT & PLAN NOTE
-an acute hepatitis panel was drawn with results pending at the time of discharge  -follow the liver function tests as an outpatient with outpatient blood work with her PCP  -continue to avoid all hepatotoxic agents including tylenol/acetaminophen and alcohol  -she will need a liver ultrasound if the transaminitis persists as an outpatient  -she will need outpatient follow-up with Gastroenterology

## 2018-08-15 NOTE — ASSESSMENT & PLAN NOTE
-improved  -continue to monitor her potassium level with outpatient blood work and follow-up with her PCP

## 2018-08-15 NOTE — ASSESSMENT & PLAN NOTE
Malnutrition Findings:   Malnutrition type: Chronic illness  Degree of Malnutrition: Malnutrition of moderate degree    BMI Findings:         Body mass index is 32 2 kg/m²      -banatrol nutritional supplements were utilized during the hospitalization

## 2018-08-15 NOTE — NURSING NOTE
AVS printed ans reviewed with patient  Patient verbalized understanding  Patient D?c to home in no acute distress and no home services required

## 2018-08-15 NOTE — ASSESSMENT & PLAN NOTE
-improved with magnesium supplementation during the hospitalization  -magnesium oxide may be causing the diarrhea  -continue to follow the magnesium level as an outpatient with outpatient blood work and follow-up with her PCP

## 2018-08-15 NOTE — ASSESSMENT & PLAN NOTE
-continue PO metoprolol  -follow the blood pressure trend  -outpatient follow-up with her PCP in regards to this matter

## 2018-08-15 NOTE — ASSESSMENT & PLAN NOTE
-persistent  -secondary to Clostridium difficile colitis versus the patient being S/P Pj's reversal with primary anastmosis on 07/11/2018  -she was treated with Imodium and Lomotil during the hospitalization, which will continue upon discharge  -the patient cannot tolerate questran  -colestipol is another option after discharge  -colestipol is not on hospital formulary  -banatrol nutritional supplements BID were utilized during the hospitalization  -the patient was seen by Gastroenterology and by General Surgery during the hospitalization  -she will need outpatient follow-up with General Surgery and with Gastroenterology as well as with her PCP

## 2018-08-15 NOTE — SOCIAL WORK
Pt is being discharged home today  Pt's family will give the pt a ride home  Pt has an appt with Dr Janet Obrien already set up  Offered to make a follow up appt for pt with Dr Leonard Fountain  Pt stated that she did not think she needed to follow up with him   I did call Dr Edson Cadena office and notified Kay Barrymaverick that pt is going home today and will need to follow up with pt   Ingris will call the pt at home and set up a follow up appt  Pt is a 30 day readmission so I did refer her to the Outpatient Coordinator  Case Management reviewed discharge planning process including the following: identifying help that is needed at home, pt's preference for discharge needs and Meds at [facility]  Reviewed with Pt that any member of the healthcare team can answer questions regarding : medications, jmportance of recognizing  Signs and symptoms of any  medical problems  Case Management also encouraged pt to follow up with all recommended appointments after discharge

## 2018-08-15 NOTE — PROGRESS NOTES
[facility]  Progress Note - Sea Kellogg 46 y o  female MRN: [id number]    Unit/Bed#: 401-01 Encounter: [id number]    Assessment/Plan:  1  C  Diff colitis  2  S/p colon resection  3  Nausea and vomiting   -She states her abdominal discomfort and stool frequency are both improved today  She continues to have loose stools but had only 3 BMs this morning which she states is better than yesterday  Her nausea and vomiting have resolved  -Continue vancomycin 250 QID   -She does not have an ileocecal valve so her baseline stool frequency is increased  She will start colestipol as an outpatient as she can not tolerate Questran  Also recommend banana flakes  2  Elevated LFTs   -Acute hepatitis panel is pending   -Her total bilirubin is decreased today, AST and ALT are stable and alk phos increased slightly from 111 to 129    -Recommend outpatient follow up as she is anticipating being discharged today; if her LFTs remain elevated would recommend further workup including a liver ultrasound  Subjective:   She reports she is going better shape, she states that her abdominal discomfort  Has lessened and she is having less frequent bowel movements  She states she  Had 3 bowel movements this morning, denies any hematochezia  Objective:     Vitals: Blood pressure 101/63, pulse 70, temperature 97 6 °F (36 4 °C), temperature source Temporal, resp  rate 18, height 5' 1" (1 549 m), weight 77 3 kg (170 lb 6 7 oz), SpO2 98 %  ,Body mass index is 32 2 kg/m²  Intake/Output Summary (Last 24 hours) at 08/15/18 1051  Last data filed at 08/14/18 2054   Gross per 24 hour   Intake              660 ml   Output                0 ml   Net              660 ml       Review of Systems: as per HPI  Review of Systems   Constitutional: Negative for activity change, appetite change, chills, fatigue, fever and unexpected weight change  HENT: Negative for mouth sores, sore throat and trouble swallowing  Respiratory: Negative for shortness of breath  Cardiovascular: Negative for chest pain  Gastrointestinal: Positive for diarrhea (improved from yesterday)  Negative for abdominal distention, abdominal pain, blood in stool, constipation, nausea and vomiting  Skin: Negative for color change, pallor, rash and wound  Neurological: Negative for tremors and syncope  All other systems reviewed and are negative  Physical Exam:     Physical Exam   Constitutional: She is oriented to person, place, and time  She appears well-developed and well-nourished  No distress  HENT:   Head: Normocephalic and atraumatic  Eyes: Right eye exhibits no discharge  Left eye exhibits no discharge  No scleral icterus  Neck: Neck supple  No tracheal deviation present  Cardiovascular: Normal rate, regular rhythm and normal heart sounds  Exam reveals no gallop and no friction rub  No murmur heard  Pulmonary/Chest: Effort normal and breath sounds normal  No respiratory distress  She has no wheezes  She has no rales  Abdominal: Soft  Bowel sounds are normal  She exhibits no distension and no mass  There is tenderness (mild, pt states this is improved from previous exams  )  There is no rebound and no guarding  Neurological: She is alert and oriented to person, place, and time  Skin: Skin is warm and dry  Psychiatric: She has a normal mood and affect           Invasive Devices          No matching active lines, drains, or airways                  CBC: Lab Results   Component Value Date    WBC 4 30 (L) 08/15/2018    HGB 11 4 (L) 08/15/2018    HCT 34 8 08/15/2018    MCV 92 08/15/2018     08/15/2018    MCH 30 2 08/15/2018    MCHC 32 8 08/15/2018    RDW 13 3 08/15/2018    MPV 10 9 08/15/2018    NRBC 0 08/15/2018   ,   CMP: Lab Results   Component Value Date     08/15/2018    K 3 5 08/15/2018     08/15/2018    CO2 31 08/15/2018    ANIONGAP 2 (L) 08/15/2018    BUN 6 08/15/2018    CREATININE 0 66 08/15/2018    GLUCOSE 86 08/15/2018    CALCIUM 8 8 08/15/2018    AST 57 (H) 08/15/2018     (H) 08/15/2018    ALKPHOS 129 (H) 08/15/2018    PROT 6 0 (L) 08/15/2018    BILITOT 1 20 (H) 08/15/2018    EGFR 100 08/15/2018   ,   Phosphorous:   Lab Results   Component Value Date    PHOS 4 3 08/15/2018

## 2018-08-15 NOTE — ASSESSMENT & PLAN NOTE
-she was treated with p o  vancomycin and will complete a total of a 14-day course of p o  vancomycin after discharge  -banatrol nutritional supplements were utilized during the hospitalization  -the patient was seen by Gastroenterology and by General Surgery during the hospitalization  -the patient underwent a colonoscopy on 08/10/2018, which showed a normal appearing colon without any signs of pseudomembranes

## 2018-08-16 ENCOUNTER — TRANSITIONAL CARE MANAGEMENT (OUTPATIENT)
Dept: FAMILY MEDICINE CLINIC | Facility: CLINIC | Age: 53
End: 2018-08-16

## 2018-08-16 DIAGNOSIS — I10 HYPERTENSION, UNSPECIFIED TYPE: ICD-10-CM

## 2018-08-16 LAB
HAV IGM SER QL: NORMAL
HBV CORE IGM SER QL: NORMAL
HBV SURFACE AG SER QL: NORMAL
HCV AB SER QL: NORMAL

## 2018-08-21 ENCOUNTER — PATIENT OUTREACH (OUTPATIENT)
Dept: FAMILY MEDICINE CLINIC | Facility: CLINIC | Age: 53
End: 2018-08-21

## 2018-08-21 ENCOUNTER — OFFICE VISIT (OUTPATIENT)
Dept: SURGERY | Facility: HOSPITAL | Age: 53
End: 2018-08-21

## 2018-08-21 VITALS
BODY MASS INDEX: 30.96 KG/M2 | HEART RATE: 71 BPM | HEIGHT: 61 IN | WEIGHT: 164 LBS | SYSTOLIC BLOOD PRESSURE: 114 MMHG | TEMPERATURE: 97.9 F | DIASTOLIC BLOOD PRESSURE: 68 MMHG

## 2018-08-21 DIAGNOSIS — Z98.890 S/P COLOSTOMY TAKEDOWN: ICD-10-CM

## 2018-08-21 DIAGNOSIS — Z09 POSTOP CHECK: Primary | ICD-10-CM

## 2018-08-21 PROCEDURE — 99024 POSTOP FOLLOW-UP VISIT: CPT | Performed by: SURGERY

## 2018-08-21 PROCEDURE — 1111F DSCHRG MED/CURRENT MED MERGE: CPT | Performed by: SURGERY

## 2018-08-21 NOTE — ASSESSMENT & PLAN NOTE
Patient admitted and treated for C diff colitis  She has just finished her p o  antibiotics  Diarrhea improved with Imodium and Lomotil  This is likely secondary to bowel acid given the fact that she has had colonic resection in addition to an ileocolic resection and therefore does not have any ileocecal valve  Tolerating diet nausea better but still persistent  Will follow up in 3 months to see how things are coming along

## 2018-08-21 NOTE — PROGRESS NOTES
pt called back and says she is slowly getting better, feeling stronger  appetite improving   taking meds as prescribed and helping with diarrhea  able to keep all doctor appts  pt declines further calls and care management but will keep has contact number in case needs change

## 2018-08-21 NOTE — PROGRESS NOTES
Assessment/Plan:    S/P colostomy takedown  Patient admitted and treated for C diff colitis  She has just finished her p o  antibiotics  Diarrhea improved with Imodium and Lomotil  This is likely secondary to bowel acid given the fact that she has had colonic resection in addition to an ileocolic resection and therefore does not have any ileocecal valve  Tolerating diet nausea better but still persistent  Will follow up in 3 months to see how things are coming along  Diagnoses and all orders for this visit:    Postop check    S/P colostomy takedown          Subjective:      Patient ID: Jose Carlos Solitario is a 46 y o  female  13-year-old female status post colostomy takedown and colorectal anastomosis with persistent postoperative diarrhea and nausea vomiting  Patient was worked up as an outpatient for the nausea vomiting diarrhea and was noted to be C diff negative  Unfortunately her symptoms persisted and she came to the hospital was admitted  While as an inpatient repeat C diff was done and this was, to my surprise positive  She was started on antibiotics and also seen by infectious disease  She subsequently scoped by GI and which revealed normal colon intact anastomoses no and evidence of pseudomembranes colitis  Therefore her diarrhea is likely secondary to just access of bile acid due to the fact she no longer has any ileocecal valve has an overall short:  Due to resection  Med she was started on Imodium and Lomotil and improved  And she comes in today stating that overall she is feeling better energy is improved  Still has nausea but she is living with it and realizes this will likely take time to go away as as it did from her previous surgeries  The diarrhea is significantly improved although still present  She continues taking Imodium and Lomotil  Denies any chest pain or shortness of breath    No blood in the stool and the few times that she did actually vomit was there was no blood in the emesis as well  The following portions of the patient's history were reviewed and updated as appropriate:   She  has a past medical history of Disease of thyroid gland; Endometrial polyp; Gastroesophageal reflux disease without esophagitis (12/4/2017); Hand injury; High risk medication use; Hyperlipidemia; Hypertension; Renal colic; Viral syndrome; and Vitamin D deficiency disease    She   Patient Active Problem List    Diagnosis Date Noted    Moderate protein-calorie malnutrition ([facility] [address] ) 08/11/2018    Dyspepsia 08/09/2018    Clostridium difficile colitis 08/06/2018    S/P colostomy takedown 07/25/2018    Diarrhea 07/25/2018    Hair abnormality 04/30/2018    Hair loss 04/26/2018    Adenomatous polyps 04/26/2018    Screening for colon cancer 04/26/2018    Gastroesophageal reflux disease without esophagitis 03/20/2018    Dehydration 02/13/2018    Intractable vomiting with nausea 02/13/2018    Ileus ([facility] [address] ) 01/18/2018    Paroxysmal atrial fibrillation ([facility] [address] ) 01/05/2018    Hematuria 01/03/2018    Microscopic hematuria 12/28/2017    Hypomagnesemia 12/28/2017    Transaminitis 12/27/2017    Severe protein-calorie malnutrition ([facility] [address] ) 12/27/2017    Hypoalbuminemia 12/26/2017    Diverticulitis of colon 12/26/2017    Diverticulitis of large intestine with perforation without abscess or bleeding 12/04/2017    Esophageal reflux 12/04/2017    Hyperlipidemia 12/04/2017    Essential hypertension 12/04/2017    Hypokalemia 12/04/2017    Ache in joint 09/15/2017    Primary osteoarthritis of left knee 09/15/2017    Anemia 09/14/2016    Thyroid disorder 09/14/2016    Non-rheumatic mitral regurgitation 07/22/2016    Fatigue 83/94/9198    Systolic murmur 07/71/2802    Vitamin D deficiency 06/21/2016    Vaginal dryness, menopausal 02/01/2016    Leg pain, left 07/17/2015    Erysipelas 12/12/2014    Diverticulosis 03/17/2014    Vulvitis 08/14/2013     She  has a past surgical history that includes Cholecystectomy; Thyroidectomy, partial; Tonsillectomy and adenoidectomy; Tubal ligation (1997); pr esophagogastroduodenoscopy transoral diagnostic (N/A, 10/21/2016); pr part removal colon w anastomosis (N/A, 1/3/2018); Ureteral stent placement (Bilateral, 1/3/2018); LAPAROTOMY (N/A, 1/4/2018); Colostomy; Esophagogastroduodenoscopy (N/A, 4/6/2018); Endometrial ablation (2007); Hysteroscopy; Thyroidectomy; Colon surgery; Colonoscopy (N/A, 6/15/2018); pr close enterostomy (N/A, 7/11/2018); and Colonoscopy (N/A, 8/10/2018)  Her family history includes Diabetes type II in her mother; Heart failure in her father  She  reports that she quit smoking about 15 years ago  She has a 15 00 pack-year smoking history  She has never used smokeless tobacco  She reports that she drinks about 1 2 oz of alcohol per week   She reports that she does not use drugs    Current Outpatient Prescriptions   Medication Sig Dispense Refill    apixaban (ELIQUIS) 5 mg Take 1 tablet (5 mg total) by mouth 2 (two) times a day 60 tablet 3    diphenoxylate-atropine (LOMOTIL) 2 5-0 025 mg per tablet Take 1 tablet by mouth 4 (four) times a day for 14 days 56 tablet 0    lactobacillus acidophilus-bulgaricus (FLORANEX) packet Take 1 packet by mouth 3 (three) times a day with meals  0    loperamide (IMODIUM) 2 mg capsule Take 2 capsules (4 mg total) by mouth 4 (four) times a day (before meals and at bedtime)  0    magnesium oxide (MAG-OX) 400 mg Take 1 tablet (400 mg total) by mouth 2 (two) times a day with meals (Patient taking differently: Take 800 mg by mouth daily  ) 60 tablet 3    menthol-zinc oxide (CALMOSEPTINE) 0 44-20 6 % OINT Apply topically 2 (two) times a day 71 g 0    metoprolol tartrate (LOPRESSOR) 25 mg tablet Take 1 tablet (25 mg total) by mouth every 12 (twelve) hours 60 tablet 5    pantoprazole (PROTONIX) 40 mg tablet Take 1 tablet (40 mg total) by mouth daily before breakfast  0    potassium chloride (K-DUR,KLOR-CON) 20 mEq tablet Take 1 tablet (20 mEq total) by mouth daily  0    LORazepam (ATIVAN) 0 5 mg tablet Take 1 tablet (0 5 mg total) by mouth daily at bedtime as needed for anxiety for up to 10 days Takes PRN at bedtime 15 tablet 0     No current facility-administered medications for this visit  She has No Known Allergies       Review of Systems   Constitutional: Negative for activity change, appetite change, chills, diaphoresis, fatigue, fever and unexpected weight change  Respiratory: Negative for shortness of breath  Cardiovascular: Negative for chest pain  Gastrointestinal: Positive for diarrhea, nausea and vomiting  Negative for abdominal pain  Objective:      /68   Pulse 71   Temp 97 9 °F (36 6 °C)   Ht 5' 1" (1 549 m)   Wt 74 4 kg (164 lb)   LMP  (LMP Unknown)   BMI 30 99 kg/m²          Physical Exam   Constitutional: She appears well-developed and well-nourished  No distress  Abdominal: Soft  She exhibits no distension and no mass  There is no tenderness  There is no rebound and no guarding  Left lower quadrant previous ostomy site is completely healed  Skin: She is not diaphoretic  Vitals reviewed

## 2018-08-21 NOTE — PROGRESS NOTES
Called  to introduce self and discuss care management / assess services needed and support services pt has in place  No answer left message with contact info to return call

## 2018-08-27 DIAGNOSIS — R19.7 DIARRHEA, UNSPECIFIED TYPE: ICD-10-CM

## 2018-08-27 DIAGNOSIS — A04.72 CLOSTRIDIUM DIFFICILE COLITIS: ICD-10-CM

## 2018-08-27 RX ORDER — DIPHENOXYLATE HYDROCHLORIDE AND ATROPINE SULFATE 2.5; .025 MG/1; MG/1
1 TABLET ORAL 4 TIMES DAILY
Qty: 56 TABLET | Refills: 0 | Status: SHIPPED | OUTPATIENT
Start: 2018-08-27 | End: 2018-09-10

## 2018-09-06 DIAGNOSIS — E87.6 HYPOKALEMIA: ICD-10-CM

## 2018-09-13 ENCOUNTER — TELEPHONE (OUTPATIENT)
Dept: SURGERY | Facility: HOSPITAL | Age: 53
End: 2018-09-13

## 2018-09-13 DIAGNOSIS — R19.7 DIARRHEA, UNSPECIFIED TYPE: Primary | ICD-10-CM

## 2018-09-14 ENCOUNTER — APPOINTMENT (OUTPATIENT)
Dept: LAB | Facility: MEDICAL CENTER | Age: 53
End: 2018-09-14
Payer: COMMERCIAL

## 2018-09-14 DIAGNOSIS — R19.7 DIARRHEA, UNSPECIFIED TYPE: ICD-10-CM

## 2018-09-14 LAB
ALBUMIN SERPL BCP-MCNC: 3.3 G/DL (ref 3.5–5)
ALP SERPL-CCNC: 98 U/L (ref 46–116)
ALT SERPL W P-5'-P-CCNC: 19 U/L (ref 12–78)
ANION GAP SERPL CALCULATED.3IONS-SCNC: 6 MMOL/L (ref 4–13)
AST SERPL W P-5'-P-CCNC: 16 U/L (ref 5–45)
BASOPHILS # BLD AUTO: 0.07 THOUSANDS/ΜL (ref 0–0.1)
BASOPHILS NFR BLD AUTO: 2 % (ref 0–1)
BILIRUB SERPL-MCNC: 1.66 MG/DL (ref 0.2–1)
BUN SERPL-MCNC: 6 MG/DL (ref 5–25)
CALCIUM SERPL-MCNC: 9 MG/DL (ref 8.3–10.1)
CHLORIDE SERPL-SCNC: 107 MMOL/L (ref 100–108)
CO2 SERPL-SCNC: 28 MMOL/L (ref 21–32)
CREAT SERPL-MCNC: 0.68 MG/DL (ref 0.6–1.3)
EOSINOPHIL # BLD AUTO: 0.18 THOUSAND/ΜL (ref 0–0.61)
EOSINOPHIL NFR BLD AUTO: 4 % (ref 0–6)
ERYTHROCYTE [DISTWIDTH] IN BLOOD BY AUTOMATED COUNT: 12.6 % (ref 11.6–15.1)
GFR SERPL CREATININE-BSD FRML MDRD: 99 ML/MIN/1.73SQ M
GLUCOSE P FAST SERPL-MCNC: 84 MG/DL (ref 65–99)
HCT VFR BLD AUTO: 39.6 % (ref 34.8–46.1)
HGB BLD-MCNC: 12.6 G/DL (ref 11.5–15.4)
IMM GRANULOCYTES # BLD AUTO: 0.01 THOUSAND/UL (ref 0–0.2)
IMM GRANULOCYTES NFR BLD AUTO: 0 % (ref 0–2)
LYMPHOCYTES # BLD AUTO: 1.84 THOUSANDS/ΜL (ref 0.6–4.47)
LYMPHOCYTES NFR BLD AUTO: 39 % (ref 14–44)
MCH RBC QN AUTO: 29.2 PG (ref 26.8–34.3)
MCHC RBC AUTO-ENTMCNC: 31.8 G/DL (ref 31.4–37.4)
MCV RBC AUTO: 92 FL (ref 82–98)
MONOCYTES # BLD AUTO: 0.31 THOUSAND/ΜL (ref 0.17–1.22)
MONOCYTES NFR BLD AUTO: 7 % (ref 4–12)
NEUTROPHILS # BLD AUTO: 2.29 THOUSANDS/ΜL (ref 1.85–7.62)
NEUTS SEG NFR BLD AUTO: 48 % (ref 43–75)
NRBC BLD AUTO-RTO: 0 /100 WBCS
PLATELET # BLD AUTO: 258 THOUSANDS/UL (ref 149–390)
PMV BLD AUTO: 11.5 FL (ref 8.9–12.7)
POTASSIUM SERPL-SCNC: 3.5 MMOL/L (ref 3.5–5.3)
PROT SERPL-MCNC: 7 G/DL (ref 6.4–8.2)
RBC # BLD AUTO: 4.32 MILLION/UL (ref 3.81–5.12)
SODIUM SERPL-SCNC: 141 MMOL/L (ref 136–145)
WBC # BLD AUTO: 4.7 THOUSAND/UL (ref 4.31–10.16)

## 2018-09-14 PROCEDURE — 87505 NFCT AGENT DETECTION GI: CPT

## 2018-09-14 PROCEDURE — 87209 SMEAR COMPLEX STAIN: CPT

## 2018-09-14 PROCEDURE — 87177 OVA AND PARASITES SMEARS: CPT

## 2018-09-14 PROCEDURE — 85025 COMPLETE CBC W/AUTO DIFF WBC: CPT

## 2018-09-14 PROCEDURE — 80053 COMPREHEN METABOLIC PANEL: CPT

## 2018-09-14 PROCEDURE — 36415 COLL VENOUS BLD VENIPUNCTURE: CPT

## 2018-09-15 ENCOUNTER — TRANSCRIBE ORDERS (OUTPATIENT)
Dept: LAB | Facility: MEDICAL CENTER | Age: 53
End: 2018-09-15

## 2018-09-15 ENCOUNTER — APPOINTMENT (OUTPATIENT)
Dept: LAB | Facility: MEDICAL CENTER | Age: 53
End: 2018-09-15
Payer: COMMERCIAL

## 2018-09-15 DIAGNOSIS — R19.7 DIARRHEA, UNSPECIFIED TYPE: ICD-10-CM

## 2018-09-15 LAB
CAMPYLOBACTER DNA SPEC NAA+PROBE: NORMAL
SALMONELLA DNA SPEC QL NAA+PROBE: NORMAL
SHIGA TOXIN STX GENE SPEC NAA+PROBE: NORMAL
SHIGELLA DNA SPEC QL NAA+PROBE: NORMAL

## 2018-09-15 PROCEDURE — 87493 C DIFF AMPLIFIED PROBE: CPT

## 2018-09-16 LAB
C DIFF TOX GENS STL QL NAA+PROBE: NORMAL
O+P STL CONC: NORMAL

## 2018-09-17 ENCOUNTER — OFFICE VISIT (OUTPATIENT)
Dept: CARDIOLOGY CLINIC | Facility: CLINIC | Age: 53
End: 2018-09-17
Payer: COMMERCIAL

## 2018-09-17 VITALS
HEIGHT: 61 IN | SYSTOLIC BLOOD PRESSURE: 126 MMHG | BODY MASS INDEX: 31.91 KG/M2 | DIASTOLIC BLOOD PRESSURE: 80 MMHG | HEART RATE: 56 BPM | WEIGHT: 169 LBS

## 2018-09-17 DIAGNOSIS — I10 ESSENTIAL HYPERTENSION: ICD-10-CM

## 2018-09-17 DIAGNOSIS — I48.0 PAROXYSMAL ATRIAL FIBRILLATION (HCC): Primary | ICD-10-CM

## 2018-09-17 PROCEDURE — 99214 OFFICE O/P EST MOD 30 MIN: CPT | Performed by: INTERNAL MEDICINE

## 2018-09-17 RX ORDER — DIPHENOXYLATE HYDROCHLORIDE AND ATROPINE SULFATE 2.5; .025 MG/1; MG/1
1 TABLET ORAL 4 TIMES DAILY PRN
COMMUNITY
End: 2018-09-18 | Stop reason: SDUPTHER

## 2018-09-17 NOTE — PATIENT INSTRUCTIONS

## 2018-09-17 NOTE — PROGRESS NOTES
Subjective:        Patient ID: Debora Stiles is a 46 y o  female  Chief Complaint:  Here for cardiac evaluation/follow-up  She has had 2 bouts of paroxysmal atrial fibrillation once in January and the 2nd episode just recently both after GI surgery/perforated diverticulitis  She had had portions of her colon removed on both bouts  Now she complains of diarrhea and suspects it might be, or thinks it might be from Eliquis  She is seeing a surgeon tomorrow and is aware could be from a short:  She denies any chest pain chest tightness shortness of breath palpitations presyncope syncope TIA or claudication like symptoms  Since on Eliquis no bleeding bruising melena hematochezia or hematuria  CHADS2 stroke risk score is 1  Recent echo reviewed, essentially normal but for mild diastolic dysfunction  No significant valvulopathy  The following portions of the patient's history were reviewed and updated as appropriate: allergies, current medications, past family history, past medical history, past social history, past surgical history and problem list   Review of Systems   Constitution: Negative for chills, diaphoresis, malaise/fatigue and weight gain  HENT: Negative for nosebleeds and stridor  Eyes: Negative for double vision, vision loss in left eye, vision loss in right eye and visual disturbance  Cardiovascular: Negative for chest pain, claudication, cyanosis, dyspnea on exertion, irregular heartbeat, leg swelling, near-syncope, orthopnea, palpitations, paroxysmal nocturnal dyspnea and syncope  Respiratory: Negative for cough, shortness of breath, snoring and wheezing  Endocrine: Negative for polydipsia, polyphagia and polyuria  Hematologic/Lymphatic: Negative for bleeding problem  Does not bruise/bleed easily  Skin: Negative for flushing and rash  Musculoskeletal: Negative for falls and myalgias  Gastrointestinal: Positive for diarrhea   Negative for abdominal pain, heartburn, hematemesis, hematochezia, melena and nausea  Genitourinary: Negative for hematuria  Neurological: Negative for brief paralysis, dizziness, focal weakness, headaches, light-headedness, loss of balance and vertigo  Psychiatric/Behavioral: Negative for altered mental status and substance abuse  Allergic/Immunologic: Negative for hives  Objective:     Physical Exam   Constitutional: She is oriented to person, place, and time  She appears well-developed and well-nourished  HENT:   Head: Normocephalic and atraumatic  Eyes: EOM are normal  Pupils are equal, round, and reactive to light  Neck: Normal range of motion  Neck supple  No JVD present  No thyromegaly present  Cardiovascular: Normal rate, regular rhythm, normal heart sounds and intact distal pulses  Exam reveals no gallop and no friction rub  No murmur heard  Pulmonary/Chest: Effort normal and breath sounds normal  No stridor  No respiratory distress  She has no wheezes  She has no rales  Abdominal: Soft  Bowel sounds are normal  She exhibits no mass  There is no tenderness  Musculoskeletal: Normal range of motion  She exhibits no edema  Lymphadenopathy:     She has no cervical adenopathy  Neurological: She is alert and oriented to person, place, and time  Skin: Skin is warm and dry  No rash noted  No pallor  Psychiatric: She has a normal mood and affect  Her behavior is normal  Judgment and thought content normal    Nursing note and vitals reviewed  Lab Review:   Appointment on 09/15/2018   Component Date Value    C difficile toxin by PCR 09/15/2018 NEGATIVE for C difficle toxin by PCR       Appointment on 09/14/2018   Component Date Value    WBC 09/14/2018 4 70     RBC 09/14/2018 4 32     Hemoglobin 09/14/2018 12 6     Hematocrit 09/14/2018 39 6     MCV 09/14/2018 92     MCH 09/14/2018 29 2     MCHC 09/14/2018 31 8     RDW 09/14/2018 12 6     MPV 09/14/2018 11 5     Platelets 99/27/4793 258     nRBC 09/14/2018 0     Neutrophils Relative 09/14/2018 48     Immat GRANS % 09/14/2018 0     Lymphocytes Relative 09/14/2018 39     Monocytes Relative 09/14/2018 7     Eosinophils Relative 09/14/2018 4     Basophils Relative 09/14/2018 2*    Neutrophils Absolute 09/14/2018 2 29     Immature Grans Absolute 09/14/2018 0 01     Lymphocytes Absolute 09/14/2018 1 84     Monocytes Absolute 09/14/2018 0 31     Eosinophils Absolute 09/14/2018 0 18     Basophils Absolute 09/14/2018 0 07     Sodium 09/14/2018 141     Potassium 09/14/2018 3 5     Chloride 09/14/2018 107     CO2 09/14/2018 28     ANION GAP 09/14/2018 6     BUN 09/14/2018 6     Creatinine 09/14/2018 0 68     Glucose, Fasting 09/14/2018 84     Calcium 09/14/2018 9 0     AST 09/14/2018 16     ALT 09/14/2018 19     Alkaline Phosphatase 09/14/2018 98     Total Protein 09/14/2018 7 0     Albumin 09/14/2018 3 3*    Total Bilirubin 09/14/2018 1 66*    eGFR 09/14/2018 99     Salmonella sp PCR 09/14/2018 None Detected     Shigella sp/Enteroinvasi* 09/14/2018 None Detected     Campylobacter sp (jejuni* 09/14/2018 None Detected     Shiga toxin 1/Shiga toxi* 09/14/2018 None Detected     Ova + Parasite Exam 09/14/2018 No ova, cysts, or parasites seen     One negative specimen does not rule out the possibility of a  parasitic infection  Admission on 08/03/2018, Discharged on 08/15/2018   No results displayed because visit has over 200 results        Appointment on 07/25/2018   Component Date Value    Sodium 07/25/2018 140     Potassium 07/25/2018 3 8     Chloride 07/25/2018 98*    CO2 07/25/2018 28     ANION GAP 07/25/2018 14*    BUN 07/25/2018 7     Creatinine 07/25/2018 0 82     Glucose 07/25/2018 99     Calcium 07/25/2018 9 9     eGFR 07/25/2018 81     WBC 07/25/2018 12 35*    RBC 07/25/2018 4 25     Hemoglobin 07/25/2018 13 3     Hematocrit 07/25/2018 38 5     MCV 07/25/2018 91     MCH 07/25/2018 31 3     MCHC 07/25/2018 34 5     RDW 07/25/2018 12 7     MPV 07/25/2018 9 3     Platelets 81/52/9663 607*    C difficile toxin by PCR 07/26/2018 NEGATIVE for C difficle toxin by PCR   Segmented % 07/25/2018 74     Lymphocytes % 07/25/2018 24     Monocytes % 07/25/2018 0*    Eosinophils % 07/25/2018 2     Basophils % 07/25/2018 0     Absolute Neutrophils 07/25/2018 9 14*    Lymphocytes Absolute 07/25/2018 2 96     Monocytes Absolute 07/25/2018 0 00     Eosinophils Absolute 07/25/2018 0 25     Basophils Absolute 07/25/2018 0 00     Total Counted 07/25/2018 100     Platelet Estimate 66/46/4428 Increased*   Admission on 07/11/2018, Discharged on 07/18/2018   No results displayed because visit has over 200 results        Hospital Outpatient Visit on 06/27/2018   Component Date Value    Ventricular Rate 06/27/2018 63     Atrial Rate 06/27/2018 63     MT Interval 06/27/2018 158     QRSD Interval 06/27/2018 100     QT Interval 06/27/2018 390     QTC Interval 06/27/2018 399     P Axis 06/27/2018 59     QRS Axis 06/27/2018 73     T Wave Axis 06/27/2018 49    Appointment on 06/27/2018   Component Date Value    Sodium 06/27/2018 143     Potassium 06/27/2018 3 9     Chloride 06/27/2018 106     CO2 06/27/2018 29     ANION GAP 06/27/2018 8     BUN 06/27/2018 12     Creatinine 06/27/2018 0 68     Glucose, Fasting 06/27/2018 86     Calcium 06/27/2018 9 1     AST 06/27/2018 16     ALT 06/27/2018 28     Alkaline Phosphatase 06/27/2018 102     Total Protein 06/27/2018 6 7     Albumin 06/27/2018 3 5     Total Bilirubin 06/27/2018 1 40*    eGFR 06/27/2018 100     WBC 06/27/2018 5 31     RBC 06/27/2018 4 47     Hemoglobin 06/27/2018 14 5     Hematocrit 06/27/2018 41 0     MCV 06/27/2018 92     MCH 06/27/2018 32 4     MCHC 06/27/2018 35 4     RDW 06/27/2018 12 5     MPV 06/27/2018 10 4     Platelets 35/40/5087 251     Neutrophils Relative 06/27/2018 58     Lymphocytes Relative 06/27/2018 33     Monocytes Relative 06/27/2018 6     Eosinophils Relative 06/27/2018 2     Basophils Relative 06/27/2018 1     Neutrophils Absolute 06/27/2018 3 07     Lymphocytes Absolute 06/27/2018 1 73     Monocytes Absolute 06/27/2018 0 34     Eosinophils Absolute 06/27/2018 0 13     Basophils Absolute 06/27/2018 0 04     PTT 06/27/2018 30     Protime 06/27/2018 12 3     INR 06/27/2018 0 96     Hemoglobin A1C 06/27/2018 4 7     EAG 06/27/2018 88     Magnesium 06/27/2018 1 8    Admission on 06/15/2018, Discharged on 06/15/2018   Component Date Value    Case Report 06/15/2018                      Value:Surgical Pathology Report                         Case: [id number]                                   Authorizing Provider:  Ronaldo Arevalo DO        Collected:           06/15/2018 1120              Ordering Location:     [facility] Received:            06/15/2018 1338                                     Operating Room                                                               Pathologist:           Skyla Hitchcock DO                                                            Specimen:    Large Intestine, Cecum, Cecal polyp, retrieved with cold biopsy forcep                     Final Diagnosis 06/15/2018                      Value: This result contains rich text formatting which cannot be displayed here   Additional Information 06/15/2018                      Value: This result contains rich text formatting which cannot be displayed here  Jacob Editaty Gross Description 06/15/2018                      Value: This result contains rich text formatting which cannot be displayed here     Appointment on 06/06/2018   Component Date Value    Magnesium 06/06/2018 1 7     Sodium 06/06/2018 142     Potassium 06/06/2018 4 1     Chloride 06/06/2018 106     CO2 06/06/2018 29     ANION GAP 06/06/2018 7     BUN 06/06/2018 12     Creatinine 06/06/2018 0 72     Glucose, Fasting 06/06/2018 92     Calcium 06/06/2018 9 4     AST 06/06/2018 20     ALT 06/06/2018 34     Alkaline Phosphatase 06/06/2018 106     Total Protein 06/06/2018 6 7     Albumin 06/06/2018 3 5     Total Bilirubin 06/06/2018 1 80*    eGFR 06/06/2018 96    Appointment on 05/15/2018   Component Date Value    Sodium 05/15/2018 140     Potassium 05/15/2018 3 6     Chloride 05/15/2018 109*    CO2 05/15/2018 27     ANION GAP 05/15/2018 4     BUN 05/15/2018 11     Creatinine 05/15/2018 0 67     Glucose, Fasting 05/15/2018 91     Calcium 05/15/2018 9 5     eGFR 05/15/2018 101     Magnesium 05/15/2018 1 8    There may be more visits with results that are not included  Assessment:       1  Paroxysmal atrial fibrillation (HCC)  rivaroxaban (XARELTO) 20 mg tablet   2  Essential hypertension          Plan:       Mehreen Potter has recurrent paroxysmal atrial fibrillation with an intermediate stroke risk score and continues to have some bleeding risk  Her AFib bouts are asymptomatic so it may be even more frequent  Risk, benefit, alternatives of different anticoagulants including aspirin versus full anticoagulation discussed at length, feel risk less than benefit of continuing anticoagulation at this time  In light of the possibility of Eliquis affecting her bowel movements will change her to Xarelto 20 mg a day  Will continue metoprolol, current dose  Explained how the metoprolol as the medication that can prevent recurrent AFib and tachycardia  Briefly discussed that should she have recurrent GI bleeding on full anticoagulation, or not want to take lifelong anticoagulation option would be the Watchman device or ablation then implanted loop recorder to make sure there is no recurrent AFib post ablation prior to discontinuing full anticoagulants  For now I recommend conservative therapy of rate control and anticoagulation as stated above      Return office 1 month, if diarrhea persists on Xarelto may try Pradaxa  I asked her to give me a note from her surgeon who she has seen tomorrow

## 2018-09-18 ENCOUNTER — OFFICE VISIT (OUTPATIENT)
Dept: SURGERY | Facility: HOSPITAL | Age: 53
End: 2018-09-18

## 2018-09-18 VITALS
HEIGHT: 61 IN | WEIGHT: 168 LBS | HEART RATE: 63 BPM | DIASTOLIC BLOOD PRESSURE: 67 MMHG | SYSTOLIC BLOOD PRESSURE: 106 MMHG | BODY MASS INDEX: 31.72 KG/M2

## 2018-09-18 DIAGNOSIS — K90.9 DIARRHEA DUE TO MALABSORPTION: Primary | ICD-10-CM

## 2018-09-18 DIAGNOSIS — Z90.49 S/P PARTIAL COLECTOMY: ICD-10-CM

## 2018-09-18 DIAGNOSIS — R19.7 DIARRHEA DUE TO MALABSORPTION: Primary | ICD-10-CM

## 2018-09-18 PROCEDURE — 99024 POSTOP FOLLOW-UP VISIT: CPT | Performed by: SURGERY

## 2018-09-18 RX ORDER — DIPHENOXYLATE HYDROCHLORIDE AND ATROPINE SULFATE 2.5; .025 MG/1; MG/1
1 TABLET ORAL 4 TIMES DAILY PRN
Qty: 30 TABLET | Refills: 0 | Status: SHIPPED | OUTPATIENT
Start: 2018-09-18 | End: 2018-09-28 | Stop reason: SDUPTHER

## 2018-09-18 NOTE — PROGRESS NOTES
Assessment/Plan:    Diarrhea due to malabsorption  -     diphenoxylate-atropine (LOMOTIL) 2 5-0 025 mg per tablet; Take 1 tablet by mouth 4 (four) times a day as needed for diarrhea  -add Fiber to diet 3x daily  Follow up in 3 months    S/P partial colectomy      Subjective:      Patient ID: Justin Richardson is a 46 y o  female s/p colectomy for severe diverticulitis approximately 2 months ago  Still having up to 10 watery bowel movements per day  Taking lomotil and imodium  Otherwise doing well  HPI    The following portions of the patient's history were reviewed and updated as appropriate: allergies, current medications, past family history, past medical history, past social history, past surgical history and problem list     Review of Systems   Constitutional: Negative for fatigue and fever  HENT: Negative  Cardiovascular: Negative  Gastrointestinal: Positive for abdominal pain, diarrhea and rectal pain  Negative for nausea  Genitourinary: Negative  Objective:      /67 (BP Location: Left arm, Patient Position: Sitting)   Pulse 63   Ht 5' 1" (1 549 m)   Wt 76 2 kg (168 lb)   LMP  (LMP Unknown)   BMI 31 74 kg/m²          Physical Exam   Constitutional: She is oriented to person, place, and time  She appears well-nourished  No distress  Eyes: EOM are normal  No scleral icterus  Neck: Neck supple  No thyromegaly present  Abdominal: Soft  Bowel sounds are normal  She exhibits no distension  There is no tenderness  There is no rebound and no guarding  Musculoskeletal: She exhibits no edema  Neurological: She is alert and oriented to person, place, and time  No cranial nerve deficit  Skin: Skin is warm and dry  No rash noted  No erythema  Psychiatric: She has a normal mood and affect   Her behavior is normal  Judgment and thought content normal

## 2018-09-28 DIAGNOSIS — R19.7 DIARRHEA DUE TO MALABSORPTION: ICD-10-CM

## 2018-09-28 DIAGNOSIS — A04.72 CLOSTRIDIUM DIFFICILE COLITIS: ICD-10-CM

## 2018-09-28 DIAGNOSIS — K90.9 DIARRHEA DUE TO MALABSORPTION: ICD-10-CM

## 2018-09-28 RX ORDER — DIPHENOXYLATE HYDROCHLORIDE AND ATROPINE SULFATE 2.5; .025 MG/1; MG/1
1 TABLET ORAL 4 TIMES DAILY PRN
Qty: 30 TABLET | Refills: 0 | Status: SHIPPED | OUTPATIENT
Start: 2018-09-28 | End: 2018-10-09 | Stop reason: SDUPTHER

## 2018-10-02 DIAGNOSIS — E87.6 HYPOKALEMIA: ICD-10-CM

## 2018-10-02 DIAGNOSIS — R11.2 INTRACTABLE VOMITING WITH NAUSEA, UNSPECIFIED VOMITING TYPE: ICD-10-CM

## 2018-10-03 RX ORDER — POTASSIUM CHLORIDE 20 MEQ/1
20 TABLET, EXTENDED RELEASE ORAL DAILY
Qty: 90 TABLET | Refills: 1 | OUTPATIENT
Start: 2018-10-03

## 2018-10-03 NOTE — TELEPHONE ENCOUNTER
Pt has active lab orders in EMR from hospital encounter 8/15/18 (CMP, CBC, & Magnesium) - will call Pt with message this morning

## 2018-10-04 ENCOUNTER — APPOINTMENT (OUTPATIENT)
Dept: LAB | Facility: MEDICAL CENTER | Age: 53
End: 2018-10-04
Payer: COMMERCIAL

## 2018-10-04 DIAGNOSIS — E87.6 HYPOKALEMIA: ICD-10-CM

## 2018-10-04 DIAGNOSIS — A04.72 CLOSTRIDIUM DIFFICILE COLITIS: ICD-10-CM

## 2018-10-04 DIAGNOSIS — R74.01 TRANSAMINITIS: ICD-10-CM

## 2018-10-04 DIAGNOSIS — E83.42 HYPOMAGNESEMIA: ICD-10-CM

## 2018-10-04 DIAGNOSIS — D64.9 ANEMIA, UNSPECIFIED TYPE: ICD-10-CM

## 2018-10-04 LAB
ALBUMIN SERPL BCP-MCNC: 3.2 G/DL (ref 3.5–5)
ALP SERPL-CCNC: 112 U/L (ref 46–116)
ALT SERPL W P-5'-P-CCNC: 18 U/L (ref 12–78)
ANION GAP SERPL CALCULATED.3IONS-SCNC: 7 MMOL/L (ref 4–13)
AST SERPL W P-5'-P-CCNC: 15 U/L (ref 5–45)
BASOPHILS # BLD AUTO: 0.06 THOUSANDS/ΜL (ref 0–0.1)
BASOPHILS NFR BLD AUTO: 1 % (ref 0–1)
BILIRUB SERPL-MCNC: 1.85 MG/DL (ref 0.2–1)
BUN SERPL-MCNC: 11 MG/DL (ref 5–25)
CALCIUM SERPL-MCNC: 8.7 MG/DL (ref 8.3–10.1)
CHLORIDE SERPL-SCNC: 105 MMOL/L (ref 100–108)
CO2 SERPL-SCNC: 26 MMOL/L (ref 21–32)
CREAT SERPL-MCNC: 0.7 MG/DL (ref 0.6–1.3)
EOSINOPHIL # BLD AUTO: 0.16 THOUSAND/ΜL (ref 0–0.61)
EOSINOPHIL NFR BLD AUTO: 3 % (ref 0–6)
ERYTHROCYTE [DISTWIDTH] IN BLOOD BY AUTOMATED COUNT: 12.6 % (ref 11.6–15.1)
GFR SERPL CREATININE-BSD FRML MDRD: 99 ML/MIN/1.73SQ M
GLUCOSE P FAST SERPL-MCNC: 93 MG/DL (ref 65–99)
HCT VFR BLD AUTO: 39.2 % (ref 34.8–46.1)
HGB BLD-MCNC: 12.6 G/DL (ref 11.5–15.4)
IMM GRANULOCYTES # BLD AUTO: 0.01 THOUSAND/UL (ref 0–0.2)
IMM GRANULOCYTES NFR BLD AUTO: 0 % (ref 0–2)
LYMPHOCYTES # BLD AUTO: 2.07 THOUSANDS/ΜL (ref 0.6–4.47)
LYMPHOCYTES NFR BLD AUTO: 37 % (ref 14–44)
MAGNESIUM SERPL-MCNC: 1.8 MG/DL (ref 1.6–2.6)
MCH RBC QN AUTO: 28.8 PG (ref 26.8–34.3)
MCHC RBC AUTO-ENTMCNC: 32.1 G/DL (ref 31.4–37.4)
MCV RBC AUTO: 90 FL (ref 82–98)
MONOCYTES # BLD AUTO: 0.39 THOUSAND/ΜL (ref 0.17–1.22)
MONOCYTES NFR BLD AUTO: 7 % (ref 4–12)
NEUTROPHILS # BLD AUTO: 2.86 THOUSANDS/ΜL (ref 1.85–7.62)
NEUTS SEG NFR BLD AUTO: 52 % (ref 43–75)
NRBC BLD AUTO-RTO: 0 /100 WBCS
PLATELET # BLD AUTO: 222 THOUSANDS/UL (ref 149–390)
PMV BLD AUTO: 11.4 FL (ref 8.9–12.7)
POTASSIUM SERPL-SCNC: 3.5 MMOL/L (ref 3.5–5.3)
PROT SERPL-MCNC: 6.8 G/DL (ref 6.4–8.2)
RBC # BLD AUTO: 4.38 MILLION/UL (ref 3.81–5.12)
SODIUM SERPL-SCNC: 138 MMOL/L (ref 136–145)
WBC # BLD AUTO: 5.55 THOUSAND/UL (ref 4.31–10.16)

## 2018-10-04 PROCEDURE — 80053 COMPREHEN METABOLIC PANEL: CPT

## 2018-10-04 PROCEDURE — 36415 COLL VENOUS BLD VENIPUNCTURE: CPT

## 2018-10-04 PROCEDURE — 83735 ASSAY OF MAGNESIUM: CPT

## 2018-10-04 PROCEDURE — 85025 COMPLETE CBC W/AUTO DIFF WBC: CPT

## 2018-10-09 DIAGNOSIS — E87.6 HYPOKALEMIA: ICD-10-CM

## 2018-10-09 DIAGNOSIS — R11.2 INTRACTABLE VOMITING WITH NAUSEA, UNSPECIFIED VOMITING TYPE: ICD-10-CM

## 2018-10-09 DIAGNOSIS — K90.9 DIARRHEA DUE TO MALABSORPTION: ICD-10-CM

## 2018-10-09 DIAGNOSIS — R19.7 DIARRHEA DUE TO MALABSORPTION: ICD-10-CM

## 2018-10-09 RX ORDER — POTASSIUM CHLORIDE 20 MEQ/1
20 TABLET, EXTENDED RELEASE ORAL DAILY
Qty: 30 TABLET | Refills: 5 | Status: SHIPPED | OUTPATIENT
Start: 2018-10-09 | End: 2019-03-18

## 2018-10-09 RX ORDER — DIPHENOXYLATE HYDROCHLORIDE AND ATROPINE SULFATE 2.5; .025 MG/1; MG/1
1 TABLET ORAL 4 TIMES DAILY PRN
Qty: 30 TABLET | Refills: 1 | Status: SHIPPED | OUTPATIENT
Start: 2018-10-09 | End: 2019-09-12

## 2018-11-09 ENCOUNTER — OFFICE VISIT (OUTPATIENT)
Dept: GASTROENTEROLOGY | Facility: HOSPITAL | Age: 53
End: 2018-11-09
Payer: COMMERCIAL

## 2018-11-09 ENCOUNTER — TELEPHONE (OUTPATIENT)
Dept: GASTROENTEROLOGY | Facility: CLINIC | Age: 53
End: 2018-11-09

## 2018-11-09 VITALS
SYSTOLIC BLOOD PRESSURE: 141 MMHG | DIASTOLIC BLOOD PRESSURE: 96 MMHG | WEIGHT: 165.2 LBS | HEART RATE: 75 BPM | BODY MASS INDEX: 31.19 KG/M2 | TEMPERATURE: 96.6 F | HEIGHT: 61 IN

## 2018-11-09 DIAGNOSIS — R17 TOTAL BILIRUBIN, ELEVATED: ICD-10-CM

## 2018-11-09 DIAGNOSIS — R19.7 DIARRHEA, UNSPECIFIED TYPE: Primary | ICD-10-CM

## 2018-11-09 PROCEDURE — 99213 OFFICE O/P EST LOW 20 MIN: CPT | Performed by: PHYSICIAN ASSISTANT

## 2018-11-09 RX ORDER — LANOLIN ALCOHOL/MO/W.PET/CERES
CREAM (GRAM) TOPICAL
Refills: 0 | COMMUNITY
Start: 2018-10-18 | End: 2018-11-09 | Stop reason: SDUPTHER

## 2018-11-09 NOTE — PROGRESS NOTES
[facility] - Outpatient Follow-up Note  Robinson Kurtz 46 y o  female MRN: [id number]  Encounter: [id number]          ASSESSMENT AND PLAN:      1  Diarrhea:   History of diverticulitis status post Pj's procedure with ileocecectomy due to adhesions and reversal July/2018  This was complicated by C diff  She was treated with vancomycin  She states she is still having 10 or more liquid BMs daily  She denies any abdominal pain but states that this is affecting her quality of life  She has tried Imodium without relief as well as align  She has tried questionable was unable to tolerate this  We will recheck a C diff to rule out infection and start colestipol as well as Imodium  Could consider Lomotil or tincture of opium in the future if no relief  -will try to taper off PPI  -recommend starting align probiotic  - Clostridium difficile toxin by PCR  - Pancreatic elastase, fecal  -colestipol up to 30g daily  -imodium up to 16mg daily   -follow up in 3 months     2  Elevated tbili: asymptomatic  -check RUQ u/s  -check direct bilirubin     ______________________________________________________________________    SUBJECTIVE:  41-year-old female with a past medical history of diverticulitis status post Pj's procedure with reversal of an ileocecectomy on July 11, 2018  She was seen in the hospital in August for diarrhea and was found to be C diff positive  She was treated with vancomycin  She also underwent a colonoscopy 08/2018 which was negative for pseudomembranes  She had another C diff study in September it was again negative  She admits to having 10 or more watery bowel movements on a daily basis  She states this is affecting her quality of life  She denies any melena or hematochezia  She denies dysphagia, GERD, abdominal pain  REVIEW OF SYSTEMS IS OTHERWISE NEGATIVE        Historical Information   Past Medical History:   Diagnosis Date    Atrial fibrillation ([facility] [address] )     Disease of thyroid gland     Endometrial polyp     Gastroesophageal reflux disease without esophagitis 12/4/2017    Hand injury     LAST ASSESSED: [id number]    High risk medication use     LAST ASSESSED: [id number]    Hyperlipidemia     Hypertension     Renal colic     LAST ASSESSED: [id number]    Viral syndrome     LAST ASSESSED: [id number]    Vitamin D deficiency disease      Past Surgical History:   Procedure Laterality Date    CHOLECYSTECTOMY      COLON SURGERY      COLONOSCOPY N/A 6/15/2018    Procedure: COLONOSCOPY;  Surgeon: Shlomo Kelly DO;  [facility];  Service: Gastroenterology    COLONOSCOPY N/A 8/10/2018    Procedure: COLONOSCOPY;  Surgeon: Shlomo Kelly DO;  [facility];  Service: Gastroenterology    COLOSTOMY      ENDOMETRIAL ABLATION  2007    THERMAL    ESOPHAGOGASTRODUODENOSCOPY N/A 4/6/2018    Procedure: ESOPHAGOGASTRODUODENOSCOPY (EGD);   Surgeon: Calli Caicedo MD;  [facility];  Service: Gastroenterology   [address] [place] [address], 2002, 2007    LAPAROTOMY N/A 1/4/2018    Procedure: LAPAROTOMY EXPLORATORY, 2nd look, small bowel resection, ilieostomy closure via handsown anastamosis transverse colostomy;  Surgeon: Delio Garcia DO;  [facility];  Service: General    NE CLOSE ENTEROSTOMY N/A 7/11/2018    Procedure: COLOSTOMY TAKEDOWN / REVERSE COATES, ILEOCECECTOMY, LASER FLUORESCENCE ANGIOGRAPHY, LYSIS OF ADHESIONS;  Surgeon: Keeley Ulloa DO;  [facility];  Service: General    NE ESOPHAGOGASTRODUODENOSCOPY TRANSORAL DIAGNOSTIC N/A 10/21/2016    Procedure: EGD AND COLONOSCOPY;  Surgeon: Yesica Lima MD;  [facility];  Service: Gastroenterology    NE PART REMOVAL COLON W ANASTOMOSIS N/A 1/3/2018    Procedure: Exploratory laparotomy, RESECTION COLON SIGMOID, possible ostomy;  Surgeon: Keeley Ulloa DO;  [facility];  Service: General    THYROIDECTOMY      NEAR-TOTAL    THYROIDECTOMY, PARTIAL      TONSILLECTOMY AND ADENOIDECTOMY      TUBAL LIGATION  1997    URETERAL STENT PLACEMENT Bilateral 1/3/2018    Procedure: INSERTION STENT URETERAL;  Surgeon: Colleen Domingo MD;  [facility];  Service: Urology     Social History   History   Alcohol Use    1 2 oz/week    2 Standard drinks or equivalent per week     Comment: 1-2 mixed drinks per weekend     History   Drug Use No     History   Smoking Status    Former Smoker    Packs/day: 0 50    Years: 30 00    Quit date: 2003   Smokeless Tobacco    Never Used     Family History   Problem Relation Age of Onset    Diabetes type II Mother     Heart failure Father        Meds/Allergies       Current Outpatient Prescriptions:     diphenoxylate-atropine (LOMOTIL) 2 5-0 025 mg per tablet    lactobacillus acidophilus-bulgaricus (FLORANEX) packet    loperamide (IMODIUM) 2 mg capsule    magnesium oxide (MAG-OX) 400 mg    menthol-zinc oxide (CALMOSEPTINE) 0 44-20 6 % OINT    metoprolol tartrate (LOPRESSOR) 25 mg tablet    potassium chloride (K-DUR,KLOR-CON) 20 mEq tablet    rivaroxaban (XARELTO) 20 mg tablet    LORazepam (ATIVAN) 0 5 mg tablet    No Known Allergies        Objective     Blood pressure 141/96, pulse 75, temperature (!) 96 6 °F (35 9 °C), temperature source Tympanic, height 5' 1" (1 549 m), weight 74 9 kg (165 lb 3 2 oz)  Body mass index is 31 21 kg/m²  PHYSICAL EXAM:      General Appearance:   Alert, cooperative, no distress   HEENT:   Normocephalic, atraumatic, anicteric  Right eye exhibits no discharge  Left eye exhibits no discharge  No scleral icterus    Neck:  Supple, symmetrical, trachea midline, no stridor    Lungs:   Clear to auscultation bilaterally; no rales, rhonchi or wheezing; respirations unlabored    Heart[de-identified]   Regular rate and rhythm; no murmur, rub, or gallop     Abdomen:   Soft, non-tender, non-distended; normal bowel sounds; no masses, no organomegaly    Genitalia:   Deferred    Rectal:   Deferred  Extremities:  No cyanosis, clubbing or edema        Skin:  No jaundice, rashes, or lesions          Lab Results:   No visits with results within 1 Day(s) from this visit  Latest known visit with results is:   Appointment on 10/04/2018   Component Date Value    WBC 10/04/2018 5 55     RBC 10/04/2018 4 38     Hemoglobin 10/04/2018 12 6     Hematocrit 10/04/2018 39 2     MCV 10/04/2018 90     MCH 10/04/2018 28 8     MCHC 10/04/2018 32 1     RDW 10/04/2018 12 6     MPV 10/04/2018 11 4     Platelets 31/27/3113 222     nRBC 10/04/2018 0     Neutrophils Relative 10/04/2018 52     Immat GRANS % 10/04/2018 0     Lymphocytes Relative 10/04/2018 37     Monocytes Relative 10/04/2018 7     Eosinophils Relative 10/04/2018 3     Basophils Relative 10/04/2018 1     Neutrophils Absolute 10/04/2018 2 86     Immature Grans Absolute 10/04/2018 0 01     Lymphocytes Absolute 10/04/2018 2 07     Monocytes Absolute 10/04/2018 0 39     Eosinophils Absolute 10/04/2018 0 16     Basophils Absolute 10/04/2018 0 06     Sodium 10/04/2018 138     Potassium 10/04/2018 3 5     Chloride 10/04/2018 105     CO2 10/04/2018 26     ANION GAP 10/04/2018 7     BUN 10/04/2018 11     Creatinine 10/04/2018 0 70     Glucose, Fasting 10/04/2018 93     Calcium 10/04/2018 8 7     AST 10/04/2018 15     ALT 10/04/2018 18     Alkaline Phosphatase 10/04/2018 112     Total Protein 10/04/2018 6 8     Albumin 10/04/2018 3 2*    Total Bilirubin 10/04/2018 1 85*    eGFR 10/04/2018 99     Magnesium 10/04/2018 1 8          Radiology Results:   No results found

## 2018-11-09 NOTE — TELEPHONE ENCOUNTER
----- Message from Clifford Pantoja PA-C sent at 11/9/2018  2:21 PM EST -----  Please let the patient know I have ordered a blood test and an ultrasound of her liver as her total bilirubin remains slightly elevated since hospitalization   Thank you

## 2018-11-12 ENCOUNTER — OFFICE VISIT (OUTPATIENT)
Dept: CARDIOLOGY CLINIC | Facility: CLINIC | Age: 53
End: 2018-11-12
Payer: COMMERCIAL

## 2018-11-12 ENCOUNTER — APPOINTMENT (OUTPATIENT)
Dept: LAB | Facility: MEDICAL CENTER | Age: 53
End: 2018-11-12
Payer: COMMERCIAL

## 2018-11-12 VITALS
HEIGHT: 61 IN | HEART RATE: 60 BPM | DIASTOLIC BLOOD PRESSURE: 80 MMHG | SYSTOLIC BLOOD PRESSURE: 122 MMHG | WEIGHT: 167 LBS | BODY MASS INDEX: 31.53 KG/M2

## 2018-11-12 DIAGNOSIS — R19.7 DIARRHEA, UNSPECIFIED TYPE: ICD-10-CM

## 2018-11-12 DIAGNOSIS — R17 TOTAL BILIRUBIN, ELEVATED: ICD-10-CM

## 2018-11-12 DIAGNOSIS — I48.0 PAROXYSMAL ATRIAL FIBRILLATION (HCC): Primary | ICD-10-CM

## 2018-11-12 DIAGNOSIS — I10 ESSENTIAL HYPERTENSION: ICD-10-CM

## 2018-11-12 LAB — BILIRUB DIRECT SERPL-MCNC: 0.32 MG/DL (ref 0–0.2)

## 2018-11-12 PROCEDURE — 82656 EL-1 FECAL QUAL/SEMIQ: CPT | Performed by: PHYSICIAN ASSISTANT

## 2018-11-12 PROCEDURE — 99214 OFFICE O/P EST MOD 30 MIN: CPT | Performed by: INTERNAL MEDICINE

## 2018-11-12 PROCEDURE — 36415 COLL VENOUS BLD VENIPUNCTURE: CPT

## 2018-11-12 PROCEDURE — 87493 C DIFF AMPLIFIED PROBE: CPT | Performed by: PHYSICIAN ASSISTANT

## 2018-11-12 PROCEDURE — 82248 BILIRUBIN DIRECT: CPT

## 2018-11-12 RX ORDER — PANTOPRAZOLE SODIUM 40 MG/1
40 TABLET, DELAYED RELEASE ORAL DAILY
COMMUNITY
End: 2020-04-28 | Stop reason: SDUPTHER

## 2018-11-12 NOTE — PROGRESS NOTES
Subjective:        Patient ID: Nazanin Mehta is a 46 y o  female  Chief Complaint:  Clyde Caldera is here for routine follow-up  Fortunately she has no cardiopulmonary symptoms on extensive review of systems questioning  Specifically no chest pains, no shortness of breath no palpitations, no melena, no hematochezia, no overt bruising or bleeding issues  I reviewed her recent GI note  The following portions of the patient's history were reviewed and updated as appropriate: allergies, current medications, past family history, past medical history, past social history, past surgical history and problem list   Review of Systems   Constitution: Negative for chills, diaphoresis, malaise/fatigue and weight gain  HENT: Negative for nosebleeds and stridor  Eyes: Negative for double vision, vision loss in left eye, vision loss in right eye and visual disturbance  Cardiovascular: Negative for chest pain, claudication, cyanosis, dyspnea on exertion, irregular heartbeat, leg swelling, near-syncope, orthopnea, palpitations, paroxysmal nocturnal dyspnea and syncope  Respiratory: Negative for cough, shortness of breath, snoring and wheezing  Endocrine: Negative for polydipsia, polyphagia and polyuria  Hematologic/Lymphatic: Negative for bleeding problem  Does not bruise/bleed easily  Skin: Negative for flushing and rash  Musculoskeletal: Negative for falls and myalgias  Gastrointestinal: Negative for abdominal pain, heartburn, hematemesis, hematochezia, melena and nausea  Genitourinary: Negative for hematuria  Neurological: Negative for brief paralysis, dizziness, focal weakness, headaches, light-headedness, loss of balance and vertigo  Psychiatric/Behavioral: Negative for altered mental status and substance abuse  Allergic/Immunologic: Negative for hives            Objective:      /80   Pulse 60   Ht 5' 1" (1 549 m)   Wt 75 8 kg (167 lb)   LMP  (LMP Unknown)   BMI 31 55 kg/m² Physical Exam   Constitutional: She is oriented to person, place, and time  She appears well-developed and well-nourished  HENT:   Head: Normocephalic and atraumatic  Eyes: Pupils are equal, round, and reactive to light  EOM are normal    Neck: Normal range of motion  Neck supple  No JVD present  No thyromegaly present  Cardiovascular: Normal rate, regular rhythm, normal heart sounds and intact distal pulses  Exam reveals no gallop and no friction rub  No murmur heard  Pulmonary/Chest: Effort normal and breath sounds normal  No stridor  No respiratory distress  She has no wheezes  She has no rales  Abdominal: Soft  Bowel sounds are normal  She exhibits no mass  There is no tenderness  Musculoskeletal: Normal range of motion  She exhibits no edema  Lymphadenopathy:     She has no cervical adenopathy  Neurological: She is alert and oriented to person, place, and time  Skin: Skin is warm and dry  No rash noted  No pallor  Psychiatric: She has a normal mood and affect  Her behavior is normal  Judgment and thought content normal    Nursing note and vitals reviewed        Lab Review:   Appointment on 10/04/2018   Component Date Value    WBC 10/04/2018 5 55     RBC 10/04/2018 4 38     Hemoglobin 10/04/2018 12 6     Hematocrit 10/04/2018 39 2     MCV 10/04/2018 90     MCH 10/04/2018 28 8     MCHC 10/04/2018 32 1     RDW 10/04/2018 12 6     MPV 10/04/2018 11 4     Platelets 45/68/1998 222     nRBC 10/04/2018 0     Neutrophils Relative 10/04/2018 52     Immat GRANS % 10/04/2018 0     Lymphocytes Relative 10/04/2018 37     Monocytes Relative 10/04/2018 7     Eosinophils Relative 10/04/2018 3     Basophils Relative 10/04/2018 1     Neutrophils Absolute 10/04/2018 2 86     Immature Grans Absolute 10/04/2018 0 01     Lymphocytes Absolute 10/04/2018 2 07     Monocytes Absolute 10/04/2018 0 39     Eosinophils Absolute 10/04/2018 0 16     Basophils Absolute 10/04/2018 0 06     Sodium 10/04/2018 138     Potassium 10/04/2018 3 5     Chloride 10/04/2018 105     CO2 10/04/2018 26     ANION GAP 10/04/2018 7     BUN 10/04/2018 11     Creatinine 10/04/2018 0 70     Glucose, Fasting 10/04/2018 93     Calcium 10/04/2018 8 7     AST 10/04/2018 15     ALT 10/04/2018 18     Alkaline Phosphatase 10/04/2018 112     Total Protein 10/04/2018 6 8     Albumin 10/04/2018 3 2*    Total Bilirubin 10/04/2018 1 85*    eGFR 10/04/2018 99     Magnesium 10/04/2018 1 8    Appointment on 09/15/2018   Component Date Value    C difficile toxin by PCR 09/15/2018 NEGATIVE for C difficle toxin by PCR       Appointment on 09/14/2018   Component Date Value    WBC 09/14/2018 4 70     RBC 09/14/2018 4 32     Hemoglobin 09/14/2018 12 6     Hematocrit 09/14/2018 39 6     MCV 09/14/2018 92     MCH 09/14/2018 29 2     MCHC 09/14/2018 31 8     RDW 09/14/2018 12 6     MPV 09/14/2018 11 5     Platelets 63/69/4963 258     nRBC 09/14/2018 0     Neutrophils Relative 09/14/2018 48     Immat GRANS % 09/14/2018 0     Lymphocytes Relative 09/14/2018 39     Monocytes Relative 09/14/2018 7     Eosinophils Relative 09/14/2018 4     Basophils Relative 09/14/2018 2*    Neutrophils Absolute 09/14/2018 2 29     Immature Grans Absolute 09/14/2018 0 01     Lymphocytes Absolute 09/14/2018 1 84     Monocytes Absolute 09/14/2018 0 31     Eosinophils Absolute 09/14/2018 0 18     Basophils Absolute 09/14/2018 0 07     Sodium 09/14/2018 141     Potassium 09/14/2018 3 5     Chloride 09/14/2018 107     CO2 09/14/2018 28     ANION GAP 09/14/2018 6     BUN 09/14/2018 6     Creatinine 09/14/2018 0 68     Glucose, Fasting 09/14/2018 84     Calcium 09/14/2018 9 0     AST 09/14/2018 16     ALT 09/14/2018 19     Alkaline Phosphatase 09/14/2018 98     Total Protein 09/14/2018 7 0     Albumin 09/14/2018 3 3*    Total Bilirubin 09/14/2018 1 66*    eGFR 09/14/2018 99     Salmonella sp PCR 09/14/2018 None Detected     Shigella sp/Enteroinvasi* 09/14/2018 None Detected     Campylobacter sp (jejuni* 09/14/2018 None Detected     Shiga toxin 1/Shiga toxi* 09/14/2018 None Detected     Ova + Parasite Exam 09/14/2018 No ova, cysts, or parasites seen     One negative specimen does not rule out the possibility of a  parasitic infection  Admission on 08/03/2018, Discharged on 08/15/2018   No results displayed because visit has over 200 results  Appointment on 07/25/2018   Component Date Value    Sodium 07/25/2018 140     Potassium 07/25/2018 3 8     Chloride 07/25/2018 98*    CO2 07/25/2018 28     ANION GAP 07/25/2018 14*    BUN 07/25/2018 7     Creatinine 07/25/2018 0 82     Glucose 07/25/2018 99     Calcium 07/25/2018 9 9     eGFR 07/25/2018 81     WBC 07/25/2018 12 35*    RBC 07/25/2018 4 25     Hemoglobin 07/25/2018 13 3     Hematocrit 07/25/2018 38 5     MCV 07/25/2018 91     MCH 07/25/2018 31 3     MCHC 07/25/2018 34 5     RDW 07/25/2018 12 7     MPV 07/25/2018 9 3     Platelets 10/70/4759 607*    C difficile toxin by PCR 07/26/2018 NEGATIVE for C difficle toxin by PCR   Segmented % 07/25/2018 74     Lymphocytes % 07/25/2018 24     Monocytes % 07/25/2018 0*    Eosinophils, % 07/25/2018 2     Basophils % 07/25/2018 0     Absolute Neutrophils 07/25/2018 9 14*    Lymphocytes Absolute 07/25/2018 2 96     Monocytes Absolute 07/25/2018 0 00     Eosinophils Absolute 07/25/2018 0 25     Basophils Absolute 07/25/2018 0 00     Total Counted 07/25/2018 100     Platelet Estimate 17/07/4496 Increased*   Admission on 07/11/2018, Discharged on 07/18/2018   No results displayed because visit has over 200 results        Hospital Outpatient Visit on 06/27/2018   Component Date Value    Ventricular Rate 06/27/2018 63     Atrial Rate 06/27/2018 63     NV Interval 06/27/2018 158     QRSD Interval 06/27/2018 100     QT Interval 06/27/2018 390  QTC Interval 06/27/2018 399     P Axis 06/27/2018 59     QRS Axis 06/27/2018 73     T Wave Axis 06/27/2018 49    Appointment on 06/27/2018   Component Date Value    Sodium 06/27/2018 143     Potassium 06/27/2018 3 9     Chloride 06/27/2018 106     CO2 06/27/2018 29     ANION GAP 06/27/2018 8     BUN 06/27/2018 12     Creatinine 06/27/2018 0 68     Glucose, Fasting 06/27/2018 86     Calcium 06/27/2018 9 1     AST 06/27/2018 16     ALT 06/27/2018 28     Alkaline Phosphatase 06/27/2018 102     Total Protein 06/27/2018 6 7     Albumin 06/27/2018 3 5     Total Bilirubin 06/27/2018 1 40*    eGFR 06/27/2018 100     WBC 06/27/2018 5 31     RBC 06/27/2018 4 47     Hemoglobin 06/27/2018 14 5     Hematocrit 06/27/2018 41 0     MCV 06/27/2018 92     MCH 06/27/2018 32 4     MCHC 06/27/2018 35 4     RDW 06/27/2018 12 5     MPV 06/27/2018 10 4     Platelets 51/45/6795 251     Neutrophils Relative 06/27/2018 58     Lymphocytes Relative 06/27/2018 33     Monocytes Relative 06/27/2018 6     Eosinophils Relative 06/27/2018 2     Basophils Relative 06/27/2018 1     Neutrophils Absolute 06/27/2018 3 07     Lymphocytes Absolute 06/27/2018 1 73     Monocytes Absolute 06/27/2018 0 34     Eosinophils Absolute 06/27/2018 0 13     Basophils Absolute 06/27/2018 0 04     PTT 06/27/2018 30     Protime 06/27/2018 12 3     INR 06/27/2018 0 96     Hemoglobin A1C 06/27/2018 4 7     EAG 06/27/2018 88     Magnesium 06/27/2018 1 8    Admission on 06/15/2018, Discharged on 06/15/2018   Component Date Value    Case Report 06/15/2018                      Value:Surgical Pathology Report                         Case: [id number]                                   Authorizing Provider:  Yoselin Milligan DO        Collected:           06/15/2018 1120              Ordering Location:     [facility] Received:            06/15/2018 [id number] [place] [address]                                     Operating Room Pathologist:           Luisa Butler DO                                                            Specimen:    Large Intestine, Cecum, Cecal polyp, retrieved with cold biopsy forcep                     Final Diagnosis 06/15/2018                      Value: This result contains rich text formatting which cannot be displayed here   Additional Information 06/15/2018                      Value: This result contains rich text formatting which cannot be displayed here  Shaniqua Shepard Gross Description 06/15/2018                      Value: This result contains rich text formatting which cannot be displayed here  Appointment on 06/06/2018   Component Date Value    Magnesium 06/06/2018 1 7     Sodium 06/06/2018 142     Potassium 06/06/2018 4 1     Chloride 06/06/2018 106     CO2 06/06/2018 29     ANION GAP 06/06/2018 7     BUN 06/06/2018 12     Creatinine 06/06/2018 0 72     Glucose, Fasting 06/06/2018 92     Calcium 06/06/2018 9 4     AST 06/06/2018 20     ALT 06/06/2018 34     Alkaline Phosphatase 06/06/2018 106     Total Protein 06/06/2018 6 7     Albumin 06/06/2018 3 5     Total Bilirubin 06/06/2018 1 80*    eGFR 06/06/2018 96    There may be more visits with results that are not included  No results found  Assessment:       1  Paroxysmal atrial fibrillation (HCC)     2  Essential hypertension     3  Diarrhea, unspecified type          Plan:       I think she is and is doing well, maintaining sinus rhythm by clinical markers  CHADS2 stroke risk score is 1, we had a lengthy discussion regarding risk of anticoagulation versus the risk of stroke without  We have opted to continue with novel oral anticoagulant therapy    She will call if any bleeding, black stools occurs    The told her we may in the future consider an implanted loop recorder make sure she remains AFib free after her GI status stabilizes if we want to consider cessation of anticoagulation therapy  For now I feel the benefit outweighs the risk of continuing it  I also asked to continue with her metoprolol, being his quite well tolerated seems to be working nicely for blood pressure and rhythm control at the moment    I will see her back in about 3-4 months, I made no changes today  She will call sooner with any concerning symptoms

## 2018-11-12 NOTE — PATIENT INSTRUCTIONS

## 2018-11-13 LAB — C DIFF TOX GENS STL QL NAA+PROBE: NORMAL

## 2018-11-14 LAB — ELASTASE PANC STL-MCNT: >500 UG ELAST./G

## 2018-11-15 DIAGNOSIS — R19.7 DIARRHEA, UNSPECIFIED TYPE: Primary | ICD-10-CM

## 2018-11-15 RX ORDER — MONTELUKAST SODIUM 4 MG/1
2 TABLET, CHEWABLE ORAL 2 TIMES DAILY
Qty: 60 TABLET | Refills: 3 | Status: SHIPPED | OUTPATIENT
Start: 2018-11-15 | End: 2019-01-10 | Stop reason: SDUPTHER

## 2018-12-04 ENCOUNTER — OFFICE VISIT (OUTPATIENT)
Dept: SURGERY | Facility: HOSPITAL | Age: 53
End: 2018-12-04
Payer: COMMERCIAL

## 2018-12-04 VITALS
WEIGHT: 172 LBS | SYSTOLIC BLOOD PRESSURE: 148 MMHG | TEMPERATURE: 98.1 F | BODY MASS INDEX: 32.47 KG/M2 | DIASTOLIC BLOOD PRESSURE: 97 MMHG | HEIGHT: 61 IN | HEART RATE: 76 BPM

## 2018-12-04 DIAGNOSIS — Z98.890 S/P COLOSTOMY TAKEDOWN: Primary | ICD-10-CM

## 2018-12-04 PROBLEM — K57.20 DIVERTICULITIS OF LARGE INTESTINE WITH PERFORATION WITHOUT ABSCESS OR BLEEDING: Status: RESOLVED | Noted: 2017-12-04 | Resolved: 2018-12-04

## 2018-12-04 PROCEDURE — 99212 OFFICE O/P EST SF 10 MIN: CPT | Performed by: SURGERY

## 2018-12-04 NOTE — ASSESSMENT & PLAN NOTE
Status post colostomy reversal and ileocecectomy, with postoperative diarrhea not be managed by GI with much improvement of her symptoms  Doing well  Occasional abdominal tenderness but otherwise fine  She may follow up me vero [facility] Po

## 2018-12-04 NOTE — PROGRESS NOTES
Assessment/Plan:    S/P colostomy takedown  Status post colostomy reversal and ileocecectomy, with postoperative diarrhea not be managed by GI with much improvement of her symptoms  Doing well  Occasional abdominal tenderness but otherwise fine  She may follow up edwin [facility] Diagnoses and all orders for this visit:    S/P colostomy takedown          Subjective:      Patient ID: Romelia Rosa is a 46 y o  female  58-year-old female history of diverticulitis status post Pj's procedure and subsequent reversal of the Gayl Salvage in addition to an ileocecectomy, with postoperative diarrhea, presents today for follow-up  Overall doing very well  No nausea vomiting  No fevers or chills  Her symptoms have now resolved or much improved with addition of cholestyramine as per G I  She is able to eat and drink without any problems now  No nausea vomiting  No fevers or chills  Occasional abdominal tenderness at the previous colostomy site but otherwise doing well  The following portions of the patient's history were reviewed and updated as appropriate: She  has a past medical history of Atrial fibrillation ([facility] [address] ); Disease of thyroid gland; Endometrial polyp; Gastroesophageal reflux disease without esophagitis (12/4/2017); Hand injury; High risk medication use; Hyperlipidemia; Hypertension; Renal colic; Viral syndrome; and Vitamin D deficiency disease    She   Patient Active Problem List    Diagnosis Date Noted    Moderate protein-calorie malnutrition ([facility] [address] ) 08/11/2018    Dyspepsia 08/09/2018    Clostridium difficile colitis 08/06/2018    Diarrhea 07/25/2018    Hair abnormality 04/30/2018    Hair loss 04/26/2018    Adenomatous polyps 04/26/2018    Screening for colon cancer 04/26/2018    Gastroesophageal reflux disease without esophagitis 03/20/2018    Dehydration 02/13/2018    Intractable vomiting with nausea 02/13/2018    Ileus ([address] ) 01/18/2018    Paroxysmal atrial fibrillation ([facility] [address] ) 01/05/2018    Hematuria 01/03/2018    Microscopic hematuria 12/28/2017    Hypomagnesemia 12/28/2017    Transaminitis 12/27/2017    Severe protein-calorie malnutrition ([address] ) 12/27/2017    Hypoalbuminemia 12/26/2017    Diverticulitis of colon 12/26/2017    Esophageal reflux 12/04/2017    Hyperlipidemia 12/04/2017    Essential hypertension 12/04/2017    Hypokalemia 12/04/2017    Ache in joint 09/15/2017    Primary osteoarthritis of left knee 09/15/2017    Anemia 09/14/2016    Thyroid disorder 09/14/2016    Non-rheumatic mitral regurgitation 07/22/2016    Fatigue 70/58/1256    Systolic murmur 75/60/3931    Vitamin D deficiency 06/21/2016    Vaginal dryness, menopausal 02/01/2016    Leg pain, left 07/17/2015    Erysipelas 12/12/2014    Diverticulosis 03/17/2014    Vulvitis 08/14/2013     She  has a past surgical history that includes Cholecystectomy; Thyroidectomy, partial; Tonsillectomy and adenoidectomy; Tubal ligation (1997); pr esophagogastroduodenoscopy transoral diagnostic (N/A, 10/21/2016); pr part removal colon w anastomosis (N/A, 1/3/2018); Ureteral stent placement (Bilateral, 1/3/2018); LAPAROTOMY (N/A, 1/4/2018); Colostomy; Esophagogastroduodenoscopy (N/A, 4/6/2018); Endometrial ablation (2007); Hysteroscopy; Thyroidectomy; Colon surgery; Colonoscopy (N/A, 6/15/2018); pr close enterostomy (N/A, 7/11/2018); and Colonoscopy (N/A, 8/10/2018)  Her family history includes Diabetes type II in her mother; Heart failure in her father  She  reports that she quit smoking about 15 years ago  She has a 15 00 pack-year smoking history  She has never used smokeless tobacco  She reports that she drinks about 1 2 oz of alcohol per week   She reports that she does not use drugs    Current Outpatient Prescriptions   Medication Sig Dispense Refill    colestipol (COLESTID) 1 g tablet Take 2 tablets (2 g total) by mouth 2 (two) times a day 60 tablet 3    lactobacillus acidophilus-bulgaricus (FLORANEX) packet Take 1 packet by mouth 3 (three) times a day with meals  0    loperamide (IMODIUM) 2 mg capsule Take 2 capsules (4 mg total) by mouth 4 (four) times a day (before meals and at bedtime)  0    magnesium oxide (MAG-OX) 400 mg Take 2 tablets (800 mg total) by mouth daily 60 tablet 5    menthol-zinc oxide (CALMOSEPTINE) 0 44-20 6 % OINT Apply topically 2 (two) times a day 71 g 0    metoprolol tartrate (LOPRESSOR) 25 mg tablet Take 1 tablet (25 mg total) by mouth every 12 (twelve) hours 60 tablet 5    pantoprazole (PROTONIX) 40 mg tablet Take 40 mg by mouth daily      potassium chloride (K-DUR,KLOR-CON) 20 mEq tablet Take 1 tablet (20 mEq total) by mouth daily 30 tablet 5    rivaroxaban (XARELTO) 20 mg tablet Take 1 tablet (20 mg total) by mouth daily with dinner 30 tablet 5    diphenoxylate-atropine (LOMOTIL) 2 5-0 025 mg per tablet Take 1 tablet by mouth 4 (four) times a day as needed for diarrhea (Patient not taking: Reported on 12/4/2018 ) 30 tablet 1    LORazepam (ATIVAN) 0 5 mg tablet Take 1 tablet (0 5 mg total) by mouth daily at bedtime as needed for anxiety for up to 10 days Takes PRN at bedtime 15 tablet 0     No current facility-administered medications for this visit  She has No Known Allergies       Review of Systems   Constitutional: Negative  Negative for activity change, appetite change, chills and diaphoresis  Gastrointestinal: Positive for abdominal pain and diarrhea  Negative for abdominal distention, anal bleeding, blood in stool, constipation, nausea, rectal pain and vomiting  Skin: Negative for pallor, rash and wound  Objective:      /97   Pulse 76   Temp 98 1 °F (36 7 °C)   Ht 5' 1" (1 549 m)   Wt 78 kg (172 lb)   LMP  (LMP Unknown)   BMI 32 50 kg/m²          Physical Exam   Constitutional: She is oriented to person, place, and time  She appears well-developed and well-nourished  No distress  HENT:   Head: Atraumatic  Eyes: No scleral icterus     Neck: No tracheal deviation present  Pulmonary/Chest: Effort normal    Abdominal: Soft  She exhibits no distension  There is no tenderness  There is no rebound and no guarding  Musculoskeletal: She exhibits no edema or deformity  Neurological: She is alert and oriented to person, place, and time  No cranial nerve deficit  Skin: Skin is warm and dry  No rash noted  She is not diaphoretic  No erythema  No pallor  Psychiatric: She has a normal mood and affect  Her behavior is normal    Vitals reviewed

## 2018-12-09 DIAGNOSIS — K21.9 GASTROESOPHAGEAL REFLUX DISEASE WITHOUT ESOPHAGITIS: ICD-10-CM

## 2018-12-11 RX ORDER — PANTOPRAZOLE SODIUM 40 MG/1
TABLET, DELAYED RELEASE ORAL
Qty: 60 TABLET | Refills: 3 | Status: SHIPPED | OUTPATIENT
Start: 2018-12-11 | End: 2019-03-18

## 2019-01-10 DIAGNOSIS — R19.7 DIARRHEA, UNSPECIFIED TYPE: ICD-10-CM

## 2019-01-10 RX ORDER — MONTELUKAST SODIUM 4 MG/1
TABLET, CHEWABLE ORAL
Qty: 60 TABLET | Refills: 3 | Status: SHIPPED | OUTPATIENT
Start: 2019-01-10 | End: 2019-03-10 | Stop reason: SDUPTHER

## 2019-01-21 DIAGNOSIS — Z20.828 EXPOSURE TO INFLUENZA: Primary | ICD-10-CM

## 2019-01-21 RX ORDER — OSELTAMIVIR PHOSPHATE 75 MG/1
CAPSULE ORAL
Qty: 10 CAPSULE | Refills: 0 | Status: SHIPPED | OUTPATIENT
Start: 2019-01-21 | End: 2019-01-31

## 2019-01-31 ENCOUNTER — TELEPHONE (OUTPATIENT)
Dept: GASTROENTEROLOGY | Facility: HOSPITAL | Age: 54
End: 2019-01-31

## 2019-01-31 DIAGNOSIS — I10 HYPERTENSION, UNSPECIFIED TYPE: ICD-10-CM

## 2019-02-04 ENCOUNTER — OFFICE VISIT (OUTPATIENT)
Dept: CARDIOLOGY CLINIC | Facility: CLINIC | Age: 54
End: 2019-02-04
Payer: COMMERCIAL

## 2019-02-04 VITALS
HEART RATE: 70 BPM | SYSTOLIC BLOOD PRESSURE: 122 MMHG | BODY MASS INDEX: 34.93 KG/M2 | WEIGHT: 185 LBS | HEIGHT: 61 IN | DIASTOLIC BLOOD PRESSURE: 80 MMHG

## 2019-02-04 DIAGNOSIS — I10 ESSENTIAL HYPERTENSION: ICD-10-CM

## 2019-02-04 DIAGNOSIS — I48.0 PAROXYSMAL ATRIAL FIBRILLATION (HCC): Primary | ICD-10-CM

## 2019-02-04 PROCEDURE — 99214 OFFICE O/P EST MOD 30 MIN: CPT | Performed by: INTERNAL MEDICINE

## 2019-02-04 NOTE — PROGRESS NOTES
Subjective:        Patient ID: Benedicto Smith is a 46 y o  female  Chief Complaint:  Niranjan Pulido is here for routine cardiac follow-up in light of paroxysmal atrial fibrillation and hypertension  CHADS2 stroke risk score of 1  She has had no concerning palpitations just a seconds worth of a skip here and there, nothing sustained  No exertional chest pains or concerning shortness of breath  No edema orthopnea  No presyncope nor syncope  No claudication like symptoms  No unilateral symptoms  Normotensive, euvolemic  No cardiac murmur rub nor gallop  We reviewed her echocardiogram from the summer  The following portions of the patient's history were reviewed and updated as appropriate: allergies, current medications, past family history, past medical history, past social history, past surgical history and problem list   Review of Systems   Constitution: Negative for chills, diaphoresis, malaise/fatigue and weight gain  HENT: Negative for nosebleeds and stridor  Eyes: Negative for double vision, vision loss in left eye, vision loss in right eye and visual disturbance  Cardiovascular: Negative for chest pain, claudication, cyanosis, dyspnea on exertion, irregular heartbeat, leg swelling, near-syncope, orthopnea, palpitations, paroxysmal nocturnal dyspnea and syncope  Respiratory: Negative for cough, shortness of breath, snoring and wheezing  Endocrine: Negative for polydipsia, polyphagia and polyuria  Hematologic/Lymphatic: Negative for bleeding problem  Does not bruise/bleed easily  Skin: Negative for flushing and rash  Musculoskeletal: Negative for falls and myalgias  Gastrointestinal: Positive for change in bowel habit and diarrhea  Negative for abdominal pain, heartburn, hematemesis, hematochezia, melena and nausea  Genitourinary: Negative for hematuria     Neurological: Negative for brief paralysis, dizziness, focal weakness, headaches, light-headedness, loss of balance and vertigo  Psychiatric/Behavioral: Negative for altered mental status and substance abuse  Allergic/Immunologic: Negative for hives  Objective:      /80   Pulse 70   Ht 5' 1" (1 549 m)   Wt 83 9 kg (185 lb)   LMP  (LMP Unknown)   BMI 34 96 kg/m²   Physical Exam   Constitutional: She is oriented to person, place, and time  She appears well-developed and well-nourished  HENT:   Head: Normocephalic and atraumatic  Eyes: Pupils are equal, round, and reactive to light  EOM are normal    Neck: Normal range of motion  Neck supple  No JVD present  No thyromegaly present  Cardiovascular: Normal rate, regular rhythm, normal heart sounds and intact distal pulses  Exam reveals no gallop and no friction rub  No murmur heard  Pulmonary/Chest: Effort normal and breath sounds normal  No stridor  No respiratory distress  She has no wheezes  She has no rales  Abdominal: Soft  Bowel sounds are normal  She exhibits no mass  There is no tenderness  Musculoskeletal: Normal range of motion  She exhibits no edema  Lymphadenopathy:     She has no cervical adenopathy  Neurological: She is alert and oriented to person, place, and time  Skin: Skin is warm and dry  No rash noted  No pallor  Psychiatric: She has a normal mood and affect  Her behavior is normal  Judgment and thought content normal    Nursing note and vitals reviewed  Lab Review:   Appointment on 11/12/2018   Component Date Value    Bilirubin, Direct 11/12/2018 0 32*   Office Visit on 11/09/2018   Component Date Value    C difficile toxin by PCR 11/12/2018 NEGATIVE for C difficle toxin by PCR        Pancreatic Elastase-1 11/12/2018 >500    Appointment on 10/04/2018   Component Date Value    WBC 10/04/2018 5 55     RBC 10/04/2018 4 38     Hemoglobin 10/04/2018 12 6     Hematocrit 10/04/2018 39 2     MCV 10/04/2018 90     MCH 10/04/2018 28 8     MCHC 10/04/2018 32 1     RDW 10/04/2018 12 6     MPV 10/04/2018 11 4     Platelets 14/65/0710 222     nRBC 10/04/2018 0     Neutrophils Relative 10/04/2018 52     Immat GRANS % 10/04/2018 0     Lymphocytes Relative 10/04/2018 37     Monocytes Relative 10/04/2018 7     Eosinophils Relative 10/04/2018 3     Basophils Relative 10/04/2018 1     Neutrophils Absolute 10/04/2018 2 86     Immature Grans Absolute 10/04/2018 0 01     Lymphocytes Absolute 10/04/2018 2 07     Monocytes Absolute 10/04/2018 0 39     Eosinophils Absolute 10/04/2018 0 16     Basophils Absolute 10/04/2018 0 06     Sodium 10/04/2018 138     Potassium 10/04/2018 3 5     Chloride 10/04/2018 105     CO2 10/04/2018 26     ANION GAP 10/04/2018 7     BUN 10/04/2018 11     Creatinine 10/04/2018 0 70     Glucose, Fasting 10/04/2018 93     Calcium 10/04/2018 8 7     AST 10/04/2018 15     ALT 10/04/2018 18     Alkaline Phosphatase 10/04/2018 112     Total Protein 10/04/2018 6 8     Albumin 10/04/2018 3 2*    Total Bilirubin 10/04/2018 1 85*    eGFR 10/04/2018 99     Magnesium 10/04/2018 1 8    Appointment on 09/15/2018   Component Date Value    C difficile toxin by PCR 09/15/2018 NEGATIVE for C difficle toxin by PCR       Appointment on 09/14/2018   Component Date Value    WBC 09/14/2018 4 70     RBC 09/14/2018 4 32     Hemoglobin 09/14/2018 12 6     Hematocrit 09/14/2018 39 6     MCV 09/14/2018 92     MCH 09/14/2018 29 2     MCHC 09/14/2018 31 8     RDW 09/14/2018 12 6     MPV 09/14/2018 11 5     Platelets 02/99/1328 258     nRBC 09/14/2018 0     Neutrophils Relative 09/14/2018 48     Immat GRANS % 09/14/2018 0     Lymphocytes Relative 09/14/2018 39     Monocytes Relative 09/14/2018 7     Eosinophils Relative 09/14/2018 4     Basophils Relative 09/14/2018 2*    Neutrophils Absolute 09/14/2018 2 29     Immature Grans Absolute 09/14/2018 0 01     Lymphocytes Absolute 09/14/2018 1 84     Monocytes Absolute 09/14/2018 0 31     Eosinophils Absolute 09/14/2018 0 18     Basophils Absolute 09/14/2018 0 07     Sodium 09/14/2018 141     Potassium 09/14/2018 3 5     Chloride 09/14/2018 107     CO2 09/14/2018 28     ANION GAP 09/14/2018 6     BUN 09/14/2018 6     Creatinine 09/14/2018 0 68     Glucose, Fasting 09/14/2018 84     Calcium 09/14/2018 9 0     AST 09/14/2018 16     ALT 09/14/2018 19     Alkaline Phosphatase 09/14/2018 98     Total Protein 09/14/2018 7 0     Albumin 09/14/2018 3 3*    Total Bilirubin 09/14/2018 1 66*    eGFR 09/14/2018 99     Salmonella sp PCR 09/14/2018 None Detected     Shigella sp/Enteroinvasi* 09/14/2018 None Detected     Campylobacter sp (jejuni* 09/14/2018 None Detected     Shiga toxin 1/Shiga toxi* 09/14/2018 None Detected     Ova + Parasite Exam 09/14/2018 No ova, cysts, or parasites seen     One negative specimen does not rule out the possibility of a  parasitic infection  Admission on 08/03/2018, Discharged on 08/15/2018   No results displayed because visit has over 200 results  No results found  Assessment:       1  Paroxysmal atrial fibrillation (HCC)     2  Essential hypertension          Plan:       Joseph Bowie is without any signs or symptoms reminiscent of recurrent atrial fibrillation  She also is without any signs or symptoms of angina heart failure nor any other electrical instability  Medications optimal   We discussed her stroke risk being intermediate, and until she is atrial fibrillation free for at least 1 year that I feel the benefit outweighs the potential risk of significant bleeding of ongoing novel oral anticoagulant therapy  She will stop the Xarelto and call should any excess bruising, hematochezia, or hematuria arise  She will continue with low-dose metoprolol tartrate 25 mg b i d  Deyvi Cassette I will see her back in 4 months, sooner as needed

## 2019-02-04 NOTE — PATIENT INSTRUCTIONS

## 2019-03-10 DIAGNOSIS — R19.7 DIARRHEA, UNSPECIFIED TYPE: ICD-10-CM

## 2019-03-11 RX ORDER — MONTELUKAST SODIUM 4 MG/1
2 TABLET, CHEWABLE ORAL 2 TIMES DAILY
Qty: 120 TABLET | Refills: 3 | Status: SHIPPED | OUTPATIENT
Start: 2019-03-11 | End: 2019-07-10 | Stop reason: SDUPTHER

## 2019-03-18 ENCOUNTER — OFFICE VISIT (OUTPATIENT)
Dept: OBGYN CLINIC | Facility: CLINIC | Age: 54
End: 2019-03-18
Payer: COMMERCIAL

## 2019-03-18 VITALS
SYSTOLIC BLOOD PRESSURE: 120 MMHG | BODY MASS INDEX: 34.41 KG/M2 | WEIGHT: 194.2 LBS | HEIGHT: 63 IN | DIASTOLIC BLOOD PRESSURE: 82 MMHG

## 2019-03-18 DIAGNOSIS — Z01.419 ENCOUNTER FOR WELL WOMAN EXAM WITH ROUTINE GYNECOLOGICAL EXAM: Primary | ICD-10-CM

## 2019-03-18 DIAGNOSIS — Z12.31 ENCOUNTER FOR SCREENING MAMMOGRAM FOR MALIGNANT NEOPLASM OF BREAST: ICD-10-CM

## 2019-03-18 PROCEDURE — 99386 PREV VISIT NEW AGE 40-64: CPT | Performed by: OBSTETRICS & GYNECOLOGY

## 2019-03-18 NOTE — PROGRESS NOTES
ASSESSMENT & PLAN: Raisa Mtz is a 46 y o  [id number] with normal gynecologic exam     1   Routine well woman exam done today  2  Pap:  The patient's last pap was 2016  It was normal     Pap and cotesting was done today  Current ASCCP Guidelines reviewed  3   Mammogram ordered  4  Colonoscopy   5  The following were reviewed in today's visit: breast self exam and mammography screening ordered      CC:  Annual Gynecologic Examination    HPI: Raisa Mtz is a 46 y o  [id number] who presents for annual gynecologic examination  She has the following concerns:  No gyn complaints; had ex lap last year for diverticulitis, recently had ostomy reversed   Health Maintenance:    She wears her seatbelt routinely  She does perform regular monthly self breast exams  She feels safe at home  Pap: 2016  Last mammogram: 2018  Last colonoscopy: 2018    Past Medical History:   Diagnosis Date    Atrial fibrillation ([facility] [address] )     Disease of thyroid gland     Endometrial polyp     Gastroesophageal reflux disease without esophagitis 12/4/2017    Hand injury     LAST ASSESSED: [id number]    High risk medication use     LAST ASSESSED: [id number]    Hyperlipidemia     Hypertension     Renal colic     LAST ASSESSED: [id number]    Viral syndrome     LAST ASSESSED: [id number]    Vitamin D deficiency disease        Past Surgical History:   Procedure Laterality Date    CHOLECYSTECTOMY      COLON SURGERY      COLONOSCOPY N/A 6/15/2018    Procedure: COLONOSCOPY;  Surgeon: Addie Sanchez DO;  [facility]: [facility];  Service: Gastroenterology    COLONOSCOPY N/A 8/10/2018    Procedure: COLONOSCOPY;  Surgeon: Addie Sanchez DO;  [facility];  Service: Gastroenterology    COLOSTOMY      ENDOMETRIAL ABLATION  2007    THERMAL    ESOPHAGOGASTRODUODENOSCOPY N/A 4/6/2018    Procedure: ESOPHAGOGASTRODUODENOSCOPY (EGD);   Surgeon: Larry Santizo MD;  [facility]: [facility];  Service: Gastroenterology    HYSTEROSCOPY , ,     LAPAROTOMY N/A 2018    Procedure: LAPAROTOMY EXPLORATORY, 2nd look, small bowel resection, ilieostomy closure via [place]own anastamosis transverse colostomy;  Surgeon: Ebb Mortimer, DO;  [facility];  Service: General    CT CLOSE ENTEROSTOMY N/A 2018    Procedure: COLOSTOMY TAKEDOWN / Elio Raider, ILEOCECECTOMY, LASER FLUORESCENCE ANGIOGRAPHY, LYSIS OF ADHESIONS;  Surgeon: Kaelyn Werner DO;  [facility];  Service: General    CT ESOPHAGOGASTRODUODENOSCOPY TRANSORAL DIAGNOSTIC N/A 10/21/2016    Procedure: EGD AND COLONOSCOPY;  Surgeon: Leonard Radford MD;  [facility];  Service: Gastroenterology    CT PART REMOVAL COLON W ANASTOMOSIS N/A 1/3/2018    Procedure: Exploratory laparotomy, RESECTION COLON SIGMOID, possible ostomy;  Surgeon: Kaelyn Werner DO;  [facility];  Service: General    THYROIDECTOMY      NEAR-TOTAL    THYROIDECTOMY, PARTIAL      TONSILLECTOMY AND ADENOIDECTOMY      TUBAL LIGATION      URETERAL STENT PLACEMENT Bilateral 1/3/2018    Procedure: INSERTION STENT URETERAL;  Surgeon: Darlene Dunham MD;  Location: [facility];  Service: Urology       Past OB/Gyn History:  OB History        5    Para   3    Term                AB   2    Living   2       SAB        TAB        Ectopic        Multiple        Live Births               Obstetric Comments   DYSFUNCTIONAL UTERINE BLEEDING           Pt does not have menstrual issues  Menopausal since     History of sexually transmitted infection: No   History of abnormal pap smears: No      Patient is currently sexually active  heterosexual   The current method of family planning is post menopausal status      Family History   Problem Relation Age of Onset    Diabetes type II Mother     Heart failure Father        Social History:  Social History     Socioeconomic History    Marital status: /Civil Union     Spouse name: Not on file    Number of children: Not on file    Years of education: Not on file    Highest education level: Not on file   Occupational History    Not on file   Social Needs    Financial resource strain: Not on file    Food insecurity:     Worry: Not on file     Inability: Not on file    Transportation needs:     Medical: Not on file     Non-medical: Not on file   Tobacco Use    Smoking status: Former Smoker     Packs/day: 0 50     Years: 30 00     Pack years: 15 00     Last attempt to quit:      Years since quittin 2    Smokeless tobacco: Never Used   Substance and Sexual Activity    Alcohol use:  Yes     Alcohol/week: 1 2 oz     Types: 2 Standard drinks or equivalent per week     Comment: 1-2 mixed drinks per weekend    Drug use: No    Sexual activity: Yes   Lifestyle    Physical activity:     Days per week: Not on file     Minutes per session: Not on file    Stress: Not on file   Relationships    Social connections:     Talks on phone: Not on file     Gets together: Not on file     Attends Gnosticist service: Not on file     Active member of club or organization: Not on file     Attends meetings of clubs or organizations: Not on file     Relationship status: Not on file    Intimate partner violence:     Fear of current or ex partner: Not on file     Emotionally abused: Not on file     Physically abused: Not on file     Forced sexual activity: Not on file   Other Topics Concern    Not on file   Social History Narrative    Not on file     No Known Allergies    Current Outpatient Medications:     colestipol (COLESTID) 1 g tablet, Take 2 tablets (2 g total) by mouth 2 (two) times a day, Disp: 120 tablet, Rfl: 3    diphenoxylate-atropine (LOMOTIL) 2 5-0 025 mg per tablet, Take 1 tablet by mouth 4 (four) times a day as needed for diarrhea, Disp: 30 tablet, Rfl: 1    loperamide (IMODIUM) 2 mg capsule, Take 2 capsules (4 mg total) by mouth 4 (four) times a day (before meals and at bedtime), Disp: , Rfl: 0   LORazepam (ATIVAN) 0 5 mg tablet, Take 1 tablet (0 5 mg total) by mouth daily at bedtime as needed for anxiety for up to 10 days Takes PRN at bedtime, Disp: 15 tablet, Rfl: 0    menthol-zinc oxide (CALMOSEPTINE) 0 44-20 6 % OINT, Apply topically 2 (two) times a day, Disp: 71 g, Rfl: 0    metoprolol tartrate (LOPRESSOR) 25 mg tablet, take 1 tablet by mouth every 12 hours, Disp: 60 tablet, Rfl: 5    pantoprazole (PROTONIX) 40 mg tablet, Take 40 mg by mouth daily, Disp: , Rfl:     rivaroxaban (XARELTO) 20 mg tablet, Take 1 tablet (20 mg total) by mouth daily with dinner, Disp: 30 tablet, Rfl: 5      Review of Systems  Constitutional :no fever, feels well, no tiredness, no recent weight gain or loss  ENT: no ear ache, no loss of hearing, no nosebleeds or nasal discharge, no sore throat or hoarseness  Cardiovascular: no complaints of slow or fast heart beat, no chest pain, no palpitations, no leg claudication or lower extremity edema  Respiratory: no complaints of shortness of shortness of breath, no HENDERSON  Breasts:no complaints of breast pain, breast lump, or nipple discharge  Gastrointestinal: no complaints of abdominal pain, constipation, nausea, vomiting, or diarrhea or bloody stools  Genitourinary : no complaints of dysuria, incontinence, pelvic pain, no dysmenorrhea, vaginal discharge or abnormal vaginal bleeding and as noted in HPI  Musculoskeletal: no complaints of arthralgia, no myalgia, no joint swelling or stiffness, no limb pain or swelling  Integumentary: no complaints of skin rash or lesion, itching or dry skin  Neurological: no complaints of headache, no confusion, no numbness or tingling, no dizziness or fainting    Objective      /82   Ht 5' 2 5" (1 588 m)   Wt 88 1 kg (194 lb 3 2 oz)   LMP  (LMP Unknown)   Breastfeeding?  No   BMI 34 95 kg/m²     General:   appears stated age, cooperative, alert normal mood and affect   Neck: normal, supple,trachea midline, no masses   Heart: regular rate and rhythm, S1, S2 normal, no murmur, click, rub or gallop   Lungs: clear to auscultation bilaterally   Breasts: normal appearance, no masses or tenderness, Inspection negative, No nipple retraction or dimpling, No nipple discharge or bleeding, No axillary or supraclavicular adenopathy, Normal to palpation without dominant masses   Abdomen: soft, non-tender, without masses or organomegaly   Vulva: normal female genitalia, Bartholin's, Urethra, [place] normal, no lesions, normal female hair distribution   Vagina: normal vagina, no discharge, exudate, lesion, or erythema   Urethra: normal   Cervix: Normal, no discharge  PAP done  Uterus: normal size, contour, position, consistency, mobility, non-tender   Adnexa: no mass, fullness, tenderness   Lymphatic palpation of lymph nodes in neck, axilla, groin and/or other locations: no lymphadenopathy or masses noted   Skin normal skin turgor and no rashes     Psychiatric orientation to person, place, and time: normal  mood and affect: normal

## 2019-03-20 LAB
CLINICAL INFO: NORMAL
CYTOLOGY CMNT CVX/VAG CYTO-IMP: NORMAL
DATE PREVIOUS BX: NORMAL
HPV E6+E7 MRNA CVX QL NAA+PROBE: NOT DETECTED
LMP START DATE: NORMAL
SL AMB PREV. PAP:: NORMAL
SPECIMEN SOURCE CVX/VAG CYTO: NORMAL
STAT OF ADQ CVX/VAG CYTO-IMP: NORMAL

## 2019-03-26 ENCOUNTER — APPOINTMENT (OUTPATIENT)
Dept: LAB | Facility: HOSPITAL | Age: 54
End: 2019-03-26
Payer: COMMERCIAL

## 2019-03-26 ENCOUNTER — OFFICE VISIT (OUTPATIENT)
Dept: GASTROENTEROLOGY | Facility: HOSPITAL | Age: 54
End: 2019-03-26
Payer: COMMERCIAL

## 2019-03-26 VITALS
BODY MASS INDEX: 34.73 KG/M2 | TEMPERATURE: 98.1 F | HEIGHT: 63 IN | DIASTOLIC BLOOD PRESSURE: 110 MMHG | SYSTOLIC BLOOD PRESSURE: 144 MMHG | HEART RATE: 78 BPM | WEIGHT: 196 LBS

## 2019-03-26 DIAGNOSIS — K92.1 HEMATOCHEZIA: Primary | ICD-10-CM

## 2019-03-26 DIAGNOSIS — E80.6 HYPERBILIRUBINEMIA: ICD-10-CM

## 2019-03-26 LAB
ALBUMIN SERPL BCP-MCNC: 3.6 G/DL (ref 3.5–5)
ALP SERPL-CCNC: 130 U/L (ref 46–116)
ALT SERPL W P-5'-P-CCNC: 21 U/L (ref 12–78)
AST SERPL W P-5'-P-CCNC: 17 U/L (ref 5–45)
BILIRUB DIRECT SERPL-MCNC: 0.37 MG/DL (ref 0–0.2)
BILIRUB SERPL-MCNC: 1.6 MG/DL (ref 0.2–1)
PROT SERPL-MCNC: 6.8 G/DL (ref 6.4–8.2)

## 2019-03-26 PROCEDURE — 99214 OFFICE O/P EST MOD 30 MIN: CPT | Performed by: PHYSICIAN ASSISTANT

## 2019-03-26 PROCEDURE — 36415 COLL VENOUS BLD VENIPUNCTURE: CPT

## 2019-03-26 PROCEDURE — 80076 HEPATIC FUNCTION PANEL: CPT

## 2019-03-26 RX ORDER — HYDROCORTISONE ACETATE 25 MG/1
25 SUPPOSITORY RECTAL
Qty: 12 SUPPOSITORY | Refills: 0 | Status: SHIPPED | OUTPATIENT
Start: 2019-03-26 | End: 2019-09-12

## 2019-03-26 NOTE — PROGRESS NOTES
[facility] - Outpatient Follow-up Note  Rambo Angel 46 y o  female MRN: [id number]  Encounter: [id number]          ASSESSMENT AND PLAN:      1  Diarrhea:   History of diverticulitis status post Pj's procedure with ileocecectomy due to adhesions and reversal July of 2018  That surgery was complicated by C diff and she was treated with vancomycin  She was still having greater than 10 liquid BMs daily  We recheck the C diff which was negative  She had tried Imodium without relief  We started colestipol  She states that her bowel movements greatly improved to 4 to 5 daily  She is taking 4 grams of colestipol daily  We discussed that she could increase this with a max of 30 grams daily as needed  We discussed possibly trying Lomotil or tincture of opium, she would like to hold off for now as colestipol seems to be helping her  -continue colestipol up to 30 grams daily  -may use Imodium as needed  -start daily probiotic    2  Hematochezia:  Admits to small amount of bright red blood per rectum over the past 2 weeks  She denies any lightheadedness, dizziness, shortness of breath or large amount of blood  We discussed doing a colonoscopy for further evaluation, which she politely declines at this time  We will try Anusol suppositories in case this is related to hemorrhoids  She knows that if the bleeding worsens, she will call our office so we will again discuss a colonoscopy  - hydrocortisone (ANUSOL-HC) 25 mg suppository; Insert 1 suppository (25 mg total) into the rectum daily at bedtime  Dispense: 12 suppository; Refill: 0    3  Hyperbilirubinemia:  Very mildly elevated total bilirubin last visit  She is asymptomatic  Will check right upper quadrant ultrasound and recheck bilirubin  Likely no further workup necessary  - Hepatic function panel; Future    4  GERD:  She is currently on Protonix 40 milligrams daily which controls her symptoms    She did try tapering off of this last visit secondary to diarrhea however, she states that her reflux symptoms returned     -continue 40 milligrams Protonix daily    ______________________________________________________________________    SUBJECTIVE:  77-year-old female past medical history diverticulitis status post Pj's procedure with reversal of an ileocecectomy on July 11, 2018  She was seen in the hospital in August 2018 for diarrhea and found to be C diff positive  She was treated with vancomycin  She also underwent colonoscopy 08/2018 which was negative for pseudomembranes  She had another C diff study in September it was again negative  She was having greater than 10 watery bowel movements on a daily basis in November when I saw her last   This was severely affecting her quality of life  We recheck the C diff study which was negative  We started colestipol  She states that she is doing much better with 4-5 bowel movements daily  She is not interested in changing medications at this time  REVIEW OF SYSTEMS IS OTHERWISE NEGATIVE        Historical Information   Past Medical History:   Diagnosis Date    Atrial fibrillation ([address] )     Disease of thyroid gland     Endometrial polyp     Gastroesophageal reflux disease without esophagitis 12/4/2017    Hand injury     LAST ASSESSED: [id number]    High risk medication use     LAST ASSESSED: [id number]    Hyperlipidemia     Hypertension     Renal colic     LAST ASSESSED: [id number]    Viral syndrome     LAST ASSESSED: [id number]    Vitamin D deficiency disease      Past Surgical History:   Procedure Laterality Date    CHOLECYSTECTOMY      COLON SURGERY      COLONOSCOPY N/A 6/15/2018    Procedure: COLONOSCOPY;  Surgeon: Diego Rosales DO;  [facility];  Service: Gastroenterology    COLONOSCOPY N/A 8/10/2018    Procedure: COLONOSCOPY;  Surgeon: Diego Rosales DO;  [facility];  Service: Gastroenterology    COLOSTOMY      ENDOMETRIAL ABLATION     THERMAL    ESOPHAGOGASTRODUODENOSCOPY N/A 2018    Procedure: ESOPHAGOGASTRODUODENOSCOPY (EGD);   Surgeon: Yue Morin MD;  [facility];  Service: Gastroenterology   [address] [place] [address], ,     LAPAROTOMY N/A 2018    Procedure: LAPAROTOMY EXPLORATORY, 2nd look, small bowel resection, ilieostomy closure via handsown anastamosis transverse colostomy;  Surgeon: Cordell Hurtado DO;  Location:  MAIN OR;  Service: General    OH CLOSE ENTEROSTOMY N/A 2018    Procedure: COLOSTOMY TAKEDOWN / Destinee Zee, ILEOCECECTOMY, LASER FLUORESCENCE ANGIOGRAPHY, LYSIS OF ADHESIONS;  Surgeon: Jose Duran DO;  Location:  MAIN OR;  Service: General    OH ESOPHAGOGASTRODUODENOSCOPY TRANSORAL DIAGNOSTIC N/A 10/21/2016    Procedure: EGD AND COLONOSCOPY;  Surgeon: Senait Reece MD;  [facility];  Service: Gastroenterology    OH PART REMOVAL COLON W ANASTOMOSIS N/A 1/3/2018    Procedure: Exploratory laparotomy, RESECTION COLON SIGMOID, possible ostomy;  Surgeon: Jose Duran DO;  [facility];  Service: General    THYROIDECTOMY      NEAR-TOTAL    THYROIDECTOMY, PARTIAL      TONSILLECTOMY AND ADENOIDECTOMY      TUBAL LIGATION      URETERAL STENT PLACEMENT Bilateral 1/3/2018    Procedure: INSERTION STENT URETERAL;  Surgeon: Mara Kumar MD;  [facility];  Service: Urology     Social History   Social History     Substance and Sexual Activity   Alcohol Use Yes    Alcohol/week: 1 2 oz    Types: 2 Standard drinks or equivalent per week    Comment: 1-2 mixed drinks per weekend     Social History     Substance and Sexual Activity   Drug Use No     Social History     Tobacco Use   Smoking Status Former Smoker    Packs/day: 0 50    Years: 30 00    Pack years: 15 00    Last attempt to quit:     Years since quittin 2   Smokeless Tobacco Never Used     Family History   Problem Relation Age of Onset    Diabetes type II Mother     Heart failure Father        Meds/Allergies       Current Outpatient Medications:     colestipol (COLESTID) 1 g tablet    diphenoxylate-atropine (LOMOTIL) 2 5-0 025 mg per tablet    hydrocortisone (ANUSOL-HC) 25 mg suppository    loperamide (IMODIUM) 2 mg capsule    LORazepam (ATIVAN) 0 5 mg tablet    menthol-zinc oxide (CALMOSEPTINE) 0 44-20 6 % OINT    metoprolol tartrate (LOPRESSOR) 25 mg tablet    pantoprazole (PROTONIX) 40 mg tablet    rivaroxaban (XARELTO) 20 mg tablet    No Known Allergies        Objective     Blood pressure (!) 144/110, pulse 78, temperature 98 1 °F (36 7 °C), height 5' 2 5" (1 588 m), weight 88 9 kg (196 lb), not currently breastfeeding  Body mass index is 35 28 kg/m²  PHYSICAL EXAM:      General Appearance:   Alert, cooperative, no distress   HEENT:   Normocephalic, atraumatic, anicteric  Right eye exhibits no discharge  Left eye exhibits no discharge  No scleral icterus       Neck:  Supple, symmetrical, trachea midline, no stridor   Lungs:   Clear to auscultation bilaterally; no rales, rhonchi or wheezing; respirations unlabored    Heart[de-identified]   Regular rate and rhythm; no murmur, rub, or gallop  Abdomen:   Soft, non-tender, non-distended; normal bowel sounds; no masses, no organomegaly    Genitalia:   Deferred    Rectal:   Deferred    Extremities:  No cyanosis, clubbing or edema        Skin:  No jaundice, rashes, or lesions          Lab Results:   No visits with results within 1 Day(s) from this visit  Latest known visit with results is:   Office Visit on 03/18/2019   Component Date Value    SL AMB CLINICAL INFORMAT* 03/18/2019 None given     LMP 03/18/2019 NONE GIVEN     Prev  PAP 03/18/2019 NONE GIVEN     Prev   BX 03/18/2019 NONE GIVEN     Source 03/18/2019 Endocervix     Statement of Adequacy 03/18/2019 SATISFACTORY FOR EVALUATION Age and/or menstrual status not provided     Interpretation/Result 03/18/2019      Comment 03/18/2019 This Pap test has been evaluated with computer assisted technology   PARISH [employer]IST: 03/18/2019      Comment 03/18/2019      HPV mRNA E6/E7 03/18/2019 Not Detected          Radiology Results:   No results found

## 2019-03-29 ENCOUNTER — HOSPITAL ENCOUNTER (OUTPATIENT)
Dept: ULTRASOUND IMAGING | Facility: HOSPITAL | Age: 54
Discharge: HOME | End: 2019-03-29
Payer: COMMERCIAL

## 2019-03-29 ENCOUNTER — HOSPITAL ENCOUNTER (OUTPATIENT)
Dept: MAMMOGRAPHY | Facility: HOSPITAL | Age: 54
Discharge: HOME | End: 2019-03-29
Attending: OBSTETRICS & GYNECOLOGY
Payer: COMMERCIAL

## 2019-03-29 VITALS — WEIGHT: 196 LBS | BODY MASS INDEX: 34.73 KG/M2 | HEIGHT: 63 IN

## 2019-03-29 DIAGNOSIS — Z12.31 ENCOUNTER FOR SCREENING MAMMOGRAM FOR MALIGNANT NEOPLASM OF BREAST: ICD-10-CM

## 2019-03-29 DIAGNOSIS — R17 TOTAL BILIRUBIN, ELEVATED: ICD-10-CM

## 2019-03-29 PROCEDURE — 76705 ECHO EXAM OF ABDOMEN: CPT

## 2019-03-29 PROCEDURE — 77067 SCR MAMMO BI INCL CAD: CPT

## 2019-03-29 PROCEDURE — 77063 BREAST TOMOSYNTHESIS BI: CPT

## 2019-04-01 ENCOUNTER — TELEPHONE (OUTPATIENT)
Dept: GASTROENTEROLOGY | Facility: MEDICAL CENTER | Age: 54
End: 2019-04-01

## 2019-04-01 DIAGNOSIS — R74.8 ELEVATED ALKALINE PHOSPHATASE LEVEL: Primary | ICD-10-CM

## 2019-04-01 DIAGNOSIS — I48.0 PAROXYSMAL ATRIAL FIBRILLATION (HCC): ICD-10-CM

## 2019-04-01 DIAGNOSIS — R17 TOTAL BILIRUBIN, ELEVATED: ICD-10-CM

## 2019-04-02 DIAGNOSIS — R74.01 TRANSAMINITIS: Primary | ICD-10-CM

## 2019-04-04 DIAGNOSIS — K21.9 GASTROESOPHAGEAL REFLUX DISEASE WITHOUT ESOPHAGITIS: ICD-10-CM

## 2019-04-04 RX ORDER — PANTOPRAZOLE SODIUM 40 MG/1
TABLET, DELAYED RELEASE ORAL
Qty: 60 TABLET | Refills: 3 | Status: SHIPPED | OUTPATIENT
Start: 2019-04-04 | End: 2019-12-14 | Stop reason: SDUPTHER

## 2019-06-06 ENCOUNTER — OFFICE VISIT (OUTPATIENT)
Dept: CARDIOLOGY CLINIC | Facility: CLINIC | Age: 54
End: 2019-06-06
Payer: COMMERCIAL

## 2019-06-06 VITALS
WEIGHT: 204 LBS | SYSTOLIC BLOOD PRESSURE: 122 MMHG | DIASTOLIC BLOOD PRESSURE: 82 MMHG | HEART RATE: 78 BPM | HEIGHT: 63 IN | BODY MASS INDEX: 36.14 KG/M2

## 2019-06-06 DIAGNOSIS — I48.0 PAROXYSMAL ATRIAL FIBRILLATION (HCC): Primary | ICD-10-CM

## 2019-06-06 DIAGNOSIS — I10 ESSENTIAL HYPERTENSION: ICD-10-CM

## 2019-06-06 PROCEDURE — 99214 OFFICE O/P EST MOD 30 MIN: CPT | Performed by: INTERNAL MEDICINE

## 2019-07-10 DIAGNOSIS — R19.7 DIARRHEA, UNSPECIFIED TYPE: ICD-10-CM

## 2019-07-10 RX ORDER — MONTELUKAST SODIUM 4 MG/1
TABLET, CHEWABLE ORAL
Qty: 120 TABLET | Refills: 3 | Status: SHIPPED | OUTPATIENT
Start: 2019-07-10 | End: 2020-02-03

## 2019-07-17 DIAGNOSIS — R19.7 DIARRHEA, UNSPECIFIED TYPE: ICD-10-CM

## 2019-07-17 RX ORDER — MONTELUKAST SODIUM 4 MG/1
TABLET, CHEWABLE ORAL
Qty: 120 TABLET | Refills: 3 | Status: SHIPPED | OUTPATIENT
Start: 2019-07-17 | End: 2019-09-12

## 2019-07-28 DIAGNOSIS — I10 HYPERTENSION, UNSPECIFIED TYPE: ICD-10-CM

## 2019-09-12 ENCOUNTER — OFFICE VISIT (OUTPATIENT)
Dept: CARDIOLOGY CLINIC | Facility: CLINIC | Age: 54
End: 2019-09-12
Payer: COMMERCIAL

## 2019-09-12 VITALS
DIASTOLIC BLOOD PRESSURE: 80 MMHG | SYSTOLIC BLOOD PRESSURE: 120 MMHG | HEART RATE: 80 BPM | WEIGHT: 216 LBS | HEIGHT: 63 IN | BODY MASS INDEX: 38.27 KG/M2

## 2019-09-12 DIAGNOSIS — I48.0 PAROXYSMAL ATRIAL FIBRILLATION (HCC): Primary | ICD-10-CM

## 2019-09-12 DIAGNOSIS — E78.5 HYPERLIPIDEMIA, UNSPECIFIED HYPERLIPIDEMIA TYPE: ICD-10-CM

## 2019-09-12 DIAGNOSIS — I10 ESSENTIAL HYPERTENSION: ICD-10-CM

## 2019-09-12 PROCEDURE — 99214 OFFICE O/P EST MOD 30 MIN: CPT | Performed by: PHYSICIAN ASSISTANT

## 2019-09-12 PROCEDURE — 3074F SYST BP LT 130 MM HG: CPT | Performed by: PHYSICIAN ASSISTANT

## 2019-09-12 PROCEDURE — 3079F DIAST BP 80-89 MM HG: CPT | Performed by: PHYSICIAN ASSISTANT

## 2019-09-12 NOTE — PROGRESS NOTES
[facility]   Outpatient Note  Bettie Bridges  1965  [id number]  [facility] [id number]  [address] [place] [id number] [phone]    Bettie Bridges is a 53 y o  female    Assessment and Plan:     Problem List Items Addressed This Visit        Cardiovascular and Mediastinum    Essential hypertension     Controlled on current medication   Continue          Paroxysmal atrial fibrillation ([address] ) - Primary     Maintaining sinus rhythm   Transient Atrial fibrillation: post-procedural   Event monitor showing Sinus Rhythm throughout    Without patient triggered symptoms   OK to discontinue Xarelto   Resume ASA 81 mg               Other    Hyperlipidemia     Controlling with diet and exercise                 Additional Plan:   Medication changes as detailed above  Return visit will be in 6 months or earlier if there are problems  The patient is encouraged to call in the meantime if there are questions or concerns  Subjective: The patient is seen in follow up regarding PAF that was transient and post-procedural, HTN and HLD  She is here today to review Event monitor results to determine if she can be taken off of Xarelto  She is doing well from a cardiac perspective without complaints of chest pain or exertional dyspnea  She has no complaints of palpitations syncope or near syncope  She denies edema orthopnea or PND  The patient denies TIA or CVA symptoms           Social History  Social History     Tobacco Use   Smoking Status Former Smoker    Packs/day: 0 50    Years: 30 00    Pack years: 15 00    Last attempt to quit:     Years since quittin 7   Smokeless Tobacco Never Used   ,   Social History     Substance and Sexual Activity   Alcohol Use Yes    Alcohol/week: 2 0 standard drinks    Types: 2 Standard drinks or equivalent per week    Comment: 1-2 mixed drinks per weekend   ,   Social History Substance and Sexual Activity   Drug Use No     Family History   Problem Relation Age of Onset    Diabetes type II Mother     Heart failure Father        Medical and Surgical History  Past Medical History:   Diagnosis Date    Atrial fibrillation ([address] )     Disease of thyroid gland     Endometrial polyp     Gastroesophageal reflux disease without esophagitis 12/4/2017    Hand injury     LAST ASSESSED: [id number]    High risk medication use     LAST ASSESSED: [id number]    Hyperlipidemia     Hypertension     Renal colic     LAST ASSESSED: [id number]    Viral syndrome     LAST ASSESSED: [id number]    Vitamin D deficiency disease      Past Surgical History:   Procedure Laterality Date    CHOLECYSTECTOMY      COLON SURGERY      COLONOSCOPY N/A 6/15/2018    Procedure: COLONOSCOPY;  Surgeon: Jody Lerma DO;  [facility];  Service: Gastroenterology    COLONOSCOPY N/A 8/10/2018    Procedure: COLONOSCOPY;  Surgeon: Jody Lerma DO;  [facility];  Service: Gastroenterology    COLOSTOMY      ENDOMETRIAL ABLATION  2007    THERMAL    ESOPHAGOGASTRODUODENOSCOPY N/A 4/6/2018    Procedure: ESOPHAGOGASTRODUODENOSCOPY (EGD);   Surgeon: Lynn Bridges MD;  [facility];  Service: Gastroenterology   [address] [place] [address], 2007    LAPAROTOMY N/A 1/4/2018    Procedure: LAPAROTOMY EXPLORATORY, 2nd look, small bowel resection, ilieostomy closure via hands[place] anastamosis transverse colostomy;  Surgeon: Michelle Tafoya DO;  Location:  MAIN OR;  Service: General    VT CLOSE ENTEROSTOMY N/A 7/11/2018    Procedure: COLOSTOMY TAKEDOWN / REVERSE COATES, ILEOCECECTOMY, LASER FLUORESCENCE ANGIOGRAPHY, LYSIS OF ADHESIONS;  Surgeon: Tia Gtz DO;  [facility];  Service: General    VT ESOPHAGOGASTRODUODENOSCOPY TRANSORAL DIAGNOSTIC N/A 10/21/2016    Procedure: EGD AND COLONOSCOPY;  Surgeon: Guille Martinez MD;  [facility];  Service: Gastroenterology    VT PART REMOVAL COLON W ANASTOMOSIS N/A 1/3/2018    Procedure: Exploratory laparotomy, RESECTION COLON SIGMOID, possible ostomy;  Surgeon: Miya Unger DO;  [facility];  Service: General    THYROIDECTOMY      NEAR-TOTAL    THYROIDECTOMY, PARTIAL      TONSILLECTOMY AND ADENOIDECTOMY      TUBAL LIGATION  1997    URETERAL STENT PLACEMENT Bilateral 1/3/2018    Procedure: INSERTION STENT URETERAL;  Surgeon: Henrry Jacobson MD;  [facility];  Service: Urology         Current Outpatient Medications:     colestipol (COLESTID) 1 g tablet, take 2 tablets by mouth twice a day, Disp: 120 tablet, Rfl: 3    metoprolol tartrate (LOPRESSOR) 25 mg tablet, take 1 tablet by mouth every 12 hours, Disp: 60 tablet, Rfl: 5    pantoprazole (PROTONIX) 40 mg tablet, Take 40 mg by mouth daily, Disp: , Rfl:     pantoprazole (PROTONIX) 40 mg tablet, take 1 tablet by mouth twice a day (Patient not taking: Reported on 9/12/2019), Disp: 60 tablet, Rfl: 3  No Known Allergies    Review of Systems   Constitution: Negative  HENT: Negative  Eyes: Negative  Cardiovascular: Negative  Negative for chest pain, claudication, cyanosis, dyspnea on exertion, irregular heartbeat, leg swelling, near-syncope, orthopnea, palpitations, paroxysmal nocturnal dyspnea and syncope  Respiratory: Negative  Negative for cough, hemoptysis, shortness of breath, sleep disturbances due to breathing, snoring, sputum production and wheezing  Endocrine: Negative  Hematologic/Lymphatic: Negative  Skin: Negative  Musculoskeletal: Negative  Gastrointestinal: Negative  Genitourinary: Negative  Neurological: Negative  Psychiatric/Behavioral: Negative  Allergic/Immunologic: Negative  Objective:   /80   Pulse 80   Ht 5' 2 5" (1 588 m)   Wt 98 kg (216 lb)   LMP  (LMP Unknown)   BMI 38 88 kg/m²   Physical Exam   Constitutional: She is oriented to person, place, and time   She appears well-developed and well-nourished  HENT:   Head: Normocephalic and atraumatic  Mouth/Throat: Oropharynx is clear and moist    Eyes: Conjunctivae and EOM are normal  No scleral icterus  Neck: Normal range of motion  Neck supple  No JVD present  No tracheal deviation present  Cardiovascular: Normal rate, regular rhythm, normal heart sounds and intact distal pulses  Exam reveals no gallop and no friction rub  No murmur heard  Pulmonary/Chest: Effort normal and breath sounds normal  No respiratory distress  She has no wheezes  She has no rales  She exhibits no tenderness  Abdominal: Soft  Bowel sounds are normal  She exhibits no distension  There is no tenderness  Aorta not palpable    Musculoskeletal: Normal range of motion  She exhibits no edema or tenderness  Neurological: She is alert and oriented to person, place, and time  Skin: Skin is warm and dry  No rash noted  No erythema  No pallor  Psychiatric: She has a normal mood and affect  Her behavior is normal    Nursing note and vitals reviewed  Lab Review:   Lab Results   Component Value Date    CHOL 226 12/14/2014     Lab Results   Component Value Date    HDL 58 06/25/2016     Lab Results   Component Value Date    LDLCALC 88 06/25/2016     Lab Results   Component Value Date    TRIG 107 06/25/2016     Results Reviewed     None        Results Reviewed     None        Results Reviewed     None          Recent Cardiovascular Testing:   ECHO 7-16-18 : LEFT VENTRICLE:  Systolic function was normal  Ejection fraction was estimated to be 65 %  There were no regional wall motion abnormalities  Doppler parameters were consistent with abnormal left ventricular relaxation (grade 1 diastolic dysfunction)      Cardiac event monitor 6-6-19: sinus rhythm throughout without any patient triggered events     ECG Review:   6-27-18: Normal sinus rhythm with sinus arrhythmia    1-5-18-18: Atrial fibrillation with rapid ventricular response  Nonspecific ST and T wave abnormality  Abnormal ECG  When compared with ECG of 05-JAN-2018 22:04, (unconfirmed)  Atrial fibrillation has replaced Sinus rhythm

## 2019-09-12 NOTE — ASSESSMENT & PLAN NOTE
Maintaining sinus rhythm   Transient Atrial fibrillation: post-procedural   Event monitor showing Sinus Rhythm throughout    Without patient triggered symptoms   OK to discontinue Xarelto   Resume ASA 81 mg

## 2019-09-25 ENCOUNTER — APPOINTMENT (OUTPATIENT)
Dept: LAB | Facility: HOSPITAL | Age: 54
End: 2019-09-25
Payer: COMMERCIAL

## 2019-09-25 ENCOUNTER — OFFICE VISIT (OUTPATIENT)
Dept: GASTROENTEROLOGY | Facility: HOSPITAL | Age: 54
End: 2019-09-25
Payer: COMMERCIAL

## 2019-09-25 VITALS
HEIGHT: 63 IN | SYSTOLIC BLOOD PRESSURE: 140 MMHG | BODY MASS INDEX: 38.66 KG/M2 | HEART RATE: 74 BPM | DIASTOLIC BLOOD PRESSURE: 74 MMHG | WEIGHT: 218.2 LBS | TEMPERATURE: 98.7 F

## 2019-09-25 DIAGNOSIS — K52.9 CHRONIC DIARRHEA: ICD-10-CM

## 2019-09-25 DIAGNOSIS — R79.89 ELEVATED LFTS: Primary | ICD-10-CM

## 2019-09-25 DIAGNOSIS — R79.89 ELEVATED LFTS: ICD-10-CM

## 2019-09-25 LAB
ALBUMIN SERPL BCP-MCNC: 4 G/DL (ref 3.5–5)
ALP SERPL-CCNC: 137 U/L (ref 46–116)
ALT SERPL W P-5'-P-CCNC: 21 U/L (ref 12–78)
ANION GAP SERPL CALCULATED.3IONS-SCNC: 8 MMOL/L (ref 4–13)
AST SERPL W P-5'-P-CCNC: 23 U/L (ref 5–45)
BILIRUB SERPL-MCNC: 1.5 MG/DL (ref 0.2–1)
BUN SERPL-MCNC: 10 MG/DL (ref 5–25)
CALCIUM SERPL-MCNC: 9 MG/DL (ref 8.3–10.1)
CHLORIDE SERPL-SCNC: 106 MMOL/L (ref 100–108)
CO2 SERPL-SCNC: 26 MMOL/L (ref 21–32)
CREAT SERPL-MCNC: 0.73 MG/DL (ref 0.6–1.3)
GFR SERPL CREATININE-BSD FRML MDRD: 93 ML/MIN/1.73SQ M
GGT SERPL-CCNC: 17 U/L (ref 5–85)
GLUCOSE SERPL-MCNC: 87 MG/DL (ref 65–140)
POTASSIUM SERPL-SCNC: 4 MMOL/L (ref 3.5–5.3)
PROT SERPL-MCNC: 7.8 G/DL (ref 6.4–8.2)
SODIUM SERPL-SCNC: 140 MMOL/L (ref 136–145)

## 2019-09-25 PROCEDURE — 36415 COLL VENOUS BLD VENIPUNCTURE: CPT

## 2019-09-25 PROCEDURE — 99214 OFFICE O/P EST MOD 30 MIN: CPT | Performed by: PHYSICIAN ASSISTANT

## 2019-09-25 PROCEDURE — 82977 ASSAY OF GGT: CPT

## 2019-09-25 PROCEDURE — 80053 COMPREHEN METABOLIC PANEL: CPT

## 2019-09-25 RX ORDER — DIPHENOXYLATE HYDROCHLORIDE AND ATROPINE SULFATE 2.5; .025 MG/1; MG/1
1 TABLET ORAL 4 TIMES DAILY
Qty: 360 TABLET | Refills: 3 | Status: SHIPPED | OUTPATIENT
Start: 2019-09-25 | End: 2019-09-25 | Stop reason: SDUPTHER

## 2019-09-25 RX ORDER — DIPHENOXYLATE HYDROCHLORIDE AND ATROPINE SULFATE 2.5; .025 MG/1; MG/1
1 TABLET ORAL 4 TIMES DAILY
Qty: 360 TABLET | Refills: 3 | Status: SHIPPED | OUTPATIENT
Start: 2019-09-25 | End: 2019-12-24

## 2019-09-25 NOTE — PROGRESS NOTES
[facility] - Outpatient Follow-up Note  Charu Ramsey 53 y o  female MRN: [id number]  Encounter: [id number]          ASSESSMENT AND PLAN:      1  Elevated LFTs   -she has a history of mildly elevated total bilirubin which does appear to be chronic and rarely goes above 2  Right upper quadrant ultrasound showed only fatty infiltration of the liver, no other abnormalities  -her alk-phos has also been mildly elevated in the past and was 130 on last labs in March   -recheck LFTs as it has been 6 months, if her elevated LFTs persist could consider further workup for autoimmune or genetic causes  She did have a viral hepatitis panel that was negative last year  2  Chronic diarrhea   -she has a history of chronic diarrhea that began after her colon resection  She had a Pj's procedure with ileocecal active me due to adhesions with reversal of this in July 2018    -she has chronic diarrhea with 5-10 bowel movements per day   -she does have a history of C diff however she has had her stool tested several times this year since then with negative results as well as a colonoscopy without any pseudomembranes seen  She states her symptoms remain unchanged since that time  Random colon biopsies were negative for microscopic colitis    -she denies any abdominal pain, fevers, or chills  She states that she has occasional hematochezia when she has many bowel movements in 1 day with blood seen on the toilet tissue, she states that she feels "raw" when this happens    -she was unable to tolerate Questran and she tried taking colestipol without much benefit  -we discussed possible options including trial of a low residue diet, trial of Metamucil, trial of banana flakes or Lomotil  She would like to try changing her diet and the Lomotil  If this is ineffective, she will try the Metamucil    - diphenoxylate-atropine (LOMOTIL) 2 5-0 025 mg per tablet;  Take 1 tablet by mouth 4 (four) times a day  Dispense: 360 tablet; Refill: 3   -follow-up in the office in a few months to see how she is doing, I encouraged to call with any questions in the meantime or if she is not getting better  ____________________________________________________________    SUBJECTIVE:    58-year-old female past medical history of diverticulitis status post Pj's procedure with an ileocecalectomy in July 2018 and C diff in August 2018 presents to the office for follow-up  She was treated with vancomycin at that time for C diff infection and was tested several times after this with negative results  She also had a colonoscopy in August which was negative for pseudomembranes  She continues to have 5-10 loose or watery bowel movements per day depending on what she eats  She states she will have more bowel movements if she eats things such as salad  She has tried Mike which she was unable to tolerate and colestipol which did seem to help at 1st however she states her symptoms returned  She denies any abdominal pain, nausea, vomiting, fevers, chills, change in appetite, unintended weight loss, difficulty swallowing, melena or jaundice  She states she does see some bright red blood on the toilet tissue at times with multiple bowel movements per day, she states that her backside feels raw   REVIEW OF SYSTEMS IS OTHERWISE NEGATIVE        Historical Information   Past Medical History:   Diagnosis Date    Atrial fibrillation ([address] )     Disease of thyroid gland     Endometrial polyp     Gastroesophageal reflux disease without esophagitis 12/4/2017    Hand injury     LAST ASSESSED: [id number]    High risk medication use     LAST ASSESSED: [id number]    Hyperlipidemia     Hypertension     Renal colic     LAST ASSESSED: [id number]    Viral syndrome     LAST ASSESSED: [id number]    Vitamin D deficiency disease      Past Surgical History:   Procedure Laterality Date    CHOLECYSTECTOMY      COLON SURGERY      COLONOSCOPY N/A 6/15/2018    Procedure: COLONOSCOPY;  Surgeon: Carrie Cabrales DO;  [facility];  Service: Gastroenterology    COLONOSCOPY N/A 8/10/2018    Procedure: COLONOSCOPY;  Surgeon: Carrie Cabrales DO;  [facility];  Service: Gastroenterology    COLOSTOMY      ENDOMETRIAL ABLATION  2007    THERMAL    ESOPHAGOGASTRODUODENOSCOPY N/A 4/6/2018    Procedure: ESOPHAGOGASTRODUODENOSCOPY (EGD);   Surgeon: Miya Quintana MD;  [facility];  Service: Gastroenterology   [address] [place] [address], 2002, 2007    LAPAROTOMY N/A 1/4/2018    Procedure: LAPAROTOMY EXPLORATORY, 2nd look, small bowel resection, ilieostomy closure via hands[place] anastamosis transverse colostomy;  Surgeon: Brent Vela DO;  Location:  MAIN OR;  Service: General    MS CLOSE ENTEROSTOMY N/A 7/11/2018    Procedure: COLOSTOMY TAKEDOWN / Durenda Blase, ILEOCECECTOMY, LASER FLUORESCENCE ANGIOGRAPHY, LYSIS OF ADHESIONS;  Surgeon: Mel Leary DO;  Location:  MAIN OR;  Service: General    MS ESOPHAGOGASTRODUODENOSCOPY TRANSORAL DIAGNOSTIC N/A 10/21/2016    Procedure: EGD AND COLONOSCOPY;  Surgeon: Jamir Jaramillo MD;  [facility];  Service: Gastroenterology    MS PART REMOVAL COLON W ANASTOMOSIS N/A 1/3/2018    Procedure: Exploratory laparotomy, RESECTION COLON SIGMOID, possible ostomy;  Surgeon: Mel Leary DO;  [facility];  Service: General    THYROIDECTOMY      NEAR-TOTAL    THYROIDECTOMY, PARTIAL      TONSILLECTOMY AND ADENOIDECTOMY      TUBAL LIGATION  1997    URETERAL STENT PLACEMENT Bilateral 1/3/2018    Procedure: INSERTION STENT URETERAL;  Surgeon: Jossie Fuentes MD;  [facility];  Service: Urology     Social History   Social History     Substance and Sexual Activity   Alcohol Use Yes    Alcohol/week: 2 0 standard drinks    Types: 2 Standard drinks or equivalent per week    Comment: 1-2 mixed drinks per weekend     Social History     Substance and Sexual Activity Drug Use No     Social History     Tobacco Use   Smoking Status Former Smoker    Packs/day: 0 50    Years: 30 00    Pack years: 15 00    Last attempt to quit:     Years since quittin 7   Smokeless Tobacco Never Used     Family History   Problem Relation Age of Onset    Diabetes type II Mother     Heart failure Father        Meds/Allergies       Current Outpatient Medications:     colestipol (COLESTID) 1 g tablet    metoprolol tartrate (LOPRESSOR) 25 mg tablet    pantoprazole (PROTONIX) 40 mg tablet    pantoprazole (PROTONIX) 40 mg tablet    diphenoxylate-atropine (LOMOTIL) 2 5-0 025 mg per tablet    No Known Allergies        Objective     Blood pressure 140/74, pulse 74, temperature 98 7 °F (37 1 °C), temperature source Tympanic, height 5' 2 5" (1 588 m), weight 99 kg (218 lb 3 2 oz), not currently breastfeeding  PHYSICAL EXAM:      Physical Exam   Constitutional: She is oriented to person, place, and time  She appears well-developed  No distress  Obese   HENT:   Head: Normocephalic and atraumatic  Eyes: Right eye exhibits no discharge  Left eye exhibits no discharge  No scleral icterus  Neck: Neck supple  No tracheal deviation present  Cardiovascular: Normal rate, regular rhythm and normal heart sounds  Exam reveals no gallop and no friction rub  No murmur heard  Pulmonary/Chest: Effort normal  No respiratory distress  She has no wheezes  She has no rales  Abdominal: Soft  Bowel sounds are normal  She exhibits no distension  There is no tenderness  There is no rebound and no guarding  Neurological: She is alert and oriented to person, place, and time  Skin: Skin is warm and dry  Psychiatric: She has a normal mood and affect  Lab Results:   No visits with results within 1 Day(s) from this visit     Latest known visit with results is:   Appointment on 2019   Component Date Value    Total Bilirubin 2019 1 60*    Bilirubin, Direct 2019 0 37*  Alkaline Phosphatase 03/26/2019 130*    AST 03/26/2019 17     ALT 03/26/2019 21     Total Protein 03/26/2019 6 8     Albumin 03/26/2019 3 6          Radiology Results:   No results found

## 2019-09-28 ENCOUNTER — APPOINTMENT (OUTPATIENT)
Dept: LAB | Facility: MEDICAL CENTER | Age: 54
End: 2019-09-28
Payer: COMMERCIAL

## 2019-09-28 ENCOUNTER — TRANSCRIBE ORDERS (OUTPATIENT)
Dept: LAB | Facility: MEDICAL CENTER | Age: 54
End: 2019-09-28

## 2019-09-28 DIAGNOSIS — R79.89 ELEVATED LFTS: ICD-10-CM

## 2019-09-28 DIAGNOSIS — R74.01 TRANSAMINITIS: ICD-10-CM

## 2019-09-28 LAB
ALBUMIN SERPL BCP-MCNC: 3.9 G/DL (ref 3.5–5)
ALP SERPL-CCNC: 135 U/L (ref 46–116)
ALT SERPL W P-5'-P-CCNC: 26 U/L (ref 12–78)
ANION GAP SERPL CALCULATED.3IONS-SCNC: 7 MMOL/L (ref 4–13)
AST SERPL W P-5'-P-CCNC: 16 U/L (ref 5–45)
BILIRUB SERPL-MCNC: 1.88 MG/DL (ref 0.2–1)
BUN SERPL-MCNC: 14 MG/DL (ref 5–25)
CALCIUM SERPL-MCNC: 9 MG/DL (ref 8.3–10.1)
CHLORIDE SERPL-SCNC: 111 MMOL/L (ref 100–108)
CO2 SERPL-SCNC: 26 MMOL/L (ref 21–32)
CREAT SERPL-MCNC: 0.69 MG/DL (ref 0.6–1.3)
GFR SERPL CREATININE-BSD FRML MDRD: 98 ML/MIN/1.73SQ M
GLUCOSE P FAST SERPL-MCNC: 88 MG/DL (ref 65–99)
POTASSIUM SERPL-SCNC: 3.6 MMOL/L (ref 3.5–5.3)
PROT SERPL-MCNC: 7.3 G/DL (ref 6.4–8.2)
SODIUM SERPL-SCNC: 144 MMOL/L (ref 136–145)

## 2019-09-28 PROCEDURE — 84075 ASSAY ALKALINE PHOSPHATASE: CPT

## 2019-09-28 PROCEDURE — 84080 ASSAY ALKALINE PHOSPHATASES: CPT

## 2019-09-28 PROCEDURE — 36415 COLL VENOUS BLD VENIPUNCTURE: CPT | Performed by: PHYSICIAN ASSISTANT

## 2019-09-28 PROCEDURE — 86256 FLUORESCENT ANTIBODY TITER: CPT

## 2019-09-28 PROCEDURE — 80053 COMPREHEN METABOLIC PANEL: CPT | Performed by: PHYSICIAN ASSISTANT

## 2019-09-30 LAB — MITOCHONDRIA M2 IGG SER-ACNC: <20 UNITS (ref 0–20)

## 2019-10-02 DIAGNOSIS — E80.6 HYPERBILIRUBINEMIA: Primary | ICD-10-CM

## 2019-10-02 DIAGNOSIS — R74.8 ELEVATED ALKALINE PHOSPHATASE LEVEL: ICD-10-CM

## 2019-10-02 LAB
ALP BONE CFR SERPL: 34 % (ref 14–68)
ALP INTEST CFR SERPL: 4 % (ref 0–18)
ALP LIVER CFR SERPL: 62 % (ref 18–85)
ALP SERPL-CCNC: 121 IU/L (ref 39–117)

## 2019-10-06 ENCOUNTER — APPOINTMENT (OUTPATIENT)
Dept: LAB | Facility: HOSPITAL | Age: 54
End: 2019-10-06
Payer: COMMERCIAL

## 2019-10-06 DIAGNOSIS — E80.6 HYPERBILIRUBINEMIA: ICD-10-CM

## 2019-10-06 DIAGNOSIS — R74.8 ELEVATED ALKALINE PHOSPHATASE LEVEL: ICD-10-CM

## 2019-10-06 LAB — BILIRUB DIRECT SERPL-MCNC: 0.25 MG/DL (ref 0–0.2)

## 2019-10-06 PROCEDURE — 86038 ANTINUCLEAR ANTIBODIES: CPT

## 2019-10-06 PROCEDURE — 86235 NUCLEAR ANTIGEN ANTIBODY: CPT

## 2019-10-06 PROCEDURE — 36415 COLL VENOUS BLD VENIPUNCTURE: CPT

## 2019-10-06 PROCEDURE — 82248 BILIRUBIN DIRECT: CPT

## 2019-10-07 LAB — RYE IGE QN: NEGATIVE

## 2019-10-08 LAB — ACTIN IGG SERPL-ACNC: 11 UNITS (ref 0–19)

## 2019-10-14 ENCOUNTER — HOSPITAL ENCOUNTER (OUTPATIENT)
Dept: MRI IMAGING | Facility: HOSPITAL | Age: 54
Discharge: HOME | End: 2019-10-14
Payer: COMMERCIAL

## 2019-10-14 DIAGNOSIS — R74.8 ELEVATED ALKALINE PHOSPHATASE LEVEL: ICD-10-CM

## 2019-10-14 DIAGNOSIS — E80.6 HYPERBILIRUBINEMIA: ICD-10-CM

## 2019-10-14 PROCEDURE — 76376 3D RENDER W/INTRP POSTPROCES: CPT

## 2019-10-14 PROCEDURE — A9585 GADOBUTROL INJECTION: HCPCS | Performed by: PHYSICIAN ASSISTANT

## 2019-10-14 PROCEDURE — 74183 MRI ABD W/O CNTR FLWD CNTR: CPT

## 2019-10-14 RX ADMIN — GADOBUTROL 10 ML: 604.72 INJECTION INTRAVENOUS at 11:52

## 2019-10-16 DIAGNOSIS — R11.2 INTRACTABLE VOMITING WITH NAUSEA, UNSPECIFIED VOMITING TYPE: ICD-10-CM

## 2019-10-16 DIAGNOSIS — E87.6 HYPOKALEMIA: ICD-10-CM

## 2019-10-16 RX ORDER — POTASSIUM CHLORIDE 20 MEQ/1
TABLET, EXTENDED RELEASE ORAL
Qty: 30 TABLET | Refills: 0 | Status: SHIPPED | OUTPATIENT
Start: 2019-10-16 | End: 2020-03-30

## 2019-10-23 ENCOUNTER — TELEPHONE (OUTPATIENT)
Dept: GASTROENTEROLOGY | Facility: CLINIC | Age: 54
End: 2019-10-23

## 2019-10-23 NOTE — TELEPHONE ENCOUNTER
Patients GI provider:  Dr Saenz Jurist    Number to return call: [phone]    Reason for call: Pt was returning a missed call from [place], [place] in regards to results       Scheduled procedure/appointment date if applicable: Apt/procedure - n/a

## 2019-10-23 NOTE — TELEPHONE ENCOUNTER
I spoke with the patient in asked her to increase her Lomotil from 2 twice a day given she is still having at least 3-4 watery bowel movements per day  I told her she can take up to 8 tablets per day  She will call us if she has any further problems    Leon Morfin

## 2019-11-04 ENCOUNTER — TELEPHONE (OUTPATIENT)
Dept: GASTROENTEROLOGY | Facility: CLINIC | Age: 54
End: 2019-11-04

## 2019-12-14 DIAGNOSIS — K21.9 GASTROESOPHAGEAL REFLUX DISEASE WITHOUT ESOPHAGITIS: ICD-10-CM

## 2019-12-16 RX ORDER — PANTOPRAZOLE SODIUM 40 MG/1
TABLET, DELAYED RELEASE ORAL
Qty: 60 TABLET | Refills: 5 | Status: SHIPPED | OUTPATIENT
Start: 2019-12-16 | End: 2020-01-21 | Stop reason: HOSPADM

## 2020-01-15 DIAGNOSIS — I10 HYPERTENSION, UNSPECIFIED TYPE: ICD-10-CM

## 2020-01-21 ENCOUNTER — OFFICE VISIT (OUTPATIENT)
Dept: GASTROENTEROLOGY | Facility: CLINIC | Age: 55
End: 2020-01-21
Payer: COMMERCIAL

## 2020-01-21 VITALS
HEIGHT: 63 IN | BODY MASS INDEX: 38.31 KG/M2 | DIASTOLIC BLOOD PRESSURE: 84 MMHG | SYSTOLIC BLOOD PRESSURE: 141 MMHG | TEMPERATURE: 98.7 F | WEIGHT: 216.2 LBS | HEART RATE: 77 BPM

## 2020-01-21 DIAGNOSIS — R74.01 TRANSAMINITIS: ICD-10-CM

## 2020-01-21 DIAGNOSIS — K21.9 GASTROESOPHAGEAL REFLUX DISEASE WITHOUT ESOPHAGITIS: Chronic | ICD-10-CM

## 2020-01-21 DIAGNOSIS — R19.7 DIARRHEA, UNSPECIFIED TYPE: Primary | ICD-10-CM

## 2020-01-21 PROCEDURE — 99214 OFFICE O/P EST MOD 30 MIN: CPT | Performed by: PHYSICIAN ASSISTANT

## 2020-01-21 NOTE — PROGRESS NOTES
Assessment/Plan:     Diagnoses and all orders for this visit:    Diarrhea, unspecified type  Patient was having significant diarrhea after her partial bowel resection  Her bowels are now well controlled with colestipol 2 tablets b i d     Would recommend continuing this regiment  Transaminitis  Patient was found to have an elevated bilirubin as well as a elevated alkaline phosphatase  So far workup has been unrevealing  Given her direct bilirubin was low it is possible this is secondary to Gilbert's disease  Would recommend repeating labs in 6 months time   -     Hepatic function panel; Future    Gastroesophageal reflux disease without esophagitis  She does have a history of GERD for which she is taking pantoprazole 40 mg daily with good control of her symptoms  She has tried to titrate down the medication in the past but states her symptoms return  She does admit to eating spicy foods  We did review reflux diet and hopefully with dietary modifications can potentially decrease medications in the future  Will see her back in 6 months or sooner if necessary  Subjective:      Patient ID: Víctor Recinos is a 53 y o  female  HPI     This is a follow-up for GERD, diarrhea and elevated LFTs  Patient has a history of diverticulitis status post Pj's procedure with the ileocectomy in July 2018 status post C diff in August 2018  After her surgery she was having significant watery loose bowel movements, 5-10 times per day  She was on Questran but was unable to tolerated  This has since been switched to colestipol which she seems to be doing well with  She takes 2 tabs twice a day  She does have a history of GERD for which she takes pantoprazole 40 mg daily with good control of her symptoms  She states she has tried to taper off of the medication in the past and her symptoms returned  She states she does follow a reflux diet however on further questioning does admit to eating spicy food  She was previously found to have elevated LFTs, specifically total bilirubin and alkaline phosphatase  She did have mild elevations in her AST and ALT in the past however this has since returned to normal   Most recent labs were checked in September, total bilirubin was 1 88, alkaline phosphatase was 135  A direct bilirubin was checked however not on the same day which was low, indicating her total was more indirect  She was also checked for anti smooth muscle antibody, FABIANO, AMA all of which were within normal limits as was GGT  Alkaline phosphatase isoenzymes were mildly elevated  She was also checked for hepatitis in the past which was within normal limits  Her last endoscopy was in April 2018 which showed a 1 cm hiatal hernia and gastritis  Biopsies were negative for Logan's and H pylori  Her last colonoscopy was in August 2018 which appeared normal with the exception of the anastomosis  Biopsies were negative for microscopic colitis      Patient Active Problem List   Diagnosis    Esophageal reflux    Hyperlipidemia    Essential hypertension    Hypokalemia    Hypoalbuminemia    Transaminitis    Severe protein-calorie malnutrition (HCC)    Microscopic hematuria    Hypomagnesemia    Diverticulitis of colon    Hematuria    Paroxysmal atrial fibrillation (HCC)    Ileus (HCC)    Ache in joint    Anemia    Diverticulosis    Erysipelas    Fatigue    Leg pain, left    Non-rheumatic mitral regurgitation    Primary osteoarthritis of left knee    Systolic murmur    Thyroid disorder    Vaginal dryness, menopausal    Vitamin D deficiency    Vulvitis    Dehydration    Intractable vomiting with nausea    Gastroesophageal reflux disease without esophagitis    Hair loss    Adenomatous polyps    Screening for colon cancer    Hair abnormality    Diarrhea    Clostridium difficile colitis    Dyspepsia    Moderate protein-calorie malnutrition (HCC)     No Known Allergies  Current Outpatient Medications on File Prior to Visit   Medication Sig    colestipol (COLESTID) 1 g tablet take 2 tablets by mouth twice a day    metoprolol tartrate (LOPRESSOR) 25 mg tablet take 1 tablet by mouth every 12 hours    pantoprazole (PROTONIX) 40 mg tablet Take 40 mg by mouth daily    potassium chloride (K-DUR,KLOR-CON) 20 mEq tablet take 1 tablet by mouth once daily (Patient not taking: Reported on 2020)    [DISCONTINUED] pantoprazole (PROTONIX) 40 mg tablet take 1 tablet by mouth twice a day (Patient not taking: Reported on 2020)     No current facility-administered medications on file prior to visit  Family History   Problem Relation Age of Onset    Diabetes type II Mother     Heart failure Father      Past Medical History:   Diagnosis Date    Atrial fibrillation ([facility] )     Disease of thyroid gland     Endometrial polyp     Gastroesophageal reflux disease without esophagitis 2017    Hand injury     LAST ASSESSED: [id number]    High risk medication use     LAST ASSESSED: [id number]    Hyperlipidemia     Hypertension     Renal colic     LAST ASSESSED: [id number]    Viral syndrome     LAST ASSESSED: [id number]    Vitamin D deficiency disease      Social History     Socioeconomic History    Marital status: /Civil Union     Spouse name: None    Number of children: None    Years of education: None    Highest education level: None   Occupational History    None   Social Needs    Financial resource strain: None    Food insecurity:     Worry: None     Inability: None    Transportation needs:     Medical: None     Non-medical: None   Tobacco Use    Smoking status: Former Smoker     Packs/day: 0 50     Years: 30 00     Pack years: 15 00     Last attempt to quit: 2003     Years since quittin 0    Smokeless tobacco: Never Used   Substance and Sexual Activity    Alcohol use:  Yes     Alcohol/week: 2 0 standard drinks     Types: 2 Standard drinks or equivalent per week Comment: 1-2 mixed drinks per weekend    Drug use: No    Sexual activity: Yes   Lifestyle    Physical activity:     Days per week: None     Minutes per session: None    Stress: None   Relationships    Social connections:     Talks on phone: None     Gets together: None     Attends [employer] service: None     Active member of club or organization: None     Attends meetings of clubs or organizations: None     Relationship status: None    Intimate partner violence:     Fear of current or ex partner: None     Emotionally abused: None     Physically abused: None     Forced sexual activity: None   Other Topics Concern    None   Social History Narrative    None     Past Surgical History:   Procedure Laterality Date    CHOLECYSTECTOMY      COLON SURGERY      COLONOSCOPY N/A 6/15/2018    Procedure: COLONOSCOPY;  Surgeon: Opal Meadows DO;  [facility];  Service: Gastroenterology    COLONOSCOPY N/A 8/10/2018    Procedure: COLONOSCOPY;  Surgeon: Opal Meadows DO;  [facility];  Service: Gastroenterology    COLOSTOMY      ENDOMETRIAL ABLATION  2007    THERMAL    ESOPHAGOGASTRODUODENOSCOPY N/A 4/6/2018    Procedure: ESOPHAGOGASTRODUODENOSCOPY (EGD);   Surgeon: Rajesh Carson MD;  [facility];  Service: Gastroenterology   [address] [place] [address], 2002, 2007    LAPAROTOMY N/A 1/4/2018    Procedure: LAPAROTOMY EXPLORATORY, 2nd look, small bowel resection, ilieostomy closure via [place] anastamosis transverse colostomy;  Surgeon: Yvette Olivia DO;  Location:  MAIN OR;  Service: General    HI CLOSE ENTEROSTOMY N/A 7/11/2018    Procedure: COLOSTOMY TAKEDOWN / REVERSE COATES, ILEOCECECTOMY, LASER FLUORESCENCE ANGIOGRAPHY, LYSIS OF ADHESIONS;  Surgeon: Trisha Ledesma DO;  [facility];  Service: General    HI ESOPHAGOGASTRODUODENOSCOPY TRANSORAL DIAGNOSTIC N/A 10/21/2016    Procedure: EGD AND COLONOSCOPY;  Surgeon: June Andujar MD;  [facility];  Service: Gastroenterology    NY PART REMOVAL COLON W ANASTOMOSIS N/A 1/3/2018    Procedure: Exploratory laparotomy, RESECTION COLON SIGMOID, possible ostomy;  Surgeon: Edson Aguilar DO;  [facility];  Service: General    THYROIDECTOMY      NEAR-TOTAL    THYROIDECTOMY, PARTIAL      TONSILLECTOMY AND ADENOIDECTOMY      TUBAL LIGATION  1997    URETERAL STENT PLACEMENT Bilateral 1/3/2018    Procedure: INSERTION STENT URETERAL;  Surgeon: Nelda Germain MD;  [facility];  Service: Urology         Review of Systems   All other systems reviewed and are negative  Objective:      /84 (BP Location: Left arm, Patient Position: Sitting, Cuff Size: Extra-Large)   Pulse 77   Temp 98 7 °F (37 1 °C) (Tympanic)   Ht 5' 2 5" (1 588 m)   Wt 98 1 kg (216 lb 3 2 oz)   LMP  (LMP Unknown)   BMI 38 91 kg/m²          Physical Exam   Constitutional: She is oriented to person, place, and time  She appears well-developed and well-nourished  Over weight   HENT:   Head: Normocephalic and atraumatic  Eyes: Conjunctivae and EOM are normal    Neck: Normal range of motion  Cardiovascular: Normal rate and regular rhythm  Pulmonary/Chest: Effort normal and breath sounds normal    Abdominal: Soft  Bowel sounds are normal  She exhibits no distension  There is no tenderness  Musculoskeletal: Normal range of motion  Neurological: She is alert and oriented to person, place, and time  Skin: Skin is warm and dry  Psychiatric: She has a normal mood and affect   Her behavior is normal

## 2020-02-02 DIAGNOSIS — R19.7 DIARRHEA, UNSPECIFIED TYPE: ICD-10-CM

## 2020-02-03 RX ORDER — MONTELUKAST SODIUM 4 MG/1
TABLET, CHEWABLE ORAL
Qty: 120 TABLET | Refills: 5 | Status: SHIPPED | OUTPATIENT
Start: 2020-02-03 | End: 2020-02-25

## 2020-02-15 DIAGNOSIS — I10 HYPERTENSION, UNSPECIFIED TYPE: ICD-10-CM

## 2020-02-25 DIAGNOSIS — R19.7 DIARRHEA, UNSPECIFIED TYPE: ICD-10-CM

## 2020-02-25 RX ORDER — MONTELUKAST SODIUM 4 MG/1
TABLET, CHEWABLE ORAL
Qty: 120 TABLET | Refills: 5 | Status: SHIPPED | OUTPATIENT
Start: 2020-02-25 | End: 2020-12-07

## 2020-03-15 DIAGNOSIS — I10 HYPERTENSION, UNSPECIFIED TYPE: ICD-10-CM

## 2020-03-30 ENCOUNTER — TELEMEDICINE (OUTPATIENT)
Dept: CARDIOLOGY CLINIC | Facility: CLINIC | Age: 55
End: 2020-03-30
Payer: COMMERCIAL

## 2020-03-30 VITALS — WEIGHT: 216 LBS | HEIGHT: 63 IN | BODY MASS INDEX: 38.27 KG/M2

## 2020-03-30 DIAGNOSIS — I10 HYPERTENSION, UNSPECIFIED TYPE: ICD-10-CM

## 2020-03-30 DIAGNOSIS — I48.0 PAROXYSMAL ATRIAL FIBRILLATION (HCC): Primary | ICD-10-CM

## 2020-03-30 PROCEDURE — 99213 OFFICE O/P EST LOW 20 MIN: CPT | Performed by: INTERNAL MEDICINE

## 2020-03-30 NOTE — PROGRESS NOTES
Virtual Brief Visit    Problem List Items Addressed This Visit        Cardiovascular and Mediastinum    Paroxysmal atrial fibrillation (HCC) - Primary    Relevant Medications    metoprolol tartrate (LOPRESSOR) 25 mg tablet      Other Visit Diagnoses     Hypertension, unspecified type        Relevant Medications    metoprolol tartrate (LOPRESSOR) 25 mg tablet            Seven Teresa has no symptoms reminiscent of angina heart failure nor recurrent AFib  I asked her to continue with her low-dose metoprolol, I gave her refills today  I asked her to continue with aspirin therapy as well for anticoagulation  She will call should any palpitations arise or any concerning potential cardiac symptoms such as chest pain shortness of breath arise prior to her 2 month scheduled follow-up visit  I gave her information about [employer] teams  Our staff is mailing her or emailing her instructions  Her and her  are interested in video visits in the future if pandemic still present  Reason for visit is COVID-19    Encounter provider Benjy Nunez DO    Provider located at [address] [address]      Recent Visits  No visits were found meeting these conditions  Showing recent visits within past 7 days and meeting all other requirements     Today's Visits  Date Type Provider Dept   03/30/20 Telemedicine Benjy Nunez DO [facility]   Showing today's visits and meeting all other requirements     Future Appointments  No visits were found meeting these conditions  Showing future appointments within next 150 days and meeting all other requirements        After connecting through [employer], the patient was identified by name and date of birth  Elizabethtianna Gant was informed that this is a telemedicine visit and that the visit is being conducted through [employer]    My office door was closed  No one else was in the room  She acknowledged consent and understanding of privacy and security of the video platform  The patient has agreed to participate and understands they can discontinue the visit at any time  Patient is aware this is a billable service  Subjective  Víctor Recinos is a 53 y o  female with low CHADS2 stroke risk score PAF  No palpitations, last event monitor negative for AFib, now off novel oral anticoagulant therapy on low-dose aspirin therapy  No palpitations no chest pain no shortness of breath no presyncope no syncope no TIA or claudication like symptoms  No alarming GI symptoms  She does not take her home blood pressure  Does not want to go out with the COVID-19 epidemic/pandemic  No fevers chills or sore throat  No cough  Past Medical History:   Diagnosis Date    [facility] fibrillation ([facility] )     Disease of thyroid gland     Endometrial polyp     Gastroesophageal reflux disease without esophagitis 12/4/2017    Hand injury     LAST ASSESSED: [id number]    High risk medication use     LAST ASSESSED: [id number]    Hyperlipidemia     Hypertension     Renal colic     LAST ASSESSED: [id number]    Viral syndrome     LAST ASSESSED: [id number]    Vitamin D deficiency disease        Past Surgical History:   Procedure Laterality Date    CHOLECYSTECTOMY      COLON SURGERY      COLONOSCOPY N/A 6/15/2018    Procedure: COLONOSCOPY;  Surgeon: Frankey Spillers, DO;  [facility];  Service: Gastroenterology    COLONOSCOPY N/A 8/10/2018    Procedure: COLONOSCOPY;  Surgeon: Frankey Spillers, DO;  [facility];  Service: Gastroenterology    COLOSTOMY      ENDOMETRIAL ABLATION  2007    THERMAL    ESOPHAGOGASTRODUODENOSCOPY N/A 4/6/2018    Procedure: ESOPHAGOGASTRODUODENOSCOPY (EGD);   Surgeon: Farrukh Farley MD;  [facility];  Service: Gastroenterology    Hina Toussaint, 2002, 2007    LAPAROTOMY N/A 1/4/2018    Procedure: Zarina Kim EXPLORATORY, 2nd look, small bowel resection, ilieostomy closure via [place] anastamosis transverse colostomy;  Surgeon: Capri Sheldon DO;  [facility];  Service: General    OK CLOSE ENTEROSTOMY N/A 7/11/2018    Procedure: COLOSTOMY TAKEDOWN / [place]o, ILEOCECECTOMY, LASER FLUORESCENCE ANGIOGRAPHY, LYSIS OF ADHESIONS;  Surgeon: Gamaliel Huitron DO;  [facility];  Service: General    OK ESOPHAGOGASTRODUODENOSCOPY TRANSORAL DIAGNOSTIC N/A 10/21/2016    Procedure: EGD AND COLONOSCOPY;  Surgeon: Aiden Fernando MD;  [facility];  Service: Gastroenterology    OK PART REMOVAL COLON W ANASTOMOSIS N/A 1/3/2018    Procedure: Exploratory laparotomy, RESECTION COLON SIGMOID, possible ostomy;  Surgeon: Gamaliel Huitron DO;  [facility];  Service: General    THYROIDECTOMY      NEAR-TOTAL    THYROIDECTOMY, PARTIAL      TONSILLECTOMY AND ADENOIDECTOMY      TUBAL LIGATION  1997    URETERAL STENT PLACEMENT Bilateral 1/3/2018    Procedure: INSERTION STENT URETERAL;  Surgeon: Ricki Roy MD;  [facility];  Service: Urology       Current Outpatient Medications   Medication Sig Dispense Refill    colestipol (COLESTID) 1 g tablet take 2 tablets by mouth twice a day 120 tablet 5    metoprolol tartrate (LOPRESSOR) 25 mg tablet Take 1 tablet (25 mg total) by mouth every 12 (twelve) hours 60 tablet 5    pantoprazole (PROTONIX) 40 mg tablet Take 40 mg by mouth daily       No current facility-administered medications for this visit  No Known Allergies    Review of Systems   Constitutional: Negative  HENT: Negative  Eyes: Negative  Respiratory: Negative  Cardiovascular: Negative  Gastrointestinal: Negative for abdominal pain, anal bleeding, blood in stool, nausea, rectal pain and vomiting  Endocrine: Negative  Genitourinary: Negative  Musculoskeletal: Negative  Skin: Negative  Allergic/Immunologic: Negative  Neurological: Negative  Hematological: Negative  Psychiatric/Behavioral: Negative  I spent 20 minutes with the patient during this visit

## 2020-04-28 DIAGNOSIS — K21.9 GASTROESOPHAGEAL REFLUX DISEASE WITHOUT ESOPHAGITIS: Primary | ICD-10-CM

## 2020-04-28 RX ORDER — PANTOPRAZOLE SODIUM 40 MG/1
40 TABLET, DELAYED RELEASE ORAL DAILY
Qty: 90 TABLET | Refills: 3 | Status: SHIPPED | OUTPATIENT
Start: 2020-04-28 | End: 2021-03-04 | Stop reason: SDUPTHER

## 2020-06-04 ENCOUNTER — OFFICE VISIT (OUTPATIENT)
Dept: CARDIOLOGY CLINIC | Facility: CLINIC | Age: 55
End: 2020-06-04
Payer: COMMERCIAL

## 2020-06-04 VITALS
WEIGHT: 226 LBS | HEART RATE: 78 BPM | HEIGHT: 63 IN | DIASTOLIC BLOOD PRESSURE: 82 MMHG | BODY MASS INDEX: 40.04 KG/M2 | SYSTOLIC BLOOD PRESSURE: 124 MMHG

## 2020-06-04 DIAGNOSIS — I48.0 PAROXYSMAL ATRIAL FIBRILLATION (HCC): Primary | ICD-10-CM

## 2020-06-04 DIAGNOSIS — I10 ESSENTIAL HYPERTENSION: ICD-10-CM

## 2020-06-04 PROCEDURE — 99214 OFFICE O/P EST MOD 30 MIN: CPT | Performed by: INTERNAL MEDICINE

## 2020-06-04 RX ORDER — ASPIRIN 81 MG/1
81 TABLET ORAL DAILY
Qty: 30 TABLET | Refills: 5
Start: 2020-06-04

## 2020-06-19 ENCOUNTER — APPOINTMENT (OUTPATIENT)
Dept: LAB | Facility: MEDICAL CENTER | Age: 55
End: 2020-06-19
Payer: COMMERCIAL

## 2020-06-19 DIAGNOSIS — R74.01 TRANSAMINITIS: ICD-10-CM

## 2020-06-19 LAB
ALBUMIN SERPL BCP-MCNC: 3.9 G/DL (ref 3.5–5)
ALP SERPL-CCNC: 121 U/L (ref 46–116)
ALT SERPL W P-5'-P-CCNC: 46 U/L (ref 12–78)
AST SERPL W P-5'-P-CCNC: 24 U/L (ref 5–45)
BILIRUB DIRECT SERPL-MCNC: 0.46 MG/DL (ref 0–0.2)
BILIRUB SERPL-MCNC: 2.46 MG/DL (ref 0.2–1)
CHOLEST SERPL-MCNC: 159 MG/DL (ref 50–200)
HDLC SERPL-MCNC: 68 MG/DL
LDLC SERPL CALC-MCNC: 58 MG/DL (ref 0–100)
NONHDLC SERPL-MCNC: 91 MG/DL
PROT SERPL-MCNC: 7.5 G/DL (ref 6.4–8.2)
TRIGL SERPL-MCNC: 165 MG/DL

## 2020-06-19 PROCEDURE — 36415 COLL VENOUS BLD VENIPUNCTURE: CPT

## 2020-06-19 PROCEDURE — 80061 LIPID PANEL: CPT | Performed by: INTERNAL MEDICINE

## 2020-06-19 PROCEDURE — 80076 HEPATIC FUNCTION PANEL: CPT

## 2020-07-21 ENCOUNTER — OFFICE VISIT (OUTPATIENT)
Dept: GASTROENTEROLOGY | Facility: CLINIC | Age: 55
End: 2020-07-21
Payer: COMMERCIAL

## 2020-07-21 VITALS
TEMPERATURE: 98.3 F | WEIGHT: 232.4 LBS | SYSTOLIC BLOOD PRESSURE: 149 MMHG | DIASTOLIC BLOOD PRESSURE: 82 MMHG | BODY MASS INDEX: 41.18 KG/M2 | HEIGHT: 63 IN | HEART RATE: 69 BPM

## 2020-07-21 DIAGNOSIS — R74.01 TRANSAMINITIS: ICD-10-CM

## 2020-07-21 DIAGNOSIS — R19.7 DIARRHEA, UNSPECIFIED TYPE: Primary | ICD-10-CM

## 2020-07-21 DIAGNOSIS — K21.9 GASTROESOPHAGEAL REFLUX DISEASE WITHOUT ESOPHAGITIS: Chronic | ICD-10-CM

## 2020-07-21 PROCEDURE — 99214 OFFICE O/P EST MOD 30 MIN: CPT | Performed by: PHYSICIAN ASSISTANT

## 2020-07-21 RX ORDER — MONTELUKAST SODIUM 4 MG/1
TABLET, CHEWABLE ORAL
COMMUNITY
Start: 2020-06-12 | End: 2020-12-07

## 2020-07-21 RX ORDER — DICYCLOMINE HCL 20 MG
20 TABLET ORAL EVERY 6 HOURS
Qty: 120 TABLET | Refills: 5 | Status: SHIPPED | OUTPATIENT
Start: 2020-07-21 | End: 2020-12-07 | Stop reason: SDUPTHER

## 2020-07-21 NOTE — PROGRESS NOTES
Assessment/Plan:     Diagnoses and all orders for this visit:    Diarrhea, unspecified type  Patient continues with multiple loose bowel movements  She has a history of diverticulitis, status post Pj, ileocecectomy with reversal   She has tried Lomotil as well as Imodium without relief  She has been on colestipol 2 pills b i d  and was having some improvement however recently things seem to worsen  Would recommend going to 2 pills t i d  as she describes fecal urgency after eating  Is possible she also has overlapping irritable bowel syndrome as she has noticed that her symptoms have worsened with stress  Would recommend a trial of Bentyl to help with cramping as well as diarrhea  If no improvement can consider Xifaxan which will likely need a prior authorization  -     dicyclomine (BENTYL) 20 mg tablet; Take 1 tablet (20 mg total) by mouth every 6 (six) hours  -     rifaximin (XIFAXAN) 550 mg tablet; Take 1 tablet (550 mg total) by mouth every 8 (eight) hours for 14 days    Gastroesophageal reflux disease without esophagitis  He does have a history of GERD for which she takes pantoprazole 40 mg a liver symptoms  Unfortunately she has tried to taper this in the past and has been unsuccessful  Transaminitis  She does have a history of elevated bilirubin that is mainly indirect, and mildly elevated alkaline phosphatase  Previous workup has been negative including FABIANO, AMA, antismooth muscle antibody, chronic hep panel  She did have an MRI which showed a mildly dilated CBD likely secondary to prior cholecystectomy  This is likely 2nd very to Gilbert's disease, no further workup at this time  Will see her back in 3 months or sooner if necessary  Subjective:      Patient ID: Romelia Rosa is a 63 y o  female  HPI     This is follow-up for GERD, diarrhea and elevated LFTs    Patient has a history of diverticulitis, status post Pj's procedure with ileocecectomy in July 2018, status post C diff in August 2018  She then had a reversal    She was complaining of 5-10 bowel movements per day  She has been taking colestipol, 2 tabs b i d  and did have some mild improvement however this has since returned to what it was before, 5-10 bowel movements per day  She states she has tried Imodium as well as Lomotil without relief  She has never tried Bentyl  She has not been able to identify specific foods as triggers  She does notice that stress may be complicating factors  Her 1 son did pass away, 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  She does have a history of GERD  She is on pantoprazole 40 mg daily with good control of her symptoms  She has tried to stop the medication in the past but her symptoms return  She was found to have a previously elevated bilirubin, this was fractionated and is mainly indirect  She also has a mildly elevated alkaline phosphatase, previous workup including anti smooth muscle antibody, FABIANO, AMA were all within normal limits as was GGT  Previous hepatitis panel also within normal limits  Her last endoscopy was in April 2018 which showed a 1 cm hiatal hernia and gastritis  Biopsies negative for Logan's and H pylori  Her last colonoscopy was in August 2018 and appeared within normal limits  Biopsies were negative for microscopic colitis      Patient Active Problem List   Diagnosis    Esophageal reflux    Hyperlipidemia    Essential hypertension    Hypokalemia    Hypoalbuminemia    Transaminitis    Severe protein-calorie malnutrition (HCC)    Microscopic hematuria    Hypomagnesemia    Diverticulitis of colon    Hematuria    Paroxysmal atrial fibrillation (HCC)    Ileus ([address] )    Ache in joint    Anemia    Diverticulosis    Erysipelas    Fatigue    Leg pain, left    Non-rheumatic mitral regurgitation    Primary osteoarthritis of left knee    Systolic murmur    Thyroid disorder    Vaginal dryness, menopausal    Vitamin D deficiency    Vulvitis    Dehydration    Intractable vomiting with nausea    Gastroesophageal reflux disease without esophagitis    Hair loss    Adenomatous polyps    Screening for colon cancer    Hair abnormality    Diarrhea    Clostridium difficile colitis    Dyspepsia    Moderate protein-calorie malnutrition (HCC)     No Known Allergies  Current Outpatient Medications on File Prior to Visit   Medication Sig    aspirin (ECOTRIN LOW STRENGTH) 81 mg EC tablet Take 1 tablet (81 mg total) by mouth daily    colestipol (COLESTID) 1 g tablet take 2 tablets by mouth twice a day    colestipol (COLESTID) 1 g tablet     metoprolol tartrate (LOPRESSOR) 25 mg tablet Take 1 tablet (25 mg total) by mouth every 12 (twelve) hours    metoprolol tartrate (LOPRESSOR) 25 mg tablet     pantoprazole (PROTONIX) 40 mg tablet Take 1 tablet (40 mg total) by mouth daily     No current facility-administered medications on file prior to visit        Family History   Problem Relation Age of Onset    Diabetes type II Mother     Heart failure Father      Past Medical History:   Diagnosis Date    Atrial fibrillation [address] [address] )     Disease of thyroid gland     Endometrial polyp     Gastroesophageal reflux disease without esophagitis 12/4/2017    Hand injury     LAST ASSESSED: [id number]    High risk medication use     LAST ASSESSED: [id number]    Hyperlipidemia     Hypertension     Renal colic     LAST ASSESSED: [id number]    Viral syndrome     LAST ASSESSED: [id number]    Vitamin D deficiency disease      Social History     Socioeconomic History    Marital status: /Civil Union     Spouse name: None    Number of children: None    Years of education: None    Highest education level: None   Occupational History    None   Social Needs    Financial resource strain: None    Food insecurity:     Worry: None     Inability: None    Transportation needs:     Medical: None     Non-medical: None   Tobacco Use    Smoking status: Former Smoker     Packs/day: 0 50     Years: 30 00     Pack years: 15 00     Last attempt to quit:      Years since quittin 5    Smokeless tobacco: Never Used   Substance and Sexual Activity    Alcohol use: Yes     Alcohol/week: 2 0 standard drinks     Types: 2 Standard drinks or equivalent per week     Comment: 1-2 mixed drinks per weekend    Drug use: No    Sexual activity: Yes   Lifestyle    Physical activity:     Days per week: None     Minutes per session: None    Stress: None   Relationships    Social connections:     Talks on phone: None     Gets together: None     Attends Yarsani service: None     Active member of club or organization: None     Attends meetings of clubs or organizations: None     Relationship status: None    Intimate partner violence:     Fear of current or ex partner: None     Emotionally abused: None     Physically abused: None     Forced sexual activity: None   Other Topics Concern    None   Social History Narrative    None     Past Surgical History:   Procedure Laterality Date    CHOLECYSTECTOMY      COLON SURGERY      COLONOSCOPY N/A 6/15/2018    Procedure: COLONOSCOPY;  Surgeon: Rosita Monteiro DO;  [facility];  Service: Gastroenterology    COLONOSCOPY N/A 8/10/2018    Procedure: COLONOSCOPY;  Surgeon: Rosita Monteiro DO;  [facility];  Service: Gastroenterology    COLOSTOMY      ENDOMETRIAL ABLATION      THERMAL    ESOPHAGOGASTRODUODENOSCOPY N/A 2018    Procedure: ESOPHAGOGASTRODUODENOSCOPY (EGD);   Surgeon: Nava Bowie MD;  [facility]: [facility];  Service: Gastroenterology   [address] [place] [address], ,     LAPAROTOMY N/A 2018    Procedure: LAPAROTOMY EXPLORATORY, 2nd look, small bowel resection, ilieostomy closure via [place] anastamosis transverse colostomy;  Surgeon: Tito Morfin DO;  Location:  MAIN OR;  Service: General    MA CLOSE ENTEROSTOMY N/A 2018    Procedure: COLOSTOMY TAKEDOWN / [place], ILEOCECECTOMY, LASER FLUORESCENCE ANGIOGRAPHY, LYSIS OF ADHESIONS;  Surgeon: Froilan Herrera DO;  Location:  MAIN OR;  Service: General    AK ESOPHAGOGASTRODUODENOSCOPY TRANSORAL DIAGNOSTIC N/A 10/21/2016    Procedure: EGD AND COLONOSCOPY;  Surgeon: Ashley Tejada MD;  [facility];  Service: Gastroenterology    AK PART REMOVAL COLON W ANASTOMOSIS N/A 1/3/2018    Procedure: Exploratory laparotomy, RESECTION COLON SIGMOID, possible ostomy;  Surgeon: Froilan Herrera DO;  [facility];  Service: General    THYROIDECTOMY      NEAR-TOTAL    THYROIDECTOMY, PARTIAL      TONSILLECTOMY AND ADENOIDECTOMY      TUBAL LIGATION  1997    URETERAL STENT PLACEMENT Bilateral 1/3/2018    Procedure: INSERTION STENT URETERAL;  Surgeon: Rachelle Mota MD;  [facility];  Service: Urology         Review of Systems   All other systems reviewed and are negative  Objective:      /82 (BP Location: Right arm, Patient Position: Sitting, Cuff Size: Extra-Large)   Pulse 69   Temp 98 3 °F (36 8 °C) (Tympanic)   Ht 5' 2 5" (1 588 m)   Wt 105 kg (232 lb 6 4 oz)   LMP  (LMP Unknown)   BMI 41 83 kg/m²          Physical Exam   Constitutional: She is oriented to person, place, and time  She appears well-developed and well-nourished  HENT:   Head: Normocephalic and atraumatic  Eyes: Conjunctivae and EOM are normal    Neck: Normal range of motion  Cardiovascular: Normal rate and regular rhythm  Pulmonary/Chest: Effort normal and breath sounds normal    Abdominal: Soft  Bowel sounds are normal  She exhibits no distension  There is no tenderness  Musculoskeletal: Normal range of motion  Neurological: She is alert and oriented to person, place, and time  Skin: Skin is warm and dry  Psychiatric: She has a normal mood and affect   Her behavior is normal    Tearful

## 2020-07-24 ENCOUNTER — TELEPHONE (OUTPATIENT)
Dept: GASTROENTEROLOGY | Facility: CLINIC | Age: 55
End: 2020-07-24

## 2020-07-24 NOTE — TELEPHONE ENCOUNTER
Called the patient insurance to see if her medication  Xifaxan needed a prior-authorization  I talked to [place] a  from [place] [phone] with the pass code of [id number]  He asked me to talk to [facility]  @ [phone], I called and talked to [place] 4, and started the prior-authorization for the Medication Xifaxan Tab 550 mg  While I was on hold she talked to the Pharmacist and came back on the phone  And stated that the medication is approved  The approval  Is good from 7/24/2020 to 8/07/2020  I called the patient and the Pharmacist to let them both know they both expressed understanding   I also faxed all the form to

## 2020-07-27 NOTE — ASSESSMENT & PLAN NOTE
Patient:   SHELLEY ROLDAN            MRN: [id number]            FIN: [id number]              Age:   25 years     Sex:  MALE     :  [birth year]   Associated Diagnoses:   None   Author:   MALINA ODELL     Basic Information   Time seen: Date & time 20 02:22:00.   History source: Patient.   Arrival mode: Private vehicle.   History limitation: None.     History of Present Illness   Patient is a 25-year-old male with no significant past medical history, right-hand-dominant, presents for evaluation of laceration in between his right fourth and fifth digits.  Patient states that he tripped and fell this evening suffering his wound.  Patient denies head, neck, chest, abdominal, back, pelvic, flank, and/or additional extremity pain or injury.  Patient denies bony tenderness at his wound.  Patient denies subjective foreign body  or gross contamination.  No motor and or sensory deficits or changes.  Patient believes that his tetanus is current.  Patient has no other complaints..       Review of Systems   Constitutional symptoms:  No weakness,    Eye symptoms:  Vision unchanged.   Respiratory symptoms:  No shortness of breath,    Cardiovascular symptoms:  No chest pain,    Gastrointestinal symptoms:  No abdominal pain, no nausea, no vomiting, no diarrhea.   Musculoskeletal symptoms:  No back pain,    Neurologic symptoms:  No headache, no dizziness, no numbness, no tingling, no weakness.   Hematologic/Lymphatic symptoms:  Bleeding tendency negative,      Health Status   Allergies: No known allergies.     Past Medical/ Family/ Social History   Medical history: Negative.   Surgical history: Left wrist/forearm.   Family history: Not significant.   Social history: Negative.     Physical Examination   General:  Alert, no acute distress.    Skin:  Warm, dry, pink, 3 cm laceration between the patient's right fourth and fifth digits.  No foreign body or gross contamination.  No step-off or crepitus.  Distal neurovascular  S/p ex lap, cammy reversal, ileocecectomy, lysis of adhesions 7/11  Keep on clears  Await return of bowel function  OOB, ambulate  Keep on PCA is intact.  Compartments soft.  Otherwise unremarkable inspection..   Cardiovascular:  Regular rate and rhythm.   Respiratory:  Respirations are non-labored.   Gastrointestinal:  Non distended.   Musculoskeletal:  Normal ROM, normal strength, no tenderness, no swelling.   Neurological:  Alert and oriented to person, place, time, and situation, No focal neurological deficit observed, CN II-XII intact, normal sensory observed, normal motor observed, normal speech observed.   Psychiatric:  Cooperative, appropriate mood & affect.      Medical Decision Making   Rationale:  Patient is a 25-year-old male with no significant past medical history, right-hand-dominant, now with right hand laceration.  Patient overall nontoxic that acute distress.  Patient's wound cleaned and irrigated.  Sutures were placed by medical student and myself under supervision.  Currently there is low suspicion for acute disease process with significant morbidity and mortality.  Plan for discharged home.  Patient to return if  worse, to follow-up as an outpatient.  Patient is agreeable with plan..     Procedure   Laceration repair   Time: 07/27/20 02:50:00 .    Confirmed: Patient, procedure, side, and site correct.    Consent: Patient.      Description/ repair   Laceration  3 cm in length.Hand: right.   Shape: linear.   Depth: superficial.   Details: clean.   Neurovascular/ tendon exam: intact.   Anesthesia: 10 ml, 1% lidocaine.   Preparation: sterile field established.   Irrigation: minimal.   Debridement: none.   Foreign body removal: complete.   Skin closure: # 8 sutures, simple technique, interrupted technique.  Complexity: single layer.   Post procedure exam: Circulation, motor, sensory examination intact.   Complications: None.    Patient tolerated: Well.    Performed by: Self, Student.      Impression and Plan   Diagnosis   Right hand laceration   Plan   Condition: Improved.    Disposition: Discharged: to home.    Patient was given the  following educational materials: Laceration Care, Adult, Laceration Care, Adult.   Follow up with: Follow up with primary care provider Within 3 to 5 days unless otherwise instructed 1.  Keep your wound clean with soap and water.  Keep the wound covered with antibiotic ointment and a dressing.  2.  Use tylenol and ibuprofen as needed for pain.  3.  Your sutures should be removed in 7 to 10 days.  You may return the emergency department, go to any urgent care, or follow-up with your primary physician for suture removal..   Counseled: Patient, Regarding diagnosis, Regarding diagnostic results, Regarding treatment plan, Patient indicated understanding of instructions.

## 2020-08-25 ENCOUNTER — TELEPHONE (OUTPATIENT)
Dept: GASTROENTEROLOGY | Facility: CLINIC | Age: 55
End: 2020-08-25

## 2020-08-25 NOTE — TELEPHONE ENCOUNTER
----- Message from [place], [place] sent at 8/21/2020 10:26 AM EDT -----  Regarding: Prior Auth for Xifaxan 550 mg  Prior authorization request received for Danette Found patient, via fax for [employer]  Form faxed to [employer]        Thank Roya Lara

## 2020-09-09 DIAGNOSIS — I10 HYPERTENSION, UNSPECIFIED TYPE: ICD-10-CM

## 2020-10-14 ENCOUNTER — ANNUAL EXAM (OUTPATIENT)
Dept: OBGYN CLINIC | Facility: CLINIC | Age: 55
End: 2020-10-14
Payer: COMMERCIAL

## 2020-10-14 VITALS
WEIGHT: 232 LBS | SYSTOLIC BLOOD PRESSURE: 142 MMHG | DIASTOLIC BLOOD PRESSURE: 84 MMHG | HEIGHT: 62 IN | TEMPERATURE: 99.4 F | BODY MASS INDEX: 42.69 KG/M2

## 2020-10-14 DIAGNOSIS — Z01.419 ENCOUNTER FOR WELL WOMAN EXAM WITH ROUTINE GYNECOLOGICAL EXAM: Primary | ICD-10-CM

## 2020-10-14 DIAGNOSIS — Z12.31 ENCOUNTER FOR SCREENING MAMMOGRAM FOR MALIGNANT NEOPLASM OF BREAST: ICD-10-CM

## 2020-10-14 PROCEDURE — 99396 PREV VISIT EST AGE 40-64: CPT | Performed by: ADVANCED PRACTICE MIDWIFE

## 2020-10-15 ENCOUNTER — HOSPITAL ENCOUNTER (OUTPATIENT)
Dept: MAMMOGRAPHY | Facility: HOSPITAL | Age: 55
Discharge: HOME | End: 2020-10-15
Payer: COMMERCIAL

## 2020-10-15 VITALS — HEIGHT: 62 IN | BODY MASS INDEX: 42.69 KG/M2 | WEIGHT: 232 LBS

## 2020-10-15 DIAGNOSIS — Z12.31 ENCOUNTER FOR SCREENING MAMMOGRAM FOR MALIGNANT NEOPLASM OF BREAST: ICD-10-CM

## 2020-10-15 PROCEDURE — 77067 SCR MAMMO BI INCL CAD: CPT

## 2020-10-15 PROCEDURE — 77063 BREAST TOMOSYNTHESIS BI: CPT

## 2020-10-29 ENCOUNTER — TELEPHONE (OUTPATIENT)
Dept: GASTROENTEROLOGY | Facility: CLINIC | Age: 55
End: 2020-10-29

## 2020-12-03 ENCOUNTER — TELEPHONE (OUTPATIENT)
Dept: GASTROENTEROLOGY | Facility: AMBULARY SURGERY CENTER | Age: 55
End: 2020-12-03

## 2020-12-03 DIAGNOSIS — R19.7 DIARRHEA, UNSPECIFIED TYPE: Primary | ICD-10-CM

## 2020-12-07 RX ORDER — COLESEVELAM HYDROCHLORIDE 3.75 G/1
POWDER, FOR SUSPENSION ORAL
Qty: 30 EACH | Refills: 5 | Status: SHIPPED | OUTPATIENT
Start: 2020-12-07 | End: 2021-02-05 | Stop reason: ALTCHOICE

## 2020-12-07 RX ORDER — DICYCLOMINE HCL 20 MG
20 TABLET ORAL EVERY 6 HOURS
Qty: 120 TABLET | Refills: 5 | Status: SHIPPED | OUTPATIENT
Start: 2020-12-07 | End: 2021-02-05 | Stop reason: ALTCHOICE

## 2021-01-06 NOTE — CASE MANAGEMENT
Continued Stay Review    Thank you,  [facility] in the [employer] by Yonis Stover for 2017  Network Utilization Review Department  Phone: [phone]; Fax [phone]  ATTENTION: The Network Utilization Review Department is now centralized for our 7 Facilities  Make a note that we have a new phone and fax numbers for our Department  Please call with any questions or concerns to [phone] and carefully follow the prompts so that you are directed to the right person  All voicemails are confidential  Fax any determinations, approvals, denials, and requests for initial or continue stay review clinical to [phone]  Due to HIGH CALL volume, it would be easier if you could please send faxed requests to expedite your requests and in part, help us provide discharge notifications faster      Date: 1/10/2018    Vital Signs: /85   Pulse 80   Temp 97 9 °F (36 6 °C) (Oral)   Resp 18   Ht 5' 2" (1 575 m)   Wt 91 2 kg (201 lb 1 oz)   LMP  (LMP Unknown)   SpO2 96%   BMI 36 77 kg/m²     Medications:   Scheduled Meds:   heparin (porcine) 5,000 Units Subcutaneous Q8H [facility] & half-way   pantoprazole 40 mg Intravenous Q24H [facility] & half-way     Continuous Infusions:   amiodarone 0 5 mg/min Last Rate: 0 5 mg/min (01/09/18 2056)   dextrose 5 % and sodium chloride 0 45 % with KCl 20 mEq/L 75 mL/hr Last Rate: 75 mL/hr (01/10/18 0710)     PRN Meds:   acetaminophen    HYDROmorphone 1 mg iv 1/8 x 3- 1/9 x3 - 1/10 x 1     LORazepam iv 1/8 x 2 - 1/10 x 1    ondansetron  Iv 1/9 x 1- 1/10 x 1    Nursing orders - VS q 4 - continuous Pulse ox monitoring - Daily weights - Sequential compression device - I & O q shift - Turn pt q 2 - Encourage deep breathing  & coughing - Elevate HOB > 30 degrees - Oral care - Diet surgical  Cl Liquids w/ toast & crackers- PT/OT treatment     Abnormal Labs/Diagnostic Results:      Ref Range & Units 1/10/18 0602 1/9/18 0628   Sodium 136 - 145 mmol/L 143  141 Potassium 3 5 - 5 3 mmol/L 3 7  3 5    Chloride 100 - 108 mmol/L 108  105    CO2 21 - 32 mmol/L 30  30    Anion Gap 4 - 13 mmol/L 5  6    BUN 5 - 25 mg/dL 1   2     Creatinine 0 60 - 1 30 mg/dL 0 36   0 37CM     Glucose 65 - 140 mg/dL 92  107CM    Calcium 8 3 - 10 1 mg/dL 8 4  8 1     eGFR ml/min/1 73sq m 124  122             Ref Range & Units 1/10/18 0602 1/9/18 0627 1/8/18 0622 1/7/18 1045 1/6/18 1701   WBC 4 31 - 10 16 Thousand/uL 6 17  5 89  5 89  9 08  12 79     RBC 3 81 - 5 12 Million/uL 2 63   2 49   2 49   2 75   2 73     Hemoglobin 11 5 - 15 4 g/dL 7 6   7 1   7 2   7 8   7 8     Hematocrit 34 8 - 46 1 % 23 9   22 6   23 0   25 2   24 8     MCV 82 - 98 fL 91  91  92  92  91    MCH 26 8 - 34 3 pg 28 9  28 5  28 9  28 4  28 6    MCHC 31 4 - 37 4 g/dL 31 8  31 4  31 3   31 0   31 5    RDW 11 6 - 15 1 % 15 0  14 3  14 5  14 3  14 3    Platelets 144 - 715 Thousands/uL 304  289  312  357  341    MPV 8 9 - 12 7 fL 9 1  9 1  10 2  9 1  9 4                Age/Sex: 47 y o  female     Assessment/Plan:   PROGRESS NOTE 1/10    Assessment:  54-year-old female status post second-look exploratory laparotomy small bowel resection, ileostomy closure via hand-sewn anastomosis and transverse colostomy on 1/4/18     Plan:  Start clear liquid diet   Abdominal binder daily  Decreased IVF fluids to D5 1/2 NS +K @75  Continue protonix for GI ppx   Will change to PO amio once on a diet   Continue REILLY drains x2 to suction  SQH and venodynes for DVT ppx  Follow up on am labs and replete electrolytes as needed  OT - home vs In pt rehab     Encourage ambulation           Subjective:    Pt is now having ostomy function  She was up and out of bed a little bit yesterday  Assessed by OT who is unsure if she can go home vs in pt rehab   No REILLY drain output recorded but serous and minimal amout of drainage in the REILLY bulb        Discharge Plan:  Referrals made to SNF rehabs Number Of Curettages: 3

## 2021-01-13 ENCOUNTER — OFFICE VISIT (OUTPATIENT)
Dept: CARDIOLOGY CLINIC | Facility: CLINIC | Age: 56
End: 2021-01-13
Payer: COMMERCIAL

## 2021-01-13 VITALS
WEIGHT: 222 LBS | BODY MASS INDEX: 40.85 KG/M2 | HEART RATE: 88 BPM | HEIGHT: 62 IN | SYSTOLIC BLOOD PRESSURE: 122 MMHG | DIASTOLIC BLOOD PRESSURE: 80 MMHG

## 2021-01-13 DIAGNOSIS — I48.0 PAROXYSMAL ATRIAL FIBRILLATION (HCC): Primary | ICD-10-CM

## 2021-01-13 DIAGNOSIS — I10 ESSENTIAL HYPERTENSION: ICD-10-CM

## 2021-01-13 PROCEDURE — 3008F BODY MASS INDEX DOCD: CPT | Performed by: INTERNAL MEDICINE

## 2021-01-13 PROCEDURE — 99214 OFFICE O/P EST MOD 30 MIN: CPT | Performed by: INTERNAL MEDICINE

## 2021-01-13 PROCEDURE — 3079F DIAST BP 80-89 MM HG: CPT | Performed by: INTERNAL MEDICINE

## 2021-01-13 PROCEDURE — 1036F TOBACCO NON-USER: CPT | Performed by: INTERNAL MEDICINE

## 2021-01-13 PROCEDURE — 93000 ELECTROCARDIOGRAM COMPLETE: CPT | Performed by: INTERNAL MEDICINE

## 2021-01-13 PROCEDURE — 3074F SYST BP LT 130 MM HG: CPT | Performed by: INTERNAL MEDICINE

## 2021-01-13 NOTE — PATIENT INSTRUCTIONS
A-fib (Atrial Fibrillation)   AMBULATORY CARE:   Atrial fibrillation (a-fib)  is an irregular heartbeat  It reduces your heart's ability to pump blood through your body  A-fib may come and go, or it may be a long-term condition  A-fib can cause life-threatening blood clots, stroke, or heart failure  It is important to treat and manage a-fib to help prevent these problems  Common signs and symptoms include the following:   · A heartbeat that races, pounds, or flutters    · Weakness, severe tiredness, or confusion    · Feeling lightheaded, sweaty, dizzy, or faint    · Shortness of breath or anxiety    · Chest pain or pressure    Call your local emergency number (911 in the [address] [facility]) if:   · You have any of the following signs of a heart attack:      ? Squeezing, pressure, or pain in your chest    ? You may  also have any of the following:     ? Discomfort or pain in your back, neck, jaw, stomach, or arm    ? Shortness of breath    ? Nausea or vomiting    ? Lightheadedness or a sudden cold sweat    · You have any of the following signs of a stroke:      ? Numbness or drooping on one side of your face     ? Weakness in an arm or leg    ? Confusion or difficulty speaking    ? Dizziness, a severe headache, or vision loss    Call your cardiologist if:   · Your arm or leg feels warm, tender, and painful  It may look swollen and red  · Your heart rate is more than 110 beats per minute  · You have new or worsening swelling in your legs, feet, ankles, or abdomen  · You are short of breath, even at rest      · You have questions or concerns about your condition or care  Treatment for A-fib:  Conditions that cause a-fib, such as thyroid disease, will be treated  You may also need any of the following:  · Heart medicines  help control your heart rate or rhythm  You may need more than one medicine to treat your symptoms  · Antiplatelet or blood thinner medicines  help prevent blood clots and stroke  · Cardioversion  is a procedure to return your heart rate and rhythm to normal  It can be done using medicines or electric shock  · A-fib ablation  is a procedure that uses energy to burn a small area of heart tissue  This creates scar tissue and prevents electrical signals that cause a-fib  You may need this procedure more than once  Ask for more information on a-fib ablation  · A pacemaker  may be inserted into your heart  A pacemaker is a device that controls your heartbeat  A pacemaker may be inserted during an ablation procedure or surgery  Ask your healthcare provider for more information on pacemakers  · Surgery  may be needed if other procedures do not work  During surgery your healthcare provider will make cuts in the upper part of your heart  The provider will stitch the cuts together to create scar tissue  The scar tissue will prevent electrical signals that cause a-fib  Manage A-fib:   · Know your target heart rate  Learn how to check your pulse and monitor your heart rate  · Know the risks if you choose to drink alcohol  Alcohol can make a-fib hard to manage  Ask your healthcare provider if it is safe for you to drink alcohol  A drink of alcohol is 12 ounces of beer, 5 ounces of wine, or 1½ ounces of liquor  · Do not smoke  Nicotine can cause heart damage and make it more difficult to manage your a-fib  Do not use e-cigarettes or smokeless tobacco in place of cigarettes or to help you quit  They still contain nicotine  Ask your healthcare provider for information if you currently smoke and need help quitting  · Eat heart-healthy foods  Heart healthy foods will help keep your cholesterol low  These include fruits, vegetables, whole-grain breads, low-fat dairy products, beans, lean meats, and fish  Replace butter and margarine with heart-healthy oils such as olive oil and canola oil  · Maintain a healthy weight  Ask your healthcare provider how much you should weigh  Ask him or her to help you create a safe weight loss plan if you are overweight  Even a small goal of a 10% weight loss can improve your heart health  · Get regular physical activity  Physical activity helps improve your heart health  Get at least 150 minutes of moderate aerobic physical activity each week  Your healthcare provider can help you create an activity plan  · Manage other health conditions  This includes high blood pressure or cholesterol, sleep apnea, diabetes, and other heart conditions  Take medicine as directed and follow your treatment plan  Follow up with your cardiologist as directed: You will need regular blood tests and monitoring  Write down your questions so you remember to ask them during your visits  © Copyright [address] Information is for End User's use only and may not be sold, redistributed or otherwise used for commercial purposes  All illustrations and images included in CareNotes® are the copyrighted property of [employer]  or [facility]   The above information is an  only  It is not intended as medical advice for individual conditions or treatments  Talk to your doctor, nurse or pharmacist before following any medical regimen to see if it is safe and effective for you

## 2021-01-13 NOTE — PROGRESS NOTES
Subjective:        Patient ID: Daniel Atkins is a 63 y o  female  Chief Complaint:  Yakov huynh is here for routine follow-up  Offers no complaints no palpitations no chest pain or shortness of breath  Requested topical ivermectin cream, for rosacea  Says she is having some GI disturbances due to a shortage of Colestipol    The following portions of the patient's history were reviewed and updated as appropriate: allergies, current medications, past family history, past medical history, past social history, past surgical history and problem list   Review of Systems   Constitution: Negative for chills, diaphoresis, malaise/fatigue and weight gain  HENT: Negative for nosebleeds and stridor  Eyes: Negative for double vision, vision loss in left eye, vision loss in right eye and visual disturbance  Cardiovascular: Negative for chest pain, claudication, cyanosis, dyspnea on exertion, irregular heartbeat, leg swelling, near-syncope, orthopnea, palpitations, paroxysmal nocturnal dyspnea and syncope  Respiratory: Negative for cough, shortness of breath, snoring and wheezing  Endocrine: Negative for polydipsia, polyphagia and polyuria  Hematologic/Lymphatic: Negative for bleeding problem  Does not bruise/bleed easily  Skin: Negative for flushing and rash  Musculoskeletal: Negative for falls and myalgias  Gastrointestinal: Negative for abdominal pain, heartburn, hematemesis, hematochezia, melena and nausea  Genitourinary: Negative for hematuria  Neurological: Negative for brief paralysis, dizziness, focal weakness, headaches, light-headedness, loss of balance and vertigo  Psychiatric/Behavioral: Negative for altered mental status and substance abuse  Allergic/Immunologic: Negative for hives            Objective:      /80   Pulse 88   Ht 5' 1 5" (1 562 m)   Wt 101 kg (222 lb)   LMP  (LMP Unknown)   BMI 41 27 kg/m²   Physical Exam   Constitutional: She is oriented to person, place, and time  She appears well-developed and well-nourished  HENT:   Head: Normocephalic and atraumatic  Eyes: Pupils are equal, round, and reactive to light  EOM are normal    Neck: Normal range of motion  Neck supple  No JVD present  No thyromegaly present  Cardiovascular: Normal rate, regular rhythm, normal heart sounds and intact distal pulses  Exam reveals no gallop and no friction rub  No murmur heard  Pulmonary/Chest: Effort normal and breath sounds normal  No stridor  No respiratory distress  She has no wheezes  She has no rales  Abdominal: Soft  Bowel sounds are normal  She exhibits no mass  There is no abdominal tenderness  Musculoskeletal: Normal range of motion  General: No edema  Lymphadenopathy:     She has no cervical adenopathy  Neurological: She is alert and oriented to person, place, and time  Skin: Skin is warm and dry  No rash noted  No pallor  Psychiatric: She has a normal mood and affect  Her behavior is normal  Judgment and thought content normal    Nursing note and vitals reviewed  Lab Review:   No visits with results within 2 Month(s) from this visit  Latest known visit with results is:   Appointment on 06/19/2020   Component Date Value    Total Bilirubin 06/19/2020 2 46*    Bilirubin, Direct 06/19/2020 0 46*    Alkaline Phosphatase 06/19/2020 121*    AST 06/19/2020 24     ALT 06/19/2020 46     Total Protein 06/19/2020 7 5     Albumin 06/19/2020 3 9      No results found  Assessment:       1  Paroxysmal atrial fibrillation (HCC)  POCT ECG   2  Essential hypertension          Plan:       Areli Sweeney is without any signs or symptoms reminiscent of angina pectoris, heart failure nor electrical instability  No evidence for recurrent AFib  She remains on aspirin therapy for relative low stroke risk score, her blood pressure is always been very well controlled  EKG prove maintenance of sinus rhythm today with no other alarming finding      Asked her to continue with her present regimen, advised no changes nor cardiac testing today  Return  to office in 6 months, call sooner with any concerning potential cardiac symptoms in the meantime

## 2021-01-14 DIAGNOSIS — R19.7 DIARRHEA, UNSPECIFIED TYPE: Primary | ICD-10-CM

## 2021-01-14 RX ORDER — MONTELUKAST SODIUM 4 MG/1
2 TABLET, CHEWABLE ORAL 2 TIMES DAILY
Qty: 120 TABLET | Refills: 3 | Status: SHIPPED | OUTPATIENT
Start: 2021-01-14 | End: 2021-03-12 | Stop reason: SDUPTHER

## 2021-01-14 NOTE — TELEPHONE ENCOUNTER
Called and spoke with patient  She was on colestipol from us in the past, but it went out of stock at several pharmacies, so she switched to colesevelam  She states her pharmacy now has colestipol in stock so she is requesting we prescribe that instead   Prescription attached

## 2021-01-14 NOTE — TELEPHONE ENCOUNTER
GI Physician: ROB Welch    Refill Request for Medication:colestipol (COLESTID)     Dose: 1 g    Quantity: 120    Pharmacy and Location: [address]

## 2021-01-15 NOTE — OP NOTE
OPERATIVE REPORT  PATIENT NAME: Jenni Garcia    :  1965  MRN: [id number]  Pt Location: [facility] OR ROOM 01    SURGERY DATE: 1/3/2018    Surgeon(s) and Role:  Panel 1:     * Tommy Alexandre DO - Primary     * Jing Robison PA-C - Assisting    Panel 2:     * Lowell Taylor MD - Primary    Preop Diagnosis:  Diverticulitis of large intestine with perforation and bleeding without abscess [K57 21]    Post-Op Diagnosis Codes:     * Diverticulitis of large intestine with perforation and bleeding without abscess [K57 21]    Procedure(s) (LRB):  Exploratory laparotomy, RESECTION COLON SIGMOID, possible ostomy (N/A)  INSERTION STENT URETERAL (Bilateral)    Specimen(s):  ID Type Source Tests Collected by Time Destination   A : urine from bladder for culture Urine Urine, Cystoscopic URINE CULTURE Tommy Alexandre DO 1/3/2018 1410        Estimated Blood Loss:   Minimal    Drains:   5F Left (BLUE) and Right (RED) ureteral catheters and 18F montoya    Anesthesia Type:   General    Operative Indications:  Diverticulitis of large intestine with perforation and bleeding without abscess [K57 21]    Operative Findings:  1  Urethral stenosis, dilated with scope  2  Some bladder irritation, urine culture sent  3  Easy passage of bilateral ureteral catheters    Complications:   None    Procedure and Technique:  Jenni Garcia is a 47y o  -year-old female known to Dr Kitty Long with diverticular abscess  She presents today to undergo sigmoid colectomy  Preoperative ureteral stents of been requested  Risk and benefits of the procedure were discussed and reviewed  Informed consent was obtained  The patient was brought to the operating room on 1/3/2018  After the smooth induction of general endotracheal anesthesia, the patient was placed in the dorsal lithotomy position  Her genitalia was prepped and draped in a sterile fashion  Intravenous antibiotics were administered    A timeout was performed with all members of the operative team confirming the patient's identity and procedure to be performed  A 22 Gabonese rigid cystoscope with 30° lens was inserted  There was noted urethral stenosis which was dilated with the scope  The bladder was thoroughly inspected  There was some mucosal irritation but no lesions or stones  Both ureteral orifices were visualized with clear efflux of urine  A urine culture was obtained  Bilateral 5 Gabonese open-ended catheters were placed under direct vision to 25cm  There was no difficulty  BLUE catheter placed on the LEFT and RED on the RIGHT  Catheters were placed without difficulty  The bladder was left full the cystoscope was removed  An 25 Gabonese Soto catheter was inserted and a 5 Western Sandra open-ended ureteral stents were backloaded into the distal aspect of the Soto catheter  The Soto catheter and bilateral 5 [place] Sandra open-ended catheters were then connected to gravity drainage and secured to the patient's inner thigh with tape  We see the dictation of Dr Sae Soto for additional details regarding his colon resection       I was present for the entire procedure    Patient Disposition:  PACU     SIGNATURE: Chano Whipple MD  DATE: January 3, 2018  TIME: 2:29 PM Airborne+Contact precautions/Droplet+Contact precautions

## 2021-02-05 ENCOUNTER — OFFICE VISIT (OUTPATIENT)
Dept: FAMILY MEDICINE CLINIC | Facility: CLINIC | Age: 56
End: 2021-02-05
Payer: COMMERCIAL

## 2021-02-05 VITALS
DIASTOLIC BLOOD PRESSURE: 102 MMHG | WEIGHT: 225 LBS | TEMPERATURE: 97.4 F | HEIGHT: 62 IN | BODY MASS INDEX: 41.41 KG/M2 | SYSTOLIC BLOOD PRESSURE: 158 MMHG

## 2021-02-05 DIAGNOSIS — L71.9 ACNE ROSACEA: Primary | ICD-10-CM

## 2021-02-05 PROCEDURE — 3077F SYST BP >= 140 MM HG: CPT | Performed by: FAMILY MEDICINE

## 2021-02-05 PROCEDURE — 99213 OFFICE O/P EST LOW 20 MIN: CPT | Performed by: FAMILY MEDICINE

## 2021-02-05 PROCEDURE — 3080F DIAST BP >= 90 MM HG: CPT | Performed by: FAMILY MEDICINE

## 2021-02-05 PROCEDURE — 3725F SCREEN DEPRESSION PERFORMED: CPT | Performed by: FAMILY MEDICINE

## 2021-02-05 RX ORDER — MULTIVITAMIN
1 TABLET ORAL DAILY
COMMUNITY

## 2021-02-05 RX ORDER — IVERMECTIN 10 MG/G
CREAM TOPICAL 2 TIMES DAILY
Qty: 45 G | Refills: 1 | Status: SHIPPED | OUTPATIENT
Start: 2021-02-05 | End: 2021-10-21 | Stop reason: ALTCHOICE

## 2021-02-05 NOTE — PROGRESS NOTES
BMI Counseling: Body mass index is 41 82 kg/m²  The BMI is above normal  Nutrition recommendations include decreasing portion sizes, encouraging healthy choices of fruits and vegetables, decreasing fast food intake, consuming healthier snacks, moderation in carbohydrate intake and increasing intake of lean protein  Assessment/Plan:    Problem List Items Addressed This Visit     None      Visit Diagnoses     Acne rosacea    -  Primary    Relevant Medications    Ivermectin 1 % CREA           Diagnoses and all orders for this visit:    Acne rosacea  -     Ivermectin 1 % CREA; Apply topically 2 (two) times a day    Other orders  -     Multiple Vitamin (multivitamin) tablet; Take 1 tablet by mouth daily        No problem-specific Assessment & Plan notes found for this encounter  Subjective:      Patient ID: Jane Kerr is a 63 y o  female  Daylin Dole has come today with the chief complaint of a facial rash and she normally uses ivermectin cream for it on going to refill that it is probably being exacerbated by her mask for COVID 2nd issue she is having pain traveling down the lateral aspect left thigh especially if she tries to lay on her left side in bed on she has no history of any trauma on she does not have any history of any degenerative disc disease but the pain is enough that she can sleep at all on her left side      The following portions of the patient's history were reviewed and updated as appropriate:   She has a past medical history of Atrial fibrillation ([facility] [address] ), Disease of thyroid gland, Endometrial polyp, Gastroesophageal reflux disease without esophagitis (12/4/2017), Hand injury, High risk medication use, Hyperlipidemia, Hypertension, Renal colic, Viral syndrome, and Vitamin D deficiency disease  ,  does not have any pertinent problems on file  ,   has a past surgical history that includes Cholecystectomy; Thyroidectomy, partial; Tonsillectomy and adenoidectomy;  Tubal ligation (1997); pr esophagogastroduodenoscopy transoral diagnostic (N/A, 10/21/2016); pr part removal colon w anastomosis (N/A, 1/3/2018); Ureteral stent placement (Bilateral, 1/3/2018); LAPAROTOMY (N/A, 1/4/2018); Colostomy; Esophagogastroduodenoscopy (N/A, 4/6/2018); Endometrial ablation (2007); Hysteroscopy; Thyroidectomy; Colon surgery; Colonoscopy (N/A, 6/15/2018); pr close enterostomy (N/A, 7/11/2018); and Colonoscopy (N/A, 8/10/2018)  ,  family history includes Diabetes type II in her mother; Heart failure in her father; No Known Problems in her daughter, maternal grandfather, maternal grandmother, paternal aunt, paternal grandfather, paternal grandmother, sister, son, and son; Skin cancer in her sister  ,   reports that she quit smoking about 18 years ago  She has a 15 00 pack-year smoking history  She has never used smokeless tobacco  She reports current alcohol use of about 2 0 standard drinks of alcohol per week  She reports that she does not use drugs  ,  has No Known Allergies     Current Outpatient Medications   Medication Sig Dispense Refill    aspirin (ECOTRIN LOW STRENGTH) 81 mg EC tablet Take 1 tablet (81 mg total) by mouth daily 30 tablet 5    colestipol (COLESTID) 1 g tablet Take 2 tablets (2 g total) by mouth 2 (two) times a day 120 tablet 3    metoprolol tartrate (LOPRESSOR) 25 mg tablet take 1 tablet by mouth every 12 hours 60 tablet 5    Multiple Vitamin (multivitamin) tablet Take 1 tablet by mouth daily      pantoprazole (PROTONIX) 40 mg tablet Take 1 tablet (40 mg total) by mouth daily 90 tablet 3    Ivermectin 1 % CREA Apply topically 2 (two) times a day 45 g 1     No current facility-administered medications for this visit  Review of Systems   Constitutional: Positive for activity change  Negative for appetite change, diaphoresis, fatigue and fever  HENT: Negative  Eyes: Negative  Respiratory: Negative for apnea, cough, chest tightness, shortness of breath and wheezing  Cardiovascular: Negative for chest pain, palpitations and leg swelling  Gastrointestinal: Negative for abdominal distention, abdominal pain, anal bleeding, constipation, diarrhea, nausea and vomiting  Endocrine: Negative for cold intolerance, heat intolerance, polydipsia, polyphagia and polyuria  Genitourinary: Negative for difficulty urinating, dysuria, flank pain, hematuria and urgency  Musculoskeletal: Negative for arthralgias, back pain, gait problem, joint swelling and myalgias  Left hip pain and lateral thigh pain   Skin: Negative for color change, rash and wound  Allergic/Immunologic: Negative for environmental allergies, food allergies and immunocompromised state  Neurological: Negative for dizziness, seizures, syncope, speech difficulty, numbness and headaches  Hematological: Negative for adenopathy  Does not bruise/bleed easily  Psychiatric/Behavioral: Negative for agitation, behavioral problems, hallucinations, sleep disturbance and suicidal ideas  Objective:  Vitals:    02/05/21 1435   BP: (!) 158/102   BP Location: Left arm   Patient Position: Sitting   Cuff Size: Standard   Temp: (!) 97 4 °F (36 3 °C)   TempSrc: Temporal   Weight: 102 kg (225 lb)   Height: 5' 1 5" (1 562 m)     Body mass index is 41 82 kg/m²  Physical Exam  Constitutional:       General: She is not in acute distress  Appearance: She is well-developed  She is not diaphoretic  HENT:      Head: Normocephalic  Right Ear: External ear normal       Left Ear: External ear normal       Nose: Nose normal    Eyes:      General: No scleral icterus  Right eye: No discharge  Left eye: No discharge  Conjunctiva/sclera: Conjunctivae normal       Pupils: Pupils are equal, round, and reactive to light  Neck:      Musculoskeletal: Normal range of motion  Thyroid: No thyromegaly  Trachea: No tracheal deviation     Cardiovascular:      Rate and Rhythm: Normal rate and regular rhythm  Heart sounds: Normal heart sounds  No murmur  No friction rub  No gallop  Pulmonary:      Effort: Pulmonary effort is normal  No respiratory distress  Breath sounds: Normal breath sounds  No wheezing  Abdominal:      General: Bowel sounds are normal       Palpations: Abdomen is soft  There is no mass  Tenderness: There is no abdominal tenderness  There is no guarding  Musculoskeletal:         General: No deformity  Lymphadenopathy:      Cervical: No cervical adenopathy  Skin:     General: Skin is warm and dry  Findings: No erythema or rash  Neurological:      Mental Status: She is alert and oriented to person, place, and time  Cranial Nerves: No cranial nerve deficit  Psychiatric:         Thought Content:  Thought content normal

## 2021-02-08 ENCOUNTER — HOSPITAL ENCOUNTER (OUTPATIENT)
Dept: RADIOLOGY | Facility: HOSPITAL | Age: 56
Discharge: HOME | End: 2021-02-08
Attending: FAMILY MEDICINE
Payer: COMMERCIAL

## 2021-02-08 ENCOUNTER — TELEPHONE (OUTPATIENT)
Dept: FAMILY MEDICINE CLINIC | Facility: CLINIC | Age: 56
End: 2021-02-08

## 2021-02-08 DIAGNOSIS — L71.9 ACNE ROSACEA: ICD-10-CM

## 2021-02-08 DIAGNOSIS — L71.9 ACNE ROSACEA: Primary | ICD-10-CM

## 2021-02-08 PROCEDURE — 72114 X-RAY EXAM L-S SPINE BENDING: CPT

## 2021-02-08 PROCEDURE — 73502 X-RAY EXAM HIP UNI 2-3 VIEWS: CPT

## 2021-02-08 NOTE — TELEPHONE ENCOUNTER
There is nothing similar to Ivermectin but usually I have tried Metrogel in the past if that is okay with her I can send a pre

## 2021-02-08 NOTE — TELEPHONE ENCOUNTER
Metrogel was not covered by her insurance and would of been very expensive so I prescribed metronidazole generic cream

## 2021-02-08 NOTE — TELEPHONE ENCOUNTER
Ivermectin 1% crm unavailable at pharm - Asking if there is anything similar that  can Rx     Send in Rx change to [facility]

## 2021-02-15 NOTE — RESULT ENCOUNTER NOTE
Please call the patient regarding her abnormal result   Mild Diskus space height loss at L5-S1 otherwise normal

## 2021-03-04 ENCOUNTER — OFFICE VISIT (OUTPATIENT)
Dept: GASTROENTEROLOGY | Facility: CLINIC | Age: 56
End: 2021-03-04
Payer: COMMERCIAL

## 2021-03-04 VITALS
WEIGHT: 226 LBS | BODY MASS INDEX: 41.59 KG/M2 | TEMPERATURE: 98.3 F | SYSTOLIC BLOOD PRESSURE: 143 MMHG | HEIGHT: 62 IN | HEART RATE: 87 BPM | DIASTOLIC BLOOD PRESSURE: 88 MMHG

## 2021-03-04 DIAGNOSIS — K21.9 GASTROESOPHAGEAL REFLUX DISEASE WITHOUT ESOPHAGITIS: ICD-10-CM

## 2021-03-04 DIAGNOSIS — R19.7 DIARRHEA, UNSPECIFIED TYPE: Primary | ICD-10-CM

## 2021-03-04 PROCEDURE — 99214 OFFICE O/P EST MOD 30 MIN: CPT | Performed by: PHYSICIAN ASSISTANT

## 2021-03-04 PROCEDURE — 3008F BODY MASS INDEX DOCD: CPT | Performed by: PHYSICIAN ASSISTANT

## 2021-03-04 PROCEDURE — 1036F TOBACCO NON-USER: CPT | Performed by: PHYSICIAN ASSISTANT

## 2021-03-04 RX ORDER — PANTOPRAZOLE SODIUM 40 MG/1
40 TABLET, DELAYED RELEASE ORAL DAILY
Qty: 90 TABLET | Refills: 3 | Status: SHIPPED | OUTPATIENT
Start: 2021-03-04 | End: 2022-03-21

## 2021-03-04 NOTE — PROGRESS NOTES
Assessment/Plan:     Diagnoses and all orders for this visit:    Diarrhea, unspecified type    She continues with diarrhea which is likely secondary to her surgery as well as has a component of irritable bowel syndrome  She has noticed  Some improvement with adding Bentyl to her colestipol  She is only using 1 Bentyl daily, I did states that she can increase to 4 pills per day if needed  I also suggested trying to increase her colestipol to t i d  if necessary  Gastroesophageal reflux disease without esophagitis    She does have a history of GERD and is currently on pantoprazole 40 mg daily  She states this typically controls her symptoms  Would recommend continuing for the time being   -     pantoprazole (PROTONIX) 40 mg tablet; Take 1 tablet (40 mg total) by mouth daily      Will see her back in 6 months or sooner if necessary  Subjective:      Patient ID: Brayden Fair is a 63 y o  female  HPI       This is a follow-up for GERD and diarrhea  She has a history of diverticulitis, status post procedure with ileocecectomy 2018,with reversal, history of C diff in 2019  She does have a history of GERD and is on pantoprazole 40 mg daily which typically controls her symptoms  She had been using colestipol 2 tablets b i d  but continued to be symptomatic  At her last visit I recommended that she increase 3 times a day however she states the medicine became unavailable she had a hard time getting it  She is still at her previous dose  We also recommended a trial of Bentyl  She states she takes the colestipol 1st thing in the morning and then takes the Bentyl approximately an hour later it has noticed some improvement in the frequency and consistency of her stools  She then takes the other 2 tablets of colestipol in the evening prior to bed  She has used Imodium and Lomotil in the past without any improvement     She was also prescribe Xifaxan however it does not appear that this was filled and may have been denied by her insurance  She does have a history of elevated LFTs however this was mainly bilirubin and was indirect  She also previously had elevated alk-phos but workup was completely negative  Her last endoscopy was in April 2018 and showed a 1 cm hiatal hernia and gastritis  Biopsies were negative for Logan's and H pylori  Her last colonoscopy was in August 2018 and appeared within normal limits with biopsies negative for microscopic colitis      Patient Active Problem List   Diagnosis    Esophageal reflux    Hyperlipidemia    Essential hypertension    Hypokalemia    Hypoalbuminemia    Transaminitis    Severe protein-calorie malnutrition (HCC)    Microscopic hematuria    Hypomagnesemia    Diverticulitis of colon    Hematuria    Paroxysmal atrial fibrillation (HCC)    Ileus ([address] )    Ache in joint    Anemia    Diverticulosis    Erysipelas    Fatigue    Leg pain, left    Non-rheumatic mitral regurgitation    Primary osteoarthritis of left knee    Systolic murmur    Thyroid disorder    Vitamin D deficiency    Dehydration    Intractable vomiting with nausea    Gastroesophageal reflux disease without esophagitis    Hair loss    Adenomatous polyps    Screening for colon cancer    Hair abnormality    Diarrhea    Clostridium difficile colitis    Dyspepsia    Moderate protein-calorie malnutrition (HCC)     No Known Allergies  Current Outpatient Medications on File Prior to Visit   Medication Sig    aspirin (ECOTRIN LOW STRENGTH) 81 mg EC tablet Take 1 tablet (81 mg total) by mouth daily    colestipol (COLESTID) 1 g tablet Take 2 tablets (2 g total) by mouth 2 (two) times a day    Ivermectin 1 % CREA Apply topically 2 (two) times a day    metoprolol tartrate (LOPRESSOR) 25 mg tablet take 1 tablet by mouth every 12 hours    metroNIDAZOLE (METROCREAM) 0 75 % cream Apply topically 2 (two) times a day    Multiple Vitamin (multivitamin) tablet Take 1 tablet by mouth daily    [DISCONTINUED] pantoprazole (PROTONIX) 40 mg tablet Take 1 tablet (40 mg total) by mouth daily     No current facility-administered medications on file prior to visit  Family History   Problem Relation Age of Onset    Diabetes type II Mother     Heart failure Father     Skin cancer Sister     No Known Problems Daughter     No Known Problems Maternal Grandmother     No Known Problems Maternal Grandfather     No Known Problems Paternal Grandmother     No Known Problems Paternal Grandfather     No Known Problems Sister     No Known Problems Son     No Known Problems Son     No Known Problems Paternal Aunt      Past Medical History:   Diagnosis Date    Atrial fibrillation ([facility] [address] )     Disease of thyroid gland     Endometrial polyp     Gastroesophageal reflux disease without esophagitis 2017    Hand injury     LAST ASSESSED: [id number]    High risk medication use     LAST ASSESSED: [id number]    Hyperlipidemia     Hypertension     Renal colic     LAST ASSESSED: [id number]    Viral syndrome     LAST ASSESSED: [id number]    Vitamin D deficiency disease      Social History     Socioeconomic History    Marital status: /Civil Union     Spouse name: None    Number of children: None    Years of education: None    Highest education level: None   Occupational History    None   Social Needs    Financial resource strain: None    Food insecurity     Worry: None     Inability: None    Transportation needs     Medical: None     Non-medical: None   Tobacco Use    Smoking status: Former Smoker     Packs/day: 0 50     Years: 30 00     Pack years: 15 00     Quit date:      Years since quittin 1    Smokeless tobacco: Never Used   Substance and Sexual Activity    Alcohol use:  Yes     Alcohol/week: 2 0 standard drinks     Types: 2 Standard drinks or equivalent per week     Comment: 1-2 mixed drinks per weekend    Drug use: No    Sexual activity: Yes     Partners: Male     Birth control/protection: Post-menopausal   Lifestyle    Physical activity     Days per week: None     Minutes per session: None    Stress: None   Relationships    Social connections     Talks on phone: None     Gets together: None     Attends Jainism service: None     Active member of club or organization: None     Attends meetings of clubs or organizations: None     Relationship status: None    Intimate partner violence     Fear of current or ex partner: None     Emotionally abused: None     Physically abused: None     Forced sexual activity: None   Other Topics Concern    None   Social History Narrative    None     Past Surgical History:   Procedure Laterality Date    CHOLECYSTECTOMY      COLON SURGERY      COLONOSCOPY N/A 6/15/2018    Procedure: COLONOSCOPY;  Surgeon: Frankey Spillers, DO;  [facility];  Service: Gastroenterology    COLONOSCOPY N/A 8/10/2018    Procedure: COLONOSCOPY;  Surgeon: Frankey Spillers, DO;  [facility];  Service: Gastroenterology    COLOSTOMY      ENDOMETRIAL ABLATION  2007    THERMAL    ESOPHAGOGASTRODUODENOSCOPY N/A 4/6/2018    Procedure: ESOPHAGOGASTRODUODENOSCOPY (EGD);   Surgeon: Farrukh Farley MD;  [facility];  Service: Gastroenterology   [address] [place] [address] 2002, 2007    LAPAROTOMY N/A 1/4/2018    Procedure: LAPAROTOMY EXPLORATORY, 2nd look, small bowel resection, ilieostomy closure via [place] anastamosis transverse colostomy;  Surgeon: Amado Martin DO;  Location:  MAIN OR;  Service: General    PA CLOSE ENTEROSTOMY N/A 7/11/2018    Procedure: COLOSTOMY TAKEDOWN / REVERSE COATES, ILEOCECECTOMY, LASER FLUORESCENCE ANGIOGRAPHY, LYSIS OF ADHESIONS;  Surgeon: Triston Saba DO;  Location:  MAIN OR;  Service: General    PA ESOPHAGOGASTRODUODENOSCOPY TRANSORAL DIAGNOSTIC N/A 10/21/2016    Procedure: EGD AND COLONOSCOPY;  Surgeon: Phill Santiago MD;  [facility];  Service: Gastroenterology    PA PART REMOVAL COLON W ANASTOMOSIS N/A 1/3/2018    Procedure: Exploratory laparotomy, RESECTION COLON SIGMOID, possible ostomy;  Surgeon: Trisha Ledesma DO;  [facility];  Service: General    THYROIDECTOMY      NEAR-TOTAL    THYROIDECTOMY, PARTIAL      TONSILLECTOMY AND ADENOIDECTOMY      TUBAL LIGATION  1997    URETERAL STENT PLACEMENT Bilateral 1/3/2018    Procedure: INSERTION STENT URETERAL;  Surgeon: Ingrid Patel MD;  [facility];  Service: Urology         Review of Systems   Gastrointestinal: Positive for abdominal pain and diarrhea  All other systems reviewed and are negative  Objective:      /88   Pulse 87   Temp 98 3 °F (36 8 °C)   Ht 5' 1 5" (1 562 m)   Wt 103 kg (226 lb)   LMP  (LMP Unknown)   BMI 42 01 kg/m²          Physical Exam  Constitutional:       Appearance: Normal appearance  She is well-developed  HENT:      Head: Normocephalic and atraumatic  Eyes:      Conjunctiva/sclera: Conjunctivae normal    Neck:      Musculoskeletal: Normal range of motion  Cardiovascular:      Rate and Rhythm: Normal rate and regular rhythm  Pulmonary:      Effort: Pulmonary effort is normal       Breath sounds: Normal breath sounds  Abdominal:      General: Bowel sounds are normal  There is no distension  Palpations: Abdomen is soft  Tenderness: There is no abdominal tenderness  Musculoskeletal: Normal range of motion  Skin:     General: Skin is warm and dry  Neurological:      Mental Status: She is alert and oriented to person, place, and time     Psychiatric:         Mood and Affect: Mood normal          Behavior: Behavior normal

## 2021-03-08 DIAGNOSIS — I10 HYPERTENSION, UNSPECIFIED TYPE: ICD-10-CM

## 2021-03-12 DIAGNOSIS — R19.7 DIARRHEA, UNSPECIFIED TYPE: ICD-10-CM

## 2021-03-12 RX ORDER — MONTELUKAST SODIUM 4 MG/1
2 TABLET, CHEWABLE ORAL 2 TIMES DAILY
Qty: 120 TABLET | Refills: 5 | Status: SHIPPED | OUTPATIENT
Start: 2021-03-12 | End: 2021-03-17 | Stop reason: SDUPTHER

## 2021-03-12 NOTE — TELEPHONE ENCOUNTER
GI Physician: Dr Ennis Cassette    Refill Request for Medication: Colestipol    Dose: 2g    Quantity: 120    Pharmacy and Location: [facility]

## 2021-03-17 DIAGNOSIS — R19.7 DIARRHEA, UNSPECIFIED TYPE: ICD-10-CM

## 2021-03-17 RX ORDER — MONTELUKAST SODIUM 4 MG/1
2 TABLET, CHEWABLE ORAL 2 TIMES DAILY
Qty: 360 TABLET | Refills: 3 | Status: SHIPPED | OUTPATIENT
Start: 2021-03-17 | End: 2022-01-12 | Stop reason: SDUPTHER

## 2021-09-15 DIAGNOSIS — I10 HYPERTENSION, UNSPECIFIED TYPE: ICD-10-CM

## 2021-09-23 ENCOUNTER — TELEPHONE (OUTPATIENT)
Dept: GASTROENTEROLOGY | Facility: CLINIC | Age: 56
End: 2021-09-23

## 2021-10-14 ENCOUNTER — HOSPITAL ENCOUNTER (EMERGENCY)
Facility: HOSPITAL | Age: 56
Discharge: HOME | End: 2021-10-14
Attending: EMERGENCY MEDICINE
Payer: COMMERCIAL

## 2021-10-14 ENCOUNTER — TELEPHONE (OUTPATIENT)
Dept: GASTROENTEROLOGY | Facility: CLINIC | Age: 56
End: 2021-10-14

## 2021-10-14 ENCOUNTER — APPOINTMENT (EMERGENCY)
Dept: CT IMAGING | Facility: HOSPITAL | Age: 56
End: 2021-10-14
Payer: COMMERCIAL

## 2021-10-14 VITALS
RESPIRATION RATE: 18 BRPM | SYSTOLIC BLOOD PRESSURE: 158 MMHG | HEIGHT: 61 IN | TEMPERATURE: 99.5 F | DIASTOLIC BLOOD PRESSURE: 80 MMHG | BODY MASS INDEX: 42.87 KG/M2 | OXYGEN SATURATION: 99 % | WEIGHT: 227.07 LBS | HEART RATE: 68 BPM

## 2021-10-14 DIAGNOSIS — K52.9 COLITIS: Primary | ICD-10-CM

## 2021-10-14 DIAGNOSIS — R19.7 DIARRHEA: ICD-10-CM

## 2021-10-14 LAB
ALBUMIN SERPL BCP-MCNC: 4.1 G/DL (ref 3.5–5)
ALP SERPL-CCNC: 105 U/L (ref 46–116)
ALT SERPL W P-5'-P-CCNC: 108 U/L (ref 12–78)
ANION GAP SERPL CALCULATED.3IONS-SCNC: 12 MMOL/L (ref 4–13)
AST SERPL W P-5'-P-CCNC: 51 U/L (ref 5–45)
ATRIAL RATE: 89 BPM
BASOPHILS # BLD AUTO: 0.07 THOUSANDS/ΜL (ref 0–0.1)
BASOPHILS NFR BLD AUTO: 1 % (ref 0–1)
BILIRUB DIRECT SERPL-MCNC: 0.37 MG/DL (ref 0–0.2)
BILIRUB SERPL-MCNC: 2.89 MG/DL (ref 0.2–1)
BILIRUB UR QL STRIP: NEGATIVE
BUN SERPL-MCNC: 7 MG/DL (ref 5–25)
CALCIUM SERPL-MCNC: 8.9 MG/DL (ref 8.3–10.1)
CHLORIDE SERPL-SCNC: 102 MMOL/L (ref 100–108)
CLARITY UR: CLEAR
CO2 SERPL-SCNC: 28 MMOL/L (ref 21–32)
COLOR UR: YELLOW
CREAT SERPL-MCNC: 0.8 MG/DL (ref 0.6–1.3)
EOSINOPHIL # BLD AUTO: 0.12 THOUSAND/ΜL (ref 0–0.61)
EOSINOPHIL NFR BLD AUTO: 2 % (ref 0–6)
ERYTHROCYTE [DISTWIDTH] IN BLOOD BY AUTOMATED COUNT: 11.9 % (ref 11.6–15.1)
GFR SERPL CREATININE-BSD FRML MDRD: 82 ML/MIN/1.73SQ M
GLUCOSE SERPL-MCNC: 104 MG/DL (ref 65–140)
GLUCOSE UR STRIP-MCNC: NEGATIVE MG/DL
HCT VFR BLD AUTO: 45.8 % (ref 34.8–46.1)
HGB BLD-MCNC: 15.8 G/DL (ref 11.5–15.4)
HGB UR QL STRIP.AUTO: NEGATIVE
IMM GRANULOCYTES # BLD AUTO: 0.02 THOUSAND/UL (ref 0–0.2)
IMM GRANULOCYTES NFR BLD AUTO: 0 % (ref 0–2)
KETONES UR STRIP-MCNC: NEGATIVE MG/DL
LEUKOCYTE ESTERASE UR QL STRIP: NEGATIVE
LIPASE SERPL-CCNC: 107 U/L (ref 73–393)
LYMPHOCYTES # BLD AUTO: 1.93 THOUSANDS/ΜL (ref 0.6–4.47)
LYMPHOCYTES NFR BLD AUTO: 23 % (ref 14–44)
MCH RBC QN AUTO: 31.5 PG (ref 26.8–34.3)
MCHC RBC AUTO-ENTMCNC: 34.5 G/DL (ref 31.4–37.4)
MCV RBC AUTO: 91 FL (ref 82–98)
MONOCYTES # BLD AUTO: 0.52 THOUSAND/ΜL (ref 0.17–1.22)
MONOCYTES NFR BLD AUTO: 6 % (ref 4–12)
NEUTROPHILS # BLD AUTO: 5.58 THOUSANDS/ΜL (ref 1.85–7.62)
NEUTS SEG NFR BLD AUTO: 68 % (ref 43–75)
NITRITE UR QL STRIP: NEGATIVE
NRBC BLD AUTO-RTO: 0 /100 WBCS
P AXIS: 50 DEGREES
PH UR STRIP.AUTO: 5 [PH]
PLATELET # BLD AUTO: 207 THOUSANDS/UL (ref 149–390)
PMV BLD AUTO: 10.8 FL (ref 8.9–12.7)
POTASSIUM SERPL-SCNC: 3.4 MMOL/L (ref 3.5–5.3)
PR INTERVAL: 154 MS
PROT SERPL-MCNC: 7.5 G/DL (ref 6.4–8.2)
PROT UR STRIP-MCNC: NEGATIVE MG/DL
QRS AXIS: 55 DEGREES
QRSD INTERVAL: 98 MS
QT INTERVAL: 364 MS
QTC INTERVAL: 442 MS
RBC # BLD AUTO: 5.01 MILLION/UL (ref 3.81–5.12)
SODIUM SERPL-SCNC: 142 MMOL/L (ref 136–145)
SP GR UR STRIP.AUTO: <=1.005 (ref 1–1.03)
T WAVE AXIS: 4 DEGREES
UROBILINOGEN UR QL STRIP.AUTO: 0.2 E.U./DL
VENTRICULAR RATE: 89 BPM
WBC # BLD AUTO: 8.24 THOUSAND/UL (ref 4.31–10.16)

## 2021-10-14 PROCEDURE — 80076 HEPATIC FUNCTION PANEL: CPT | Performed by: EMERGENCY MEDICINE

## 2021-10-14 PROCEDURE — 74177 CT ABD & PELVIS W/CONTRAST: CPT

## 2021-10-14 PROCEDURE — 96361 HYDRATE IV INFUSION ADD-ON: CPT

## 2021-10-14 PROCEDURE — 85025 COMPLETE CBC W/AUTO DIFF WBC: CPT | Performed by: EMERGENCY MEDICINE

## 2021-10-14 PROCEDURE — 99284 EMERGENCY DEPT VISIT MOD MDM: CPT

## 2021-10-14 PROCEDURE — 93010 ELECTROCARDIOGRAM REPORT: CPT | Performed by: INTERNAL MEDICINE

## 2021-10-14 PROCEDURE — 81003 URINALYSIS AUTO W/O SCOPE: CPT | Performed by: EMERGENCY MEDICINE

## 2021-10-14 PROCEDURE — 36415 COLL VENOUS BLD VENIPUNCTURE: CPT | Performed by: EMERGENCY MEDICINE

## 2021-10-14 PROCEDURE — 99284 EMERGENCY DEPT VISIT MOD MDM: CPT | Performed by: EMERGENCY MEDICINE

## 2021-10-14 PROCEDURE — 83690 ASSAY OF LIPASE: CPT | Performed by: EMERGENCY MEDICINE

## 2021-10-14 PROCEDURE — 80048 BASIC METABOLIC PNL TOTAL CA: CPT | Performed by: EMERGENCY MEDICINE

## 2021-10-14 PROCEDURE — 93005 ELECTROCARDIOGRAM TRACING: CPT

## 2021-10-14 PROCEDURE — 96360 HYDRATION IV INFUSION INIT: CPT

## 2021-10-14 RX ORDER — CIPROFLOXACIN 500 MG/1
500 TABLET, FILM COATED ORAL 2 TIMES DAILY
Qty: 14 TABLET | Refills: 0 | Status: SHIPPED | OUTPATIENT
Start: 2021-10-14 | End: 2021-10-21 | Stop reason: ALTCHOICE

## 2021-10-14 RX ADMIN — SODIUM CHLORIDE 1000 ML: 0.9 INJECTION, SOLUTION INTRAVENOUS at 11:53

## 2021-10-14 RX ADMIN — IOHEXOL 100 ML: 350 INJECTION, SOLUTION INTRAVENOUS at 12:37

## 2021-10-21 ENCOUNTER — OFFICE VISIT (OUTPATIENT)
Dept: FAMILY MEDICINE CLINIC | Facility: CLINIC | Age: 56
End: 2021-10-21
Payer: COMMERCIAL

## 2021-10-21 VITALS
SYSTOLIC BLOOD PRESSURE: 136 MMHG | HEIGHT: 61 IN | BODY MASS INDEX: 43.23 KG/M2 | DIASTOLIC BLOOD PRESSURE: 82 MMHG | TEMPERATURE: 97.2 F | WEIGHT: 229 LBS

## 2021-10-21 DIAGNOSIS — Z23 NEED FOR INFLUENZA VACCINATION: Primary | ICD-10-CM

## 2021-10-21 DIAGNOSIS — K52.9 COLITIS: ICD-10-CM

## 2021-10-21 DIAGNOSIS — K52.9 CHRONIC DIARRHEA: ICD-10-CM

## 2021-10-21 PROCEDURE — 90682 RIV4 VACC RECOMBINANT DNA IM: CPT | Performed by: FAMILY MEDICINE

## 2021-10-21 PROCEDURE — 99214 OFFICE O/P EST MOD 30 MIN: CPT | Performed by: FAMILY MEDICINE

## 2021-10-21 PROCEDURE — 3008F BODY MASS INDEX DOCD: CPT | Performed by: FAMILY MEDICINE

## 2021-10-21 PROCEDURE — 90471 IMMUNIZATION ADMIN: CPT | Performed by: FAMILY MEDICINE

## 2021-10-21 PROCEDURE — 1036F TOBACCO NON-USER: CPT | Performed by: FAMILY MEDICINE

## 2021-10-21 RX ORDER — METRONIDAZOLE 7.5 MG/G
GEL TOPICAL
COMMUNITY
Start: 2021-09-01 | End: 2021-10-21 | Stop reason: SDUPTHER

## 2021-10-21 RX ORDER — PREDNISONE 10 MG/1
TABLET ORAL
COMMUNITY
Start: 2021-09-29 | End: 2021-10-21 | Stop reason: ALTCHOICE

## 2021-10-21 RX ORDER — DOXYCYCLINE HYCLATE 50 MG/1
CAPSULE ORAL
COMMUNITY
Start: 2021-10-01

## 2021-10-22 ENCOUNTER — TELEPHONE (OUTPATIENT)
Dept: GASTROENTEROLOGY | Facility: CLINIC | Age: 56
End: 2021-10-22

## 2022-02-27 DIAGNOSIS — I10 HYPERTENSION, UNSPECIFIED TYPE: ICD-10-CM

## 2022-03-21 DIAGNOSIS — K21.9 GASTROESOPHAGEAL REFLUX DISEASE WITHOUT ESOPHAGITIS: ICD-10-CM

## 2022-03-21 RX ORDER — PANTOPRAZOLE SODIUM 40 MG/1
TABLET, DELAYED RELEASE ORAL
Qty: 90 TABLET | Refills: 3 | Status: SHIPPED | OUTPATIENT
Start: 2022-03-21

## 2022-08-18 ENCOUNTER — PATIENT MESSAGE (OUTPATIENT)
Dept: GASTROENTEROLOGY | Facility: CLINIC | Age: 57
End: 2022-08-18

## 2022-08-18 DIAGNOSIS — R19.7 DIARRHEA, UNSPECIFIED TYPE: ICD-10-CM

## 2022-08-18 RX ORDER — MONTELUKAST SODIUM 4 MG/1
2 TABLET, CHEWABLE ORAL 3 TIMES DAILY
Qty: 540 TABLET | Refills: 3 | Status: SHIPPED | OUTPATIENT
Start: 2022-08-18

## 2022-09-22 NOTE — PLAN OF CARE
DISCHARGE PLANNING     Discharge to home or other facility with appropriate resources Progressing        DISCHARGE PLANNING - CARE MANAGEMENT     Discharge to post-acute care or home with appropriate resources Progressing        GASTROINTESTINAL - ADULT     Minimal or absence of nausea and/or vomiting Progressing     Maintains or returns to baseline bowel function Progressing        INFECTION - ADULT     Absence or prevention of progression during hospitalization Progressing        Knowledge Deficit     Patient/family/caregiver demonstrates understanding of disease process, treatment plan, medications, and discharge instructions Progressing        Nutrition/Hydration-ADULT     Nutrient/Hydration intake appropriate for improving, restoring or maintaining nutritional needs Progressing        PAIN - ADULT     Verbalizes/displays adequate comfort level or baseline comfort level Progressing        Potential for Falls     Patient will remain free of falls Progressing        Prexisting or High Potential for Compromised Skin Integrity     Skin integrity is maintained or improved Progressing        SAFETY ADULT     Patient will remain free of falls Progressing     Maintain or return to baseline ADL function Progressing     Maintain or return mobility status to optimal level Progressing Started having symptoms 9/5 w/ nasal congestion, runny nose then on 9/11 started deveopoing fever, chills, aching headache, sore throat, hacking cough initially dry then productive of thick pale discolored sputum    Left earache, stopped up, hearing comes and goes     ER Sat night [address] 9/17  [facility] 9/13  Tested for flu negat strep neg Covid +  Nothing rx'd except otc      [facility] 9/15  Rx'd Flonase, sudafed, Tessalon Perles 200 mg  Cough persisted and worsened  Was hard to talk or breath bc of the coughing fits    His O2 sats were 93%  So went to [facility] 9/17 95%  No CXR  No testing   redness left ear  Prescribed Amox 500 mg tid x 10 days (currently day 6 of 10)  Dexamethasone 6 mg 1 every day x 5 days (completed last dose yesterday)        Albuterol Neb (but has not able to get it yet) so changed to Albuterol HFA   Oxygen goes to 97-98% after usage but down to 93-95% when not using it  Finally got nebs 3 days ago and started using that once a day mid day  And still using the Albuterol HFA q4-6 hrs    Hoarseness and lost voice about 1 week ago throat not sore anymore   Eating abnd drinking ok now  Tylenol prn  Coughing still alld day and night  Better but nog gone  Fever resolved about 1 week ago  Ear getting better but not fully back to normal  Still coughing and voice still raspy but slowly regaining it  Cough still prodcutive at times  No SOB, pleuritic pain, or wheezing  Cough not as violent as it was before  Overall feeling about 70% better  Sleeping better at night      No prior h/o asthma      CXR  Zpack  Albuterol HFA RF  Vit C, D, Zinc

## 2022-10-27 ENCOUNTER — OFFICE VISIT (OUTPATIENT)
Dept: CARDIOLOGY CLINIC | Facility: CLINIC | Age: 57
End: 2022-10-27
Payer: COMMERCIAL

## 2022-10-27 VITALS
SYSTOLIC BLOOD PRESSURE: 150 MMHG | BODY MASS INDEX: 43.05 KG/M2 | WEIGHT: 228 LBS | HEART RATE: 71 BPM | DIASTOLIC BLOOD PRESSURE: 90 MMHG | HEIGHT: 61 IN

## 2022-10-27 DIAGNOSIS — I10 ESSENTIAL HYPERTENSION: ICD-10-CM

## 2022-10-27 DIAGNOSIS — I48.0 PAROXYSMAL ATRIAL FIBRILLATION (HCC): Primary | ICD-10-CM

## 2022-10-27 PROCEDURE — 99214 OFFICE O/P EST MOD 30 MIN: CPT | Performed by: INTERNAL MEDICINE

## 2022-10-27 PROCEDURE — 93000 ELECTROCARDIOGRAM COMPLETE: CPT | Performed by: INTERNAL MEDICINE

## 2022-10-27 RX ORDER — METOPROLOL SUCCINATE 25 MG/1
25 TABLET, EXTENDED RELEASE ORAL DAILY
Qty: 30 TABLET | Refills: 5 | Status: SHIPPED | OUTPATIENT
Start: 2022-10-27

## 2022-10-27 NOTE — PATIENT INSTRUCTIONS
Cholesterol and Your Health   AMBULATORY CARE:   Cholesterol  is a waxy, fat-like substance  Your body uses cholesterol to make hormones and new cells, and to protect nerves  Cholesterol is made by your body  It also comes from certain foods you eat, such as meat and dairy products  Your healthcare provider can help you set goals for your cholesterol levels  He or she can help you create a plan to meet your goals  Cholesterol level goals: Your cholesterol level goals depend on your risk for heart disease, your age, and your other health conditions  The following are general guidelines: Total cholesterol  includes low-density lipoprotein (LDL), high-density lipoprotein (HDL), and triglyceride levels  The total cholesterol level should be lower than 200 mg/dL and is best at about 150 mg/dL  LDL cholesterol  is called bad cholesterol  because it forms plaque in your arteries  As plaque builds up, your arteries become narrow, and less blood flows through  When plaque decreases blood flow to your heart, you may have chest pain  If plaque completely blocks an artery that brings blood to your heart, you may have a heart attack  Plaque can break off and form blood clots  Blood clots may block arteries in your brain and cause a stroke  The level should be less than 130 mg/dL and is best at about 100 mg/dL  HDL cholesterol  is called good cholesterol  because it helps remove LDL cholesterol from your arteries  It does this by attaching to LDL cholesterol and carrying it to your liver  Your liver breaks down LDL cholesterol so your body can get rid of it  High levels of HDL cholesterol can help prevent a heart attack and stroke  Low levels of HDL cholesterol can increase your risk for heart disease, heart attack, and stroke  The level should be 60 mg/dL or higher  Triglycerides  are a type of fat that store energy from foods you eat  High levels of triglycerides also cause plaque buildup   This can increase your risk for a heart attack or stroke  If your triglyceride level is high, your LDL cholesterol level may also be high  The level should be less than 150 mg/dL  Any of the following can increase your risk for high cholesterol:   Smoking cigarettes    Being overweight or obese, or not getting enough exercise    Drinking large amounts of alcohol    A medical condition such as hypertension (high blood pressure) or diabetes    Certain genes passed from your parents to you    Age older than 65 years    What you need to know about having your cholesterol levels checked: Adults 21to 39years of age should have their cholesterol levels checked every 4 to 6 years  Adults 45 years or older should have their cholesterol checked every 1 to 2 years  You may need your cholesterol checked more often, or at a younger age, if you have risk factors for heart disease  You may also need to have your cholesterol checked more often if you have other health conditions, such as diabetes  Blood tests are used to check cholesterol levels  Blood tests measure your levels of triglycerides, LDL cholesterol, and HDL cholesterol  How healthy fats affect your cholesterol levels:  Healthy fats, also called unsaturated fats, help lower LDL cholesterol and triglyceride levels  Healthy fats include the following:  Monounsaturated fats  are found in foods such as olive oil, canola oil, avocado, nuts, and olives  Polyunsaturated fats,  such as omega 3 fats, are found in fish, such as salmon, trout, and tuna  They can also be found in plant foods such as flaxseed, walnuts, and soybeans  How unhealthy fats affect your cholesterol levels:  Unhealthy fats increase LDL cholesterol and triglyceride levels  They are found in foods high in cholesterol, saturated fat, and trans fat:  Cholesterol  is found in eggs, dairy, and meat  Saturated fat  is found in butter, cheese, ice cream, whole milk, and coconut oil   Saturated fat is also found in meat, such as sausage, hot dogs, and bologna  Trans fat  is found in liquid oils and is used in fried and baked foods  Foods that contain trans fats include chips, crackers, muffins, sweet rolls, microwave popcorn, and cookies  Treatment  for high cholesterol will also decrease your risk of heart disease, heart attack, and stroke  Treatment may include any of the following:  Lifestyle changes  may include food, exercise, weight loss, and quitting smoking  You may also need to decrease the amount of alcohol you drink  Your healthcare provider will want you to start with lifestyle changes  Other treatment may be added if lifestyle changes are not enough  Your healthcare provider may recommend you work with a team to manage hyperlipidemia  The team may include medical experts such as a dietitian, an exercise or physical therapist, and a behavior therapist  Your family members may be included in helping you create lifestyle changes  Medicines  may be given to lower your LDL cholesterol, triglyceride levels, or total cholesterol level  You may need medicines to lower your cholesterol if any of the following is true:    You have a history of stroke, TIA, unstable angina, or a heart attack  Your LDL cholesterol level is 190 mg/dL or higher  You are age 36 to 76 years, have diabetes or heart disease risk factors, and your LDL cholesterol is 70 mg/dL or higher  Supplements  include fish oil, red yeast rice, and garlic  Fish oil may help lower your triglyceride and LDL cholesterol levels  It may also increase your HDL cholesterol level  Red yeast rice may help decrease your total cholesterol level and LDL cholesterol level  Garlic may help lower your total cholesterol level  Do not take any supplements without talking to your healthcare provider  Food changes you can make to lower your cholesterol levels:  A dietitian can help you create a healthy eating plan   He or she can show you how to read food labels and choose foods low in saturated fat, trans fats, and cholesterol  Decrease the total amount of fat you eat  Choose lean meats, fat-free or 1% fat milk, and low-fat dairy products, such as yogurt and cheese  Try to limit or avoid red meats  Limit or do not eat fried foods or baked goods, such as cookies  Replace unhealthy fats with healthy fats  Cook foods in olive oil or canola oil  Choose soft margarines that are low in saturated fat and trans fat  Seeds, nuts, and avocados are other examples of healthy fats  Eat foods with omega-3 fats  Examples include salmon, tuna, mackerel, walnuts, and flaxseed  Eat fish 2 times per week  Pregnant women should not eat fish that have high levels of mercury, such as shark, swordfish, and emy mackerel  Increase the amount of high-fiber foods you eat  High-fiber foods can help lower your LDL cholesterol  Aim to get between 20 and 30 grams of fiber each day  Fruits and vegetables are high in fiber  Eat at least 5 servings each day  Other high-fiber foods are whole-grain or whole-wheat breads, pastas, or cereals, and brown rice  Eat 3 ounces of whole-grain foods each day  Increase fiber slowly  You may have abdominal discomfort, bloating, and gas if you add fiber to your diet too quickly  Eat healthy protein foods  Examples include low-fat dairy products, skinless chicken and turkey, fish, and nuts  Limit foods and drinks that are high in sugar  Your dietitian or healthcare provider can help you create daily limits for high-sugar foods and drinks  The limit may be lower if you have diabetes or another health condition  Limits can also help you lose weight if needed  Lifestyle changes you can make to lower your cholesterol levels:   Maintain a healthy weight  Ask your healthcare provider what a healthy weight is for you  Ask him or her to help you create a weight loss plan if needed   Weight loss can decrease your total cholesterol and triglyceride levels  Weight loss may also help keep your blood pressure at a healthy level  Be physically active throughout the day  Physical activity, such as exercise, can help lower your total cholesterol level and maintain a healthy weight  Physical activity can also help increase your HDL cholesterol level  Work with your healthcare provider to create an program that is right for you  Get at least 30 to 40 minutes of moderate physical activity most days of the week  Examples of exercise include brisk walking, swimming, or biking  Also include strength training at least 2 times each week  Your healthcare providers can help you create a physical activity plan  Do not smoke  Nicotine and other chemicals in cigarettes and cigars can raise your cholesterol levels  Ask your healthcare provider for information if you currently smoke and need help to quit  E-cigarettes or smokeless tobacco still contain nicotine  Talk to your healthcare provider before you use these products  Limit or do not drink alcohol  Alcohol can increase your triglyceride levels  Ask your healthcare provider before you drink alcohol  Ask how much is okay for you to drink in 24 hours or 1 week  Follow up with your doctor as directed:  Write down your questions so you remember to ask them during your visits  © Copyright [employer] 2022 Information is for End User's use only and may not be sold, redistributed or otherwise used for commercial purposes  All illustrations and images included in CareNotes® are the copyrighted property of [employer]  or [facility] Mary Ann Anderson  The above information is an  only  It is not intended as medical advice for individual conditions or treatments  Talk to your doctor, nurse or pharmacist before following any medical regimen to see if it is safe and effective for you

## 2022-10-27 NOTE — PROGRESS NOTES
Subjective:        Patient ID: Eliane Villanueva is a 61 y o  female  Chief Complaint:  Miguelito Sanders is here for her routine visit, fortunately offers no alarming cardiopulmonary complaints, staying pretty active, excited about a grandchild due in February, no chest pain shortness of breath concerning palpitations presyncope syncope TIA or claudication like symptoms  She ran out of her meds a couple months ago, possibly we discussed explaining her higher than normal blood pressure today  Fortunately she has not had any sustained palpitations just a few brief episodes she admits to  EKG within normal limits  The following portions of the patient's history were reviewed and updated as appropriate: allergies, current medications, past family history, past medical history, past social history, past surgical history and problem list   Review of Systems   Constitutional: Negative for chills, diaphoresis, malaise/fatigue and weight gain  HENT: Negative for nosebleeds and stridor  Eyes: Negative for double vision, vision loss in left eye, vision loss in right eye and visual disturbance  Cardiovascular: Negative for chest pain, claudication, cyanosis, dyspnea on exertion, irregular heartbeat, leg swelling, near-syncope, orthopnea, palpitations, paroxysmal nocturnal dyspnea and syncope  Respiratory: Negative for cough, shortness of breath, snoring and wheezing  Endocrine: Negative for polydipsia, polyphagia and polyuria  Hematologic/Lymphatic: Negative for bleeding problem  Does not bruise/bleed easily  Skin: Negative for flushing and rash  Musculoskeletal: Negative for falls and myalgias  Gastrointestinal: Negative for abdominal pain, heartburn, hematemesis, hematochezia, melena and nausea  Genitourinary: Negative for hematuria  Neurological: Negative for brief paralysis, dizziness, focal weakness, headaches, light-headedness, loss of balance and vertigo     Psychiatric/Behavioral: Negative for altered mental status and substance abuse  Allergic/Immunologic: Negative for hives  Objective:      /90 (BP Location: Left arm)   Pulse 71   Ht 5' 1" (1 549 m)   Wt 103 kg (228 lb)   LMP  (LMP Unknown)   BMI 43 08 kg/m²   Physical Exam  Vitals and nursing note reviewed  Constitutional:       Appearance: She is well-developed  She is not diaphoretic  HENT:      Head: Normocephalic and atraumatic  Eyes:      General: No scleral icterus  Pupils: Pupils are equal, round, and reactive to light  Neck:      Thyroid: No thyromegaly  Vascular: No carotid bruit or JVD  Cardiovascular:      Rate and Rhythm: Normal rate and regular rhythm  Pulses: Intact distal pulses  Heart sounds: Normal heart sounds  No murmur heard  No friction rub  No gallop  Pulmonary:      Effort: Pulmonary effort is normal  No respiratory distress  Breath sounds: Normal breath sounds  No stridor  No wheezing or rales  Abdominal:      General: Bowel sounds are normal       Palpations: Abdomen is soft  There is no mass  Tenderness: There is no abdominal tenderness  Musculoskeletal:         General: No deformity or signs of injury  Cervical back: Normal range of motion and neck supple  Right lower leg: No edema  Left lower leg: No edema  Lymphadenopathy:      Cervical: No cervical adenopathy  Skin:     General: Skin is warm and dry  Coloration: Skin is not pale  Findings: No rash  Neurological:      Mental Status: She is alert and oriented to person, place, and time  Psychiatric:         Mood and Affect: Mood normal          Behavior: Behavior normal          Thought Content: Thought content normal          Judgment: Judgment normal          Lab Review:   No visits with results within 2 Month(s) from this visit     Latest known visit with results is:   Admission on 10/14/2021, Discharged on 10/14/2021   Component Date Value   • WBC 10/14/2021 8 24    • RBC 10/14/2021 5 01    • Hemoglobin 10/14/2021 15 8 (A)   • Hematocrit 10/14/2021 45 8    • MCV 10/14/2021 91    • MCH 10/14/2021 31 5    • MCHC 10/14/2021 34 5    • RDW 10/14/2021 11 9    • MPV 10/14/2021 10 8    • Platelets 77/56/4472 207    • nRBC 10/14/2021 0    • Neutrophils Relative 10/14/2021 68    • Immat GRANS % 10/14/2021 0    • Lymphocytes Relative 10/14/2021 23    • Monocytes Relative 10/14/2021 6    • Eosinophils Relative 10/14/2021 2    • Basophils Relative 10/14/2021 1    • Neutrophils Absolute 10/14/2021 5 58    • Immature Grans Absolute 10/14/2021 0 02    • Lymphocytes Absolute 10/14/2021 1 93    • Monocytes Absolute 10/14/2021 0 52    • Eosinophils Absolute 10/14/2021 0 12    • Basophils Absolute 10/14/2021 0 07    • Lipase 10/14/2021 107    • Sodium 10/14/2021 142    • Potassium 10/14/2021 3 4 (A)   • Chloride 10/14/2021 102    • CO2 10/14/2021 28    • ANION GAP 10/14/2021 12    • BUN 10/14/2021 7    • Creatinine 10/14/2021 0 80    • Glucose 10/14/2021 104    • Calcium 10/14/2021 8 9    • eGFR 10/14/2021 82    • Total Bilirubin 10/14/2021 2 89 (A)   • Bilirubin, Direct 10/14/2021 0 37 (A)   • Alkaline Phosphatase 10/14/2021 105    • AST 10/14/2021 51 (A)   • ALT 10/14/2021 108 (A)   • Total Protein 10/14/2021 7 5    • Albumin 10/14/2021 4 1    • Color, UA 10/14/2021 Yellow    • Clarity, UA 10/14/2021 Clear    • Specific Gravity, UA 10/14/2021 <=1 005    • pH, UA 10/14/2021 5 0    • Leukocytes, UA 10/14/2021 Negative    • Nitrite, UA 10/14/2021 Negative    • Protein, UA 10/14/2021 Negative    • Glucose, UA 10/14/2021 Negative    • Ketones, UA 10/14/2021 Negative    • Urobilinogen, UA 10/14/2021 0 2    • Bilirubin, UA 10/14/2021 Negative    • Occult Blood, UA 10/14/2021 Negative    • Ventricular Rate 10/14/2021 89    • Atrial Rate 10/14/2021 89    • TX Interval 10/14/2021 154    • QRSD Interval 10/14/2021 98    • QT Interval 10/14/2021 364    • QTC Interval 10/14/2021 442    • P Axis 10/14/2021 50 • QRS Axis 10/14/2021 55    • T Wave Axis 10/14/2021 4      No results found  Assessment:       1  Paroxysmal atrial fibrillation (HCC)  POCT ECG    metoprolol succinate (TOPROL-XL) 25 mg 24 hr tablet   2  Essential hypertension  metoprolol succinate (TOPROL-XL) 25 mg 24 hr tablet        Plan:       I advised she go back on Toprol, I ordered the metoprolol succinate prep 25 mg once a day  She will call if any side effects and keep an eye on her blood pressure at home and let me know if over 140/80 more than rarely  Overall stroke risk score is low and I have not seen recurrent AFib, her risk score will change until she it is about 65 all else staying well  For now we will continue observation  I think the beta-blocker will help here as well  About 2 years ago her lipids were excellent, her weight and diet has not really changed, will hold off on reassessing these until after next year's visit  She know she should try and lose about 10 lb or so, she knows to avoid excess sodium and salt in her diet, and to avoid NSAIDs at least regular use of  I will see her back in 6 months, asked her to call me sooner with any concerning potential cardiac symptoms in the meantime

## 2022-10-31 ENCOUNTER — OFFICE VISIT (OUTPATIENT)
Dept: URGENT CARE | Facility: MEDICAL CENTER | Age: 57
End: 2022-10-31

## 2022-10-31 ENCOUNTER — TELEPHONE (OUTPATIENT)
Dept: FAMILY MEDICINE CLINIC | Facility: CLINIC | Age: 57
End: 2022-10-31

## 2022-10-31 VITALS
RESPIRATION RATE: 18 BRPM | OXYGEN SATURATION: 98 % | HEIGHT: 61 IN | BODY MASS INDEX: 42.86 KG/M2 | WEIGHT: 227 LBS | SYSTOLIC BLOOD PRESSURE: 124 MMHG | DIASTOLIC BLOOD PRESSURE: 90 MMHG | HEART RATE: 76 BPM | TEMPERATURE: 97.6 F

## 2022-10-31 DIAGNOSIS — N30.00 ACUTE CYSTITIS WITHOUT HEMATURIA: Primary | ICD-10-CM

## 2022-10-31 DIAGNOSIS — R30.0 DYSURIA: Primary | ICD-10-CM

## 2022-10-31 LAB
SL AMB  POCT GLUCOSE, UA: ABNORMAL
SL AMB LEUKOCYTE ESTERASE,UA: ABNORMAL
SL AMB POCT BILIRUBIN,UA: ABNORMAL
SL AMB POCT BLOOD,UA: ABNORMAL
SL AMB POCT CLARITY,UA: CLEAR
SL AMB POCT COLOR,UA: YELLOW
SL AMB POCT KETONES,UA: ABNORMAL
SL AMB POCT NITRITE,UA: ABNORMAL
SL AMB POCT PH,UA: 5
SL AMB POCT SPECIFIC GRAVITY,UA: 1
SL AMB POCT URINE PROTEIN: ABNORMAL
SL AMB POCT UROBILINOGEN: 0.2

## 2022-10-31 RX ORDER — PHENAZOPYRIDINE HYDROCHLORIDE 95 MG/1
95 TABLET ORAL 3 TIMES DAILY PRN
Qty: 10 TABLET | Refills: 0 | Status: SHIPPED | OUTPATIENT
Start: 2022-10-31

## 2022-10-31 RX ORDER — SULFAMETHOXAZOLE AND TRIMETHOPRIM 800; 160 MG/1; MG/1
1 TABLET ORAL EVERY 12 HOURS SCHEDULED
Qty: 6 TABLET | Refills: 0 | Status: SHIPPED | OUTPATIENT
Start: 2022-10-31 | End: 2022-11-03

## 2022-10-31 RX ORDER — SULFAMETHOXAZOLE AND TRIMETHOPRIM 800; 160 MG/1; MG/1
1 TABLET ORAL EVERY 12 HOURS SCHEDULED
Qty: 20 TABLET | Refills: 0 | Status: SHIPPED | OUTPATIENT
Start: 2022-10-31 | End: 2022-11-10

## 2022-10-31 NOTE — PROGRESS NOTES
[facility]        NAME: Bir Uriarte is a 61 y o  female  : 1965    MRN: [id number]  DATE: 2022  TIME: 3:07 PM    Assessment and Plan   Dysuria [R30 0]  1  Dysuria  POCT urine dip    Urine culture    phenazopyridine (PYRIDIUM) 95 MG tablet    sulfamethoxazole-trimethoprim (BACTRIM DS) 800-160 mg per tablet         Patient Instructions       Follow up with PCP in 3-5 days  Proceed to  ER if symptoms worsen  Chief Complaint     Chief Complaint   Patient presents with   • Possible UTI     Pressure upon urination that started Saturday          History of Present Illness       61year old female presents with dysuria since 10/29  Her symptoms started that night  She describes the discomfort as pressure with increasing frequency  Patient states this is the first time she has experienced these symptoms  Denies hx of UTIs  She admits to drinking cranberry juice and water since onset  Pt admits to small amount of pinkish like discharge since onset of her sx  She denies any fever, chills, back pain, chest pain, shortness of breath, or obvious blood in urine  Review of Systems   Review of Systems   Constitutional: Negative for chills and fever  Respiratory: Negative for shortness of breath  Cardiovascular: Negative for chest pain  Gastrointestinal: Negative for abdominal pain, constipation, diarrhea and vomiting  Genitourinary: Positive for dysuria and urgency  Negative for enuresis and flank pain  Musculoskeletal: Negative for back pain           Current Medications       Current Outpatient Medications:   •  aspirin (ECOTRIN LOW STRENGTH) 81 mg EC tablet, Take 1 tablet (81 mg total) by mouth daily, Disp: 30 tablet, Rfl: 5  •  colestipol (COLESTID) 1 g tablet, Take 2 tablets (2 g total) by mouth 3 (three) times a day, Disp: 540 tablet, Rfl: 3  •  metoprolol succinate (TOPROL-XL) 25 mg 24 hr tablet, Take 1 tablet (25 mg total) by mouth daily, Disp: 30 tablet, Rfl: 5  • metroNIDAZOLE (METROCREAM) 0 75 % cream, Apply topically 2 (two) times a day, Disp: 45 g, Rfl: 0  •  Multiple Vitamin (multivitamin) tablet, Take 1 tablet by mouth daily, Disp: , Rfl:   •  pantoprazole (PROTONIX) 40 mg tablet, TAKE 1 TABLET BY MOUTH  DAILY, Disp: 90 tablet, Rfl: 3  •  phenazopyridine (PYRIDIUM) 95 MG tablet, Take 1 tablet (95 mg total) by mouth 3 (three) times a day as needed for bladder spasms, Disp: 10 tablet, Rfl: 0  •  sulfamethoxazole-trimethoprim (BACTRIM DS) 800-160 mg per tablet, Take 1 tablet by mouth every 12 (twelve) hours for 3 days, Disp: 6 tablet, Rfl: 0  •  doxycycline hyclate (VIBRAMYCIN) 50 mg capsule, , Disp: , Rfl:     Current Allergies     Allergies as of 10/31/2022   • (No Known Allergies)            The following portions of the patient's history were reviewed and updated as appropriate: allergies, current medications, past family history, past medical history, past social history, past surgical history and problem list      Past Medical History:   Diagnosis Date   • Atrial fibrillation ([facility] [address] )    • Disease of thyroid gland    • Endometrial polyp    • Gastroesophageal reflux disease without esophagitis 12/4/2017   • Hand injury     LAST ASSESSED: [id number]   • High risk medication use     LAST ASSESSED: [id number]   • Hyperlipidemia    • Hypertension    • Renal colic     LAST ASSESSED: [id number]   • Viral syndrome     LAST ASSESSED: [id number]   • Vitamin D deficiency disease        Past Surgical History:   Procedure Laterality Date   • CHOLECYSTECTOMY     • COLON SURGERY     • COLONOSCOPY N/A 6/15/2018    Procedure: COLONOSCOPY;  Surgeon: Monica Martinez DO;  Location: [facility];  Service: Gastroenterology   • COLONOSCOPY N/A 8/10/2018    Procedure: COLONOSCOPY;  Surgeon: Monica Martinez DO;  Location: [facility];  Service: Gastroenterology   • COLOSTOMY     • ENDOMETRIAL ABLATION  2007    THERMAL   • ESOPHAGOGASTRODUODENOSCOPY N/A 4/6/2018    Procedure: ESOPHAGOGASTRODUODENOSCOPY (EGD); Surgeon: Toni Angel MD;  Location: [facility];  Service: Gastroenterology   • [address], 2002, 2007   • LAPAROTOMY N/A 1/4/2018    Procedure: LAPAROTOMY EXPLORATORY, 2nd look, small bowel resection, ilieostomy closure via [place] anastamosis transverse colostomy;  Surgeon: Fatemeh Buchanan DO;  Location:  MAIN OR;  Service: General   • IA CLOSE ENTEROSTOMY N/A 7/11/2018    Procedure: COLOSTOMY TAKEDOWN / Marleni Blas, ILEOCECECTOMY, LASER FLUORESCENCE ANGIOGRAPHY, LYSIS OF ADHESIONS;  Surgeon: Phill Solitario DO;  Location:  MAIN OR;  Service: General   • IA ESOPHAGOGASTRODUODENOSCOPY TRANSORAL DIAGNOSTIC N/A 10/21/2016    Procedure: EGD AND COLONOSCOPY;  Surgeon: Melissa Montana MD;  Location: [facility];  Service: Gastroenterology   • IA PART REMOVAL COLON W ANASTOMOSIS N/A 1/3/2018    Procedure: Exploratory laparotomy, RESECTION COLON SIGMOID, possible ostomy;  Surgeon: Phill Solitario DO;  Location: [facility];  Service: General   • THYROIDECTOMY      NEAR-TOTAL   • THYROIDECTOMY, PARTIAL     • TONSILLECTOMY AND ADENOIDECTOMY     • TUBAL LIGATION  1997   • URETERAL STENT PLACEMENT Bilateral 1/3/2018    Procedure: INSERTION STENT URETERAL;  Surgeon: Amado Matias MD;  Location: [facility];  Service: Urology       Family History   Problem Relation Age of Onset   • Diabetes type II Mother    • Heart failure Father    • Skin cancer Sister    • No Known Problems Daughter    • No Known Problems Maternal Grandmother    • No Known Problems Maternal Grandfather    • No Known Problems Paternal Grandmother    • No Known Problems Paternal Grandfather    • No Known Problems Sister    • No Known Problems Son    • No Known Problems Son    • No Known Problems Paternal Aunt          Medications have been verified          Objective   /90   Pulse 76   Temp 97 6 °F (36 4 °C)   Resp 18   Ht 5' 1" (1 549 m)   Wt 103 kg (227 lb)   LMP  (LMP Unknown)   SpO2 98% BMI 42 89 kg/m²        Physical Exam     Physical Exam  Constitutional:       Appearance: Normal appearance  Cardiovascular:      Rate and Rhythm: Normal rate and regular rhythm  Pulses: Normal pulses  Heart sounds: Normal heart sounds  Pulmonary:      Effort: Pulmonary effort is normal       Breath sounds: Normal breath sounds  Abdominal:      General: Abdomen is flat  Bowel sounds are normal  There is no distension  Palpations: Abdomen is soft  There is no mass  Tenderness: There is no abdominal tenderness (no suprapubic tenderness to either light or deep palpation)  There is no right CVA tenderness, left CVA tenderness or guarding  Neurological:      General: No focal deficit present  Mental Status: She is alert     Psychiatric:         Mood and Affect: Mood normal          Behavior: Behavior normal

## 2022-10-31 NOTE — PATIENT INSTRUCTIONS
Patient advised to take medications as instructed  Advised to drink plenty of water  Patient was made aware of medication side effects  Dysuria   WHAT YOU NEED TO KNOW:   Dysuria is difficulty urinating, or pain, burning, or discomfort with urination  Dysuria is usually a symptom of another problem  DISCHARGE INSTRUCTIONS:   Return to the emergency department if:   You have severe back, side, or abdominal pain  You have fever and shaking chills  You vomit several times in a row  Contact your healthcare provider if:   Your symptoms do not go away, even after treatment  You have questions or concerns about your condition or care  Medicines:   Medicines  may be given to help treat a bacterial infection or help decrease bladder spasms  Take your medicine as directed  Contact your healthcare provider if you think your medicine is not helping or if you have side effects  Tell him of her if you are allergic to any medicine  Keep a list of the medicines, vitamins, and herbs you take  Include the amounts, and when and why you take them  Bring the list or the pill bottles to follow-up visits  Carry your medicine list with you in case of an emergency  Follow up with your healthcare provider as directed: Your healthcare provider may also refer you to a urologist or nephrologist to have additional testing  Write down your questions so you remember to ask them during your visits  Manage your dysuria:   Drink more liquids  Liquids help flush out bacteria that may be causing an infection  Ask your healthcare provider how much liquid to drink each day and which liquids are best for you  Take sitz baths as directed  Fill a bathtub with 4 to 6 inches of warm water  You may also use a sitz bath pan that fits over a toilet  Sit in the sitz bath for 20 minutes  Do this 2 to 3 times a day, or as directed  The warm water can help decrease pain and swelling       © Copyright [employer] 2022 Information is for End User's use only and may not be sold, redistributed or otherwise used for commercial purposes  All illustrations and images included in CareNotes® are the copyrighted property of A D A M , Inc  or Cricket Anderson  The above information is an  only  It is not intended as medical advice for individual conditions or treatments  Talk to your doctor, nurse or pharmacist before following any medical regimen to see if it is safe and effective for you

## 2022-10-31 NOTE — TELEPHONE ENCOUNTER
Gastroesophageal Reflux Diet (GERD)    This guide has been prepared for your use by registered dietitians. If you have questions or concerns, please call the nearest [place] facility to contact a dietitian. Diet counseling is available to discuss your specific needs.    Why follow a diet for gastroesophageal reflux?  This diet, along with prescribed medication, should help prevent uncomfortable side effects, such as heartburn.     Important points to keep in mind  Stop smoking   Wear loose fitting clothes  Achieve and maintain a healthy weight  Eat small frequent meals   Sit or  an upright position during and for 45 to 60 minutes after eating   Try problem foods in small amounts as part of a meal  Avoid eating within 2 to 3 hours before bedtime   Raise the head of the bed 6-8 inches when sleeping Foods to limit or avoid  High-fat foods   Alcohol  Carbonated beverages   Chocolate   Citrus juices   Coffee and caffeinated beverages   Tomato products        FOOD GROUPS  USUALLY WELL TOLERATED  MAY CAUSE DISCOMFORT  TIPS      BREADS, CEREALS, RICE and PASTA  5-8 ounce equivalents per day     1 ounce equivalent =   1 slice of bread   1 cup ready-to-eat cereal  ½ cup cooked cereal, rice, pasta  ½ bagel, bun, English muffin Plain (with or without whole grain flour) bread, rolls, crackers, cereals, rice, barley, and plain pastas; pasta with low-fat cream sauce   Hebrew toast, muffins, biscuits, pancakes and waffles made with low-fat ingredients; bagels; corn tortillas   Fat-free crackers Breads and cereals made with high fat ingredients such as croissants, doughnuts, sweet rolls, muffins, biscuits and granola type cereals   Pasta served with cream sauces and tomato based sauces   High fat snacks Spread jellies and jams on breads instead of butter or margarine  Sprinkle low-fat cheese, such as part-skim ricotta or mozzarella, on pasta in place of cream or tomato sauce      VEGETABLES  2-3 cups per day  Fresh,  Pt went to urgent care for [place] frozen or canned vegetables   Baked, boiled and mashed potatoes without added fat fried or creamed vegetables, tomatoes and tomato products, onion, vegetable juices  Nepalese fried potatoes, potato chips Cook vegetables in broth or sprinkle with herbs to add flavor     FRUITS  1 ½ -2 cups per day    Fresh, frozen and canned fruits as tolerated   Fruit juices as tolerated  Renee, grapefruit, oranges, pineapples, and tangerines   Citrus juices Include other sources of vitamin C, such as cantaloupe, potatoes and strawberries     MILK, YOGURT and CHEESE  3 cups per day     1 cup=   1 cup milk or yogurt  1 ½ oz. natural cheese   2 oz. processed cheese  Fat-free, low-fat and reduced fat milk, low-fat buttermilk    Low-fat and nonfat yogurt    Low-fat cheeses, cottage cheese  Whole milk, buttermilk made with whole milk, chocolate milk, chocolate shakes or drinks     Evaporated whole milk and cream     Regular cheeses  In recipes that call for higher fat items, such as whole milk or cream, replace with skim milk or low-fat cottage cheese     MEATS, FISH, POULTRY, DRY BEANS & PEAS, EGGS, & NUTS  5-7 ounce equivalents per day    1ounce =   1oz. cooked meat, poultry or fish  1 egg   ¼ cup cooked dried beans   1Tbsp peanut butter  ½ oz. nuts or seeds  Lean beef, pork, lamb, veal, and poultry(without the skin): All fresh, frozen or canned fish packed in water; shellfish    Low-fat luncheon meats    Eggs(limit to 3-4 egg yolks weekly)    Dry beans and peas prepared without fat(includes fat-free refried beans)    Reduced fat peanut butter    tofu All fried, fatty, or heavily marbled meat, poultry or fish    Regular luncheon meats, including bologna, salami, pimento loaf; sausages, wieners    Dry beans and peas prepared with fat or high-fat meat; refried beans     Nuts and peanut butter Broil, roast, grill, or boil meats, poultry and fish instead of frying     Select or prepare meats in their natural juice instead of sauces or  gravies.      FATS, SNACKS, SWEETS, CONDIMENTS and BEVERAGES   Use sparingly  Nonfat or low-fat dressings and mayonnaise; nonfat liquid or powdered cream substitutes; nonfat or reduced fat sour cream   Soups made with a vegetable broth base, lean meat, vegetables (except tomatoes) and low fat milk  Sherbet, fruit ice, gelatin, masha food cake, janae crackers, low-fat cookies, frozen yogurt, reduced fat ice cream, pudding, or baked custard made with low-fat milk or other low-fat milk and other low-fat or nonfat desserts  Sugar, honey, jams, jellies, molasses, syrups, hard candy and marshmallows  Decaffeinated coffee, non-mint tea  Salt, pepper, garlic, oregano, belkis, other spices and herbs.  Regular salad dressings, butter, margarine, oil, ramirez, gravy, regular sour cream, cream cheese  Regular cream and tomato-based soups  High fat snacks, such as chips and buttered popcorn  All other cakes, cookies, pies, ice cream, pastries, and doughnuts  Any deserts containing chocolate  Coconut, chocolate or cream-filled candy  Candy with nuts   Carbonated beverages, regular coffee, mint tea, and alcoholic beverages  Tomato-based sauces  Spearmint, peppermint, chili and jalapeno peppers, and vinegar  Sprinkle seasonings, such as garlic, onion powder, or oregano on cooked in place of butter or margarine   Snack on fresh fruit instead of chips or cookies.      A registered dietitian can help  For a list of [place] facilities with a dietitian, please call [facility] [place] toll free at [phone]            This information presented is intended for general information and educational purposes. It is not intended to replace the advice of your health care provider. Contact your health care provider if you believe you have a health problem. [facility] [place] is a not-for-profit health care provider and a national leader in efforts to improve the quality of health care.

## 2022-11-01 NOTE — PLAN OF CARE
Problem: Nutrition/Hydration-ADULT  Goal: Nutrient/Hydration intake appropriate for improving, restoring or maintaining nutritional needs  Monitor and assess patient's nutrition/hydration status for malnutrition (ex- brittle hair, bruises, dry skin, pale skin and conjunctiva, muscle wasting, smooth red tongue, and disorientation)  Collaborate with interdisciplinary team and initiate plan and interventions as ordered  Monitor patient's weight and dietary intake as ordered or per policy  Utilize nutrition screening tool and intervene per policy  Determine patient's food preferences and provide high-protein, high-caloric foods as appropriate       INTERVENTIONS:  - Monitor oral intake, urinary output, labs, and treatment plans  - Assess nutrition and hydration status and recommend course of action  - Evaluate amount of meals eaten  - Assist patient with eating if necessary   - Allow adequate time for meals  - Recommend/ encourage appropriate diets, oral nutritional supplements, and vitamin/mineral supplements  - Order, calculate, and assess calorie counts as needed  - Recommend, monitor, and adjust tube feedings and TPN/PPN based on assessed needs  - Assess need for intravenous fluids  - Provide specific nutrition/hydration education as appropriate  - Include patient/family/caregiver in decisions related to nutrition    Outcome: Progressing Abbe Flap (Upper To Lower Lip) Text: The defect of the lower lip was assessed and measured.  Given the location and size of the defect, an Abbe flap was deemed most appropriate.  Using a sterile surgical marker, an appropriate Abbe flap was measured and drawn on the upper lip. Local anesthesia was then infiltrated.  A scalpel was then used to incise the upper lip through and through the skin, vermilion, muscle and mucosa, leaving the flap pedicled on the opposite side.  The flap was then rotated and transferred to the lower lip defect.  The flap was then sutured into place with a three layer technique, closing the orbicularis oris muscle layer with subcutaneous buried sutures, followed by a mucosal layer and an epidermal layer.

## 2022-11-02 LAB — BACTERIA UR CULT: ABNORMAL

## 2023-04-27 DIAGNOSIS — I10 ESSENTIAL HYPERTENSION: ICD-10-CM

## 2023-04-27 DIAGNOSIS — I48.0 PAROXYSMAL ATRIAL FIBRILLATION (HCC): ICD-10-CM

## 2023-04-27 RX ORDER — METOPROLOL SUCCINATE 25 MG/1
TABLET, EXTENDED RELEASE ORAL
Qty: 30 TABLET | Refills: 5 | Status: SHIPPED | OUTPATIENT
Start: 2023-04-27

## 2023-08-08 ENCOUNTER — NURSE TRIAGE (OUTPATIENT)
Age: 58
End: 2023-08-08

## 2023-08-27 ENCOUNTER — OFFICE VISIT (OUTPATIENT)
Dept: URGENT CARE | Facility: MEDICAL CENTER | Age: 58
End: 2023-08-27
Payer: COMMERCIAL

## 2023-08-27 ENCOUNTER — APPOINTMENT (EMERGENCY)
Dept: CT IMAGING | Facility: HOSPITAL | Age: 58
End: 2023-08-27
Payer: COMMERCIAL

## 2023-08-27 ENCOUNTER — HOSPITAL ENCOUNTER (EMERGENCY)
Facility: HOSPITAL | Age: 58
Discharge: HOME | End: 2023-08-27
Attending: EMERGENCY MEDICINE
Payer: COMMERCIAL

## 2023-08-27 VITALS
TEMPERATURE: 98 F | BODY MASS INDEX: 41.91 KG/M2 | RESPIRATION RATE: 19 BRPM | HEIGHT: 61 IN | WEIGHT: 222 LBS | SYSTOLIC BLOOD PRESSURE: 143 MMHG | DIASTOLIC BLOOD PRESSURE: 67 MMHG | HEART RATE: 62 BPM | OXYGEN SATURATION: 97 %

## 2023-08-27 VITALS
BODY MASS INDEX: 41.95 KG/M2 | RESPIRATION RATE: 20 BRPM | DIASTOLIC BLOOD PRESSURE: 64 MMHG | TEMPERATURE: 98.2 F | OXYGEN SATURATION: 97 % | WEIGHT: 222 LBS | HEART RATE: 82 BPM | SYSTOLIC BLOOD PRESSURE: 110 MMHG

## 2023-08-27 DIAGNOSIS — R10.9 ABDOMINAL PAIN: Primary | ICD-10-CM

## 2023-08-27 DIAGNOSIS — E83.42 HYPOMAGNESEMIA: ICD-10-CM

## 2023-08-27 DIAGNOSIS — R93.3 ABNORMAL CT SCAN, SMALL BOWEL: ICD-10-CM

## 2023-08-27 DIAGNOSIS — R19.7 DIARRHEA: ICD-10-CM

## 2023-08-27 DIAGNOSIS — R10.31 RLQ ABDOMINAL PAIN: Primary | ICD-10-CM

## 2023-08-27 DIAGNOSIS — E87.6 HYPOKALEMIA: ICD-10-CM

## 2023-08-27 DIAGNOSIS — R11.2 NAUSEA AND VOMITING: ICD-10-CM

## 2023-08-27 LAB
ALBUMIN SERPL BCP-MCNC: 4.1 G/DL (ref 3.5–5)
ALP SERPL-CCNC: 88 U/L (ref 34–104)
ALT SERPL W P-5'-P-CCNC: 18 U/L (ref 7–52)
ANION GAP SERPL CALCULATED.3IONS-SCNC: 10 MMOL/L
APTT PPP: 31 SECONDS (ref 23–37)
AST SERPL W P-5'-P-CCNC: 18 U/L (ref 13–39)
BACTERIA UR QL AUTO: ABNORMAL /HPF
BASOPHILS # BLD AUTO: 0.05 THOUSANDS/ÂΜL (ref 0–0.1)
BASOPHILS NFR BLD AUTO: 1 % (ref 0–1)
BILIRUB SERPL-MCNC: 2.62 MG/DL (ref 0.2–1)
BILIRUB UR QL STRIP: ABNORMAL
BUN SERPL-MCNC: 8 MG/DL (ref 5–25)
CALCIUM SERPL-MCNC: 9 MG/DL (ref 8.4–10.2)
CARDIAC TROPONIN I PNL SERPL HS: 3 NG/L
CHLORIDE SERPL-SCNC: 101 MMOL/L (ref 96–108)
CLARITY UR: ABNORMAL
CO2 SERPL-SCNC: 30 MMOL/L (ref 21–32)
COLOR UR: YELLOW
CREAT SERPL-MCNC: 0.71 MG/DL (ref 0.6–1.3)
EOSINOPHIL # BLD AUTO: 0.1 THOUSAND/ÂΜL (ref 0–0.61)
EOSINOPHIL NFR BLD AUTO: 2 % (ref 0–6)
ERYTHROCYTE [DISTWIDTH] IN BLOOD BY AUTOMATED COUNT: 12.7 % (ref 11.6–15.1)
GFR SERPL CREATININE-BSD FRML MDRD: 93 ML/MIN/1.73SQ M
GLUCOSE SERPL-MCNC: 95 MG/DL (ref 65–140)
GLUCOSE UR STRIP-MCNC: NEGATIVE MG/DL
HCT VFR BLD AUTO: 41.9 % (ref 34.8–46.1)
HGB BLD-MCNC: 14.5 G/DL (ref 11.5–15.4)
HGB UR QL STRIP.AUTO: NEGATIVE
IMM GRANULOCYTES # BLD AUTO: 0.01 THOUSAND/UL (ref 0–0.2)
IMM GRANULOCYTES NFR BLD AUTO: 0 % (ref 0–2)
INR PPP: 0.96 (ref 0.84–1.19)
KETONES UR STRIP-MCNC: NEGATIVE MG/DL
LACTATE SERPL-SCNC: 1.2 MMOL/L (ref 0.5–2)
LEUKOCYTE ESTERASE UR QL STRIP: ABNORMAL
LIPASE SERPL-CCNC: 23 U/L (ref 11–82)
LYMPHOCYTES # BLD AUTO: 1.99 THOUSANDS/ÂΜL (ref 0.6–4.47)
LYMPHOCYTES NFR BLD AUTO: 30 % (ref 14–44)
MAGNESIUM SERPL-MCNC: 1.7 MG/DL (ref 1.9–2.7)
MCH RBC QN AUTO: 31.9 PG (ref 26.8–34.3)
MCHC RBC AUTO-ENTMCNC: 34.6 G/DL (ref 31.4–37.4)
MCV RBC AUTO: 92 FL (ref 82–98)
MONOCYTES # BLD AUTO: 0.45 THOUSAND/ÂΜL (ref 0.17–1.22)
MONOCYTES NFR BLD AUTO: 7 % (ref 4–12)
NEUTROPHILS # BLD AUTO: 4.06 THOUSANDS/ÂΜL (ref 1.85–7.62)
NEUTS SEG NFR BLD AUTO: 60 % (ref 43–75)
NITRITE UR QL STRIP: NEGATIVE
NON-SQ EPI CELLS URNS QL MICRO: ABNORMAL /HPF
NRBC BLD AUTO-RTO: 0 /100 WBCS
OTHER STN SPEC: ABNORMAL
PH UR STRIP.AUTO: 5.5 [PH]
PLATELET # BLD AUTO: 236 THOUSANDS/UL (ref 149–390)
PMV BLD AUTO: 9.4 FL (ref 8.9–12.7)
POTASSIUM SERPL-SCNC: 3.2 MMOL/L (ref 3.5–5.3)
PROT SERPL-MCNC: 7 G/DL (ref 6.4–8.4)
PROT UR STRIP-MCNC: ABNORMAL MG/DL
PROTHROMBIN TIME: 13 SECONDS (ref 11.6–14.5)
RBC # BLD AUTO: 4.54 MILLION/UL (ref 3.81–5.12)
RBC #/AREA URNS AUTO: ABNORMAL /HPF
SODIUM SERPL-SCNC: 141 MMOL/L (ref 135–147)
SP GR UR STRIP.AUTO: 1.02 (ref 1–1.03)
UROBILINOGEN UR QL STRIP.AUTO: 0.2 E.U./DL
WBC # BLD AUTO: 6.66 THOUSAND/UL (ref 4.31–10.16)
WBC #/AREA URNS AUTO: ABNORMAL /HPF

## 2023-08-27 PROCEDURE — G1004 CDSM NDSC: HCPCS

## 2023-08-27 PROCEDURE — 99284 EMERGENCY DEPT VISIT MOD MDM: CPT

## 2023-08-27 PROCEDURE — 85730 THROMBOPLASTIN TIME PARTIAL: CPT | Performed by: PHYSICIAN ASSISTANT

## 2023-08-27 PROCEDURE — 96375 TX/PRO/DX INJ NEW DRUG ADDON: CPT

## 2023-08-27 PROCEDURE — 36415 COLL VENOUS BLD VENIPUNCTURE: CPT | Performed by: PHYSICIAN ASSISTANT

## 2023-08-27 PROCEDURE — 80053 COMPREHEN METABOLIC PANEL: CPT | Performed by: PHYSICIAN ASSISTANT

## 2023-08-27 PROCEDURE — 83605 ASSAY OF LACTIC ACID: CPT | Performed by: PHYSICIAN ASSISTANT

## 2023-08-27 PROCEDURE — 99212 OFFICE O/P EST SF 10 MIN: CPT | Performed by: PHYSICIAN ASSISTANT

## 2023-08-27 PROCEDURE — 99285 EMERGENCY DEPT VISIT HI MDM: CPT | Performed by: PHYSICIAN ASSISTANT

## 2023-08-27 PROCEDURE — 84484 ASSAY OF TROPONIN QUANT: CPT | Performed by: PHYSICIAN ASSISTANT

## 2023-08-27 PROCEDURE — 85025 COMPLETE CBC W/AUTO DIFF WBC: CPT | Performed by: PHYSICIAN ASSISTANT

## 2023-08-27 PROCEDURE — 83735 ASSAY OF MAGNESIUM: CPT | Performed by: PHYSICIAN ASSISTANT

## 2023-08-27 PROCEDURE — 81001 URINALYSIS AUTO W/SCOPE: CPT | Performed by: PHYSICIAN ASSISTANT

## 2023-08-27 PROCEDURE — 85610 PROTHROMBIN TIME: CPT | Performed by: PHYSICIAN ASSISTANT

## 2023-08-27 PROCEDURE — 83690 ASSAY OF LIPASE: CPT | Performed by: PHYSICIAN ASSISTANT

## 2023-08-27 PROCEDURE — C9113 INJ PANTOPRAZOLE SODIUM, VIA: HCPCS | Performed by: PHYSICIAN ASSISTANT

## 2023-08-27 PROCEDURE — 96367 TX/PROPH/DG ADDL SEQ IV INF: CPT

## 2023-08-27 PROCEDURE — 96376 TX/PRO/DX INJ SAME DRUG ADON: CPT

## 2023-08-27 PROCEDURE — 96365 THER/PROPH/DIAG IV INF INIT: CPT

## 2023-08-27 PROCEDURE — 93005 ELECTROCARDIOGRAM TRACING: CPT | Performed by: PHYSICIAN ASSISTANT

## 2023-08-27 PROCEDURE — 74177 CT ABD & PELVIS W/CONTRAST: CPT

## 2023-08-27 RX ORDER — POTASSIUM CHLORIDE 20 MEQ/1
40 TABLET, EXTENDED RELEASE ORAL ONCE
Status: COMPLETED | OUTPATIENT
Start: 2023-08-27 | End: 2023-08-27

## 2023-08-27 RX ORDER — MORPHINE SULFATE 4 MG/ML
4 INJECTION, SOLUTION INTRAMUSCULAR; INTRAVENOUS ONCE
Status: COMPLETED | OUTPATIENT
Start: 2023-08-27 | End: 2023-08-27

## 2023-08-27 RX ORDER — FAMOTIDINE 10 MG/ML
20 INJECTION, SOLUTION INTRAVENOUS ONCE
Status: COMPLETED | OUTPATIENT
Start: 2023-08-27 | End: 2023-08-27

## 2023-08-27 RX ORDER — SODIUM CHLORIDE, SODIUM GLUCONATE, SODIUM ACETATE, POTASSIUM CHLORIDE, MAGNESIUM CHLORIDE, SODIUM PHOSPHATE, DIBASIC, AND POTASSIUM PHOSPHATE .53; .5; .37; .037; .03; .012; .00082 G/100ML; G/100ML; G/100ML; G/100ML; G/100ML; G/100ML; G/100ML
1000 INJECTION, SOLUTION INTRAVENOUS ONCE
Status: COMPLETED | OUTPATIENT
Start: 2023-08-27 | End: 2023-08-27

## 2023-08-27 RX ORDER — PANTOPRAZOLE SODIUM 40 MG/10ML
40 INJECTION, POWDER, LYOPHILIZED, FOR SOLUTION INTRAVENOUS ONCE
Status: COMPLETED | OUTPATIENT
Start: 2023-08-27 | End: 2023-08-27

## 2023-08-27 RX ORDER — ONDANSETRON 2 MG/ML
4 INJECTION INTRAMUSCULAR; INTRAVENOUS ONCE
Status: COMPLETED | OUTPATIENT
Start: 2023-08-27 | End: 2023-08-27

## 2023-08-27 RX ORDER — PANTOPRAZOLE SODIUM 40 MG/1
40 TABLET, DELAYED RELEASE ORAL 2 TIMES DAILY
Qty: 60 TABLET | Refills: 0 | Status: SHIPPED | OUTPATIENT
Start: 2023-08-27 | End: 2023-09-24 | Stop reason: SDUPTHER

## 2023-08-27 RX ORDER — MAGNESIUM SULFATE HEPTAHYDRATE 40 MG/ML
2 INJECTION, SOLUTION INTRAVENOUS ONCE
Status: COMPLETED | OUTPATIENT
Start: 2023-08-27 | End: 2023-08-27

## 2023-08-27 RX ORDER — ONDANSETRON 4 MG/1
4 TABLET, ORALLY DISINTEGRATING ORAL EVERY 6 HOURS PRN
Qty: 12 TABLET | Refills: 0 | Status: SHIPPED | OUTPATIENT
Start: 2023-08-27 | End: 2023-08-31

## 2023-08-27 RX ADMIN — ONDANSETRON 4 MG: 2 INJECTION INTRAMUSCULAR; INTRAVENOUS at 14:52

## 2023-08-27 RX ADMIN — SODIUM CHLORIDE, SODIUM GLUCONATE, SODIUM ACETATE, POTASSIUM CHLORIDE, MAGNESIUM CHLORIDE, SODIUM PHOSPHATE, DIBASIC, AND POTASSIUM PHOSPHATE 1000 ML: .53; .5; .37; .037; .03; .012; .00082 INJECTION, SOLUTION INTRAVENOUS at 14:52

## 2023-08-27 RX ADMIN — POTASSIUM CHLORIDE 40 MEQ: 1500 TABLET, EXTENDED RELEASE ORAL at 17:57

## 2023-08-27 RX ADMIN — FAMOTIDINE 20 MG: 10 INJECTION, SOLUTION INTRAVENOUS at 14:52

## 2023-08-27 RX ADMIN — MORPHINE SULFATE 4 MG: 4 INJECTION, SOLUTION INTRAMUSCULAR; INTRAVENOUS at 14:52

## 2023-08-27 RX ADMIN — MAGNESIUM SULFATE HEPTAHYDRATE 2 G: 40 INJECTION, SOLUTION INTRAVENOUS at 15:56

## 2023-08-27 RX ADMIN — PANTOPRAZOLE SODIUM 40 MG: 40 INJECTION, POWDER, FOR SOLUTION INTRAVENOUS at 17:57

## 2023-08-27 RX ADMIN — IOHEXOL 100 ML: 350 INJECTION, SOLUTION INTRAVENOUS at 15:41

## 2023-08-27 NOTE — ED PROVIDER NOTES
History  Chief Complaint   Patient presents with   • Abdominal Pain     Pt arrives from urgent care with c/o right sided abd pain starting Friday. +vomiting     60year old female with PMH paroxysmal afib, HTN, HLD, GERD, hypothyroidism, kidney stones, bowel issues presents ambulatory from local urgent care for further evaluation. Pt reports on Friday night she began having abdominal pain. She reports this started around her mid abdomen but now more right sided. Reports pain in RUQ, RLQ and epigastric region. Started having nausea, vomiting throughout the evening. She reports persistent nausea. She states also having diarrhea but states that she has chronic diarrhea and this has been unchanged from prior. No reported black or bloody stools. Denies fever, chills. Denies chest pain but reports some radiation of the epigastric pain which she describes as a burning. Denies SOB. Denies cough, congestion or recent illness. Denies constipation. Denies any urinary complaints such as dysuria, frequency, urgency, hematuria. She does report having some low back pain but contributes this to laying in bed. She has had decreased appetite. Ate about half a banana and a couple crackers last evening. Drank coffee today and was able to keep down. She states she took her home meds this morning as well. No reported aggravating or alleviating factors. No specific treatments tried. Reports taking home medications. Has had prior abdominal surgeries to include cholecystectomy. She has had numerous bowel surgeries to include resection with colostomy at one point and subsequent reversal.  She does follow with GI for her bowel related issues. History provided by:  Patient and medical records   used: No        Prior to Admission Medications   Prescriptions Last Dose Informant Patient Reported? Taking?    Multiple Vitamin (multivitamin) tablet   Yes No   Sig: Take 1 tablet by mouth daily   Patient not taking: Reported on 8/27/2023   aspirin (ECOTRIN LOW STRENGTH) 81 mg EC tablet  Self No No   Sig: Take 1 tablet (81 mg total) by mouth daily   Patient not taking: Reported on 8/27/2023   colestipol (COLESTID) 1 g tablet   No No   Sig: Take 2 tablets (2 g total) by mouth 3 (three) times a day   Patient not taking: Reported on 8/27/2023   doxycycline hyclate (VIBRAMYCIN) 50 mg capsule   Yes No   Patient not taking: Reported on 10/31/2022   metoprolol succinate (TOPROL-XL) 25 mg 24 hr tablet   No No   Sig: take 1 tablet by mouth once daily   Patient not taking: Reported on 8/27/2023   metroNIDAZOLE (METROCREAM) 0.75 % cream   No No   Sig: Apply topically 2 (two) times a day   Patient not taking: Reported on 8/27/2023   phenazopyridine (PYRIDIUM) 95 MG tablet   No No   Sig: Take 1 tablet (95 mg total) by mouth 3 (three) times a day as needed for bladder spasms      Facility-Administered Medications: None       Past Medical History:   Diagnosis Date   • Atrial fibrillation (HCC)    • Disease of thyroid gland    • Endometrial polyp    • Gastroesophageal reflux disease without esophagitis 12/4/2017   • Hand injury     LAST ASSESSED: [id number]   • High risk medication use     LAST ASSESSED: [id number]   • Hyperlipidemia    • Hypertension    • Renal colic     LAST ASSESSED: [id number]   • Viral syndrome     LAST ASSESSED: [id number]   • Vitamin D deficiency disease        Past Surgical History:   Procedure Laterality Date   • CHOLECYSTECTOMY     • COLON SURGERY     • COLONOSCOPY N/A 6/15/2018    Procedure: COLONOSCOPY;  Surgeon: Phil Wiggins DO;  Location: [facility];  Service: Gastroenterology   • COLONOSCOPY N/A 8/10/2018    Procedure: COLONOSCOPY;  Surgeon: Phil Wiggins DO;  Location: [facility];  Service: Gastroenterology   • COLOSTOMY     • ENDOMETRIAL ABLATION  2007    THERMAL   • ESOPHAGOGASTRODUODENOSCOPY N/A 4/6/2018    Procedure: ESOPHAGOGASTRODUODENOSCOPY (EGD);   Surgeon: Ginger Thurston MD;  Location: [facility];  Service: Gastroenterology   • HYSTEROSCOPY      1995, 2002, 2007   • LAPAROTOMY N/A 1/4/2018    Procedure: LAPAROTOMY EXPLORATORY, 2nd look, small bowel resection, ilieostomy closure via [place] anastamosis transverse colostomy;  Surgeon: Alecia Harper DO;  Location:  MAIN OR;  Service: General   • NC CLOSURE ENTEROSTOMY LG/SMALL INTESTINE N/A 7/11/2018    Procedure: COLOSTOMY TAKEDOWN / [facility], ILEOCECECTOMY, LASER FLUORESCENCE ANGIOGRAPHY, LYSIS OF ADHESIONS;  Surgeon: Juan F Traroe DO;  Location:  MAIN OR;  Service: General   • NC COLECTOMY PARTIAL W/ANASTOMOSIS N/A 1/3/2018    Procedure: Exploratory laparotomy, RESECTION COLON SIGMOID, possible ostomy;  Surgeon: Juan F Traore DO;  Location: [facility];  Service: General   • NC ESOPHAGOGASTRODUODENOSCOPY TRANSORAL DIAGNOSTIC N/A 10/21/2016    Procedure: EGD AND COLONOSCOPY;  Surgeon: Akiko Bateman MD;  Location: [facility];  Service: Gastroenterology   • THYROIDECTOMY      NEAR-TOTAL   • THYROIDECTOMY, PARTIAL     • TONSILLECTOMY AND ADENOIDECTOMY     • TUBAL LIGATION  1997   • URETERAL STENT PLACEMENT Bilateral 1/3/2018    Procedure: INSERTION STENT URETERAL;  Surgeon: Reza Steiner MD;  Location: [facility];  Service: Urology       Family History   Problem Relation Age of Onset   • Diabetes type II Mother    • Heart failure Father    • Skin cancer Sister    • No Known Problems Daughter    • No Known Problems Maternal Grandmother    • No Known Problems Maternal Grandfather    • No Known Problems Paternal Grandmother    • No Known Problems Paternal Grandfather    • No Known Problems Sister    • No Known Problems Son    • No Known Problems Son    • No Known Problems Paternal Aunt      I have reviewed and agree with the history as documented.     E-Cigarette/Vaping   • E-Cigarette Use Never User      E-Cigarette/Vaping Substances   • Nicotine No    • THC No    • CBD No    • Flavoring No    • Other No    • Unknown No      Social History     Tobacco Use   • Smoking status: Former     Packs/day: 0.50     Years: 30.00     Total pack years: 15.00     Types: Cigarettes     Quit date:      Years since quittin.6   • Smokeless tobacco: Never   Vaping Use   • Vaping Use: Never used   Substance Use Topics   • Alcohol use: Yes     Alcohol/week: 2.0 standard drinks of alcohol     Types: 2 Standard drinks or equivalent per week     Comment: 1-2 mixed drinks per weekend   • Drug use: No       Review of Systems   Constitutional: Positive for appetite change and fatigue. Negative for chills and fever. HENT: Negative. Negative for congestion, rhinorrhea and sore throat. Eyes: Negative. Negative for visual disturbance. Respiratory: Negative. Negative for cough, shortness of breath and wheezing. Cardiovascular: Negative. Negative for chest pain, palpitations and leg swelling. Gastrointestinal: Positive for abdominal pain, diarrhea, nausea and vomiting. Negative for blood in stool and constipation. Genitourinary: Negative. Negative for dysuria, flank pain, frequency and hematuria. Musculoskeletal: Positive for back pain. Negative for myalgias and neck pain. Skin: Negative. Negative for rash. Neurological: Negative. Negative for dizziness, light-headedness and headaches. Psychiatric/Behavioral: Negative. All other systems reviewed and are negative. Physical Exam  Physical Exam  Vitals and nursing note reviewed. Constitutional:       General: She is awake. She is not in acute distress. Appearance: She is well-developed and overweight. She is not toxic-appearing or diaphoretic. HENT:      Head: Normocephalic and atraumatic. Right Ear: Hearing and external ear normal.      Left Ear: Hearing and external ear normal.      Nose: Nose normal.      Mouth/Throat:      Mouth: Mucous membranes are moist.      Pharynx: Oropharynx is clear. Uvula midline. Eyes:      General: Lids are normal. No scleral icterus. Conjunctiva/sclera: Conjunctivae normal.      Pupils: Pupils are equal, round, and reactive to light. Neck:      Trachea: Trachea and phonation normal. No tracheal deviation. Cardiovascular:      Rate and Rhythm: Normal rate and regular rhythm. Pulses: Normal pulses. Radial pulses are 2+ on the right side and 2+ on the left side. Heart sounds: Normal heart sounds, S1 normal and S2 normal. No murmur heard. Pulmonary:      Effort: Pulmonary effort is normal. No tachypnea or respiratory distress. Breath sounds: Normal breath sounds. No wheezing, rhonchi or rales. Abdominal:      General: Bowel sounds are normal. There is no distension. Palpations: Abdomen is soft. Tenderness: There is abdominal tenderness in the right upper quadrant and epigastric area. There is no right CVA tenderness, left CVA tenderness, guarding or rebound. Musculoskeletal:         General: No tenderness. Normal range of motion. Cervical back: Normal range of motion and neck supple. Right lower leg: No edema. Left lower leg: No edema. Skin:     General: Skin is warm and dry. Capillary Refill: Capillary refill takes less than 2 seconds. Findings: No rash. Neurological:      General: No focal deficit present. Mental Status: She is alert and oriented to person, place, and time. GCS: GCS eye subscore is 4. GCS verbal subscore is 5. GCS motor subscore is 6. Cranial Nerves: No cranial nerve deficit. Sensory: No sensory deficit. Motor: No abnormal muscle tone. Gait: Gait normal.      Comments: Pt ambulatory to exam room.    Psychiatric:         Mood and Affect: Mood normal.         Speech: Speech normal.         Behavior: Behavior normal.         Vital Signs  ED Triage Vitals   Temperature Pulse Respirations Blood Pressure SpO2   08/27/23 1429 08/27/23 1429 08/27/23 1429 08/27/23 1429 08/27/23 1429   97.6 °F (36.4 °C) 80 16 (!) 197/95 97 %      Temp Source Heart Rate Source Patient Position - Orthostatic VS BP Location FiO2 (%)   08/27/23 1429 08/27/23 1429 08/27/23 1429 08/27/23 1429 --   Tympanic Monitor Lying Right arm       Pain Score       08/27/23 1440       7           Vitals:    08/27/23 1440 08/27/23 1513 08/27/23 1530 08/27/23 1730   BP: (!) 175/85 161/80 124/70 143/67   Pulse: 78 69 59 62   Patient Position - Orthostatic VS: Lying Lying Lying          Visual Acuity      ED Medications  Medications   ondansetron (ZOFRAN) injection 4 mg (4 mg Intravenous Given 8/27/23 1452)   Famotidine (PF) (PEPCID) injection 20 mg (20 mg Intravenous Given 8/27/23 1452)   multi-electrolyte (ISOLYTE-S PH 7.4) bolus 1,000 mL (0 mL Intravenous Stopped 8/27/23 1756)   morphine injection 4 mg (4 mg Intravenous Given 8/27/23 1452)   iohexol (OMNIPAQUE) 350 MG/ML injection (SINGLE-DOSE) 100 mL (100 mL Intravenous Given 8/27/23 1541)   magnesium sulfate 2 g/50 mL IVPB (premix) 2 g (0 g Intravenous Stopped 8/27/23 1656)   pantoprazole (PROTONIX) injection 40 mg (40 mg Intravenous Given 8/27/23 1757)   potassium chloride (K-DUR,KLOR-CON) CR tablet 40 mEq (40 mEq Oral Given 8/27/23 1757)       Diagnostic Studies  Results Reviewed     Procedure Component Value Units Date/Time    Magnesium [[id number]]  (Abnormal) Collected: 08/27/23 1445    Lab Status: Final result Specimen: Blood Updated: 08/27/23 1545     Magnesium 1.7 mg/dL     HS Troponin 0hr (reflex protocol) [[id number]]  (Normal) Collected: 08/27/23 1445    Lab Status: Final result Specimen: Blood from Arm, Left Updated: 08/27/23 1532     hs TnI 0hr 3 ng/L     Urine Microscopic [[id number]]  (Abnormal) Collected: 08/27/23 1452    Lab Status: Final result Specimen: Urine, Clean Catch Updated: 08/27/23 1532     RBC, UA 0-1 /hpf      WBC, UA 4-10 /hpf      Epithelial Cells Occasional /hpf      Bacteria, UA Occasional /hpf      OTHER OBSERVATIONS Transitional Epithelial Cells    Comprehensive metabolic panel [[id number]] (Abnormal) Collected: 08/27/23 1445    Lab Status: Final result Specimen: Blood from Arm, Left Updated: 08/27/23 1525     Sodium 141 mmol/L      Potassium 3.2 mmol/L      Chloride 101 mmol/L      CO2 30 mmol/L      ANION GAP 10 mmol/L      BUN 8 mg/dL      Creatinine 0.71 mg/dL      Glucose 95 mg/dL      Calcium 9.0 mg/dL      AST 18 U/L      ALT 18 U/L      Alkaline Phosphatase 88 U/L      Total Protein 7.0 g/dL      Albumin 4.1 g/dL      Total Bilirubin 2.62 mg/dL      eGFR 93 ml/min/1.73sq m     Narrative:      [facility] guidelines for [facility] Kid[facility] Disease (CKD):   •  Stage 1 with normal or high GFR (GFR > 90 mL/min/1.73 square meters)  •  Stage 2 Mild CKD (GFR = 60-89 mL/min/1.73 square meters)  •  Stage 3A Moderate CKD (GFR = 45-59 mL/min/1.73 square meters)  •  Stage 3B Moderate CKD (GFR = 30-44 mL/min/1.73 square meters)  •  Stage 4 Severe CKD (GFR = 15-29 mL/min/1.73 square meters)  •  Stage 5 End Stage CKD (GFR <15 mL/min/1.73 square meters)  Note: GFR calculation is accurate only with a steady state creatinine    Lipase [[id number]]  (Normal) Collected: 08/27/23 1445    Lab Status: Final result Specimen: Blood from Arm, Left Updated: 08/27/23 1525     Lipase 23 u/L     Lactic acid, plasma (w/reflex if result > 2.0) [[id number]]  (Normal) Collected: 08/27/23 1445    Lab Status: Final result Specimen: Blood from Arm, Left Updated: 08/27/23 1523     LACTIC ACID 1.2 mmol/L     Narrative:      Result may be elevated if tourniquet was used during collection.     Protime-INR [[id number]]  (Normal) Collected: 08/27/23 1445    Lab Status: Final result Specimen: Blood from Arm, Left Updated: 08/27/23 1521     Protime 13.0 seconds      INR 0.96    APTT [[id number]]  (Normal) Collected: 08/27/23 1445    Lab Status: Final result Specimen: Blood from Arm, Left Updated: 08/27/23 1521     PTT 31 seconds     UA w Reflex to Microscopic w Reflex to Culture [[id number]]  (Abnormal) Collected: 08/27/23 1452    Lab Status: Final result Specimen: Urine, Clean Catch Updated: 08/27/23 1511     Color, UA Yellow     Clarity, UA Slightly Cloudy     Specific Gravity, UA 1.025     pH, UA 5.5     Leukocytes, UA Trace     Nitrite, UA Negative     Protein, UA Trace mg/dl      Glucose, UA Negative mg/dl      Ketones, UA Negative mg/dl      Urobilinogen, UA 0.2 E.U./dl      Bilirubin, UA Moderate     Occult Blood, UA Negative    CBC and differential [[id number]] Collected: 08/27/23 1445    Lab Status: Final result Specimen: Blood from Arm, Left Updated: 08/27/23 1509     WBC 6.66 Thousand/uL      RBC 4.54 Million/uL      Hemoglobin 14.5 g/dL      Hematocrit 41.9 %      MCV 92 fL      MCH 31.9 pg      MCHC 34.6 g/dL      RDW 12.7 %      MPV 9.4 fL      Platelets 214 Thousands/uL      nRBC 0 /100 WBCs      Neutrophils Relative 60 %      Immat GRANS % 0 %      Lymphocytes Relative 30 %      Monocytes Relative 7 %      Eosinophils Relative 2 %      Basophils Relative 1 %      Neutrophils Absolute 4.06 Thousands/µL      Immature Grans Absolute 0.01 Thousand/uL      Lymphocytes Absolute 1.99 Thousands/µL      Monocytes Absolute 0.45 Thousand/µL      Eosinophils Absolute 0.10 Thousand/µL      Basophils Absolute 0.05 Thousands/µL                  CT abdomen pelvis with contrast   Final Result by Sonali Ritter MD (08/27 1715)      Inflammatory changes along the second part of the duodenum extending along the right anterior pararenal fascia towards patient's ileocolic anastomosis. Findings are likely to represent underlying disease at the proximal duodenum, potentially secondary to    ulcer disease or infectious/inflammatory enteritis. Gastroenterology consult is recommended.                   Workstation performed: [id number]                    Procedures  ECG 12 Lead Documentation Only    Date/Time: 8/27/2023 3:01 PM    Performed by: Tere Garcia PA-C  Authorized by: Tere Garcia PA-C    Indications / Diagnosis:  Epigastric pain, HTN  ECG reviewed by me, the ED Provider: yes    Patient location:  ED  Previous ECG:     Previous ECG:  Compared to current    Comparison ECG info:  10/14/21    Similarity:  No change    Comparison to cardiac monitor: Yes    Interpretation:     Interpretation: non-specific    Rate:     ECG rate:  62    ECG rate assessment: normal    Rhythm:     Rhythm: sinus rhythm    Ectopy:     Ectopy: none    QRS:     QRS axis:  Normal    QRS intervals:  Normal  Conduction:     Conduction: normal    ST segments:     ST segments:  Normal  T waves:     T waves: non-specific and inverted      Inverted:  III  Comments:      , QRS 96, QT/QTc 408/414; no acute ischemic changes. ED Course  ED Course as of 08/27/23 1839   Alyssa Troy Aug 27, 2023   1427 Reviewed prior records. Was seen earlier at urgent care and referred to ER for further evaluation given RLQ abdominal pain. Last ED visit from 10/14/21 reviewed in which pt had findings of colitis on CT and was treated with a course of cipro. 1511 WBC: 6.66   1511 Hemoglobin: 14.5   1511 Platelet Count: 840   1511 Leukocytes, UA(!): Trace   1511 POCT URINE PROTEIN(!): Trace   1511 Bilirubin, UA(!): Moderate   1526 POCT INR: 0.96   1526 PROTIME: 13.0   1526 PTT: 31   1526 LACTIC ACID: 1.2   1526 Lipase: 23  Not c/w pancreatitis   1526 Glucose, Random: 95   1526 Creatinine: 0.71   1526 BUN: 8   1526 Sodium: 141   1526 Potassium(!): 3.2  Mildly decreased, likely secondary to GI losses, will check mag and replete   1527 Chloride: 101   1527 CO2: 30   1527 Anion Gap: 10   1527 Calcium: 9.0   1527 AST: 18   1527 ALT: 18   1527 Alkaline Phosphatase: 88   1527 Total Protein: 7.0   1527 Albumin: 4.1   1527 TOTAL BILIRUBIN(!): 2.62  Similar to prior   1527 eGFR: 93   1534 WBC, UA(!): 4-10   1534 Epithelial Cells: Occasional  Pt has no urinary symptomatology; this likely represents skin contaminant.    1534 Bacteria, UA: Occasional   1534 hs TnI 0hr: 3  Not c/w ACS   1545 Magnesium(!): 1.7  Will replete   1548 CT abd/pelv performed and pending interpretation, on my prelim interpretation, no noted bowel obstruction, no free air. 1643 Pt reassessed. She reports pain still present but has lessened. Nausea is better. Aware CT results still pending at this time. 26 CT abdomen pelvis with contrast  IMPRESSION:     Inflammatory changes along the second part of the duodenum extending along the right anterior pararenal fascia towards patient's ileocolic anastomosis. Findings are likely to represent underlying disease at the proximal duodenum, potentially secondary to   ulcer disease or infectious/inflammatory enteritis. Gastroenterology consult is recommended. 1725 TT to on call GI Dr. Jagdish Wu to discuss further. 46 Discussed with Dr. Jagdish Wu of GI; feels infectious enteritis would be unusual given short segment. Feels this could be a possible ulcer. Would recommend PPI BID. Feels pt could be discharged as long as tolerating PO and having BMs.   1748 Pt and spouse updated. Reviewed my conversation with GI regarding CT findings. Discussed possible ulcer vs segment of infectious enteritis. Discussed admission vs discharge. She was offered admission which she declined. Discharge not unreasonable given improved symptoms, tolerating PO and having ability to follow up with GI. She is afebrile, hemodynamically stable. Abdomen is non peritoneal.  Mild electrolyte abnormalities were repleted. Discussed symptomatic management. Advised rest, fluids, clear liquids and advance diet as tolerated. Will increased PPI to BID dosing per GI's recommendations. Rx zofran PRN nausea/vomiting. Strict return precautions outlined - especially if persistent or worsening abdominal pain, persistent vomiting, inability to tolerate PO or meds, absence of BMs, black or bloody stools, weakness, fever or any other concerns. Pt and spouse voiced understanding.   I also sent message to Tadeo De La Rosa Fady Loyola PA-C of GI to ensure outpatient follow up. Advised outpatient follow up with PCP and GI or return to ER for change in condition as outlined. Pt verbalized understanding and had no further questions. HEART Risk Score    Flowsheet Row Most Recent Value   Heart Score Risk Calculator    History 0 Filed at: 08/27/2023 1504   ECG 0 Filed at: 08/27/2023 1504   Age 1 Filed at: 08/27/2023 1504   Risk Factors 1 Filed at: 08/27/2023 1504   Troponin 0 Filed at: 08/27/2023 1504   HEART Score 2 Filed at: 08/27/2023 1504                                      Medical Decision Making  61 yo female presenting for evaluation of abdominal pain, nausea/vomiting. Diarrhea also noted but reported to be chronic. Will check labs, urine and obtain further CT imaging. Pt is at risk for SBO in light of multiple prior abdominal surgeries. Given RLQ pain, will need to evaluate for appendicitis. Pt has had prior cholecystectomy therefore this would exclude gallstones, cholecystitis. Colitis, diverticulitis, pancreatitis, cystis, pyelonephritis, kidney stones, GERD, gastritis, esophagitis, PUD remain in the differential.  Given epigastric pain, will also evaluate for ACS with EKG and troponin. History and exam does not appear consistent with an ischemic gut. Work up obtained as noted above. EKG and troponin not c/w ACS. No noted leukocytosis, no anemia. No infectious concerns. Normal lactic acid. No hypo or hyperglycemia. Renal and liver function within normal limits. T bili elevation is chronic and stable. Mild hypomagnesemia and mild hypokalemia which were repleted. UA not suggestive of infection. CT abd/pelv showing findings of inflammatory changes along the duodenum (suggestive of ulcer vs segment of infectious or inflammatory enteritis). GI was consulted. Recommended PPI BID. Admission vs discharge based on pt's clinical status. This was reviewed with pt and spouse.   Pt does follow with GI as an outpatient. Pt was offered admission but elected to return home at this time. Pt afebrile, hemodynamically stable, tolerating PO and having usual bowel movements. BP improved on recheck (was elevated upon presentation). Advised to continue home anti-hypertensives, monitor and follow up with PCP. Will discharge with symptomatic management and outpatient follow up with GI. Strict return precautions outlined. Pt comfortable with plan of care. Please refer to above ER course for further details/discussion. Abdominal pain: acute illness or injury  Abnormal CT scan, small bowel: acute illness or injury  Diarrhea: chronic illness or injury     Details: appears to be chronic for patient  Hypokalemia: acute illness or injury  Hypomagnesemia: acute illness or injury  Nausea and vomiting: acute illness or injury     Details: Improved. Tolerating PO. Will Rx zofran if needed. Amount and/or Complexity of Data Reviewed  Independent Historian: spouse  External Data Reviewed: labs, radiology and notes. Labs: ordered. Decision-making details documented in ED Course. Radiology: ordered. Decision-making details documented in ED Course. ECG/medicine tests: ordered and independent interpretation performed. Decision-making details documented in ED Course. Discussion of management or test interpretation with external provider(s): GI    Risk  OTC drugs. Prescription drug management. Decision regarding hospitalization. Disposition  Final diagnoses:   Abdominal pain   Nausea and vomiting   Diarrhea   Abnormal CT scan, small bowel - Inflammatory changes along the second part of the duodenum extending along the right anterior pararenal fascia towards patient's ileocolic anastomosis.  Findings are likely to represent underlying disease at the proximal duodenum, potentially secondary to ulcer disease vs infectious/inflammatory enteritis   Hypokalemia   Hypomagnesemia     Time reflects when diagnosis was documented in both MDM as applicable and the Disposition within this note     Time User Action Codes Description Comment    8/27/2023  5:51 PM [place] Beagle Add [R10.9] Abdominal pain     8/27/2023  5:52 PM Zena Beagle Add [R11.2] Nausea and vomiting     8/27/2023  5:52 PM Zena Beagle Add [R19.7] Diarrhea     8/27/2023  5:54 PM Zena Beagle Add [R93.3] Abnormal CT scan, small bowel     8/27/2023  5:54 PM [place] Beagle Modify [R93.3] Abnormal CT scan, small bowel Inflammatory changes along the second part of the duodenum extending along the right anterior pararenal fascia towards patient's ileocolic anastomosis. Findings are likely to represent underlying disease at the proximal duodenum, potentially secondary to ulcer disease vs infectious/inflammatory enteritis    8/27/2023  6:02 PM Zena Beagle Add [E87.6] Hypokalemia     8/27/2023  6:02 PM Zena Beagle Add [E83.42] Hypomagnesemia       ED Disposition     ED Disposition   Discharge    Condition   Stable    Date/Time   Sun Aug 27, 2023  5:51 PM    Comment   Mauri Gordon discharge to home/self care. Follow-up Information     Follow up With Specialties Details Why Contact Info Additional [address] [facility] Call  Call to schedule follow up appointment from today's ED visit.  [address] [facility] [phone]  [facility] [place], [address] [place] [address], [address] [facility] Emergency Department Emergency Medicine  As needed [address] [place] [phone]  300 [facility] Emergency Department, [address] [place] [address], [phone]          Discharge Medication List as of 8/27/2023  6:01 PM      START taking these medications    Details   ondansetron (ZOFRAN-ODT) 4 mg disintegrating tablet Take 1 tablet (4 mg total) by mouth every 6 (six) hours as needed for vomiting or nausea, Starting Sun 8/27/2023, Normal      pantoprazole (PROTONIX) 40 mg tablet Take 1 tablet (40 mg total) by mouth 2 (two) times a day, Starting Sun 8/27/2023, Normal         CONTINUE these medications which have NOT CHANGED    Details   aspirin (ECOTRIN LOW STRENGTH) 81 mg EC tablet Take 1 tablet (81 mg total) by mouth daily, Starting Thu 6/4/2020, No Print      colestipol (COLESTID) 1 g tablet Take 2 tablets (2 g total) by mouth 3 (three) times a day, Starting Thu 8/18/2022, Normal      doxycycline hyclate (VIBRAMYCIN) 50 mg capsule Starting Fri 10/1/2021, Historical Med      metoprolol succinate (TOPROL-XL) 25 mg 24 hr tablet take 1 tablet by mouth once daily, Normal      metroNIDAZOLE (METROCREAM) 0.75 % cream Apply topically 2 (two) times a day, Starting Mon 2/8/2021, Normal      Multiple Vitamin (multivitamin) tablet Take 1 tablet by mouth daily, Historical Med      phenazopyridine (PYRIDIUM) 95 MG tablet Take 1 tablet (95 mg total) by mouth 3 (three) times a day as needed for bladder spasms, Starting Mon 10/31/2022, Normal             No discharge procedures on file.     PDMP Review       Value Time User    PDMP Reviewed  Yes 8/27/2023  5:55 PM Fredis Tesfaye PA-C          ED Provider  Electronically Signed by           Fredis Tesfaye PA-C  08/27/23 [id number]

## 2023-08-27 NOTE — DISCHARGE INSTRUCTIONS
Rest, plenty of fluids. Clear liquid diet and advance as tolerated. Zofran as needed for nausea/vomiting. Protonix twice daily for your stomach (this is increased from once a day due to possible ulcer). Follow up with GI or return to ER as needed. Please call their office tomorrow to set up follow up appointment (let them know you had an ER visit and that we spoke to the on call provider). Return to ER if persistent or worsening pain, recurrent vomiting, fever, inability to eat/drink/tolerate home meds, absence of bowel movements, weakness or any other concerns.

## 2023-08-27 NOTE — PROGRESS NOTES
[facility]        NAME: Sheri Melchor is a 60 y.o. female  : 1965    MRN: [id number]  DATE: 2023  TIME: 2:21 PM    Assessment and Plan   RLQ abdominal pain [R10.31]  1. RLQ abdominal pain  Transfer to other facility            Patient Instructions       Go directly to the emergency room for further evaluation. Chief Complaint     Chief Complaint   Patient presents with   • right sided abdominal pain, vomiting         History of Present Illness       Patient presents with a 3 day hx of abd pain which started periumbilical and has localized to her right lower quadrant. Having intermittent nausea no vomiting. No fever or chills. Has history of abdominal surgeries with intestinal resections, patient still has her appendix. Review of Systems   Review of Systems   Constitutional: Negative for fever. Gastrointestinal: Positive for abdominal pain, diarrhea (chronic) and nausea. Negative for vomiting.          Current Medications       Current Outpatient Medications:   •  aspirin (ECOTRIN LOW STRENGTH) 81 mg EC tablet, Take 1 tablet (81 mg total) by mouth daily (Patient not taking: Reported on 2023), Disp: 30 tablet, Rfl: 5  •  colestipol (COLESTID) 1 g tablet, Take 2 tablets (2 g total) by mouth 3 (three) times a day (Patient not taking: Reported on 2023), Disp: 540 tablet, Rfl: 3  •  doxycycline hyclate (VIBRAMYCIN) 50 mg capsule, , Disp: , Rfl:   •  metoprolol succinate (TOPROL-XL) 25 mg 24 hr tablet, take 1 tablet by mouth once daily (Patient not taking: Reported on 2023), Disp: 30 tablet, Rfl: 5  •  metroNIDAZOLE (METROCREAM) 0.75 % cream, Apply topically 2 (two) times a day (Patient not taking: Reported on 2023), Disp: 45 g, Rfl: 0  •  Multiple Vitamin (multivitamin) tablet, Take 1 tablet by mouth daily (Patient not taking: Reported on 2023), Disp: , Rfl:   •  pantoprazole (PROTONIX) 40 mg tablet, TAKE 1 TABLET BY MOUTH  DAILY (Patient not taking: Reported on 8/27/2023), Disp: 90 tablet, Rfl: 3  •  phenazopyridine (PYRIDIUM) 95 MG tablet, Take 1 tablet (95 mg total) by mouth 3 (three) times a day as needed for bladder spasms, Disp: 10 tablet, Rfl: 0    Current Allergies     Allergies as of 08/27/2023   • (No Known Allergies)            The following portions of the patient's history were reviewed and updated as appropriate: allergies, current medications, past family history, past medical history, past social history, past surgical history and problem list.     Past Medical History:   Diagnosis Date   • Atrial fibrillation ([address])    • Disease of thyroid gland    • Endometrial polyp    • Gastroesophageal reflux disease without esophagitis 12/4/2017   • Hand injury     LAST ASSESSED: [id number]   • High risk medication use     LAST ASSESSED: [id number]   • Hyperlipidemia    • Hypertension    • Renal colic     LAST ASSESSED: [id number]   • Viral syndrome     LAST ASSESSED: [id number]   • Vitamin D deficiency disease        Past Surgical History:   Procedure Laterality Date   • CHOLECYSTECTOMY     • COLON SURGERY     • COLONOSCOPY N/A 6/15/2018    Procedure: COLONOSCOPY;  Surgeon: Lou Fatima DO;  Location: [facility];  Service: Gastroenterology   • COLONOSCOPY N/A 8/10/2018    Procedure: COLONOSCOPY;  Surgeon: Lou Fatima DO;  [facility]: [facility];  Service: Gastroenterology   • COLOSTOMY     • ENDOMETRIAL ABLATION  2007    THERMAL   • ESOPHAGOGASTRODUODENOSCOPY N/A 4/6/2018    Procedure: ESOPHAGOGASTRODUODENOSCOPY (EGD);   Surgeon: Debbie Marina MD;  Location: [facility];  Service: Gastroenterology   • HYSTEROSCOPY      1995, 2002, 2007   • LAPAROTOMY N/A 1/4/2018    Procedure: LAPAROTOMY EXPLORATORY, 2nd look, small bowel resection, ilieostomy closure via [place]own anastamosis transverse colostomy;  Surgeon: Jennifer Richardson DO;  Location:  MAIN OR;  Service: General   • HI CLOSURE ENTEROSTOMY LG/SMALL INTESTINE N/A 7/11/2018    Procedure: COLOSTOMY TAKEDOWN / REVERSE COATES, ILEOCECECTOMY, LASER FLUORESCENCE ANGIOGRAPHY, LYSIS OF ADHESIONS;  Surgeon: Jayla Vazquez DO;  Location:  MAIN OR;  Service: General   • DC COLECTOMY PARTIAL W/ANASTOMOSIS N/A 1/3/2018    Procedure: Exploratory laparotomy, RESECTION COLON SIGMOID, possible ostomy;  Surgeon: Jayla Vazquez DO;  [facility]: [facility];  Service: General   • DC ESOPHAGOGASTRODUODENOSCOPY TRANSORAL DIAGNOSTIC N/A 10/21/2016    Procedure: EGD AND COLONOSCOPY;  Surgeon: Yumiko Jackson MD;  [facility]: [facility];  Service: Gastroenterology   • THYROIDECTOMY      NEAR-TOTAL   • THYROIDECTOMY, PARTIAL     • TONSILLECTOMY AND ADENOIDECTOMY     • TUBAL LIGATION  1997   • URETERAL STENT PLACEMENT Bilateral 1/3/2018    Procedure: INSERTION STENT URETERAL;  Surgeon: Ashly Del Rio MD;  Location: [facility];  Service: Urology       Family History   Problem Relation Age of Onset   • Diabetes type II Mother    • Heart failure Father    • Skin cancer Sister    • No Known Problems Daughter    • No Known Problems Maternal Grandmother    • No Known Problems Maternal Grandfather    • No Known Problems Paternal Grandmother    • No Known Problems Paternal Grandfather    • No Known Problems Sister    • No Known Problems Son    • No Known Problems Son    • No Known Problems Paternal Aunt          Medications have been verified. Objective   /64   Pulse 82   Temp 98.2 °F (36.8 °C)   Resp 20   Wt 101 kg (222 lb)   LMP  (LMP Unknown)   SpO2 97%   BMI 41.95 kg/m²   No LMP recorded (lmp unknown). Patient is postmenopausal.       Physical Exam     Physical Exam  Vitals and nursing note reviewed. Constitutional:       Appearance: Normal appearance. HENT:      Head: Normocephalic and atraumatic. Cardiovascular:      Rate and Rhythm: Normal rate and regular rhythm. Pulmonary:      Effort: Pulmonary effort is normal.   Abdominal:      Tenderness: There is abdominal tenderness (RLQ). There is rebound.    Skin: General: Skin is warm. Neurological:      Mental Status: She is alert.

## 2023-08-28 ENCOUNTER — NURSE TRIAGE (OUTPATIENT)
Age: 58
End: 2023-08-28

## 2023-08-28 DIAGNOSIS — R19.7 DIARRHEA, UNSPECIFIED TYPE: ICD-10-CM

## 2023-08-28 LAB
ATRIAL RATE: 62 BPM
P AXIS: 39 DEGREES
PR INTERVAL: 160 MS
QRS AXIS: 45 DEGREES
QRSD INTERVAL: 96 MS
QT INTERVAL: 408 MS
QTC INTERVAL: 414 MS
T WAVE AXIS: 20 DEGREES
VENTRICULAR RATE: 62 BPM

## 2023-08-28 PROCEDURE — 93010 ELECTROCARDIOGRAM REPORT: CPT | Performed by: INTERNAL MEDICINE

## 2023-08-28 RX ORDER — MONTELUKAST SODIUM 4 MG/1
2 TABLET, CHEWABLE ORAL 3 TIMES DAILY
Qty: 180 TABLET | Refills: 0 | Status: SHIPPED | OUTPATIENT
Start: 2023-08-28

## 2023-08-28 NOTE — TELEPHONE ENCOUNTER
Returned call, no answer, did leave message that I scheduled her for ER follow up this Thursday 1pm at [place], asked for call back to discuss.

## 2023-08-28 NOTE — TELEPHONE ENCOUNTER
I spoke with patient, reviewed AP recommendations. She will require work note for days missed and I advised asking for note while in office this week.

## 2023-08-28 NOTE — TELEPHONE ENCOUNTER
Last visit 3/4/21, scheduled for appt 8/31/23  Labs, Imaging ER yesterday    Reason for Disposition  • Constant abdominal pain lasting > 2 hours    Answer Assessment - Initial Assessment Questions  1. LOCATION: "Where does it hurt?"      Right side under ribs to umbilicus  2. RADIATION: "Does the pain shoot anywhere else?" (e.g., chest, back)      Radiates to back  3. ONSET: "When did the pain begin?" (e.g., minutes, hours or days ago)     Friday night about 7 pm  4. SUDDEN: "Gradual or sudden onset?"      sudden  5. PATTERN "Does the pain come and go, or is it constant?"     - If constant: "Is it getting better, staying the same, or worsening?"       (Note: Constant means the pain never goes away completely; most serious pain is constant and it progresses)      - If intermittent: "How long does it last?" "Do you have pain now?"      (Note: Intermittent means the pain goes away completely between bouts)     constant  6. SEVERITY: "How bad is the pain?"  (e.g., Scale 1-10; mild, moderate, or severe)    - MILD (1-3): doesn't interfere with normal activities, abdomen soft and not tender to touch     - MODERATE (4-7): interferes with normal activities or awakens from sleep, tender to touch     - SEVERE (8-10): excruciating pain, doubled over, unable to do any normal activities       Pain level y   7. RECURRENT SYMPTOM: "Have you ever had this type of stomach pain before?" If Yes, ask: "When was the last time?" and "What happened that time?"       no  8. CAUSE: "What do you think is causing the stomach pain?"      No travel, no sick contacts, no change in diet  9. RELIEVING/AGGRAVATING FACTORS: "What makes it better or worse?" (e.g., movement, antacids, bowel movement)      Nothing helps  10. OTHER SYMPTOMS: "Has there been any vomiting, diarrhea, constipation, or urine problems?"        Always has diarrhea  11.  PREGNANCY: "Is there any chance you are pregnant?" "When was your last menstrual period?" no    Protocols used: ABDOMINAL PAIN [facility]    Patient continues with abdominal pain. Her  is picking up meds ordered post ER. I advised keeping on fluids right now. She has taken Tylenol for the pain with no relief. Please advise. Patient was instructed to report to ER if pain increases to level 9-10.

## 2023-08-28 NOTE — TELEPHONE ENCOUNTER
----- Message from Camelia [place] sent at 8/28/2023  8:36 AM EDT -----  Pt went to ED yesterday and had appt in Oct Dr. Nehemias Azevedo in [place] for check up. I rescheduled for sooner appt on 9/13 w/ Dr. Nehemias Azevedo in [facility]. Meanwhile pt wants advise on what to do for her abdominal pain. Pt still has nausea and has to  script today for this.

## 2023-08-31 ENCOUNTER — OFFICE VISIT (OUTPATIENT)
Dept: GASTROENTEROLOGY | Facility: CLINIC | Age: 58
End: 2023-08-31

## 2023-08-31 ENCOUNTER — TELEPHONE (OUTPATIENT)
Dept: GASTROENTEROLOGY | Facility: CLINIC | Age: 58
End: 2023-08-31

## 2023-08-31 VITALS
SYSTOLIC BLOOD PRESSURE: 151 MMHG | DIASTOLIC BLOOD PRESSURE: 86 MMHG | BODY MASS INDEX: 41.35 KG/M2 | HEIGHT: 61 IN | WEIGHT: 219 LBS | HEART RATE: 74 BPM | OXYGEN SATURATION: 98 % | TEMPERATURE: 98.1 F

## 2023-08-31 DIAGNOSIS — R10.13 EPIGASTRIC PAIN: ICD-10-CM

## 2023-08-31 DIAGNOSIS — Z86.010 PERSONAL HISTORY OF COLONIC POLYPS: ICD-10-CM

## 2023-08-31 DIAGNOSIS — R93.3 ABNORMAL CT SCAN, GASTROINTESTINAL TRACT: Primary | ICD-10-CM

## 2023-08-31 DIAGNOSIS — R11.2 INTRACTABLE VOMITING WITH NAUSEA: ICD-10-CM

## 2023-08-31 DIAGNOSIS — R19.7 DIARRHEA, UNSPECIFIED TYPE: ICD-10-CM

## 2023-08-31 RX ORDER — ONDANSETRON HYDROCHLORIDE 8 MG/1
8 TABLET, FILM COATED ORAL EVERY 8 HOURS PRN
Qty: 30 TABLET | Refills: 3 | Status: SHIPPED | OUTPATIENT
Start: 2023-08-31

## 2023-08-31 RX ORDER — FAMOTIDINE 40 MG/1
40 TABLET, FILM COATED ORAL
Qty: 30 TABLET | Refills: 3 | Status: SHIPPED | OUTPATIENT
Start: 2023-08-31

## 2023-08-31 NOTE — PROGRESS NOTES
[facility] Outpatient Follow-up Note  Dee Dee Mancia 60 y.o. female MRN: [id number]  Encounter: [id number]    ASSESSMENT AND PLAN:      1. Abnormal CT scan, gastrointestinal tract  2. Epigastric pain  3. Intractable vomiting with nausea    Patient with a history of GERD historically treated with over-the-counter antacids developed acute onset of upper abdominal pain, nausea, nonbloody emesis, and dark appearing diarrhea within the past week and sought medical attention. CT scan commented on duodenal inflammation extending along the right anterior pararenal fascia towards the patient's ileocolic anastomosis. She was started on PPI twice daily. We will add in nightly famotidine for added relief. We reviewed differential diagnosis includes PUD, duodenitis of infectious etiol, IBD, other etiol. We reviewed plan for direct visualization of the upper GI tract with EGD at this time. Continue with small frequent meals, bland diet, antiemetics (normal QTc interval) as needed. Risks associated with endoscopic evaluation discussed with patient today, including but not limited to bleeding, infection, perforation, and organ injury, all of which are low. Pt demonstrates understanding and is agreeable to the plan. Strict return to ER precautions were discussed today. No evidence of  acute abdomen on abdominal examination.     - EGD; Future  - famotidine (PEPCID) 40 MG tablet; Take 1 tablet (40 mg total) by mouth daily at bedtime  Dispense: 30 tablet; Refill: 3  - ondansetron (ZOFRAN) 8 mg tablet; Take 1 tablet (8 mg total) by mouth every 8 (eight) hours as needed for nausea or vomiting  Dispense: 30 tablet; Refill: 3    4. Diarrhea, unspecified type  5. Personal history of colonic polyps    Patient with extensive GI history. History of complicated diverticulitis requiring sigmoidectomy/Lundberg's procedure with reversal and ileocecectomy. History of cholecystectomy as well. Chronic diarrhea likely secondary to bile salt diarrhea and malabsorption. She also has a history of C. difficile. Reviewed checking infectious stool studies now. She is overdue for colonoscopy as she had a tubular adenoma removed during one of her colonoscopies in 2018, and we will plan to repeat colonoscopy at time of upper endoscopy to evaluate for polyps as well as inflammatory disease in the colon at site of anastomosis. Continue with colestipol. Avoid additional antidiarrheals until stool studies return. Continue bland low residue diet for    - Colonoscopy; Future  - Calprotectin,Fecal; Future  - Clostridium difficile toxin by PCR with EIA; Future  - Giardia antigen; Future  - Ova and parasite examination; Future  - Stool Enteric Bacterial Panel by PCR; Future  - polyethylene glycol (GOLYTELY) 4000 mL solution; Take 4,000 mL by mouth once for 1 dose  Dispense: 4000 mL; Refill: 0    We will follow-up after bidirectional endoscopic evaluation. We did review should patient's symptoms worsen, intractable abdominal pain, inability to tolerate p.o., fever, weakness, she is to report to the ER immediately. For umbilical irritation and erythema, I do recommend evaluation by dermatology. No evidence of fistula on most recent CT scan.  ______________________________________________________________________    SUBJECTIVE: Patient is a 60 y.o. female who presents today for follow-up regarding ER evaluation for abdominal pain. Pmhx sig for GERD, diverticulosis with hx of diverticulitis w/ sigmoidectomy, Pj's procedure with subsequent reversal and ileocecectomy, hx of c difficile infection, HTN, PAF (on problem list no anticoagulation), HLD, personal history of colon polyps, BMI 41. Hx of CCY. Patient is known to GI. She has been following with GI for history of chronic diarrhea following ileocecectomy, sigmoidectomy, and cholecystectomy.   She was tried on Questran though did not tolerate the powder and was switched to colestipol. On this regimen she  was having 2-5 stools in a 24-hour period. Within the past week, she developed acute onset of upper abdominal pain, nausea, nonbloody emesis while out to dinner. Symptoms progressed and she sought attention in the ER on 8/27/2023. She shares she had been having more diarrhea and stools appeared very dark/black in color, and denies any use of iron supplementation or Pepto-Bismol around that time. Evaluation in ER noted hypokalemia, normal renal and kidney function, normal CBC. CT scan commented on inflammatory changes along second part of the duodenum extending along the right anterior pararenal fascia towards patient's ileocolic anastomosis. She was restarted on PPI twice daily and given antiemetics. She shares she is still having upper abdominal discomfort, which is worse postprandially. She is having nausea though very rare emesis at this time. She historically had a history of heartburn though is taking an over-the-counter antacid with adequate effect. Stools are still loose, no BRBPR or melena. She shares a few weeks ago she had 2 episodes of BRBPR on the wipe. She is having intermittent nocturnal BMs. No fever, no new rashes. She has been unable to identify any obvious precipitants to the onset of her symptoms. She also notes chronic recurrent rash at umbilicus with drainage. NSAIDs: none   Tobacco: none   Etoh: 2 drinks per week     08/2023: CT A/P: Inflammatory changes along the second part of the duodenum extending along the right anterior pararenal fascia towards patient's ileocolic anastomosis. Findings are likely to represent underlying disease at the proximal duodenum, potentially secondary to ulcer disease or infectious/inflammatory enteritis. Endoscopic history:   EGD: 04/2018: Normal entire esophagus. A 1cm hiatus hernia found. Gastritis found in the body of the stomach. .   A. Stomach, biopsy gastric body, r/o gastritis biopsy:  Benign gastric-oxyntoantral type mucosa with mild chronic inactive gastritis. No evidence of Paneth cell metaplasia or atrophic gastritis. No evidence of dysplasia or malignancy. No H.Pylori organisms identified on immunohistochemical stained slide. B. Stomach, gastric body r/o h pylori ; biopsy: Benign gastric- type mucosa with minimal superficial chronic inflammation; No evidence of Paneth cell metaplasia or atrophic gastritis. No evidence of dysplasia or malignancy. No H.Pylori organisms identified on immunohistochemical stained slide. C. Esophagus, esophageal biopsy r/o dysphagia: Benign squamous epithelium with minimal chronic inflammation; Small fragment of benign cardiac type mucosa; No evidence of eosinophilic esophagitis. No evidence of Goblet cell metaplasia. No evidence of glandular dysplasia or malignancy  Colon: 08/2018: Surgical anastomosis on the right side of the colon as well as the rectum was seen. Pt had prior recent ileocecectomy and reconnection surgery after diverticulitis. Otherwise, the colon appeared to be normal. No pseudomembranes seen. A. Colon (biopsy): Colonic mucosa with reactive changes; No acute or microscopic colitis; Negative for dysplasia     Review of Systems   Constitutional: Negative for fever. Gastrointestinal: Positive for abdominal pain, diarrhea, nausea and vomiting. Negative for constipation. Genitourinary: Negative for dysuria, frequency and hematuria. Musculoskeletal: Negative for arthralgias and myalgias. Neurological: Positive for headaches.    Otherwise Per HPI    Historical Information   Past Medical History:   Diagnosis Date   • Atrial fibrillation [facility])    • Disease of thyroid gland    • Endometrial polyp    • Gastroesophageal reflux disease without esophagitis 12/4/2017   • Hand injury     LAST ASSESSED: [id number]   • High risk medication use     LAST ASSESSED: [id number]   • Hyperlipidemia    • Hypertension    • Renal colic     LAST ASSESSED: [id number]   • Viral syndrome     LAST ASSESSED: [id number]   • Vitamin D deficiency disease      Past Surgical History:   Procedure Laterality Date   • CHOLECYSTECTOMY     • COLON SURGERY     • COLONOSCOPY N/A 6/15/2018    Procedure: COLONOSCOPY;  Surgeon: Ulysses Anton, DO;  [facility]: [facility];  Service: Gastroenterology   • COLONOSCOPY N/A 8/10/2018    Procedure: COLONOSCOPY;  Surgeon: Ulysses Anton, DO;  [facility];  Service: Gastroenterology   • COLOSTOMY     • ENDOMETRIAL ABLATION  2007    THERMAL   • ESOPHAGOGASTRODUODENOSCOPY N/A 4/6/2018    Procedure: ESOPHAGOGASTRODUODENOSCOPY (EGD);   Surgeon: Fredis Alvares MD;  [facility]: [facility];  Service: Gastroenterology   • HYSTEROSCOPY      1995, 2002, 2007   • LAPAROTOMY N/A 1/4/2018    Procedure: LAPAROTOMY EXPLORATORY, 2nd look, small bowel resection, ilieostomy closure via handsown anastamosis transverse colostomy;  Surgeon: Carlyle Alpers, DO;  [facility];  Service: General   • WI CLOSURE ENTEROSTOMY LG/SMALL INTESTINE N/A 7/11/2018    Procedure: COLOSTOMY TAKEDOWN / Amber Pedro, ILEOCECECTOMY, LASER FLUORESCENCE ANGIOGRAPHY, LYSIS OF ADHESIONS;  Surgeon: Ortiz Helton DO;  [facility];  Service: General   • WI COLECTOMY PARTIAL W/ANASTOMOSIS N/A 1/3/2018    Procedure: Exploratory laparotomy, RESECTION COLON SIGMOID, possible ostomy;  Surgeon: Ortiz Helton DO;  [facility];  Service: General   • WI ESOPHAGOGASTRODUODENOSCOPY TRANSORAL DIAGNOSTIC N/A 10/21/2016    Procedure: EGD AND COLONOSCOPY;  Surgeon: Robb Villarreal MD;  [facility];  Service: Gastroenterology   • THYROIDECTOMY      NEAR-TOTAL   • THYROIDECTOMY, PARTIAL     • TONSILLECTOMY AND ADENOIDECTOMY     • TUBAL LIGATION  1997   • URETERAL STENT PLACEMENT Bilateral 1/3/2018    Procedure: INSERTION STENT URETERAL;  Surgeon: Rachael Law MD;  [facility];  Service: Urology     Social History   Social History     Substance and Sexual Activity   Alcohol Use Yes   • Alcohol/week: 2.0 standard drinks of alcohol   • Types: 2 Standard drinks or equivalent per week    Comment: 1-2 mixed drinks per weekend     Social History     Substance and Sexual Activity   Drug Use No     Social History     Tobacco Use   Smoking Status Former   • Packs/day: 0.50   • Years: 30.00   • Total pack years: 15.00   • Types: Cigarettes   • Quit date:    • Years since quittin.6   Smokeless Tobacco Never     Family History   Problem Relation Age of Onset   • Diabetes type II Mother    • Heart failure Father    • Skin cancer Sister    • No Known Problems Daughter    • No Known Problems Maternal Grandmother    • No Known Problems Maternal Grandfather    • No Known Problems Paternal Grandmother    • No Known Problems Paternal Grandfather    • No Known Problems Sister    • No Known Problems Son    • No Known Problems Son    • No Known Problems Paternal Aunt        Meds/Allergies       Current Outpatient Medications:   •  aspirin (ECOTRIN LOW STRENGTH) 81 mg EC tablet  •  colestipol (COLESTID) 1 g tablet  •  metoprolol succinate (TOPROL-XL) 25 mg 24 hr tablet  •  metroNIDAZOLE (METROCREAM) 0.75 % cream  •  ondansetron (ZOFRAN-ODT) 4 mg disintegrating tablet  •  pantoprazole (PROTONIX) 40 mg tablet  •  doxycycline hyclate (VIBRAMYCIN) 50 mg capsule  •  Multiple Vitamin (multivitamin) tablet  •  phenazopyridine (PYRIDIUM) 95 MG tablet    No Known Allergies        Objective     Blood pressure 151/86, pulse 74, temperature 98.1 °F (36.7 °C), temperature source Tympanic, height 5' 1" (1.549 m), weight 99.3 kg (219 lb), SpO2 98 %, not currently breastfeeding. Body mass index is 41.38 kg/m². Physical Exam  Vitals and nursing note reviewed. Constitutional:       Appearance: Normal appearance. HENT:      Head: Normocephalic and atraumatic. Eyes:      General: No scleral icterus.      Conjunctiva/sclera: Conjunctivae normal.   Cardiovascular:      Rate and Rhythm: Normal rate.   Pulmonary:      Effort: Pulmonary effort is normal. No respiratory distress. Abdominal:      General: Bowel sounds are normal.      Palpations: Abdomen is soft. Tenderness: There is abdominal tenderness. There is no guarding or rebound. Hernia: No hernia is present. Skin:     General: Skin is warm and dry. Coloration: Skin is not jaundiced. Comments: Erythematous skin at the umbilicus   Neurological:      General: No focal deficit present. Mental Status: She is alert and oriented to person, place, and time. Psychiatric:         Mood and Affect: Mood normal.         Behavior: Behavior normal.         Lab Results:   No visits with results within 1 Day(s) from this visit.    Latest known visit with results is:   Admission on 08/27/2023, Discharged on 08/27/2023   Component Date Value   • WBC 08/27/2023 6.66    • RBC 08/27/2023 4.54    • Hemoglobin 08/27/2023 14.5    • Hematocrit 08/27/2023 41.9    • MCV 08/27/2023 92    • MCH 08/27/2023 31.9    • MCHC 08/27/2023 34.6    • RDW 08/27/2023 12.7    • MPV 08/27/2023 9.4    • Platelets 61/96/7298 236    • nRBC 08/27/2023 0    • Neutrophils Relative 08/27/2023 60    • Immat GRANS % 08/27/2023 0    • Lymphocytes Relative 08/27/2023 30    • Monocytes Relative 08/27/2023 7    • Eosinophils Relative 08/27/2023 2    • Basophils Relative 08/27/2023 1    • Neutrophils Absolute 08/27/2023 4.06    • Immature Grans Absolute 08/27/2023 0.01    • Lymphocytes Absolute 08/27/2023 1.99    • Monocytes Absolute 08/27/2023 0.45    • Eosinophils Absolute 08/27/2023 0.10    • Basophils Absolute 08/27/2023 0.05    • Sodium 08/27/2023 141    • Potassium 08/27/2023 3.2 (L)    • Chloride 08/27/2023 101    • CO2 08/27/2023 30    • ANION GAP 08/27/2023 10    • BUN 08/27/2023 8    • Creatinine 08/27/2023 0.71    • Glucose 08/27/2023 95    • Calcium 08/27/2023 9.0    • AST 08/27/2023 18    • ALT 08/27/2023 18    • Alkaline Phosphatase 08/27/2023 88    • Total Protein 08/27/2023 7.0    • Albumin 08/27/2023 4.1    • Total Bilirubin 08/27/2023 2.62 (H)    • eGFR 08/27/2023 93    • Protime 08/27/2023 13.0    • INR 08/27/2023 0.96    • PTT 08/27/2023 31    • Lipase 08/27/2023 23    • hs TnI 0hr 08/27/2023 3    • LACTIC ACID 08/27/2023 1.2    • Color, UA 08/27/2023 Yellow    • Clarity, UA 08/27/2023 Slightly Cloudy    • Specific Gravity, UA 08/27/2023 1.025    • pH, UA 08/27/2023 5.5    • Leukocytes, UA 08/27/2023 Trace (A)    • Nitrite, UA 08/27/2023 Negative    • Protein, UA 08/27/2023 Trace (A)    • Glucose, UA 08/27/2023 Negative    • Ketones, UA 08/27/2023 Negative    • Urobilinogen, UA 08/27/2023 0.2    • Bilirubin, UA 08/27/2023 Moderate (A)    • Occult Blood, UA 08/27/2023 Negative    • RBC, UA 08/27/2023 0-1    • WBC, UA 08/27/2023 4-10 (A)    • Epithelial Cells 08/27/2023 Occasional    • Bacteria, UA 08/27/2023 Occasional    • OTHER OBSERVATIONS 08/27/2023 Transitional Epithelial Cells    • Magnesium 08/27/2023 1.7 (L)          Radiology Results:   CT abdomen pelvis with contrast    Result Date: 8/27/2023  Narrative: CT ABDOMEN AND PELVIS WITH IV CONTRAST INDICATION:   Nausea/vomiting Abdominal pain, acute, nonlocalized abd pain, n/v. COMPARISON: 10/14/21. TECHNIQUE:  CT examination of the abdomen and pelvis was performed. Multiplanar 2D reformatted images were created from the source data. This examination, like all CT scans performed in the [facility], was performed utilizing techniques to minimize radiation dose exposure, including the use of iterative reconstruction and automated exposure control. Radiation dose length product (DLP) for this visit:  1021.58 mGy-cm IV Contrast:  100 mL of iohexol (OMNIPAQUE) Enteric Contrast:  Enteric contrast was not administered. FINDINGS: ABDOMEN LOWER CHEST:  No clinically significant abnormality identified in the visualized lower chest. LIVER/BILIARY TREE:  Unremarkable.  GALLBLADDER:  Gallbladder is surgically absent. SPLEEN:  Unremarkable. PANCREAS:  Unremarkable. ADRENAL GLANDS:  Unremarkable. KIDNEYS/URETERS:  Unremarkable. No hydronephrosis. STOMACH AND BOWEL: Prior ileocecectomy and sigmoidectomy. Inflammatory changes along the second part of the duodenum extending along the right anterior pararenal fascia towards the patient's ileocolic anastomosis. APPENDIX:  No findings to suggest appendicitis. ABDOMINOPELVIC CAVITY:  No ascites. No pneumoperitoneum. No lymphadenopathy. VESSELS:  Unremarkable for patient's age. PELVIS REPRODUCTIVE ORGANS: Fibroid uterus URINARY BLADDER:  Unremarkable. ABDOMINAL WALL/INGUINAL REGIONS:  Unremarkable. OSSEOUS STRUCTURES:  No acute fracture or destructive osseous lesion. Impression: Inflammatory changes along the second part of the duodenum extending along the right anterior pararenal fascia towards patient's ileocolic anastomosis. Findings are likely to represent underlying disease at the proximal duodenum, potentially secondary to  ulcer disease or infectious/inflammatory enteritis. Gastroenterology consult is recommended. Workstation performed: [id number]       Krishna Ruff PA-C    **Please note:  Dictation voice to text software may have been used in the creation of this record. Occasional wrong word or “sound alike” substitutions may have occurred due to the inherent limitations of voice recognition software. Read the chart carefully and recognize, using context, where substitutions have occurred. **

## 2023-08-31 NOTE — PATIENT INSTRUCTIONS
For your sudden onset of new abdominal pain, nausea, vomiting, and dark-colored stools, we are wondering about thickening, inflammation or damage to your upper GI tract or perhaps that you are exposed to some type of infectious process. The CT scan showed thickening of the small intestine right after the stomach called the duodenum. We have started you on pantoprazole twice daily which should heal inflammation. We will add in Pepcid/famotidine before bed to further help with your discomfort. I have adjusted the nausea medication which you can take every 8 hours. Ensure you are trying to stay well-hydrated with electrolyte rich beverages and if and when you tolerate oral intake please for start with a bland diet. It does sound as though your chronic loose stool may be secondary to no longer having a gallbladder and some malabsorption from the surgeries on your small bowel and colon. It is possible that you may have also now had an acute infection that could affect your bowels. For this reason I am checking stool test for specific kinds of infection and inflammation. You can continue on the colestipol though I would avoid any other antidiarrhea medications until you hear back from us. Because of all of the symptoms and that you are due for a colonoscopy, we will attempt to look into both and on an upcoming EGD and colonoscopy.

## 2023-09-05 ENCOUNTER — APPOINTMENT (OUTPATIENT)
Dept: LAB | Facility: HOSPITAL | Age: 58
End: 2023-09-05
Payer: COMMERCIAL

## 2023-09-05 DIAGNOSIS — R19.7 DIARRHEA, UNSPECIFIED TYPE: ICD-10-CM

## 2023-09-05 PROCEDURE — 87493 C DIFF AMPLIFIED PROBE: CPT

## 2023-09-05 PROCEDURE — 87177 OVA AND PARASITES SMEARS: CPT

## 2023-09-05 PROCEDURE — 87329 GIARDIA AG IA: CPT

## 2023-09-05 PROCEDURE — 87505 NFCT AGENT DETECTION GI: CPT

## 2023-09-05 PROCEDURE — 83993 ASSAY FOR CALPROTECTIN FECAL: CPT

## 2023-09-05 PROCEDURE — 87209 SMEAR COMPLEX STAIN: CPT

## 2023-09-06 LAB
C DIFF TOX GENS STL QL NAA+PROBE: NEGATIVE
CAMPYLOBACTER DNA SPEC NAA+PROBE: NORMAL
G LAMBLIA AG STL QL IA: NEGATIVE
SALMONELLA DNA SPEC QL NAA+PROBE: NORMAL
SHIGA TOXIN STX GENE SPEC NAA+PROBE: NORMAL
SHIGELLA DNA SPEC QL NAA+PROBE: NORMAL

## 2023-09-07 LAB — CALPROTECTIN STL-MCNT: 88 UG/G (ref 0–120)

## 2023-09-24 DIAGNOSIS — R10.9 ABDOMINAL PAIN: ICD-10-CM

## 2023-09-24 DIAGNOSIS — R93.3 ABNORMAL CT SCAN, SMALL BOWEL: ICD-10-CM

## 2023-09-25 RX ORDER — PANTOPRAZOLE SODIUM 40 MG/1
40 TABLET, DELAYED RELEASE ORAL 2 TIMES DAILY
Qty: 60 TABLET | Refills: 0 | Status: SHIPPED | OUTPATIENT
Start: 2023-09-25

## 2023-09-28 DIAGNOSIS — Z12.31 ENCOUNTER FOR SCREENING MAMMOGRAM FOR MALIGNANT NEOPLASM OF BREAST: Primary | ICD-10-CM

## 2023-10-04 ENCOUNTER — HOSPITAL ENCOUNTER (OUTPATIENT)
Dept: PERIOP | Facility: HOSPITAL | Age: 58
Setting detail: OUTPATIENT SURGERY
Discharge: HOME | End: 2023-10-04
Admitting: INTERNAL MEDICINE
Payer: COMMERCIAL

## 2023-10-04 ENCOUNTER — ANESTHESIA (OUTPATIENT)
Dept: PERIOP | Facility: HOSPITAL | Age: 58
End: 2023-10-04

## 2023-10-04 ENCOUNTER — ANESTHESIA EVENT (OUTPATIENT)
Dept: PERIOP | Facility: HOSPITAL | Age: 58
End: 2023-10-04

## 2023-10-04 VITALS
OXYGEN SATURATION: 99 % | HEIGHT: 61 IN | RESPIRATION RATE: 20 BRPM | WEIGHT: 219 LBS | DIASTOLIC BLOOD PRESSURE: 67 MMHG | HEART RATE: 63 BPM | BODY MASS INDEX: 41.35 KG/M2 | SYSTOLIC BLOOD PRESSURE: 141 MMHG | TEMPERATURE: 98 F

## 2023-10-04 DIAGNOSIS — R19.7 DIARRHEA, UNSPECIFIED TYPE: ICD-10-CM

## 2023-10-04 DIAGNOSIS — R93.3 ABNORMAL CT SCAN, GASTROINTESTINAL TRACT: ICD-10-CM

## 2023-10-04 DIAGNOSIS — R11.2 INTRACTABLE VOMITING WITH NAUSEA: ICD-10-CM

## 2023-10-04 DIAGNOSIS — Z86.010 PERSONAL HISTORY OF COLONIC POLYPS: ICD-10-CM

## 2023-10-04 PROBLEM — E66.813 CLASS 3 SEVERE OBESITY WITH BODY MASS INDEX (BMI) OF 40.0 TO 44.9 IN ADULT (HCC): Status: ACTIVE | Noted: 2023-10-04

## 2023-10-04 PROBLEM — E66.01 CLASS 3 SEVERE OBESITY WITH BODY MASS INDEX (BMI) OF 40.0 TO 44.9 IN ADULT (HCC): Status: ACTIVE | Noted: 2023-10-04

## 2023-10-04 PROCEDURE — 88305 TISSUE EXAM BY PATHOLOGIST: CPT | Performed by: PATHOLOGY

## 2023-10-04 RX ORDER — ALBUTEROL SULFATE 2.5 MG/3ML
2.5 SOLUTION RESPIRATORY (INHALATION) ONCE AS NEEDED
Status: DISCONTINUED | OUTPATIENT
Start: 2023-10-04 | End: 2023-10-08 | Stop reason: HOSPADM

## 2023-10-04 RX ORDER — ONDANSETRON 2 MG/ML
4 INJECTION INTRAMUSCULAR; INTRAVENOUS ONCE AS NEEDED
Status: DISCONTINUED | OUTPATIENT
Start: 2023-10-04 | End: 2023-10-08 | Stop reason: HOSPADM

## 2023-10-04 RX ORDER — SODIUM CHLORIDE, SODIUM LACTATE, POTASSIUM CHLORIDE, CALCIUM CHLORIDE 600; 310; 30; 20 MG/100ML; MG/100ML; MG/100ML; MG/100ML
INJECTION, SOLUTION INTRAVENOUS CONTINUOUS PRN
Status: DISCONTINUED | OUTPATIENT
Start: 2023-10-04 | End: 2023-10-04

## 2023-10-04 RX ORDER — LIDOCAINE HYDROCHLORIDE 20 MG/ML
INJECTION, SOLUTION EPIDURAL; INFILTRATION; INTRACAUDAL; PERINEURAL AS NEEDED
Status: DISCONTINUED | OUTPATIENT
Start: 2023-10-04 | End: 2023-10-04

## 2023-10-04 RX ORDER — PROPOFOL 10 MG/ML
INJECTION, EMULSION INTRAVENOUS AS NEEDED
Status: DISCONTINUED | OUTPATIENT
Start: 2023-10-04 | End: 2023-10-04

## 2023-10-04 RX ORDER — SODIUM CHLORIDE, SODIUM LACTATE, POTASSIUM CHLORIDE, CALCIUM CHLORIDE 600; 310; 30; 20 MG/100ML; MG/100ML; MG/100ML; MG/100ML
125 INJECTION, SOLUTION INTRAVENOUS CONTINUOUS
Status: CANCELLED | OUTPATIENT
Start: 2023-10-04

## 2023-10-04 RX ADMIN — PROPOFOL 140 MCG/KG/MIN: 10 INJECTION, EMULSION INTRAVENOUS at 09:38

## 2023-10-04 RX ADMIN — PROPOFOL 50 MG: 10 INJECTION, EMULSION INTRAVENOUS at 09:35

## 2023-10-04 RX ADMIN — LIDOCAINE HYDROCHLORIDE 100 MG: 20 INJECTION, SOLUTION EPIDURAL; INFILTRATION; INTRACAUDAL; PERINEURAL at 09:29

## 2023-10-04 RX ADMIN — PROPOFOL 70 MG: 10 INJECTION, EMULSION INTRAVENOUS at 09:37

## 2023-10-04 RX ADMIN — PROPOFOL 60 MG: 10 INJECTION, EMULSION INTRAVENOUS at 09:31

## 2023-10-04 RX ADMIN — SODIUM CHLORIDE, SODIUM LACTATE, POTASSIUM CHLORIDE, AND CALCIUM CHLORIDE: .6; .31; .03; .02 INJECTION, SOLUTION INTRAVENOUS at 09:15

## 2023-10-04 RX ADMIN — PROPOFOL 50 MG: 10 INJECTION, EMULSION INTRAVENOUS at 09:33

## 2023-10-04 RX ADMIN — PROPOFOL 100 MG: 10 INJECTION, EMULSION INTRAVENOUS at 09:29

## 2023-10-04 NOTE — ANESTHESIA POSTPROCEDURE EVALUATION
Post-Op Assessment Note    CV Status:  Stable    Pain management: adequate     Mental Status:  Alert and awake   Hydration Status:  Euvolemic   PONV Controlled:  Controlled   Airway Patency:  Patent      Post Op Vitals Reviewed: Yes      Staff: CRNA         No notable events documented.     BP   127/64   Temp  97   Pulse  74   Resp 16 (10/04/23 1006)    SpO2   98%

## 2023-10-04 NOTE — H&P
H&P EXAM - Outpatient Endoscopy   Naveed Alonso 60 y.o. female MRN: [id number]    [address] [facility]   Encounter: [id number]        History and Physical - [facility]  Naveed Alonso 60 y.o. female MRN: [id number]                  HPI: Naveed Alonso is a 60y.o. year old female who presents for abnormal CT scan of the abdomen, abdominal pain, chronic GERD, history of colon polyps, diarrhea      REVIEW OF SYSTEMS: Per the HPI, and otherwise unremarkable. Historical Information   Past Medical History:   Diagnosis Date   • Atrial fibrillation [facility])    • Disease of thyroid gland    • Endometrial polyp    • Gastroesophageal reflux disease without esophagitis 12/4/2017   • Hand injury     LAST ASSESSED: [id number]   • High risk medication use     LAST ASSESSED: [id number]   • Hyperlipidemia    • Hypertension    • Renal colic     LAST ASSESSED: [id number]   • Viral syndrome     LAST ASSESSED: [id number]   • Vitamin D deficiency disease      Past Surgical History:   Procedure Laterality Date   • CHOLECYSTECTOMY     • COLON SURGERY     • COLONOSCOPY N/A 6/15/2018    Procedure: COLONOSCOPY;  Surgeon: Loreta Morgan DO;  [facility];  Service: Gastroenterology   • COLONOSCOPY N/A 8/10/2018    Procedure: COLONOSCOPY;  Surgeon: Loreta Morgan DO;  [facility];  Service: Gastroenterology   • COLOSTOMY     • ENDOMETRIAL ABLATION  2007    THERMAL   • ESOPHAGOGASTRODUODENOSCOPY N/A 4/6/2018    Procedure: ESOPHAGOGASTRODUODENOSCOPY (EGD);   Surgeon: Vivi Alvarenga MD;  [facility];  Service: Gastroenterology   • HYSTEROSCOPY      1995, 2002, 2007   • LAPAROTOMY N/A 1/4/2018    Procedure: LAPAROTOMY EXPLORATORY, 2nd look, small bowel resection, ilieostomy closure via [place] anastamosis transverse colostomy;  Surgeon: Guille Prather DO;  Location:  MAIN OR;  Service: General   • [place] CLOSURE ENTEROSTOMY LG/SMALL INTESTINE N/A 7/11/2018    Procedure: COLOSTOMY TAKEDOWN / REVERSE COATES, ILEOCECECTOMY, LASER FLUORESCENCE ANGIOGRAPHY, LYSIS OF ADHESIONS;  Surgeon: Rosa Maria Chavarria DO;  Location:  MAIN OR;  Service: General   • CA COLECTOMY PARTIAL W/ANASTOMOSIS N/A 1/3/2018    Procedure: Exploratory laparotomy, RESECTION COLON SIGMOID, possible ostomy;  Surgeon: Rosa Maria Chavarria DO;  [facility];  Service: General   • CA ESOPHAGOGASTRODUODENOSCOPY TRANSORAL DIAGNOSTIC N/A 10/21/2016    Procedure: EGD AND COLONOSCOPY;  Surgeon: Brittney Nur MD;  [facility];  Service: Gastroenterology   • THYROIDECTOMY      NEAR-TOTAL   • THYROIDECTOMY, PARTIAL     • TONSILLECTOMY AND ADENOIDECTOMY     • TUBAL LIGATION     • URETERAL STENT PLACEMENT Bilateral 1/3/2018    Procedure: INSERTION STENT URETERAL;  Surgeon: Jessica Otoole MD;  [facility];  Service: Urology     Social History   Social History     Substance and Sexual Activity   Alcohol Use Yes   • Alcohol/week: 2.0 standard drinks of alcohol   • Types: 2 Standard drinks or equivalent per week    Comment: 1-2 mixed drinks per weekend     Social History     Substance and Sexual Activity   Drug Use No     Social History     Tobacco Use   Smoking Status Former   • Packs/day: 0.50   • Years: 30.00   • Total pack years: 15.00   • Types: Cigarettes   • Quit date:    • Years since quittin.7   Smokeless Tobacco Never     Family History   Problem Relation Age of Onset   • Diabetes type II Mother    • Heart failure Father    • Skin cancer Sister    • No Known Problems Daughter    • No Known Problems Maternal Grandmother    • No Known Problems Maternal Grandfather    • No Known Problems Paternal Grandmother    • No Known Problems Paternal Grandfather    • No Known Problems Sister    • No Known Problems Son    • No Known Problems Son    • No Known Problems Paternal Aunt        Meds/Allergies     (Not in a hospital admission)      No Known Allergies    Objective     /84   Pulse 73 Temp 98.5 °F (36.9 °C) (Tympanic)   Resp 18   Ht 5' 1" (1.549 m)   Wt 99.3 kg (219 lb)   LMP  (LMP Unknown)   SpO2 95%   BMI 41.38 kg/m²       PHYSICAL EXAM    Gen: NAD  CV: RRR  CHEST: Clear  ABD: soft, NT/ND  EXT: no edema      ASSESSMENT/PLAN:  This is a 60y.o. year old female here for egd/colonoscopy, and she is stable and optimized for her procedure.

## 2023-10-04 NOTE — ANESTHESIA PREPROCEDURE EVALUATION
Procedure:  COLONOSCOPY  EGD     History of complicated diverticulitis requiring sigmoidectomy/Lundberg's procedure with reversal and ileocecectomy. History of cholecystectomy as well. Chronic diarrhea likely secondary to bile salt diarrhea and malabsorption. Relevant Problems   CARDIO   (+) Essential hypertension   (+) Hyperlipidemia   (+) Non-rheumatic mitral regurgitation   (+) Paroxysmal atrial fibrillation (HCC)   (+) Systolic murmur      GI/HEPATIC   (+) Esophageal reflux   (+) Gastroesophageal reflux disease without esophagitis      HEMATOLOGY   (+) Anemia      MUSCULOSKELETAL   (+) Primary osteoarthritis of left knee      Endocrine   (+) Thyroid disorder      Other   (+) Class 3 severe obesity with body mass index (BMI) of 40.0 to 44.9 in adult [facility])        Physical Exam    Airway    Mallampati score: II  TM Distance: >3 FB  Neck ROM: full     Dental       Cardiovascular      Pulmonary      Other Findings        Anesthesia Plan  ASA Score- 3     Anesthesia Type- IV sedation with anesthesia with ASA Monitors.          Additional Monitors:   Airway Plan:     Comment: Recent labs personally reviewed:  Lab Results       Component                Value               Date                       WBC                      6.66                08/27/2023                 HGB                      14.5                08/27/2023                 PLT                      236                 08/27/2023            Lab Results       Component                Value               Date                       NA                       141                 12/14/2014                 K                        3.2 (L)             08/27/2023                 BUN                      8                   08/27/2023                 CREATININE               0.71                08/27/2023                 GLUCOSE                  162 (H)             07/11/2018            Lab Results       Component                Value               Date PTT                      31                  08/27/2023             Lab Results       Component                Value               Date                       INR                      0.96                08/27/2023              Blood type Eulogio Bowling I, Yakov Kennedy MD, have personally seen and evaluated the patient prior to anesthetic care. I have reviewed the pre-anesthetic record, medical history, allergies, medications and any other medical records if appropriate to the anesthetic care. If a CRNA is involved in the case, I have reviewed the CRNA assessment, if present, and agree. Patient consented for IV Sedation, general anesthesia as back up. Discussed risks of aspiration, IV infiltration, indications for conversion to general anesthesia. All questions and concerns addressed. .       Plan Factors-Exercise tolerance (METS): >4 METS. Chart reviewed. Existing labs reviewed. Patient summary reviewed. Patient is not a current smoker. Patient did not smoke on day of surgery. Obstructive sleep apnea risk education given perioperatively. Induction- intravenous. Postoperative Plan-     Informed Consent- Anesthetic plan and risks discussed with patient. I personally reviewed this patient with the CRNA. Discussed and agreed on the Anesthesia Plan with the CRNA. Yadira Heller

## 2023-10-09 PROCEDURE — 88305 TISSUE EXAM BY PATHOLOGIST: CPT | Performed by: PATHOLOGY

## 2023-10-09 NOTE — RESULT ENCOUNTER NOTE
Results relayed via Mychart  1 subcentimeter tubular adenoma.   Repeat colonoscopy 7 years  Otherwise unremarkable pathology

## 2023-10-24 ENCOUNTER — ANNUAL EXAM (OUTPATIENT)
Dept: OBGYN CLINIC | Facility: CLINIC | Age: 58
End: 2023-10-24

## 2023-10-24 VITALS
HEIGHT: 61 IN | BODY MASS INDEX: 42.44 KG/M2 | DIASTOLIC BLOOD PRESSURE: 90 MMHG | WEIGHT: 224.8 LBS | SYSTOLIC BLOOD PRESSURE: 138 MMHG

## 2023-10-24 DIAGNOSIS — N90.89 VULVAR IRRITATION: ICD-10-CM

## 2023-10-24 DIAGNOSIS — Z01.419 ENCOUNTER FOR WELL WOMAN EXAM WITH ROUTINE GYNECOLOGICAL EXAM: Primary | ICD-10-CM

## 2023-10-24 PROCEDURE — G0145 SCR C/V CYTO,THINLAYER,RESCR: HCPCS | Performed by: OBSTETRICS & GYNECOLOGY

## 2023-10-24 PROCEDURE — G0476 HPV COMBO ASSAY CA SCREEN: HCPCS | Performed by: OBSTETRICS & GYNECOLOGY

## 2023-10-24 RX ORDER — KETOCONAZOLE 20 MG/ML
SHAMPOO TOPICAL
COMMUNITY
Start: 2023-08-30 | End: 2023-10-24 | Stop reason: ALTCHOICE

## 2023-10-24 RX ORDER — NYSTATIN AND TRIAMCINOLONE ACETONIDE 100000; 1 [USP'U]/G; MG/G
OINTMENT TOPICAL 2 TIMES DAILY
Qty: 30 G | Refills: 5 | Status: SHIPPED | OUTPATIENT
Start: 2023-10-24

## 2023-10-24 RX ORDER — TRIAMCINOLONE ACETONIDE 1 MG/G
OINTMENT TOPICAL
COMMUNITY
Start: 2023-08-30 | End: 2023-10-24 | Stop reason: ALTCHOICE

## 2023-10-24 NOTE — PROGRESS NOTES
ASSESSMENT & PLAN: Severiano Howard was seen today for gynecologic exam.    Diagnoses and all orders for this visit:    Encounter for well woman exam with routine gynecological exam  -     Liquid-based pap, screening    Vulvar irritation  -     nystatin-triamcinolone (MYCOLOG-II) ointment; Apply topically 2 (two) times a day        1. Routine well woman exam done today  2. Pap and HPV:  The patient's pap is not up to date. Pap and cotesting was done today. Current ASCCP Guidelines reviewed. 3.  Mammogram is scheduled for tomorrow. 4.  Colorectal cancer screening is up to date. 4. The following were reviewed in today's visit: adequate intake of calcium and vitamin D, exercise, and healthy diet. 5. F/u 1 year for next routine GYN exam.  6.  Vulvar irritation: Patient to start Mycolog to ointment. Instructions reviewed and prescription sent to patient's pharmacy. CC:  Annual Gynecologic Examination    HPI: Nell Cazares is a 60 y.o. Nesha New who presents for annual gynecologic examination. She has the following concerns:  pt reports external itching about one month. No VD. Denies any changes in soaps detergents. Has had some medication changes. Health Maintenance:    Patient describes her health as fair. Patient has weight concerns. She exercises with walking at work. .    She does wear her seatbelt routinely. She does not perform regular monthly self breast exams. She does feel safe at home. Patients does not follow a special diet. Patient gets 1 servings of dairy or calcium rich foods a day.     Last pap: 2019 nl pap and neg HPV  Last mammogram: 2020, scheduled for tomorrow  Last colorectal cancer screening: colonoscopy 2023, repeat in 7 years    Patient Active Problem List   Diagnosis    Esophageal reflux    Hyperlipidemia    Essential hypertension    Hypokalemia    Hypoalbuminemia    Transaminitis    Severe protein-calorie malnutrition ([address])    Microscopic hematuria    Hypomagnesemia Diverticulitis of colon    Hematuria    Paroxysmal atrial fibrillation (HCC)    Ileus (HCC)    Ache in joint    Anemia    Diverticulosis    Erysipelas    Fatigue    Leg pain, left    Non-rheumatic mitral regurgitation    Primary osteoarthritis of left knee    Systolic murmur    Thyroid disorder    Vitamin D deficiency    Dehydration    Intractable vomiting with nausea    Gastroesophageal reflux disease without esophagitis    Hair loss    Adenomatous polyps    Screening for colon cancer    Hair abnormality    Diarrhea    Clostridium difficile colitis    Dyspepsia    Moderate protein-calorie malnutrition (HCC)    Class 3 severe obesity with body mass index (BMI) of 40.0 to 44.9 in adult [facility])       Past Medical History:   Diagnosis Date    Atrial fibrillation ([address] [facility])     Disease of thyroid gland     Endometrial polyp     Gastroesophageal reflux disease without esophagitis 12/4/2017    Hand injury     LAST ASSESSED: [id number]    High risk medication use     LAST ASSESSED: [id number]    Hyperlipidemia     Hypertension     Renal colic     LAST ASSESSED: [id number]    Viral syndrome     LAST ASSESSED: [id number]    Vitamin D deficiency disease        Past Surgical History:   Procedure Laterality Date    CHOLECYSTECTOMY      COLON SURGERY      COLONOSCOPY N/A 6/15/2018    Procedure: COLONOSCOPY;  Surgeon: Heidy Vela DO;  [facility];  Service: Gastroenterology    COLONOSCOPY N/A 8/10/2018    Procedure: COLONOSCOPY;  Surgeon: Heidy Vela DO;  [facility];  Service: Gastroenterology    COLOSTOMY      ENDOMETRIAL ABLATION  2007    THERMAL    ESOPHAGOGASTRODUODENOSCOPY N/A 4/6/2018    Procedure: ESOPHAGOGASTRODUODENOSCOPY (EGD);   Surgeon: Alexey Salazar MD;  [facility];  Service: Gastroenterology    HYSTEROSCOPY      1995, 2002, 2007    LAPAROTOMY N/A 1/4/2018    Procedure: LAPAROTOMY EXPLORATORY, 2nd look, small bowel resection, ilieostomy closure via handsown anastamosis transverse colostomy; Surgeon: Jarett Carr DO;  [facility];  Service: General    MD CLOSURE ENTEROSTOMY LG/SMALL INTESTINE N/A 7/11/2018    Procedure: COLOSTOMY TAKEDOWN / REVERSE COATES, ILEOCECECTOMY, LASER FLUORESCENCE ANGIOGRAPHY, LYSIS OF ADHESIONS;  Surgeon: Sandra Oviedo DO;  [facility];  Service: General    MD COLECTOMY PARTIAL W/ANASTOMOSIS N/A 1/3/2018    Procedure: Exploratory laparotomy, RESECTION COLON SIGMOID, possible ostomy;  Surgeon: Sandra Oviedo DO;  [facility];  Service: General    MD ESOPHAGOGASTRODUODENOSCOPY TRANSORAL DIAGNOSTIC N/A 10/21/2016    Procedure: EGD AND COLONOSCOPY;  Surgeon: Vijay Rainey MD;  [facility];  Service: Gastroenterology    THYROIDECTOMY      NEAR-TOTAL    THYROIDECTOMY, PARTIAL      TONSILLECTOMY AND ADENOIDECTOMY      TUBAL LIGATION  1997    URETERAL STENT PLACEMENT Bilateral 1/3/2018    Procedure: INSERTION STENT URETERAL;  Surgeon: Alexey Hester MD;  [facility];  Service: Urology       Past OB/Gyn History:    History of abnormal pap smears: No.   Patient is currently sexually active. heterosexual. Patient's family planning method is post menopausal status.     Family History   Problem Relation Age of Onset    Diabetes type II Mother     Heart failure Father     Skin cancer Sister     No Known Problems Daughter     No Known Problems Maternal Grandmother     No Known Problems Maternal Grandfather     No Known Problems Paternal Grandmother     No Known Problems Paternal Grandfather     No Known Problems Sister     No Known Problems Son     No Known Problems Son     No Known Problems Paternal Aunt        Social History:  Social History     Socioeconomic History    Marital status: /Civil Union     Spouse name: Not on file    Number of children: Not on file    Years of education: Not on file    Highest education level: Not on file   Occupational History    Not on file   Tobacco Use    Smoking status: Former     Packs/day: 0.50     Years: 30.00     Total pack years: 15.00     Types: Cigarettes     Quit date:      Years since quittin.8    Smokeless tobacco: Never   Vaping Use    Vaping Use: Never used   Substance and Sexual Activity    Alcohol use: Yes     Alcohol/week: 2.0 standard drinks of alcohol     Types: 2 Standard drinks or equivalent per week     Comment: 1-2 mixed drinks per weekend    Drug use: No    Sexual activity: Not Currently     Partners: Male     Birth control/protection: Post-menopausal   Other Topics Concern    Not on file   Social History Narrative    Not on file     Social Determinants of Health     Financial Resource Strain: Not on file   Food Insecurity: Not on file   Transportation Needs: Not on file   Physical Activity: Not on file   Stress: Not on file   Social Connections: Not on file   Intimate Partner Violence: Not on file   Housing Stability: Not on file     Presently lives with . Patient is currently employed works in [employer] at [employer]. No Known Allergies      Current Outpatient Medications:     aspirin (ECOTRIN LOW STRENGTH) 81 mg EC tablet, Take 1 tablet (81 mg total) by mouth daily, Disp: 30 tablet, Rfl: 5    colestipol (COLESTID) 1 g tablet, take 2 tablets by mouth three times a day, Disp: 180 tablet, Rfl: 0    famotidine (PEPCID) 40 MG tablet, Take 1 tablet (40 mg total) by mouth daily at bedtime, Disp: 30 tablet, Rfl: 3    metoprolol succinate (TOPROL-XL) 25 mg 24 hr tablet, take 1 tablet by mouth once daily, Disp: 30 tablet, Rfl: 5    metroNIDAZOLE (METROCREAM) 0.75 % cream, Apply topically 2 (two) times a day, Disp: 45 g, Rfl: 0    nystatin-triamcinolone (MYCOLOG-II) ointment, Apply topically 2 (two) times a day, Disp: 30 g, Rfl: 5    pantoprazole (PROTONIX) 40 mg tablet, Take 1 tablet (40 mg total) by mouth 2 (two) times a day, Disp: 60 tablet, Rfl: 0    Review of Systems  Constitutional :no fever, feels well, no tiredness, recent weight gain.   ENT: no ear ache, no loss of hearing, no nosebleeds or nasal discharge, no sore throat or hoarseness. Cardiovascular: no complaints of slow or fast heart beat, no chest pain, no palpitations, no leg claudication or lower extremity edema. Respiratory: no complaints of shortness of shortness of breath, no HENDERSON  Breasts:no complaints of breast pain, breast lump, or nipple discharge  Gastrointestinal: no complaints of abdominal pain, constipation, nausea, vomiting, or diarrhea or bloody stools  Genitourinary : no complaints of dysuria, incontinence, pelvic pain, no dysmenorrhea, vaginal discharge or abnormal vaginal bleeding and as noted in HPI. Musculoskeletal: no complaints of arthralgia, no myalgia, no joint swelling or stiffness, no limb pain or swelling. Integumentary: no complaints of skin rash or lesion, itching or dry skin  Neurological: no complaints of headache, no confusion, no numbness or tingling, no dizziness or fainting    Physical Exam:     /90   Ht 5' 1" (1.549 m)   Wt 102 kg (224 lb 12.8 oz)   LMP  (LMP Unknown)   BMI 42.48 kg/m²     General: appears stated age, cooperative, alert normal mood and affect   Psychiatric oriented to person, place and time. Mood and affect normal   Neck: normal, supple,trachea midline, no masses.   Thyroid: normal, no thyromegaly   Heart: regular rate and rhythm, S1, S2 normal, no murmur, click, rub or gallop   Lungs: clear to auscultation bilaterally, no increased work of breathing or signs of respiratory distress   Breasts: normal, no dimpling or skin changes noted   Abdomen: soft, non-tender, without masses or organomegaly   Vulva: normal , no lesions   Vagina: normal , no lesions or atrophy   Urethra: normal   Urethal meatus normal   Bladder Normal, soft, non-tender and no prolapse or masses appreciated   Cervix: normal, no palpable masses    Uterus: normal , non-tender, not enlarged, no palpable masses   Adnexa: normal, non-tender without fullness or masses Lymphatic Palpation of lymph nodes in neck, axilla, groin and/or other locations: no lymphadenopathy or masses noted   Skin Normal skin turgor and no rashes.   Palpation of skin and subcutaneous tissue normal.

## 2023-10-25 ENCOUNTER — HOSPITAL ENCOUNTER (OUTPATIENT)
Dept: MAMMOGRAPHY | Facility: HOSPITAL | Age: 58
Discharge: HOME | End: 2023-10-25
Attending: FAMILY MEDICINE
Payer: COMMERCIAL

## 2023-10-25 VITALS — BODY MASS INDEX: 42.44 KG/M2 | HEIGHT: 61 IN | WEIGHT: 224.8 LBS

## 2023-10-25 DIAGNOSIS — Z12.31 ENCOUNTER FOR SCREENING MAMMOGRAM FOR MALIGNANT NEOPLASM OF BREAST: ICD-10-CM

## 2023-10-25 PROCEDURE — 77063 BREAST TOMOSYNTHESIS BI: CPT

## 2023-10-25 PROCEDURE — 77067 SCR MAMMO BI INCL CAD: CPT

## 2023-10-26 LAB
HPV HR 12 DNA CVX QL NAA+PROBE: NEGATIVE
HPV16 DNA CVX QL NAA+PROBE: NEGATIVE
HPV18 DNA CVX QL NAA+PROBE: NEGATIVE

## 2023-10-28 DIAGNOSIS — R93.3 ABNORMAL CT SCAN, SMALL BOWEL: ICD-10-CM

## 2023-10-28 DIAGNOSIS — R10.9 ABDOMINAL PAIN: ICD-10-CM

## 2023-10-30 ENCOUNTER — TELEPHONE (OUTPATIENT)
Dept: FAMILY MEDICINE CLINIC | Facility: CLINIC | Age: 58
End: 2023-10-30

## 2023-10-30 LAB
LAB AP GYN PRIMARY INTERPRETATION: NORMAL
Lab: NORMAL

## 2023-10-30 RX ORDER — PANTOPRAZOLE SODIUM 40 MG/1
40 TABLET, DELAYED RELEASE ORAL 2 TIMES DAILY
Qty: 60 TABLET | Refills: 0 | Status: SHIPPED | OUTPATIENT
Start: 2023-10-30

## 2023-10-30 NOTE — TELEPHONE ENCOUNTER
Left VM for pt to call and schedule an appt in order to refill medication as per prov request,LOV 10/21/2021

## 2023-11-03 DIAGNOSIS — R19.7 DIARRHEA, UNSPECIFIED TYPE: ICD-10-CM

## 2023-11-03 RX ORDER — MONTELUKAST SODIUM 4 MG/1
2 TABLET, CHEWABLE ORAL 3 TIMES DAILY
Qty: 180 TABLET | Refills: 0 | Status: SHIPPED | OUTPATIENT
Start: 2023-11-03

## 2023-11-04 DIAGNOSIS — I48.0 PAROXYSMAL ATRIAL FIBRILLATION (HCC): ICD-10-CM

## 2023-11-04 DIAGNOSIS — I10 ESSENTIAL HYPERTENSION: ICD-10-CM

## 2023-11-06 RX ORDER — METOPROLOL SUCCINATE 25 MG/1
TABLET, EXTENDED RELEASE ORAL
Qty: 30 TABLET | Refills: 1 | Status: SHIPPED | OUTPATIENT
Start: 2023-11-06

## 2023-11-26 DIAGNOSIS — R93.3 ABNORMAL CT SCAN, GASTROINTESTINAL TRACT: ICD-10-CM

## 2023-11-26 DIAGNOSIS — R11.2 INTRACTABLE VOMITING WITH NAUSEA: ICD-10-CM

## 2023-11-27 RX ORDER — FAMOTIDINE 40 MG/1
40 TABLET, FILM COATED ORAL
Qty: 90 TABLET | Refills: 1 | Status: SHIPPED | OUTPATIENT
Start: 2023-11-27

## 2023-11-28 DIAGNOSIS — R93.3 ABNORMAL CT SCAN, SMALL BOWEL: ICD-10-CM

## 2023-11-28 DIAGNOSIS — R10.9 ABDOMINAL PAIN: ICD-10-CM

## 2023-11-28 RX ORDER — PANTOPRAZOLE SODIUM 40 MG/1
40 TABLET, DELAYED RELEASE ORAL DAILY
Qty: 30 TABLET | Refills: 0 | Status: SHIPPED | OUTPATIENT
Start: 2023-11-28 | End: 2023-12-06 | Stop reason: SDUPTHER

## 2023-12-05 ENCOUNTER — TELEPHONE (OUTPATIENT)
Dept: FAMILY MEDICINE CLINIC | Facility: CLINIC | Age: 58
End: 2023-12-05

## 2023-12-05 NOTE — TELEPHONE ENCOUNTER
Pt called stating she was on Protonix twice a day back in October, when pt renewed rx in November the instructions were different stating to take once a day. Pt stated she is taking twice daily since that helps her, but will run out of the rx sooner now. Please advise.

## 2023-12-05 NOTE — TELEPHONE ENCOUNTER
Pt was seen by Myesha Cargo 8/31/23 & they recommended that the Patient stay on the Protonix twice daily - Pt would like the Rx changed back to twice daily

## 2023-12-06 DIAGNOSIS — R10.9 ABDOMINAL PAIN: ICD-10-CM

## 2023-12-06 DIAGNOSIS — R93.3 ABNORMAL CT SCAN, SMALL BOWEL: ICD-10-CM

## 2023-12-06 RX ORDER — PANTOPRAZOLE SODIUM 40 MG/1
40 TABLET, DELAYED RELEASE ORAL 2 TIMES DAILY
Qty: 60 TABLET | Refills: 3 | Status: SHIPPED | OUTPATIENT
Start: 2023-12-06

## 2023-12-28 DIAGNOSIS — R11.2 INTRACTABLE VOMITING WITH NAUSEA: ICD-10-CM

## 2023-12-28 DIAGNOSIS — R93.3 ABNORMAL CT SCAN, GASTROINTESTINAL TRACT: ICD-10-CM

## 2023-12-28 RX ORDER — FAMOTIDINE 40 MG/1
40 TABLET, FILM COATED ORAL
Qty: 90 TABLET | Refills: 1 | Status: SHIPPED | OUTPATIENT
Start: 2023-12-28

## 2023-12-30 DIAGNOSIS — R19.7 DIARRHEA, UNSPECIFIED TYPE: ICD-10-CM

## 2024-01-02 RX ORDER — MONTELUKAST SODIUM 4 MG/1
2 TABLET, CHEWABLE ORAL 3 TIMES DAILY
Qty: 180 TABLET | Refills: 0 | Status: SHIPPED | OUTPATIENT
Start: 2024-01-02

## 2024-01-06 DIAGNOSIS — I10 ESSENTIAL HYPERTENSION: ICD-10-CM

## 2024-01-06 DIAGNOSIS — I48.0 PAROXYSMAL ATRIAL FIBRILLATION (HCC): ICD-10-CM

## 2024-01-08 RX ORDER — METOPROLOL SUCCINATE 25 MG/1
TABLET, EXTENDED RELEASE ORAL
Qty: 30 TABLET | Refills: 1 | Status: SHIPPED | OUTPATIENT
Start: 2024-01-08

## 2024-01-25 DIAGNOSIS — R19.7 DIARRHEA, UNSPECIFIED TYPE: ICD-10-CM

## 2024-01-25 RX ORDER — MONTELUKAST SODIUM 4 MG/1
2 TABLET, CHEWABLE ORAL 3 TIMES DAILY
Qty: 180 TABLET | Refills: 1 | Status: SHIPPED | OUTPATIENT
Start: 2024-01-25

## 2024-02-21 PROBLEM — E86.0 DEHYDRATION: Status: RESOLVED | Noted: 2018-02-13 | Resolved: 2024-02-21

## 2024-02-21 PROBLEM — Z12.11 SCREENING FOR COLON CANCER: Status: RESOLVED | Noted: 2018-04-26 | Resolved: 2024-02-21

## 2024-03-01 DIAGNOSIS — I10 ESSENTIAL HYPERTENSION: ICD-10-CM

## 2024-03-01 DIAGNOSIS — I48.0 PAROXYSMAL ATRIAL FIBRILLATION (HCC): ICD-10-CM

## 2024-03-01 RX ORDER — METOPROLOL SUCCINATE 25 MG/1
TABLET, EXTENDED RELEASE ORAL
Qty: 30 TABLET | Refills: 5 | Status: SHIPPED | OUTPATIENT
Start: 2024-03-01

## 2024-03-19 DIAGNOSIS — R19.7 DIARRHEA, UNSPECIFIED TYPE: ICD-10-CM

## 2024-03-19 RX ORDER — MONTELUKAST SODIUM 4 MG/1
2 TABLET, CHEWABLE ORAL 3 TIMES DAILY
Qty: 180 TABLET | Refills: 0 | Status: SHIPPED | OUTPATIENT
Start: 2024-03-19

## 2024-03-24 DIAGNOSIS — I48.0 PAROXYSMAL ATRIAL FIBRILLATION (HCC): ICD-10-CM

## 2024-03-24 DIAGNOSIS — I10 ESSENTIAL HYPERTENSION: ICD-10-CM

## 2024-03-25 RX ORDER — METOPROLOL SUCCINATE 25 MG/1
25 TABLET, EXTENDED RELEASE ORAL DAILY
Qty: 30 TABLET | Refills: 0 | Status: SHIPPED | OUTPATIENT
Start: 2024-03-25

## 2024-04-15 DIAGNOSIS — I10 ESSENTIAL HYPERTENSION: ICD-10-CM

## 2024-04-15 DIAGNOSIS — I48.0 PAROXYSMAL ATRIAL FIBRILLATION (HCC): ICD-10-CM

## 2024-04-16 NOTE — TELEPHONE ENCOUNTER
Patient last seen in 2022.  Called patient and left voicemail for patient to call back and schedule overdue f/u.  Patient had been notified 2x previously and did not call back to schedule.   Also told her that she can ask her PCP if he would refill the metoprolol for her as well.   Will send Dr Cramer a 30 day supply to sign off and until we hear back from patient.

## 2024-04-17 RX ORDER — METOPROLOL SUCCINATE 25 MG/1
25 TABLET, EXTENDED RELEASE ORAL DAILY
Qty: 30 TABLET | Refills: 0 | Status: SHIPPED | OUTPATIENT
Start: 2024-04-17

## 2024-04-25 DIAGNOSIS — R19.7 DIARRHEA, UNSPECIFIED TYPE: ICD-10-CM

## 2024-04-25 RX ORDER — MONTELUKAST SODIUM 4 MG/1
2 TABLET, CHEWABLE ORAL 3 TIMES DAILY
Qty: 180 TABLET | Refills: 0 | Status: SHIPPED | OUTPATIENT
Start: 2024-04-25

## 2024-05-14 DIAGNOSIS — I10 ESSENTIAL HYPERTENSION: ICD-10-CM

## 2024-05-14 DIAGNOSIS — I48.0 PAROXYSMAL ATRIAL FIBRILLATION (HCC): ICD-10-CM

## 2024-05-17 RX ORDER — METOPROLOL SUCCINATE 25 MG/1
25 TABLET, EXTENDED RELEASE ORAL DAILY
Qty: 30 TABLET | Refills: 5 | Status: SHIPPED | OUTPATIENT
Start: 2024-05-17

## 2024-05-18 DIAGNOSIS — R93.3 ABNORMAL CT SCAN, SMALL BOWEL: ICD-10-CM

## 2024-05-18 DIAGNOSIS — R10.9 ABDOMINAL PAIN: ICD-10-CM

## 2024-05-20 RX ORDER — PANTOPRAZOLE SODIUM 40 MG/1
40 TABLET, DELAYED RELEASE ORAL 2 TIMES DAILY
Qty: 60 TABLET | Refills: 0 | Status: SHIPPED | OUTPATIENT
Start: 2024-05-20

## 2024-06-01 DIAGNOSIS — R19.7 DIARRHEA, UNSPECIFIED TYPE: ICD-10-CM

## 2024-06-03 RX ORDER — MONTELUKAST SODIUM 4 MG/1
2 TABLET, CHEWABLE ORAL 3 TIMES DAILY
Qty: 180 TABLET | Refills: 5 | Status: SHIPPED | OUTPATIENT
Start: 2024-06-03

## 2024-06-25 DIAGNOSIS — R11.2 INTRACTABLE VOMITING WITH NAUSEA: ICD-10-CM

## 2024-06-25 DIAGNOSIS — R93.3 ABNORMAL CT SCAN, GASTROINTESTINAL TRACT: ICD-10-CM

## 2024-06-26 RX ORDER — FAMOTIDINE 40 MG/1
40 TABLET, FILM COATED ORAL
Qty: 90 TABLET | Refills: 1 | Status: SHIPPED | OUTPATIENT
Start: 2024-06-26

## 2024-06-29 DIAGNOSIS — R93.3 ABNORMAL CT SCAN, SMALL BOWEL: ICD-10-CM

## 2024-06-29 DIAGNOSIS — R10.9 ABDOMINAL PAIN: ICD-10-CM

## 2024-07-01 RX ORDER — PANTOPRAZOLE SODIUM 40 MG/1
40 TABLET, DELAYED RELEASE ORAL 2 TIMES DAILY
Qty: 60 TABLET | Refills: 0 | Status: SHIPPED | OUTPATIENT
Start: 2024-07-01

## 2024-08-05 DIAGNOSIS — R93.3 ABNORMAL CT SCAN, SMALL BOWEL: ICD-10-CM

## 2024-08-05 DIAGNOSIS — R10.9 ABDOMINAL PAIN: ICD-10-CM

## 2024-08-06 RX ORDER — PANTOPRAZOLE SODIUM 40 MG/1
40 TABLET, DELAYED RELEASE ORAL 2 TIMES DAILY
Qty: 60 TABLET | Refills: 0 | Status: SHIPPED | OUTPATIENT
Start: 2024-08-06

## 2024-10-26 DIAGNOSIS — I10 ESSENTIAL HYPERTENSION: ICD-10-CM

## 2024-10-26 DIAGNOSIS — I48.0 PAROXYSMAL ATRIAL FIBRILLATION (HCC): ICD-10-CM

## 2024-10-28 ENCOUNTER — TELEPHONE (OUTPATIENT)
Dept: CARDIOLOGY CLINIC | Facility: CLINIC | Age: 59
End: 2024-10-28

## 2024-10-28 RX ORDER — METOPROLOL SUCCINATE 25 MG/1
25 TABLET, EXTENDED RELEASE ORAL DAILY
Qty: 30 TABLET | Refills: 0 | Status: SHIPPED | OUTPATIENT
Start: 2024-10-28

## 2024-10-28 NOTE — TELEPHONE ENCOUNTER
Received inbasket message of Medication denial. Patient is OVERDUE for a visit.     LM for patient to schedule appt. Sent only 30 day supply of med refil     Last office visit: 10/27/22 with TRB  AVS stated to F/u in 6 month     Appointment was scheduled for 4/27/23 but myChart cancelled due to not having insurance.   Appointment was scheduled for 10/12/23 but cancelled via automated reminder.

## 2024-11-27 DIAGNOSIS — R93.3 ABNORMAL CT SCAN, SMALL BOWEL: ICD-10-CM

## 2024-11-27 DIAGNOSIS — I48.0 PAROXYSMAL ATRIAL FIBRILLATION (HCC): ICD-10-CM

## 2024-11-27 DIAGNOSIS — R10.9 ABDOMINAL PAIN: ICD-10-CM

## 2024-11-27 DIAGNOSIS — I10 ESSENTIAL HYPERTENSION: ICD-10-CM

## 2024-11-29 RX ORDER — PANTOPRAZOLE SODIUM 40 MG/1
40 TABLET, DELAYED RELEASE ORAL 2 TIMES DAILY
Qty: 60 TABLET | Refills: 0 | Status: SHIPPED | OUTPATIENT
Start: 2024-11-29

## 2024-12-04 DIAGNOSIS — R19.7 DIARRHEA, UNSPECIFIED TYPE: ICD-10-CM

## 2024-12-05 RX ORDER — MONTELUKAST SODIUM 4 MG/1
2 TABLET, CHEWABLE ORAL 3 TIMES DAILY
Qty: 180 TABLET | Refills: 0 | Status: SHIPPED | OUTPATIENT
Start: 2024-12-05

## 2024-12-11 RX ORDER — METOPROLOL SUCCINATE 25 MG/1
25 TABLET, EXTENDED RELEASE ORAL DAILY
Qty: 30 TABLET | Refills: 0 | Status: SHIPPED | OUTPATIENT
Start: 2024-12-11

## 2024-12-30 DIAGNOSIS — R93.3 ABNORMAL CT SCAN, SMALL BOWEL: ICD-10-CM

## 2024-12-30 DIAGNOSIS — R10.9 ABDOMINAL PAIN: ICD-10-CM

## 2025-01-02 RX ORDER — PANTOPRAZOLE SODIUM 40 MG/1
40 TABLET, DELAYED RELEASE ORAL 2 TIMES DAILY
Qty: 60 TABLET | Refills: 0 | Status: SHIPPED | OUTPATIENT
Start: 2025-01-02

## 2025-01-05 DIAGNOSIS — R19.7 DIARRHEA, UNSPECIFIED TYPE: ICD-10-CM

## 2025-01-07 RX ORDER — COLESTIPOL HYDROCHLORIDE 1 G/1
2 TABLET ORAL 3 TIMES DAILY
Qty: 180 TABLET | Refills: 0 | Status: SHIPPED | OUTPATIENT
Start: 2025-01-07

## 2025-01-11 DIAGNOSIS — I48.0 PAROXYSMAL ATRIAL FIBRILLATION (HCC): ICD-10-CM

## 2025-01-11 DIAGNOSIS — I10 ESSENTIAL HYPERTENSION: ICD-10-CM

## 2025-01-13 NOTE — PHYSICAL THERAPY NOTE
PT Treatment Note      08/07/18 0827   Restrictions/Precautions   Other Precautions Contact/isolation   Bed Mobility   Supine to Sit 7  Independent   Transfers   Sit to Stand 7  Independent   Additional items Bedrails   Stand to Sit 7  Independent   Ambulation/Elevation   Gait pattern Forward Flexion   Gait Assistance 5  Supervision   Assistive Device None   Balance   Static Sitting Good   Dynamic Sitting Good   Static Standing Fair +   Dynamic Standing Fair +   Ambulatory Fair +   Endurance Deficit   Endurance Deficit Yes   Assessment   Prognosis Good   Problem List Decreased strength;Decreased endurance; Impaired balance;Decreased mobility   Assessment Ambulated in hallway with supervison  Ambulation limited this session 2* not feeling well, upset stomach  She will benefit from continued PT to address deficits in order to maximize return to PLOF  Plan   Treatment/Interventions Functional transfer training;LE strengthening/ROM; Therapeutic exercise; Endurance training;Elevations;Gait training   Recommendation   Recommendation Home with family support   Pt  OOB in bathroom with call bell within reach at end of PT session  Discussed with Kylee Madden, PT today's treatment and patient's current level of function for care coordination  done

## 2025-01-15 RX ORDER — METOPROLOL SUCCINATE 25 MG/1
25 TABLET, EXTENDED RELEASE ORAL DAILY
Qty: 30 TABLET | Refills: 0 | Status: SHIPPED | OUTPATIENT
Start: 2025-01-15

## 2025-02-05 DIAGNOSIS — R10.9 ABDOMINAL PAIN: ICD-10-CM

## 2025-02-05 DIAGNOSIS — R93.3 ABNORMAL CT SCAN, SMALL BOWEL: ICD-10-CM

## 2025-02-06 RX ORDER — PANTOPRAZOLE SODIUM 40 MG/1
40 TABLET, DELAYED RELEASE ORAL 2 TIMES DAILY
Qty: 60 TABLET | Refills: 0 | Status: SHIPPED | OUTPATIENT
Start: 2025-02-06

## 2025-02-07 DIAGNOSIS — R19.7 DIARRHEA, UNSPECIFIED TYPE: ICD-10-CM

## 2025-02-07 RX ORDER — COLESTIPOL HYDROCHLORIDE 1 G/1
2 TABLET ORAL 3 TIMES DAILY
Qty: 180 TABLET | Refills: 0 | Status: SHIPPED | OUTPATIENT
Start: 2025-02-07

## 2025-02-13 DIAGNOSIS — I48.0 PAROXYSMAL ATRIAL FIBRILLATION (HCC): ICD-10-CM

## 2025-02-13 DIAGNOSIS — I10 ESSENTIAL HYPERTENSION: ICD-10-CM

## 2025-02-13 RX ORDER — METOPROLOL SUCCINATE 25 MG/1
25 TABLET, EXTENDED RELEASE ORAL DAILY
Qty: 30 TABLET | Refills: 0 | Status: SHIPPED | OUTPATIENT
Start: 2025-02-13

## 2025-03-10 DIAGNOSIS — R19.7 DIARRHEA, UNSPECIFIED TYPE: ICD-10-CM

## 2025-03-11 ENCOUNTER — TELEPHONE (OUTPATIENT)
Dept: CARDIOLOGY CLINIC | Facility: CLINIC | Age: 60
End: 2025-03-11

## 2025-03-11 NOTE — TELEPHONE ENCOUNTER
Left a message on patients phone that [facility] are [facility] and that she will need to be a self pay patient or cancel the appt but she needs to let us know.

## 2025-03-13 ENCOUNTER — OFFICE VISIT (OUTPATIENT)
Dept: CARDIOLOGY CLINIC | Facility: CLINIC | Age: 60
End: 2025-03-13
Payer: COMMERCIAL

## 2025-03-13 VITALS
OXYGEN SATURATION: 96 % | SYSTOLIC BLOOD PRESSURE: 138 MMHG | DIASTOLIC BLOOD PRESSURE: 72 MMHG | WEIGHT: 221 LBS | RESPIRATION RATE: 16 BRPM | HEART RATE: 70 BPM | HEIGHT: 62 IN | BODY MASS INDEX: 40.67 KG/M2

## 2025-03-13 DIAGNOSIS — I10 ESSENTIAL HYPERTENSION: Primary | ICD-10-CM

## 2025-03-13 DIAGNOSIS — I48.0 PAROXYSMAL ATRIAL FIBRILLATION (HCC): ICD-10-CM

## 2025-03-13 PROCEDURE — 99213 OFFICE O/P EST LOW 20 MIN: CPT | Performed by: INTERNAL MEDICINE

## 2025-03-13 PROCEDURE — 93000 ELECTROCARDIOGRAM COMPLETE: CPT | Performed by: INTERNAL MEDICINE

## 2025-03-13 RX ORDER — METOPROLOL SUCCINATE 25 MG/1
25 TABLET, EXTENDED RELEASE ORAL DAILY
Qty: 30 TABLET | Refills: 5 | Status: SHIPPED | OUTPATIENT
Start: 2025-03-13

## 2025-03-13 NOTE — ASSESSMENT & PLAN NOTE
Low-Int stroke risk score, no recurrent Afib  Will stay on Asa for now  Discussed how she may want to consider [employer] for home monitoring  Call with sustained palpitations

## 2025-03-13 NOTE — PROGRESS NOTES
Patient ID: Lashaun Menjivar is a 59 y.o. female.        Plan:      Essential hypertension  Controlled    Paroxysmal atrial fibrillation (HCC)  Low-Int stroke risk score, no recurrent Afib  Will stay on Asa for now  Discussed how she may want to consider [employer] for home monitoring  Call with sustained palpitations       Follow up Plan/Other summary comments  There is no evidence for angina, CHF, electrical instability.   Advised no medication changes today.   Cont Toprol 25 mg QD and Asa 81 mg QD  Return in about 1 year (around 3/13/2026).  Call sooner with issues.     HPI: Lashaun is here for overdue FU.  Feels well.  Offers no cardiac related complaints on extensive ROS questioning.  No exertionally mediated cp nor SOB, no sustained nor concerning palps*, no unusual edema, orthopnea, pnd, (pre)syncope, tia, or claudication like sx.     *rare palps Q 2-3 mos usually precipitated with emotional distress and short lived.    Results for orders placed or performed in visit on 25   POCT ECG    Impression    SR, PAC, 66 bpm, oth normal.         Most recent or relevant cardiac/vascular testin Echo EF 65%      Past Surgical History:   Procedure Laterality Date    CHOLECYSTECTOMY      COLON SURGERY      COLONOSCOPY N/A 6/15/2018    Procedure: COLONOSCOPY;  Surgeon: Linda Ayers DO;  [facility];  Service: Gastroenterology    COLONOSCOPY N/A 8/10/2018    Procedure: COLONOSCOPY;  Surgeon: Linda Ayers DO;  [facility];  Service: Gastroenterology    COLOSTOMY      ENDOMETRIAL ABLATION      THERMAL    ESOPHAGOGASTRODUODENOSCOPY N/A 2018    Procedure: ESOPHAGOGASTRODUODENOSCOPY (EGD);  Surgeon: Frank Drew MD;  [facility];  Service: Gastroenterology    HYSTEROSCOPY      , ,     LAPAROTOMY N/A 2018    Procedure: LAPAROTOMY EXPLORATORY, 2nd look, small bowel resection, ilieostomy closure via handsown anastamosis transverse colostomy;  Surgeon:  "Clive Coppola DO;  [facility];  Service: General    DE CLOSURE ENTEROSTOMY LG/SMALL INTESTINE N/A 7/11/2018    Procedure: COLOSTOMY TAKEDOWN / REVERSE COATES, ILEOCECECTOMY, LASER FLUORESCENCE ANGIOGRAPHY, LYSIS OF ADHESIONS;  Surgeon: Abdon Montenegro DO;  [facility];  Service: General    DE COLECTOMY PARTIAL W/ANASTOMOSIS N/A 1/3/2018    Procedure: Exploratory laparotomy, RESECTION COLON SIGMOID, possible ostomy;  Surgeon: Abdon Montenegro DO;  [facility];  Service: General    DE ESOPHAGOGASTRODUODENOSCOPY TRANSORAL DIAGNOSTIC N/A 10/21/2016    Procedure: EGD AND COLONOSCOPY;  Surgeon: Sebastien Smith MD;  [facility];  Service: Gastroenterology    THYROIDECTOMY      NEAR-TOTAL    THYROIDECTOMY, PARTIAL      TONSILLECTOMY AND ADENOIDECTOMY      TUBAL LIGATION  1997    URETERAL STENT PLACEMENT Bilateral 1/3/2018    Procedure: INSERTION STENT URETERAL;  Surgeon: Antonio Stewart MD;  [facility];  Service: Urology       Lipid Profile: Reviewed      Review of Systems   10  point ROS  was otherwise non pertinent or negative except as per HPI or as below.       Objective:     /72 (BP Location: Left arm, Patient Position: Sitting, Cuff Size: Large)   Pulse 70   Resp 16   Ht 5' 2\" (1.575 m)   Wt 100 kg (221 lb)   LMP  (LMP Unknown)   SpO2 96%   BMI 40.42 kg/m²     PHYSICAL EXAM:    General:  Normal appearance in no distress.  Eyes:  Anicteric.  Oral mucosa:  Moist.  Neck:  No JVD. Carotid upstrokes are brisk without bruits.  No masses.  Chest:  Clear to auscultation.  Cardiac:  Regular No palpable PMI.  Normal S1 and S2.  No murmur gallop or rub.  Abdomen:  Soft and nontender. No palpable organomegaly or aortic enlargement.  Extremities:  No peripheral edema.  Musculoskeletal:  Symmetric.   Vascular: Pedal pulses are intact.  Neuro:  Grossly symmetric.  Psych:  Alert and oriented x3.      Meds reviewed.    Current Outpatient Medications:     aspirin (ECOTRIN " LOW STRENGTH) 81 mg EC tablet, Take 1 tablet (81 mg total) by mouth daily, Disp: 30 tablet, Rfl: 5    colestipol (COLESTID) 1 g tablet, take 2 tablets by mouth three times a day, Disp: 180 tablet, Rfl: 0    metoprolol succinate (TOPROL-XL) 25 mg 24 hr tablet, Take 1 tablet (25 mg total) by mouth daily, Disp: 30 tablet, Rfl: 5    metroNIDAZOLE (METROCREAM) 0.75 % cream, Apply topically 2 (two) times a day, Disp: 45 g, Rfl: 0    nystatin-triamcinolone (MYCOLOG-II) ointment, Apply topically 2 (two) times a day, Disp: 30 g, Rfl: 5    pantoprazole (PROTONIX) 40 mg tablet, Take 1 tablet (40 mg total) by mouth 2 (two) times a day, Disp: 60 tablet, Rfl: 0    famotidine (PEPCID) 40 MG tablet, take 1 tablet by mouth at bedtime, Disp: 90 tablet, Rfl: 1     Past Medical History:   Diagnosis Date    Atrial fibrillation (HCC)     Paroxysmal    Disease of thyroid gland     Endometrial polyp     Gastroesophageal reflux disease without esophagitis 2017    Hand injury     LAST ASSESSED: [id number]    High risk medication use     LAST ASSESSED: 2014    Hyperlipidemia     Hypertension     Essential    Renal colic     LAST ASSESSED: 2014    Viral syndrome     LAST ASSESSED: 2015    Vitamin D deficiency disease            Social History     Tobacco Use   Smoking Status Former    Current packs/day: 0.00    Average packs/day: 0.5 packs/day for 30.0 years (15.0 ttl pk-yrs)    Types: Cigarettes    Start date:     Quit date:     Years since quittin.2   Smokeless Tobacco Never

## 2025-03-13 NOTE — PATIENT INSTRUCTIONS
"Patient Education     Atrial fibrillation   The Basics   Written by the doctors and editors at [facility]   What is atrial fibrillation? -- Atrial fibrillation is the most common heart rhythm problem (figure 1). The condition can put you at risk of stroke and other problems, as well as death. Atrial fibrillation is sometimes called \"A-fib.\"  The top 2 chambers of your heart are called the \"atria.\" They pump blood into the larger bottom chambers, which pump blood to your lungs and the rest of your body. In A-fib, your heart beats abnormally and the top chambers stop pumping blood as strongly as normal.  In some people, A-fib never goes away. In others, A-fib can come and go, even with treatment. If you had A-fib in the past, but have a normal heart rhythm now, ask your doctor what you can do to keep A-fib from coming back.  You might be able to lower your chances of having A-fib again if you:   Control your blood pressure   Avoid or limit alcohol   Cut down on caffeine   Get treatment for an overactive thyroid gland   Get regular exercise   Lose weight (if you are overweight)   Reduce stress  What are the symptoms of A-fib? -- Some people with A-fib have no symptoms. When symptoms do happen, they can include:   Feeling as though your heart is racing, skipping beats, or beating out of sync   Mild chest \"tightness\" or pain   Feeling lightheaded, dizzy, or like you might pass out   Having trouble breathing, especially with exercise  Is there a test for A-fib? -- Yes. If your doctor or nurse thinks that you might have A-fib, they will probably do a test called an electrocardiogram (\"ECG\"). It records the electrical activity in your heart.  How is A-fib treated? -- In some cases, A-fib goes away on its own, even without treatment. But many people do need treatment.  Treatment can include 1 or more of the following:   Medicines to control the speed or rhythm of the heartbeat   Medicines to keep clots from forming   " "\"Cardioversion\" - This involves applying an electrical current to the heart to fix its rhythm.   \"Ablation\" - These treatments involve destroying the small part of the heart that is sending abnormal electrical signals. This can be done using heat (\"radiofrequency ablation\") or cold (\"cryoablation\").   Pacemaker - This is a device that is implanted in the body and sends electrical signals to the heart to control the heartbeat.  What will my life be like? -- Most people with A-fib are able to live normal lives. Still, it is important to take the medicines that your doctor prescribes every day. Taking your medicines as directed can help reduce the chances that your A-fib will cause a stroke. It's also a good idea to learn what the signs and symptoms of a stroke are (figure 2).  When should I call the doctor? -- Call for an ambulance (in the [place] and [place], call 9-1-1) if:   You have severe trouble breathing, or you pass out.   You have signs of a heart attack, which might include:   Severe chest pain, pressure, or discomfort with:   Breathing trouble, sweating, upset stomach, or cold and clammy skin   Pain in your arms, back, or jaw   Pain that gets worse with activity like walking up stairs   You have signs of a stroke, which might include:   Numbness or weakness of the face, arm, or leg, especially on 1 side of the body   Confusion, or trouble speaking or understanding   Trouble seeing in 1 or both eyes   Trouble walking, dizziness, or loss of balance or coordination   Severe headache with no known cause  Call your doctor for advice if:   You have trouble breathing when talking or sitting still.   You feel your heart racing, and it does not stop after a while (for example, 1 hour).   You are lightheaded or more tired than normal.  All topics are updated as new evidence becomes available and our peer review process is complete.  This topic retrieved from [employer] on: Feb 26, 2024.  Topic 20508 Version 25.0  Release: " "32.2.4 - C32.56  © 2024 UpToDate, Inc. and/or its affiliates. All rights reserved.  figure 1: Atrial fibrillation     This drawing shows where the heart is located in the chest. In atrial fibrillation (\"A-fib\"), the electrical signals that control the heartbeat are abnormal. As a result, the top 2 chambers of the heart (see arrows) stop pumping effectively, and blood that should move out of these chambers gets left behind.  Graphic 70890 Version 6.0  figure 2: BE FAST to help remember stroke symptoms     One way to help remember stroke symptoms is to think of the words \"BE FAST.\" If a person shows any of these signs, call for an ambulance right away (in the [place] and [place], call 9-1-1).  Graphic 55379 Version 10.0  Consumer Information Use and Disclaimer   Disclaimer: This generalized information is a limited summary of diagnosis, treatment, and/or medication information. It is not meant to be comprehensive and should be used as a tool to help the user understand and/or assess potential diagnostic and treatment options. It does NOT include all information about conditions, treatments, medications, side effects, or risks that may apply to a specific patient. It is not intended to be medical advice or a substitute for the medical advice, diagnosis, or treatment of a health care provider based on the health care provider's examination and assessment of a patient's specific and unique circumstances. Patients must speak with a health care provider for complete information about their health, medical questions, and treatment options, including any risks or benefits regarding use of medications. This information does not endorse any treatments or medications as safe, effective, or approved for treating a specific patient. UpToDate, Inc. and its affiliates disclaim any warranty or liability relating to this information or the use thereof.The use of this information is governed by the Terms of Use, available at " https://www.woltersTELiBrahmauwer.com/en/know/clinical-effectiveness-terms. 2024© [employer], Inc. and its affiliates and/or licensors. All rights reserved.  Copyright   © 2024 [employer]. and/or its affiliates. All rights reserved.

## 2025-03-22 DIAGNOSIS — R19.7 DIARRHEA, UNSPECIFIED TYPE: ICD-10-CM

## 2025-03-22 DIAGNOSIS — R10.9 ABDOMINAL PAIN: ICD-10-CM

## 2025-03-22 DIAGNOSIS — R93.3 ABNORMAL CT SCAN, SMALL BOWEL: ICD-10-CM

## 2025-03-24 RX ORDER — COLESTIPOL HYDROCHLORIDE 1 G/1
2 TABLET ORAL 3 TIMES DAILY
Qty: 180 TABLET | Refills: 0 | Status: SHIPPED | OUTPATIENT
Start: 2025-03-24

## 2025-03-24 RX ORDER — PANTOPRAZOLE SODIUM 40 MG/1
40 TABLET, DELAYED RELEASE ORAL 2 TIMES DAILY
Qty: 60 TABLET | Refills: 0 | Status: SHIPPED | OUTPATIENT
Start: 2025-03-24

## 2025-04-15 DIAGNOSIS — R93.3 ABNORMAL CT SCAN, SMALL BOWEL: ICD-10-CM

## 2025-04-15 DIAGNOSIS — R19.7 DIARRHEA, UNSPECIFIED TYPE: ICD-10-CM

## 2025-04-15 DIAGNOSIS — R10.9 ABDOMINAL PAIN: ICD-10-CM

## 2025-04-15 RX ORDER — COLESTIPOL HYDROCHLORIDE 1 G/1
2 TABLET ORAL 3 TIMES DAILY
Qty: 180 TABLET | Refills: 0 | Status: SHIPPED | OUTPATIENT
Start: 2025-04-15

## 2025-04-16 RX ORDER — PANTOPRAZOLE SODIUM 40 MG/1
40 TABLET, DELAYED RELEASE ORAL 2 TIMES DAILY
Qty: 60 TABLET | Refills: 0 | Status: SHIPPED | OUTPATIENT
Start: 2025-04-16

## 2025-05-15 ENCOUNTER — TELEPHONE (OUTPATIENT)
Dept: FAMILY MEDICINE CLINIC | Facility: CLINIC | Age: 60
End: 2025-05-15

## 2025-05-15 NOTE — LETTER
[facility]  [address] [address]    Date: 05/15/25    Lashaun Menjivar  [address] [address]    Dear Lashaun:    Your primary care provider, Rubens Fuentes DO, [facility], is committed to providing you with quality health care and we consider it a privilege to be your health care provider.  We have not seen you in the office since 10/21/2021 and have been attempting to contact you to schedule your annual physical.  Your primary care provider wants to ensure your ongoing medical care is being addressed.  Please call the office [phone] to re-establish care and schedule your annual physical today.  Yearly physicals are a key component in maintaining good health.  If you have established care with a different primary care provider, please call our office to notify us of this change.  We encourage you to call the number on the back of your insurance card to change your primary care physician with your insurance company as well.   We thank you for choosing [facility] for your healthcare needs.  Sincerely,  Ignacia Pathak MA  [facility]  [phone]

## 2025-05-19 DIAGNOSIS — R10.9 ABDOMINAL PAIN: ICD-10-CM

## 2025-05-19 DIAGNOSIS — R93.3 ABNORMAL CT SCAN, SMALL BOWEL: ICD-10-CM

## 2025-05-23 RX ORDER — PANTOPRAZOLE SODIUM 40 MG/1
40 TABLET, DELAYED RELEASE ORAL 2 TIMES DAILY
Qty: 60 TABLET | Refills: 0 | Status: SHIPPED | OUTPATIENT
Start: 2025-05-23

## 2025-05-29 DIAGNOSIS — R19.7 DIARRHEA, UNSPECIFIED TYPE: ICD-10-CM

## 2025-05-29 RX ORDER — COLESTIPOL HYDROCHLORIDE 1 G/1
2 TABLET ORAL 3 TIMES DAILY
Qty: 180 TABLET | Refills: 0 | Status: SHIPPED | OUTPATIENT
Start: 2025-05-29

## 2025-05-30 NOTE — TELEPHONE ENCOUNTER
Called pt to see what pharmacy they would like this medcation sent to. I got the pt voicemail and left a call back message for the pt to discuss

## 2025-06-03 RX ORDER — COLESTIPOL HYDROCHLORIDE 1 G/1
1 TABLET ORAL 2 TIMES DAILY
Qty: 90 TABLET | Refills: 0 | OUTPATIENT
Start: 2025-06-03

## 2025-06-17 ENCOUNTER — OFFICE VISIT (OUTPATIENT)
Dept: FAMILY MEDICINE CLINIC | Facility: CLINIC | Age: 60
End: 2025-06-17

## 2025-06-17 VITALS
OXYGEN SATURATION: 96 % | SYSTOLIC BLOOD PRESSURE: 154 MMHG | HEART RATE: 74 BPM | WEIGHT: 223 LBS | HEIGHT: 62 IN | TEMPERATURE: 97.9 F | DIASTOLIC BLOOD PRESSURE: 92 MMHG | BODY MASS INDEX: 41.04 KG/M2

## 2025-06-17 DIAGNOSIS — R10.9 ABDOMINAL PAIN: ICD-10-CM

## 2025-06-17 DIAGNOSIS — E55.9 VITAMIN D DEFICIENCY: ICD-10-CM

## 2025-06-17 DIAGNOSIS — M25.562 PAIN IN JOINT OF LEFT KNEE: ICD-10-CM

## 2025-06-17 DIAGNOSIS — H60.93 OTITIS EXTERNA OF BOTH EARS, UNSPECIFIED CHRONICITY, UNSPECIFIED TYPE: ICD-10-CM

## 2025-06-17 DIAGNOSIS — R93.3 ABNORMAL CT SCAN, SMALL BOWEL: ICD-10-CM

## 2025-06-17 DIAGNOSIS — E78.2 MIXED HYPERLIPIDEMIA: ICD-10-CM

## 2025-06-17 DIAGNOSIS — Z00.00 ANNUAL PHYSICAL EXAM: Primary | ICD-10-CM

## 2025-06-17 PROCEDURE — 99213 OFFICE O/P EST LOW 20 MIN: CPT | Performed by: FAMILY MEDICINE

## 2025-06-17 RX ORDER — ACETIC ACID 20.65 MG/ML
4 SOLUTION AURICULAR (OTIC) DAILY
Qty: 15 ML | Refills: 0 | Status: SHIPPED | OUTPATIENT
Start: 2025-06-17

## 2025-06-17 RX ORDER — PANTOPRAZOLE SODIUM 40 MG/1
40 TABLET, DELAYED RELEASE ORAL 2 TIMES DAILY
Qty: 60 TABLET | Refills: 0 | Status: SHIPPED | OUTPATIENT
Start: 2025-06-17

## 2025-06-17 NOTE — PATIENT INSTRUCTIONS
"Patient Education     Routine physical for adults   The Basics   Written by the doctors and editors at [facility]   What is a physical? -- A physical is a routine visit, or \"check-up,\" with your doctor. You might also hear it called a \"wellness visit\" or \"preventive visit.\"  During each visit, the doctor will:   Ask about your physical and mental health   Ask about your habits, behaviors, and lifestyle   Do an exam   Give you vaccines if needed   Talk to you about any medicines you take   Give advice about your health   Answer your questions  Getting regular check-ups is an important part of taking care of your health. It can help your doctor find and treat any problems you have. But it's also important for preventing health problems.  A routine physical is different from a \"sick visit.\" A sick visit is when you see a doctor because of a health concern or problem. Since physicals are scheduled ahead of time, you can think about what you want to ask the doctor.  How often should I get a physical? -- It depends on your age and health. In general, for people age 21 years and older:   If you are younger than 50 years, you might be able to get a physical every 3 years.   If you are 50 years or older, your doctor might recommend a physical every year.  If you have an ongoing health condition, like diabetes or high blood pressure, your doctor will probably want to see you more often.  What happens during a physical? -- In general, each visit will include:   Physical exam - The doctor or nurse will check your height, weight, heart rate, and blood pressure. They will also look at your eyes and ears. They will ask about how you are feeling and whether you have any symptoms that bother you.   Medicines - It's a good idea to bring a list of all the medicines you take to each doctor visit. Your doctor will talk to you about your medicines and answer any questions. Tell them if you are having any side effects that bother you. You " "should also tell them if you are having trouble paying for any of your medicines.   Habits and behaviors - This includes:   Your diet   Your exercise habits   Whether you smoke, drink alcohol, or use drugs   Whether you are sexually active   Whether you feel safe at home  Your doctor will talk to you about things you can do to improve your health and lower your risk of health problems. They will also offer help and support. For example, if you want to quit smoking, they can give you advice and might prescribe medicines. If you want to improve your diet or get more physical activity, they can help you with this, too.   Lab tests, if needed - The tests you get will depend on your age and situation. For example, your doctor might want to check your:   Cholesterol   Blood sugar   Iron level   Vaccines - The recommended vaccines will depend on your age, health, and what vaccines you already had. Vaccines are very important because they can prevent certain serious or deadly infections.   Discussion of screening - \"Screening\" means checking for diseases or other health problems before they cause symptoms. Your doctor can recommend screening based on your age, risk, and preferences. This might include tests to check for:   Cancer, such as breast, prostate, cervical, ovarian, colorectal, prostate, lung, or skin cancer   Sexually transmitted infections, such as chlamydia and gonorrhea   Mental health conditions like depression and anxiety  Your doctor will talk to you about the different types of screening tests. They can help you decide which screenings to have. They can also explain what the results might mean.   Answering questions - The physical is a good time to ask the doctor or nurse questions about your health. If needed, they can refer you to other doctors or specialists, too.  Adults older than 65 years often need other care, too. As you get older, your doctor will talk to you about:   How to prevent falling at " home   Hearing or vision tests   Memory testing   How to take your medicines safely   Making sure that you have the help and support you need at home  All topics are updated as new evidence becomes available and our peer review process is complete.  This topic retrieved from [employer] on: May 02, 2024.  Topic 386049 Version 1.0  Release: 32.4.3 - C32.122  © 2024 UpToDate, Inc. and/or its affiliates. All rights reserved.  Consumer Information Use and Disclaimer   Disclaimer: This generalized information is a limited summary of diagnosis, treatment, and/or medication information. It is not meant to be comprehensive and should be used as a tool to help the user understand and/or assess potential diagnostic and treatment options. It does NOT include all information about conditions, treatments, medications, side effects, or risks that may apply to a specific patient. It is not intended to be medical advice or a substitute for the medical advice, diagnosis, or treatment of a health care provider based on the health care provider's examination and assessment of a patient's specific and unique circumstances. Patients must speak with a health care provider for complete information about their health, medical questions, and treatment options, including any risks or benefits regarding use of medications. This information does not endorse any treatments or medications as safe, effective, or approved for treating a specific patient. UpToDate, Inc. and its affiliates disclaim any warranty or liability relating to this information or the use thereof.The use of this information is governed by the Terms of Use, available at https://www.woltersLockheed Martinuwer.com/en/know/clinical-effectiveness-terms. 2024© UpToDate, Inc. and its affiliates and/or licensors. All rights reserved.  Copyright   © 2024 UpToDate, Inc. and/or its affiliates. All rights reserved.

## 2025-06-17 NOTE — PROGRESS NOTES
Adult Annual Physical  Name: Lashaun Menjivar      : 1965      MRN: [id number]  Encounter Provider: Rubens Fuentes DO  Encounter Date: 2025   Encounter department: [facility]    :  Assessment & Plan  Pain in joint of left knee             Preventive Screenings:  - Diabetes Screening: screening not indicated  - Cholesterol Screening: orders placed   - Hepatitis C screening: screening up-to-date   - HIV screening: screening not indicated   - Cervical cancer screening: screening up-to-date   - Breast cancer screening: screening up-to-date   - Colon cancer screening: screening up-to-date   - Lung cancer screening: screening not indicated     Immunizations:  - Immunizations due: Prevnar 20, Tdap and Zoster (Shingrix)      Depression Screening and Follow-up Plan: Patient was screened for depression during today's encounter. They screened negative with a PHQ-2 score of 0.          History of Present Illness     Adult Annual Physical:  Patient presents for annual physical.     Diet and Physical Activity:  - Diet/Nutrition: well balanced diet.  - Exercise: walking.    Depression Screening:  - PHQ-2 Score: 0    General Health:  - Sleep: 7-8 hours of sleep on average.  - Hearing: normal hearing bilateral ears.  - Vision: most recent eye exam > 1 year ago.  - Dental: brushes teeth twice daily and no dental visits for > 1 year.    /GYN Health:  - Follows with GYN: yes.   - Menopause: postmenopausal.   - History of STDs: no  - Contraception: menopause.      Review of Systems   Constitutional:  Negative for activity change, appetite change, diaphoresis, fatigue and fever.   HENT: Negative.     Eyes: Negative.    Respiratory:  Negative for apnea, cough, chest tightness, shortness of breath and wheezing.    Cardiovascular:  Negative for chest pain, palpitations and leg swelling.   Gastrointestinal:  Negative for abdominal distention, abdominal pain, anal bleeding, constipation, diarrhea, nausea and  "vomiting.   Endocrine: Negative for cold intolerance, heat intolerance, polydipsia, polyphagia and polyuria.   Genitourinary:  Negative for difficulty urinating, dysuria, flank pain, hematuria and urgency.   Musculoskeletal:  Negative for arthralgias, back pain, gait problem, joint swelling and myalgias.   Skin:  Negative for color change, rash and wound.   Allergic/Immunologic: Negative for environmental allergies, food allergies and immunocompromised state.   Neurological:  Negative for dizziness, seizures, syncope, speech difficulty, numbness and headaches.   Hematological:  Negative for adenopathy. Does not bruise/bleed easily.   Psychiatric/Behavioral:  Negative for agitation, behavioral problems, hallucinations, sleep disturbance and suicidal ideas.          Objective   /92   Pulse 74   Temp 97.9 °F (36.6 °C)   Ht 5' 2\" (1.575 m)   Wt 101 kg (223 lb)   LMP  (LMP Unknown)   SpO2 96%   BMI 40.79 kg/m²     Physical Exam  Constitutional:       Appearance: She is well-developed.   HENT:      Head: Normocephalic and atraumatic.      Right Ear: External ear normal.      Left Ear: External ear normal.      Nose: Nose normal.     Eyes:      Conjunctiva/sclera: Conjunctivae normal.      Pupils: Pupils are equal, round, and reactive to light.       Cardiovascular:      Rate and Rhythm: Normal rate and regular rhythm.      Heart sounds: Normal heart sounds. No murmur heard.     No friction rub.   Pulmonary:      Effort: Pulmonary effort is normal. No respiratory distress.      Breath sounds: Normal breath sounds. No wheezing or rales.   Chest:      Chest wall: No tenderness.   Abdominal:      General: Bowel sounds are normal.      Palpations: Abdomen is soft.     Musculoskeletal:         General: Normal range of motion.      Cervical back: Normal range of motion and neck supple.     Skin:     General: Skin is warm and dry.      Capillary Refill: Capillary refill takes 2 to 3 seconds.     Neurological:      " Mental Status: She is alert and oriented to person, place, and time.     Psychiatric:         Behavior: Behavior normal.         Thought Content: Thought content normal.         Judgment: Judgment normal.

## 2025-06-25 ENCOUNTER — TELEPHONE (OUTPATIENT)
Age: 60
End: 2025-06-25

## 2025-06-26 LAB
25(OH)D3+25(OH)D2 SERPL-MCNC: 22 NG/ML (ref 30–100)
ALBUMIN SERPL-MCNC: 4.2 G/DL (ref 3.5–5.7)
ALP SERPL-CCNC: 84 U/L (ref 35–120)
ALT SERPL-CCNC: 20 U/L
ANION GAP SERPL CALCULATED.3IONS-SCNC: 9 MMOL/L (ref 3–11)
AST SERPL-CCNC: 20 U/L
BILIRUB SERPL-MCNC: 2.4 MG/DL (ref 0.2–1)
BUN SERPL-MCNC: 14 MG/DL (ref 7–25)
CA-I SERPL ISE-MCNC: 1.24 MMOL/L (ref 1.15–1.33)
CALCIUM SERPL-MCNC: 9.2 MG/DL (ref 8.5–10.5)
CHLORIDE SERPL-SCNC: 104 MMOL/L (ref 100–109)
CHOLEST SERPL-MCNC: 163 MG/DL
CHOLEST/HDLC SERPL: 2.4 {RATIO}
CO2 SERPL-SCNC: 31 MMOL/L (ref 21–31)
CREAT SERPL-MCNC: 0.59 MG/DL (ref 0.4–1.1)
CYTOLOGY CMNT CVX/VAG CYTO-IMP: ABNORMAL
GFR/BSA.PRED SERPLBLD CYS-BASED-ARV: 102 ML/MIN/{1.73_M2}
GLUCOSE SERPL-MCNC: 90 MG/DL (ref 65–99)
HDLC SERPL-MCNC: 67 MG/DL (ref 23–92)
LDLC SERPL CALC-MCNC: 69 MG/DL
NONHDLC SERPL-MCNC: 96 MG/DL
POTASSIUM SERPL-SCNC: 3.8 MMOL/L (ref 3.5–5.2)
PROT SERPL-MCNC: 7 G/DL (ref 6.3–8.3)
SODIUM SERPL-SCNC: 144 MMOL/L (ref 135–145)
TRIGL SERPL-MCNC: 137 MG/DL

## 2025-07-09 DIAGNOSIS — R19.7 DIARRHEA, UNSPECIFIED TYPE: ICD-10-CM

## 2025-07-09 RX ORDER — COLESTIPOL HYDROCHLORIDE 1 G/1
2 TABLET ORAL 3 TIMES DAILY
Qty: 180 TABLET | Refills: 0 | Status: SHIPPED | OUTPATIENT
Start: 2025-07-09

## 2025-07-15 ENCOUNTER — APPOINTMENT (OUTPATIENT)
Dept: RADIOLOGY | Facility: MEDICAL CENTER | Age: 60
End: 2025-07-15
Payer: COMMERCIAL

## 2025-07-15 DIAGNOSIS — G89.29 CHRONIC PAIN OF LEFT KNEE: Primary | ICD-10-CM

## 2025-07-15 DIAGNOSIS — M25.562 CHRONIC PAIN OF LEFT KNEE: ICD-10-CM

## 2025-07-15 DIAGNOSIS — M25.562 CHRONIC PAIN OF LEFT KNEE: Primary | ICD-10-CM

## 2025-07-15 DIAGNOSIS — G89.29 CHRONIC PAIN OF LEFT KNEE: ICD-10-CM

## 2025-07-15 PROCEDURE — 73562 X-RAY EXAM OF KNEE 3: CPT

## 2025-07-19 DIAGNOSIS — R10.9 ABDOMINAL PAIN: ICD-10-CM

## 2025-07-19 DIAGNOSIS — R93.3 ABNORMAL CT SCAN, SMALL BOWEL: ICD-10-CM

## 2025-07-21 DIAGNOSIS — M17.10 PRIMARY OSTEOARTHRITIS OF KNEE, UNSPECIFIED LATERALITY: Primary | ICD-10-CM

## 2025-07-21 RX ORDER — PANTOPRAZOLE SODIUM 40 MG/1
40 TABLET, DELAYED RELEASE ORAL 2 TIMES DAILY
Qty: 60 TABLET | Refills: 1 | Status: SHIPPED | OUTPATIENT
Start: 2025-07-21

## 2025-08-12 ENCOUNTER — OFFICE VISIT (OUTPATIENT)
Dept: OBGYN CLINIC | Facility: CLINIC | Age: 60
End: 2025-08-12
Attending: FAMILY MEDICINE
Payer: COMMERCIAL

## 2025-08-16 DIAGNOSIS — R19.7 DIARRHEA, UNSPECIFIED TYPE: ICD-10-CM

## 2025-08-18 RX ORDER — COLESTIPOL HYDROCHLORIDE 1 G/1
2 TABLET ORAL 3 TIMES DAILY
Qty: 180 TABLET | Refills: 0 | Status: SHIPPED | OUTPATIENT
Start: 2025-08-18

## (undated) DEVICE — SUT SILK 3-0 SH CR/8 18 IN C013D

## (undated) DEVICE — PROXIMATE LINEAR CUTTER RELOADS (STANDARD)-100MM: Brand: PROXIMATE

## (undated) DEVICE — ENSEAL 20 CM SHAFT, LARGE JAW: Brand: ENSEAL X1

## (undated) DEVICE — PROXIMATE RELOADABLE LINEAR CUTTER WITH SAFETY LOCK-OUT, 75MM: Brand: PROXIMATE

## (undated) DEVICE — TELFA NON-ADHERENT ABSORBENT DRESSING: Brand: TELFA

## (undated) DEVICE — SUT PDS II 1 XLH 96 IN LOOPED Z881G

## (undated) DEVICE — SUT VICRYL 2-0 REEL 54 IN J286G

## (undated) DEVICE — TOWEL SET X-RAY

## (undated) DEVICE — TRAY FOLEY 16FR URIMETER SURESTEP

## (undated) DEVICE — PROXIMATE RELOADABLE LINEAR CUTTER WITH SAFETY LOCK-OUT, 100MM: Brand: PROXIMATE

## (undated) DEVICE — GAUZE SPONGES,16 PLY: Brand: CURITY

## (undated) DEVICE — PLUMEPEN PRO 10FT

## (undated) DEVICE — GLOVE SRG BIOGEL ECLIPSE 7

## (undated) DEVICE — STERILE MAJOR GENERAL PACK: Brand: CARDINAL HEALTH

## (undated) DEVICE — GLOVE SRG BIOGEL 8

## (undated) DEVICE — POOLE SUCTION HANDLE: Brand: CARDINAL HEALTH

## (undated) DEVICE — CATH URETERAL 5FR X 70 CM FLEX TIP POLYUR BARD

## (undated) DEVICE — 3000CC GUARDIAN II: Brand: GUARDIAN

## (undated) DEVICE — SUT SILK 3-0 SH 30 IN K832H

## (undated) DEVICE — TUBING SUCTION 5MM X 12 FT

## (undated) DEVICE — INTENDED FOR TISSUE SEPARATION, AND OTHER PROCEDURES THAT REQUIRE A SHARP SURGICAL BLADE TO PUNCTURE OR CUT.: Brand: BARD-PARKER SAFETY BLADES SIZE 10, STERILE

## (undated) DEVICE — LUBRICANT SURGILUBE TUBE 4 OZ  FLIP TOP

## (undated) DEVICE — SUT SILK 2-0 TIES 144 IN LA55G

## (undated) DEVICE — Device: Brand: DEFENDO AIR/WATER/SUCTION AND BIOPSY VALVE

## (undated) DEVICE — PROXIMATE PLUS MD MULTI-DIRECTIONAL RELEASE SKIN STAPLERS CONTAINS 35 STAINLESS STEEL STAPLES APPROXIMATE CLOSED DIMENSIONS: 6.9MM X 3.9MM WIDE: Brand: PROXIMATE

## (undated) DEVICE — NON-WOVEN ADHESIVE WOUND DRESSING: Brand: PRIMAPORE ADHESIVE DRESSING 30*10CM

## (undated) DEVICE — 2000CC GUARDIAN II: Brand: GUARDIAN

## (undated) DEVICE — GLOVE INDICATOR PI UNDERGLOVE SZ 7 BLUE

## (undated) DEVICE — ROSEBUD DISSECTORS: Brand: DEROYAL

## (undated) DEVICE — FORCEPS BIOPSY ALLIGATOR JAW W/NEEDLE 2.8MM

## (undated) DEVICE — SUT SILK 2-0 SH 30 IN K833H

## (undated) DEVICE — SINGLE-USE POLYPECTOMY SNARE: Brand: SENSATION SHORT THROW

## (undated) DEVICE — URO CATCHER BAG STERILE 0-UC32

## (undated) DEVICE — CONMED SCOPE SAVER BITE BLOCK, 20X27 MM: Brand: SCOPE SAVER

## (undated) DEVICE — 3M™ IOBAN™ 2 ANTIMICROBIAL INCISE DRAPE 6650EZ: Brand: IOBAN™ 2

## (undated) DEVICE — CO2 AND WATER TUBING/CAP SET FOR OLYMPUS® SCOPES & UCR: Brand: ERBE

## (undated) DEVICE — CONTOUR CURVED CUTTER STAPLER: Brand: CONTOUR

## (undated) DEVICE — GLOVE SRG BIOGEL ORTHOPEDIC 7.5

## (undated) DEVICE — JACKSON-PRATT 100CC BULB RESERVOIR: Brand: CARDINAL HEALTH

## (undated) DEVICE — 3M™ TEGADERM™ TRANSPARENT FILM DRESSING FRAME STYLE, 1628, 6 IN X 8 IN (15 CM X 20 CM), 10/CT 8CT/CASE: Brand: 3M™ TEGADERM™

## (undated) DEVICE — MEDI-VAC YANK SUCT HNDL W/TPRD BULBOUS TIP: Brand: CARDINAL HEALTH

## (undated) DEVICE — LIGACLIP MCA MULTIPLE CLIP APPLIERS, 20 LARGE CLIPS: Brand: LIGACLIP

## (undated) DEVICE — CURITY IDOFORM PACKING STRIP: Brand: CURITY

## (undated) DEVICE — DRESSING MEPILEX AG BORDER 4 X 12 IN

## (undated) DEVICE — SUT VICRYL 3-0 SH 27 IN J416H

## (undated) DEVICE — ENDOSCOPIC CURVED INTRALUMINAL STAPLER (ILS) 24 TITANIUM ADJUSTABLE HEIGHT STAPLES

## (undated) DEVICE — PROXIMATE LINEAR CUTTER RELOAD, BLUE, 75MM: Brand: PROXIMATE

## (undated) DEVICE — CONTOUR CURVED CUTTER STAPLER RELOAD: Brand: CONTOUR

## (undated) DEVICE — DRESSING MEPILEX AG BORDER 4 X 8 IN

## (undated) DEVICE — SUT PDS II 2-0 SH 27 IN Z317H

## (undated) DEVICE — CHLORAPREP HI-LITE 26ML ORANGE

## (undated) DEVICE — JP PERF DRN SIL FLT 10MM FULL: Brand: CARDINAL HEALTH

## (undated) DEVICE — INTENDED FOR TISSUE SEPARATION, AND OTHER PROCEDURES THAT REQUIRE A SHARP SURGICAL BLADE TO PUNCTURE OR CUT.: Brand: BARD-PARKER SAFETY BLADES SIZE 15, STERILE

## (undated) DEVICE — TRAVELKIT CONTAINS FIRST STEP KIT (200ML EP-4 KIT) AND SOILED SCOPE BAG - 1 KIT: Brand: TRAVELKIT CONTAINS FIRST STEP KIT AND SOILED SCOPE BAG

## (undated) DEVICE — 1820 FOAM BLOCK NEEDLE COUNTER: Brand: DEVON

## (undated) DEVICE — ABDOMINAL PAD: Brand: DERMACEA

## (undated) DEVICE — MAYO STAND COVER: Brand: CONVERTORS

## (undated) DEVICE — PACK TUR

## (undated) DEVICE — SPY ELITE SINGLE VIAL KIT

## (undated) DEVICE — SUT PROLENE 2-0 SH 36 IN 8523H

## (undated) DEVICE — PACK PBDS STERILE LAP LITHOTOMY RF